# Patient Record
Sex: MALE | Race: WHITE | NOT HISPANIC OR LATINO | Employment: OTHER | URBAN - NONMETROPOLITAN AREA
[De-identification: names, ages, dates, MRNs, and addresses within clinical notes are randomized per-mention and may not be internally consistent; named-entity substitution may affect disease eponyms.]

---

## 2021-06-23 ENCOUNTER — HOSPITAL ENCOUNTER (OUTPATIENT)
Facility: HOSPITAL | Age: 76
Setting detail: OBSERVATION
Discharge: HOME | End: 2021-06-24
Attending: EMERGENCY MEDICINE | Admitting: INTERNAL MEDICINE
Payer: COMMERCIAL

## 2021-06-23 DIAGNOSIS — Z79.4 TYPE 2 DIABETES MELLITUS WITH HYPERGLYCEMIA, WITH LONG-TERM CURRENT USE OF INSULIN (HCC): ICD-10-CM

## 2021-06-23 DIAGNOSIS — E87.6 HYPOKALEMIA: Primary | ICD-10-CM

## 2021-06-23 DIAGNOSIS — R94.31 ABNORMAL EKG: ICD-10-CM

## 2021-06-23 DIAGNOSIS — E11.65 TYPE 2 DIABETES MELLITUS WITH HYPERGLYCEMIA, WITH LONG-TERM CURRENT USE OF INSULIN (HCC): ICD-10-CM

## 2021-06-23 PROBLEM — E11.9 DIABETES MELLITUS, TYPE 2 (HCC): Status: ACTIVE | Noted: 2021-06-23

## 2021-06-23 PROBLEM — I10 ESSENTIAL HYPERTENSION: Status: ACTIVE | Noted: 2021-06-23

## 2021-06-23 LAB
ANION GAP SERPL CALCULATED.3IONS-SCNC: 5 MMOL/L (ref 4–13)
ATRIAL RATE: 97 BPM
BASOPHILS # BLD AUTO: 0.04 THOUSANDS/ΜL (ref 0–0.1)
BASOPHILS NFR BLD AUTO: 1 % (ref 0–1)
BUN SERPL-MCNC: 15 MG/DL (ref 5–25)
CALCIUM SERPL-MCNC: 9 MG/DL (ref 8.3–10.1)
CHLORIDE SERPL-SCNC: 104 MMOL/L (ref 100–108)
CO2 SERPL-SCNC: 34 MMOL/L (ref 21–32)
CREAT SERPL-MCNC: 0.84 MG/DL (ref 0.6–1.3)
EOSINOPHIL # BLD AUTO: 0.14 THOUSAND/ΜL (ref 0–0.61)
EOSINOPHIL NFR BLD AUTO: 2 % (ref 0–6)
ERYTHROCYTE [DISTWIDTH] IN BLOOD BY AUTOMATED COUNT: 15.3 % (ref 11.6–15.1)
GFR SERPL CREATININE-BSD FRML MDRD: 86 ML/MIN/1.73SQ M
GLUCOSE SERPL-MCNC: 225 MG/DL (ref 65–140)
GLUCOSE SERPL-MCNC: 264 MG/DL (ref 65–140)
HCT VFR BLD AUTO: 40.8 % (ref 36.5–49.3)
HGB BLD-MCNC: 12.9 G/DL (ref 12–17)
IMM GRANULOCYTES # BLD AUTO: 0.02 THOUSAND/UL (ref 0–0.2)
IMM GRANULOCYTES NFR BLD AUTO: 0 % (ref 0–2)
LYMPHOCYTES # BLD AUTO: 1.36 THOUSANDS/ΜL (ref 0.6–4.47)
LYMPHOCYTES NFR BLD AUTO: 23 % (ref 14–44)
MAGNESIUM SERPL-MCNC: 2 MG/DL (ref 1.6–2.6)
MCH RBC QN AUTO: 27.7 PG (ref 26.8–34.3)
MCHC RBC AUTO-ENTMCNC: 31.6 G/DL (ref 31.4–37.4)
MCV RBC AUTO: 88 FL (ref 82–98)
MONOCYTES # BLD AUTO: 0.59 THOUSAND/ΜL (ref 0.17–1.22)
MONOCYTES NFR BLD AUTO: 10 % (ref 4–12)
NEUTROPHILS # BLD AUTO: 3.89 THOUSANDS/ΜL (ref 1.85–7.62)
NEUTS SEG NFR BLD AUTO: 64 % (ref 43–75)
NRBC BLD AUTO-RTO: 0 /100 WBCS
P AXIS: 65 DEGREES
PLATELET # BLD AUTO: 252 THOUSANDS/UL (ref 149–390)
PMV BLD AUTO: 8.6 FL (ref 8.9–12.7)
POTASSIUM SERPL-SCNC: 2.4 MMOL/L (ref 3.5–5.3)
PR INTERVAL: 176 MS
QRS AXIS: 29 DEGREES
QRSD INTERVAL: 82 MS
QT INTERVAL: 372 MS
QTC INTERVAL: 472 MS
RBC # BLD AUTO: 4.65 MILLION/UL (ref 3.88–5.62)
SODIUM SERPL-SCNC: 143 MMOL/L (ref 136–145)
T WAVE AXIS: 62 DEGREES
VENTRICULAR RATE: 97 BPM
WBC # BLD AUTO: 6.04 THOUSAND/UL (ref 4.31–10.16)

## 2021-06-23 PROCEDURE — 80048 BASIC METABOLIC PNL TOTAL CA: CPT | Performed by: EMERGENCY MEDICINE

## 2021-06-23 PROCEDURE — 82948 REAGENT STRIP/BLOOD GLUCOSE: CPT

## 2021-06-23 PROCEDURE — 96365 THER/PROPH/DIAG IV INF INIT: CPT

## 2021-06-23 PROCEDURE — 99220 PR INITIAL OBSERVATION CARE/DAY 70 MINUTES: CPT | Performed by: NURSE PRACTITIONER

## 2021-06-23 PROCEDURE — 99285 EMERGENCY DEPT VISIT HI MDM: CPT | Performed by: EMERGENCY MEDICINE

## 2021-06-23 PROCEDURE — 96366 THER/PROPH/DIAG IV INF ADDON: CPT

## 2021-06-23 PROCEDURE — 93010 ELECTROCARDIOGRAM REPORT: CPT | Performed by: INTERNAL MEDICINE

## 2021-06-23 PROCEDURE — 83735 ASSAY OF MAGNESIUM: CPT | Performed by: EMERGENCY MEDICINE

## 2021-06-23 PROCEDURE — 99285 EMERGENCY DEPT VISIT HI MDM: CPT

## 2021-06-23 PROCEDURE — 85025 COMPLETE CBC W/AUTO DIFF WBC: CPT | Performed by: EMERGENCY MEDICINE

## 2021-06-23 PROCEDURE — 83036 HEMOGLOBIN GLYCOSYLATED A1C: CPT | Performed by: NURSE PRACTITIONER

## 2021-06-23 PROCEDURE — 93005 ELECTROCARDIOGRAM TRACING: CPT

## 2021-06-23 PROCEDURE — 36415 COLL VENOUS BLD VENIPUNCTURE: CPT | Performed by: EMERGENCY MEDICINE

## 2021-06-23 RX ORDER — PRAVASTATIN SODIUM 40 MG
40 TABLET ORAL
Status: DISCONTINUED | OUTPATIENT
Start: 2021-06-24 | End: 2021-06-24 | Stop reason: HOSPADM

## 2021-06-23 RX ORDER — INSULIN GLARGINE 100 [IU]/ML
10 INJECTION, SOLUTION SUBCUTANEOUS EVERY MORNING
Status: DISCONTINUED | OUTPATIENT
Start: 2021-06-24 | End: 2021-06-24 | Stop reason: HOSPADM

## 2021-06-23 RX ORDER — POTASSIUM CHLORIDE 14.9 MG/ML
20 INJECTION INTRAVENOUS ONCE
Status: COMPLETED | OUTPATIENT
Start: 2021-06-23 | End: 2021-06-23

## 2021-06-23 RX ORDER — ONDANSETRON 2 MG/ML
4 INJECTION INTRAMUSCULAR; INTRAVENOUS EVERY 6 HOURS PRN
Status: DISCONTINUED | OUTPATIENT
Start: 2021-06-23 | End: 2021-06-24 | Stop reason: HOSPADM

## 2021-06-23 RX ORDER — ACETAMINOPHEN 325 MG/1
650 TABLET ORAL EVERY 6 HOURS PRN
Status: DISCONTINUED | OUTPATIENT
Start: 2021-06-23 | End: 2021-06-24 | Stop reason: HOSPADM

## 2021-06-23 RX ORDER — ROSUVASTATIN CALCIUM 20 MG/1
20 TABLET, COATED ORAL DAILY
COMMUNITY

## 2021-06-23 RX ORDER — POTASSIUM CHLORIDE 20 MEQ/1
40 TABLET, EXTENDED RELEASE ORAL
Status: COMPLETED | OUTPATIENT
Start: 2021-06-23 | End: 2021-06-23

## 2021-06-23 RX ORDER — LISINOPRIL 2.5 MG/1
2.5 TABLET ORAL DAILY
COMMUNITY

## 2021-06-23 RX ORDER — INSULIN GLARGINE 100 [IU]/ML
10 INJECTION, SOLUTION SUBCUTANEOUS
Status: ON HOLD | COMMUNITY
End: 2021-06-24 | Stop reason: SDUPTHER

## 2021-06-23 RX ORDER — LISINOPRIL 5 MG/1
2.5 TABLET ORAL DAILY
Status: DISCONTINUED | OUTPATIENT
Start: 2021-06-24 | End: 2021-06-24 | Stop reason: HOSPADM

## 2021-06-23 RX ADMIN — POTASSIUM CHLORIDE 40 MEQ: 20 TABLET, EXTENDED RELEASE ORAL at 20:40

## 2021-06-23 RX ADMIN — POTASSIUM CHLORIDE 20 MEQ: 14.9 INJECTION, SOLUTION INTRAVENOUS at 17:39

## 2021-06-23 RX ADMIN — POTASSIUM CHLORIDE 40 MEQ: 20 TABLET, EXTENDED RELEASE ORAL at 22:17

## 2021-06-23 RX ADMIN — INSULIN LISPRO 2 UNITS: 100 INJECTION, SOLUTION INTRAVENOUS; SUBCUTANEOUS at 22:17

## 2021-06-23 RX ADMIN — POTASSIUM CHLORIDE 20 MEQ: 14.9 INJECTION, SOLUTION INTRAVENOUS at 19:46

## 2021-06-24 VITALS
OXYGEN SATURATION: 95 % | TEMPERATURE: 98 F | HEART RATE: 90 BPM | RESPIRATION RATE: 16 BRPM | WEIGHT: 151.24 LBS | DIASTOLIC BLOOD PRESSURE: 89 MMHG | SYSTOLIC BLOOD PRESSURE: 135 MMHG | BODY MASS INDEX: 21.65 KG/M2 | HEIGHT: 70 IN

## 2021-06-24 PROBLEM — R94.31 ABNORMAL EKG: Status: ACTIVE | Noted: 2021-06-24

## 2021-06-24 PROBLEM — E87.0 HYPERNATREMIA: Status: ACTIVE | Noted: 2021-06-24

## 2021-06-24 PROBLEM — E11.65 TYPE 2 DIABETES MELLITUS WITH HYPERGLYCEMIA, WITH LONG-TERM CURRENT USE OF INSULIN (HCC): Status: ACTIVE | Noted: 2021-06-23

## 2021-06-24 PROBLEM — Z79.4 TYPE 2 DIABETES MELLITUS WITH HYPERGLYCEMIA, WITH LONG-TERM CURRENT USE OF INSULIN (HCC): Status: ACTIVE | Noted: 2021-06-23

## 2021-06-24 LAB
ALBUMIN SERPL BCP-MCNC: 3.5 G/DL (ref 3.5–5)
ALP SERPL-CCNC: 102 U/L (ref 46–116)
ALT SERPL W P-5'-P-CCNC: 20 U/L (ref 12–78)
ANION GAP SERPL CALCULATED.3IONS-SCNC: 5 MMOL/L (ref 4–13)
AST SERPL W P-5'-P-CCNC: 15 U/L (ref 5–45)
BILIRUB SERPL-MCNC: 0.48 MG/DL (ref 0.2–1)
BUN SERPL-MCNC: 11 MG/DL (ref 5–25)
CALCIUM SERPL-MCNC: 8.7 MG/DL (ref 8.3–10.1)
CHLORIDE SERPL-SCNC: 110 MMOL/L (ref 100–108)
CO2 SERPL-SCNC: 31 MMOL/L (ref 21–32)
CREAT SERPL-MCNC: 0.8 MG/DL (ref 0.6–1.3)
ERYTHROCYTE [DISTWIDTH] IN BLOOD BY AUTOMATED COUNT: 15.5 % (ref 11.6–15.1)
EST. AVERAGE GLUCOSE BLD GHB EST-MCNC: 217 MG/DL
GFR SERPL CREATININE-BSD FRML MDRD: 88 ML/MIN/1.73SQ M
GLUCOSE P FAST SERPL-MCNC: 155 MG/DL (ref 65–99)
GLUCOSE SERPL-MCNC: 137 MG/DL (ref 65–140)
GLUCOSE SERPL-MCNC: 155 MG/DL (ref 65–140)
HBA1C MFR BLD: 9.2 %
HCT VFR BLD AUTO: 41.2 % (ref 36.5–49.3)
HGB BLD-MCNC: 13 G/DL (ref 12–17)
MAGNESIUM SERPL-MCNC: 2.1 MG/DL (ref 1.6–2.6)
MCH RBC QN AUTO: 27.7 PG (ref 26.8–34.3)
MCHC RBC AUTO-ENTMCNC: 31.6 G/DL (ref 31.4–37.4)
MCV RBC AUTO: 88 FL (ref 82–98)
PHOSPHATE SERPL-MCNC: 2.8 MG/DL (ref 2.3–4.1)
PLATELET # BLD AUTO: 209 THOUSANDS/UL (ref 149–390)
PMV BLD AUTO: 8.4 FL (ref 8.9–12.7)
POTASSIUM SERPL-SCNC: 3.5 MMOL/L (ref 3.5–5.3)
PROT SERPL-MCNC: 7 G/DL (ref 6.4–8.2)
RBC # BLD AUTO: 4.69 MILLION/UL (ref 3.88–5.62)
SODIUM SERPL-SCNC: 146 MMOL/L (ref 136–145)
WBC # BLD AUTO: 6.35 THOUSAND/UL (ref 4.31–10.16)

## 2021-06-24 PROCEDURE — 85027 COMPLETE CBC AUTOMATED: CPT | Performed by: NURSE PRACTITIONER

## 2021-06-24 PROCEDURE — 84100 ASSAY OF PHOSPHORUS: CPT | Performed by: NURSE PRACTITIONER

## 2021-06-24 PROCEDURE — 82948 REAGENT STRIP/BLOOD GLUCOSE: CPT

## 2021-06-24 PROCEDURE — 83735 ASSAY OF MAGNESIUM: CPT | Performed by: NURSE PRACTITIONER

## 2021-06-24 PROCEDURE — 80053 COMPREHEN METABOLIC PANEL: CPT | Performed by: NURSE PRACTITIONER

## 2021-06-24 PROCEDURE — 99217 PR OBSERVATION CARE DISCHARGE MANAGEMENT: CPT | Performed by: INTERNAL MEDICINE

## 2021-06-24 RX ORDER — POTASSIUM CHLORIDE 20 MEQ/1
40 TABLET, EXTENDED RELEASE ORAL ONCE
Status: COMPLETED | OUTPATIENT
Start: 2021-06-24 | End: 2021-06-24

## 2021-06-24 RX ORDER — POTASSIUM CHLORIDE 20 MEQ/1
20 TABLET, EXTENDED RELEASE ORAL DAILY
Qty: 4 TABLET | Refills: 0 | Status: SHIPPED | OUTPATIENT
Start: 2021-06-25 | End: 2021-06-29

## 2021-06-24 RX ORDER — INSULIN GLARGINE 100 [IU]/ML
10 INJECTION, SOLUTION SUBCUTANEOUS DAILY
Qty: 10 ML | Refills: 0
Start: 2021-06-24

## 2021-06-24 RX ADMIN — POTASSIUM CHLORIDE 40 MEQ: 20 TABLET, EXTENDED RELEASE ORAL at 08:13

## 2021-06-24 RX ADMIN — LISINOPRIL 2.5 MG: 5 TABLET ORAL at 08:14

## 2021-06-24 RX ADMIN — INSULIN GLARGINE 10 UNITS: 100 INJECTION, SOLUTION SUBCUTANEOUS at 08:14

## 2021-06-24 NOTE — ASSESSMENT & PLAN NOTE
Blood pressure currently stable  Admit to [facility]-telemetry-observation  Continue prior to admission lisinopril  Trend blood pressure per protocol

## 2021-06-24 NOTE — PLAN OF CARE
Problem: Potential for Falls  Goal: Patient will remain free of falls  Description: INTERVENTIONS:  - Educate patient/family on patient safety including physical limitations  - Instruct patient to call for assistance with activity   - Consult OT/PT to assist with strengthening/mobility   - Keep Call bell within reach  - Keep bed low and locked with side rails adjusted as appropriate  - Keep care items and personal belongings within reach  - Initiate and maintain comfort rounds  - Apply yellow socks and bracelet for high fall risk patients  - Consider moving patient to room near nurses station  6/23/2021 2242 by Renetta Zazueta RN  Outcome: Progressing  6/23/2021 2242 by Renetta Zazueta RN  Outcome: Progressing     Problem: METABOLIC, FLUID AND ELECTROLYTES - ADULT  Goal: Electrolytes maintained within normal limits  Description: INTERVENTIONS:  - Monitor labs and assess patient for signs and symptoms of electrolyte imbalances  - Administer electrolyte replacement as ordered  - Monitor response to electrolyte replacements, including repeat lab results as appropriate  - Instruct patient on fluid and nutrition as appropriate  6/23/2021 2242 by Renetta Zazueta RN  Outcome: Progressing  6/23/2021 2242 by Renetta Zazueta RN  Outcome: Progressing  Goal: Glucose maintained within target range  Description: INTERVENTIONS:  - Monitor Blood Glucose as ordered  - Assess for signs and symptoms of hyperglycemia and hypoglycemia  - Administer ordered medications to maintain glucose within target range  - Assess nutritional intake and initiate nutrition service referral as needed  6/23/2021 2242 by Renetta Zazueta RN  Outcome: Progressing  6/23/2021 2242 by Renetta Zazueta RN  Outcome: Progressing     Problem: CARDIOVASCULAR - ADULT  Goal: Absence of cardiac dysrhythmias or at baseline rhythm  Description: INTERVENTIONS:  - Continuous cardiac monitoring, vital signs, obtain 12 lead EKG if ordered  - Administer antiarrhythmic and heart rate control medications as ordered  - Monitor electrolytes and administer replacement therapy as ordered  Outcome: Progressing

## 2021-06-24 NOTE — DISCHARGE INSTRUCTIONS
Hypokalemia   WHAT YOU NEED TO KNOW:   Hypokalemia is a low level of potassium in your blood  Potassium helps control how your muscles, heart, and digestive system work  Hypokalemia occurs when your body loses too much potassium or does not absorb enough from food  DISCHARGE INSTRUCTIONS:   Seek care immediately if:   · You cannot move your arm or leg  · You have a fast or irregular heartbeat  · You are too tired or weak to stand up  Contact your healthcare provider if:   · You are vomiting, or you have diarrhea  · You have numbness or tingling in your arms or legs  · Your symptoms do not go away or they get worse  · You have questions or concerns about your condition or care  Medicines:   · Potassium  will be given to bring your potassium levels back to normal     · Take your medicine as directed  Contact your healthcare provider if you think your medicine is not helping or if you have side effects  Tell him of her if you are allergic to any medicine  Keep a list of the medicines, vitamins, and herbs you take  Include the amounts, and when and why you take them  Bring the list or the pill bottles to follow-up visits  Carry your medicine list with you in case of an emergency  Eat foods that are high in potassium:  Foods that are high in potassium include bananas, oranges, tomatoes, potatoes, and avocado  [place] beans, turkey, salmon, lean beef, yogurt, and milk are also high in potassium  Ask your healthcare provider or dietitian for more information about foods that are high in potassium  Follow up with your healthcare provider as directed:  Write down your questions so you remember to ask them during your visits  © Copyright [address] Information is for End User's use only and may not be sold, redistributed or otherwise used for commercial purposes   All illustrations and images included in CareNotes® are the copyrighted property of A D A M , Inc  or [employer]  The above information is an  only  It is not intended as medical advice for individual conditions or treatments  Talk to your doctor, nurse or pharmacist before following any medical regimen to see if it is safe and effective for you  Hypokalemia   WHAT YOU NEED TO KNOW:   What is hypokalemia? Hypokalemia is a low level of potassium in your blood  Potassium helps control how your muscles, heart, and digestive system work  Hypokalemia occurs when your body loses too much potassium or does not absorb enough from food  What causes hypokalemia? · Diarrhea or vomiting    · Medicines, such as diuretics, blood pressure medicines, or antibiotics    · Excessive use of laxatives    · Anorexia or bulimia nervosa    · Medical conditions, such as Cushing syndrome or kidney disease    · Not eating enough foods that contain potassium    What are the signs and symptoms of hypokalemia? You may not have any signs or symptoms if you have mild hypokalemia  You may have any of the following if it is more severe:  · Fatigue    · Constipation    · Frequent urination or urinating large amounts    · Muscle cramps or skin tingling    · Muscle weakness    · Fast or irregular heartbeat    How is hypokalemia diagnosed? · An EKG  test records your heart rhythm and how fast your heart beats  It is used to check for an irregular heartbeat  · Blood tests  are done to check your potassium level  How is hypokalemia treated? You will receive potassium to bring your levels back to normal  This may be given as a pill or IV  The amount of potassium you will be given depends on your potassium level  What foods are high in potassium? Foods that are high in potassium include bananas, oranges, tomatoes, potatoes, and avocado  [place] beans, turkey, salmon, lean beef, yogurt, and milk are also high in potassium  Ask your healthcare provider or dietitian for more information about foods that are high in potassium  When should I seek immediate care? · You cannot move your arm or leg  · You have a fast or irregular heartbeat  · You are too tired or weak to stand up  When should I contact my healthcare provider? · You are vomiting, or you have diarrhea  · You have numbness or tingling in your arms or legs  · Your symptoms do not go away or they get worse  · You have questions or concerns about your condition or care  CARE AGREEMENT:   You have the right to help plan your care  Learn about your health condition and how it may be treated  Discuss treatment options with your healthcare providers to decide what care you want to receive  You always have the right to refuse treatment  The above information is an  only  It is not intended as medical advice for individual conditions or treatments  Talk to your doctor, nurse or pharmacist before following any medical regimen to see if it is safe and effective for you  © Copyright [address] Information is for End User's use only and may not be sold, redistributed or otherwise used for commercial purposes   All illustrations and images included in CareNotes® are the copyrighted property of A D A M , Inc  or [address] [place] [address]

## 2021-06-24 NOTE — NURSING NOTE
Went over AVS with patient  Verbalized understanding of discharge instructions including lab orders for 6/28  Left walking down to private vehicle

## 2021-06-24 NOTE — H&P
[facility] [address] [place]  H&P- Zeb George 1945, 77 y o  male MRN: [id number]  Unit/Bed#: RM01 Encounter: [id number]  Primary Care Provider: No primary care provider on file  Date and time admitted to hospital: 6/23/2021  4:06 PM    * Hypokalemia  Assessment & Plan  As evidence by a potassium level of 2 4  Patient was given potassium IV piggyback 20 mEq x2 in the ER  Will replete with 40 mEq times to p o  Admit to med surge with telemetry-observation  EKG reviewed  Monitor for signs and symptoms of arrhythmias  Patient denies any current symptoms  Trend BMP    Essential hypertension  Assessment & Plan  Blood pressure currently stable  Admit to med surge-telemetry-observation  Continue prior to admission lisinopril  Trend blood pressure per protocol    Diabetes mellitus, type 2 ([facility] [address] )  Assessment & Plan  No results found for: HGBA1C    No results for input(s): POCGLU in the last 72 hours  Blood Sugar Average: Last 72 hrs:    Collect New A1c  Accu-Cheks a c  Hs  Insulin sliding scale  Continue prior to admission long-acting Lantus 10 units q a m  VTE Prophylaxis: Low risk activity as tolerated  / reason for no mechanical VTE prophylaxis Low risk activity as tolerated   Code Status:  Full  POLST: POLST is not applicable to this patient    Anticipated Length of Stay:  Patient will be admitted on an Observation basis with an anticipated length of stay of  Less than 2 midnights  Justification for Hospital Stay:  Hypokalemia    Total Time for Visit, including Counseling / Coordination of Care: 45 minutes  Greater than 50% of this total time spent on direct patient counseling and coordination of care  Chief Complaint:   Instructed to come to the emergency room for evaluation of abnormal lab value-low potassium    History of Present Illness:    Zeb George is a 77 y o  male presented to the emergency room per recommendation of telephone conversation held with [facility]    Patient reported abnormal lab value-low potassium  Patient has a history of hypertension, diabetes and hypercholesterolemia  Prior to admission medications reviewed  Patient denies all review of symptoms  Patient denies diet changes, denies new medications, denies supplements and herbs, denies nausea vomiting diarrhea  Denies chest pain denies shortness of breath  Labs were collected emergency room showed potassium level of 2 4  Patient received 20 mEq IV piggyback potassium in ER  Patient be admitted to [facility] with telemetry for observation  Review of Systems:    Review of Systems   All other systems reviewed and are negative  Past Medical and Surgical History:     Past Medical History:   Diagnosis Date    Diabetes mellitus ([facility] [address] )        History reviewed  No pertinent surgical history  Meds/Allergies:    Prior to Admission medications    Medication Sig Start Date End Date Taking? Authorizing Provider   Empagliflozin 25 MG TABS Take 25 mg by mouth daily 4/9/21  Yes Historical Provider, MD   insulin glargine (LANTUS) 100 units/mL subcutaneous injection Inject 10 Units under the skin   Yes Historical Provider, MD   lisinopril (ZESTRIL) 2 5 mg tablet Take 2 5 mg by mouth daily   Yes Historical Provider, MD   rosuvastatin (CRESTOR) 20 MG tablet Take 20 mg by mouth daily   Yes Historical Provider, MD   Empagliflozin (JARDIANCE PO) Take by mouth daily    Historical Provider, MD     I have reviewed home medications using allscripts  Allergies: Allergies   Allergen Reactions    Metformin Diarrhea       Social History:     Marital Status:    Occupation:  Noncontributory    Substance Use History:   Social History     Substance and Sexual Activity   Alcohol Use Never     Social History     Tobacco Use   Smoking Status Former Smoker   Smokeless Tobacco Never Used     Social History     Substance and Sexual Activity   Drug Use Never       Family History:    History reviewed   No pertinent family history  Physical Exam:     Vitals:   Blood Pressure: 159/89 (06/23/21 2000)  Pulse: 85 (06/23/21 2000)  Temperature: 98 9 °F (37 2 °C) (06/23/21 1611)  Temp Source: Temporal (06/23/21 1611)  Respirations: 16 (06/23/21 2000)  Height: 5' 10" (177 8 cm) (06/23/21 1611)  Weight - Scale: 61 2 kg (135 lb) (06/23/21 1611)  SpO2: 95 % (06/23/21 2000)    Physical Exam  Constitutional:       Appearance: Normal appearance  HENT:      Head: Normocephalic and atraumatic  Mouth/Throat:      Pharynx: Oropharynx is clear  Eyes:      Conjunctiva/sclera: Conjunctivae normal       Pupils: Pupils are equal, round, and reactive to light  Cardiovascular:      Rate and Rhythm: Normal rate and regular rhythm  Pulmonary:      Effort: Pulmonary effort is normal       Breath sounds: Normal breath sounds  Abdominal:      General: Abdomen is flat  Bowel sounds are normal       Palpations: Abdomen is soft  Musculoskeletal:         General: No swelling or tenderness  Skin:     General: Skin is warm and dry  Capillary Refill: Capillary refill takes 2 to 3 seconds  Neurological:      General: No focal deficit present  Mental Status: He is alert and oriented to person, place, and time  Psychiatric:         Mood and Affect: Mood normal          Behavior: Behavior normal          Thought Content: Thought content normal          Judgment: Judgment normal          Additional Data:     Lab Results: I have personally reviewed pertinent reports  Results from last 7 days   Lab Units 06/23/21  1648   WBC Thousand/uL 6 04   HEMOGLOBIN g/dL 12 9   HEMATOCRIT % 40 8   PLATELETS Thousands/uL 252   NEUTROS PCT % 64   LYMPHS PCT % 23   MONOS PCT % 10   EOS PCT % 2     Results from last 7 days   Lab Units 06/23/21  1648   POTASSIUM mmol/L 2 4*   CHLORIDE mmol/L 104   CO2 mmol/L 34*   BUN mg/dL 15   CREATININE mg/dL 0 84   CALCIUM mg/dL 9 0           Imaging: I have personally reviewed pertinent reports        No results found     EKG, Pathology, and Other Studies Reviewed on Admission:   · EKG: reviewed    Epic / Care Everywhere Records Reviewed: Yes     ** Please Note: This note has been constructed using a voice recognition system   **

## 2021-06-24 NOTE — ED PROVIDER NOTES
History  Chief Complaint   Patient presents with    Abnormal Lab     [facility] a [facility] of 2 6  patient denies any symptoms     This is a 78-year-old male presents emergency department with abnormal labs  The patient was called today by the [place] and advised to come to the emergency department for a low potassium level  Patient states he has never had this before  He was told that it was 2 6 on outpatient labs  No recent change in medications  He does not take potassium  No chest pain  No palpitations  No shortness of breath  No weakness  No aggravating or alleviating factors  No radiation of symptoms  Labs were drawn this morning  Differential diagnosis includes electrolyte abnormality, lab draw error          Prior to Admission Medications   Prescriptions Last Dose Informant Patient Reported? Taking? Empagliflozin (JARDIANCE PO)   Yes No   Sig: Take by mouth daily   Empagliflozin 25 MG TABS 6/23/2021 at Unknown time  Yes Yes   Sig: Take 25 mg by mouth daily   insulin glargine (LANTUS) 100 units/mL subcutaneous injection   Yes Yes   Sig: Inject 10 Units under the skin   lisinopril (ZESTRIL) 2 5 mg tablet   Yes Yes   Sig: Take 2 5 mg by mouth daily   rosuvastatin (CRESTOR) 20 MG tablet   Yes Yes   Sig: Take 20 mg by mouth daily      Facility-Administered Medications: None       Past Medical History:   Diagnosis Date    Diabetes mellitus ([facility] [address] )        History reviewed  No pertinent surgical history  History reviewed  No pertinent family history  I have reviewed and agree with the history as documented  E-Cigarette/Vaping     E-Cigarette/Vaping Substances     Social History     Tobacco Use    Smoking status: Former Smoker    Smokeless tobacco: Never Used   Substance Use Topics    Alcohol use: Never    Drug use: Never       Review of Systems   Constitutional: Negative for activity change, appetite change and fever  HENT: Negative for congestion      Eyes: Negative for pain and redness  Respiratory: Negative for cough, shortness of breath and wheezing  Cardiovascular: Negative for chest pain and palpitations  Gastrointestinal: Negative for abdominal pain, diarrhea, nausea and vomiting  Endocrine: Negative for polyuria  Genitourinary: Negative for difficulty urinating, dysuria, frequency and urgency  Musculoskeletal: Negative for arthralgias and myalgias  Skin: Negative for color change and rash  Allergic/Immunologic: Negative for immunocompromised state  Neurological: Negative for dizziness, syncope and light-headedness  Hematological: Does not bruise/bleed easily  Psychiatric/Behavioral: Negative for confusion  All other systems reviewed and are negative  Physical Exam  Physical Exam  Vitals and nursing note reviewed  Constitutional:       General: He is not in acute distress  Appearance: He is well-developed  HENT:      Head: Normocephalic and atraumatic  Nose: Nose normal    Eyes:      General: No scleral icterus  Conjunctiva/sclera: Conjunctivae normal    Cardiovascular:      Rate and Rhythm: Normal rate and regular rhythm  Heart sounds: Normal heart sounds  Pulmonary:      Effort: Pulmonary effort is normal  No respiratory distress  Breath sounds: Normal breath sounds  No stridor  No wheezing  Abdominal:      General: There is no distension  Palpations: Abdomen is soft  Tenderness: There is no abdominal tenderness  There is no guarding or rebound  Musculoskeletal:         General: No deformity  Cervical back: Normal range of motion and neck supple  Skin:     General: Skin is warm and dry  Findings: No rash  Neurological:      Mental Status: He is alert and oriented to person, place, and time  Psychiatric:         Thought Content:  Thought content normal          Vital Signs  ED Triage Vitals   Temperature Pulse Respirations Blood Pressure SpO2   06/23/21 1611 06/23/21 1611 06/23/21 1611 06/23/21 1611 06/23/21 1611   98 9 °F (37 2 °C) 102 16 (!) 173/85 96 %      Temp Source Heart Rate Source Patient Position - Orthostatic VS BP Location FiO2 (%)   06/23/21 1611 06/23/21 1611 06/23/21 1611 06/23/21 1611 --   Temporal Monitor Sitting Left arm       Pain Score       06/23/21 2040       No Pain           Vitals:    06/23/21 1900 06/23/21 1930 06/23/21 2000 06/23/21 2036   BP: 162/82 164/88 159/89 160/87   Pulse: 86 88 85 86   Patient Position - Orthostatic VS: Sitting Sitting Sitting Lying         Visual Acuity      ED Medications  Medications   acetaminophen (TYLENOL) tablet 650 mg (has no administration in time range)   ondansetron (ZOFRAN) injection 4 mg (has no administration in time range)   insulin lispro (HumaLOG) 100 units/mL subcutaneous injection 1-5 Units (has no administration in time range)   insulin lispro (HumaLOG) 100 units/mL subcutaneous injection 1-5 Units (2 Units Subcutaneous Given 6/23/21 2217)   pneumococcal 13-valent conjugate vaccine (PREVNAR-13) IM injection 0 5 mL (has no administration in time range)   lisinopril (ZESTRIL) tablet 2 5 mg (has no administration in time range)   pravastatin (PRAVACHOL) tablet 40 mg (has no administration in time range)   insulin glargine (LANTUS) subcutaneous injection 10 Units 0 1 mL (has no administration in time range)   potassium chloride 20 mEq IVPB (premix) (0 mEq Intravenous Stopped 6/23/21 1939)   potassium chloride 20 mEq IVPB (premix) (20 mEq Intravenous New Bag 6/1945)   potassium chloride (K-DUR,KLOR-CON) CR tablet 40 mEq (40 mEq Oral Given 6/23/21 2217)       Diagnostic Studies  Results Reviewed     Procedure Component Value Units Date/Time    Hemoglobin A1c w/EAG Estimation (Orders if not completed within the last 90 days) [[id number]] Collected: 06/23/21 2126    Lab Status:  In process Specimen: Blood from Arm, Left Updated: 06/23/21 2132    Magnesium [[id number]]  (Normal) Collected: 06/23/21 1648    Lab Status: Final result Specimen: Blood from Arm, Right Updated: 06/23/21 1827     Magnesium 2 0 mg/dL     Basic metabolic panel [[id number]]  (Abnormal) Collected: 06/23/21 1648    Lab Status: Final result Specimen: Blood from Arm, Right Updated: 06/23/21 1711     Sodium 143 mmol/L      Potassium 2 4 mmol/L      Chloride 104 mmol/L      CO2 34 mmol/L      ANION GAP 5 mmol/L      BUN 15 mg/dL      Creatinine 0 84 mg/dL      Glucose 264 mg/dL      Calcium 9 0 mg/dL      eGFR 86 ml/min/1 73sq m     Narrative:      National Kidney Disease Foundation guidelines for Chronic Kidney Disease (CKD):     Stage 1 with normal or high GFR (GFR > 90 mL/min/1 73 square meters)    Stage 2 Mild CKD (GFR = 60-89 mL/min/1 73 square meters)    Stage 3A Moderate CKD (GFR = 45-59 mL/min/1 73 square meters)    Stage 3B Moderate CKD (GFR = 30-44 mL/min/1 73 square meters)    Stage 4 Severe CKD (GFR = 15-29 mL/min/1 73 square meters)    Stage 5 End Stage CKD (GFR <15 mL/min/1 73 square meters)  Note: GFR calculation is accurate only with a steady state creatinine    CBC and differential [[id number]]  (Abnormal) Collected: 06/23/21 1648    Lab Status: Final result Specimen: Blood from Arm, Right Updated: 06/23/21 1653     WBC 6 04 Thousand/uL      RBC 4 65 Million/uL      Hemoglobin 12 9 g/dL      Hematocrit 40 8 %      MCV 88 fL      MCH 27 7 pg      MCHC 31 6 g/dL      RDW 15 3 %      MPV 8 6 fL      Platelets 085 Thousands/uL      nRBC 0 /100 WBCs      Neutrophils Relative 64 %      Immat GRANS % 0 %      Lymphocytes Relative 23 %      Monocytes Relative 10 %      Eosinophils Relative 2 %      Basophils Relative 1 %      Neutrophils Absolute 3 89 Thousands/µL      Immature Grans Absolute 0 02 Thousand/uL      Lymphocytes Absolute 1 36 Thousands/µL      Monocytes Absolute 0 59 Thousand/µL      Eosinophils Absolute 0 14 Thousand/µL      Basophils Absolute 0 04 Thousands/µL                  No orders to display              Procedures  ECG 12 Lead Documentation Only    Date/Time: 6/23/2021 4:10 PM  Performed by: Barbara Castillo MD  Authorized by: Barbara Castillo MD     Indications / Diagnosis:  Hypokalemia  ECG reviewed by me, the ED Provider: yes    Patient location:  ED  Rate:     ECG rate assessment: normal    Rhythm:     Rhythm: sinus rhythm    Ectopy:     Ectopy: none    QRS:     QRS intervals:  Normal  Conduction:     Conduction: normal    ST segments:     ST segments:  Non-specific  T waves:     T waves: non-specific               ED Course                             SBIRT 20yo+      Most Recent Value   SBIRT (24 yo +)   In order to provide better care to our patients, we are screening all of our patients for alcohol and drug use  Would it be okay to ask you these screening questions? Yes Filed at: 06/23/2021 1618   Initial Alcohol Screen: US AUDIT-C    1  How often do you have a drink containing alcohol?  0 Filed at: 06/23/2021 1618   2  How many drinks containing alcohol do you have on a typical day you are drinking? 0 Filed at: 06/23/2021 1618   3a  Male UNDER 65: How often do you have five or more drinks on one occasion? 0 Filed at: 06/23/2021 1618   3b  FEMALE Any Age, or MALE 65+: How often do you have 4 or more drinks on one occassion? 0 Filed at: 06/23/2021 1618   Audit-C Score  0 Filed at: 06/23/2021 1618   ELIZABETH: How many times in the past year have you    Used an illegal drug or used a prescription medication for non-medical reasons? Never Filed at: 06/23/2021 1618                    MDM  Number of Diagnoses or Management Options  Diagnosis management comments: Patient with hypokalemia  Requires IV replacement         Amount and/or Complexity of Data Reviewed  Clinical lab tests: ordered and reviewed  Discuss the patient with other providers: yes [facility] admitting)        Disposition  Final diagnoses:   Hypokalemia     Time reflects when diagnosis was documented in both MDM as applicable and the Disposition within this note     Time User Action Codes Description Comment    6/23/2021 10:59 PM Trace Mckeon Add [E87 6] Hypokalemia       ED Disposition     ED Disposition Condition Date/Time Comment    Admit Stable Wed Jun 23, 2021 10:59 PM Case was discussed with [facility] Admitting team and the patient's admission status was agreed to be Admission Status: observation status to the service of SLIM   Follow-up Information    None         Current Discharge Medication List      CONTINUE these medications which have NOT CHANGED    Details   !! Empagliflozin 25 MG TABS Take 25 mg by mouth daily      insulin glargine (LANTUS) 100 units/mL subcutaneous injection Inject 10 Units under the skin      lisinopril (ZESTRIL) 2 5 mg tablet Take 2 5 mg by mouth daily      rosuvastatin (CRESTOR) 20 MG tablet Take 20 mg by mouth daily      !! Empagliflozin (JARDIANCE PO) Take by mouth daily       ! ! - Potential duplicate medications found  Please discuss with provider  No discharge procedures on file      PDMP Review     None          ED Provider  Electronically Signed by           Jean Padilla MD  06/23/21 1611

## 2021-06-24 NOTE — ASSESSMENT & PLAN NOTE
As evidence by a potassium level of 2 4  Patient was given potassium IV piggyback 20 mEq x2 in the ER  Will replete with 40 mEq times to p o    Admit to med surge with telemetry-observation  EKG reviewed  Monitor for signs and symptoms of arrhythmias  Patient denies any current symptoms  Trend BMP

## 2021-06-24 NOTE — CASE MANAGEMENT
Pt being dc'd home today and will be following up with the   [address] (OP clinic in [facility]) after dc as that is where his primary care provider is located

## 2021-06-24 NOTE — ASSESSMENT & PLAN NOTE
No results found for: HGBA1C    No results for input(s): POCGLU in the last 72 hours  Blood Sugar Average: Last 72 hrs:    Collect New A1c  Accu-Cheks a c  Hs  Insulin sliding scale  Continue prior to admission long-acting Lantus 10 units q a m

## 2021-06-25 NOTE — DISCHARGE SUMMARY
[address] [facility] [address]  Discharge- Suma Aguilar 1945, 77 y o  male MRN: [id number]  Unit/Bed#: 401-01 Encounter: [id number]  Primary Care Provider: No primary care provider on file     Date and time admitted to hospital: 6/23/2021  4:06 PM    * Hypokalemia  Assessment & Plan  · Secondary to unclear etiology  · Treated with IV and PO potassium chloride supplementation during the hospitalization, which resolved the hypokalemia  · Discharge on potassium chloride 20 meQ PO Qdaily for 4 days  · Recheck a BMP in 4 days as an outpatient with his PCP to monitor the potassium level  · Outpatient Nephrology evaluation    Results from last 7 days   Lab Units 06/24/21  0532 06/23/21  1648   SODIUM mmol/L 146* 143   POTASSIUM mmol/L 3 5 2 4*   CHLORIDE mmol/L 110* 104   CO2 mmol/L 31 34*   BUN mg/dL 11 15   CREATININE mg/dL 0 80 0 84   CALCIUM mg/dL 8 7 9 0     Results from last 7 days   Lab Units 06/24/21  0531 06/23/21  1648   MAGNESIUM mg/dL 2 1 2 0         Abnormal EKG  Assessment & Plan  · Outpatient Cardiology evaluation for a transthoracic echocardiogram and nuclear stress test    ECG 12 lead  Order: [id number]  Status:  Final result   Visible to patient:  No (inaccessible in [facility])   Next appt:  None   0 Result Notes   Ref Range & Units 6/23/21 1610   Ventricular Rate BPM 97    Atrial Rate BPM 97    ID Interval ms 176    QRSD Interval ms 82    QT Interval ms 372    QTC Interval ms 472    P Axis degrees 65    QRS Axis degrees 29    T Wave Axis degrees 62       Narrative & Impression    Normal sinus rhythm  Septal infarct , age undetermined  Nonspecific ST and T wave abnormality  Abnormal ECG  No previous ECGs available  Confirmed by Nicholas Rubalcava ([id number]) on 6/23/2021 4:55:55 PM      Specimen Collected: 06/23/21 16:10   Last Resulted: 06/23/21 16:55               Hypernatremia  Assessment & Plan  · Mild hypernatremia  · Recheck a BMP in 4 days as an outpatient with his PCP to monitor his sodium level      Results from last 7 days   Lab Units 06/24/21  0532 06/23/21  1648   SODIUM mmol/L 146* 143   POTASSIUM mmol/L 3 5 2 4*   CHLORIDE mmol/L 110* 104   CO2 mmol/L 31 34*   BUN mg/dL 11 15   CREATININE mg/dL 0 80 0 84   CALCIUM mg/dL 8 7 9 0         Essential hypertension  Assessment & Plan  · Continue lisinopril   Outpatient follow-up with PCP in regards to this matter    Type 2 diabetes mellitus with hyperglycemia, with long-term current use of insulin [facility])  Assessment & Plan  Lab Results   Component Value Date    HGBA1C 9 2 (H) 06/23/2021       Recent Labs     06/23/21  2128 06/24/21  0706   POCGLU 225* 137       Blood Sugar Average: Last 72 hrs:  (P) 181    · Continue his insulin regimen and PO diabetic medication regimen upon discharge  · Continue lisinopril for renal protection in the setting of type 2 diabetes mellitus   Outpatient follow-up with PCP in regards to this matter      Medical Problems     Resolved Problems  Date Reviewed: 6/24/2021    None              Discharging Physician / Practitioner: Joel Williamson DO  PCP: No primary care provider on file    Admission Date:   Admission Orders (From admission, onward)     Ordered        06/23/21 2002  Place in Observation  Once                   Discharge Date: 06/24/21    Consultations During Hospital Stay:  · None    Procedures Performed:   · None    Significant Findings / Test Results:   ECG 12 lead  Order: [id number]  Status:  Final result   Visible to patient:  No (inaccessible in [facility])   Next appt:  None   0 Result Notes   Ref Range & Units 6/23/21 1610   Ventricular Rate BPM 97    Atrial Rate BPM 97    ID Interval ms 176    QRSD Interval ms 82    QT Interval ms 372    QTC Interval ms 472    P Axis degrees 65    QRS Axis degrees 29    T Wave Axis degrees 62       Narrative & Impression    Normal sinus rhythm  Septal infarct , age undetermined  Nonspecific ST and T wave abnormality  Abnormal ECG  No previous ECGs available  Confirmed by Renny Downey ([id number]) on 6/23/2021 4:55:55 PM      Specimen Collected: 06/23/21 16:10   Last Resulted: 06/23/21 16:55           Results from last 7 days   Lab Units 06/24/21  0531 06/23/21  1648   WBC Thousand/uL 6 35 6 04   HEMOGLOBIN g/dL 13 0 12 9   HEMATOCRIT % 41 2 40 8   PLATELETS Thousands/uL 209 252     Results from last 7 days   Lab Units 06/24/21  0532 06/23/21  1648   SODIUM mmol/L 146* 143   POTASSIUM mmol/L 3 5 2 4*   CHLORIDE mmol/L 110* 104   CO2 mmol/L 31 34*   BUN mg/dL 11 15   CREATININE mg/dL 0 80 0 84   CALCIUM mg/dL 8 7 9 0     Results from last 7 days   Lab Units 06/24/21  0531 06/23/21  1648   MAGNESIUM mg/dL 2 1 2 0     Results from last 7 days   Lab Units 06/24/21  0532   ALK PHOS U/L 102   ALT U/L 20   AST U/L 15       Incidental Findings:   · None     Test Results Pending at Discharge (will require follow-up):  · None     Outpatient Tests Requested:  · BMP an Magnesium level in 4 days with PCP  · Outpatient follow-up with Cardiology for a transthoracic echocardiogram and nuclear stress test    Complications:  None    Reason for Admission:  Hypokalemia    Hospital Course:   Willie Vera is a 77 y o  male patient who originally presented to the hospital on 6/23/2021 after being found to have severe hypokalemia on outpatient laboratory blood work  Please see above list of diagnoses and related plan for additional information  Condition at Discharge: good    Discharge Day Visit / Exam:   Subjective: The patient was seen and examined  The patient is doing better  No chest pain  No shortness of breath  No abdominal pain  No nausea or vomiting  No diarrhea      Vitals: Blood Pressure: 135/89 (06/24/21 0708)  Pulse: 90 (06/24/21 0708)  Temperature: 98 °F (36 7 °C) (06/24/21 0708)  Temp Source: Oral (06/24/21 0013)  Respirations: 16 (06/24/21 0708)  Height: 5' 10" (177 8 cm) (06/23/21 2036)  Weight - Scale: 68 6 kg (151 lb 3 8 oz) (06/24/21 0600)  SpO2: 95 % (06/24/21 0316)  Exam:   Physical Exam   General:  NAD, follows commands  HEENT:  NC/AT, mucous membranes moist  Neck:  Supple, No JVP elevation  CV:  + S1, + S2, RRR  Pulm:  Lung fields are CTA bilaterally  Abd:  Soft, Non-tender, Non-distended  Ext:  No clubbing/cyanosis/edema  Skin:  No rashes  Neuro:  Awake, alert, oriented  Psych:  Normal mood and affect    Discharge instructions/Information to patient and family:  See after visit summary for information provided to patient and family  Provisions for Follow-Up Care:  See after visit summary for information related to follow-up care and any pertinent home health orders  Disposition:   Home    Planned Readmission:  No     Discharge Statement:  I spent 25 minutes discharging the patient  This time was spent on the day of discharge  I had direct contact with the patient on the day of discharge  Greater than 50% of the total time was spent examining patient, answering all patient questions, arranging and discussing plan of care with patient as well as directly providing post-discharge instructions  Additional time then spent on discharge activities  Discharge Medications:  See after visit summary for reconciled discharge medications provided to patient and/or family        **Please Note: This note may have been constructed using a voice recognition system**

## 2021-06-25 NOTE — ASSESSMENT & PLAN NOTE
· Outpatient Cardiology evaluation for a transthoracic echocardiogram and nuclear stress test    ECG 12 lead  Order: [id number]  Status:  Final result   Visible to patient:  No (inaccessible in [address])   Next appt:  None   0 Result Notes   Ref Range & Units 6/23/21 1610   Ventricular Rate BPM 97    Atrial Rate BPM 97    DE Interval ms 176    QRSD Interval ms 82    QT Interval ms 372    QTC Interval ms 472    P Axis degrees 65    QRS Axis degrees 29    T Wave Axis degrees 62       Narrative & Impression    Normal sinus rhythm  Septal infarct , age undetermined  Nonspecific ST and T wave abnormality  Abnormal ECG  No previous ECGs available  Confirmed by Valarie Pedraza ([id number]) on 6/23/2021 4:55:55 PM      Specimen Collected: 06/23/21 16:10   Last Resulted: 06/23/21 16:55

## 2021-06-25 NOTE — ASSESSMENT & PLAN NOTE
· Mild hypernatremia  · Recheck a BMP in 4 days as an outpatient with his PCP to monitor his sodium level      Results from last 7 days   Lab Units 06/24/21  0532 06/23/21  1648   SODIUM mmol/L 146* 143   POTASSIUM mmol/L 3 5 2 4*   CHLORIDE mmol/L 110* 104   CO2 mmol/L 31 34*   BUN mg/dL 11 15   CREATININE mg/dL 0 80 0 84   CALCIUM mg/dL 8 7 9 0

## 2021-06-25 NOTE — ASSESSMENT & PLAN NOTE
· Secondary to unclear etiology  · Treated with IV and PO potassium chloride supplementation during the hospitalization, which resolved the hypokalemia  · Discharge on potassium chloride 20 meQ PO Qdaily for 4 days  · Recheck a BMP in 4 days as an outpatient with his PCP to monitor the potassium level  · Outpatient Nephrology evaluation    Results from last 7 days   Lab Units 06/24/21  0532 06/23/21  1648   SODIUM mmol/L 146* 143   POTASSIUM mmol/L 3 5 2 4*   CHLORIDE mmol/L 110* 104   CO2 mmol/L 31 34*   BUN mg/dL 11 15   CREATININE mg/dL 0 80 0 84   CALCIUM mg/dL 8 7 9 0     Results from last 7 days   Lab Units 06/24/21  0531 06/23/21  1648   MAGNESIUM mg/dL 2 1 2 0

## 2021-06-25 NOTE — ASSESSMENT & PLAN NOTE
Lab Results   Component Value Date    HGBA1C 9 2 (H) 06/23/2021       Recent Labs     06/23/21 2128 06/24/21  0706   POCGLU 225* 137       Blood Sugar Average: Last 72 hrs:  (P) 181    · Continue his insulin regimen and PO diabetic medication regimen upon discharge  · Continue lisinopril for renal protection in the setting of type 2 diabetes mellitus   Outpatient follow-up with PCP in regards to this matter

## 2021-06-28 ENCOUNTER — APPOINTMENT (OUTPATIENT)
Dept: LAB | Facility: HOSPITAL | Age: 76
End: 2021-06-28
Attending: INTERNAL MEDICINE
Payer: COMMERCIAL

## 2021-06-28 DIAGNOSIS — E87.6 HYPOKALEMIA: ICD-10-CM

## 2021-06-28 LAB
ANION GAP SERPL CALCULATED.3IONS-SCNC: 7 MMOL/L (ref 4–13)
BUN SERPL-MCNC: 16 MG/DL (ref 5–25)
CALCIUM SERPL-MCNC: 8.9 MG/DL (ref 8.3–10.1)
CHLORIDE SERPL-SCNC: 107 MMOL/L (ref 100–108)
CO2 SERPL-SCNC: 30 MMOL/L (ref 21–32)
CREAT SERPL-MCNC: 0.83 MG/DL (ref 0.6–1.3)
GFR SERPL CREATININE-BSD FRML MDRD: 87 ML/MIN/1.73SQ M
GLUCOSE P FAST SERPL-MCNC: 81 MG/DL (ref 65–99)
MAGNESIUM SERPL-MCNC: 1.9 MG/DL (ref 1.6–2.6)
POTASSIUM SERPL-SCNC: 3.5 MMOL/L (ref 3.5–5.3)
SODIUM SERPL-SCNC: 144 MMOL/L (ref 136–145)

## 2021-06-28 PROCEDURE — 36415 COLL VENOUS BLD VENIPUNCTURE: CPT

## 2021-06-28 PROCEDURE — 83735 ASSAY OF MAGNESIUM: CPT

## 2021-06-28 PROCEDURE — 80048 BASIC METABOLIC PNL TOTAL CA: CPT

## 2021-07-09 NOTE — UTILIZATION REVIEW
Observation Admission Authorization Request   NOTIFICATION OF OBSERVATION ADMISSION/OBSERVATION AUTHORIZATION REQUEST   SERVICING FACILITY:   [address] [place] [address]  [address]  Tax ID:  [id number]  NPI: [id number]  Place of Service: On [address] [facility] Code: 22  CPT Code for Observation: CPT   CPT 42907     ATTENDING PROVIDER:  Attending Name and NPI#: David June [[id number]]  Address: [address]  Phone: [phone]     UTILIZATION REVIEW CONTACT:  Tevin Villalta, Utilization   Network Utilization Review Department  Phone: [phone]  Fax [phone]  Email: [email or web address]     PHYSICIAN ADVISORY SERVICES:  FOR WAHN-RG-VA[facility] REVIEW - MEDICAL NECESSITY DENIAL  Phone: [phone]  Fax: [phone]  Email: [email or web address]     TYPE OF REQUEST:  Observation Status     ADMISSION INFORMATION:  ADMISSION DATE/TIME: 06/23/21 @ 2002  PATIENT DIAGNOSIS CODE/DESCRIPTION:  Abnormal laboratory test result [R89 9]  DISCHARGE DATE/TIME: 6/24/2021  8:45 AM  DISCHARGE DISPOSITION (IF DISCHARGED): Home/Self Care     IMPORTANT INFORMATION:  Please contact the Tevin Villalta directly with any questions or concerns regarding this request  Department voicemails are confidential     Send requests for admission clinical reviews, concurrent reviews, approvals, and administrative denials due to lack of clinical to fax [phone]

## 2022-02-24 ENCOUNTER — EVALUATION (OUTPATIENT)
Dept: PHYSICAL THERAPY | Facility: CLINIC | Age: 77
End: 2022-02-24
Payer: COMMERCIAL

## 2022-02-24 DIAGNOSIS — M62.81 ABNORMAL GAIT DUE TO MUSCLE WEAKNESS: Primary | ICD-10-CM

## 2022-02-24 DIAGNOSIS — R26.9 ABNORMAL GAIT DUE TO MUSCLE WEAKNESS: Primary | ICD-10-CM

## 2022-02-24 PROCEDURE — 97161 PT EVAL LOW COMPLEX 20 MIN: CPT

## 2022-02-24 PROCEDURE — 97116 GAIT TRAINING THERAPY: CPT

## 2022-02-24 PROCEDURE — 97110 THERAPEUTIC EXERCISES: CPT

## 2022-02-24 PROCEDURE — 97535 SELF CARE MNGMENT TRAINING: CPT

## 2022-02-24 NOTE — PROGRESS NOTES
PT Evaluation     Today's date: 2022  Patient name: Mley Oleary  : [birth year]  MRN: [id number]  Referring provider: Grace Ramsey MD  Dx:   Encounter Diagnosis     ICD-10-CM    1  Abnormal gait due to muscle weakness  M62 81     R26 9        Start Time: 0900  Stop Time: 1000  Total time in clinic (min): 60 minutes    Assessment  Assessment details: Patient is a 76 yo male with medical diagnosis of gait abnormality  Following a thorough physical therapy evaluation, the patient demonstrates significant weakness in b/l LEs (R>L), impaired balance, difficulty c/ transfers and impaired gait  These findings place patient at a significantly increased risk of falls, which is further supported by his time c/ five time sit to stand and TUG test times  Patient demonstrates some instability c/ use of SPC, but reports he is unable to use a RW when taking his dog outside (which he does frequently t/o the day)  He reports the dog would pull the walker if he was tied to it  Trialed ambulation c/ RW in clinic, which improved patient stability  Recommended patient use RW, patient verbalized understanding but intends to continue c/ SPC  Patient has a (+) history of falls and reports he does not seek medical care after they occur even if he experiences head trauma  Educated patient about importance of seeking medical care following falls, especially when they involve head trauma due to risk of brain bleeds and other injuries to brain  Patient verbalized understanding of education provided  Patient will benefit from skilled physical therapy treatment to address the aforementioned impairments and functional deficits, accomplish patient goals and decrease patient's risk of falls     Impairments: abnormal gait, abnormal movement, impaired balance and impaired physical strength  Understanding of Dx/Px/POC: good   Prognosis: good    Goals  STG (3 weeks):  Patient will demonstrate improved five time sit to stand time by 4"   Patient will demonstrate increased LE strength by 1/2 grade  LTG (6 weeks):  Patient will demonstrate five time sit to stand <13  6" to demonstrate decreased risk of falls and disability  Patient will demonstrate TUG score of <=9" to promote decreased risk of falls  Patient will demonstrate 4+/5 LE strength to demonstrate increased strength and stability during ambulation  Patient will self-report 70% improvement in his overall stability during ambulation  Patient will be independent with HEP in 6 weeks to allow independent management of condition  Plan  Plan details: TE, NMR, TA, MT, self-care, and modalities as needed in order to progress through skilled PT focused on ROM, strength, balance, motor control  Patient would benefit from: skilled physical therapy  Planned modality interventions: cryotherapy and thermotherapy: hydrocollator packs  Planned therapy interventions: joint mobilization, manual therapy, massage, neuromuscular re-education, patient education, self care, strengthening, stretching, therapeutic activities, therapeutic exercise, home exercise program, gait training and balance  Frequency: 2x week  Duration in weeks: 6  Treatment plan discussed with: patient        Subjective Evaluation    History of Present Illness  Mechanism of injury: Patient is a 78 yo male presenting with abnormal gait and recurrent falls  Patient reports onset of unsteadiness over the course of the past year, worsened during suspected bout of COVID in early November 2021  While sick, he reports weakness to the point of crawling on the floor, but that has subsided  He uses [facility] for all ambulation at this point, except occasionally in his apartment  He has a large dog that pulls him  Patient reports his last fall was sometime in January 2022, notes he doesn't take record of his falls, just checks to make sure nothing feels more painful  Reports dizziness upon position change often   Patient's primary goals for PT are to decrease his falls  Pain  No pain reported    Social Support  Steps to enter house: yes (Has elevator, occasionally completes 2 flights of stairs c/ u/l HR)  Stairs in house: no   Lives in: apartment (3rd floor in high rise)  Lives with: alone    Employment status: not working  Patient Goals  Patient goals for therapy: increased strength, return to sport/leisure activities and improved balance          Objective     Ambulation     Comments   Observation:     -Gait: Ambulates c/ SPC on L c/ wide DORA and increased lateral sway t/o, unsteady     -Transfers:            -Sit to Stand: Requires us of b/l UEs (handrail and SPC), unsteady    Special Tests:  5xSTS: 33"  TU"    Balance s/ UE support:  CBOS: 7"   NBOS: 5"    Hip Strength (MMT Grades):  FLX (Seated, R/L): 3 / 3+  ABD (Seated, R/L): 3+ / 3+  ADD (Seated, R/L): 3+ / 4-    Knee Strength (MMT Grades):  EXT (R/L): 3 / 3  FLX (R/L): 3 / 3    Ankle Strength (MMT Grades):  DF (R/L): 3- / 3-  PF (R/L): 3- / 3-             Precautions: FALL RISK      Date        Manuals                        Neuro Re-Ed        NBOS        Tandem        SLS        Sidestepping        Ball Squeeze        S/Lying Clams                                Ther Ex        Gastroc Stretch        Heel Slides        LAQ        SLR        TKE        Step Flexion        HR/TR 2x10       Stand: Abd/Ext 2x10       Stand: March/ 2x10       Leg Press        NuStep                Ther Activity        Step-ups: Fwd        Bridge        Sit to [facility] [employer]        Obstacle Course                                Gait Training        Ambulation c/ RW '       Stairs                Modalities        CP                Self Care: Recommended patient use RW, patient verbalized understanding but intends to continue c/ SPC  Patient has a (+) history of falls and reports he does not seek medical care after they occur even if he experiences head trauma   Educated patient about importance of seeking medical care following falls, especially when they involve head trauma due to risk of brain bleeds and other injuries to brain  Patient verbalized understanding of education provided

## 2022-02-24 NOTE — LETTER
2022    MD Dinah Hall 78  [address] [facility],[address]  [place] [address]    Patient: Rebecca Torres   YOB: 1945   Date of Visit: 2022     Encounter Diagnosis     ICD-10-CM    1  Abnormal gait due to muscle weakness  M62 81     R26 9        Dear Dr Ehsan Pantoja:    Thank you for your recent referral of Rebecca Torres  Please review the attached evaluation summary from Marco's recent visit  Please verify that you agree with the plan of care by signing the attached order  If you have any questions or concerns, please do not hesitate to call  I sincerely appreciate the opportunity to share in the care of one of your patients and hope to have another opportunity to work with you in the near future  Sincerely,    Esther Barnard, PT      Referring Provider:      I certify that I have read the below Plan of Care and certify the need for these services furnished under this plan of treatment while under my care  MD Dinah Hall 78  [address] [address]  [address] [place] [address]  Via Fax: [phone]          PT Evaluation     Today's date: 2022  Patient name: Rebecca Torres  :   MRN: [id number]  Referring provider: Martha Pickard MD  Dx:   Encounter Diagnosis     ICD-10-CM    1  Abnormal gait due to muscle weakness  M62 81     R26 9        Start Time: 0900  Stop Time: 1000  Total time in clinic (min): 60 minutes    Assessment  Assessment details: Patient is a 76 yo male with medical diagnosis of gait abnormality  Following a thorough physical therapy evaluation, the patient demonstrates significant weakness in b/l LEs (R>L), impaired balance, difficulty c/ transfers and impaired gait  These findings place patient at a significantly increased risk of falls, which is further supported by his time c/ five time sit to stand and TUG test times   Patient demonstrates some instability c/ use of SPC, but reports he is unable to use a RW when taking his dog outside (which he does frequently t/o the day)  He reports the dog would pull the walker if he was tied to it  Trialed ambulation c/ RW in clinic, which improved patient stability  Recommended patient use RW, patient verbalized understanding but intends to continue c/ SPC  Patient has a (+) history of falls and reports he does not seek medical care after they occur even if he experiences head trauma  Educated patient about importance of seeking medical care following falls, especially when they involve head trauma due to risk of brain bleeds and other injuries to brain  Patient verbalized understanding of education provided  Patient will benefit from skilled physical therapy treatment to address the aforementioned impairments and functional deficits, accomplish patient goals and decrease patient's risk of falls  Impairments: abnormal gait, abnormal movement, impaired balance and impaired physical strength  Understanding of Dx/Px/POC: good   Prognosis: good    Goals  STG (3 weeks):  Patient will demonstrate improved five time sit to stand time by 4"  Patient will demonstrate increased LE strength by 1/2 grade  LTG (6 weeks):  Patient will demonstrate five time sit to stand <13  6" to demonstrate decreased risk of falls and disability  Patient will demonstrate TUG score of <=9" to promote decreased risk of falls  Patient will demonstrate 4+/5 LE strength to demonstrate increased strength and stability during ambulation  Patient will self-report 70% improvement in his overall stability during ambulation  Patient will be independent with HEP in 6 weeks to allow independent management of condition  Plan  Plan details: TE, NMR, TA, MT, self-care, and modalities as needed in order to progress through skilled PT focused on ROM, strength, balance, motor control     Patient would benefit from: skilled physical therapy  Planned modality interventions: cryotherapy and thermotherapy: hydrocollator packs  Planned therapy interventions: joint mobilization, manual therapy, massage, neuromuscular re-education, patient education, self care, strengthening, stretching, therapeutic activities, therapeutic exercise, home exercise program, gait training and balance  Frequency: 2x week  Duration in weeks: 6  Treatment plan discussed with: patient        Subjective Evaluation    History of Present Illness  Mechanism of injury: Patient is a 78 yo male presenting with abnormal gait and recurrent falls  Patient reports onset of unsteadiness over the course of the past year, worsened during suspected bout of COVID in early 2021  While sick, he reports weakness to the point of crawling on the floor, but that has subsided  He uses [facility] for all ambulation at this point, except occasionally in his apartment  He has a large dog that pulls him  Patient reports his last fall was sometime in 2022, notes he doesn't take record of his falls, just checks to make sure nothing feels more painful  Reports dizziness upon position change often  Patient's primary goals for PT are to decrease his falls     Pain  No pain reported    Social Support  Steps to enter house: yes (Has elevator, occasionally completes 2 flights of stairs c/ u/l HR)  Stairs in house: no   Lives in: apartment (3rd floor in high rise)  Lives with: alone    Employment status: not working  Patient Goals  Patient goals for therapy: increased strength, return to sport/leisure activities and improved balance          Objective     Ambulation     Comments   Observation:     -Gait: Ambulates c/ SPC on L c/ wide DORA and increased lateral sway t/o, unsteady     -Transfers:            -Sit to Stand: Requires us of b/l UEs (handrail and SPC), unsteady    Special Tests:  5xSTS: 33"  TU"    Balance s/ UE support:  CBOS: 7"   NBOS: 5"    Hip Strength (MMT Grades):  FLX (Seated, R/L): 3 / 3+  ABD (Seated, R/L): 3+ / 3+  ADD (Seated, R/L): 3+ / 4-    Knee Strength (MMT Grades):  EXT (R/L): 3 / 3  FLX (R/L): 3 / 3    Ankle Strength (MMT Grades):  DF (R/L): 3- / 3-  PF (R/L): 3- / 3-             Precautions: FALL RISK      Date 2/24       Manuals                        Neuro Re-Ed        NBOS        Tandem        SLS        Sidestepping        Ball Squeeze        S/Lying Clams                                Ther Ex        Gastroc Stretch        Heel Slides        LAQ        SLR        TKE        Step Flexion        HR/TR 2x10       Stand: Abd/Ext 2x10       Stand: March/HC 2x10       Leg Press        NuStep                Ther Activity        Step-ups: Fwd        Bridge        Sit to [facility]        Obstacle Course                                Gait Training        Ambulation c/ RW '       Stairs                Modalities        CP                Self Care: Recommended patient use RW, patient verbalized understanding but intends to continue c/ SPC  Patient has a (+) history of falls and reports he does not seek medical care after they occur even if he experiences head trauma  Educated patient about importance of seeking medical care following falls, especially when they involve head trauma due to risk of brain bleeds and other injuries to brain  Patient verbalized understanding of education provided

## 2022-03-01 ENCOUNTER — OFFICE VISIT (OUTPATIENT)
Dept: PHYSICAL THERAPY | Facility: CLINIC | Age: 77
End: 2022-03-01
Payer: COMMERCIAL

## 2022-03-01 DIAGNOSIS — R26.9 ABNORMAL GAIT DUE TO MUSCLE WEAKNESS: Primary | ICD-10-CM

## 2022-03-01 DIAGNOSIS — M62.81 ABNORMAL GAIT DUE TO MUSCLE WEAKNESS: Primary | ICD-10-CM

## 2022-03-01 PROCEDURE — 97112 NEUROMUSCULAR REEDUCATION: CPT

## 2022-03-01 PROCEDURE — 97110 THERAPEUTIC EXERCISES: CPT

## 2022-03-01 PROCEDURE — 97530 THERAPEUTIC ACTIVITIES: CPT

## 2022-03-01 NOTE — PROGRESS NOTES
Daily Note     Today's date: 3/1/2022  Patient name: Chris Muse  :   MRN: [id number]  Referring provider: Dirk Zarate MD  Dx:   Encounter Diagnosis     ICD-10-CM    1  Abnormal gait due to muscle weakness  M62 81     R26 9        Start Time: 9620  Stop Time: 5221  Total time in clinic (min): 56 minutes    Subjective: Patient reports continued weakness and instability in his LEs, but denies any falls  Objective: See treatment diary below      Assessment: Tolerated treatment well  Patient demonstrated fatigue post treatment, exhibited good technique with therapeutic exercises and would benefit from continued PT  Required verbal cues to engage quadriceps during quad set and SLR  Uses UEs for support consistently  Provided CGA/touchA t/o session to ensure safety and stability  Patient required rest breaks t/o session secondary to fatigue, will assess for muscle soreness and post-exercise fatigue next session  Educated patient about what to expect c/ DOMs, patient verbalized understanding  Plan: Continue per plan of care        Precautions: FALL RISK      Date 2/24 3/1      Manuals                        Neuro Re-Ed        NBOS        Tandem Walk  5 laps //      SLS        Sidestepping  3 laps //      [employer] Set  10"x10      Ball Squeeze  5"x20      S/Lying Clams                                Ther Ex        Gastroc Stretch        Heel Slides        LAQ  2# 2x10      HCs  L1 2x10      SLR  2x10      TKE        Step Flexion        HR/TR 2x10 3x10      Stand: Abd/Ext 2x10 2x10      Stand: March/HC 2x10 2x10      Leg Press        NuStep  L4 10'              Ther Activity        Step-ups: Fwd        Bridge        Sit to [employer]  1x10 Foam      Wall Squat        Obstacle Course                                Gait Training        Ambulation c/ RW '       Stairs                Modalities        CP

## 2022-03-04 ENCOUNTER — OFFICE VISIT (OUTPATIENT)
Dept: PHYSICAL THERAPY | Facility: CLINIC | Age: 77
End: 2022-03-04
Payer: COMMERCIAL

## 2022-03-04 DIAGNOSIS — R26.9 ABNORMAL GAIT DUE TO MUSCLE WEAKNESS: Primary | ICD-10-CM

## 2022-03-04 DIAGNOSIS — M62.81 ABNORMAL GAIT DUE TO MUSCLE WEAKNESS: Primary | ICD-10-CM

## 2022-03-04 PROCEDURE — 97110 THERAPEUTIC EXERCISES: CPT

## 2022-03-04 PROCEDURE — 97112 NEUROMUSCULAR REEDUCATION: CPT

## 2022-03-04 PROCEDURE — 97530 THERAPEUTIC ACTIVITIES: CPT

## 2022-03-04 NOTE — PROGRESS NOTES
Daily Note     Today's date: 3/4/2022  Patient name: Adwoa Berry  : [birth year]  MRN: [id number]  Referring provider: Gabriel Chan MD  Dx:   Encounter Diagnosis     ICD-10-CM    1  Abnormal gait due to muscle weakness  M62 81     R26 9        Start Time: 1000  Stop Time: 1100  Total time in clinic (min): 60 minutes    Subjective: Patient reports he was sore following his last appt  Denies any falls  Objective: See treatment diary below      Assessment: Tolerated treatment well  Patient demonstrated fatigue post treatment, exhibited good technique with therapeutic exercises and would benefit from continued PT  Patient demonstrates improved gait mechanics upon presentation to PT this morning  Incorporated static tandem balance, which patient was able to complete c/ occasional reaching for // bars  Plan: Continue per plan of care        Precautions: FALL RISK      Date 2/24 3/1 3/4     Manuals                        Neuro Re-Ed        NBOS   nv     Tandem   30"x3 ea //     Tandem Walk  5 laps // 5 laps //     SLS        Sidestepping  3 laps // L1 3 laps //     [employer] Set  10"x10 10"x10     Ball Squeeze  5"x20 5"x20     S/Lying Clams                                Ther Ex        Gastroc Stretch        Heel Slides        LAQ  2# 2x10 2# 2x10     HCs  L1 2x10 L1 2x10     SLR  2x10      TKE        Step Flexion        HR/TR 2x10 3x10 3x10     Stand: Abd/Ext 2x10 2x10 2x10     Stand: March/HC 2x10 2x10 2x10     Leg Press        NuStep  L4 10' L4 10'             Ther Activity        Step-ups: Fwd   4" 2x10     Bridge        Sit to Stands  1x10 Foam 1x10 Foam     Wall Squat        Obstacle Course                                Gait Training        Ambulation c/ RW '       Stairs                Modalities        CP

## 2022-03-08 ENCOUNTER — OFFICE VISIT (OUTPATIENT)
Dept: PHYSICAL THERAPY | Facility: CLINIC | Age: 77
End: 2022-03-08
Payer: COMMERCIAL

## 2022-03-08 DIAGNOSIS — R26.9 ABNORMAL GAIT DUE TO MUSCLE WEAKNESS: Primary | ICD-10-CM

## 2022-03-08 DIAGNOSIS — M62.81 ABNORMAL GAIT DUE TO MUSCLE WEAKNESS: Primary | ICD-10-CM

## 2022-03-08 PROCEDURE — 97530 THERAPEUTIC ACTIVITIES: CPT

## 2022-03-08 PROCEDURE — 97112 NEUROMUSCULAR REEDUCATION: CPT

## 2022-03-08 PROCEDURE — 97110 THERAPEUTIC EXERCISES: CPT

## 2022-03-08 NOTE — PROGRESS NOTES
Daily Note     Today's date: 3/8/2022  Patient name: Mely Oleary  :   MRN: [id number]  Referring provider: Grace Ramsey MD  Dx:   Encounter Diagnosis     ICD-10-CM    1  Abnormal gait due to muscle weakness  M62 81     R26 9        Start Time: 0800  Stop Time: 0900  Total time in clinic (min): 60 minutes    Subjective: Patient reports no increase in muscle soreness following last visit  He reports he fell onto his dog's bed on Saturday while working on AV cables at his TV, denies an head impact, LOC, or pain since fall  Objective: See treatment diary below      Assessment: Tolerated treatment well  Patient demonstrated fatigue post treatment, exhibited good technique with therapeutic exercises and would benefit from continued PT  Patient demonstrates progress in his balance since IE as evidenced by his ability to perform NBOS on foam for 30" s/ UE support  Progressed step height to 6", patient completed all repetitions c/ only slight increase in muscular fatigue  Plan: Continue per plan of care  Progress balance exercises  Precautions: FALL RISK      Date 2/24 3/1 3/4 3/7    Manuals                        Neuro Re-Ed        NBOS   nv Foam30"x3    Tandem   30"x3 ea // 30"x3 ea  //    Tandem Walk  5 laps // 5 laps // 5 laps //    SLS        Sidestepping  3 laps // L1 3 laps // L1 4 laps //    Quad Set  10"x10 10"x10 10"x10    Ball Squeeze  5"x20 5"x20 5"x20    S/Lying Clams    L2 2x10ea                              Ther Ex        Gastroc Stretch        Heel Slides        LAQ  2# 2x10 2# 2x10 2# 2x10    HCs  L1 2x10 L1 2x10 L2 2x10    SLR  2x10      TKE    L2 2x10    Step Flexion        HR/TR 2x10 3x10 3x10 3x10    Stand: Abd/Ext 2x10 2x10 2x10 2x10    Stand: March/HC 2x10 2x10 2x10 2x10    Leg Press        NuStep: S7, A9  L4 10' L4 10' L4 10'            Ther Activity        Step-ups: Fwd   4" 2x10 6" 2x10    Bridge        Sit to Stands  1x10 Foam 1x10 Foam 1x10 Foam    Wall Squat Obstacle Course                                Gait Training        Ambulation c/ RW '       Stairs                Modalities        CP

## 2022-03-11 ENCOUNTER — OFFICE VISIT (OUTPATIENT)
Dept: PHYSICAL THERAPY | Facility: CLINIC | Age: 77
End: 2022-03-11
Payer: COMMERCIAL

## 2022-03-11 DIAGNOSIS — R26.9 ABNORMAL GAIT DUE TO MUSCLE WEAKNESS: Primary | ICD-10-CM

## 2022-03-11 DIAGNOSIS — M62.81 ABNORMAL GAIT DUE TO MUSCLE WEAKNESS: Primary | ICD-10-CM

## 2022-03-11 PROCEDURE — 97110 THERAPEUTIC EXERCISES: CPT

## 2022-03-11 PROCEDURE — 97112 NEUROMUSCULAR REEDUCATION: CPT

## 2022-03-11 PROCEDURE — 97530 THERAPEUTIC ACTIVITIES: CPT

## 2022-03-11 NOTE — PROGRESS NOTES
Daily Note     Today's date: 3/11/2022  Patient name: Faye Cuellar  :   MRN: [id number]  Referring provider: Haydee Aguilar MD  Dx:   Encounter Diagnosis     ICD-10-CM    1  Abnormal gait due to muscle weakness  M62 81     R26 9        Start Time: 0800  Stop Time: 09  Total time in clinic (min): 62 minutes    Subjective: Patient reports less muscle soreness following last visit, denies any falls since prior to his last PT session  Objective: See treatment diary below      Assessment: Tolerated treatment well  Patient demonstrated fatigue post treatment, exhibited good technique with therapeutic exercises and would benefit from continued PT  Patient demonstrates improved technique and decreased fatigue c/ sit to stands  Incorporated obstacle course and stop/go gait to improve balance c/ sudden movements  Patient required CGA, but was able to self-correct 3 mild LOB  Plan: Continue per plan of care  Precautions: FALL RISK      Date 2/24 3/1 3/4 3/7 3/10   Manuals                        Neuro Re-Ed        NBOS   nv Foam30"x3 nv   NBOS c/ perturbations     nv   Tandem   30"x3 ea // 30"x3 ea  // 30"x3ea //   Tandem Walk  5 laps // 5 laps // 5 laps // 5 laps //   SLS        Sidestepping  3 laps // L1 3 laps // L1 4 laps // L1 4 laps //   Quad Set  10"x10 10"x10 10"x10 10"x10   Ball Squeeze  5"x20 5"x20 5"x20 5"x20   S/Lying Clams    L2 2x10ea   L3 2x10ea                           Ther Ex        Gastroc Stretch        Heel Slides        LAQ  2# 2x10 2# 2x10 2# 2x10 3# 2x10   HCs  L1 2x10 L1 2x10 L2 2x10 L3 2x10   SLR  2x10      TKE    L2 2x10 nv   Step Flexion        HR/TR 2x10 3x10 3x10 3x10 3x10   Stand: Abd/Ext 2x10 2x10 2x10 2x10 2x10   Stand: March/HC 2x10 2x10 2x10 2x10 2x10   Leg Press        NuStep: S9, A9  L4 10' L4 10' L4 10' L4 10'           Ther Activity        Step-ups: Fwd   4" 2x10 6" 2x10 6" 2x10   Bridge        Sit to Stands  1x10 Foam 1x10 Foam 1x10 Foam 2x10 Foam   Wall Squat        Obstacle Course: [place], [place], Foam     20'x4   Stop/Go Gait     20' x4                   Gait Training        Ambulation c/ RW '       Stairs                Modalities        CP                    Access Code: [email or web address]  URL: [email or web address]  Date: 03/11/2022  Prepared by: Fernandez     Exercises  · Supine Quad Set - 1 x daily - 7 x weekly - 10 reps - 10 seconds hold  · Supine Active Straight Leg Raise - 1 x daily - 7 x weekly - 2 sets - 10 reps  · Seated Long Arc Quad - 1 x daily - 7 x weekly - 2 sets - 10 reps  · Supine Bridge - 1 x daily - 7 x weekly - 3 sets - 10 reps

## 2022-03-15 ENCOUNTER — OFFICE VISIT (OUTPATIENT)
Dept: PHYSICAL THERAPY | Facility: CLINIC | Age: 77
End: 2022-03-15
Payer: COMMERCIAL

## 2022-03-15 DIAGNOSIS — R26.9 ABNORMAL GAIT DUE TO MUSCLE WEAKNESS: Primary | ICD-10-CM

## 2022-03-15 DIAGNOSIS — M62.81 ABNORMAL GAIT DUE TO MUSCLE WEAKNESS: Primary | ICD-10-CM

## 2022-03-15 PROCEDURE — 97112 NEUROMUSCULAR REEDUCATION: CPT

## 2022-03-15 PROCEDURE — 97530 THERAPEUTIC ACTIVITIES: CPT

## 2022-03-15 PROCEDURE — 97110 THERAPEUTIC EXERCISES: CPT

## 2022-03-15 PROCEDURE — 97140 MANUAL THERAPY 1/> REGIONS: CPT

## 2022-03-15 NOTE — PROGRESS NOTES
Daily Note     Today's date: 3/15/2022  Patient name: Dieudonne Sandoval  : [birth year]  MRN: [id number]  Referring provider: Marlana Boas, MD  Dx:   Encounter Diagnosis     ICD-10-CM    1  Abnormal gait due to muscle weakness  M62 81     R26 9        Start Time: 0758  Stop Time: 09  Total time in clinic (min): 87 minutes    Subjective: Patient reports greater ease c/ sitting to standing  Objective: See treatment diary below      Assessment: Tolerated treatment well  Patient demonstrated fatigue post treatment, exhibited good technique with therapeutic exercises and would benefit from continued PT  Patient demonstrates improved stability c/ sit to stands and was able to complete 5 repetitions c/ hands on thighs v  Pushing up with hands on chair rails  Focused session on ankle strategy and ankle strength  He demonstrates decreased ankle dorsiflexion ROM and decreased tibialis anterior strength (eccentrically and concentrically, R>L)  Incorporated gastroc stretch in standing, manual stretching and toes raises to improve functional mobility and strength during gait and balance  Provided patient c/ handout c/ HEP  Patient demonstrated each exercise c/ good form and verbalized understanding of expectations c/ HEP and ability to perform safely at home  Plan: Continue per plan of care  Precautions: FALL RISK      Date 3/15 3/1 3/4 3/7 3/10   Manuals        Hamstring and Gastroc Stretch KS               Neuro Re-Ed        NBOS Foam 30"x3  nv Foam30"x3 nv   NBOS c/ perturbations: lateral, fwd, bkwd x30    nv   Tandem 30"x3 //  30"x3 ea // 30"x3 ea  // 30"x3ea //   Tandem Walk 5 laps // 5 laps // 5 laps // 5 laps // 5 laps //   SLS        Sidestepping L1 4 laps // 3 laps // L1 3 laps // L1 4 laps // L1 4 laps //   Quad Set 10"x10 10"x10 10"x10 10"x10 10"x10   Ball Squeeze  5"x20 5"x20 5"x20 5"x20   S/Lying Clams    L2 2x10ea   L3 2x10ea                           Ther Ex        Gastroc Stretch 30"x3 1/2 roll, seated       Heel Slides        LAQ 3# 2x10 2# 2x10 2# 2x10 2# 2x10 3# 2x10   HCs L3 2x10 L1 2x10 L1 2x10 L2 2x10 L3 2x10   SLR 1x10 2x10      TKE L2 2x10   L2 2x10 nv   Step Flexion        HR/TR 2x10 3x10 3x10 3x10 3x10   Stand: Abd/Ext 2x10 2x10 2x10 2x10 2x10   Stand: March/HC 2x10 2x10 2x10 2x10 2x10   Leg Press        NuStep: S9, A9 L4 10' L4 10' L4 10' L4 10' L4 10'           Ther Activity        Step-ups: Fwd nv  4" 2x10 6" 2x10 6" 2x10   Bridge        Sit to Stands 2x10 Foam 1x10 Foam 1x10 Foam 1x10 Foam 2x10 Foam   Wall Squat        Obstacle Course: Hurdles, Cones, Foam nv    20'x4   Stop/Go Gait nv    20' x4                   Gait Training        Ambulation c/ RW        Stairs                Modalities        CP                    Access Code: [email or web address]  URL: [email or web address]  Date: 03/15/2022  Prepared by: Susanna Roman    Exercises  · Supine Quad Set - 1 x daily - 7 x weekly - 10 reps - 10 seconds hold  · Supine Active Straight Leg Raise - 1 x daily - 7 x weekly - 2 sets - 10 reps  · Seated Long Arc Quad - 1 x daily - 7 x weekly - 2 sets - 10 reps  · Supine Bridge - 1 x daily - 7 x weekly - 3 sets - 10 reps  · Seated Heel Toe Raises - 1 x daily - 7 x weekly - 3 sets - 10 reps  · Seated Calf Stretch with Strap - 1 x daily - 7 x weekly - 3 reps - 30 seconds hold

## 2022-03-18 ENCOUNTER — OFFICE VISIT (OUTPATIENT)
Dept: PHYSICAL THERAPY | Facility: CLINIC | Age: 77
End: 2022-03-18
Payer: COMMERCIAL

## 2022-03-18 DIAGNOSIS — R26.9 ABNORMAL GAIT DUE TO MUSCLE WEAKNESS: Primary | ICD-10-CM

## 2022-03-18 DIAGNOSIS — M62.81 ABNORMAL GAIT DUE TO MUSCLE WEAKNESS: Primary | ICD-10-CM

## 2022-03-18 PROCEDURE — 97112 NEUROMUSCULAR REEDUCATION: CPT

## 2022-03-18 PROCEDURE — 97110 THERAPEUTIC EXERCISES: CPT

## 2022-03-18 PROCEDURE — 97530 THERAPEUTIC ACTIVITIES: CPT

## 2022-03-18 NOTE — PROGRESS NOTES
Daily Note     Today's date: 3/18/2022  Patient name: Wallace Hernández  :   MRN: [id number]  Referring provider: Nelson Rosado MD  Dx:   Encounter Diagnosis     ICD-10-CM    1  Abnormal gait due to muscle weakness  M62 81     R26 9        Start Time: 0800  Stop Time: 09  Total time in clinic (min): 62 minutes    Subjective: Patient reports improvement in his ankle mobility and stability at home  Reports consistency c/ HEP and denies any difficulties c/ completion  Objective: See treatment diary below      Assessment: Tolerated treatment well  Patient demonstrated fatigue post treatment, exhibited good technique with therapeutic exercises and would benefit from continued PT  Patient demonstrates improved ankle mobility during toe raises and improved hamstring stability during HCs  Continues to have difficulty using tibialis anterior while standing, but able to perform c/ good technique from seated position  Trialed gait training s/ AD c/ CGA, patient able to walk 250' s/ LOB, but c/ increased muscular fatigue specifically noting increased calf muscle tension  Plan: Continue per plan of care  Precautions: FALL RISK      Date 3/15 3/18 3/4 3/7 3/10   Manuals        Hamstring and Gastroc Stretch KS               Neuro Re-Ed        NBOS Foam 30"x3 nv nv Foam30"x3 nv   NBOS c/ perturbations: lateral, fwd, bkwd x30 nv   nv   Tandem 30"x3 // 30"x3 // 30"x3 ea // 30"x3 ea  // 30"x3ea //   Tandem Walk 5 laps // 5 laps // 5 laps // 5 laps // 5 laps //   SLS        Sidestepping L1 4 laps // L1 4 laps // L1 3 laps // L1 4 laps // L1 4 laps //   Quad Set 10"x10 np 10"x10 10"x10 10"x10   Ball Squeeze   5"x20 5"x20 5"x20   S/Lying Clams    L2 2x10ea   L3 2x10ea                           Ther Ex        Gastroc Stretch 30"x3 1/2 roll, seated 30"x3 1/2 roll      Heel Slides        LAQ 3# 2x10 3# 2x10 2# 2x10 2# 2x10 3# 2x10   HCs L3 2x10 L3 2x10 L1 2x10 L2 2x10 L3 2x10   SLR 1x10 2x10      TKE L2 2x10 nv L2 2x10 nv   Step Flexion        HR/TR 2x10 3x10 3x10 3x10 3x10   Stand: Abd/Ext 2x10 2x10 2x10 2x10 2x10   Stand: March/HC 2x10 2x10 2x10 2x10 2x10   Leg Press        NuStep: S9, A9 L4 10' L4 10' L4 10' L4 10' L4 10'           Ther Activity        Step-ups: Fwd nv 6" 2x10 4" 2x10 6" 2x10 6" 2x10   Bridge        Sit to Stands 2x10 Foam 2x10 Foam 1x10 Foam 1x10 Foam 2x10 Foam   Wall Squat        Obstacle Course: Hurdles, Cones, Foam, 6", 8" nv 2 laps   20'x4   Stop/Go Gait nv np   20' x4   Ambulation s/ AD, gait belt  ' CGA              Gait Training        Ambulation c/ RW        Stairs                Modalities        CP                    Access Code: [email or web address]  URL: [email or web address]  Date: 03/15/2022  Prepared by: Elodie Boas    Exercises  · Supine Quad Set - 1 x daily - 7 x weekly - 10 reps - 10 seconds hold  · Supine Active Straight Leg Raise - 1 x daily - 7 x weekly - 2 sets - 10 reps  · Seated Long Arc Quad - 1 x daily - 7 x weekly - 2 sets - 10 reps  · Supine Bridge - 1 x daily - 7 x weekly - 3 sets - 10 reps  · Seated Heel Toe Raises - 1 x daily - 7 x weekly - 3 sets - 10 reps  · Seated Calf Stretch with Strap - 1 x daily - 7 x weekly - 3 reps - 30 seconds hold

## 2022-03-21 NOTE — PROGRESS NOTES
PT Re-Evaluation     Today's date: 3/22/2022  Patient name: Sheela Stephens  :   MRN: [id number]  Referring provider: Janneth Marie MD  Dx:   Encounter Diagnosis     ICD-10-CM    1  Abnormal gait due to muscle weakness  M62 81     R26 9        Start Time: 0800  Stop Time: 0900  Total time in clinic (min): 60 minutes    Assessment  Assessment details: Patient is a 76 yo male with medical diagnosis of gait abnormality  Patient's LTGs are not met at this time  Patient continues to demonstrate progress in pain and function as evidenced by improved balance, increased b/l LE strength and improve gait mechanics  He continues to demonstrate decreased weakness in b/l LE (L>R), impaired balance and is at risk of falls as evidenced by functional testing  Patient will continue to benefit from skilled PT interventions to address remaining impairments and functional limitations and decrease risk of falls  Impairments: abnormal gait, abnormal movement, impaired balance and impaired physical strength  Understanding of Dx/Px/POC: good   Prognosis: good    Goals  STG (3 weeks):  Patient will demonstrate improved five time sit to stand time by 4"  Met  Patient will demonstrate increased LE strength by 1/2 grade  Partially Met  LTG (6 weeks):  Patient will demonstrate five time sit to stand <13  6" to demonstrate decreased risk of falls and disability  Not Met  Progressing  Patient will demonstrate TUG score of <=9" to promote decreased risk of falls  Not Met  Progressing  Patient will demonstrate 4+/5 LE strength to demonstrate increased strength and stability during ambulation  Not Met  Patient will self-report 70% improvement in his overall stability during ambulation  Not Met  Patient will be independent with HEP in 6 weeks to allow independent management of condition  Not Met      Plan  Plan details: TE, NMR, TA, MT, self-care, and modalities as needed in order to progress through skilled PT focused on ROM, strength, balance, motor control  Patient will continue to benefit from skilled PT interventions 2x/wk  to address remaining impairments and functional limitations and decrease risk of falls  Patient would benefit from: skilled physical therapy  Planned modality interventions: cryotherapy and thermotherapy: hydrocollator packs  Planned therapy interventions: joint mobilization, manual therapy, massage, neuromuscular re-education, patient education, self care, strengthening, stretching, therapeutic activities, therapeutic exercise, home exercise program, gait training and balance  Frequency: 2x week  Duration in weeks: 6  Treatment plan discussed with: patient        Subjective Evaluation    History of Present Illness  Mechanism of injury: Patient is a 78 yo male presenting with abnormal gait and recurrent falls  Patient reports 20-30% improvement in his overall stability during gait  He reports  improved strength in his legs and progress in balance  He notes improved balance for the morning, but it doesn't last throughout the whole day  Patient's primary goals for PT are to decrease his falls     Pain  No pain reported    Social Support  Steps to enter house: yes (Has elevator, occasionally completes 2 flights of stairs c/ u/l HR)  Stairs in house: no   Lives in: apartment (3rd floor in high rise)  Lives with: alone    Employment status: not working  Patient Goals  Patient goals for therapy: increased strength, return to sport/leisure activities and improved balance          Objective     Ambulation     Comments   Observation:     -Gait: Ambulates c/ SPC on L c/ wide DORA and increased lateral sway t/o     -Transfers:            -Sit to Stand: Able to place hands on thighs to complete    Special Tests:  5xSTS: 20"  TU"    Balance s/ UE support:  NBOS: 30"  Tandem (R posterior/L posterior): 15" / 10"    Hip Strength (MMT Grades):  FLX (Seated, R/L): 3+ / 3+  ABD (Seated, R/L): 4- / 4-  ADD (Seated, R/L): 3+ / 4-    Knee Strength (MMT Grades):  EXT (R/L): 3+ / 3+  FLX (R/L): 3+ / 3    Ankle Strength (MMT Grades):  DF (R/L): 3+ / 3+  PF (R/L): 3+ / 3+             Precautions: FALL RISK    Date 3/15 3/18 3/22 3/7 3/10   Manuals        Hamstring and Gastroc Stretch KS       Assessment   KS             Neuro Re-Ed        NBOS Foam 30"x3 nv  Foam30"x3 nv   NBOS c/ perturbations: lateral, fwd, bkwd x30 nv   nv   Tandem 30"x3 // 30"x3 // 30"x3 ea // 30"x3 ea  // 30"x3ea //   Tandem Walk 5 laps // 5 laps // 5 laps // 5 laps // 5 laps //   SLS        Sidestepping L1 4 laps // L1 4 laps // L2 3 laps // L1 4 laps // L1 4 laps //   Quad Set 10"x10 np 10"x10 10"x10 10"x10   Ball Squeeze   5"x20 5"x20 5"x20   S/Lying Clams    L2 2x10ea  L3 2x10ea                           Ther Ex        Gastroc Stretch 30"x3 1/2 roll, seated 30"x3 1/2 roll 30"x3 1/2 roll     Heel Slides        LAQ 3# 2x10 3# 2x10 3# 2x10 2# 2x10 3# 2x10   HCs L3 2x10 L3 2x10 L2 2x10 L2 2x10 L3 2x10   SLR 1x10 2x10 2x10     TKE L2 2x10 nv nv L2 2x10 nv   Step Flexion        HR/TR 2x10 3x10 3x10 3x10 3x10   Stand: Abd/Ext 2x10 2x10 np 2x10 2x10   Stand: March/HC 2x10 2x10 np 2x10 2x10   Leg Press        NuStep: S9, A9 L4 10' L4 10' L4 10' L4 10' L4 10'           Ther Activity        Step-ups: Fwd nv 6" 2x10 8" 2x10 6" 2x10 6" 2x10   Bridge        Sit to Stands 2x10 Foam 2x10 Foam 2x10 Foam 1x10 Foam 2x10 Foam   Wall Squat        Obstacle Course: Hurdles, Cones, Foam, 6", 8" nv 2 laps np  20'x4   Stop/Go Gait nv np D/c  20' x4   Ambulation s/ AD, gait belt  ' CGA np             Gait Training        Ambulation c/ RW        Stairs                Modalities        CP                    Access Code: [email or web address]  URL: [email or web address]  Date: 03/15/2022  Prepared by: Susanna Roman    Exercises  · Supine Quad Set - 1 x daily - 7 x weekly - 10 reps - 10 seconds hold  · Supine Active Straight Leg Raise - 1 x daily - 7 x weekly - 2 sets - 10 reps  · Seated Long Arc Quad - 1 x daily - 7 x weekly - 2 sets - 10 reps  · Supine Bridge - 1 x daily - 7 x weekly - 3 sets - 10 reps  · Seated Heel Toe Raises - 1 x daily - 7 x weekly - 3 sets - 10 reps  · Seated Calf Stretch with Strap - 1 x daily - 7 x weekly - 3 reps - 30 seconds hold

## 2022-03-22 ENCOUNTER — EVALUATION (OUTPATIENT)
Dept: PHYSICAL THERAPY | Facility: CLINIC | Age: 77
End: 2022-03-22
Payer: COMMERCIAL

## 2022-03-22 DIAGNOSIS — M62.81 ABNORMAL GAIT DUE TO MUSCLE WEAKNESS: Primary | ICD-10-CM

## 2022-03-22 DIAGNOSIS — R26.9 ABNORMAL GAIT DUE TO MUSCLE WEAKNESS: Primary | ICD-10-CM

## 2022-03-22 PROCEDURE — 97110 THERAPEUTIC EXERCISES: CPT

## 2022-03-22 PROCEDURE — 97112 NEUROMUSCULAR REEDUCATION: CPT

## 2022-03-22 PROCEDURE — 97530 THERAPEUTIC ACTIVITIES: CPT

## 2022-03-22 NOTE — LETTER
2022    MD Riri [address]  [address]  [place]    Patient: Isabel Barron   YOB: 1945   Date of Visit: 3/22/2022     Encounter Diagnosis     ICD-10-CM    1  Abnormal gait due to muscle weakness  M62 81     R26 9        Dear Dr Ector Sandoval:    Thank you for your recent referral of Isabel Barron  Please review the attached evaluation summary from Marco's recent visit  Please verify that you agree with the plan of care by signing the attached order  If you have any questions or concerns, please do not hesitate to call  I sincerely appreciate the opportunity to share in the care of one of your patients and hope to have another opportunity to work with you in the near future  Sincerely,    Bernie Marcelino, PT      Referring Provider:      I certify that I have read the below Plan of Care and certify the need for these services furnished under this plan of treatment while under my care  Maribel Mercado MD  [facility]  [address]  [address] [place] [address]  Via Fax: [phone]          PT Re-Evaluation     Today's date: 3/22/2022  Patient name: Isabel Barron  : [birth year]  MRN: [id number]  Referring provider: Natasha Green MD  Dx:   Encounter Diagnosis     ICD-10-CM    1  Abnormal gait due to muscle weakness  M62 81     R26 9        Start Time: 0800  Stop Time: 0900  Total time in clinic (min): 60 minutes    Assessment  Assessment details: Patient is a 76 yo male with medical diagnosis of gait abnormality  Patient's LTGs are not met at this time  Patient continues to demonstrate progress in pain and function as evidenced by improved balance, increased b/l LE strength and improve gait mechanics  He continues to demonstrate decreased weakness in b/l LE (L>R), impaired balance and is at risk of falls as evidenced by functional testing   Patient will continue to benefit from skilled PT interventions to address remaining impairments and functional limitations and decrease risk of falls  Impairments: abnormal gait, abnormal movement, impaired balance and impaired physical strength  Understanding of Dx/Px/POC: good   Prognosis: good    Goals  STG (3 weeks):  Patient will demonstrate improved five time sit to stand time by 4"  Met  Patient will demonstrate increased LE strength by 1/2 grade  Partially Met  LTG (6 weeks):  Patient will demonstrate five time sit to stand <13  6" to demonstrate decreased risk of falls and disability  Not Met  Progressing  Patient will demonstrate TUG score of <=9" to promote decreased risk of falls  Not Met  Progressing  Patient will demonstrate 4+/5 LE strength to demonstrate increased strength and stability during ambulation  Not Met  Patient will self-report 70% improvement in his overall stability during ambulation  Not Met  Patient will be independent with HEP in 6 weeks to allow independent management of condition  Not Met  Plan  Plan details: TE, NMR, TA, MT, self-care, and modalities as needed in order to progress through skilled PT focused on ROM, strength, balance, motor control  Patient will continue to benefit from skilled PT interventions 2x/wk  to address remaining impairments and functional limitations and decrease risk of falls  Patient would benefit from: skilled physical therapy  Planned modality interventions: cryotherapy and thermotherapy: hydrocollator packs  Planned therapy interventions: joint mobilization, manual therapy, massage, neuromuscular re-education, patient education, self care, strengthening, stretching, therapeutic activities, therapeutic exercise, home exercise program, gait training and balance  Frequency: 2x week  Duration in weeks: 6  Treatment plan discussed with: patient        Subjective Evaluation    History of Present Illness  Mechanism of injury: Patient is a 76 yo male presenting with abnormal gait and recurrent falls   Patient reports 20-30% improvement in his overall stability during gait  He reports  improved strength in his legs and progress in balance  He notes improved balance for the morning, but it doesn't last throughout the whole day  Patient's primary goals for PT are to decrease his falls  Pain  No pain reported    Social Support  Steps to enter house: yes (Has elevator, occasionally completes 2 flights of stairs c/ u/l HR)  Stairs in house: no   Lives in: apartment (3rd floor in high rise)  Lives with: alone    Employment status: not working  Patient Goals  Patient goals for therapy: increased strength, return to sport/leisure activities and improved balance          Objective     Ambulation     Comments   Observation:     -Gait: Ambulates c/ SPC on L c/ wide DORA and increased lateral sway t/o     -Transfers:            -Sit to Stand: Able to place hands on thighs to complete    Special Tests:  5xSTS: 20"  TU"    Balance s/ UE support:  NBOS: 30"  Tandem (R posterior/L posterior): 15" / 10"    Hip Strength (MMT Grades):  FLX (Seated, R/L): 3+ / 3+  ABD (Seated, R/L): 4- / 4-  ADD (Seated, R/L): 3+ / 4-    Knee Strength (MMT Grades):  EXT (R/L): 3+ / 3+  FLX (R/L): 3+ / 3    Ankle Strength (MMT Grades):  DF (R/L): 3+ / 3+  PF (R/L): 3+ / 3+             Precautions: FALL RISK    Date 3/15 3/18 3/22 3/7 3/10   Manuals        Hamstring and Gastroc Stretch KS       Assessment   KS             Neuro Re-Ed        NBOS Foam 30"x3 nv  Foam30"x3 nv   NBOS c/ perturbations: lateral, fwd, bkwd x30 nv   nv   Tandem 30"x3 // 30"x3 // 30"x3 ea // 30"x3 ea  // 30"x3ea //   Tandem Walk 5 laps // 5 laps // 5 laps // 5 laps // 5 laps //   SLS        Sidestepping L1 4 laps // L1 4 laps // L2 3 laps // L1 4 laps // L1 4 laps //   Quad Set 10"x10 np 10"x10 10"x10 10"x10   Ball Squeeze   5"x20 5"x20 5"x20   S/Lying Clams    L2 2x10ea   L3 2x10ea                           Ther Ex        Gastroc Stretch 30"x3 1/2 roll, seated 30"x3 1/2 roll 30"x3 1/2 roll     Heel Slides LAQ 3# 2x10 3# 2x10 3# 2x10 2# 2x10 3# 2x10   HCs L3 2x10 L3 2x10 L2 2x10 L2 2x10 L3 2x10   SLR 1x10 2x10 2x10     TKE L2 2x10 nv nv L2 2x10 nv   Step Flexion        HR/TR 2x10 3x10 3x10 3x10 3x10   Stand: Abd/Ext 2x10 2x10 np 2x10 2x10   Stand: March/HC 2x10 2x10 np 2x10 2x10   Leg Press        NuStep: S9, A9 L4 10' L4 10' L4 10' L4 10' L4 10'           Ther Activity        Step-ups: Fwd nv 6" 2x10 8" 2x10 6" 2x10 6" 2x10   Bridge        Sit to Stands 2x10 Foam 2x10 Foam 2x10 Foam 1x10 Foam 2x10 Foam   Wall Squat        Obstacle Course: Hurdles, Cones, Foam, 6", 8" nv 2 laps np  20'x4   Stop/Go Gait nv np D/c  20' x4   Ambulation s/ AD, gait belt  ' CGA np             Gait Training        Ambulation c/ RW        Stairs                Modalities        CP                    Access Code: [email or web address]  URL: [email or web address]  Date: 03/15/2022  Prepared by: Sowmya Perez    Exercises  · Supine Quad Set - 1 x daily - 7 x weekly - 10 reps - 10 seconds hold  · Supine Active Straight Leg Raise - 1 x daily - 7 x weekly - 2 sets - 10 reps  · Seated Long Arc Quad - 1 x daily - 7 x weekly - 2 sets - 10 reps  · Supine Bridge - 1 x daily - 7 x weekly - 3 sets - 10 reps  · Seated Heel Toe Raises - 1 x daily - 7 x weekly - 3 sets - 10 reps  · Seated Calf Stretch with Strap - 1 x daily - 7 x weekly - 3 reps - 30 seconds hold

## 2022-03-25 ENCOUNTER — OFFICE VISIT (OUTPATIENT)
Dept: PHYSICAL THERAPY | Facility: CLINIC | Age: 77
End: 2022-03-25
Payer: COMMERCIAL

## 2022-03-25 DIAGNOSIS — R26.9 ABNORMAL GAIT DUE TO MUSCLE WEAKNESS: Primary | ICD-10-CM

## 2022-03-25 DIAGNOSIS — M62.81 ABNORMAL GAIT DUE TO MUSCLE WEAKNESS: Primary | ICD-10-CM

## 2022-03-25 PROCEDURE — 97112 NEUROMUSCULAR REEDUCATION: CPT

## 2022-03-25 PROCEDURE — 97110 THERAPEUTIC EXERCISES: CPT

## 2022-03-25 PROCEDURE — 97530 THERAPEUTIC ACTIVITIES: CPT

## 2022-03-25 NOTE — PROGRESS NOTES
Daily Note     Today's date: 3/25/2022  Patient name: Bertin Covarrubias  :   MRN: [id number]  Referring provider: Man Kay MD  Dx:   Encounter Diagnosis     ICD-10-CM    1  Abnormal gait due to muscle weakness  M62 81     R26 9        Start Time: 0800  Stop Time: 0910  Total time in clinic (min): 70 minutes    Subjective: Reports decreased foot slap while walking at home  Objective: See treatment diary below      Assessment: Tolerated treatment well  Patient demonstrated fatigue post treatment, exhibited good technique with therapeutic exercises and would benefit from continued PT  Trialed FF of stairs which patient was able to complete c/ rest break at the top  Notes right knee pain while ascending stairs, which is typical for him secondary to a history of previous knee injury on that side  Plan: Continue per plan of care  Precautions: FALL RISK    Date 3/15 3/18 3/22 3/25 3/10   Manuals        Hamstring and Gastroc Stretch KS   KS    Assessment   KS             Neuro Re-Ed        NBOS Foam 30"x3 nv   nv   NBOS c/ perturbations: lateral, fwd, bkwd x30 nv   nv   Tandem 30"x3 // 30"x3 // 30"x3 ea // 30"x3 ea  // 30"x3ea //   Tandem Walk 5 laps // 5 laps // 5 laps // 5 laps // 5 laps //   SLS        Sidestepping L1 4 laps // L1 4 laps // L2 3 laps // L2 4 laps // L1 4 laps //   Quad Set 10"x10 np 10"x10 10"x10 10"x10   Ball Squeeze   5"x20 5"x20 5"x20   S/Lying Clams    L3 2x10ea   L3 2x10ea                           Ther Ex        Gastroc Stretch 30"x3 1/2 roll, seated 30"x3 1/2 roll 30"x3 1/2 roll 30"x3 1/2 roll    Heel Slides        LAQ 3# 2x10 3# 2x10 3# 2x10 4# 2x10 3# 2x10   HCs L3 2x10 L3 2x10 L2 2x10 L3 2x10 L3 2x10   SLR 1x10 2x10 2x10 2x10    TKE L2 2x10 nv nv np nv   Step Flexion        HR/TR 2x10 3x10 3x10 1# 3x10 3x10   Stand: Abd/Ext 2x10 2x10 np 1# 2x10 2x10   Stand: March/ 2x10 2x10 np 1# 2x10 2x10   Leg Press        NuStep: S9, A9 L4 10' L4 10' L4 10' L5 10' L4 10' Ther Activity        Step-ups: Fwd nv 6" 2x10 8" 2x10 8" 2x10 6" 2x10   Bridge        Sit to Stands 2x10 Foam 2x10 Foam 2x10 Foam 1x10 Foam 2x10 Foam   Wall Squat        Obstacle Course: Hurdles, Cones, Foam, 6", 8" nv 2 laps np nv 20'x4   Stop/Go Gait nv np D/c  20' x4   Ambulation s/ AD, gait belt  ' CGA np     Stairs c/ SPC and R HR Asc/ L HR Hermes    FFx1                     Gait Training        Ambulation c/ RW        Stairs                Modalities        CP                    Access Code: [email or web address]  URL: [email or web address]  Date: 03/15/2022  Prepared by: Tobi Massing    Exercises  · Supine Quad Set - 1 x daily - 7 x weekly - 10 reps - 10 seconds hold  · Supine Active Straight Leg Raise - 1 x daily - 7 x weekly - 2 sets - 10 reps  · Seated Long Arc Quad - 1 x daily - 7 x weekly - 2 sets - 10 reps  · Supine Bridge - 1 x daily - 7 x weekly - 3 sets - 10 reps  · Seated Heel Toe Raises - 1 x daily - 7 x weekly - 3 sets - 10 reps  · Seated Calf Stretch with Strap - 1 x daily - 7 x weekly - 3 reps - 30 seconds hold

## 2022-03-29 ENCOUNTER — OFFICE VISIT (OUTPATIENT)
Dept: PHYSICAL THERAPY | Facility: CLINIC | Age: 77
End: 2022-03-29
Payer: COMMERCIAL

## 2022-03-29 DIAGNOSIS — R26.9 ABNORMAL GAIT DUE TO MUSCLE WEAKNESS: Primary | ICD-10-CM

## 2022-03-29 DIAGNOSIS — M62.81 ABNORMAL GAIT DUE TO MUSCLE WEAKNESS: Primary | ICD-10-CM

## 2022-03-29 PROCEDURE — 97110 THERAPEUTIC EXERCISES: CPT

## 2022-03-29 PROCEDURE — 97530 THERAPEUTIC ACTIVITIES: CPT

## 2022-03-29 PROCEDURE — 97112 NEUROMUSCULAR REEDUCATION: CPT

## 2022-03-29 NOTE — PROGRESS NOTES
Daily Note     Today's date: 3/29/2022  Patient name: Adwoa Berry  : [birth year]  MRN: [id number]  Referring provider: Gabriel Chan MD  Dx:   Encounter Diagnosis     ICD-10-CM    1  Abnormal gait due to muscle weakness  M62 81     R26 9        Start Time: 0800  Stop Time: 0900  Total time in clinic (min): 60 minutes    Subjective: Patient reports he feels stiff this morning, but notes mild improvement in his balance since his last visit  Objective: See treatment diary below      Assessment: Tolerated treatment well  Patient demonstrated fatigue post treatment, exhibited good technique with therapeutic exercises and would benefit from continued PT  Patient demonstrates continued progress in balance during ambulation s/ AD as evidenced by Sup/CGA performance c/ obstacle course  Continue to recommend use of SPC for all ambulation when not at PT  Plan: Continue per plan of care  Precautions: FALL RISK    Date 3/15 3/18 3/22 3/25 3/29   Manuals        Hamstring and Gastroc Stretch KS   KS    Assessment   KS             Neuro Re-Ed        NBOS Foam 30"x3 nv   nv   NBOS c/ perturbations: lateral, fwd, bkwd x30 nv   nv   Tandem 30"x3 // 30"x3 // 30"x3 ea // 30"x3 ea  // 30"x3ea //   Tandem Walk 5 laps // 5 laps // 5 laps // 5 laps // 5 laps //   SLS        Sidestepping L1 4 laps // L1 4 laps // L2 3 laps // L2 4 laps // L2 4 laps //   Quad Set 10"x10 np 10"x10 10"x10 10"x10   Ball Squeeze   5"x20 5"x20 5"x20   S/Lying Clams    L3 2x10ea                              Ther Ex        Gastroc Stretch 30"x3 1/2 roll, seated 30"x3 1/2 roll 30"x3 1/2 roll 30"x3 1/2 roll 30"x3 1/2 roll   Heel Slides        LAQ 3# 2x10 3# 2x10 3# 2x10 4# 2x10 3# 2x10   HCs L3 2x10 L3 2x10 L2 2x10 L3 2x10 L3 2x10   SLR 1x10 2x10 2x10 2x10    TKE L2 2x10 nv nv np    Step Flexion        HR/TR 2x10 3x10 3x10 1# 3x10 1# 3x10   Stand: Abd/Ext 2x10 2x10 np 1# 2x10 1# 2x10   Stand: March/HC 2x10 2x10 np 1# 2x10 1# 2x10   Leg Press NuStep: S9, A9 L4 10' L4 10' L4 10' L5 10' L5 10'           Ther Activity        Step-ups: Fwd nv 6" 2x10 8" 2x10 8" 2x10 8" 2x10   Bridge        Sit to Stands 2x10 Foam 2x10 Foam 2x10 Foam 1x10 Foam 2x10 Foam   Wall Squat        Obstacle Course: Hurdles, Cones, Foam, 6", 8" nv 2 laps np nv 4 laps, hurdles, foam no AD CGA   Stop/Go Gait nv np D/c     Ambulation s/ AD, gait belt  ' CGA np  ' Sup   Stairs c/ SPC and R HR Asc/ L HR Hermes    FFx1                     Gait Training        Ambulation c/ RW        Stairs                Modalities        CP                    Access Code: [email or web address]  URL: [email or web address]  Date: 03/15/2022  Prepared by: Sal Bruno    Exercises  · Supine Quad Set - 1 x daily - 7 x weekly - 10 reps - 10 seconds hold  · Supine Active Straight Leg Raise - 1 x daily - 7 x weekly - 2 sets - 10 reps  · Seated Long Arc Quad - 1 x daily - 7 x weekly - 2 sets - 10 reps  · Supine Bridge - 1 x daily - 7 x weekly - 3 sets - 10 reps  · Seated Heel Toe Raises - 1 x daily - 7 x weekly - 3 sets - 10 reps  · Seated Calf Stretch with Strap - 1 x daily - 7 x weekly - 3 reps - 30 seconds hold

## 2022-03-31 ENCOUNTER — OFFICE VISIT (OUTPATIENT)
Dept: PHYSICAL THERAPY | Facility: CLINIC | Age: 77
End: 2022-03-31
Payer: COMMERCIAL

## 2022-03-31 DIAGNOSIS — R26.9 ABNORMAL GAIT DUE TO MUSCLE WEAKNESS: Primary | ICD-10-CM

## 2022-03-31 DIAGNOSIS — M62.81 ABNORMAL GAIT DUE TO MUSCLE WEAKNESS: Primary | ICD-10-CM

## 2022-03-31 PROCEDURE — 97112 NEUROMUSCULAR REEDUCATION: CPT

## 2022-03-31 PROCEDURE — 97110 THERAPEUTIC EXERCISES: CPT

## 2022-03-31 PROCEDURE — 97140 MANUAL THERAPY 1/> REGIONS: CPT

## 2022-03-31 NOTE — PROGRESS NOTES
Daily Note     Today's date: 3/31/2022  Patient name: Freddy Hernandez  : [birth year]  MRN: [id number]  Referring provider: Kathy Sarabia MD  Dx:   Encounter Diagnosis     ICD-10-CM    1  Abnormal gait due to muscle weakness  M62 81     R26 9                   Subjective: Pt reports he is doing well and getting stronger  Objective: See treatment diary below      Assessment: Tolerated treatment well  Patient exhibited good technique with therapeutic exercises  Pt cont to demonstrate overall improved gait and balance with program  Pt with min LOB with balance program  Cont to delbert well with overall improved strength and balance  Plan: Continue per plan of care  Precautions: FALL RISK    Date 3/31 3/18 3/22 3/25 3/29   Manuals        Hamstring and Gastroc Stretch    KS    Assessment   KS             Neuro Re-Ed        NBOS  nv   nv   NBOS c/ perturbations: lateral, fwd, bkwd  nv   nv   Tandem 30"x3 // 30"x3 // 30"x3 ea // 30"x3 ea  // 30"x3ea //   Tandem Walk 5 laps // 5 laps // 5 laps // 5 laps // 5 laps //   SLS        Sidestepping L2 5 laps // L1 4 laps // L2 3 laps // L2 4 laps // L2 4 laps //   Quad Set NP np 10"x10 10"x10 10"x10   Ball Squeeze   5"x20 5"x20 5"x20   S/Lying Clams    L3 2x10ea                              Ther Ex        Gastroc Stretch 30"x3 1/2 roll, seated 30"x3 1/2 roll 30"x3 1/2 roll 30"x3 1/2 roll 30"x3 1/2 roll   Heel Slides        LAQ 3# 2x10 3# 2x10 3# 2x10 4# 2x10 3# 2x10   HCs L3 2x10 L3 2x10 L2 2x10 L3 2x10 L3 2x10   SLR 1x10 2x10 2x10 2x10    TKE L2 2x10 nv nv np    Step Flexion        HR/TR 3x10 1# 3x10 3x10 1# 3x10 1# 3x10   Stand: Abd/Ext 2x10 1# 2x10 np 1# 2x10 1# 2x10   Stand: March/HC 2x10 1# 2x10 np 1# 2x10 1# 2x10   Leg Press        NuStep: S9, A9 L5 10' L4 10' L4 10' L5 10' L5 10'           Ther Activity        Step-ups: Fwd 8" 2/10 6" 2x10 8" 2x10 8" 2x10 8" 2x10   Bridge        Sit to Stands 2x10 Foam 2x10 Foam 2x10 Foam 1x10 Foam 2x10 Foam   Wall Squat Obstacle Course: Hurdles, Cones, Foam, 6", 8" 4 laps hurdles 2 laps np nv 4 laps, hurdles, foam no AD CGA   Stop/Go Gait nv np D/c     Ambulation s/ AD, gait belt ' ' CGA np  ' Sup   Stairs c/ SPC and R HR Asc/ L HR Hermes    FFx1                     Gait Training        Ambulation c/ RW        Stairs                Modalities        CP                    Access Code: [email or web address]  URL: [email or web address]  Date: 03/15/2022  Prepared by: Lauren De Jesus    Exercises  · Supine Quad Set - 1 x daily - 7 x weekly - 10 reps - 10 seconds hold  · Supine Active Straight Leg Raise - 1 x daily - 7 x weekly - 2 sets - 10 reps  · Seated Long Arc Quad - 1 x daily - 7 x weekly - 2 sets - 10 reps  · Supine Bridge - 1 x daily - 7 x weekly - 3 sets - 10 reps  · Seated Heel Toe Raises - 1 x daily - 7 x weekly - 3 sets - 10 reps  · Seated Calf Stretch with Strap - 1 x daily - 7 x weekly - 3 reps - 30 seconds hold

## 2022-04-05 ENCOUNTER — OFFICE VISIT (OUTPATIENT)
Dept: PHYSICAL THERAPY | Facility: CLINIC | Age: 77
End: 2022-04-05
Payer: COMMERCIAL

## 2022-04-05 DIAGNOSIS — M62.81 ABNORMAL GAIT DUE TO MUSCLE WEAKNESS: Primary | ICD-10-CM

## 2022-04-05 DIAGNOSIS — R26.9 ABNORMAL GAIT DUE TO MUSCLE WEAKNESS: Primary | ICD-10-CM

## 2022-04-05 PROCEDURE — 97112 NEUROMUSCULAR REEDUCATION: CPT

## 2022-04-05 PROCEDURE — 97110 THERAPEUTIC EXERCISES: CPT

## 2022-04-05 NOTE — PROGRESS NOTES
Daily Note     Today's date: 2022  Patient name: iXomara Riley  :   MRN: [id number]  Referring provider: Divya Pennington MD  Dx:   Encounter Diagnosis     ICD-10-CM    1  Abnormal gait due to muscle weakness  M62 81     R26 9                   Subjective: Pt reports he is doing well  States cont to make slow steady gains  Feels stronger in legs  Objective: See treatment diary below      Assessment: Tolerated treatment well  Patient demonstrated fatigue post treatment  Pt with notable fatigue at end of session today  Pt with mild unsteadiness with NBOS of foam surface  Pt cont to demonstrate improved overall strength and stability with program        Plan: Continue per plan of care  Precautions: FALL RISK    Date 3/31 4/5 3/22 3/25 3/29   Manuals        Hamstring and Gastroc Stretch    KS    Assessment   KS             Neuro Re-Ed        NBOS  30"/2   nv   NBOS c/ perturbations: lateral, fwd, bkwd  nv   nv   Tandem 30"x3 // 30"x3 // 30"x3 ea // 30"x3 ea  // 30"x3ea //   Tandem Walk 5 laps // 5 laps // 5 laps // 5 laps // 5 laps //   SLS        Sidestepping L2 5 laps // L2 5 laps // L2 3 laps // L2 4 laps // L2 4 laps //   Quad Set NP np 10"x10 10"x10 10"x10   Ball Squeeze   5"x20 5"x20 5"x20   S/Lying Clams    L3 2x10ea                              Ther Ex        Gastroc Stretch 30"x3 1/2 roll, seated  30"x3 1/2 roll 30"x3 1/2 roll 30"x3 1/2 roll   Heel Slides        LAQ 3# 2x10 3# 2x10 3# 2x10 4# 2x10 3# 2x10   HCs L3 2x10 L3 2x10 L2 2x10 L3 2x10 L3 2x10   SLR 1x10 NV 2x10 2x10    TKE L2 2x10 L2 2/10 nv np    Step Flexion        HR/TR 3x10 1# 3x10 1# 3x10 1# 3x10 1# 3x10   Stand: Abd/Ext 2x10 1# 2x10 1# np 1# 2x10 1# 2x10   Stand: March/HC 2x10 1# 2x10 1# np 1# 2x10 1# 2x10   Leg Press        NuStep: S9, A9 L5 10' L5 10' L4 10' L5 10' L5 10'           Ther Activity        Step-ups: Fwd 8" 2/10 8" 2x10 8" 2x10 8" 2x10 8" 2x10   Bridge        Sit to Stands 2x10 Foam 2x10 Foam 2x10 Foam 1x10 Foam 2x10 Foam   Wall Squat        Obstacle Course: Hurdles, Cones, Foam, 6", 8" 4 laps hurdles 4 hurdles np nv 4 laps, hurdles, foam no AD CGA   Stop/Go Gait nv np D/c     Ambulation s/ AD, gait belt '  np  ' Sup   Stairs c/ SPC and R HR Asc/ L HR Hermes    FFx1                     Gait Training        Ambulation c/ RW        Stairs                Modalities        CP                    Access Code: [email or web address]  URL: [email or web address]  Date: 03/15/2022  Prepared by: Kelly Lopes    Exercises  · Supine Quad Set - 1 x daily - 7 x weekly - 10 reps - 10 seconds hold  · Supine Active Straight Leg Raise - 1 x daily - 7 x weekly - 2 sets - 10 reps  · Seated Long Arc Quad - 1 x daily - 7 x weekly - 2 sets - 10 reps  · Supine Bridge - 1 x daily - 7 x weekly - 3 sets - 10 reps  · Seated Heel Toe Raises - 1 x daily - 7 x weekly - 3 sets - 10 reps  · Seated Calf Stretch with Strap - 1 x daily - 7 x weekly - 3 reps - 30 seconds hold

## 2022-04-08 ENCOUNTER — OFFICE VISIT (OUTPATIENT)
Dept: PHYSICAL THERAPY | Facility: CLINIC | Age: 77
End: 2022-04-08
Payer: COMMERCIAL

## 2022-04-08 DIAGNOSIS — R26.9 ABNORMAL GAIT DUE TO MUSCLE WEAKNESS: Primary | ICD-10-CM

## 2022-04-08 DIAGNOSIS — M62.81 ABNORMAL GAIT DUE TO MUSCLE WEAKNESS: Primary | ICD-10-CM

## 2022-04-08 PROCEDURE — 97140 MANUAL THERAPY 1/> REGIONS: CPT

## 2022-04-08 PROCEDURE — 97110 THERAPEUTIC EXERCISES: CPT

## 2022-04-08 PROCEDURE — 97530 THERAPEUTIC ACTIVITIES: CPT

## 2022-04-08 NOTE — PROGRESS NOTES
Daily Note     Today's date: 2022  Patient name: Avi Mena  :   MRN: [id number]  Referring provider: Norma Cazares MD  Dx:   Encounter Diagnosis     ICD-10-CM    1  Abnormal gait due to muscle weakness  M62 81     R26 9        Start Time: 0800  Stop Time: 0900  Total time in clinic (min): 60 minutes    Subjective: Patient reports a few days ago he had a fall in his house  Denies any injuries or head impact other than a small cut on his forearm, which show good evidence of healing  Reports he notices his falls usually occur when he unexpectedly turns to the right side  Objective: See treatment diary below      Assessment: Tolerated treatment well  Patient demonstrated fatigue post treatment, exhibited good technique with therapeutic exercises and would benefit from continued PT  Patient demonstrates impaired balance c/ unexpected turning to right and left  Right is worse than left  Requiring CGA on occasion c/ gait belt  Incorporated pivot practice c/ SPC to increase motor response when required to unexpectedly turn at home  Plan: Continue per plan of care  Precautions: FALL RISK    Date 3/31 4/5 4/8 3/25 3/29   Manuals        Hamstring and Gastroc Stretch   KS KS    Assessment                Neuro Re-Ed        NBOS  30"/2 DC  nv   NBOS c/ perturbations: lateral, fwd, bkwd  nv nv  nv   Tandem 30"x3 // 30"x3 // nv 30"x3 ea  // 30"x3ea //   Tandem Walk 5 laps // 5 laps // nv 5 laps // 5 laps //   SLS        Sidestepping L2 5 laps // L2 5 laps // nv L2 4 laps // L2 4 laps //   [employer] NP np HEP 10"x10 10"x10   Ball Squeeze   HEP 5"x20 5"x20   S/Lying Clams    L3 2x10ea                              Ther Ex        Gastroc Stretch 30"x3 1/2 roll, seated  30"x3 1/2 roll 30"x3 1/2 roll 30"x3 1/2 roll   Heel Slides        LAQ 3# 2x10 3# 2x10 4# 2x10 4# 2x10 3# 2x10   HCs L3 2x10 L3 2x10 L3 2x10 L3 2x10 L3 2x10   SLR 1x10 NV np 2x10    TKE L2 2x10 L2 2/10 DC np    Step Flexion HR/TR 3x10 1# 3x10 1# 3x10 2# 1# 3x10 1# 3x10   Stand: Abd/Ext 2x10 1# 2x10 1# 2# 2x10 1# 2x10 1# 2x10   Stand: March/HC 2x10 1# 2x10 1# 2# 2x10 1# 2x10 1# 2x10   Leg Press        NuStep: S9, A9 L5 10' L5 10' L5 10' L5 10' L5 10'   Pivots c/ SPC in // bars   x20 ea  Ther Activity        Step-ups: Fwd 8" 2/10 8" 2x10 np 8" 2x10 8" 2x10   Bridge        Sit to Stands 2x10 Foam 2x10 Foam 2x10 Foam 1x10 Foam 2x10 Foam   Wall Squat        Obstacle Course: Hurdles, Cones, Foam, 6", 8" 4 laps hurdles 4 hurdles np nv 4 laps, hurdles, foam no AD CGA   Stop/Go Gait nv np D/c     Ambulation s/ AD, gait belt '  500' c/ L/R Pivot  ' Sup   Stairs c/ SPC and R HR Asc/ L HR Hermes    FFx1                     Gait Training        Ambulation c/ RW        Stairs                Modalities        CP                    Access Code: [email or web address]  URL: [email or web address]  Date: 03/15/2022  Prepared by: Leidy Irene    Exercises  · Supine Quad Set - 1 x daily - 7 x weekly - 10 reps - 10 seconds hold  · Supine Active Straight Leg Raise - 1 x daily - 7 x weekly - 2 sets - 10 reps  · Seated Long Arc Quad - 1 x daily - 7 x weekly - 2 sets - 10 reps  · Supine Bridge - 1 x daily - 7 x weekly - 3 sets - 10 reps  · Seated Heel Toe Raises - 1 x daily - 7 x weekly - 3 sets - 10 reps  · Seated Calf Stretch with Strap - 1 x daily - 7 x weekly - 3 reps - 30 seconds hold

## 2022-04-12 ENCOUNTER — OFFICE VISIT (OUTPATIENT)
Dept: PHYSICAL THERAPY | Facility: CLINIC | Age: 77
End: 2022-04-12
Payer: COMMERCIAL

## 2022-04-12 DIAGNOSIS — M62.81 ABNORMAL GAIT DUE TO MUSCLE WEAKNESS: Primary | ICD-10-CM

## 2022-04-12 DIAGNOSIS — R26.9 ABNORMAL GAIT DUE TO MUSCLE WEAKNESS: Primary | ICD-10-CM

## 2022-04-12 PROCEDURE — 97530 THERAPEUTIC ACTIVITIES: CPT

## 2022-04-12 PROCEDURE — 97110 THERAPEUTIC EXERCISES: CPT

## 2022-04-12 PROCEDURE — 97112 NEUROMUSCULAR REEDUCATION: CPT

## 2022-04-12 NOTE — PROGRESS NOTES
Daily Note     Today's date: 2022  Patient name: Vaishnavi Mcgraw  : [birth year]  MRN: [id number]  Referring provider: Rachell Weinberg MD  Dx:   Encounter Diagnosis     ICD-10-CM    1  Abnormal gait due to muscle weakness  M62 81     R26 9        Start Time: 788  Stop Time: 1276  Total time in clinic (min): 58 minutes    Subjective: Patient denies any falls since his last visit  He cites 2 close calls when he was trying to move too fast       Objective: See treatment diary below      Assessment: Tolerated treatment well  Patient demonstrated fatigue post treatment, exhibited good technique with therapeutic exercises and would benefit from continued PT  Patient demonstrates improved ability to maintain balance during gait c/ pivoting  Practiced multiple times t/o session to improve motor control  Plan: Continue per plan of care  Re-eval at nv  Precautions: FALL RISK    Date 3/31 4/5 4/8 4/12 3/29   Manuals        Hamstring and Gastroc Stretch   KS     Assessment                Neuro Re-Ed        NBOS  30"/2 DC  nv   NBOS c/ perturbations: lateral, fwd, bkwd  nv nv np nv   Tandem 30"x3 // 30"x3 // nv np 30"x3ea //   Tandem Walk 5 laps // 5 laps // nv 5 laps // 5 laps //   SLS        Sidestepping L2 5 laps // L2 5 laps // nv L2 5 laps // L2 4 laps //   [employer] NP np HEP  10"x10   [employer]   HEP  5"x20   S/Lying Clams                                Ther Ex        Gastroc Stretch 30"x3 1/2 roll, seated  30"x3 1/2 roll 30"x3 1/2 roll 30"x3 1/2 roll   Heel Slides        LAQ 3# 2x10 3# 2x10 4# 2x10 nv 3# 2x10   HCs L3 2x10 L3 2x10 L3 2x10 nv L3 2x10   SLR 1x10 NV np     TKE L2 2x10 L2 2/10 DC     Step Flexion        HR/TR 3x10 1# 3x10 1# 3x10 2# 2# 3x10 1# 3x10   Stand: Abd/Ext 2x10 1# 2x10 1# 2# 2x10 2# 2x10 1# 2x10   Stand: March/HC 2x10 1# 2x10 1# 2# 2x10 2# 2x10 1# 2x10   Leg Press        NuStep: S9, A9 L5 10' L5 10' L5 10' L5 10' L5 10'   Pivots c/ SPC in // bars   x20 ea  x20 ea   S/ // Ther Activity        Step-ups: Fwd 8" 2/10 8" 2x10 np 8" 2x10 8" 2x10   Bridge        Sit to Stands 2x10 Foam 2x10 Foam 2x10 Foam 2x10 Foam 2x10 Foam   Wall Squat        Obstacle Course: Hurdles, Cones, Foam, 6", 8" 4 laps hurdles 4 hurdles np np 4 laps, hurdles, foam no AD CGA   Stop/Go Gait nv np D/c     Ambulation s/ AD, gait belt '  500' c/ L/R Pivot 500' c/ L/R pivot ' Sup   Stairs c/ SPC and R HR Asc/ L HR Hermes                        Gait Training        Ambulation c/ RW        Stairs                Modalities        CP                    Access Code: [email or web address]  URL: [email or web address]  Date: 03/15/2022  Prepared by: Comfort Westbrook    Exercises  · Supine Quad Set - 1 x daily - 7 x weekly - 10 reps - 10 seconds hold  · Supine Active Straight Leg Raise - 1 x daily - 7 x weekly - 2 sets - 10 reps  · Seated Long Arc Quad - 1 x daily - 7 x weekly - 2 sets - 10 reps  · Supine Bridge - 1 x daily - 7 x weekly - 3 sets - 10 reps  · Seated Heel Toe Raises - 1 x daily - 7 x weekly - 3 sets - 10 reps  · Seated Calf Stretch with Strap - 1 x daily - 7 x weekly - 3 reps - 30 seconds hold    Detail Level: Zone Detail Level: Generalized Detail Level: Detailed

## 2022-04-12 NOTE — PROGRESS NOTES
PT Re-Evaluation     Today's date: 2022  Patient name: Jack Pollock  :   MRN: [id number]  Referring provider: Carlos Andrews MD  Dx:   Encounter Diagnosis     ICD-10-CM    1  Abnormal gait due to muscle weakness  M62 81     R26 9        Start Time: 0759  Stop Time: 0900  Total time in clinic (min): 61 minutes    Assessment  Assessment details: Patient is a 76 yo male with medical diagnosis of gait abnormality  Patient's LTGs are progressing, but not met at this time  He continues to demonstrate progress in pain and function as evidenced by improved balance, increased b/l LE strength,  improve gait mechanics and improved performance on standardized five time sit to stand and TUG assessments  He continues to demonstrate decreased weakness in b/l LE (L>R), impaired balance and is at risk of falls as evidenced by functional testing  Patient will continue to benefit from skilled PT interventions 1-2x/week to address remaining impairments and functional limitations, decrease risk of falls and progress to independent management c/ HEP  Impairments: abnormal gait, abnormal movement, impaired balance and impaired physical strength  Understanding of Dx/Px/POC: good   Prognosis: good    Goals  STG (3 weeks):  Patient will demonstrate improved five time sit to stand time by 4"  Met  Patient will demonstrate increased LE strength by 1/2 grade  Met  LTG (6 weeks):  Patient will demonstrate five time sit to stand <13  6" to demonstrate decreased risk of falls and disability  Progressing, Not Met  Patient will demonstrate TUG score of <=9" to promote decreased risk of falls  Not Met  Progressing  Patient will demonstrate 4+/5 LE strength to demonstrate increased strength and stability during ambulation  Not Met  Progressing  Patient will self-report 70% improvement in his overall stability during ambulation  Not Met  Progressing     Patient will be independent with HEP in 6 weeks to allow independent management of condition  Not Met  Progressing  Plan  Plan details: TE, NMR, TA, MT, self-care, and modalities as needed in order to progress through skilled PT focused on ROM, strength, balance, motor control  Patient will continue to benefit from skilled PT interventions 1-2x/wk  to address remaining impairments and functional limitations and decrease risk of falls  Patient would benefit from: skilled physical therapy  Planned modality interventions: cryotherapy and thermotherapy: hydrocollator packs  Planned therapy interventions: joint mobilization, manual therapy, massage, neuromuscular re-education, patient education, self care, strengthening, stretching, therapeutic activities, therapeutic exercise, home exercise program, gait training and balance  Frequency: 2x week  Duration in weeks: 6  Treatment plan discussed with: patient        Subjective Evaluation    History of Present Illness  Mechanism of injury: Patient is a 76 yo male presenting with abnormal gait and recurrent falls  Patient reports improvement in the number of occurrences of gait instability  He reports improved strength in his legs, specifically in his tibialis anterior and gastrocnemius  Overall, he feels more confident in his balance in various settings  Patient's primary goals for PT are to decrease his falls     Pain  No pain reported    Social Support  Steps to enter house: yes (Has elevator, occasionally completes 2 flights of stairs c/ u/l HR)  Stairs in house: no   Lives in: apartment (3rd floor in high rise)  Lives with: alone    Employment status: not working  Patient Goals  Patient goals for therapy: increased strength, return to sport/leisure activities and improved balance          Objective     Ambulation     Comments   Observation:     -Gait: Ambulates c/ SPC on L c/ wide DORA and increased lateral sway t/o     -Transfers:            -Sit to Stand: Able to place hands on thighs to complete    Special Tests:  5xSTS: 17"   TUG: 13" c/ SPC, 12" s/ AD    Balance s/ UE support:  NBOS: 30"  Tandem (R posterior/L posterior): 17" / 30"    Hip Strength (MMT Grades):  FLX (Seated, R/L): 4- / 4-  ABD (Seated, R/L): 4- / 4-  ADD (Seated, R/L): 3+ / 4-    Knee Strength (MMT Grades):  EXT (R/L): 4- / 4-  FLX (R/L): 4- / 4-    Ankle Strength (MMT Grades):  DF (R/L): 3+ / 4-  PF (R/L): 4- / 4-      Flowsheet Rows      Most Recent Value   PT/OT G-Codes    Current Score 54   Projected Score 51           Precautions: FALL RISK    Date 3/31 4/5 4/8 4/12 4/14   Manuals        Hamstring and Gastroc Stretch   KS  KS   Assessment     KS           Neuro Re-Ed        NBOS  30"/2 DC  nv   NBOS c/ perturbations: lateral, fwd, bkwd  nv nv np nv   Tandem 30"x3 // 30"x3 // nv np 30"x3ea //   Tandem Walk 5 laps // 5 laps // nv 5 laps // 5 laps //   SLS        Sidestepping L2 5 laps // L2 5 laps // nv L2 5 laps // [employer] NP np HEP     Ball Squeeze   HEP     S/Lying Clams     L3 2x10   Bridge     2x10                   Ther Ex        Gastroc Stretch 30"x3 1/2 roll, seated  30"x3 1/2 roll 30"x3 1/2 roll 30"x3 1/2 roll   Heel Slides        LAQ 3# 2x10 3# 2x10 4# 2x10 nv 4# 2x10   HCs L3 2x10 L3 2x10 L3 2x10 nv L3 2x10   SLR 1x10 NV np     TKE L2 2x10 L2 2/10 DC     Step Flexion        HR/TR 3x10 1# 3x10 1# 3x10 2# 2# 3x10 2#  3x10   Stand: Abd/Ext 2x10 1# 2x10 1# 2# 2x10 2# 2x10 nv   Stand: March/HC 2x10 1# 2x10 1# 2# 2x10 2# 2x10 nv   Leg Press        NuStep: S9, A9 L5 10' L5 10' L5 10' L5 10' L5 10'   Pivots c/ SPC    x20 ea  x20 ea    x20ea no AD           Ther Activity        Step-ups: Fwd 8" 2/10 8" 2x10 np 8" 2x10 nv   Bridge        Sit to Stands 2x10 Foam 2x10 Foam 2x10 Foam 2x10 Foam 2x10 Foam   Wall Squat        Obstacle Course: Hurdles, Cones, Foam, 6", 8" 4 laps hurdles 4 hurdles np np np   Stop/Go Gait nv np D/c     Ambulation s/ AD, gait belt '  500' c/ L/R Pivot 500' c/ L/R pivot ' L/R pivot   Stairs c/ SPC and R HR Asc/ L HR Hermes Gait Training        Ambulation c/ RW        Stairs                Modalities        CP                    Access Code: [email or web address]  URL: [email or web address]  Date: 04/14/2022  Prepared by: Brandan Ledezma    Exercises  · Supine Quad Set - 1 x daily - 7 x weekly - 10 reps - 10 seconds hold  · Supine Bridge - 1 x daily - 7 x weekly - 2 sets - 10 reps  · Supine Active Straight Leg Raise - 1 x daily - 7 x weekly - 2 sets - 10 reps  · [employer]ams[employer] with Resistance - 1 x daily - 7 x weekly - 2 sets - 10 reps  · Seated Long Arc Quad - 1 x daily - 7 x weekly - 2 sets - 10 reps  · Seated Heel Toe Raises - 1 x daily - 7 x weekly - 3 sets - 10 reps  · Seated Calf Stretch with Strap - 1 x daily - 7 x weekly - 3 reps - 30 seconds hold      ·

## 2022-04-14 ENCOUNTER — EVALUATION (OUTPATIENT)
Dept: PHYSICAL THERAPY | Facility: CLINIC | Age: 77
End: 2022-04-14
Payer: COMMERCIAL

## 2022-04-14 DIAGNOSIS — M62.81 ABNORMAL GAIT DUE TO MUSCLE WEAKNESS: Primary | ICD-10-CM

## 2022-04-14 DIAGNOSIS — R26.9 ABNORMAL GAIT DUE TO MUSCLE WEAKNESS: Primary | ICD-10-CM

## 2022-04-14 PROCEDURE — 97112 NEUROMUSCULAR REEDUCATION: CPT

## 2022-04-14 PROCEDURE — 97110 THERAPEUTIC EXERCISES: CPT

## 2022-04-14 PROCEDURE — 97140 MANUAL THERAPY 1/> REGIONS: CPT

## 2022-04-14 NOTE — LETTER
2022    Darron Gilbert MD  [address]  [place], [address] [place] [address],[address]    Patient: Xiomara Riley   YOB: 1945   Date of Visit: 2022     Encounter Diagnosis     ICD-10-CM    1  Abnormal gait due to muscle weakness  M62 81     R26 9        Dear Dr Peyman Plunkett:    Thank you for your recent referral of Xiomara Riley  Please review the attached evaluation summary from Marco's recent visit  Please verify that you agree with the plan of care by signing the attached order  If you have any questions or concerns, please do not hesitate to call  I sincerely appreciate the opportunity to share in the care of one of your patients and hope to have another opportunity to work with you in the near future  Sincerely,    Brandan Ledezma, PT      Referring Provider:      I certify that I have read the below Plan of Care and certify the need for these services furnished under this plan of treatment while under my care  Darron Gilbert MD  [place] [address]  [address] [address]  Via Fax: [phone]          PT Re-Evaluation     Today's date: 2022  Patient name: Xiomara Riley  : [birth year]  MRN: [id number]  Referring provider: Divya Pennington MD  Dx:   Encounter Diagnosis     ICD-10-CM    1  Abnormal gait due to muscle weakness  M62 81     R26 9        Start Time: 0759  Stop Time: 0900  Total time in clinic (min): 61 minutes    Assessment  Assessment details: Patient is a 76 yo male with medical diagnosis of gait abnormality  Patient's LTGs are progressing, but not met at this time  He continues to demonstrate progress in pain and function as evidenced by improved balance, increased b/l LE strength,  improve gait mechanics and improved performance on standardized five time sit to stand and TUG assessments  He continues to demonstrate decreased weakness in b/l LE (L>R), impaired balance and is at risk of falls as evidenced by functional testing   Patient will continue to benefit from skilled PT interventions 1-2x/week to address remaining impairments and functional limitations, decrease risk of falls and progress to independent management c/ HEP  Impairments: abnormal gait, abnormal movement, impaired balance and impaired physical strength  Understanding of Dx/Px/POC: good   Prognosis: good    Goals  STG (3 weeks):  Patient will demonstrate improved five time sit to stand time by 4"  Met  Patient will demonstrate increased LE strength by 1/2 grade  Met  LTG (6 weeks):  Patient will demonstrate five time sit to stand <13  6" to demonstrate decreased risk of falls and disability  Progressing, Not Met  Patient will demonstrate TUG score of <=9" to promote decreased risk of falls  Not Met  Progressing  Patient will demonstrate 4+/5 LE strength to demonstrate increased strength and stability during ambulation  Not Met  Progressing  Patient will self-report 70% improvement in his overall stability during ambulation  Not Met  Progressing  Patient will be independent with HEP in 6 weeks to allow independent management of condition  Not Met  Progressing  Plan  Plan details: TE, NMR, TA, MT, self-care, and modalities as needed in order to progress through skilled PT focused on ROM, strength, balance, motor control  Patient will continue to benefit from skilled PT interventions 1-2x/wk  to address remaining impairments and functional limitations and decrease risk of falls     Patient would benefit from: skilled physical therapy  Planned modality interventions: cryotherapy and thermotherapy: hydrocollator packs  Planned therapy interventions: joint mobilization, manual therapy, massage, neuromuscular re-education, patient education, self care, strengthening, stretching, therapeutic activities, therapeutic exercise, home exercise program, gait training and balance  Frequency: 2x week  Duration in weeks: 6  Treatment plan discussed with: patient        Subjective Evaluation    History of Present Illness  Mechanism of injury: Patient is a 78 yo male presenting with abnormal gait and recurrent falls  Patient reports improvement in the number of occurrences of gait instability  He reports improved strength in his legs, specifically in his tibialis anterior and gastrocnemius  Overall, he feels more confident in his balance in various settings  Patient's primary goals for PT are to decrease his falls     Pain  No pain reported    Social Support  Steps to enter house: yes (Has elevator, occasionally completes 2 flights of stairs c/ u/l HR)  Stairs in house: no   Lives in: apartment (3rd floor in high rise)  Lives with: alone    Employment status: not working  Patient Goals  Patient goals for therapy: increased strength, return to sport/leisure activities and improved balance          Objective     Ambulation     Comments   Observation:     -Gait: Ambulates c/ SPC on L c/ wide DORA and increased lateral sway t/o     -Transfers:            -Sit to Stand: Able to place hands on thighs to complete    Special Tests:  5xSTS: 17"   TU" c/ SPC, 12" s/ AD    Balance s/ UE support:  NBOS: 30"  Tandem (R posterior/L posterior): 17" / 30"    Hip Strength (MMT Grades):  FLX (Seated, R/L): 4- / 4-  ABD (Seated, R/L): 4- / 4-  ADD (Seated, R/L): 3+ / 4-    Knee Strength (MMT Grades):  EXT (R/L): 4- / 4-  FLX (R/L): 4- / 4-    Ankle Strength (MMT Grades):  DF (R/L): 3+ / 4-  PF (R/L): 4- / 4-      Flowsheet Rows      Most Recent Value   PT/OT G-Codes    Current Score 54   Projected Score 51           Precautions: FALL RISK    Date 3/31 4/5 4/8 4/12 4/14   Manuals        Hamstring and Gastroc Stretch   KS  KS   Assessment     KS           Neuro Re-Ed        NBOS  30"/2 DC  nv   NBOS c/ perturbations: lateral, fwd, bkwd  nv nv np nv   Tandem 30"x3 // 30"x3 // nv np 30"x3ea //   Tandem Walk 5 laps // 5 laps // nv 5 laps // 5 laps //   SLS        Sidestepping L2 5 laps // L2 5 laps // nv L2 5 laps // nv   Quad Set NP np HEP     Ball Squeeze   HEP     S/Lying Clams     L3 2x10   Bridge     2x10                   Ther Ex        Gastroc Stretch 30"x3 1/2 roll, seated  30"x3 1/2 roll 30"x3 1/2 roll 30"x3 1/2 roll   Heel Slides        LAQ 3# 2x10 3# 2x10 4# 2x10 nv 4# 2x10   HCs L3 2x10 L3 2x10 L3 2x10 nv L3 2x10   SLR 1x10 NV np     TKE L2 2x10 L2 2/10 DC     Step Flexion        HR/TR 3x10 1# 3x10 1# 3x10 2# 2# 3x10 2#  3x10   Stand: Abd/Ext 2x10 1# 2x10 1# 2# 2x10 2# 2x10 nv   Stand: March/HC 2x10 1# 2x10 1# 2# 2x10 2# 2x10 nv   Leg Press        NuStep: S9, A9 L5 10' L5 10' L5 10' L5 10' L5 10'   Pivots c/ SPC    x20 ea  x20 ea  x20ea no AD           Ther Activity        Step-ups: Fwd 8" 2/10 8" 2x10 np 8" 2x10 nv   Bridge        Sit to Stands 2x10 Foam 2x10 Foam 2x10 Foam 2x10 Foam 2x10 Foam   Wall Squat        Obstacle Course: Hurdles, Cones, Foam, 6", 8" 4 laps hurdles 4 hurdles np np np   Stop/Go Gait nv np D/c     Ambulation s/ AD, gait belt '  500' c/ L/R Pivot 500' c/ L/R pivot ' L/R pivot   Stairs c/ SPC and R HR Asc/ L HR Hermes                        Gait Training        Ambulation c/ RW        Stairs                Modalities        CP                    Access Code: [email or web address]  URL: [email or web address]  Date: 04/14/2022  Prepared by: Farhad Chapman    Exercises  · Supine Quad Set - 1 x daily - 7 x weekly - 10 reps - 10 seconds hold  · Supine Bridge - 1 x daily - 7 x weekly - 2 sets - 10 reps  · Supine Active Straight Leg Raise - 1 x daily - 7 x weekly - 2 sets - 10 reps  · Clamshell with Resistance - 1 x daily - 7 x weekly - 2 sets - 10 reps  · Seated Long Arc Quad - 1 x daily - 7 x weekly - 2 sets - 10 reps  · Seated Heel Toe Raises - 1 x daily - 7 x weekly - 3 sets - 10 reps  · Seated Calf Stretch with Strap - 1 x daily - 7 x weekly - 3 reps - 30 seconds hold      ·

## 2022-04-15 ENCOUNTER — APPOINTMENT (OUTPATIENT)
Dept: PHYSICAL THERAPY | Facility: CLINIC | Age: 77
End: 2022-04-15
Payer: COMMERCIAL

## 2022-04-19 ENCOUNTER — APPOINTMENT (OUTPATIENT)
Dept: PHYSICAL THERAPY | Facility: CLINIC | Age: 77
End: 2022-04-19
Payer: COMMERCIAL

## 2022-04-22 ENCOUNTER — OFFICE VISIT (OUTPATIENT)
Dept: PHYSICAL THERAPY | Facility: CLINIC | Age: 77
End: 2022-04-22
Payer: COMMERCIAL

## 2022-04-22 DIAGNOSIS — M62.81 ABNORMAL GAIT DUE TO MUSCLE WEAKNESS: Primary | ICD-10-CM

## 2022-04-22 DIAGNOSIS — R26.9 ABNORMAL GAIT DUE TO MUSCLE WEAKNESS: Primary | ICD-10-CM

## 2022-04-22 PROCEDURE — 97530 THERAPEUTIC ACTIVITIES: CPT

## 2022-04-22 PROCEDURE — 97110 THERAPEUTIC EXERCISES: CPT

## 2022-04-22 PROCEDURE — 97112 NEUROMUSCULAR REEDUCATION: CPT

## 2022-04-22 NOTE — PROGRESS NOTES
Daily Note     Today's date: 2022  Patient name: Lona Ascencio  :   MRN: [id number]  Referring provider: Nay King MD  Dx:   Encounter Diagnosis     ICD-10-CM    1  Abnormal gait due to muscle weakness  M62 81     R26 9        Start Time: 0800          Subjective: Patient reports he has had on and off days since his last visit, where some days he feels weaker and more wobbly and other days he feels much stronger  Objective: See treatment diary below      Assessment: Tolerated treatment well  Patient demonstrated fatigue post treatment, exhibited good technique with therapeutic exercises and would benefit from continued PT  Patient demonstrates mild instability that he is able to self-correct during backwards walking, requiring verbal cues to step from toe to heel and increase step length  Step length and form improved c/ repetition and cues  Plan: Continue per plan of care        Precautions: FALL RISK    Date    Manuals        Hamstring and Gastroc Stretch   KS  KS   Assessment     KS           Neuro Re-Ed        NBOS  30"/2 DC  nv   NBOS c/ perturbations: lateral, fwd, bkwd  nv nv np nv   Tandem  30"x3 // nv np 30"x3ea //   Tandem Walk  5 laps // nv 5 laps // 5 laps //   Backward Walking 40'x2       SLS        Sidestepping L2 5 laps // L2 5 laps // nv L2 5 laps // [employer] Set  np HEP     Ball Squeeze   HEP     S/Lying Clams     L3 2x10   Bridge     2x10                   Ther Ex        Gastroc Stretch 30"x3 1/2 roll, seated  30"x3 1/2 roll 30"x3 1/2 roll 30"x3 1/2 roll   Heel Slides        LAQ 4# 2x10 3# 2x10 4# 2x10 nv 4# 2x10   HCs L3 2x10 L3 2x10 L3 2x10 nv L3 2x10   SLR  NV np     TKE  L2 2/10 DC     Step Flexion        HR/TR 3x10 2# 3x10 1# 3x10 2# 2# 3x10 2#  3x10   Stand: Abd/Ext 2x10 2# 2x10 1# 2# 2x10 2# 2x10 nv   Stand: March/ 2x10 2# 2x10 1# 2# 2x10 2# 2x10 nv   Leg Press        NuStep: S9, A9 L5 10' L5 10' L5 10' L5 10' L5 10'   Pivots c/ [facility] x20 ea  x20 ea  x20ea no AD           Ther Activity        Step-ups: Fwd 8" 2/10 8" 2x10 np 8" 2x10 nv   Bridge        Sit to Stands 2x10 Foam 2x10 Foam 2x10 Foam 2x10 Foam 2x10 Foam   Wall Squat        Obstacle Course: Hurdles, Cones, Foam, 6", 8"  4 hurdles np np np   Stop/Go Gait  np D/c     Ambulation s/ AD, gait belt   500' c/ L/R Pivot 500' c/ L/R pivot ' L/R pivot   Stairs c/ SPC and R HR Asc/ L HR Hermes                        Gait Training        Ambulation c/ RW        Stairs                Modalities        CP                    Access Code: [email or web address]  URL: [email or web address]  Date: 04/14/2022  Prepared by: Yanelis Mccrary    Exercises  · Supine Quad Set - 1 x daily - 7 x weekly - 10 reps - 10 seconds hold  · Supine Bridge - 1 x daily - 7 x weekly - 2 sets - 10 reps  · Supine Active Straight Leg Raise - 1 x daily - 7 x weekly - 2 sets - 10 reps  · Clamshell with Resistance - 1 x daily - 7 x weekly - 2 sets - 10 reps  · Seated Long Arc Quad - 1 x daily - 7 x weekly - 2 sets - 10 reps  · Seated Heel Toe Raises - 1 x daily - 7 x weekly - 3 sets - 10 reps  · Seated Calf Stretch with Strap - 1 x daily - 7 x weekly - 3 reps - 30 seconds hold      ·

## 2022-04-26 ENCOUNTER — OFFICE VISIT (OUTPATIENT)
Dept: PHYSICAL THERAPY | Facility: CLINIC | Age: 77
End: 2022-04-26
Payer: COMMERCIAL

## 2022-04-26 DIAGNOSIS — M62.81 ABNORMAL GAIT DUE TO MUSCLE WEAKNESS: Primary | ICD-10-CM

## 2022-04-26 DIAGNOSIS — R26.9 ABNORMAL GAIT DUE TO MUSCLE WEAKNESS: Primary | ICD-10-CM

## 2022-04-26 PROCEDURE — 97530 THERAPEUTIC ACTIVITIES: CPT

## 2022-04-26 PROCEDURE — 97110 THERAPEUTIC EXERCISES: CPT

## 2022-04-26 PROCEDURE — 97112 NEUROMUSCULAR REEDUCATION: CPT

## 2022-04-26 NOTE — PROGRESS NOTES
Daily Note     Today's date: 2022  Patient name: Fritz Smoker  : 1202  MRN: [id number]  Referring provider: Natalie Buchanan MD  Dx:   Encounter Diagnosis     ICD-10-CM    1  Abnormal gait due to muscle weakness  M62 81     R26 9        Start Time: 0800  Stop Time: 0900  Total time in clinic (min): 60 minutes    Subjective: Patient reports he occasionally still feels like his feet are stuck to the ground and he has difficulty lifting them up, but that has been improving  Objective: See treatment diary below      Assessment: Tolerated treatment well  Patient demonstrated fatigue post treatment, exhibited good technique with therapeutic exercises and would benefit from continued PT  Demonstrates moderate fatigue during PT session, but able to progress resistance c/ LAQ exercise  Progressed backwards walking to add resistance c/ cable column  Patient able to complete repetitions c/ CGA  Plan: Continue per plan of care        Precautions: FALL RISK    Date    Manuals        Hamstring and Gastroc Stretch   KS  KS   Assessment     KS           Neuro Re-Ed        NBOS   DC  nv   NBOS c/ perturbations: lateral, fwd, bkwd   nv np nv   Tandem   nv np 30"x3ea //   Tandem Walk   nv 5 laps // 5 laps //   Backward Walking 40'x2 x10 P3 CC      SLS        Sidestepping L2 5 laps // nv nv L2 5 laps // [employer] Set   HEP     [employer] Squeeze   HEP     S/Lying Clams  HEP   L3 2x10   Bridge  HEP   2x10                   Ther Ex        Gastroc Stretch 30"x3 1/2 roll, seated 30"x3 1/2 roll 30"x3 1/2 roll 30"x3 1/2 roll 30"x3 1/2 roll   Heel Slides        LAQ 4# 2x10 5# 2x10 4# 2x10 nv 4# 2x10   HCs L3 2x10 L3 2x10 L3 2x10 nv L3 2x10   SLR  HEP np     TKE   DC     Step Flexion        HR/TR 3x10 2# 3x10 2# 3x10 2# 2# 3x10 2#  3x10   Stand: Abd/Ext 2x10 2# 2x10 2# 2# 2x10 2# 2x10 nv   Stand: March/HC 2x10 2# 2x10 2# 2# 2x10 2# 2x10 nv   Leg Press        NuStep: S9, A9 L5 10' L5 10' L5 10' L5 10' L5 10'   Pivots c/ SPC   nv x20 ea  x20 ea  x20ea no AD           Ther Activity        Step-ups: Fwd 8" 2/10 8" 2x10 np 8" 2x10 nv   Bridge        Sit to Stands 2x10 Foam 2x10 Foam 2x10 Foam 2x10 Foam 2x10 Foam   Wall Squat        Obstacle Course: Hurdles, Cones, Foam, 6", 8"   np np np   Stop/Go Gait   D/c     Ambulation s/ AD, gait belt  400' ' c/ L/R Pivot 500' c/ L/R pivot ' L/R pivot   Stairs c/ SPC and R HR Asc/ L HR Hermes                        Gait Training        Ambulation c/ RW        Stairs                Modalities        CP                  Access Code: [email or web address]  URL: [email or web address]  Date: 04/26/2022  Prepared by: Win Yanez    Exercises  · Supine Quad Set - 1 x daily - 7 x weekly - 10 reps - 10 seconds hold  · Supine Bridge - 1 x daily - 7 x weekly - 2 sets - 10 reps  · Supine Active Straight Leg Raise - 1 x daily - 7 x weekly - 2 sets - 10 reps  · Clamshell with Resistance - 1 x daily - 7 x weekly - 2 sets - 10 reps  · Seated Long Arc Quad - 1 x daily - 7 x weekly - 2 sets - 10 reps  · Seated Heel Toe Raises - 1 x daily - 7 x weekly - 3 sets - 10 reps  · Seated Calf Stretch with Strap - 1 x daily - 7 x weekly - 3 reps - 30 seconds hold  · Gastroc Stretch on Wall - 1 x daily - 7 x weekly - 3 reps - 30 seconds hold

## 2022-04-29 ENCOUNTER — APPOINTMENT (OUTPATIENT)
Dept: PHYSICAL THERAPY | Facility: CLINIC | Age: 77
End: 2022-04-29
Payer: COMMERCIAL

## 2022-05-03 ENCOUNTER — APPOINTMENT (OUTPATIENT)
Dept: PHYSICAL THERAPY | Facility: CLINIC | Age: 77
End: 2022-05-03
Payer: COMMERCIAL

## 2022-05-10 ENCOUNTER — OFFICE VISIT (OUTPATIENT)
Dept: PHYSICAL THERAPY | Facility: CLINIC | Age: 77
End: 2022-05-10
Payer: COMMERCIAL

## 2022-05-10 DIAGNOSIS — M62.81 ABNORMAL GAIT DUE TO MUSCLE WEAKNESS: Primary | ICD-10-CM

## 2022-05-10 DIAGNOSIS — R26.9 ABNORMAL GAIT DUE TO MUSCLE WEAKNESS: Primary | ICD-10-CM

## 2022-05-10 PROCEDURE — 97110 THERAPEUTIC EXERCISES: CPT

## 2022-05-10 PROCEDURE — 97140 MANUAL THERAPY 1/> REGIONS: CPT

## 2022-05-10 PROCEDURE — 97530 THERAPEUTIC ACTIVITIES: CPT

## 2022-05-10 PROCEDURE — 97112 NEUROMUSCULAR REEDUCATION: CPT

## 2022-05-10 NOTE — PROGRESS NOTES
Daily Note     Today's date: 5/10/2022  Patient name: Anup Allen  :   MRN: [id number]  Referring provider: Pablo Byrd MD  Dx:   Encounter Diagnosis     ICD-10-CM    1  Abnormal gait due to muscle weakness  M62 81     R26 9        Start Time: 0800  Stop Time: 0900  Total time in clinic (min): 60 minutes    Subjective: Patient reports he was sick last weak c/ N/V  He reports feeling weak after this  Objective: See treatment diary below      Assessment: Tolerated treatment well  Patient demonstrated fatigue post treatment, exhibited good technique with therapeutic exercises and would benefit from continued PT  Patient demonstrates some increased fatigue following sickness last week  He demonstrates safe ambulation c/ cane, but was more hesitant to walk s/ AD c/ PT CGA  Plan: Continue per plan of care  Precautions: FALL RISK    Date 4/22 4/26 5/10 4/12 4/14   Manuals        Hamstring and Gastroc Stretch   KS  KS   Assessment     KS           Neuro Re-Ed        NBOS c/ perturbations: lateral, fwd, bkwd    np nv   Tandem   30"x3 ea // np 30"x3ea //   Tandem Walk   5 laps // 5 laps // 5 laps //   Backward Walking 40'x2 x10 P3 CC x5 P2 CC     SLS        Sidestepping L2 5 laps // nv L2 5 laps // L2 5 laps // nv   S/Lying Clams  HEP   L3 2x10   Bridge  HEP   2x10                   Ther Ex        Gastroc Stretch 30"x3 1/2 roll, seated 30"x3 1/2 roll 30"x3 1/2 roll 30"x3 1/2 roll 30"x3 1/2 roll   Heel Slides        LAQ 4# 2x10 5# 2x10 5# 2x10 nv 4# 2x10   HCs L3 2x10 L3 2x10 L3 2x10 nv L3 2x10   SLR  HEP      HR/TR 3x10 2# 3x10 2# 3x10 2# 2# 3x10 2#  3x10   Stand: Abd/Ext 2x10 2# 2x10 2# 2# 2x10 2# 2x10 nv   Stand: March/HC 2x10 2# 2x10 2# 2# 2x10 2# 2x10 nv   Leg Press        NuStep: S9, A9 L5 10' L5 10' L5 10' L5 10' L5 10'   Pivots c/ SPC   nv np x20 ea    x20ea no AD           Ther Activity        Step-ups: Fwd 8" 2/10 8" 2x10 8" 2x10 8" 2x10 nv   Bridge        Sit to Stands 2x10 Foam 2x10 Foam 2x10 Foam 2x10 Foam 2x10 Foam   Wall Squat        Obstacle Course: Hurdles, Cones, Foam, 6", 8"    np np   Stop/Go Gait        Ambulation s/ AD, gait belt  400' KS  500' c/ L/R pivot ' L/R pivot   Stairs c/ SPC and R HR Asc/ L HR Hermes                        Gait Training        Ambulation c/ RW        Stairs                Modalities        CP                  Access Code: [email or web address]  URL: [email or web address]  Date: 04/26/2022  Prepared by: Benigno Seth    Exercises  · Supine Quad Set - 1 x daily - 7 x weekly - 10 reps - 10 seconds hold  · Supine Bridge - 1 x daily - 7 x weekly - 2 sets - 10 reps  · Supine Active Straight Leg Raise - 1 x daily - 7 x weekly - 2 sets - 10 reps  · Clamshell with Resistance - 1 x daily - 7 x weekly - 2 sets - 10 reps  · Seated Long Arc Quad - 1 x daily - 7 x weekly - 2 sets - 10 reps  · Seated Heel Toe Raises - 1 x daily - 7 x weekly - 3 sets - 10 reps  · Seated Calf Stretch with Strap - 1 x daily - 7 x weekly - 3 reps - 30 seconds hold  · Gastroc Stretch on Wall - 1 x daily - 7 x weekly - 3 reps - 30 seconds hold

## 2022-05-17 ENCOUNTER — EVALUATION (OUTPATIENT)
Dept: PHYSICAL THERAPY | Facility: CLINIC | Age: 77
End: 2022-05-17
Payer: COMMERCIAL

## 2022-05-17 DIAGNOSIS — M62.81 ABNORMAL GAIT DUE TO MUSCLE WEAKNESS: Primary | ICD-10-CM

## 2022-05-17 DIAGNOSIS — R26.9 ABNORMAL GAIT DUE TO MUSCLE WEAKNESS: Primary | ICD-10-CM

## 2022-05-17 PROCEDURE — 97110 THERAPEUTIC EXERCISES: CPT

## 2022-05-17 PROCEDURE — 97112 NEUROMUSCULAR REEDUCATION: CPT

## 2022-05-17 PROCEDURE — 97140 MANUAL THERAPY 1/> REGIONS: CPT

## 2022-05-17 PROCEDURE — 97530 THERAPEUTIC ACTIVITIES: CPT

## 2022-05-17 NOTE — PROGRESS NOTES
PT Discharge    Today's date: 2022  Patient name: Xiomara Riley  : [birth year]  MRN: [id number]  Referring provider: Divya Pennington MD  Dx:   Encounter Diagnosis     ICD-10-CM    1  Abnormal gait due to muscle weakness  M62 81     R26 9        Start Time: 0800  Stop Time: 0855  Total time in clinic (min): 55 minutes    Assessment  Assessment details: Patient is a 78 yo male with medical diagnosis of gait abnormality  Patient's LTGs are progressing, but not met at this time  He continues to demonstrate progress in pain and function as evidenced by improved balance, increased b/l LE strength,  improve gait mechanics and improved performance on standardized assessments  He continues to demonstrate decreased weakness in b/l LE (L>R), impaired balance and is at risk of falls as evidenced by functional testing; however, demonstrates independence c/ his home exercise program and is ready to transition to independent management c/ HEP  He verbalizes and demonstrates safe habits during completion of exercises at PT  Patient will benefit from discharge to independent HEP  Impairments: abnormal gait, abnormal movement, impaired balance and impaired physical strength  Understanding of Dx/Px/POC: good   Prognosis: good    Goals  STG (3 weeks):  Patient will demonstrate improved five time sit to stand time by 4"  Met  Patient will demonstrate increased LE strength by 1/2 grade  Met  LTG (6 weeks):  Patient will demonstrate five time sit to stand <13  6" to demonstrate decreased risk of falls and disability  Progressing, Not Met  Patient will demonstrate TUG score of <=9" to promote decreased risk of falls  Not Met  Progressing  Patient will demonstrate 4+/5 LE strength to demonstrate increased strength and stability during ambulation  Not Met  Progressing  Patient will self-report 70% improvement in his overall stability during ambulation  Not Met  Progressing     Patient will be independent with HEP in 6 weeks to allow independent management of condition  Met  Plan  Plan details: D/c patient to independent management c/ HEP  Planned therapy interventions: home exercise program  Treatment plan discussed with: patient        Subjective Evaluation    History of Present Illness  Mechanism of injury: Patient is a 78 yo male presenting with abnormal gait and recurrent falls  Patient reports improvement in the number of occurrences of gait instability and in his steadiness in static standing for activities at home  He reports improved strength in his legs, specifically in his tibialis anterior and gastrocnemius  He has been walking more lately secondary to the nicer weather  Patient's primary goals for PT are to decrease his falls  He feels he is ready to progress to independent management c/ HEP  Pain  No pain reported    Social Support  Steps to enter house: yes (Has elevator, occasionally completes 2 flights of stairs c/ u/l HR)  Stairs in house: no   Lives in: apartment (3rd floor in high rise)  Lives with: alone    Employment status: not working  Patient Goals  Patient goals for therapy: increased strength, return to sport/leisure activities and improved balance          Objective     Ambulation     Comments   Observation:     -Gait: Ambulates c/ SPC on L and increased lateral sway     -Transfers:            -Sit to Stand: Able to perform 1 rep s/ use of UE, but requires hands on thighs for repetitive sit to stands       Special Tests:  5xSTS: 16"   TU" c/ SPC, 12" s/ AD    Balance s/ UE support:  NBOS: 30"  Tandem (R posterior/L posterior): 30" / 30"  SLS (R/L): 6" / 1"    Hip Strength (MMT Grades):  FLX (Seated, R/L): 4- / 4-  ABD (Seated, R/L): 4 / 4  ADD (Seated, R/L): 4- / 4    Knee Strength (MMT Grades):  EXT (R/L): 4 / 4  FLX (R/L): 4 / 4    Ankle Strength (MMT Grades):  DF (R/L): 4- / 4  PF (R/L):            Precautions: FALL RISK        Date 4/22 4/26 5/10 5/17 4/14   Manuals        Hamstring and Gastroc Stretch   KS  KS   Assessment    KS KS           Neuro Re-Ed        NBOS c/ perturbations: lateral, fwd, bkwd     nv   Tandem   30"x3 ea // 30"x3 ea // 30"x3ea //   Tandem Walk   5 laps //  5 laps //   Backward Walking 40'x2 x10 P3 CC x5 P2 CC 40'x4 CGA    SLS    10"x2    Sidestepping L2 5 laps // nv L2 5 laps // L3 4 laps // nv   S/Lying Clams  HEP   L3 2x10   Bridge  HEP   2x10                   Ther Ex        Gastroc Stretch 30"x3 1/2 roll, seated 30"x3 1/2 roll 30"x3 1/2 roll 30"x3 1/2 roll 30"x3 1/2 roll   Heel Slides        LAQ 4# 2x10 5# 2x10 5# 2x10 np 4# 2x10   HCs L3 2x10 L3 2x10 L3 2x10 np L3 2x10   SLR  HEP      HR/TR 3x10 2# 3x10 2# 3x10 2# np 2#  3x10   Stand: Abd/Ext 2x10 2# 2x10 2# 2# 2x10 np nv   Stand: March/HC 2x10 2# 2x10 2# 2# 2x10 np nv   Leg Press        NuStep: S9, A9 L5 10' L5 10' L5 10' L5 10' L5 10'   Pivots c/ SPC   nv np np  x20ea no AD           Ther Activity        Step-ups: Fwd 8" 2/10 8" 2x10 8" 2x10 8" 2x10 nv   Bridge        Sit to Stands 2x10 Foam 2x10 Foam 2x10 Foam 2x10 Foam 2x10 Foam   Wall Squat        Obstacle Course: Hurdles, Cones, Foam, 6", 8"    np np   Stop/Go Gait        Ambulation s/ AD, gait belt  400' KS   ' L/R pivot   Stairs c/ SPC and R HR Asc/ L HR Hermes                        Gait Training        Ambulation c/ RW        Stairs                Modalities        CP                  Access Code: [email or web address]  URL: [email or web address]  Date: 04/26/2022  Prepared by: Anisa Curtis    Exercises  · Supine Quad Set - 1 x daily - 7 x weekly - 10 reps - 10 seconds hold  · Supine Bridge - 1 x daily - 7 x weekly - 2 sets - 10 reps  · Supine Active Straight Leg Raise - 1 x daily - 7 x weekly - 2 sets - 10 reps  · Clamshell with Resistance - 1 x daily - 7 x weekly - 2 sets - 10 reps  · Seated Long Arc Quad - 1 x daily - 7 x weekly - 2 sets - 10 reps  · Seated Heel Toe Raises - 1 x daily - 7 x weekly - 3 sets - 10 reps  · Seated Calf Stretch with Strap - 1 x daily - 7 x weekly - 3 reps - 30 seconds hold  · Gastroc Stretch on Wall - 1 x daily - 7 x weekly - 3 reps - 30 seconds hold

## 2022-05-24 ENCOUNTER — APPOINTMENT (OUTPATIENT)
Dept: PHYSICAL THERAPY | Facility: CLINIC | Age: 77
End: 2022-05-24
Payer: COMMERCIAL

## 2023-01-01 ENCOUNTER — OFFICE VISIT (OUTPATIENT)
Dept: CARDIOLOGY CLINIC | Facility: HOSPITAL | Age: 78
End: 2023-01-01
Payer: COMMERCIAL

## 2023-01-01 VITALS
SYSTOLIC BLOOD PRESSURE: 108 MMHG | DIASTOLIC BLOOD PRESSURE: 70 MMHG | OXYGEN SATURATION: 97 % | WEIGHT: 128 LBS | BODY MASS INDEX: 18.37 KG/M2 | HEART RATE: 83 BPM

## 2023-01-01 DIAGNOSIS — I25.5 ISCHEMIC CARDIOMYOPATHY: ICD-10-CM

## 2023-01-01 DIAGNOSIS — I50.23 ACUTE ON CHRONIC SYSTOLIC HEART FAILURE (HCC): ICD-10-CM

## 2023-01-01 DIAGNOSIS — I25.10 CORONARY ARTERY DISEASE INVOLVING NATIVE CORONARY ARTERY OF NATIVE HEART WITHOUT ANGINA PECTORIS: Primary | ICD-10-CM

## 2023-01-01 PROCEDURE — 99213 OFFICE O/P EST LOW 20 MIN: CPT | Performed by: INTERNAL MEDICINE

## 2023-02-08 ENCOUNTER — HOSPITAL ENCOUNTER (OUTPATIENT)
Facility: HOSPITAL | Age: 78
Setting detail: OBSERVATION
End: 2023-02-10
Attending: EMERGENCY MEDICINE | Admitting: INTERNAL MEDICINE

## 2023-02-08 ENCOUNTER — APPOINTMENT (EMERGENCY)
Dept: ULTRASOUND IMAGING | Facility: HOSPITAL | Age: 78
End: 2023-02-08

## 2023-02-08 ENCOUNTER — APPOINTMENT (EMERGENCY)
Dept: RADIOLOGY | Facility: HOSPITAL | Age: 78
End: 2023-02-08

## 2023-02-08 DIAGNOSIS — L97.509 ULCER OF FOOT DUE TO DIABETES (HCC): ICD-10-CM

## 2023-02-08 DIAGNOSIS — E83.42 HYPOMAGNESEMIA: ICD-10-CM

## 2023-02-08 DIAGNOSIS — E87.70 FLUID OVERLOAD: ICD-10-CM

## 2023-02-08 DIAGNOSIS — N50.3 EPIDIDYMAL CYST: ICD-10-CM

## 2023-02-08 DIAGNOSIS — N43.3 HYDROCELE, BILATERAL: Primary | ICD-10-CM

## 2023-02-08 DIAGNOSIS — E11.621 ULCER OF FOOT DUE TO DIABETES (HCC): ICD-10-CM

## 2023-02-08 DIAGNOSIS — J90 CHRONIC BILATERAL PLEURAL EFFUSIONS: ICD-10-CM

## 2023-02-08 DIAGNOSIS — Z79.4 TYPE 2 DIABETES MELLITUS WITH HYPERGLYCEMIA, WITH LONG-TERM CURRENT USE OF INSULIN (HCC): ICD-10-CM

## 2023-02-08 DIAGNOSIS — E11.65 TYPE 2 DIABETES MELLITUS WITH HYPERGLYCEMIA, WITH LONG-TERM CURRENT USE OF INSULIN (HCC): ICD-10-CM

## 2023-02-08 DIAGNOSIS — R09.89 PULMONARY VASCULAR CONGESTION: ICD-10-CM

## 2023-02-08 DIAGNOSIS — R33.9 URINARY RETENTION: ICD-10-CM

## 2023-02-08 LAB
ALBUMIN SERPL BCP-MCNC: 3.4 G/DL (ref 3.5–5)
ALP SERPL-CCNC: 126 U/L (ref 34–104)
ALT SERPL W P-5'-P-CCNC: 7 U/L (ref 7–52)
ANION GAP SERPL CALCULATED.3IONS-SCNC: 7 MMOL/L (ref 4–13)
APTT PPP: 32 SECONDS (ref 23–37)
AST SERPL W P-5'-P-CCNC: 11 U/L (ref 13–39)
BACTERIA UR QL AUTO: ABNORMAL /HPF
BASE EX.OXY STD BLDV CALC-SCNC: 77.3 % (ref 60–80)
BASE EXCESS BLDV CALC-SCNC: 0.6 MMOL/L
BASOPHILS # BLD AUTO: 0.02 THOUSANDS/ÂΜL (ref 0–0.1)
BASOPHILS NFR BLD AUTO: 0 % (ref 0–1)
BETA-HYDROXYBUTYRATE: 0.1 MMOL/L
BILIRUB SERPL-MCNC: 0.84 MG/DL (ref 0.2–1)
BILIRUB UR QL STRIP: NEGATIVE
BNP SERPL-MCNC: 3345 PG/ML (ref 0–100)
BUN SERPL-MCNC: 18 MG/DL (ref 5–25)
CALCIUM ALBUM COR SERPL-MCNC: 9.4 MG/DL (ref 8.3–10.1)
CALCIUM SERPL-MCNC: 8.9 MG/DL (ref 8.4–10.2)
CHLORIDE SERPL-SCNC: 100 MMOL/L (ref 96–108)
CK SERPL-CCNC: 61 U/L (ref 39–308)
CLARITY UR: CLEAR
CO2 SERPL-SCNC: 29 MMOL/L (ref 21–32)
COLOR UR: YELLOW
CREAT SERPL-MCNC: 0.89 MG/DL (ref 0.6–1.3)
EOSINOPHIL # BLD AUTO: 0.11 THOUSAND/ÂΜL (ref 0–0.61)
EOSINOPHIL NFR BLD AUTO: 2 % (ref 0–6)
ERYTHROCYTE [DISTWIDTH] IN BLOOD BY AUTOMATED COUNT: 15.7 % (ref 11.6–15.1)
FLUAV RNA RESP QL NAA+PROBE: NEGATIVE
FLUBV RNA RESP QL NAA+PROBE: NEGATIVE
GFR SERPL CREATININE-BSD FRML MDRD: 83 ML/MIN/1.73SQ M
GLUCOSE SERPL-MCNC: 346 MG/DL (ref 65–140)
GLUCOSE SERPL-MCNC: 395 MG/DL (ref 65–140)
GLUCOSE SERPL-MCNC: 417 MG/DL (ref 65–140)
GLUCOSE UR STRIP-MCNC: ABNORMAL MG/DL
HCO3 BLDV-SCNC: 27.3 MMOL/L (ref 24–30)
HCT VFR BLD AUTO: 41.2 % (ref 36.5–49.3)
HGB BLD-MCNC: 12.6 G/DL (ref 12–17)
HGB UR QL STRIP.AUTO: ABNORMAL
HYALINE CASTS #/AREA URNS LPF: ABNORMAL /LPF
IMM GRANULOCYTES # BLD AUTO: 0.02 THOUSAND/UL (ref 0–0.2)
IMM GRANULOCYTES NFR BLD AUTO: 0 % (ref 0–2)
INR PPP: 1.21 (ref 0.84–1.19)
KETONES UR STRIP-MCNC: ABNORMAL MG/DL
LACTATE SERPL-SCNC: 1.6 MMOL/L (ref 0.5–2)
LEUKOCYTE ESTERASE UR QL STRIP: ABNORMAL
LYMPHOCYTES # BLD AUTO: 0.91 THOUSANDS/ÂΜL (ref 0.6–4.47)
LYMPHOCYTES NFR BLD AUTO: 17 % (ref 14–44)
MAGNESIUM SERPL-MCNC: 1.6 MG/DL (ref 1.9–2.7)
MCH RBC QN AUTO: 26.4 PG (ref 26.8–34.3)
MCHC RBC AUTO-ENTMCNC: 30.6 G/DL (ref 31.4–37.4)
MCV RBC AUTO: 86 FL (ref 82–98)
MONOCYTES # BLD AUTO: 0.47 THOUSAND/ÂΜL (ref 0.17–1.22)
MONOCYTES NFR BLD AUTO: 9 % (ref 4–12)
MUCOUS THREADS UR QL AUTO: ABNORMAL
NEUTROPHILS # BLD AUTO: 3.99 THOUSANDS/ÂΜL (ref 1.85–7.62)
NEUTS SEG NFR BLD AUTO: 72 % (ref 43–75)
NITRITE UR QL STRIP: NEGATIVE
NON-SQ EPI CELLS URNS QL MICRO: ABNORMAL /HPF
NRBC BLD AUTO-RTO: 0 /100 WBCS
O2 CT BLDV-SCNC: 14.8 ML/DL
PCO2 BLDV: 52.5 MM HG (ref 42–50)
PH BLDV: 7.33 [PH] (ref 7.3–7.4)
PH UR STRIP.AUTO: 5.5 [PH]
PHOSPHATE SERPL-MCNC: 3 MG/DL (ref 2.3–4.1)
PLATELET # BLD AUTO: 218 THOUSANDS/UL (ref 149–390)
PMV BLD AUTO: 9.4 FL (ref 8.9–12.7)
PO2 BLDV: 46.1 MM HG (ref 35–45)
POTASSIUM SERPL-SCNC: 3.9 MMOL/L (ref 3.5–5.3)
PROCALCITONIN SERPL-MCNC: <0.05 NG/ML
PROT SERPL-MCNC: 6.3 G/DL (ref 6.4–8.4)
PROT UR STRIP-MCNC: ABNORMAL MG/DL
PROTHROMBIN TIME: 15.6 SECONDS (ref 11.6–14.5)
RBC # BLD AUTO: 4.77 MILLION/UL (ref 3.88–5.62)
RBC #/AREA URNS AUTO: ABNORMAL /HPF
RSV RNA RESP QL NAA+PROBE: NEGATIVE
SARS-COV-2 RNA RESP QL NAA+PROBE: NEGATIVE
SODIUM SERPL-SCNC: 136 MMOL/L (ref 135–147)
SP GR UR STRIP.AUTO: 1.02 (ref 1–1.03)
UROBILINOGEN UR QL STRIP.AUTO: 1 E.U./DL
WBC # BLD AUTO: 5.52 THOUSAND/UL (ref 4.31–10.16)
WBC #/AREA URNS AUTO: ABNORMAL /HPF

## 2023-02-08 RX ORDER — POTASSIUM CHLORIDE 20 MEQ/1
20 TABLET, EXTENDED RELEASE ORAL DAILY
Status: DISCONTINUED | OUTPATIENT
Start: 2023-02-09 | End: 2023-02-10 | Stop reason: HOSPADM

## 2023-02-08 RX ORDER — LANOLIN ALCOHOL/MO/W.PET/CERES
400 CREAM (GRAM) TOPICAL ONCE
Status: COMPLETED | OUTPATIENT
Start: 2023-02-08 | End: 2023-02-08

## 2023-02-08 RX ORDER — POTASSIUM CHLORIDE 20 MEQ/1
40 TABLET, EXTENDED RELEASE ORAL ONCE
Status: COMPLETED | OUTPATIENT
Start: 2023-02-08 | End: 2023-02-08

## 2023-02-08 RX ORDER — INSULIN LISPRO 100 [IU]/ML
1-6 INJECTION, SOLUTION INTRAVENOUS; SUBCUTANEOUS
Status: DISCONTINUED | OUTPATIENT
Start: 2023-02-08 | End: 2023-02-10 | Stop reason: HOSPADM

## 2023-02-08 RX ORDER — CALCIUM CARBONATE 200(500)MG
1000 TABLET,CHEWABLE ORAL DAILY PRN
Status: DISCONTINUED | OUTPATIENT
Start: 2023-02-08 | End: 2023-02-10 | Stop reason: HOSPADM

## 2023-02-08 RX ORDER — INSULIN GLARGINE 100 [IU]/ML
10 INJECTION, SOLUTION SUBCUTANEOUS
Status: DISCONTINUED | OUTPATIENT
Start: 2023-02-08 | End: 2023-02-08

## 2023-02-08 RX ORDER — INSULIN GLARGINE 100 [IU]/ML
5 INJECTION, SOLUTION SUBCUTANEOUS
Status: DISCONTINUED | OUTPATIENT
Start: 2023-02-08 | End: 2023-02-08

## 2023-02-08 RX ORDER — LIDOCAINE HYDROCHLORIDE 20 MG/ML
1 JELLY TOPICAL ONCE
Status: COMPLETED | OUTPATIENT
Start: 2023-02-08 | End: 2023-02-08

## 2023-02-08 RX ORDER — MAGNESIUM SULFATE HEPTAHYDRATE 40 MG/ML
2 INJECTION, SOLUTION INTRAVENOUS ONCE
Status: COMPLETED | OUTPATIENT
Start: 2023-02-08 | End: 2023-02-08

## 2023-02-08 RX ORDER — POLYETHYLENE GLYCOL 3350 17 G/17G
17 POWDER, FOR SOLUTION ORAL DAILY PRN
Status: DISCONTINUED | OUTPATIENT
Start: 2023-02-08 | End: 2023-02-10 | Stop reason: HOSPADM

## 2023-02-08 RX ORDER — CEPHALEXIN 250 MG/1
500 CAPSULE ORAL EVERY 6 HOURS SCHEDULED
Status: DISCONTINUED | OUTPATIENT
Start: 2023-02-08 | End: 2023-02-10 | Stop reason: HOSPADM

## 2023-02-08 RX ORDER — ONDANSETRON 2 MG/ML
4 INJECTION INTRAMUSCULAR; INTRAVENOUS EVERY 6 HOURS PRN
Status: DISCONTINUED | OUTPATIENT
Start: 2023-02-08 | End: 2023-02-10 | Stop reason: HOSPADM

## 2023-02-08 RX ORDER — FUROSEMIDE 10 MG/ML
40 INJECTION INTRAMUSCULAR; INTRAVENOUS ONCE
Status: COMPLETED | OUTPATIENT
Start: 2023-02-08 | End: 2023-02-08

## 2023-02-08 RX ORDER — INSULIN GLARGINE 100 [IU]/ML
10 INJECTION, SOLUTION SUBCUTANEOUS
Status: DISCONTINUED | OUTPATIENT
Start: 2023-02-08 | End: 2023-02-09

## 2023-02-08 RX ORDER — ACETAMINOPHEN 325 MG/1
650 TABLET ORAL EVERY 6 HOURS PRN
Status: DISCONTINUED | OUTPATIENT
Start: 2023-02-08 | End: 2023-02-10 | Stop reason: HOSPADM

## 2023-02-08 RX ORDER — LIDOCAINE HYDROCHLORIDE 20 MG/ML
1 JELLY TOPICAL ONCE
Status: DISCONTINUED | OUTPATIENT
Start: 2023-02-08 | End: 2023-02-08

## 2023-02-08 RX ORDER — TAMSULOSIN HYDROCHLORIDE 0.4 MG/1
0.4 CAPSULE ORAL
Status: DISCONTINUED | OUTPATIENT
Start: 2023-02-08 | End: 2023-02-10 | Stop reason: HOSPADM

## 2023-02-08 RX ORDER — ATORVASTATIN CALCIUM 40 MG/1
40 TABLET, FILM COATED ORAL
Status: DISCONTINUED | OUTPATIENT
Start: 2023-02-08 | End: 2023-02-10 | Stop reason: HOSPADM

## 2023-02-08 RX ORDER — INSULIN GLARGINE 100 [IU]/ML
10 INJECTION, SOLUTION SUBCUTANEOUS
Status: DISCONTINUED | OUTPATIENT
Start: 2023-02-09 | End: 2023-02-08

## 2023-02-08 RX ORDER — HEPARIN SODIUM 5000 [USP'U]/ML
5000 INJECTION, SOLUTION INTRAVENOUS; SUBCUTANEOUS EVERY 8 HOURS SCHEDULED
Status: DISCONTINUED | OUTPATIENT
Start: 2023-02-08 | End: 2023-02-10 | Stop reason: HOSPADM

## 2023-02-08 RX ADMIN — HEPARIN SODIUM 5000 UNITS: 5000 INJECTION INTRAVENOUS; SUBCUTANEOUS at 18:47

## 2023-02-08 RX ADMIN — ATORVASTATIN CALCIUM 40 MG: 40 TABLET, FILM COATED ORAL at 18:46

## 2023-02-08 RX ADMIN — POTASSIUM CHLORIDE 40 MEQ: 1500 TABLET, EXTENDED RELEASE ORAL at 18:46

## 2023-02-08 RX ADMIN — CEPHALEXIN 500 MG: 250 CAPSULE ORAL at 18:46

## 2023-02-08 RX ADMIN — TAMSULOSIN HYDROCHLORIDE 0.4 MG: 0.4 CAPSULE ORAL at 18:46

## 2023-02-08 RX ADMIN — FUROSEMIDE 40 MG: 10 INJECTION, SOLUTION INTRAMUSCULAR; INTRAVENOUS at 18:46

## 2023-02-08 RX ADMIN — INSULIN LISPRO 5 UNITS: 100 INJECTION, SOLUTION INTRAVENOUS; SUBCUTANEOUS at 22:05

## 2023-02-08 RX ADMIN — INSULIN GLARGINE 10 UNITS: 100 INJECTION, SOLUTION SUBCUTANEOUS at 19:13

## 2023-02-08 RX ADMIN — INSULIN LISPRO 5 UNITS: 100 INJECTION, SOLUTION INTRAVENOUS; SUBCUTANEOUS at 18:47

## 2023-02-08 RX ADMIN — SODIUM CHLORIDE 1000 ML: 0.9 INJECTION, SOLUTION INTRAVENOUS at 13:48

## 2023-02-08 RX ADMIN — MAGNESIUM SULFATE HEPTAHYDRATE 2 G: 40 INJECTION, SOLUTION INTRAVENOUS at 19:20

## 2023-02-08 RX ADMIN — LIDOCAINE HYDROCHLORIDE 1 APPLICATION.: 20 JELLY TOPICAL at 15:56

## 2023-02-08 RX ADMIN — MAGNESIUM OXIDE TAB 400 MG (241.3 MG ELEMENTAL MG) 400 MG: 400 (241.3 MG) TAB at 14:45

## 2023-02-08 NOTE — ASSESSMENT & PLAN NOTE
· Patient noted BLE edema, SIMON, and scrotal swelling at home  · BNP elevated  · Vascular congestion on CXR  · Suspect volume overload at this time  · No past ECHO to review  · Obtain ECHO tomorrow  · Trial lasix 40 mg IV tonight and monitor response  · Daily weights and I/O  · Salt restricted diet  · Should ECHO be abnormal, will get cardiology involved

## 2023-02-08 NOTE — ASSESSMENT & PLAN NOTE
Lab Results   Component Value Date    HGBA1C 9 2 (H) 06/23/2021       Recent Labs     02/08/23  1303 02/08/23  1713   POCGLU 395* 346*       Blood Sugar Average: Last 72 hrs:  (P) 370 5   Poor control secondary to lack of insulin therapy outpatient for the last two months  Repeat A1c at this time  Initiate on lantus 10 units and SSI with ADA diet  Should follow with endocrine on discharge

## 2023-02-08 NOTE — ED PROCEDURE NOTE
PROCEDURE  CriticalCare Time  Performed by: Michael Martinez DO  Authorized by: Michael Martinez DO     Critical care provider statement:     Critical care time (minutes):  35    Critical care time was exclusive of:  Separately billable procedures and treating other patients and teaching time    Critical care was necessary to treat or prevent imminent or life-threatening deterioration of the following conditions:  Cardiac failure    Critical care was time spent personally by me on the following activities:  Blood draw for specimens, obtaining history from patient or surrogate, development of treatment plan with patient or surrogate, evaluation of patient's response to treatment, examination of patient, ordering and performing treatments and interventions, ordering and review of laboratory studies, ordering and review of radiographic studies, re-evaluation of patient's condition and review of old charts  Comments:      Diffuse edema and dyspnea wiht concern for CHF, Lasix and KCl ordered           Michael Martinez DO  02/08/23 2031

## 2023-02-08 NOTE — ASSESSMENT & PLAN NOTE
· Patient found to have significant urinary retention in the ED  · Possibly secondary to severe edema with component of BPH as well  · Mensah placed at that time  · Initiate on flomax, goal is for void trial in 24-48 hours

## 2023-02-08 NOTE — ED PROVIDER NOTES
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 presenting to the ED due to scrotal swelling x2 weeks  Patient denies scrotal pain however feels as if the swelling is gotten significantly worse over the past 2 weeks  Patient states he has not urinated in 3 days due to the swelling  Patient also notes that he has not taken his insulin in the past 8 weeks as [facility] and has not [facility] his [facility]  Upon ED arrival, patient was satting 81% on room air, does not wear oxygen at baseline  Hypoxia resolved after 2 L nasal cannula  Patient reports that he has been short of breath with exertion for the past few weeks in addition to lower extremity edema which is new for him  Denies any chest pain, abdominal pain, nausea, vomiting, diarrhea, fevers or chills, URI symptoms     Wt Readings from Last 3 Encounters:   02/08/23 83 4 kg (183 lb 13 8 oz)   06/24/21 68 6 kg (151 lb 3 8 oz)         Prior to Admission Medications   Prescriptions Last Dose Informant Patient Reported? Taking? potassium chloride (K-DUR,KLOR-CON) 20 mEq tablet   No No   Sig: Take 1 tablet (20 mEq total) by mouth daily for 4 days      Facility-Administered Medications: None       Past Medical History:   Diagnosis Date   • [facility]        History reviewed  No pertinent surgical history  History reviewed  No pertinent family history  I have reviewed and agree with the history as documented  E-Cigarette/Vaping   • E-Cigarette Use Never User      E-Cigarette/Vaping Substances     Social History     Tobacco Use   • Smoking status: Former   • Smokeless tobacco: Never   Vaping Use   • Vaping Use: Never used   Substance Use Topics   • Alcohol use: Never   • Drug use: Never        Review of Systems   Constitutional: Negative for chills and fever     HENT: Negative for ear pain and sore throat  Eyes: Negative for pain and visual disturbance  Respiratory: Positive for shortness of breath  Negative for cough  Cardiovascular: Negative for chest pain and palpitations  Gastrointestinal: Negative for abdominal pain and vomiting  Genitourinary: Positive for difficulty urinating and scrotal swelling  Negative for dysuria and hematuria  Musculoskeletal: Negative for arthralgias and back pain  Skin: Negative for color change and rash  Neurological: Negative for seizures and syncope  All other systems reviewed and are negative  Physical Exam  ED Triage Vitals [02/08/23 1251]   Temperature Pulse Respirations Blood Pressure SpO2   (!) 97 4 °F (36 3 °C) 99 20 143/87 (!) 81 %      Temp Source Heart Rate Source Patient Position - Orthostatic VS BP Location FiO2 (%)   Temporal Monitor Sitting Left arm --      Pain Score       No Pain             Orthostatic Vital Signs  Vitals:    02/08/23 1430 02/08/23 1500 02/08/23 1600 02/08/23 1703   BP: 166/97 159/87 157/76 159/96   Pulse: 92 91 90 90   Patient Position - Orthostatic VS:           Physical Exam  Vitals and nursing note reviewed  Constitutional:       General: He is not in acute distress  Appearance: He is well-developed  HENT:      Head: Normocephalic and atraumatic  Mouth/Throat:      Mouth: Mucous membranes are moist       Pharynx: Oropharynx is clear  Eyes:      Conjunctiva/sclera: Conjunctivae normal       Pupils: Pupils are equal, round, and reactive to light  Neck:      Vascular: JVD present  Trachea: Trachea and phonation normal    Cardiovascular:      Rate and Rhythm: Normal rate and regular rhythm  Pulses: Normal pulses  Radial pulses are 2+ on the right side and 2+ on the left side  Dorsalis pedis pulses are 2+ on the right side and 2+ on the left side  Posterior tibial pulses are 2+ on the right side and 2+ on the left side        Heart sounds: Normal heart sounds, S1 normal and S2 normal  No murmur heard  Pulmonary:      Effort: Pulmonary effort is normal  No respiratory distress  Breath sounds: Decreased breath sounds present  Comments: Diffusely decreased breath sounds  No wheezing rales or rhonchi  Abdominal:      Palpations: Abdomen is soft  Tenderness: There is no abdominal tenderness  Genitourinary:     Penis: Circumcised  Testes: Cremasteric reflex is present  Comments: Significant scrotal swelling  No crepitus  Nontender  Bilateral cremasteric reflex intact  Bedside bladder ultrasound with 1500 cc of urine  Musculoskeletal:         General: No swelling  Cervical back: Full passive range of motion without pain, normal range of motion and neck supple  Right lower le+ Pitting Edema present  Left lower le+ Pitting Edema present  Feet:      Comments: L foot ulcer on dorsal surface see image below  Skin:     General: Skin is warm and dry  Capillary Refill: Capillary refill takes less than 2 seconds  Neurological:      Mental Status: He is alert     Psychiatric:         Mood and Affect: Mood normal                ED Medications  Medications   acetaminophen (TYLENOL) tablet 650 mg (has no administration in time range)   polyethylene glycol (MIRALAX) packet 17 g (has no administration in time range)   ondansetron (ZOFRAN) injection 4 mg (has no administration in time range)   calcium carbonate (TUMS) chewable tablet 1,000 mg (has no administration in time range)   heparin (porcine) subcutaneous injection 5,000 Units (5,000 Units Subcutaneous Given 23)   insulin lispro (HumaLOG) 100 units/mL subcutaneous injection 1-6 Units (5 Units Subcutaneous Given 23)   insulin lispro (HumaLOG) 100 units/mL subcutaneous injection 1-6 Units (has no administration in time range)   influenza vaccine, high-dose (FLUZONE HIGH-DOSE) IM injection TERESITA 0 7 mL (has no administration in time range) tamsulosin (FLOMAX) capsule 0 4 mg (0 4 mg Oral Given 2/8/23 1846)   atorvastatin (LIPITOR) tablet 40 mg (40 mg Oral Given 2/8/23 1846)   potassium chloride (K-DUR,KLOR-CON) CR tablet 20 mEq (has no administration in time range)   insulin glargine (LANTUS) subcutaneous injection 10 Units 0 1 mL (10 Units Subcutaneous Given 2/8/23 1913)   cephalexin (KEFLEX) capsule 500 mg (500 mg Oral Given 2/8/23 1846)   magnesium sulfate 2 g/50 mL IVPB (premix) 2 g (2 g Intravenous New Bag 2/8/23 1920)   sodium chloride 0 9 % bolus 1,000 mL (0 mL Intravenous Stopped 2/8/23 1448)   magnesium Oxide (MAG-OX) tablet 400 mg (400 mg Oral Given 2/8/23 1445)   lidocaine (URO-JET) 2 % urethral/mucosal gel 1 application (1 application Urethral Given 2/8/23 1556)   furosemide (LASIX) injection 40 mg (40 mg Intravenous Given 2/8/23 1846)   potassium chloride (K-DUR,KLOR-CON) CR tablet 40 mEq (40 mEq Oral Given 2/8/23 1846)       Diagnostic Studies  Results Reviewed     Procedure Component Value Units Date/Time    Urine culture [[id number]] Collected: 02/08/23 1617    Lab Status: In process Specimen: Urine, Indwelling Mensah Catheter Updated: 02/08/23 1644    Urine Microscopic [[id number]]  (Abnormal) Collected: 02/08/23 1617    Lab Status: Final result Specimen: Urine, Indwelling Mensah Catheter Updated: 02/08/23 1634     RBC, UA 2-4 /hpf      WBC, UA None Seen /hpf      Epithelial Cells Occasional /hpf      Bacteria, UA Occasional /hpf      Hyaline Casts, UA 0-1 /lpf      MUCUS THREADS Occasional    UA w Reflex to Microscopic w Reflex to Culture [[id number]]  (Abnormal) Collected: 02/08/23 1617    Lab Status: Final result Specimen: Urine, Indwelling Mensah Catheter Updated: 02/08/23 1625     Color, UA Yellow     Clarity, UA Clear     Specific Gravity, UA 1 025     pH, UA 5 5     Leukocytes, UA Elevated glucose may cause decreased leukocyte values   See urine microscopic for [facility] result/     Nitrite, UA Negative     Protein, UA Trace mg/dl Glucose, UA >=1000 (1%) mg/dl      Ketones, UA Trace mg/dl      Urobilinogen, UA 1 0 E U /dl      Bilirubin, UA Negative     Occult Blood, UA Trace-Intact    Phosphorus [[id number]]  (Normal) Collected: 02/08/23 1349    Lab Status: Final result Specimen: Blood from Arm, Right Updated: 02/08/23 1458     Phosphorus 3 0 mg/dL     FLU/RSV/COVID - if FLU/RSV clinically relevant [[id number]]  (Normal) Collected: 02/08/23 1349    Lab Status: Final result Specimen: Nares from Nose Updated: 02/08/23 1451     SARS-CoV-2 Negative     INFLUENZA A PCR Negative     INFLUENZA B PCR Negative     RSV PCR Negative    Narrative:      FOR PEDIATRIC PATIENTS - copy/paste COVID Guidelines URL to browser: [email or web address]  [email or web address]    SARS-CoV-2 assay is a Nucleic Acid Amplification assay intended for the  qualitative detection of nucleic acid from SARS-CoV-2 in nasopharyngeal  swabs  Results are for the presumptive identification of SARS-CoV-2 RNA  Positive results are indicative of infection with SARS-CoV-2, the virus  causing COVID-19, but do not rule out bacterial infection or co-infection  with other viruses  Laboratories within the [place] and its  territories are required to report all positive results to the appropriate  public health authorities  Negative results do not preclude SARS-CoV-2  infection and should not be used as the sole basis for treatment or other  patient management decisions  Negative results must be combined with  clinical observations, patient history, and epidemiological information  This test has not been FDA cleared or approved  This test has been authorized by FDA under an Emergency Use Authorization  (EUA)   This test is only authorized for the duration of time the  declaration that circumstances exist justifying the authorization of the  emergency use of an in vitro diagnostic tests for detection of SARS-CoV-2  virus and/or diagnosis of COVID-19 infection under section 564(b)(1) of  the Act, 21 U  S C  728WSX-3(F)(9), unless the authorization is terminated  or revoked sooner  The test has been validated but independent review by FDA  and CLIA is pending  Test performed using [employer]: This RT-PCR assay targets N2,  a region unique to SARS-CoV-2  A conserved region in the E-gene was chosen  for pan-Sarbecovirus detection which includes SARS-CoV-2  According to CMS-2020-01-R, this platform meets the definition of high-throughput technology  Procalcitonin [[id number]]  (Normal) Collected: 02/08/23 1349    Lab Status: Final result Specimen: Blood from Arm, Right Updated: 02/08/23 1428     Procalcitonin <0 05 ng/ml     B-Type Natriuretic Peptide(BNP) [[id number]]  (Abnormal) Collected: 02/08/23 1349    Lab Status: Final result Specimen: Blood from Arm, Right Updated: 02/08/23 1424     BNP 3,345 pg/mL     Protime-INR [[id number]]  (Abnormal) Collected: 02/08/23 1349    Lab Status: Final result Specimen: Blood from Arm, Right Updated: 02/08/23 1424     Protime 15 6 seconds      INR 1 21    APTT [[id number]]  (Normal) Collected: 02/08/23 1349    Lab Status: Final result Specimen: Blood from Arm, Right Updated: 02/08/23 1424     PTT 32 seconds     Lactic acid [[id number]]  (Normal) Collected: 02/08/23 1349    Lab Status: Final result Specimen: Blood from Arm, Right Updated: 02/08/23 1421     LACTIC ACID 1 6 mmol/L     Narrative:      Result may be elevated if tourniquet was used during collection      Comprehensive metabolic panel [[id number]]  (Abnormal) Collected: 02/08/23 1349    Lab Status: Final result Specimen: Blood from Arm, Right Updated: 02/08/23 1421     Sodium 136 mmol/L      Potassium 3 9 mmol/L      Chloride 100 mmol/L      CO2 29 mmol/L      ANION GAP 7 mmol/L      BUN 18 mg/dL      Creatinine 0 89 mg/dL      Glucose 417 mg/dL      Calcium 8 9 mg/dL      Corrected Calcium 9 4 mg/dL      AST 11 U/L      ALT 7 U/L      Alkaline Phosphatase 126 U/L      Total Protein 6 3 g/dL      Albumin 3 4 g/dL      Total Bilirubin 0 84 mg/dL      eGFR 83 ml/min/1 73sq m     Narrative:      [place] guidelines for Chronic Kidney Disease (CKD):   •  Stage 1 with normal or high GFR (GFR > 90 mL/min/1 73 square meters)  •  Stage 2 Mild CKD (GFR = 60-89 mL/min/1 73 square meters)  •  Stage 3A Moderate CKD (GFR = 45-59 mL/min/1 73 square meters)  •  Stage 3B Moderate CKD (GFR = 30-44 mL/min/1 73 square meters)  •  Stage 4 Severe CKD (GFR = 15-29 mL/min/1 73 square meters)  •  Stage 5 End Stage CKD (GFR <15 mL/min/1 73 square meters)  Note: GFR calculation is accurate only with a steady state creatinine    CK (with reflex to MB) [[id number]]  (Normal) Collected: 02/08/23 1349    Lab Status: Final result Specimen: Blood from Arm, Right Updated: 02/08/23 1421     Total CK 61 U/L     Magnesium [[id number]]  (Abnormal) Collected: 02/08/23 1349    Lab Status: Final result Specimen: Blood from Arm, Right Updated: 02/08/23 1421     Magnesium 1 6 mg/dL     Beta Hydroxybutyrate [[id number]]  (Normal) Collected: 02/08/23 1349    Lab Status: Final result Specimen: Blood from Arm, Right Updated: 02/08/23 1407     BETA-HYDROXYBUTYRATE 0 1 mmol/L     Blood gas, Venous [[id number]]  (Abnormal) Collected: 02/08/23 1349    Lab Status: Final result Specimen: Blood from Arm, Right Updated: 02/08/23 1404     pH, Quentin 7 334     pCO2, Quentin 52 5 mm Hg      pO2, Quentin 46 1 mm Hg      HCO3, Quentin 27 3 mmol/L      Base Excess, Quentin 0 6 mmol/L      O2 Content, Quentin 14 8 ml/dL      O2 HGB, VENOUS 77 3 %     CBC and differential [[id number]]  (Abnormal) Collected: 02/08/23 1349    Lab Status: Final result Specimen: Blood from Arm, Right Updated: 02/08/23 1403     WBC 5 52 Thousand/uL      RBC 4 77 Million/uL      Hemoglobin 12 6 g/dL      Hematocrit 41 2 %      MCV 86 fL      MCH 26 4 pg      MCHC 30 6 g/dL      RDW 15 7 %      MPV 9 4 fL      Platelets 649 Thousands/uL      nRBC 0 /100 WBCs Neutrophils Relative 72 %      Immat GRANS % 0 %      Lymphocytes Relative 17 %      Monocytes Relative 9 %      Eosinophils Relative 2 %      Basophils Relative 0 %      Neutrophils Absolute 3 99 Thousands/µL      Immature Grans Absolute 0 02 Thousand/uL      Lymphocytes Absolute 0 91 Thousands/µL      Monocytes Absolute 0 47 Thousand/µL      Eosinophils Absolute 0 11 Thousand/µL      Basophils Absolute 0 02 Thousands/µL     Blood culture #2 [[id number]] Collected: 02/08/23 1349    Lab Status: In process Specimen: Blood from Arm, Right Updated: 02/08/23 1401    Blood culture #1 [[id number]] Collected: 02/08/23 1349    Lab Status: In process Specimen: Blood from Arm, Right Updated: 02/08/23 1401    Hemoglobin A1C [[id number]] Collected: 02/08/23 1349    Lab Status: In process Specimen: Blood from Arm, Right Updated: 02/08/23 1400    Fingerstick Glucose (POCT) [[id number]]  (Abnormal) Collected: 02/08/23 1303    Lab Status: Final result Updated: 02/08/23 1305     POC Glucose 395 mg/dl                  XR chest 1 view portable   ED Interpretation by Nilda Davidson MD (02/08 1610)   Cardiomegaly, bilateral pleural effusions  No prior CXRs to compare to      Final Result by Tete Velazquez MD (02/08 1655)      Mild pulmonary vascular congestion and small bilateral pleural effusions  Workstation performed: [id number]         US scrotum and testicles   Final Result by Ching Harris MD (02/08 1417)       Diffuse scrotal thickening in underlying hydroceles may indicate a cellulitis  No signs of torsion or epididymoorchitis  The study was marked in [facility] for immediate notification        Workstation performed: [id number]               Procedures  ECG 12 Lead Documentation Only    Date/Time: 2/8/2023 3:50 PM  Performed by: Nilda Davidson MD  Authorized by: Nilda Davidson MD     Indications / Diagnosis:  Sob  Patient location:  ED  Previous ECG:     Previous ECG:  Compared to current Comparison ECG info:  6/23/21    Similarity:  No change  Interpretation:     Interpretation: abnormal    Rate:     ECG rate:  94    ECG rate assessment: normal    Rhythm:     Rhythm: sinus rhythm    Ectopy:     Ectopy: none    QRS:     QRS axis:  Normal    QRS intervals:  Normal  Conduction:     Conduction: normal    ST segments:     ST segments:  Normal  T waves:     T waves: inverted      Inverted:  V5 and V6  Comments:      NSR, normal axis, normal intervals  No ST elevations or depressions  Low voltage QRS  T wave inversion in V5 and V6 which was also present on prior ECG  ED Course  ED Course as of 02/08/23 2018 Wed Feb 08, 2023   1307 Pt on 2L NC, satting 95%   1307 POC Glucose(!): 395   1336 US at bedside   1435 BNP(!): 3,345   1435 Magnesium(!): 1 6  Mg ox ordered   1439 Bedside bladder US with 1500 mL of urine in bladder   1559 SLIM texted for admission                                       Medical Decision Making  69-year-old male with IDDM 2 presenting to the ED due to scrotal swelling x2 weeks  Of note, patient has not taken his insulin in 8 months due to an argument with the [place]  on ED arrival, was saturating 81% on room air, placed on 2 L nasal cannula with improvement up to 98%  CXR with pulmonary vascular congestion, bilateral pleural effusions, BNP = 3345  Scrotal ultrasound with bilateral hydroceles, bilateral epididymal cyst, possible cellulitis  Bedside ultrasound revealed urinary bladder with 1500 cc in it  Mensah catheter was placed without issues  Patient was given K-Dur, Lasix, mag oxide  Patient will be admitted to internal medicine service      Chronic bilateral pleural effusions: acute illness or injury  Epididymal cyst: acute illness or injury  Fluid overload: acute illness or injury  Hydrocele, bilateral: acute illness or injury  Hypomagnesemia: acute illness or injury  Pulmonary vascular congestion: acute illness or injury  Ulcer of foot due to diabetes [facility]): acute illness or injury  Urinary retention: acute illness or injury     Details: Mensah catheter replaced in the ED  Amount and/or Complexity of Data Reviewed  Independent Historian:      Details: HPI from patient  External Data Reviewed: labs, radiology, ECG and notes  Labs: ordered  Decision-making details documented in ED Course  Radiology: ordered and independent interpretation performed  Decision-making details documented in ED Course  ECG/medicine tests: ordered and independent interpretation performed  Discussion of management or test interpretation with external provider(s): Discussed case with internal medicine, patient admitted under observation  Risk  OTC drugs  Prescription drug management  Decision regarding hospitalization              Disposition  Final diagnoses:   Hydrocele, bilateral   Epididymal cyst - bilateral   Urinary retention   Hypomagnesemia   Fluid overload   Ulcer of foot due to diabetes [facility])   Chronic bilateral pleural effusions   Pulmonary vascular congestion     Time reflects when diagnosis was documented in both MDM as applicable and the Disposition within this note     Time User Action Codes Description Comment    2/8/2023  4:01 PM Carylon Bloodgood Add [N43 3] Hydrocele, bilateral     2/8/2023  4:01 PM Carylon Bloodgood Add [N50 3] Epididymal cyst     2/8/2023  4:01 PM Carylon Bloodgood Modify [N50 3] Epididymal cyst bilateral    2/8/2023  4:01 PM Carylon Bloodgood Add [R33 9] Urinary retention     2/8/2023  4:01 PM Carylon Bloodgood Add [E83 42] Hypomagnesemia     2/8/2023  4:02 PM Carylon Bloodgood Add [E87 70] Fluid overload     2/8/2023  4:05 PM Carylon Bloodgood Add [E11 621,  T90 047] Ulcer of foot due to diabetes ([facility] )     2/8/2023  4:16 PM Verlon Celina Add [E11 65,  Z79 4] Type 2 diabetes mellitus with hyperglycemia, with long-term current use of insulin ([facility] )     2/8/2023  5:11 PM Carylon Bloodgood Add [J90] Chronic bilateral pleural effusions     2/8/2023  5:11 PM Carylon Bloodgood Add [R09 89] Pulmonary vascular congestion       ED Disposition     ED Disposition   Admit    Condition   Stable    Date/Time   Wed Feb 8, 2023  4:02 PM    Comment   Case was discussed with SLIM attending and the patient's admission status was agreed to be Admission Status: observation status to the service of Dr Lori Ledezma  Follow-up Information    None         Current Discharge Medication List      CONTINUE these medications which have NOT CHANGED    Details   potassium chloride (K-DUR,KLOR-CON) 20 mEq tablet Take 1 tablet (20 mEq total) by mouth daily for 4 days  Qty: 4 tablet, Refills: 0    Associated Diagnoses: Hypokalemia           No discharge procedures on file  PDMP Review     None           ED Provider  Attending physically available and evaluated Kimani Desai I managed the patient along with the ED Attending      Electronically Signed by         Tushar Altman MD  02/08/23 2018

## 2023-02-08 NOTE — H&P
[facility] [address] [place]  H&P- Ankit Maxim 1945, 69 y o  male MRN: [id number]  Unit/Bed#: 406-01 Encounter: [id number]  Primary Care Provider: No primary care provider on file  Date and time admitted to hospital: 2/8/2023 12:47 PM    * Volume overload  Assessment & Plan  · Patient noted BLE edema, SIMON, and scrotal swelling at home  · BNP elevated  · Vascular congestion on CXR  · Suspect volume overload at this time  · No past ECHO to review  · Obtain ECHO tomorrow  · Trial lasix 40 mg IV tonight and monitor response  · Daily weights and I/O  · Salt restricted diet  · Should ECHO be abnormal, will get cardiology involved    Urinary retention  Assessment & Plan  · Patient found to have significant urinary retention in the ED  · Possibly secondary to severe edema with component of BPH as well  · Mensah placed at that time  · Initiate on flomax, goal is for void trial in 24-48 hours    Foot ulcer ([facility] [address] )  Assessment & Plan  · Left foot ulcer  · Does not look overtly infected at this time  · Can likely hold off on imaging  · Should have outpatient podiatry follow up  · Wound care nurse consulted    Essential hypertension  Assessment & Plan  · BP acceptable  · Hold lisinopril     Type 2 diabetes mellitus with hyperglycemia, with long-term current use of insulin ([facility] [address] )  Assessment & Plan  Lab Results   Component Value Date    HGBA1C 9 2 (H) 06/23/2021       Recent Labs     02/08/23  1303 02/08/23  1713   POCGLU 395* 346*       Blood Sugar Average: Last 72 hrs:  (P) 370 5   Poor control secondary to lack of insulin therapy outpatient for the last two months  Repeat A1c at this time  Initiate on lantus 10 units and SSI with ADA diet  Should follow with endocrine on discharge    VTE Pharmacologic Prophylaxis: VTE Score: 4 Moderate Risk (Score 3-4) - Pharmacological DVT Prophylaxis Ordered: heparin  Code Status: Level 1 - Full Code   Discussion with family: Patient declined call to   Anticipated Length of Stay: Patient will be admitted on an observation basis with an anticipated length of stay of less than 2 midnights secondary to monitoring symptoms overnight  Total Time for Visit, including Counseling / Coordination of Care: 75 Greater than 50% of this total time spent on direct patient counseling and coordination of care  Chief Complaint: testicular swelling    History of Present Illness:  Graham Monday is a 69 y o  male with a PMH of DM, HTN who presents with scrotal swelling  Reports this has been ongoing for two weeks  Had difficulty with urination secondary to this  Associated with SIMON and lower leg edema  Also reported he had not been taking his insulin at home for two months secondary to argument with the [place]  Was found to be hypoxic in the ED and placed on oxygen  BNP 3000 and CXR showing vascular congestion  Has no past ECHO  Not taking any diuretics at home  Found to have significant urinary retention in the ED  Catheterized with 1500 output  Mensah left in place due to significant edema  Denies fevers, chills, diarrhea, constipation, abdominal pain, chest pains  Review of Systems:  Review of Systems   Constitutional: Positive for activity change and fatigue  Negative for diaphoresis and fever  Eyes: Negative for photophobia and visual disturbance  Respiratory: Positive for shortness of breath  Negative for cough and wheezing  Cardiovascular: Positive for leg swelling  Negative for chest pain  Gastrointestinal: Negative for abdominal pain, constipation, diarrhea, nausea and vomiting  Endocrine: Negative for polydipsia, polyphagia and polyuria  Genitourinary: Positive for difficulty urinating  Negative for frequency and hematuria  Musculoskeletal: Negative for arthralgias, back pain, gait problem and joint swelling  Allergic/Immunologic: Negative for environmental allergies, food allergies and immunocompromised state     Neurological: Negative for dizziness, syncope, weakness, light-headedness and headaches  Hematological: Negative for adenopathy  Does not bruise/bleed easily  Psychiatric/Behavioral: Negative for confusion  The patient is not nervous/anxious  Past Medical and Surgical History:   Past Medical History:   Diagnosis Date   • Diabetes mellitus ([facility] [address] )        History reviewed  No pertinent surgical history  Meds/Allergies:  Prior to Admission medications    Medication Sig Start Date End Date Taking? Authorizing Provider   potassium chloride (K-DUR,KLOR-CON) 20 mEq tablet Take 1 tablet (20 mEq total) by mouth daily for 4 days 6/25/21 6/29/21  Linwood Blevins,    Empagliflozin (JARDIANCE PO) Take by mouth daily  2/8/23  Historical Provider, MD   insulin glargine (LANTUS) 100 units/mL subcutaneous injection Inject 10 Units under the skin daily 6/24/21 2/8/23  Linwood Blevins DO   lisinopril (ZESTRIL) 2 5 mg tablet Take 2 5 mg by mouth daily  2/8/23  Historical Provider, MD   rosuvastatin (CRESTOR) 20 MG tablet Take 20 mg by mouth daily  2/8/23  Historical Provider, MD     I have reviewed home medications with patient personally  Allergies: Allergies   Allergen Reactions   • Metformin Diarrhea       Social History:  Marital Status:    Occupation: retired  Patient Pre-hospital Living Situation: Home  Patient Pre-hospital Level of Mobility: walks  Patient Pre-hospital Diet Restrictions: none  Substance Use History:   Social History     Substance and Sexual Activity   Alcohol Use Never     Social History     Tobacco Use   Smoking Status Former   Smokeless Tobacco Never     Social History     Substance and Sexual Activity   Drug Use Never       Family History:  History reviewed  No pertinent family history      Physical Exam:     Vitals:   Blood Pressure: 159/96 (02/08/23 1703)  Pulse: 90 (02/08/23 1703)  Temperature: 97 6 °F (36 4 °C) (02/08/23 1703)  Temp Source: Temporal (02/08/23 1251)  Respirations: 19 (02/08/23 1703)  Height: 5' 10" (177 8 cm) (02/08/23 1703)  Weight - Scale: 83 4 kg (183 lb 13 8 oz) (02/08/23 1703)  SpO2: 97 % (02/08/23 1703)    Physical Exam  Vitals and nursing note reviewed  Constitutional:       General: He is not in acute distress  Appearance: He is ill-appearing  HENT:      Head: Normocephalic and atraumatic  Neck:      Comments: JVD  Cardiovascular:      Rate and Rhythm: Normal rate and regular rhythm  Pulses: Normal pulses  Heart sounds: Normal heart sounds  No murmur heard  No gallop  Pulmonary:      Effort: Pulmonary effort is normal       Breath sounds: Rales present  No wheezing  Comments: 2L O2  Abdominal:      General: Bowel sounds are normal       Palpations: Abdomen is soft  Tenderness: There is no abdominal tenderness  There is no guarding or rebound  Musculoskeletal:      Cervical back: Normal range of motion and neck supple  Right lower leg: Edema present  Left lower leg: Edema present  Skin:     General: Skin is warm  Comments: Ulceration of the dorsal left foot, crusting between the first and second digit   Neurological:      General: No focal deficit present  Mental Status: He is alert and oriented to person, place, and time  Mental status is at baseline     Psychiatric:         Mood and Affect: Mood normal          Behavior: Behavior normal           Additional Data:     Lab Results:  Results from last 7 days   Lab Units 02/08/23  1349   WBC Thousand/uL 5 52   HEMOGLOBIN g/dL 12 6   HEMATOCRIT % 41 2   PLATELETS Thousands/uL 218   NEUTROS PCT % 72   LYMPHS PCT % 17   MONOS PCT % 9   EOS PCT % 2     Results from last 7 days   Lab Units 02/08/23  1349   SODIUM mmol/L 136   POTASSIUM mmol/L 3 9   CHLORIDE mmol/L 100   CO2 mmol/L 29   BUN mg/dL 18   CREATININE mg/dL 0 89   ANION GAP mmol/L 7   CALCIUM mg/dL 8 9   ALBUMIN g/dL 3 4*   TOTAL BILIRUBIN mg/dL 0 84   ALK PHOS U/L 126*   ALT U/L 7   AST U/L 11*   GLUCOSE RANDOM mg/dL 417*     Results from last 7 days   Lab Units 02/08/23  1349   INR  1 21*     Results from last 7 days   Lab Units 02/08/23  1713 02/08/23  1303   POC GLUCOSE mg/dl 346* 395*         Results from last 7 days   Lab Units 02/08/23  1349   LACTIC ACID mmol/L 1 6   PROCALCITONIN ng/ml <0 05       Lines/Drains:  Invasive Devices     Peripheral Intravenous Line  Duration           Peripheral IV 02/08/23 Right Antecubital <1 day    Peripheral IV 02/08/23 Right;Ventral (anterior) Forearm <1 day          Drain  Duration           Urethral Catheter Coude 16 Fr  <1 day              Urinary Catheter:  Goal for removal: Remove after 48 hrs of I/O monitoring             Imaging: Reviewed radiology reports from this admission including: chest xray and ultrasound(s)  XR chest 1 view portable   ED Interpretation by Faisal Dougherty MD (02/08 1610)   Cardiomegaly, bilateral pleural effusions  No prior CXRs to compare to      Final Result by Joe Kelsey MD (02/08 1655)      Mild pulmonary vascular congestion and small bilateral pleural effusions  Workstation performed: [id number]         US scrotum and testicles   Final Result by Nan Berg MD (02/08 1417)       Diffuse scrotal thickening in underlying hydroceles may indicate a cellulitis  No signs of torsion or epididymoorchitis  The study was marked in [facility] for immediate notification  Workstation performed: [id number]             EKG and Other Studies Reviewed on Admission:   · EKG: NSR  HR 94     ** Please Note: This note has been constructed using a voice recognition system   **

## 2023-02-08 NOTE — ASSESSMENT & PLAN NOTE
· Left foot ulcer  · Does not look overtly infected at this time  · Can likely hold off on imaging  · Should have outpatient podiatry follow up  · Wound care nurse consulted

## 2023-02-08 NOTE — ED ATTENDING ATTESTATION
Tg Banegas DO, saw and evaluated the patient  I have discussed the patient with the resident/non-physician practitioner and agree with the resident's/non-physician practitioner's findings, Plan of Care, and MDM as documented in the resident's/non-physician practitioner's note, except where noted  All available labs and Radiology studies were reviewed  At this point I agree with the current assessment done in the Emergency Department  I have conducted an independent evaluation of this patient including a focused history and a physical exam     ED Note - Ankit Padron 69 y o  male MRN: [id number]  Unit/Bed#: RM04 Encounter: [id number]    History of Present Illness   HPI  Ankit Padron is a 69 y o  male who presents for evaluation of progressively worsening lower extremity edema now involving the scrotum and lower abdomen  Patient states that he was unable to wear his jeans due to the edema  Patient also complaining of exertional dyspnea and has been sleeping in his recliner  Patient states that his right difficult to move his legs due to the excessive weight from the edema  No fever or chills  REVIEW OF SYSTEMS  See HPI for further details  12 systems reviewed and otherwise negative except as noted  Historical Information     PAST MEDICAL HISTORY  Past Medical History:   Diagnosis Date   • Diabetes mellitus ([facility] [address] )        FAMILY HISTORY  History reviewed  No pertinent family history      SOCIAL HISTORY  Social History     Socioeconomic History   • Marital status:      Spouse name: None   • Number of children: None   • Years of education: None   • Highest education level: None   Occupational History   • None   Tobacco Use   • Smoking status: Former   • Smokeless tobacco: Never   Vaping Use   • Vaping Use: Never used   Substance and Sexual Activity   • Alcohol use: Never   • Drug use: Never   • Sexual activity: None   Other Topics Concern   • None   Social History Narrative   • None Social Determinants of Health     Financial Resource Strain: Not on file   Food Insecurity: Not on file   Transportation Needs: Not on file   Physical Activity: Not on file   Stress: Not on file   Social Connections: Not on file   Intimate Partner Violence: Not on file   Housing Stability: Not on file       SURGICAL HISTORY  History reviewed  No pertinent surgical history  Meds/Allergies     CURRENT MEDICATIONS  No current facility-administered medications for this encounter  Current Outpatient Medications:   •  potassium chloride (K-DUR,KLOR-CON) 20 mEq tablet, Take 1 tablet (20 mEq total) by mouth daily for 4 days, Disp: 4 tablet, Rfl: 0  (Not in a hospital admission)      ALLERGIES  Allergies   Allergen Reactions   • Metformin Diarrhea     Objective     PHYSICAL EXAM    VITAL SIGNS: Blood pressure 159/87, pulse 91, temperature (!) 97 4 °F (36 3 °C), temperature source Temporal, resp  rate 16, weight 83 2 kg (183 lb 6 8 oz), SpO2 97 %      Constitutional:  Appears well developed and well nourished, no acute distress, non-toxic appearance   Eyes:  PERRL, EOMI, conjunctivae pink, sclerae non-icteric    HENT:  Normocephalic/Atraumatic, no rhinorrhea, mucous membranes moist   Neck: normal range of motion, no tenderness, supple   Respiratory:  No respiratory distress, normal breath sounds   Cardiovascular:  Normal rate, normal rhythm, no murmurs/RUBS, JVD bilaterally, minimal hepatojugular reflex  GI:  Soft, no tenderness, non-distended  :  No CVAT, no flank ecchymosis, scrotal edema  Musculoskeletal: 4+ pitting lower extremity edema up to the waist with scrotal edema, no tenderness, no deformities  Integument:  Pink, warm, dry, Well hydrated, no rash, no erythema, no bullae   Lymphatic:  No cervical/ tonsillar/ submandibular lymphadenopathy noted   Neurologic:  Awake, Alert & oriented x 3, CN 2-12 intact, no focal neurological deficits, motor function intact  Psychiatric:  Speech and behavior appropriate ED COURSE and MDM:    Assessment/Plan   Assessment:  Kimani Desai is a 69 y o  male presents for evaluation of lower extremity swelling, scrotal swelling and dyspnea  Concern for progressive heart failure  Also history of diabetes and has not had follow-up with PCP  Plan:  Labs, EKG, CXR, Symptom management  Admission  [place] CRITICAL CARE TIME:   35 minutes  Diffuse edema and dyspnea wiht concern for CHF, Lasix and KCl ordered      Portions of the record may have been created with voice recognition software  Occasional wrong word or "sound a like" substitutions may have occurred due to the inherent limitations of voice recognition software       ED Provider  Electronically Signed by

## 2023-02-09 ENCOUNTER — APPOINTMENT (OUTPATIENT)
Dept: NON INVASIVE DIAGNOSTICS | Facility: HOSPITAL | Age: 78
End: 2023-02-09

## 2023-02-09 ENCOUNTER — TELEPHONE (OUTPATIENT)
Dept: OTHER | Facility: HOSPITAL | Age: 78
End: 2023-02-09

## 2023-02-09 PROBLEM — I50.21 ACUTE SYSTOLIC CONGESTIVE HEART FAILURE (HCC): Status: ACTIVE | Noted: 2023-02-08

## 2023-02-09 LAB
ALBUMIN SERPL BCP-MCNC: 2.9 G/DL (ref 3.5–5)
ALP SERPL-CCNC: 97 U/L (ref 34–104)
ALT SERPL W P-5'-P-CCNC: 5 U/L (ref 7–52)
ANION GAP SERPL CALCULATED.3IONS-SCNC: 5 MMOL/L (ref 4–13)
AORTIC ROOT: 3.3 CM
APICAL FOUR CHAMBER EJECTION FRACTION: 23 %
AST SERPL W P-5'-P-CCNC: 10 U/L (ref 13–39)
ATRIAL RATE: 94 BPM
BACTERIA UR CULT: NORMAL
BASOPHILS # BLD AUTO: 0.02 THOUSANDS/ÂΜL (ref 0–0.1)
BASOPHILS NFR BLD AUTO: 0 % (ref 0–1)
BILIRUB SERPL-MCNC: 0.96 MG/DL (ref 0.2–1)
BUN SERPL-MCNC: 18 MG/DL (ref 5–25)
CALCIUM ALBUM COR SERPL-MCNC: 9.1 MG/DL (ref 8.3–10.1)
CALCIUM SERPL-MCNC: 8.2 MG/DL (ref 8.4–10.2)
CHLORIDE SERPL-SCNC: 104 MMOL/L (ref 96–108)
CO2 SERPL-SCNC: 31 MMOL/L (ref 21–32)
CREAT SERPL-MCNC: 0.8 MG/DL (ref 0.6–1.3)
E WAVE DECELERATION TIME: 142 MS
EOSINOPHIL # BLD AUTO: 0.14 THOUSAND/ÂΜL (ref 0–0.61)
EOSINOPHIL NFR BLD AUTO: 2 % (ref 0–6)
ERYTHROCYTE [DISTWIDTH] IN BLOOD BY AUTOMATED COUNT: 15.4 % (ref 11.6–15.1)
EST. AVERAGE GLUCOSE BLD GHB EST-MCNC: 298 MG/DL
FRACTIONAL SHORTENING: 16 % (ref 28–44)
GFR SERPL CREATININE-BSD FRML MDRD: 86 ML/MIN/1.73SQ M
GLUCOSE SERPL-MCNC: 140 MG/DL (ref 65–140)
GLUCOSE SERPL-MCNC: 159 MG/DL (ref 65–140)
GLUCOSE SERPL-MCNC: 164 MG/DL (ref 65–140)
GLUCOSE SERPL-MCNC: 219 MG/DL (ref 65–140)
GLUCOSE SERPL-MCNC: 56 MG/DL (ref 65–140)
GLUCOSE SERPL-MCNC: 60 MG/DL (ref 65–140)
GLUCOSE SERPL-MCNC: 68 MG/DL (ref 65–140)
HBA1C MFR BLD: 12 %
HCT VFR BLD AUTO: 38.2 % (ref 36.5–49.3)
HGB BLD-MCNC: 11.9 G/DL (ref 12–17)
IMM GRANULOCYTES # BLD AUTO: 0.04 THOUSAND/UL (ref 0–0.2)
IMM GRANULOCYTES NFR BLD AUTO: 1 % (ref 0–2)
INTERVENTRICULAR SEPTUM IN DIASTOLE (PARASTERNAL SHORT AXIS VIEW): 1 CM
INTERVENTRICULAR SEPTUM: 1 CM (ref 0.6–1.1)
LAAS-AP2: 17.5 CM2
LAAS-AP4: 16.8 CM2
LEFT ATRIUM SIZE: 3.6 CM
LEFT INTERNAL DIMENSION IN SYSTOLE: 3.6 CM (ref 2.1–4)
LEFT VENTRICLE DIASTOLIC VOLUME (MOD BIPLANE): 137 ML
LEFT VENTRICLE SYSTOLIC VOLUME (MOD BIPLANE): 97 ML
LEFT VENTRICULAR INTERNAL DIMENSION IN DIASTOLE: 4.3 CM (ref 3.5–6)
LEFT VENTRICULAR POSTERIOR WALL IN END DIASTOLE: 1.1 CM
LEFT VENTRICULAR STROKE VOLUME: 29 ML
LV EF: 29 %
LVSV (TEICH): 29 ML
LYMPHOCYTES # BLD AUTO: 1 THOUSANDS/ÂΜL (ref 0.6–4.47)
LYMPHOCYTES NFR BLD AUTO: 17 % (ref 14–44)
MAGNESIUM SERPL-MCNC: 1.9 MG/DL (ref 1.9–2.7)
MCH RBC QN AUTO: 26.5 PG (ref 26.8–34.3)
MCHC RBC AUTO-ENTMCNC: 31.2 G/DL (ref 31.4–37.4)
MCV RBC AUTO: 85 FL (ref 82–98)
MONOCYTES # BLD AUTO: 0.53 THOUSAND/ÂΜL (ref 0.17–1.22)
MONOCYTES NFR BLD AUTO: 9 % (ref 4–12)
MV E'TISSUE VEL-SEP: 5 CM/S
MV PEAK A VEL: 0.69 M/S
MV PEAK E VEL: 99 CM/S
NEUTROPHILS # BLD AUTO: 4.27 THOUSANDS/ÂΜL (ref 1.85–7.62)
NEUTS SEG NFR BLD AUTO: 71 % (ref 43–75)
NRBC BLD AUTO-RTO: 0 /100 WBCS
P AXIS: 66 DEGREES
PHOSPHATE SERPL-MCNC: 2.7 MG/DL (ref 2.3–4.1)
PLATELET # BLD AUTO: 236 THOUSANDS/UL (ref 149–390)
PMV BLD AUTO: 9.3 FL (ref 8.9–12.7)
POTASSIUM SERPL-SCNC: 3.8 MMOL/L (ref 3.5–5.3)
PR INTERVAL: 162 MS
PROCALCITONIN SERPL-MCNC: <0.05 NG/ML
PROT SERPL-MCNC: 5.3 G/DL (ref 6.4–8.4)
QRS AXIS: 27 DEGREES
QRSD INTERVAL: 84 MS
QT INTERVAL: 342 MS
QTC INTERVAL: 427 MS
RA PRESSURE ESTIMATED: 8 MMHG
RBC # BLD AUTO: 4.49 MILLION/UL (ref 3.88–5.62)
RIGHT ATRIUM AREA SYSTOLE A4C: 11.1 CM2
RIGHT VENTRICLE ID DIMENSION: 3.7 CM
RV PSP: 46 MMHG
SL CV LEFT ATRIUM LENGTH A2C: 4.3 CM
SL CV LV EF: 25
SL CV PED ECHO LEFT VENTRICLE DIASTOLIC VOLUME (MOD BIPLANE) 2D: 82 ML
SL CV PED ECHO LEFT VENTRICLE SYSTOLIC VOLUME (MOD BIPLANE) 2D: 54 ML
SODIUM SERPL-SCNC: 140 MMOL/L (ref 135–147)
T WAVE AXIS: 117 DEGREES
TR MAX PG: 38 MMHG
TR PEAK VELOCITY: 3.1 M/S
TRICUSPID ANNULAR PLANE SYSTOLIC EXCURSION: 2 CM
TRICUSPID VALVE PEAK REGURGITATION VELOCITY: 3.08 M/S
VENTRICULAR RATE: 94 BPM
WBC # BLD AUTO: 6 THOUSAND/UL (ref 4.31–10.16)

## 2023-02-09 RX ORDER — DOCUSATE SODIUM 100 MG/1
100 CAPSULE, LIQUID FILLED ORAL 2 TIMES DAILY
Status: DISCONTINUED | OUTPATIENT
Start: 2023-02-09 | End: 2023-02-10 | Stop reason: HOSPADM

## 2023-02-09 RX ORDER — METOPROLOL SUCCINATE 50 MG/1
50 TABLET, EXTENDED RELEASE ORAL 2 TIMES DAILY
Status: DISCONTINUED | OUTPATIENT
Start: 2023-02-09 | End: 2023-02-10 | Stop reason: HOSPADM

## 2023-02-09 RX ORDER — FUROSEMIDE 10 MG/ML
40 INJECTION INTRAMUSCULAR; INTRAVENOUS
Status: DISCONTINUED | OUTPATIENT
Start: 2023-02-09 | End: 2023-02-10

## 2023-02-09 RX ORDER — INSULIN GLARGINE 100 [IU]/ML
5 INJECTION, SOLUTION SUBCUTANEOUS
Status: DISCONTINUED | OUTPATIENT
Start: 2023-02-09 | End: 2023-02-10 | Stop reason: HOSPADM

## 2023-02-09 RX ADMIN — HEPARIN SODIUM 5000 UNITS: 5000 INJECTION INTRAVENOUS; SUBCUTANEOUS at 03:35

## 2023-02-09 RX ADMIN — METOPROLOL SUCCINATE 50 MG: 50 TABLET, EXTENDED RELEASE ORAL at 22:16

## 2023-02-09 RX ADMIN — METOPROLOL SUCCINATE 50 MG: 50 TABLET, EXTENDED RELEASE ORAL at 14:14

## 2023-02-09 RX ADMIN — POTASSIUM CHLORIDE 20 MEQ: 1500 TABLET, EXTENDED RELEASE ORAL at 08:08

## 2023-02-09 RX ADMIN — FUROSEMIDE 40 MG: 10 INJECTION, SOLUTION INTRAMUSCULAR; INTRAVENOUS at 17:19

## 2023-02-09 RX ADMIN — HEPARIN SODIUM 5000 UNITS: 5000 INJECTION INTRAVENOUS; SUBCUTANEOUS at 22:16

## 2023-02-09 RX ADMIN — INSULIN LISPRO 1 UNITS: 100 INJECTION, SOLUTION INTRAVENOUS; SUBCUTANEOUS at 17:19

## 2023-02-09 RX ADMIN — CEPHALEXIN 500 MG: 250 CAPSULE ORAL at 11:46

## 2023-02-09 RX ADMIN — ATORVASTATIN CALCIUM 40 MG: 40 TABLET, FILM COATED ORAL at 17:19

## 2023-02-09 RX ADMIN — INSULIN GLARGINE 5 UNITS: 100 INJECTION, SOLUTION SUBCUTANEOUS at 22:21

## 2023-02-09 RX ADMIN — TAMSULOSIN HYDROCHLORIDE 0.4 MG: 0.4 CAPSULE ORAL at 17:19

## 2023-02-09 RX ADMIN — CEPHALEXIN 500 MG: 250 CAPSULE ORAL at 00:00

## 2023-02-09 RX ADMIN — CEPHALEXIN 500 MG: 250 CAPSULE ORAL at 17:18

## 2023-02-09 RX ADMIN — POLYETHYLENE GLYCOL 3350 17 G: 17 POWDER, FOR SOLUTION ORAL at 17:40

## 2023-02-09 RX ADMIN — CEPHALEXIN 500 MG: 250 CAPSULE ORAL at 06:00

## 2023-02-09 RX ADMIN — HEPARIN SODIUM 5000 UNITS: 5000 INJECTION INTRAVENOUS; SUBCUTANEOUS at 14:14

## 2023-02-09 RX ADMIN — INFLUENZA A VIRUS A/VICTORIA/2570/2019 IVR-215 (H1N1) ANTIGEN (FORMALDEHYDE INACTIVATED), INFLUENZA A VIRUS A/DARWIN/9/2021 SAN-010 (H3N2) ANTIGEN (FORMALDEHYDE INACTIVATED), INFLUENZA B VIRUS B/PHUKET/3073/2013 ANTIGEN (FORMALDEHYDE INACTIVATED), AND INFLUENZA B VIRUS B/MICHIGAN/01/2021 ANTIGEN (FORMALDEHYDE INACTIVATED) 0.7 ML: 60; 60; 60; 60 INJECTION, SUSPENSION INTRAMUSCULAR at 08:16

## 2023-02-09 RX ADMIN — INSULIN LISPRO 2 UNITS: 100 INJECTION, SOLUTION INTRAVENOUS; SUBCUTANEOUS at 22:17

## 2023-02-09 RX ADMIN — DOCUSATE SODIUM 100 MG: 100 CAPSULE, LIQUID FILLED ORAL at 11:46

## 2023-02-09 RX ADMIN — DOCUSATE SODIUM 100 MG: 100 CAPSULE, LIQUID FILLED ORAL at 17:20

## 2023-02-09 NOTE — PLAN OF CARE
Problem: OCCUPATIONAL THERAPY ADULT  Goal: Performs self-care activities at highest level of function for planned discharge setting  See evaluation for individualized goals  Description: Treatment Interventions: ADL retraining, Functional transfer training, UE strengthening/ROM, Endurance training, Patient/family training, Equipment evaluation/education, Activityengagement          See flowsheet documentation for full assessment, interventions and recommendations  Note: Limitation: Decreased ADL status, Decreased UE strength, Decreased Safe judgement during ADL, Decreased endurance, Decreased self-care trans, Decreased high-level ADLs     Assessment: Pt is a 69 y o  male seen for OT evaluation s/p admit to [facility] on 3/3/8192 w/ Acute systolic congestive heart failure ([facility] [address] )  Comorbidities affecting pt's functional performance at time of assessment include: DM, urinary retention, foot ulcer, DM, HTN  Personal factors affecting pt at time of IE include:steps to enter environment, limited home support, difficulty performing ADLS, difficulty performing IADLS , decreased initiation and engagement  and health management   Prior to admission, pt was (I) with ADLs and (A) with IADLs with use of SPC during mobility  Upon evaluation: Pt requires (S)-min (A) x1 level with use of RW during mobility 2* the following deficits impacting occupational performance: weakness, decreased strength, decreased balance, decreased tolerance, impaired initiation and decreased safety awareness  Pt to benefit from continued skilled OT tx while in the hospital to address deficits as defined above and maximize level of functional independence w ADL's and functional mobility  Occupational Performance areas to address include: grooming, bathing/shower, toilet hygiene, dressing, functional mobility, community mobility and clothing management   The patient's raw score on the AM-PAC Daily Activity inpatient short form is 18, standardized score is 38 66, less than 39 4  Patients at this level are likely to benefit from discharge to post-acute rehabilitation services  Please refer to the recommendation of the Occupational Therapist for safe discharge planning  Pt benefited from co-evaluation of skilled OT and PT therapists in order to most appropriately address functional deficits d/t extensive assistance required for safe functional mobility, decreased activity tolerance, and regression from functioning level prior to admission and/or onset of present illness  OT/PT objectives were addressed separately; please see PT note for specific goal areas targeted       OT Discharge Recommendation: Post acute rehabilitation services

## 2023-02-09 NOTE — UTILIZATION REVIEW
Initial Clinical Review    Admission: Date/Time/Statement:   Admission Orders (From admission, onward)     Ordered        23 1603  Place in Observation  Once                      Orders Placed This Encounter   Procedures   • Place in Observation     Standing Status:   Standing     Number of Occurrences:   1     Order Specific Question:   Level of Care     Answer:   Med Surg [16]     ED Arrival Information     Expected   -    Arrival   2023 12:30    Acuity   Emergent            Means of arrival   Wheelchair    Escorted by   Family Member    Service   Hospitalist    Admission type   Emergency            Arrival complaint   Enlarged Scrotum           Chief Complaint   Patient presents with   • Groin Swelling     Pt states his scrotum started swelling 2 weeks ago and getting progressively worse       Initial Presentation: 69 y o  male presents to ed from home for evaluation and treatment of scrotum swelling for two weeks and getting worse  PMHX: DM   Clinical assessment significant for hypoxia 81%, decreased breath sounds, significant scrotal swelling, 2+ pitting edema, L foot ulcer  HBA1C 12, BNP 3345, GLUCOSE 417, ALK PHOS 126, MAG 1 6,  / 52 5 / 46  3  Imaging shows bilateral pleural effusions, pulmonary vascular congestion  US scrotum shows cellulitis  Initially treated with iv  9% ns bolus, mag ox  Admit to observation for fluid overload  Date:  23   Day 2: observation  Received iv lasix x1, obtain echo, check urine culture  Consult cardiology for congestive heart failure  EF 25%  Continue  Iv lasix bid, beta blocker  Transfer to [facility]  Consult urology for urinary retention  Mensah placed in ed - output 3625 ml  Anasarca is related to volume overload  NO evidence of necrotic skin changes to scrotum  Low suspicious for tab's or cellulitis         ED Triage Vitals [23 1251]   (!) 97 4 °F (36 3 °C) 99 20 143/87 (!) 81 % Temporal Monitor         No Pain          02/09/23 82 1 kg (181 lb)     Additional Vital Signs:     Date/Time Temp Pulse Resp BP MAP SpO2 O2 Flow Rate (L/min) O2 Device   02/09/23 1100 -- -- -- -- -- -- 1 L/min Nasal cannula   02/09/23 0900 -- -- -- -- -- -- -- None (Room air)   02/09/23 0815 -- -- -- -- -- -- 1 L/min Nasal cannula   02/09/23 0802 -- 75 -- 159/86 -- -- -- --   02/09/23 07:39:20 97 9 °F (36 6 °C) -- 20 105/65 78 -- -- --   02/08/23 23:16:30 97 9 °F (36 6 °C) 81 -- 101/63 76 96 % -- --   02/08/23 17:03:52 97 6 °F (36 4 °C) 90 19 159/96 117 97 % -- --   02/08/23 1600 -- 90 21 157/76 108 97 % -- --   02/08/23 1500 -- 91 16 159/87 117 97 % -- --   02/08/23 1430 -- 92 18 166/97 125 95 % -- --   02/08/23 1251 97 4 °F (36 3 °C)   Abnormal  99 20 143/87 -- 81 % Abnormal  -- None (Room air)         Pertinent Labs/Diagnostic Test Results:             Collection Time Result Time Vent R Atrial R UT Int  QRSD Int  QT Int  QTC Int  P Axis QRS Axis T Wave Ax    02/08/23 13:09:35 02/09/23 08:21:18 94 94 162 84 342 427 66 27 117      Normal sinus rhythm   Low voltage QRS   Nonspecific T wave abnormality   Abnormal ECG   When compared with ECG of 23-JUN-2021 16:10,   T wave inversion more evident in Lateral leads                   XR chest 1 view portable   Final (02/08 1655)      Mild pulmonary vascular congestion and small bilateral pleural effusions  US scrotum and testicles   Final  (02/08 1417)       Diffuse scrotal thickening in underlying hydroceles may indicate a cellulitis  No signs of torsion or epididymoorchitis             Results from last 7 days   Lab Units 02/08/23  1349   SARS-COV-2  Negative     Results from last 7 days   Lab Units 02/09/23  0458 02/08/23  1349   WBC Thousand/uL 6 00 5 52   HEMOGLOBIN g/dL 11 9* 12 6   HEMATOCRIT % 38 2 41 2   PLATELETS Thousands/uL 236 218   NEUTROS ABS Thousands/µL 4 27 3 99         Results from last 7 days   Lab Units 02/09/23  0458 02/08/23  1349 SODIUM mmol/L 140 136   POTASSIUM mmol/L 3 8 3 9   CHLORIDE mmol/L 104 100   CO2 mmol/L 31 29   ANION GAP mmol/L 5 7   BUN mg/dL 18 18   CREATININE mg/dL 0 80 0 89   EGFR ml/min/1 73sq m 86 83   CALCIUM mg/dL 8 2* 8 9   MAGNESIUM mg/dL 1 9 1 6*   PHOSPHORUS mg/dL 2 7 3 0     Results from last 7 days   Lab Units 02/09/23  0458 02/08/23  1349   AST U/L 10* 11*   ALT U/L 5* 7   ALK PHOS U/L 97 126*   TOTAL PROTEIN g/dL 5 3* 6 3*   ALBUMIN g/dL 2 9* 3 4*   TOTAL BILIRUBIN mg/dL 0 96 0 84     Results from last 7 days   Lab Units 02/09/23  1106 02/09/23  1003 02/09/23  0841 02/09/23  0737 02/08/23  1713 02/08/23  1303   POC GLUCOSE mg/dl 140 159* 68 56* 346* 395*     Results from last 7 days   Lab Units 02/09/23  0458 02/08/23  1349   GLUCOSE RANDOM mg/dL 60* 417*         Results from last 7 days   Lab Units 02/08/23  1349   HEMOGLOBIN A1C % 12 0*   EAG mg/dl 298     BETA-HYDROXYBUTYRATE   Date Value Ref Range Status   02/08/2023 0 1 <0 6 mmol/L Final          Results from last 7 days   Lab Units 02/08/23  1349   PH PELON  7 334   PCO2 PELON mm Hg 52 5*   PO2 PELON mm Hg 46 1*   HCO3 PELON mmol/L 27 3   BASE EXC PELON mmol/L 0 6   O2 CONTENT PELON ml/dL 14 8   O2 HGB, VENOUS % 77 3         Results from last 7 days   Lab Units 02/08/23  1349   CK TOTAL U/L 61       Results from last 7 days   Lab Units 02/08/23  1349   PROTIME seconds 15 6*   INR  1 21*   PTT seconds 32         Results from last 7 days   Lab Units 02/09/23  0458 02/08/23  1349   PROCALCITONIN ng/ml <0 05 <0 05     Results from last 7 days   Lab Units 02/08/23  1349   LACTIC ACID mmol/L 1 6             Results from last 7 days   Lab Units 02/08/23  1349   BNP pg/mL 3,345*       Results from last 7 days   Lab Units 02/08/23  1617   CLARITY UA  Clear   COLOR UA  Yellow   SPEC GRAV UA  1 025   PH UA  5 5   GLUCOSE UA mg/dl >=1000 (1%)*   KETONES UA mg/dl Trace*   BLOOD UA  Trace-Intact*   PROTEIN UA mg/dl Trace*   NITRITE UA  Negative   BILIRUBIN UA  Negative UROBILINOGEN UA E U /dl 1 0   LEUKOCYTES UA  Elevated glucose may cause decreased leukocyte values  See urine microscopic for [facility] result/*   WBC UA /hpf None Seen   RBC UA /hpf 2-4   BACTERIA UA /hpf Occasional   EPITHELIAL CELLS WET PREP /hpf Occasional   MUCUS THREADS  Occasional*     Results from last 7 days   Lab Units 02/08/23  1349   INFLUENZA A PCR  Negative   INFLUENZA B PCR  Negative   RSV PCR  Negative       Results from last 7 days   Lab Units 02/08/23  1349   BLOOD CULTURE  Received in Microbiology Lab  Culture in Progress  Received in Microbiology Lab  Culture in Progress                 ED Treatment:   Medication Administration from 02/08/2023 1230 to 02/08/2023 1645       Date/Time Order Dose Route Action     02/08/2023 1348 EST sodium chloride 0 9 % bolus 1,000 mL 1,000 mL Intravenous New Bag     02/08/2023 1445 EST magnesium Oxide (MAG-OX) tablet 400 mg 400 mg Oral Given     02/08/2023 1556 EST lidocaine (URO-JET) 2 % urethral/mucosal gel 1 application 1 application Urethral Given        Past Medical History:   Diagnosis   • Diabetes mellitus ([facility] )     Present on Admission:  • Essential hypertension      Admitting Diagnosis:     Hypomagnesemia [E83 42]  Epididymal cyst [N50 3]  Urinary retention [R33 9]  Ulcer of foot due to diabetes ([facility] ) [V96 833, L97 509]  Hydrocele, bilateral [N43 3]  Inflammation of scrotum [N49 2]  Scrotum pain [N50 82]  Fluid overload [E87 70]  Type 2 diabetes mellitus with hyperglycemia, with long-term current use of insulin ([facility]) [E11 65, Z79 4]    Age/Sex: 69 y o  male    Scheduled Medications:    atorvastatin, 40 mg, Oral, Daily With Dinner  cephalexin, 500 mg, Oral, Q6H [place]  docusate sodium, 100 mg, Oral, BID  furosemide, 40 mg, Intravenous, BID (diuretic)  heparin (porcine), 5,000 Units, Subcutaneous, Q8H [place]  insulin glargine, 5 Units, Subcutaneous, HS  insulin lispro, 1-6 Units, Subcutaneous, TID AC  insulin lispro, 1-6 Units, Subcutaneous, HS  potassium chloride, 20 mEq, Oral, Daily  tamsulosin, 0 4 mg, Oral, Daily With Dinner      Continuous IV Infusions:     PRN Meds:  acetaminophen, 650 mg, Oral, Q6H PRN  calcium carbonate, 1,000 mg, Oral, Daily PRN  ondansetron, 4 mg, Intravenous, Q6H PRN  polyethylene glycol, 17 g, Oral, Daily PRN        IP CONSULT TO CASE MANAGEMENT  IP CONSULT TO CARDIOLOGY  IP CONSULT TO UROLOGY    Network Utilization Review Department  ATTENTION: Please call with any questions or concerns to [phone] and carefully listen to the prompts so that you are directed to the right person  All voicemails are confidential   Candia Siemens all requests for admission clinical reviews, approved or denied determinations and any other requests to dedicated fax number below belonging to the campus where the patient is receiving treatment   List of dedicated fax numbers for the Facilities:  [address] DENIALS (Administrative/Medical Necessity) [phone]   [address] (Maternity/NICU/Pediatrics) [phone]   [address] [phone]   [facility] [phone]   [facility] [address] [place] [address] Aaliyah Prieto [facility] 28 [phone]   [address] [place] [address] 815 [facility] [phone]

## 2023-02-09 NOTE — PLAN OF CARE
Problem: MOBILITY - ADULT  Goal: Maintain or return to baseline ADL function  Description: INTERVENTIONS:  -  Assess patient's ability to carry out ADLs; assess patient's baseline for ADL function and identify physical deficits which impact ability to perform ADLs (bathing, care of mouth/teeth, toileting, grooming, dressing, etc )  - Assess/evaluate cause of self-care deficits   - Assess range of motion  - Assess patient's mobility; develop plan if impaired  - Assess patient's need for assistive devices and provide as appropriate  - Encourage maximum independence but intervene and supervise when necessary  - Involve family in performance of ADLs  - Assess for home care needs following discharge   - Consider OT consult to assist with ADL evaluation and planning for discharge  - Provide patient education as appropriate  Outcome: Progressing  Goal: Maintains/Returns to pre admission functional level  Description: INTERVENTIONS:  - Perform BMAT or MOVE assessment daily    - Set and communicate daily mobility goal to care team and patient/family/caregiver  - Collaborate with rehabilitation services on mobility goals if consulted  - Perform Range of Motion 3 times a day  - Reposition patient every 2 hours    - Dangle patient 3 times a day  - Stand patient 3 times a day  - Ambulate patient 3 times a day  - Out of bed to chair 3 times a day   - Out of bed for meals 3 times a day  - Out of bed for toileting  - Record patient progress and toleration of activity level   Outcome: Progressing     Problem: Prexisting or High Potential for Compromised Skin Integrity  Goal: Skin integrity is maintained or improved  Description: INTERVENTIONS:  - Identify patients at risk for skin breakdown  - Assess and monitor skin integrity  - Assess and monitor nutrition and hydration status  - Monitor labs   - Assess for incontinence   - Turn and reposition patient  - Assist with mobility/ambulation  - Relieve pressure over bony prominences  - Avoid friction and shearing  - Provide appropriate hygiene as needed including keeping skin clean and dry  - Evaluate need for skin moisturizer/barrier cream  - Collaborate with interdisciplinary team   - Patient/family teaching  - Consider wound care consult   Outcome: Progressing     Problem: PAIN - ADULT  Goal: Verbalizes/displays adequate comfort level or baseline comfort level  Description: Interventions:  - Encourage patient to monitor pain and request assistance  - Assess pain using appropriate pain scale  - Administer analgesics based on type and severity of pain and evaluate response  - Implement non-pharmacological measures as appropriate and evaluate response  - Consider cultural and social influences on pain and pain management  - Notify physician/advanced practitioner if interventions unsuccessful or patient reports new pain  Outcome: Progressing     Problem: INFECTION - ADULT  Goal: Absence or prevention of progression during hospitalization  Description: INTERVENTIONS:  - Assess and monitor for signs and symptoms of infection  - Monitor lab/diagnostic results  - Monitor all insertion sites, i e  indwelling lines, tubes, and drains  - Monitor endotracheal if appropriate and nasal secretions for changes in amount and color  - [place] appropriate cooling/warming therapies per order  - Administer medications as ordered  - Instruct and encourage patient and family to use good hand hygiene technique  - Identify and instruct in appropriate isolation precautions for identified infection/condition  Outcome: Progressing     Problem: SAFETY ADULT  Goal: Maintain or return to baseline ADL function  Description: INTERVENTIONS:  -  Assess patient's ability to carry out ADLs; assess patient's baseline for ADL function and identify physical deficits which impact ability to perform ADLs (bathing, care of mouth/teeth, toileting, grooming, dressing, etc )  - Assess/evaluate cause of self-care deficits - Assess range of motion  - Assess patient's mobility; develop plan if impaired  - Assess patient's need for assistive devices and provide as appropriate  - Encourage maximum independence but intervene and supervise when necessary  - Involve family in performance of ADLs  - Assess for home care needs following discharge   - Consider OT consult to assist with ADL evaluation and planning for discharge  - Provide patient education as appropriate  Outcome: Progressing  Goal: Maintains/Returns to pre admission functional level  Description: INTERVENTIONS:  - Perform BMAT or MOVE assessment daily    - Set and communicate daily mobility goal to care team and patient/family/caregiver  - Collaborate with rehabilitation services on mobility goals if consulted  - Perform Range of Motion 3 times a day  - Reposition patient every 2 hours    - Dangle patient 3 times a day  - Stand patient 3 times a day  - Ambulate patient 3 times a day  - Out of bed to chair 3 times a day   - Out of bed for meals 3 times a day  - Out of bed for toileting  - Record patient progress and toleration of activity level   Outcome: Progressing  Goal: Patient will remain free of falls  Description: INTERVENTIONS:  - Educate patient/family on patient safety including physical limitations  - Instruct patient to call for assistance with activity   - Consult OT/PT to assist with strengthening/mobility   - Keep Call bell within reach  - Keep bed low and locked with side rails adjusted as appropriate  - Keep care items and personal belongings within reach  - Initiate and maintain comfort rounds  - Make Fall Risk Sign visible to staff  - Offer Toileting every 2 Hours, in advance of need  - Initiate/Maintain bend and chair alarm  - Obtain necessary fall risk management equipment: bed and chair alarm  - Apply yellow socks and bracelet for high fall risk patients  - Consider moving patient to room near nurses station  Outcome: Progressing     Problem: DISCHARGE PLANNING  Goal: Discharge to home or other facility with appropriate resources  Description: INTERVENTIONS:  - Identify barriers to discharge w/patient and caregiver  - Arrange for needed discharge resources and transportation as appropriate  - Identify discharge learning needs (meds, wound care, etc )  - Arrange for interpretive services to assist at discharge as needed  - Refer to Case Management Department for coordinating discharge planning if the patient needs post-hospital services based on physician/advanced practitioner order or complex needs related to functional status, cognitive ability, or social support system  Outcome: Progressing     Problem: Knowledge Deficit  Goal: Patient/family/caregiver demonstrates understanding of disease process, treatment plan, medications, and discharge instructions  Description: Complete learning assessment and assess knowledge base    Interventions:  - Provide teaching at level of understanding  - Provide teaching via preferred learning methods  Outcome: Progressing     Problem: CARDIOVASCULAR - ADULT  Goal: Maintains optimal cardiac output and hemodynamic stability  Description: INTERVENTIONS:  - Monitor I/O, vital signs and rhythm  - Monitor for S/S and trends of decreased cardiac output  - Administer and titrate ordered vasoactive medications to optimize hemodynamic stability  - Assess quality of pulses, skin color and temperature  - Assess for signs of decreased coronary artery perfusion  - Instruct patient to report change in severity of symptoms  Outcome: Progressing  Goal: Absence of cardiac dysrhythmias or at baseline rhythm  Description: INTERVENTIONS:  - Continuous cardiac monitoring, vital signs, obtain 12 lead EKG if ordered  - Administer antiarrhythmic and heart rate control medications as ordered  - Monitor electrolytes and administer replacement therapy as ordered  Outcome: Progressing     Problem: RESPIRATORY - ADULT  Goal: Achieves optimal ventilation and oxygenation  Description: INTERVENTIONS:  - Assess for changes in respiratory status  - Assess for changes in mentation and behavior  - Position to facilitate oxygenation and minimize respiratory effort  - Oxygen administered by appropriate delivery if ordered  - Initiate smoking cessation education as indicated  - Encourage broncho-pulmonary hygiene including cough, deep breathe, Incentive Spirometry  - Assess the need for suctioning and aspirate as needed  - Assess and instruct to report SOB or any respiratory difficulty  - Respiratory Therapy support as indicated  Outcome: Progressing     Problem: SKIN/TISSUE INTEGRITY - ADULT  Goal: Skin Integrity remains intact(Skin Breakdown Prevention)  Description: Assess:  -Perform Vin assessment every shift   -Clean and moisturize skin every shift  -Inspect skin when repositioning, toileting, and assisting with ADLS  -Assess extremities for adequate circulation and sensation     Bed Management:  -Have minimal linens on bed & keep smooth, unwrinkled  -Change linens as needed when moist or perspiring  -Avoid sitting or lying in one position for more than 2 hours while in bed      Toileting:  -Offer bedside commode  -Assess for incontinence every 2 hrs  -Use incontinent care products after each incontinent episode such as blue pads and wipes      Activity:  -Mobilize patient 3 times a day  -Encourage activity and walks on unit  -Encourage or provide ROM exercises   -Turn and reposition patient every 2 Hours  -Use appropriate equipment to lift or move patient in bed  -Instruct/ Assist with weight shifting every 2 hours when out of bed in chair      Skin Care:  -Avoid use of baby powder, tape, friction and shearing, hot water or constrictive clothing    -Do not massage red bony areas      Outcome: Progressing  Goal: Incision(s), wounds(s) or drain site(s) healing without S/S of infection  Description: INTERVENTIONS  - Assess and document dressing, incision, wound bed, drain sites and surrounding tissue  - Provide patient and family education  - Perform skin care/dressing changes every shift or as directed  Outcome: Progressing  Goal: Pressure injury heals and does not worsen  Description: Interventions:  - Implement low air loss mattress or specialty surface (Criteria met)  - Apply silicone foam dressing  - Limit chair time to 2 hour intervals  - Use special pressure reducing interventions such as waffle cushion when in chair   - Apply fecal or urinary incontinence containment device   - Turn and reposition patient & offload bony prominences every 2 hours   - Utilize friction reducing device or surface for transfers   - Consider consults to  interdisciplinary teams such as PT/ OT  - Consider nutrition services referral as needed  Outcome: Progressing     Problem: MUSCULOSKELETAL - ADULT  Goal: Maintain or return mobility to safest level of function  Description: INTERVENTIONS:  - Assess patient's ability to carry out ADLs; assess patient's baseline for ADL function and identify physical deficits which impact ability to perform ADLs (bathing, care of mouth/teeth, toileting, grooming, dressing, etc )  - Assess/evaluate cause of self-care deficits   - Assess range of motion  - Assess patient's mobility  - Assess patient's need for assistive devices and provide as appropriate  - Encourage maximum independence but intervene and supervise when necessary  - Involve family in performance of ADLs  - Assess for home care needs following discharge   - Consider OT consult to assist with ADL evaluation and planning for discharge  - Provide patient education as appropriate  Outcome: Progressing  Goal: Maintain proper alignment of affected body part  Description: INTERVENTIONS:  - Support, maintain and protect limb and body alignment  - Provide patient/ family with appropriate education  Outcome: Progressing     Problem: GENITOURINARY - ADULT  Goal: Maintains or returns to baseline urinary function  Description: INTERVENTIONS:  - Assess urinary function  - Encourage oral fluids to ensure adequate hydration if ordered  - Administer IV fluids as ordered to ensure adequate hydration  - Administer ordered medications as needed  - Offer frequent toileting  - Follow urinary retention protocol if ordered  Outcome: Progressing  Goal: Absence of urinary retention  Description: INTERVENTIONS:  - Assess patient’s ability to void and empty bladder  - Monitor I/O  - Bladder scan as needed  - Discuss with physician/AP medications to alleviate retention as needed  - Discuss catheterization for long term situations as appropriate  Outcome: Progressing  Goal: Urinary catheter remains patent  Description: INTERVENTIONS:  - Assess patency of urinary catheter  - If patient has a chronic reagan, consider changing catheter if non-functioning  - Follow guidelines for intermittent irrigation of non-functioning urinary catheter  Outcome: Progressing

## 2023-02-09 NOTE — TELEPHONE ENCOUNTER
Kade Cluster 78-year-old male with CHF, truncal and scrotal edema and urinary retention--high-volume  Will require Mensah catheter for 10 days TOV at SNF[facility]  Please patient/caregiver hospital follow-up appointment date and time for AP follow-up thereafter within 4-6 weeks

## 2023-02-09 NOTE — PLAN OF CARE
Problem: MOBILITY - ADULT  Goal: Maintain or return to baseline ADL function  Description: INTERVENTIONS:  -  Assess patient's ability to carry out ADLs; assess patient's baseline for ADL function and identify physical deficits which impact ability to perform ADLs (bathing, care of mouth/teeth, toileting, grooming, dressing, etc )  - Assess/evaluate cause of self-care deficits   - Assess range of motion  - Assess patient's mobility; develop plan if impaired  - Assess patient's need for assistive devices and provide as appropriate  - Encourage maximum independence but intervene and supervise when necessary  - Involve family in performance of ADLs  - Assess for home care needs following discharge   - Consider OT consult to assist with ADL evaluation and planning for discharge  - Provide patient education as appropriate  Outcome: Progressing  Goal: Maintains/Returns to pre admission functional level  Description: INTERVENTIONS:  - Perform BMAT or MOVE assessment daily    - Set and communicate daily mobility goal to care team and patient/family/caregiver  - Collaborate with rehabilitation services on mobility goals if consulted  - Perform Range of Motion 3 times a day  - Reposition patient every 2 hours    - Dangle patient 3 times a day  - Stand patient 3 times a day  - Ambulate patient 3 times a day  - Out of bed to chair 3 times a day   - Out of bed for meals 3 times a day  - Out of bed for toileting  - Record patient progress and toleration of activity level   Outcome: Progressing     Problem: Prexisting or High Potential for Compromised Skin Integrity  Goal: Skin integrity is maintained or improved  Description: INTERVENTIONS:  - Identify patients at risk for skin breakdown  - Assess and monitor skin integrity  - Assess and monitor nutrition and hydration status  - Monitor labs   - Assess for incontinence   - Turn and reposition patient  - Assist with mobility/ambulation  - Relieve pressure over bony prominences  - Avoid friction and shearing  - Provide appropriate hygiene as needed including keeping skin clean and dry  - Evaluate need for skin moisturizer/barrier cream  - Collaborate with interdisciplinary team   - Patient/family teaching  - Consider wound care consult   Outcome: Progressing     Problem: PAIN - ADULT  Goal: Verbalizes/displays adequate comfort level or baseline comfort level  Description: Interventions:  - Encourage patient to monitor pain and request assistance  - Assess pain using appropriate pain scale  - Administer analgesics based on type and severity of pain and evaluate response  - Implement non-pharmacological measures as appropriate and evaluate response  - Consider cultural and social influences on pain and pain management  - Notify physician/advanced practitioner if interventions unsuccessful or patient reports new pain  Outcome: Progressing     Problem: INFECTION - ADULT  Goal: Absence or prevention of progression during hospitalization  Description: INTERVENTIONS:  - Assess and monitor for signs and symptoms of infection  - Monitor lab/diagnostic results  - Monitor all insertion sites, i e  indwelling lines, tubes, and drains  - Monitor endotracheal if appropriate and nasal secretions for changes in amount and color  - [place] appropriate cooling/warming therapies per order  - Administer medications as ordered  - Instruct and encourage patient and family to use good hand hygiene technique  - Identify and instruct in appropriate isolation precautions for identified infection/condition  Outcome: Progressing  Goal: Absence of fever/infection during neutropenic period  Description: INTERVENTIONS:  - Monitor WBC    Outcome: Progressing     Problem: SAFETY ADULT  Goal: Maintain or return to baseline ADL function  Description: INTERVENTIONS:  -  Assess patient's ability to carry out ADLs; assess patient's baseline for ADL function and identify physical deficits which impact ability to perform ADLs (bathing, care of mouth/teeth, toileting, grooming, dressing, etc )  - Assess/evaluate cause of self-care deficits   - Assess range of motion  - Assess patient's mobility; develop plan if impaired  - Assess patient's need for assistive devices and provide as appropriate  - Encourage maximum independence but intervene and supervise when necessary  - Involve family in performance of ADLs  - Assess for home care needs following discharge   - Consider OT consult to assist with ADL evaluation and planning for discharge  - Provide patient education as appropriate  Outcome: Progressing  Goal: Maintains/Returns to pre admission functional level  Description: INTERVENTIONS:  - Perform BMAT or MOVE assessment daily    - Set and communicate daily mobility goal to care team and patient/family/caregiver  - Collaborate with rehabilitation services on mobility goals if consulted  - Perform Range of Motion 3 times a day  - Reposition patient every 2 hours    - Dangle patient 3 times a day  - Stand patient 3 times a day  - Ambulate patient 3 times a day  - Out of bed to chair 3 times a day   - Out of bed for meals 3 times a day  - Out of bed for toileting  - Record patient progress and toleration of activity level   Outcome: Progressing  Goal: Patient will remain free of falls  Description: INTERVENTIONS:  - Educate patient/family on patient safety including physical limitations  - Instruct patient to call for assistance with activity   - Consult OT/PT to assist with strengthening/mobility   - Keep Call bell within reach  - Keep bed low and locked with side rails adjusted as appropriate  - Keep care items and personal belongings within reach  - Initiate and maintain comfort rounds  - Make Fall Risk Sign visible to staff  - Offer Toileting every 2 Hours, in advance of need  - Initiate/Maintain bend and chair alarm  - Obtain necessary fall risk management equipment: bed and chair alarm  - Apply yellow socks and bracelet for high fall risk patients  - Consider moving patient to room near nurses station  Outcome: Progressing     Problem: DISCHARGE PLANNING  Goal: Discharge to home or other facility with appropriate resources  Description: INTERVENTIONS:  - Identify barriers to discharge w/patient and caregiver  - Arrange for needed discharge resources and transportation as appropriate  - Identify discharge learning needs (meds, wound care, etc )  - Arrange for interpretive services to assist at discharge as needed  - Refer to Case Management Department for coordinating discharge planning if the patient needs post-hospital services based on physician/advanced practitioner order or complex needs related to functional status, cognitive ability, or social support system  Outcome: Progressing     Problem: Knowledge Deficit  Goal: Patient/family/caregiver demonstrates understanding of disease process, treatment plan, medications, and discharge instructions  Description: Complete learning assessment and assess knowledge base    Interventions:  - Provide teaching at level of understanding  - Provide teaching via preferred learning methods  Outcome: Progressing     Problem: CARDIOVASCULAR - ADULT  Goal: Maintains optimal cardiac output and hemodynamic stability  Description: INTERVENTIONS:  - Monitor I/O, vital signs and rhythm  - Monitor for S/S and trends of decreased cardiac output  - Administer and titrate ordered vasoactive medications to optimize hemodynamic stability  - Assess quality of pulses, skin color and temperature  - Assess for signs of decreased coronary artery perfusion  - Instruct patient to report change in severity of symptoms  Outcome: Progressing  Goal: Absence of cardiac dysrhythmias or at baseline rhythm  Description: INTERVENTIONS:  - Continuous cardiac monitoring, vital signs, obtain 12 lead EKG if ordered  - Administer antiarrhythmic and heart rate control medications as ordered  - Monitor electrolytes and administer replacement therapy as ordered  Outcome: Progressing     Problem: RESPIRATORY - ADULT  Goal: Achieves optimal ventilation and oxygenation  Description: INTERVENTIONS:  - Assess for changes in respiratory status  - Assess for changes in mentation and behavior  - Position to facilitate oxygenation and minimize respiratory effort  - Oxygen administered by appropriate delivery if ordered  - Initiate smoking cessation education as indicated  - Encourage broncho-pulmonary hygiene including cough, deep breathe, Incentive Spirometry  - Assess the need for suctioning and aspirate as needed  - Assess and instruct to report SOB or any respiratory difficulty  - Respiratory Therapy support as indicated  Outcome: Progressing     Problem: SKIN/TISSUE INTEGRITY - ADULT  Goal: Skin Integrity remains intact(Skin Breakdown Prevention)  Description: Assess:  -Perform Vin assessment every shift   -Clean and moisturize skin every shift  -Inspect skin when repositioning, toileting, and assisting with ADLS  -Assess extremities for adequate circulation and sensation     Bed Management:  -Have minimal linens on bed & keep smooth, unwrinkled  -Change linens as needed when moist or perspiring  -Avoid sitting or lying in one position for more than 2 hours while in bed      Toileting:  -Offer bedside commode  -Assess for incontinence every 2 hrs  -Use incontinent care products after each incontinent episode such as blue pads and wipes      Activity:  -Mobilize patient 3 times a day  -Encourage activity and walks on unit  -Encourage or provide ROM exercises   -Turn and reposition patient every 2 Hours  -Use appropriate equipment to lift or move patient in bed  -Instruct/ Assist with weight shifting every 2 hours when out of bed in chair      Skin Care:  -Avoid use of baby powder, tape, friction and shearing, hot water or constrictive clothing    -Do not massage red bony areas      Outcome: Progressing  Goal: Incision(s), wounds(s) or drain site(s) healing without S/S of infection  Description: INTERVENTIONS  - Assess and document dressing, incision, wound bed, drain sites and surrounding tissue  - Provide patient and family education  - Perform skin care/dressing changes every shift or as directed  Outcome: Progressing  Goal: Pressure injury heals and does not worsen  Description: Interventions:  - Implement low air loss mattress or specialty surface (Criteria met)  - Apply silicone foam dressing  - Limit chair time to 2 hour intervals  - Use special pressure reducing interventions such as waffle cushion when in chair   - Apply fecal or urinary incontinence containment device   - Turn and reposition patient & offload bony prominences every 2 hours   - Utilize friction reducing device or surface for transfers   - Consider consults to  interdisciplinary teams such as PT/ OT  - Consider nutrition services referral as needed  Outcome: Progressing     Problem: MUSCULOSKELETAL - ADULT  Goal: Maintain or return mobility to safest level of function  Description: INTERVENTIONS:  - Assess patient's ability to carry out ADLs; assess patient's baseline for ADL function and identify physical deficits which impact ability to perform ADLs (bathing, care of mouth/teeth, toileting, grooming, dressing, etc )  - Assess/evaluate cause of self-care deficits   - Assess range of motion  - Assess patient's mobility  - Assess patient's need for assistive devices and provide as appropriate  - Encourage maximum independence but intervene and supervise when necessary  - Involve family in performance of ADLs  - Assess for home care needs following discharge   - Consider OT consult to assist with ADL evaluation and planning for discharge  - Provide patient education as appropriate  Outcome: Progressing  Goal: Maintain proper alignment of affected body part  Description: INTERVENTIONS:  - Support, maintain and protect limb and body alignment  - Provide patient/ family with appropriate education  Outcome: Progressing

## 2023-02-09 NOTE — DISCHARGE INSTR - OTHER ORDERS
Skin care Plan:  1-Protect sacrum w/Allevyn foam, paco with P, change q3d and PRN, check skin q-shift  2-Turn/reposition q2h or when medically stable for pressure re-distribution on skin  Use foam wedges  3-Elevate heels to offload pressure  4-Moisturize skin daily with skin nourishing cream   5-Ehob cushion in chair when out of bed  6-Hydraguard to bilateral heels BID and PRN  7-Cleanse left anterior foot and right lateral tibia blister with normal saline, apply Dermagran to wound bed only  cover with DSD and wrap with rajwinder  Change daily and prn soil or dislodgment

## 2023-02-09 NOTE — ASSESSMENT & PLAN NOTE
· Patient noted BLE edema, SIMON, and scrotal swelling at home  · BNP elevated  · Vascular congestion on CXR  · Suspicion was for volume overload, confirmed acute CHF on ECHO with EF of 25%  · Continue with lasix 40 mg BID for now  · Daily weights and I/O  · Salt restricted diet  · Cardiology input appreciated, patient recommended for transfer to higher level of care for ischemic work up

## 2023-02-09 NOTE — ASSESSMENT & PLAN NOTE
· Patient found to have significant urinary retention in the ED  · Possibly secondary to severe edema with component of BPH as well  · Mensah placed at that time  · Initiate on flomax, goal is for void trial in 24 hours or when scrotal edema resolved

## 2023-02-09 NOTE — PHYSICAL THERAPY NOTE
Physical Therapy Evaluation    Patient Name: Agustina Titus    RYYFC'V Date: 2/9/2023     Problem List  Principal Problem:    Acute systolic congestive heart failure ([facility] [address] )  Active Problems:    Type 2 diabetes mellitus with hyperglycemia, with long-term current use of insulin (HCC)    Essential hypertension    Foot ulcer (HCC)    Urinary retention    Hydrocele, bilateral       Past Medical History  Past Medical History:   Diagnosis Date    Diabetes mellitus ([facility] [address] )         Past Surgical History  History reviewed  No pertinent surgical history  02/09/23 1405   PT Last Visit   PT Visit Date 02/09/23   Note Type   Note type Evaluation   Pain Assessment   Pain Assessment Tool 0-10   Pain Score No Pain   Restrictions/Precautions   Weight Bearing Precautions Per Order No   Other Precautions Fall Risk;O2;Telemetry;Multiple lines; Bed Alarm; Chair Alarm   Home Living   Type of [facility] One level;Ramped entrance;Elevator   Bathroom Shower/Tub Tub/shower unit   Bathroom Toilet Standard   Bathroom Equipment Grab bars in shower; Shower chair   Bathroom Accessibility Accessible   Home Equipment Walker;Cane;Wheelchair-manual  (walker is being borrowed from [employer])   Additional Comments pt reports use of all DME at baseline   Prior Function   Level of [place] Independent with ADLs; Independent with functional mobility; Independent with IADLS   Lives With Alone   Receives Help From Family   IADLs Family/Friend/Other provides transportation   Falls in the last 6 months 1 to 4   Vocational Retired   General   Family/Caregiver Present No   Cognition   Overall Cognitive Status WFL   Arousal/Participation Alert   Orientation Level Oriented X4   Following Commands Follows all commands and directions without difficulty   Subjective   Subjective pt pleasant and cooperative; agreeable to PT intervention   RLE Assessment   RLE Assessment X  (4-/5 grossly)   LLE Assessment   LLE Assessment X  (4-/5 grossly)   Bed Mobility   Supine to Sit 5  Supervision   Additional items HOB elevated; Bedrails; Increased time required;Verbal cues   Sit to Supine   (pt OOB at end of session)   Transfers   Sit to Stand 5  Supervision   Additional items Increased time required;Verbal cues   Stand to Sit 5  Supervision   Additional items Increased time required;Verbal cues   Additional Comments RW used   Ambulation/Elevation   Gait pattern Excessively slow; Short stride; Foward flexed;Decreased foot clearance; Wide DORA; Improper Weight shift   Gait Assistance 4  Minimal assist   Additional items Assist x 1;Verbal cues   Assistive Device Rolling walker   Distance 20'   Balance   Static Sitting Good   Dynamic Sitting Good   Static Standing Fair +   Dynamic Standing Lenka Boyle [id number] -  (with RW)   Endurance Deficit   Endurance Deficit Yes   Endurance Deficit Description pt easily fatigued with ambulation   Activity Tolerance   Activity Tolerance Patient limited by fatigue   Assessment   Prognosis Good   Problem List Decreased strength;Decreased endurance; Impaired balance;Decreased mobility   Assessment Patient is a 69 y o  male evaluated by Physical Therapy s/p admit to [address] [place] [address] on 2/8/2023 with admitting diagnosis of: Hypomagnesemia, Epididymal cyst, Urinary retention, Ulcer of foot due to diabetes, Hydrocele, bilateral, Inflammation of scrotum, Scrotum pain, Fluid overload, Type 2 diabetes mellitus with hyperglycemia, with long-term current use of insulin, and principal problem of: Acute systolic congestive heart failure  PT was consulted to assess patient's functional mobility and discharge needs  Ordered are PT Evaluation and treatment with activity level of: up with assistance  Comorbidities affecting patient's physical performance at time of assessment include: DM, HTN, foot ulcer, CHF   Personal factors affecting the patient at time of IE include: ambulating with assistive device, inability to navigate community distances, history of fall(s), inability/difficulty performing IADLs and inability/difficulty performing ADLs  Please locate objective findings from PT assessment regarding body systems outlined above  Upon evaluation, pt able to perform all functional mobility with SUP-Vamsi, RW, and increased time  Occasional verbal cuing provided for safety awareness, sequencing, and RW use  Ambulation limited to 20' within room d/t B LE swelling and fatigue  HR and SpO2 remained WFL on 1L O2 throughout  Pt trialed without O2, however experienced decrease in SpO2  Pt demonstrated moderate postural sway but no true LOB experienced  The patient's AM-PAC Basic Mobility Inpatient Short Form Raw Score is 16  A Raw score of less than or equal to 16 suggests the patient may benefit from discharge to post-acute rehabilitation services  Please also refer to the recommendation of the Physical Therapist for safe discharge planning  Co treatment with OT secondary to complex medical condition of pt, possible A of 2 required to achieve and maintain transitional movements, requiring the need of skilled therapeutic intervention of 2 therapists to achieve delivery of services  Pt will benefit from continued PT intervention during LOS to address current deficits, increase LOF, and facilitate safe d/c to next level of care when medically appropriate  D/c recommendation at this time is post-acute rehabilitation services  Goals   Patient Goals to feel better   LTG Expiration Date 02/23/23   Long Term Goal #1 Pt will participate in B LE strengthening exercises to facilitate improved functional activity tolerance  Pt will perform all functional transfers and bed mobility mod(I) with good safety awareness  Pt will ambulate 250' mod(I) with LRAD while maintaining good functional dynamic balance  Plan   Treatment/Interventions Functional transfer training;LE strengthening/ROM; Therapeutic exercise; Endurance training;Bed mobility;Gait training   PT Frequency 3-5x/wk   Recommendation   PT Discharge Recommendation Post acute rehabilitation services   Equipment Recommended [facility] Recommended Wheeled walker   AM-PAC Basic Mobility Inpatient   Turning in Flat Bed Without Bedrails 3   Lying on Back to Sitting on Edge of Flat Bed Without Bedrails 3   Moving Bed to Chair 3   Standing Up From Chair Using Arms 3   Walk in Room 3   Climb 3-5 Stairs With Railing 1   Basic Mobility Inpatient Raw Score 16   Basic Mobility Standardized Score 38 32   Highest Level Of Mobility   -HLM Goal 5: Stand one or more mins   -HLM Achieved 6: Walk 10 steps or more   End of Consult   Patient Position at End of Consult Bedside chair;Bed/Chair alarm activated; All needs within reach

## 2023-02-09 NOTE — ASSESSMENT & PLAN NOTE
· Left foot ulcer  · Does not look overtly infected at this time but with mild purulent drainage noted between the first and second digit, patient was initiated on keflex therapy   Anticipate 5-7 days therapy  · Can likely hold off on imaging  · Should have outpatient podiatry follow up  · Wound care nurse consulted

## 2023-02-09 NOTE — PROGRESS NOTES
[address] [facility] [address] [place]  Progress Note Edwina Rose 1945, 69 y o  male MRN: [id number]  Unit/Bed#: 406-01 Encounter: [id number]  Primary Care Provider: No primary care provider on file  Date and time admitted to hospital: 2/8/2023 12:47 PM    * Acute systolic congestive heart failure ([facility] [address] )  Assessment & Plan  · Patient noted BLE edema, SIMON, and scrotal swelling at home  · BNP elevated  · Vascular congestion on CXR  · Suspicion was for volume overload, confirmed acute CHF on ECHO with EF of 25%  · Continue with lasix 40 mg BID for now  · Daily weights and I/O  · Salt restricted diet  · Cardiology input appreciated, patient recommended for transfer to higher level of care for ischemic work up    Urinary retention  Assessment & Plan  · Patient found to have significant urinary retention in the ED  · Possibly secondary to severe edema with component of BPH as well  · Mensah placed at that time  · Initiate on flomax, goal is for void trial in 24 hours or when scrotal edema resolved    Foot ulcer ([facility] )  Assessment & Plan  · Left foot ulcer  · Does not look overtly infected at this time but with mild purulent drainage noted between the first and second digit, patient was initiated on keflex therapy   Anticipate 5-7 days therapy  · Can likely hold off on imaging  · Should have outpatient podiatry follow up  · Wound care nurse consulted    Essential hypertension  Assessment & Plan  · BP acceptable  · Hold lisinopril     Type 2 diabetes mellitus with hyperglycemia, with long-term current use of insulin [facility])  Assessment & Plan  Lab Results   Component Value Date    HGBA1C 12 0 (H) 02/08/2023       Recent Labs     02/09/23  0737 02/09/23  0841 02/09/23  1003 02/09/23  1106   POCGLU 56* 68 159* 140       Blood Sugar Average: Last 72 hrs:  (P) 194   Poor control secondary to lack of insulin therapy outpatient for the last two months  Repeat A1c at this time  Initiate on lantus 10 units and SSI with ADA diet --> with hypoglycemia this AM, lantus decreased to 5 units  Should follow with endocrine on discharge      VTE Pharmacologic Prophylaxis: VTE Score: 4 Moderate Risk (Score 3-4) - Pharmacological DVT Prophylaxis Ordered: heparin  Patient Centered Rounds: I performed bedside rounds with nursing staff today  Discussions with Specialists or Other Care Team Provider: case management, cardiology, wound care    Education and Discussions with Family / Patient: Patient declined call to   Time Spent for Care: 46 min  More than 50% of total time spent on counseling and coordination of care as described above  Current Length of Stay: 0 day(s)  Current Patient Status: Observation   Certification Statement: The patient will continue to require additional inpatient hospital stay due to need for ischemic work up and continued diuresis  Discharge Plan: anticipate transfer to [facility] in the next 24-48 hours    Code Status: Level 1 - Full Code    Subjective:   Patient reports feeling well  No longer requiring oxygen  Still with lower extremity and scrotal edema      Objective:     Vitals:   Temp (24hrs), Av 8 °F (36 6 °C), Min:97 6 °F (36 4 °C), Max:97 9 °F (36 6 °C)    Temp:  [97 6 °F (36 4 °C)-97 9 °F (36 6 °C)] 97 9 °F (36 6 °C)  HR:  [75-92] 75  Resp:  [16-21] 20  BP: (101-166)/(63-97) 159/86  SpO2:  [95 %-97 %] 96 %  Body mass index is 25 97 kg/m²  Input and Output Summary (last 24 hours): Intake/Output Summary (Last 24 hours) at 2023 1329  Last data filed at 2023 1100  Gross per 24 hour   Intake 480 ml   Output 3625 ml   Net -3145 ml       Physical Exam:   Physical Exam  Vitals and nursing note reviewed  Constitutional:       General: He is not in acute distress  Appearance: He is ill-appearing  HENT:      Head: Normocephalic and atraumatic  Cardiovascular:      Rate and Rhythm: Normal rate and regular rhythm  Pulses: Normal pulses  Heart sounds: Normal heart sounds  No murmur heard  No gallop  Pulmonary:      Effort: Pulmonary effort is normal       Breath sounds: Rales present  No wheezing or rhonchi  Abdominal:      General: Bowel sounds are normal       Palpations: Abdomen is soft  Tenderness: There is no abdominal tenderness  There is no guarding or rebound  Musculoskeletal:      Right lower leg: Edema present  Left lower leg: Edema present  Skin:     General: Skin is warm and dry  Neurological:      General: No focal deficit present  Mental Status: He is alert and oriented to person, place, and time  Mental status is at baseline     Psychiatric:         Mood and Affect: Mood normal          Behavior: Behavior normal           Additional Data:     Labs:  Results from last 7 days   Lab Units 02/09/23  0458   WBC Thousand/uL 6 00   HEMOGLOBIN g/dL 11 9*   HEMATOCRIT % 38 2   PLATELETS Thousands/uL 236   NEUTROS PCT % 71   LYMPHS PCT % 17   MONOS PCT % 9   EOS PCT % 2     Results from last 7 days   Lab Units 02/09/23  0458   SODIUM mmol/L 140   POTASSIUM mmol/L 3 8   CHLORIDE mmol/L 104   CO2 mmol/L 31   BUN mg/dL 18   CREATININE mg/dL 0 80   ANION GAP mmol/L 5   CALCIUM mg/dL 8 2*   ALBUMIN g/dL 2 9*   TOTAL BILIRUBIN mg/dL 0 96   ALK PHOS U/L 97   ALT U/L 5*   AST U/L 10*   GLUCOSE RANDOM mg/dL 60*     Results from last 7 days   Lab Units 02/08/23  1349   INR  1 21*     Results from last 7 days   Lab Units 02/09/23  1106 02/09/23  1003 02/09/23  0841 02/09/23  0737 02/08/23  1713 02/08/23  1303   POC GLUCOSE mg/dl 140 159* 68 56* 346* 395*     Results from last 7 days   Lab Units 02/08/23  1349   HEMOGLOBIN A1C % 12 0*     Results from last 7 days   Lab Units 02/09/23  0458 02/08/23  1349   LACTIC ACID mmol/L  --  1 6   PROCALCITONIN ng/ml <0 05 <0 05       Lines/Drains:  Invasive Devices     Peripheral Intravenous Line  Duration           Peripheral IV 02/08/23 Right Antecubital 1 day    Peripheral IV 02/08/23 Right;Ventral (anterior) Forearm 1 day          Drain  Duration           Urethral Catheter Coude 16 Fr  <1 day              Urinary Catheter:  Goal for removal: Remove after 48 hrs of I/O monitoring           Telemetry:  Telemetry Orders (From admission, onward)             48 Hour Telemetry Monitoring  Continuous x 48 hours        References:    Telemetry Guidelines   Question:  Reason for 48 Hour Telemetry  Answer:  Acute Decompensated CHF (continuous diuretic infusion or total diuretic dose > 200 mg daily, associated electrolyte derangement, ionotropic drip, history of ventricular arrhythmia, or new EF <35%)                 Telemetry Reviewed: Normal Sinus Rhythm  Indication for Continued Telemetry Use: Acute CHF on >200 mg lasix/day or equivalent dose or with new reduced EF  Imaging: Reviewed radiology reports from this admission including: ECHO    Recent Cultures (last 7 days):   Results from last 7 days   Lab Units 02/08/23  1349   BLOOD CULTURE  Received in Microbiology Lab  Culture in Progress  Received in Microbiology Lab  Culture in Progress         Last 24 Hours Medication List:   Current Facility-Administered Medications   Medication Dose Route Frequency Provider Last Rate   • acetaminophen  650 mg Oral Q6H PRN Marysol Worthy PA-C     • atorvastatin  40 mg Oral Daily With Jessy Saenz PA-C     • calcium carbonate  1,000 mg Oral Daily PRN Marysol Worthy PA-C     • cephalexin  500 mg Oral Q6H DE [facility] Marysol Worthy PA-C     • docusate sodium  100 mg Oral BID Marysol Worthy PA-C     • furosemide  40 mg Intravenous BID (diuretic) JANIS Finnegan     • heparin (porcine)  5,000 Units Subcutaneous [facility] Marysol Worthy PA-C     • insulin glargine  5 Units Subcutaneous HS Marysol Worthy PA-C     • insulin lispro  1-6 Units Subcutaneous TID AC Marysol Worthy PA-C     • insulin lispro  1-6 Units Subcutaneous HS Marysol Worthy PA-C     • metoprolol succinate  50 mg Oral BID JANIS Finnegan     • ondansetron  4 mg Intravenous Q6H PRN Emory Evans PA-C     • polyethylene glycol  17 g Oral Daily PRN Emory Evans PA-C     • potassium chloride  20 mEq Oral Daily Emory Evans PA-C     • tamsulosin  0 4 mg Oral Daily With Irving Gautam PA-C          Today, Patient Was Seen By: Emory Evans PA-C    **Please Note: This note may have been constructed using a voice recognition system  **

## 2023-02-09 NOTE — OCCUPATIONAL THERAPY NOTE
Occupational Therapy Evaluation     Patient Name: Ankit Padron  Today's Date: 2/9/2023  Problem List  Principal Problem:    Acute systolic congestive heart failure ([facility] [address] )  Active Problems:    Type 2 diabetes mellitus with hyperglycemia, with long-term current use of insulin (HCC)    Essential hypertension    Foot ulcer (HCC)    Urinary retention    Hydrocele, bilateral    Past Medical History  Past Medical History:   Diagnosis Date    Diabetes mellitus ([facility] [address] )      Past Surgical History  History reviewed  No pertinent surgical history  02/09/23 1404   OT Last Visit   OT Visit Date 02/09/23   Note Type   Note type Evaluation   Pain Assessment   Pain Score No Pain   Restrictions/Precautions   Weight Bearing Precautions Per Order No   Other Precautions Fall Risk; Chair Alarm; Bed Alarm;Telemetry;O2   Home Living   Type of Home Apartment   Home Layout One level;Performs ADLs on one level; Able to live on main level with bedroom/bathroom; Elevator   Bathroom Shower/Tub Tub/shower unit   Bathroom Toilet Standard   Bathroom Equipment Shower chair;Grab bars in shower   [address]; Wheelchair-manual;Walker; Other (Comment);Grab bars  (borrowed walker, ? what kind of walker)   Additional Comments pt reports use of SPC at baseline, however with recent mobility difficulties, utilizing w/c and recliner more often during functional activity in his apartment   Prior Function   Level of [place] Independent with ADLs; Independent with functional mobility; Independent with IADLS   Lives With Alone   IADLs Family/Friend/Other provides transportation   Falls in the last 6 months 1 to 4   Vocational Retired   Subjective   Subjective "It feels good to sit up in this chair"   ADL   Where Assessed Edge of bed   LB Dressing Assistance 4  Minimal Assistance   LB Dressing Deficit Don/doff L sock   Additional Comments pt with L LE difficulties when donning socks; multiple attempts to don sock and unable to perform requiring min (A)   Bed Mobility   Additional Comments pt on 1L O2 during session; trialed during conversation on RA and SpO2 drops to 85%; pt placed back on 1L O2 with WFL SpO2 throughout remainder of session   Transfers   Sit to Stand 5  Supervision   Additional items Increased time required;Verbal cues  (RW)   Stand to Sit 5  Supervision   Additional items Increased time required;Verbal cues  (RW)   Additional Comments pt performs functional transfers with (S) level and education in regards to RW safety and stability; demonstrates understanding of ashley same and no LOB or instability   Functional Mobility   Functional Mobility 4  Minimal assistance   Additional Comments x1 with use of RW; no significant LOB, however limited by fatigue and significant leg swelling   Additional items Rolling walker   Balance   Static Sitting Good   Dynamic Sitting Good   Static Standing Fair +   Dynamic Standing Fair +   Ambulatory Fair -   Activity Tolerance   Activity Tolerance Patient limited by fatigue   RUE Assessment   RUE Assessment WFL   LUE Assessment   LUE Assessment WFL   Hand Function   Gross Motor Coordination Functional   Fine Motor Coordination Functional   Sensation   Light Touch No apparent deficits   Sharp/Dull No apparent deficits   Psychosocial   Psychosocial (WDL) WDL   Cognition   Overall Cognitive Status WFL   Arousal/Participation Alert   Attention Within functional limits   Orientation Level Oriented X4   Memory Within functional limits   Following Commands Follows all commands and directions without difficulty   Assessment   Limitation Decreased ADL status; Decreased UE strength;Decreased Safe judgement during ADL;Decreased endurance;Decreased self-care trans;Decreased high-level ADLs   Assessment Pt is a 69 y o  male seen for OT evaluation s/p admit to [facility] on 6/6/5326 w/ Acute systolic congestive heart failure ([facility] [address] )    Comorbidities affecting pt's functional performance at time of assessment include: DM, urinary retention, foot ulcer, DM, HTN  Personal factors affecting pt at time of IE include:steps to enter environment, limited home support, difficulty performing ADLS, difficulty performing IADLS , decreased initiation and engagement  and health management   Prior to admission, pt was (I) with ADLs and (A) with IADLs with use of SPC during mobility  Upon evaluation: Pt requires (S)-min (A) x1 level with use of RW during mobility 2* the following deficits impacting occupational performance: weakness, decreased strength, decreased balance, decreased tolerance, impaired initiation and decreased safety awareness  Pt to benefit from continued skilled OT tx while in the hospital to address deficits as defined above and maximize level of functional independence w ADL's and functional mobility  Occupational Performance areas to address include: grooming, bathing/shower, toilet hygiene, dressing, functional mobility, community mobility and clothing management  The patient's raw score on the -PAC Daily Activity inpatient short form is 18, standardized score is 38 66, less than 39 4  Patients at this level are likely to benefit from discharge to post-acute rehabilitation services  Please refer to the recommendation of the Occupational Therapist for safe discharge planning  Pt benefited from co-evaluation of skilled OT and PT therapists in order to most appropriately address functional deficits d/t extensive assistance required for safe functional mobility, decreased activity tolerance, and regression from functioning level prior to admission and/or onset of present illness  OT/PT objectives were addressed separately; please see PT note for specific goal areas targeted     Goals   Patient Goals to go home   Short Term Goal  pt will perform UE strengthening exercises   Long Term Goal #1 pt will perform UB/LB bathing and grooming tasks at (I) level   Long Term Goal #2 pt will demonstratre functional mobility with RW at mod (I) level   Long Term Goal pt will demonstrate toilet transfers and hygiene at (I) level   Plan   Treatment Interventions ADL retraining;Functional transfer training;UE strengthening/ROM; Endurance training;Patient/family training;Equipment evaluation/education; Activityengagement   Goal Expiration Date 02/23/23   OT Frequency 3-5x/wk   Recommendation   OT Discharge Recommendation Post acute rehabilitation services   Additional Comments  anticipate pt will improve with hospital admission towards HHOT status and recommendation   AM-PAC Daily Activity Inpatient   Lower Body Dressing 2   Bathing 3   Toileting 3   Upper Body Dressing 3   Grooming 3   Eating 4   Daily Activity Raw Score 18   Daily Activity Standardized Score (Calc for Raw Score >=11) 38 66   AM-PAC Applied Cognition Inpatient   Following a Speech/Presentation 4   Understanding Ordinary Conversation 4   Taking Medications 4   Remembering Where Things Are Placed or Put Away 4   Remembering List of 4-5 Errands 4   Taking Care of Complicated Tasks 4   Applied Cognition Raw Score 24   Applied Cognition Standardized Score 62 21

## 2023-02-09 NOTE — CASE MANAGEMENT
Case Management Assessment & Discharge Planning Note    Patient name Richa Adams  Location [facility] [id number] MRN [id number]  : 1945 Date 2023       Current Admission Date: 2023  Current Admission Diagnosis:Fluid overload   Patient Active Problem List    Diagnosis Date Noted   • Hydrocele, bilateral    • Foot ulcer ([facility] [address] ) 2023   • Fluid overload 2023   • Urinary retention 2023   • Hypernatremia 2021   • Abnormal EKG 2021   • Hypokalemia 2021   • Type 2 diabetes mellitus with hyperglycemia, with long-term current use of insulin ([facility] [address] ) 2021   • Essential hypertension 2021      LOS (days): 0  Geometric Mean LOS (GMLOS) (days):   Days to GMLOS:     OBJECTIVE:              Current admission status: Observation       Preferred Pharmacy:   [address] [place] [address] [address] [place] [address], [address] [place] [id number]  Phone: [phone] Fax: [phone]    [email or web address]  [address] [facility] [address]  Phone: [phone] Fax: [phone]    Primary Care Provider: No primary care provider on file  Primary Insurance: [id number] [facility] [facility]  Secondary Insurance:     ASSESSMENT:  Active Health Care Proxies    There are no active Health Care Proxies on file         Advance Directives  Does patient have a [address] [place] [address] Avenue?: No  Was patient offered paperwork?: Yes (declines)  Does patient currently have a Health Care decision maker?: Yes, please see Health Care Proxy section  Does patient have Advance Directives?: No  Was patient offered paperwork?: Yes (declines)  Primary Contact: Luis A Garrettr (Daughter)   [phone] (M)         Readmission Root Cause  30 Day Readmission: No    Patient Information  Admitted from[de-identified] Home  Mental Status: Alert  During Assessment patient was accompanied by: Not accompanied during assessment  Assessment information provided by[de-identified] Patient  Primary Caregiver: Self  Support Systems: Son, Daughter  [place] of Residence: [facility] do you live in?: [address] entry access options  Select all that apply : Elevator  Type of Current Residence: Apartment (Aurelia palacios)  Floor Level: 3  Upon entering residence, is there a bedroom on the main floor (no further steps)?: Yes  Upon entering residence, is there a bathroom on the main floor (no further steps)?: Yes  In the last 12 months, was there a time when you were not able to pay the mortgage or rent on time?: No  In the last 12 months, how many places have you lived?: 1  In the last 12 months, was there a time when you did not have a steady place to sleep or slept in a shelter (including now)?: No  Homeless/housing insecurity resource given?: N/A  Living Arrangements: Lives Alone  Is patient a ?: Yes  Is patient active with [facility])?: No (used to go to the [place] in [facility], no longer goes there   Possibly may start going to the [facility])    Activities of Daily Living Prior to Admission  Functional Status: Independent  Completes ADLs independently?: Yes  Ambulates independently?: Yes  Does patient use assisted devices?: Yes  Assisted Devices (DME) used: Omayra Amos, Wheelchair, Straight Cane  Does patient currently own DME?: Yes  What DME does the patient currently own?: Straight [employer] Wheelchair  Does patient have a history of Outpatient Therapy (PT/OT)?: Yes (hx OP PT [place])  Does the patient have a history of Short-Term Rehab?: No  Does patient have a history of HHC?: No  Does patient currently have [address]?: No         Patient Information Continued  Income Source: Pension/MC[employer]  Does patient have prescription coverage?: Yes  Within the past 12 months, you worried that your food would run out before you got the money to buy more : Never true  Within the past 12 months, the food you bought just didn't last and you didn't have money to get more : Never true  Food insecurity resource given?: N/A  Does patient receive dialysis treatments?: No  Does patient have a history of substance abuse?: No  Does patient have a history of Mental Health Diagnosis?: No         Means of Transportation  Means of Transport to Appts[de-identified] Family transport  In the past 12 months, has lack of transportation kept you from medical appointments or from getting medications?: No  In the past 12 months, has lack of transportation kept you from meetings, work, or from getting things needed for daily living?: No  Was application for public transport provided?: N/A        DISCHARGE DETAILS:    Discharge planning discussed with[de-identified] patient        CM contacted family/caregiver?: No- see comments (pt is alert, independent no family contacted)  Were Treatment Team discharge recommendations reviewed with patient/caregiver?: Yes  Did patient/caregiver verbalize understanding of patient care needs?: Yes  Were patient/caregiver advised of the risks associated with not following Treatment Team discharge recommendations?: Yes         [address]         Is the patient interested in [facility] at discharge?: Yes  [address] requested[de-identified] Nursing, Occupational Therapy, Physical [address] [place] [address] Ave Name[de-identified] [facility] Provider[de-identified] PCP  Home Health Services Needed[de-identified] Strengthening/Theraputic Exercises to Improve Function, Gait/ADL Training, Evaluate Functional Status and Safety, Heart Failure Management, Diabetes Management, Urinary Incontinence Catheter Management  Homebound Criteria Met[de-identified] Uses an Assist Device (i e  cane, walker, etc)  Supporting Clincal Findings[de-identified] Fatigues Easliy in Short Distances              Would you like to participate in our [facility] service program?  : No - Declined       Discharge Destination Plan[de-identified] Home with Lyndsey at Discharge : Family

## 2023-02-09 NOTE — CONSULTS
Consultation - UROLOGY   Arabella Chávez 69 y o  male MRN: [id number]  Unit/Bed#: 406-01 Encounter: [id number]    Assessment/Plan     Assessment:  Acute urinary retention  Massive bilateral scrotal edema worse over the past 2 weeks  Scrotal ultrasound shows bilateral complicated hydroceles noted  Patient with bilateral lower extremity edema, dyspnea on exertion  Mensah catheter placed in the ED, total urine output 3625 mL clear yellow urine  BNP elevated in the ED  Chest x-ray shows vascular congestion  Suspect anasarca secondary to volume overload  Acute urinary retention likely secondary to BPH with bladder outlet obstruction    Urinalysis is negative for leukocytes, white count is normal   The patient is afebrile  Procalcitonin level also normal   At this time on inspection of the scrotum there is no evidence of any cellulitic or necrotic skin changes  Low suspicion for Maged's or cellulitis  BNP 3345  Procalcitonin <0 05 normal  WBC 6 00  Hgb 11 9  Cr remains WNL 0 80    Urinalysis negative leukocytes, trace protein, glucose greater than thousand, trace ketones and trace occult blood  Urine culture pending    VSS, afebrile    Plan:  Indwelling 16 French coudé catheter to remain in place as a urological requirement for a minimum of another 24 to 48 hours before consideration of voiding trial, or if the patient continues to receive aggressive IV diuresis  Suspect that the patient will likely require urinary catheter to be left indwelling and will need follow-up in the urology office as an outpatient after hospital discharge in about 2 weeks    Fluid restriction  IV diuresis, patient was given a trial Lasix 40 mg IV overnight  Monitor MENA's including urine output  Daily weights  Salt restricted diet  Flomax has been initiated daily  Monitor creatinine, CBC, BMP in AM   Dr Karey Mott is the on-call urologist this week covering the hospital campus, she is available via [employer] messaging or telephone if any problems or questions  History of Present Illness     HPI:  Mary Murphy is a 69 y o diabetic male who presents with acute urinary retention x 4 days, presented to ED yesterday for significant scrotal swelling and AUR  Per ED report his scrotum was edematous but nontender and no crepitus  No skin changes  1 5 L on bedside bladder scan  Mensah catheter placed in ED and total urine output since has been 3,625 ml clear urine  He has 2+ bilateral LE edema as well  Initial c/o shortness of breath, now improved after diuresis and catheter placed  He said he noticed worsening nonpainful scrotal swelling for about 2 weeks  No hx of trauma  Denies hematuria or burning  No fever or chills  SCROTAL ULTRASOUND 2/8/2023     INDICATION:    testicular swelling x 2 weeks      COMPARISON: None     TECHNIQUE:   Ultrasound the scrotal contents was performed with a high frequency linear transducer utilizing volumetric sweep imaging as well as standard still image techniques  Imaging performed in longitudinal and transverse orientation  Color and   spectral Doppler evaluation also performed bilaterally      FINDINGS:     TESTES:   Testes are symmetric and normal in size      RIGHT testis = 3 6 x 2 6 x 2 7 cm  Volume 12 7 mL  Normal contour with slightly heterogeneous echotexture  No intratesticular mass lesion or calcifications      LEFT testis = 3 8 x 2 8 x 2 5 cm  Volume 13 9 mL  Normal contour with slightly heterogeneous echotexture  No intratesticular mass lesion or calcifications      Doppler flow within both testes is present and symmetric      EPIDIDYMIDES:   Normal Size  Doppler ultrasound demonstrates normal blood flow    Bilateral epididymal cysts      HYDROCELE:  Complicated bilateral hydroceles      VARICOCELE:  None present      SCROTUM: Bilateral scrotal thickening      IMPRESSION:     Diffuse scrotal thickening in underlying hydroceles may indicate a cellulitis      No signs of torsion or epididymoorchitis  CHEST 2/8/2023     INDICATION:   hypoxia      COMPARISON:  None     EXAM PERFORMED/VIEWS:  XR CHEST PORTABLE     FINDINGS:     Cardiomediastinal silhouette is enlarged      Mild pulmonary vascular congestion and small bilateral pleural effusions  No pneumothorax      Osseous structures appear within normal limits for patient age      IMPRESSION:     Mild pulmonary vascular congestion and small bilateral pleural effusions  Inpatient consult to Urology  Consult performed by: Marli Mcnamara PA-C  Consult ordered by: Zachariah Cedeno,           Review of Systems   Constitutional: Positive for activity change and unexpected weight change  Negative for appetite change, chills, diaphoresis, fatigue and fever  HENT: Negative  Eyes: Negative  Respiratory: Positive for shortness of breath  Negative for wheezing  Cardiovascular: Positive for leg swelling  Negative for chest pain and palpitations  Gastrointestinal: Positive for abdominal distention  Negative for abdominal pain, constipation, diarrhea, nausea and vomiting  Endocrine: Negative  Genitourinary: Positive for decreased urine volume, difficulty urinating, hematuria, penile swelling and scrotal swelling  Negative for dysuria, flank pain, frequency and testicular pain  Musculoskeletal: Negative  Negative for back pain and gait problem  Skin: Positive for wound  Ulcer left foot, no drainage  Allergic/Immunologic: Negative  Neurological: Negative  Negative for tremors and weakness  Hematological: Negative  Psychiatric/Behavioral: Negative  Historical Information   Past Medical History:   Diagnosis Date   • [facility]      History reviewed  No pertinent surgical history    Social History   Social History     Substance and Sexual Activity   Alcohol Use Never     Social History     Substance and Sexual Activity   Drug Use Never     E-Cigarette/Vaping   • E-Cigarette Use Never User      E-Cigarette/Vaping Substances     Social History     Tobacco Use   Smoking Status Former   Smokeless Tobacco Never     Family History: non-contributory    Meds/Allergies   current meds:   Current Facility-Administered Medications   Medication Dose Route Frequency   • acetaminophen (TYLENOL) tablet 650 mg  650 mg Oral Q6H PRN   • atorvastatin (LIPITOR) tablet 40 mg  40 mg Oral Daily With Dinner   • calcium carbonate (TUMS) chewable tablet 1,000 mg  1,000 mg Oral Daily PRN   • cephalexin (KEFLEX) capsule 500 mg  500 mg Oral Q6H [address]   • furosemide (LASIX) injection 40 mg  40 mg Intravenous BID (diuretic)   • heparin (porcine) subcutaneous injection 5,000 Units  5,000 Units Subcutaneous Q8H [address]   • insulin glargine (LANTUS) subcutaneous injection 5 Units 0 05 mL  5 Units Subcutaneous HS   • insulin lispro (HumaLOG) 100 units/mL subcutaneous injection 1-6 Units  1-6 Units Subcutaneous TID AC   • insulin lispro (HumaLOG) 100 units/mL subcutaneous injection 1-6 Units  1-6 Units Subcutaneous HS   • ondansetron (ZOFRAN) injection 4 mg  4 mg Intravenous Q6H PRN   • polyethylene glycol (MIRALAX) packet 17 g  17 g Oral Daily PRN   • potassium chloride (K-DUR,KLOR-CON) CR tablet 20 mEq  20 mEq Oral Daily   • tamsulosin (FLOMAX) capsule 0 4 mg  0 4 mg Oral Daily With Dinner     Allergies   Allergen Reactions   • Metformin Diarrhea       Objective   First Vitals:   Blood Pressure: 143/87 (02/08/23 1251)  Pulse: 99 (02/08/23 1251)  Temperature: (!) 97 4 °F (36 3 °C) (02/08/23 1251)  Temp Source: Temporal (02/08/23 1251)  Respirations: 20 (02/08/23 1251)  Height: 5' 10" (177 8 cm) (02/08/23 1703)  Weight - Scale: 83 2 kg (183 lb 6 8 oz) (02/08/23 1251)  SpO2: (!) 81 % (02/08/23 1251)    Current Vitals:   Blood Pressure: 159/86 (02/09/23 0802)  Pulse: 75 (02/09/23 0802)  Temperature: 97 9 °F (36 6 °C) (02/09/23 0739)  Temp Source: Temporal (02/08/23 1251)  Respirations: 20 (02/09/23 0739)  Height: 5' 10" (177 8 cm) (02/09/23 0802)  Weight - Scale: 82 1 kg (181 lb) (02/09/23 0802)  SpO2: 96 % (02/08/23 2316)      Intake/Output Summary (Last 24 hours) at 2/9/2023 0849  Last data filed at 2/9/2023 0545  Gross per 24 hour   Intake 240 ml   Output 3625 ml   Net -3385 ml       Invasive Devices     Peripheral Intravenous Line  Duration           Peripheral IV 02/08/23 Right Antecubital <1 day    Peripheral IV 02/08/23 Right;Ventral (anterior) Forearm <1 day          Drain  Duration           Urethral Catheter Coude 16 Fr  <1 day                Physical Exam     32-year-old male seen at bedside  He is in no acute respiratory distress  He is chronically ill-appearing  Skin warm dry to touch  No pallor or jaundice  ENT oral mucosa pink and moist   PERRLA, EOMI  Sclera white OU  Neck without carotid bruit  Trachea midline  There is evidence of elevated JVD   Chest symmetric and expansion  No chest wall tenderness  Coty Nissen Heart regular rate and rhythm  No murmur or gallop is heard  No friction rub  Lungs patient has bilateral bibasilar Rales with diminished breath sounds, he is on 2 L of nasal cannula O2 at present  SPO2 on room air 97% at present  Abdomen positive bowel sounds are heard  Abdomen is not distended  No suprapubic tenderness  No guarding or rebound  No ascites  Inguinal sites are free of hernia  Negative HJR  Liver edge is not palpable  Genital rectal examination reveals the patient has massive scrotal edema, nontender  Very difficult to palpate the testicles through the significant swelling  No skin erythema or necrosis noted  The glans penis is embedded in significant edema  16 [place] coudé catheter draining clear yellow urine without blood or clots noted  Extremities no calf tenderness, patient has 2+ pitting edema to the mid shins bilaterally  Ambulation not observed  Neurological exam shows no focal motor or sensory neurologic weakness  Mental status is normal   No tremor noted    Cranial nerves appear symmetric  Skin the patient has ulceration of the top of the left foot between the first and second toes  No drainage or erythema  Both feet are warm to touch and appear well-perfused  Lab Results:   I have personally reviewed pertinent lab results  , CBC:   Lab Results   Component Value Date    WBC 6 00 02/09/2023    HGB 11 9 (L) 02/09/2023    HCT 38 2 02/09/2023    MCV 85 02/09/2023     02/09/2023    MCH 26 5 (L) 02/09/2023    MCHC 31 2 (L) 02/09/2023    RDW 15 4 (H) 02/09/2023    MPV 9 3 02/09/2023    NRBC 0 02/09/2023   , CMP:   Lab Results   Component Value Date    SODIUM 140 02/09/2023    K 3 8 02/09/2023     02/09/2023    CO2 31 02/09/2023    BUN 18 02/09/2023    CREATININE 0 80 02/09/2023    CALCIUM 8 2 (L) 02/09/2023    AST 10 (L) 02/09/2023    ALT 5 (L) 02/09/2023    ALKPHOS 97 02/09/2023    EGFR 86 02/09/2023   , Coagulation:   Lab Results   Component Value Date    INR 1 21 (H) 02/08/2023   , Urinalysis:   Lab Results   Component Value Date    COLORU Yellow 02/08/2023    CLARITYU Clear 02/08/2023    SPECGRAV 1 025 02/08/2023    PHUR 5 5 02/08/2023    LEUKOCYTESUR (A) 02/08/2023     Elevated glucose may cause decreased leukocyte values  See urine microscopic for [facility] result/    NITRITE Negative 02/08/2023    GLUCOSEU >=1000 (1%) (A) 02/08/2023    KETONESU Trace (A) 02/08/2023    BILIRUBINUR Negative 02/08/2023    BLOODU Trace-Intact (A) 02/08/2023   , Lipase: No results found for: LIPASE  Imaging: I have personally reviewed pertinent reports  EKG, Pathology, and Other Studies: I have personally reviewed pertinent reports  Counseling / Coordination of Care  Total floor / unit time spent today 50 minutes  Greater than 50% of total time was spent with the patient and / or family counseling and / or coordination of care    A description of the counseling / coordination of care: Extensive review of the patient's past medical records, personal examination of the patient, discussion with the attending hospitalist PA, review of diagnostic imaging laboratory studies and review of existing orders all conducted neurological evaluation of the patient at this time        Paige Sanches PA-C

## 2023-02-09 NOTE — CONSULTS
Consultation - Cardiology Team One  Mary Murphy 69 y o  male MRN: [id number]  Unit/Bed#: 406-01 Encounter: [id number]    Inpatient consult to Cardiology  Consult performed by: JANIS Mcintyre  Consult ordered by: Bobie Bosworth, PA-C          Physician Requesting Consult: Rehana Hairston DO  Reason for Consult / Principal Problem: volume overload    Assessment:    CHF (type unspecified)  To the ED yesterday afternoon with c/o scrotal swelling x 2 weeks with associated urinary retention; he also notes SIMON, orthopnea and LE edema   Hypoxic on presentation with O2 sat of 81%; chest xray with evidence of mild pulm vascular congestion  O2 sat this AM is 92% on RA  BNP 3,345  No prior hx of CHF; no prior echo on file; echo ordered, await results  IV lasix 40 mg x 1 dose given yesterday afternoon on admit; he continues with bibasilar rales, at least +3 B/L LE edema up to the buttocks and penile/scrotal edema--we will continue diuresis with IV lasix 40 mg BID and continue to monitor his volume status/renal function closely  I/O: total UO since admit is 3 6 L  Weight this AM is 181 lbs down from 183 lbs on admit  Continue strict I/O's; daily weights (standing scale if able) and 2 gm sodium/fluid restriction    Essential HTN  BP mildly elevated on admit with SBP in the 150's (in setting of volume overload)  BP this /86; he notes he had been on lisinopril in the past but he stopped this several months ago on his own  He may need anti-hypertensive meds on discharge but for now we will monitor BP and make further recommendations once echo is resulted    DM2  HgbA1c 9 2  Defer to primary team       Plan/Recommendations:  Await echo results  Continue IV diuresis with Lasix 40 mg BID;  monitor his volume status/renal function closely  He may need anti-hypertensive meds on discharge but for now we will monitor BP and make further recommendations once echo is resulted    ______________________________________________________________________      History of Present Illness   HPI: Bisi Cervantes is a 69y o  year old male with PMH of DM and HTN to the ED yesterday secondary to complaints of fully worsening bilateral lower extremity edema, scrotal/penile edema as well as pain on exertion, orthopnea and increased abdominal girth for at least the past 2 weeks  Patient also noted subsequent urinary retention in the setting of scrotal/penile edema  Patient denies any prior history of CHF  He states that he had previously been followed by the [place] for all of his medical care but has since ties with them over the past several months and for at least the past 6 months has not taken any medication  He states he had previously been on lisinopril for hypertension, insulin for his diabetes and then a cholesterol medication, he states he stopped all of these several months ago  In the ED chest x-ray shows mild pulmonary vascular congestion and BNP is elevated  IV Lasix was given secondary to concern for overload  Allergies consulted secondary to the same  On physical exam the patient does have extensive bilateral lower extremity edema up to the buttocks  He has rather severe renal/penile edema and bibasilar Rales  He had adequate response to IV Lasix we will continue this today  We will await echo results regarding medical management  EKG reviewed personally: sinus rhythm rate of 94        Telemetry reviewed personally: not on tele        Review of Systems   Constitutional: Positive for malaise/fatigue  Negative for chills and fever  HENT: Negative for congestion  Cardiovascular: Positive for dyspnea on exertion, leg swelling and orthopnea  Negative for chest pain and palpitations  Respiratory: Negative for cough, shortness of breath (no SOB at rest) and wheezing  Endocrine: Negative  Hematologic/Lymphatic: Negative  Skin: Negative      Musculoskeletal: Negative  Gastrointestinal: Negative for bloating, abdominal pain, nausea and vomiting  Increased abdominal girth   Genitourinary: Negative  Scrotal/penile edema   Neurological: Negative for dizziness and light-headedness  Psychiatric/Behavioral: Negative  All other systems reviewed and are negative  Historical Information   Past Medical History:   Diagnosis Date   • Diabetes mellitus ([address] )      History reviewed  No pertinent surgical history  Social History     Substance and Sexual Activity   Alcohol Use Never     Social History     Substance and Sexual Activity   Drug Use Never     Social History     Tobacco Use   Smoking Status Former   Smokeless Tobacco Never     Family History: History reviewed  No pertinent family history      Meds/Allergies   current meds:   Current Facility-Administered Medications   Medication Dose Route Frequency   • acetaminophen (TYLENOL) tablet 650 mg  650 mg Oral Q6H PRN   • atorvastatin (LIPITOR) tablet 40 mg  40 mg Oral Daily With Dinner   • calcium carbonate (TUMS) chewable tablet 1,000 mg  1,000 mg Oral Daily PRN   • cephalexin (KEFLEX) capsule 500 mg  500 mg Oral Q6H [address]   • heparin (porcine) subcutaneous injection 5,000 Units  5,000 Units Subcutaneous Q8H [address]   • insulin glargine (LANTUS) subcutaneous injection 10 Units 0 1 mL  10 Units Subcutaneous HS   • insulin lispro (HumaLOG) 100 units/mL subcutaneous injection 1-6 Units  1-6 Units Subcutaneous TID AC   • insulin lispro (HumaLOG) 100 units/mL subcutaneous injection 1-6 Units  1-6 Units Subcutaneous HS   • ondansetron (ZOFRAN) injection 4 mg  4 mg Intravenous Q6H PRN   • polyethylene glycol (MIRALAX) packet 17 g  17 g Oral Daily PRN   • potassium chloride (K-DUR,KLOR-CON) CR tablet 20 mEq  20 mEq Oral Daily   • tamsulosin (FLOMAX) capsule 0 4 mg  0 4 mg Oral Daily With Dinner          Allergies   Allergen Reactions   • Metformin Diarrhea       Objective   Vitals: Blood pressure 105/65, pulse 81, temperature 97 9 °F (36 6 °C), resp  rate 20, height 5' 10" (1 778 m), weight 82 2 kg (181 lb 3 5 oz), SpO2 96 %  ,     Body mass index is 26 kg/m²  ,     Systolic (59GTM), SZN:994 , Min:101 , TDJ:947     Diastolic (95MHM), MMZ:18, Min:63, Max:97    Wt Readings from Last 3 Encounters:   02/09/23 82 2 kg (181 lb 3 5 oz)   06/24/21 68 6 kg (151 lb 3 8 oz)      Lab Results   Component Value Date    CREATININE 0 80 02/09/2023    CREATININE 0 89 02/08/2023    CREATININE 0 83 06/28/2021             Intake/Output Summary (Last 24 hours) at 2/9/2023 0817  Last data filed at 2/9/2023 0545  Gross per 24 hour   Intake 240 ml   Output 3625 ml   Net -3385 ml     Weight (last 2 days)     Date/Time Weight    02/09/23 0544 82 2 (181 22)     Weight: pt refused to get out of bed at 02/09/23 0544    02/08/23 17:03:52 83 4 (183 86)    02/08/23 1251 83 2 (183 42)        Invasive Devices     Peripheral Intravenous Line  Duration           Peripheral IV 02/08/23 Right Antecubital <1 day    Peripheral IV 02/08/23 Right;Ventral (anterior) Forearm <1 day          Drain  Duration           Urethral Catheter Coude 16 Fr  <1 day                  Physical Exam  Vitals reviewed  Constitutional:       General: He is not in acute distress  HENT:      Head: Normocephalic and atraumatic  Mouth/Throat:      Mouth: Mucous membranes are moist    Cardiovascular:      Rate and Rhythm: Normal rate and regular rhythm  Heart sounds: Normal heart sounds, S1 normal and S2 normal  No murmur heard  Comments: + JVD  Pulmonary:      Effort: Pulmonary effort is normal  No respiratory distress  Breath sounds: Examination of the right-lower field reveals rales  Examination of the left-lower field reveals rales  Rales present  Abdominal:      General: Bowel sounds are normal  There is no distension  Palpations: Abdomen is soft  Genitourinary:     Comments: Scrotal/penile edema  Musculoskeletal:         General: Normal range of motion  Cervical back: Normal range of motion and neck supple  Right lower leg: 3+ Edema present  Left lower leg: 3+ Edema present  Skin:     General: Skin is warm and dry  Neurological:      Mental Status: He is alert and oriented to person, place, and time  Psychiatric:         Mood and Affect: Mood normal            LABORATORY RESULTS:  Results from last 7 days   Lab Units 02/08/23  1349   CK TOTAL U/L 61     CBC with diff:   Results from last 7 days   Lab Units 02/09/23  0458 02/08/23  1349   WBC Thousand/uL 6 00 5 52   HEMOGLOBIN g/dL 11 9* 12 6   HEMATOCRIT % 38 2 41 2   MCV fL 85 86   PLATELETS Thousands/uL 236 218   MCH pg 26 5* 26 4*   MCHC g/dL 31 2* 30 6*   RDW % 15 4* 15 7*   MPV fL 9 3 9 4   NRBC AUTO /100 WBCs 0 0       CMP:  Results from last 7 days   Lab Units 02/09/23  0458 02/08/23  1349   POTASSIUM mmol/L 3 8 3 9   CHLORIDE mmol/L 104 100   CO2 mmol/L 31 29   BUN mg/dL 18 18   CREATININE mg/dL 0 80 0 89   CALCIUM mg/dL 8 2* 8 9   AST U/L 10* 11*   ALT U/L 5* 7   ALK PHOS U/L 97 126*   EGFR ml/min/1 73sq m 86 83       BMP:  Results from last 7 days   Lab Units 02/09/23  0458 02/08/23  1349   POTASSIUM mmol/L 3 8 3 9   CHLORIDE mmol/L 104 100   CO2 mmol/L 31 29   BUN mg/dL 18 18   CREATININE mg/dL 0 80 0 89   CALCIUM mg/dL 8 2* 8 9          No results found for: NTBNP         Results from last 7 days   Lab Units 02/09/23  0458 02/08/23  1349   MAGNESIUM mg/dL 1 9 1 6*          Results from last 7 days   Lab Units 02/08/23  1349   HEMOGLOBIN A1C % 12 0*              Results from last 7 days   Lab Units 02/08/23  1349   INR  1 21*     Lipid Profile:   No results found for: CHOL  No results found for: HDL  No results found for: LDLCALC  No results found for: TRIG      Cardiac testing:   No results found for this or any previous visit  No results found for this or any previous visit  No valid procedures specified  No results found for this or any previous visit          Imaging: I have personally reviewed pertinent reports  XR chest 1 view portable    Result Date: 2/8/2023  Narrative: CHEST INDICATION:   hypoxia  COMPARISON:  None EXAM PERFORMED/VIEWS:  XR CHEST PORTABLE FINDINGS: Cardiomediastinal silhouette is enlarged  Mild pulmonary vascular congestion and small bilateral pleural effusions  No pneumothorax  Osseous structures appear within normal limits for patient age  Impression: Mild pulmonary vascular congestion and small bilateral pleural effusions  Workstation performed: [id number]     US scrotum and testicles    Result Date: 2/8/2023  Narrative: SCROTAL ULTRASOUND INDICATION:    testicular swelling x 2 weeks  COMPARISON: None TECHNIQUE:   Ultrasound the scrotal contents was performed with a high frequency linear transducer utilizing volumetric sweep imaging as well as standard still image techniques  Imaging performed in longitudinal and transverse orientation  Color and spectral Doppler evaluation also performed bilaterally  FINDINGS: TESTES: Testes are symmetric and normal in size  RIGHT testis = 3 6 x 2 6 x 2 7 cm  Volume 12 7 mL Normal contour with slightly heterogeneous echotexture  No intratesticular mass lesion or calcifications  LEFT testis = 3 8 x 2 8 x 2 5 cm  Volume 13 9 mL Normal contour with slightly heterogeneous echotexture  No intratesticular mass lesion or calcifications  Doppler flow within both testes is present and symmetric  EPIDIDYMIDES: Normal Size  Doppler ultrasound demonstrates normal blood flow  Bilateral epididymal cysts  HYDROCELE:  Complicated bilateral hydroceles  VARICOCELE:  None present  SCROTUM: Bilateral scrotal thickening  Impression:  Diffuse scrotal thickening in underlying hydroceles may indicate a cellulitis  No signs of torsion or epididymoorchitis  The study was marked in [facility] for immediate notification   Workstation performed: [id number]     [address] bedside procedure    Result Date: 2/8/2023  Narrative: 1 2 [id number]  2 083 [id number]          Counseling / Coordination of Care  Total floor / unit time spent today 45 minutes  Greater than 50% of total time was spent with the patient and / or family counseling and / or coordination of care  A description of the counseling / coordination of care: Review of history, current assessment, development of a plan  Code Status: Level 1 - Full Code    ** Please Note: Dragon 360 Dictation voice to text software may have been used in the creation of this document   **

## 2023-02-09 NOTE — PLAN OF CARE
Problem: MOBILITY - ADULT  Goal: Maintain or return to baseline ADL function  Description: INTERVENTIONS:  -  Assess patient's ability to carry out ADLs; assess patient's baseline for ADL function and identify physical deficits which impact ability to perform ADLs (bathing, care of mouth/teeth, toileting, grooming, dressing, etc )  - Assess/evaluate cause of self-care deficits   - Assess range of motion  - Assess patient's mobility; develop plan if impaired  - Assess patient's need for assistive devices and provide as appropriate  - Encourage maximum independence but intervene and supervise when necessary  - Involve family in performance of ADLs  - Assess for home care needs following discharge   - Consider OT consult to assist with ADL evaluation and planning for discharge  - Provide patient education as appropriate  Outcome: Progressing  Goal: Maintains/Returns to pre admission functional level  Description: INTERVENTIONS:  - Perform BMAT or MOVE assessment daily    - Set and communicate daily mobility goal to care team and patient/family/caregiver  - Collaborate with rehabilitation services on mobility goals if consulted  - Perform Range of Motion 3 times a day  - Reposition patient every 2 hours    - Dangle patient 3 times a day  - Stand patient 3 times a day  - Ambulate patient 3 times a day  - Out of bed to chair 3 times a day   - Out of bed for meals 3 times a day  - Out of bed for toileting  - Record patient progress and toleration of activity level   Outcome: Progressing     Problem: Prexisting or High Potential for Compromised Skin Integrity  Goal: Skin integrity is maintained or improved  Description: INTERVENTIONS:  - Identify patients at risk for skin breakdown  - Assess and monitor skin integrity  - Assess and monitor nutrition and hydration status  - Monitor labs   - Assess for incontinence   - Turn and reposition patient  - Assist with mobility/ambulation  - Relieve pressure over bony prominences  - Avoid friction and shearing  - Provide appropriate hygiene as needed including keeping skin clean and dry  - Evaluate need for skin moisturizer/barrier cream  - Collaborate with interdisciplinary team   - Patient/family teaching  - Consider wound care consult   Outcome: Progressing     Problem: PAIN - ADULT  Goal: Verbalizes/displays adequate comfort level or baseline comfort level  Description: Interventions:  - Encourage patient to monitor pain and request assistance  - Assess pain using appropriate pain scale  - Administer analgesics based on type and severity of pain and evaluate response  - Implement non-pharmacological measures as appropriate and evaluate response  - Consider cultural and social influences on pain and pain management  - Notify physician/advanced practitioner if interventions unsuccessful or patient reports new pain  Outcome: Progressing     Problem: INFECTION - ADULT  Goal: Absence or prevention of progression during hospitalization  Description: INTERVENTIONS:  - Assess and monitor for signs and symptoms of infection  - Monitor lab/diagnostic results  - Monitor all insertion sites, i e  indwelling lines, tubes, and drains  - Monitor endotracheal if appropriate and nasal secretions for changes in amount and color  - Roberts appropriate cooling/warming therapies per order  - Administer medications as ordered  - Instruct and encourage patient and family to use good hand hygiene technique  - Identify and instruct in appropriate isolation precautions for identified infection/condition  Outcome: Progressing     Problem: SAFETY ADULT  Goal: Maintain or return to baseline ADL function  Description: INTERVENTIONS:  -  Assess patient's ability to carry out ADLs; assess patient's baseline for ADL function and identify physical deficits which impact ability to perform ADLs (bathing, care of mouth/teeth, toileting, grooming, dressing, etc )  - Assess/evaluate cause of self-care deficits - Assess range of motion  - Assess patient's mobility; develop plan if impaired  - Assess patient's need for assistive devices and provide as appropriate  - Encourage maximum independence but intervene and supervise when necessary  - Involve family in performance of ADLs  - Assess for home care needs following discharge   - Consider OT consult to assist with ADL evaluation and planning for discharge  - Provide patient education as appropriate  Outcome: Progressing  Goal: Maintains/Returns to pre admission functional level  Description: INTERVENTIONS:  - Perform BMAT or MOVE assessment daily    - Set and communicate daily mobility goal to care team and patient/family/caregiver  - Collaborate with rehabilitation services on mobility goals if consulted  - Perform Range of Motion 3 times a day  - Reposition patient every 2 hours    - Dangle patient 3 times a day  - Stand patient 3 times a day  - Ambulate patient 3 times a day  - Out of bed to chair 3 times a day   - Out of bed for meals 3 times a day  - Out of bed for toileting  - Record patient progress and toleration of activity level   Outcome: Progressing  Goal: Patient will remain free of falls  Description: INTERVENTIONS:  - Educate patient/family on patient safety including physical limitations  - Instruct patient to call for assistance with activity   - Consult OT/PT to assist with strengthening/mobility   - Keep Call bell within reach  - Keep bed low and locked with side rails adjusted as appropriate  - Keep care items and personal belongings within reach  - Initiate and maintain comfort rounds  - Make Fall Risk Sign visible to staff  - Offer Toileting every 2 Hours, in advance of need  - Initiate/Maintain bend and chair alarm  - Obtain necessary fall risk management equipment: bed and chair alarm  - Apply yellow socks and bracelet for high fall risk patients  - Consider moving patient to room near nurses station  Outcome: Progressing     Problem: DISCHARGE PLANNING  Goal: Discharge to home or other facility with appropriate resources  Description: INTERVENTIONS:  - Identify barriers to discharge w/patient and caregiver  - Arrange for needed discharge resources and transportation as appropriate  - Identify discharge learning needs (meds, wound care, etc )  - Arrange for interpretive services to assist at discharge as needed  - Refer to Case Management Department for coordinating discharge planning if the patient needs post-hospital services based on physician/advanced practitioner order or complex needs related to functional status, cognitive ability, or social support system  Outcome: Progressing     Problem: Knowledge Deficit  Goal: Patient/family/caregiver demonstrates understanding of disease process, treatment plan, medications, and discharge instructions  Description: Complete learning assessment and assess knowledge base    Interventions:  - Provide teaching at level of understanding  - Provide teaching via preferred learning methods  Outcome: Progressing     Problem: CARDIOVASCULAR - ADULT  Goal: Maintains optimal cardiac output and hemodynamic stability  Description: INTERVENTIONS:  - Monitor I/O, vital signs and rhythm  - Monitor for S/S and trends of decreased cardiac output  - Administer and titrate ordered vasoactive medications to optimize hemodynamic stability  - Assess quality of pulses, skin color and temperature  - Assess for signs of decreased coronary artery perfusion  - Instruct patient to report change in severity of symptoms  Outcome: Progressing  Goal: Absence of cardiac dysrhythmias or at baseline rhythm  Description: INTERVENTIONS:  - Continuous cardiac monitoring, vital signs, obtain 12 lead EKG if ordered  - Administer antiarrhythmic and heart rate control medications as ordered  - Monitor electrolytes and administer replacement therapy as ordered  Outcome: Progressing     Problem: RESPIRATORY - ADULT  Goal: Achieves optimal ventilation and oxygenation  Description: INTERVENTIONS:  - Assess for changes in respiratory status  - Assess for changes in mentation and behavior  - Position to facilitate oxygenation and minimize respiratory effort  - Oxygen administered by appropriate delivery if ordered  - Initiate smoking cessation education as indicated  - Encourage broncho-pulmonary hygiene including cough, deep breathe, Incentive Spirometry  - Assess the need for suctioning and aspirate as needed  - Assess and instruct to report SOB or any respiratory difficulty  - Respiratory Therapy support as indicated  Outcome: Progressing     Problem: SKIN/TISSUE INTEGRITY - ADULT  Goal: Skin Integrity remains intact(Skin Breakdown Prevention)  Description: Assess:  -Perform Vni assessment every shift   -Clean and moisturize skin every shift  -Inspect skin when repositioning, toileting, and assisting with ADLS  -Assess extremities for adequate circulation and sensation     Bed Management:  -Have minimal linens on bed & keep smooth, unwrinkled  -Change linens as needed when moist or perspiring  -Avoid sitting or lying in one position for more than 2 hours while in bed      Toileting:  -Offer bedside commode  -Assess for incontinence every 2 hrs  -Use incontinent care products after each incontinent episode such as blue pads and wipes      Activity:  -Mobilize patient 3 times a day  -Encourage activity and walks on unit  -Encourage or provide ROM exercises   -Turn and reposition patient every 2 Hours  -Use appropriate equipment to lift or move patient in bed  -Instruct/ Assist with weight shifting every 2 hours when out of bed in chair      Skin Care:  -Avoid use of baby powder, tape, friction and shearing, hot water or constrictive clothing    -Do not massage red bony areas      Outcome: Progressing  Goal: Incision(s), wounds(s) or drain site(s) healing without S/S of infection  Description: INTERVENTIONS  - Assess and document dressing, incision, wound bed, drain sites and surrounding tissue  - Provide patient and family education  - Perform skin care/dressing changes every shift or as directed  Outcome: Progressing  Goal: Pressure injury heals and does not worsen  Description: Interventions:  - Implement low air loss mattress or specialty surface (Criteria met)  - Apply silicone foam dressing  - Limit chair time to 2 hour intervals  - Use special pressure reducing interventions such as waffle cushion when in chair   - Apply fecal or urinary incontinence containment device   - Turn and reposition patient & offload bony prominences every 2 hours   - Utilize friction reducing device or surface for transfers   - Consider consults to  interdisciplinary teams such as PT/ OT  - Consider nutrition services referral as needed  Outcome: Progressing     Problem: MUSCULOSKELETAL - ADULT  Goal: Maintain or return mobility to safest level of function  Description: INTERVENTIONS:  - Assess patient's ability to carry out ADLs; assess patient's baseline for ADL function and identify physical deficits which impact ability to perform ADLs (bathing, care of mouth/teeth, toileting, grooming, dressing, etc )  - Assess/evaluate cause of self-care deficits   - Assess range of motion  - Assess patient's mobility  - Assess patient's need for assistive devices and provide as appropriate  - Encourage maximum independence but intervene and supervise when necessary  - Involve family in performance of ADLs  - Assess for home care needs following discharge   - Consider OT consult to assist with ADL evaluation and planning for discharge  - Provide patient education as appropriate  Outcome: Progressing  Goal: Maintain proper alignment of affected body part  Description: INTERVENTIONS:  - Support, maintain and protect limb and body alignment  - Provide patient/ family with appropriate education  Outcome: Progressing     Problem: GENITOURINARY - ADULT  Goal: Maintains or returns to baseline urinary function  Description: INTERVENTIONS:  - Assess urinary function  - Encourage oral fluids to ensure adequate hydration if ordered  - Administer IV fluids as ordered to ensure adequate hydration  - Administer ordered medications as needed  - Offer frequent toileting  - Follow urinary retention protocol if ordered  Outcome: Progressing  Goal: Absence of urinary retention  Description: INTERVENTIONS:  - Assess patient’s ability to void and empty bladder  - Monitor I/O  - Bladder scan as needed  - Discuss with physician/AP medications to alleviate retention as needed  - Discuss catheterization for long term situations as appropriate  Outcome: Progressing  Goal: Urinary catheter remains patent  Description: INTERVENTIONS:  - Assess patency of urinary catheter  - If patient has a chronic reagan, consider changing catheter if non-functioning  - Follow guidelines for intermittent irrigation of non-functioning urinary catheter  Outcome: Progressing

## 2023-02-09 NOTE — ASSESSMENT & PLAN NOTE
Lab Results   Component Value Date    HGBA1C 12 0 (H) 02/08/2023       Recent Labs     02/09/23  0737 02/09/23  0841 02/09/23  1003 02/09/23  1106   POCGLU 56* 68 159* 140       Blood Sugar Average: Last 72 hrs:  (P) 194   Poor control secondary to lack of insulin therapy outpatient for the last two months  Repeat A1c at this time  Initiate on lantus 10 units and SSI with ADA diet --> with hypoglycemia this AM, lantus decreased to 5 units  Should follow with endocrine on discharge

## 2023-02-09 NOTE — PLAN OF CARE
Problem: PHYSICAL THERAPY ADULT  Goal: Performs mobility at highest level of function for planned discharge setting  See evaluation for individualized goals  Description: Treatment/Interventions: Functional transfer training, LE strengthening/ROM, Therapeutic exercise, Endurance training, Bed mobility, Gait training  Equipment Recommended: Wilma Pedraza       See flowsheet documentation for full assessment, interventions and recommendations  Note: Prognosis: Good  Problem List: Decreased strength, Decreased endurance, Impaired balance, Decreased mobility  Assessment: Patient is a 69 y o  male evaluated by Physical Therapy [facility] with admitting diagnosis of: Hypomagnesemia, Epididymal cyst, Urinary retention, Ulcer of foot due to diabetes, Hydrocele, bilateral, Inflammation of scrotum, Scrotum pain, Fluid overload, Type 2 diabetes mellitus with hyperglycemia, with long-term current use of insulin, and principal problem of: Acute systolic congestive heart failure  PT was consulted to assess patient's functional mobility and discharge needs  Ordered are PT Evaluation and treatment with activity level of: up with assistance  Comorbidities affecting patient's physical performance at time of assessment include: DM, HTN, foot ulcer, CHF  Personal factors affecting the patient at time of IE include: ambulating with assistive device, inability to navigate community distances, history of fall(s), inability/difficulty performing IADLs and inability/difficulty performing ADLs  Please locate objective findings from PT assessment regarding body systems outlined above  Upon evaluation, pt able to perform all functional mobility with SUP-Vamsi, RW, and increased time  Occasional verbal cuing provided for safety awareness, sequencing, and RW use  Ambulation limited to 20' within room d/t B LE swelling and fatigue  HR and SpO2 remained WFL on 1L O2 throughout   Pt trialed without O2, however experienced decrease in SpO2  Pt demonstrated moderate postural sway but no true LOB experienced  The patient's AM-PAC Basic Mobility Inpatient Short Form Raw Score is 16  A Raw score of less than or equal to 16 suggests the patient may benefit from discharge to post-acute rehabilitation services  Please also refer to the recommendation of the Physical Therapist for safe discharge planning  Co treatment with OT secondary to complex medical condition of pt, possible A of 2 required to achieve and maintain transitional movements, requiring the need of skilled therapeutic intervention of 2 therapists to achieve delivery of services  Pt will benefit from continued PT intervention during LOS to address current deficits, increase LOF, and facilitate safe d/c to next level of care when medically appropriate  D/c recommendation at this time is post-acute rehabilitation services  PT Discharge Recommendation: Post acute rehabilitation services    See flowsheet documentation for full assessment

## 2023-02-09 NOTE — WOUND OSTOMY CARE
Consult Note - Wound   Graham Monday 69 y o  male MRN: [id number]  Unit/Bed#: 406-01 Encounter: [id number]        History and Present Illness:  69year old male admitted with fluid overload, PMH Type 2 DM  Foot ulcer, Seen today for initial wound consult  Supine in bed, able to turn with assist, reagan catheter to straight drainage  Assessment Findings:   1)Bilateral heels intact  2)Pt has decreased sensation to feet, when questioned stated decreased sensation while cleaning wounds of foot  Left anterior foot wound, pt stated this is a traumatic wound  States he was trimming toe nails and struck foot on tub  He stated wound has healed fairly well but then began to decline  Anterior foot wound round shape pink wound bed, periwound callous and erythema  Distal to wound is intact brown scab  Thoroughly cleansed between toes and dried  Dermagran applied to just size of wound  Covered with dsd  3)Right lateral distal tibia 2 fluid filled blisters  Covered with dermagan dsd  4)Bilateral buttocks dry intact, hyperpigmented, brown  Red blanchable  Scrotum very edematous, skin beneath and sides is dry and intact   Interdry applied as preventative measure to decrease moisture, prevent friction  No fluctuance, odor, warmth/temperature differences, or purulence noted to the above noted wounds and skin areas assessed  New dressings applied per orders listed below  Patient tolerated well- no s/s of non-verbal pain or discomfort observed during the encounter  Bedside nurse aware of plan of care  See flow sheets for more detailed assessment findings  Wound care will continue to follow  Skin care Plan:Wound care will follow weekly   1-Protect sacrum w/Allevyn foam, paco with P, change q3d and PRN, check skin q-shift  2-Turn/reposition q2h or when medically stable for pressure re-distribution on skin  Use foam wedges  3-Elevate heels to offload pressure    4-Moisturize skin daily with skin nourishing cream   5-Ehob cushion in chair when out of bed  6-Hydraguard to bilateral heels BID and PRN  7-Cleanse left anterior foot and right lateral tibia blister with normal saline, apply Dermagran to wound bed only  cover with DSD and wrap with rajwinder  Change daily and prn soil or dislodgment  Wounds:  Wound 02/08/23 Buttocks (Active)   Wound Image   02/09/23 1103   Wound Description Dry; Intact; Beefy red 02/09/23 1103   Hanh-wound Assessment Dry; Intact 02/09/23 1103   Wound Length (cm) 6 cm 02/09/23 1103   Wound Width (cm) 4 cm 02/09/23 1103   Wound Surface Area (cm^2) 24 cm^2 02/09/23 1103   Drainage Amount None 02/09/23 1103   Treatments Cleansed;Site care 02/09/23 1103   Dressing Foam, Silicon (eg  Allevyn, etc) 02/09/23 1103   Dressing Changed Reinforced 02/09/23 1103   Patient Tolerance Tolerated well 02/09/23 1103   Dressing Status Clean;Dry; Intact 02/09/23 1103       Wound 02/08/23 Foot Anterior; Left (Active)   Wound Image   02/09/23 1034   Wound Description Dry;Beefy red 02/09/23 1034   Hanh-wound Assessment Callus;Edema; Erythema 02/09/23 1034   Wound Length (cm) 0 5 cm 02/09/23 1034   Wound Width (cm) 1 5 cm 02/09/23 1034   Wound Surface Area (cm^2) 0 75 cm^2 02/09/23 1034   Drainage Amount None 02/09/23 1034   Non-staged Wound Description Partial thickness 02/09/23 1034   Treatments Cleansed;Site care;Elevated 02/09/23 1034   Dressing Beauty Klippel gauze;Dry dressing 02/09/23 1034   Dressing Changed Changed 02/09/23 1034   Patient Tolerance Tolerated well 02/09/23 1034   Dressing Status Clean;Dry; Intact 02/09/23 1034       Wound 02/09/23 Pretibial Distal;Right (Active)   Wound Image   02/09/23 1045   Wound Description Dry; Intact 02/09/23 1045   Hanh-wound Assessment Dry; Intact 02/09/23 1045   Wound Length (cm) 2 5 cm 02/09/23 1045   Wound Width (cm) 0 5 cm 02/09/23 1045   Wound Surface Area (cm^2) 1 25 cm^2 02/09/23 1045   Drainage Amount None 02/09/23 1045   Treatments Cleansed;Site care 02/09/23 1045   Dressing Corrie [place] gauze;Dry dressing 02/09/23 1045   Dressing Changed New 02/09/23 1045   Patient Tolerance Tolerated well 02/09/23 1045   Dressing Status Clean;Dry; Intact 02/09/23 1045     Call or tigertext with any questions  Wound Care will continue to follow    Ainsley OCBURNN RN

## 2023-02-10 ENCOUNTER — HOSPITAL ENCOUNTER (INPATIENT)
Facility: HOSPITAL | Age: 78
LOS: 9 days | Discharge: NURSING, REHAB OR LONG-TERM CARE | End: 2023-02-19
Attending: STUDENT IN AN ORGANIZED HEALTH CARE EDUCATION/TRAINING PROGRAM | Admitting: INTERNAL MEDICINE

## 2023-02-10 VITALS
WEIGHT: 181 LBS | OXYGEN SATURATION: 95 % | TEMPERATURE: 97.8 F | RESPIRATION RATE: 18 BRPM | SYSTOLIC BLOOD PRESSURE: 122 MMHG | BODY MASS INDEX: 25.91 KG/M2 | HEART RATE: 75 BPM | HEIGHT: 70 IN | DIASTOLIC BLOOD PRESSURE: 68 MMHG

## 2023-02-10 DIAGNOSIS — I25.10 CAD (CORONARY ARTERY DISEASE): ICD-10-CM

## 2023-02-10 DIAGNOSIS — L97.509 ULCER OF FOOT DUE TO DIABETES (HCC): ICD-10-CM

## 2023-02-10 DIAGNOSIS — R33.9 URINARY RETENTION: ICD-10-CM

## 2023-02-10 DIAGNOSIS — I50.21 ACUTE SYSTOLIC CONGESTIVE HEART FAILURE (HCC): Primary | ICD-10-CM

## 2023-02-10 DIAGNOSIS — I50.20 HEART FAILURE WITH REDUCED EJECTION FRACTION (HCC): ICD-10-CM

## 2023-02-10 DIAGNOSIS — I25.10 CAD, MULTIPLE VESSEL: ICD-10-CM

## 2023-02-10 DIAGNOSIS — E11.65 TYPE 2 DIABETES MELLITUS WITH HYPERGLYCEMIA, WITH LONG-TERM CURRENT USE OF INSULIN (HCC): ICD-10-CM

## 2023-02-10 DIAGNOSIS — E11.621 ULCER OF FOOT DUE TO DIABETES (HCC): ICD-10-CM

## 2023-02-10 DIAGNOSIS — Z79.4 TYPE 2 DIABETES MELLITUS WITH HYPERGLYCEMIA, WITH LONG-TERM CURRENT USE OF INSULIN (HCC): ICD-10-CM

## 2023-02-10 PROBLEM — E44.0 MODERATE PROTEIN-CALORIE MALNUTRITION (HCC): Status: ACTIVE | Noted: 2023-02-10

## 2023-02-10 LAB
ANION GAP SERPL CALCULATED.3IONS-SCNC: 5 MMOL/L (ref 4–13)
BUN SERPL-MCNC: 21 MG/DL (ref 5–25)
CALCIUM SERPL-MCNC: 8.6 MG/DL (ref 8.4–10.2)
CHLORIDE SERPL-SCNC: 100 MMOL/L (ref 96–108)
CO2 SERPL-SCNC: 32 MMOL/L (ref 21–32)
CREAT SERPL-MCNC: 0.88 MG/DL (ref 0.6–1.3)
GFR SERPL CREATININE-BSD FRML MDRD: 83 ML/MIN/1.73SQ M
GLUCOSE SERPL-MCNC: 123 MG/DL (ref 65–140)
GLUCOSE SERPL-MCNC: 157 MG/DL (ref 65–140)
GLUCOSE SERPL-MCNC: 182 MG/DL (ref 65–140)
GLUCOSE SERPL-MCNC: 224 MG/DL (ref 65–140)
GLUCOSE SERPL-MCNC: 238 MG/DL (ref 65–140)
MRSA NOSE QL CULT: NORMAL
POTASSIUM SERPL-SCNC: 4.1 MMOL/L (ref 3.5–5.3)
SODIUM SERPL-SCNC: 137 MMOL/L (ref 135–147)

## 2023-02-10 RX ORDER — INSULIN LISPRO 100 [IU]/ML
1-6 INJECTION, SOLUTION INTRAVENOUS; SUBCUTANEOUS
Status: CANCELLED | OUTPATIENT
Start: 2023-02-11

## 2023-02-10 RX ORDER — CALCIUM CARBONATE 200(500)MG
1000 TABLET,CHEWABLE ORAL DAILY PRN
Status: CANCELLED | OUTPATIENT
Start: 2023-02-10

## 2023-02-10 RX ORDER — INSULIN GLARGINE 100 [IU]/ML
5 INJECTION, SOLUTION SUBCUTANEOUS
Status: CANCELLED | OUTPATIENT
Start: 2023-02-10

## 2023-02-10 RX ORDER — POLYETHYLENE GLYCOL 3350 17 G/17G
17 POWDER, FOR SOLUTION ORAL DAILY PRN
Status: CANCELLED | OUTPATIENT
Start: 2023-02-10

## 2023-02-10 RX ORDER — CEPHALEXIN 250 MG/1
500 CAPSULE ORAL EVERY 6 HOURS SCHEDULED
Status: CANCELLED | OUTPATIENT
Start: 2023-02-10

## 2023-02-10 RX ORDER — CALCIUM CARBONATE 200(500)MG
1000 TABLET,CHEWABLE ORAL DAILY PRN
Status: DISCONTINUED | OUTPATIENT
Start: 2023-02-10 | End: 2023-02-19 | Stop reason: HOSPADM

## 2023-02-10 RX ORDER — INSULIN LISPRO 100 [IU]/ML
1-6 INJECTION, SOLUTION INTRAVENOUS; SUBCUTANEOUS
Status: DISCONTINUED | OUTPATIENT
Start: 2023-02-11 | End: 2023-02-14

## 2023-02-10 RX ORDER — DOCUSATE SODIUM 100 MG/1
100 CAPSULE, LIQUID FILLED ORAL 2 TIMES DAILY
Status: CANCELLED | OUTPATIENT
Start: 2023-02-10

## 2023-02-10 RX ORDER — ACETAMINOPHEN 325 MG/1
650 TABLET ORAL EVERY 6 HOURS PRN
Status: DISCONTINUED | OUTPATIENT
Start: 2023-02-10 | End: 2023-02-19 | Stop reason: HOSPADM

## 2023-02-10 RX ORDER — ONDANSETRON 2 MG/ML
4 INJECTION INTRAMUSCULAR; INTRAVENOUS EVERY 6 HOURS PRN
Status: CANCELLED | OUTPATIENT
Start: 2023-02-10

## 2023-02-10 RX ORDER — BUMETANIDE 0.25 MG/ML
2 INJECTION, SOLUTION INTRAMUSCULAR; INTRAVENOUS 2 TIMES DAILY
Status: DISCONTINUED | OUTPATIENT
Start: 2023-02-10 | End: 2023-02-10 | Stop reason: HOSPADM

## 2023-02-10 RX ORDER — POTASSIUM CHLORIDE 20 MEQ/1
20 TABLET, EXTENDED RELEASE ORAL DAILY
Status: DISCONTINUED | OUTPATIENT
Start: 2023-02-11 | End: 2023-02-11

## 2023-02-10 RX ORDER — POLYETHYLENE GLYCOL 3350 17 G/17G
17 POWDER, FOR SOLUTION ORAL DAILY PRN
Status: DISCONTINUED | OUTPATIENT
Start: 2023-02-10 | End: 2023-02-18

## 2023-02-10 RX ORDER — TAMSULOSIN HYDROCHLORIDE 0.4 MG/1
0.4 CAPSULE ORAL
Status: DISCONTINUED | OUTPATIENT
Start: 2023-02-11 | End: 2023-02-19 | Stop reason: HOSPADM

## 2023-02-10 RX ORDER — CEPHALEXIN 500 MG/1
500 CAPSULE ORAL EVERY 6 HOURS SCHEDULED
Status: DISCONTINUED | OUTPATIENT
Start: 2023-02-11 | End: 2023-02-13

## 2023-02-10 RX ORDER — METOPROLOL SUCCINATE 50 MG/1
50 TABLET, EXTENDED RELEASE ORAL 2 TIMES DAILY
Status: DISCONTINUED | OUTPATIENT
Start: 2023-02-11 | End: 2023-02-12

## 2023-02-10 RX ORDER — BUMETANIDE 0.25 MG/ML
2 INJECTION, SOLUTION INTRAMUSCULAR; INTRAVENOUS 2 TIMES DAILY
Status: DISCONTINUED | OUTPATIENT
Start: 2023-02-11 | End: 2023-02-11

## 2023-02-10 RX ORDER — INSULIN GLARGINE 100 [IU]/ML
5 INJECTION, SOLUTION SUBCUTANEOUS
Status: DISCONTINUED | OUTPATIENT
Start: 2023-02-11 | End: 2023-02-14

## 2023-02-10 RX ORDER — ONDANSETRON 2 MG/ML
4 INJECTION INTRAMUSCULAR; INTRAVENOUS EVERY 6 HOURS PRN
Status: DISCONTINUED | OUTPATIENT
Start: 2023-02-10 | End: 2023-02-19 | Stop reason: HOSPADM

## 2023-02-10 RX ORDER — ACETAMINOPHEN 325 MG/1
650 TABLET ORAL EVERY 6 HOURS PRN
Status: CANCELLED | OUTPATIENT
Start: 2023-02-10

## 2023-02-10 RX ORDER — TAMSULOSIN HYDROCHLORIDE 0.4 MG/1
0.4 CAPSULE ORAL
Status: CANCELLED | OUTPATIENT
Start: 2023-02-11

## 2023-02-10 RX ORDER — ATORVASTATIN CALCIUM 40 MG/1
40 TABLET, FILM COATED ORAL
Status: DISCONTINUED | OUTPATIENT
Start: 2023-02-11 | End: 2023-02-11

## 2023-02-10 RX ORDER — METOPROLOL SUCCINATE 50 MG/1
50 TABLET, EXTENDED RELEASE ORAL 2 TIMES DAILY
Status: CANCELLED | OUTPATIENT
Start: 2023-02-10

## 2023-02-10 RX ORDER — INSULIN LISPRO 100 [IU]/ML
1-6 INJECTION, SOLUTION INTRAVENOUS; SUBCUTANEOUS
Status: CANCELLED | OUTPATIENT
Start: 2023-02-10

## 2023-02-10 RX ORDER — HEPARIN SODIUM 5000 [USP'U]/ML
5000 INJECTION, SOLUTION INTRAVENOUS; SUBCUTANEOUS EVERY 8 HOURS SCHEDULED
Status: DISCONTINUED | OUTPATIENT
Start: 2023-02-11 | End: 2023-02-19 | Stop reason: HOSPADM

## 2023-02-10 RX ORDER — DOCUSATE SODIUM 100 MG/1
100 CAPSULE, LIQUID FILLED ORAL 2 TIMES DAILY
Status: DISCONTINUED | OUTPATIENT
Start: 2023-02-11 | End: 2023-02-18

## 2023-02-10 RX ORDER — POTASSIUM CHLORIDE 20 MEQ/1
20 TABLET, EXTENDED RELEASE ORAL DAILY
Status: CANCELLED | OUTPATIENT
Start: 2023-02-11

## 2023-02-10 RX ORDER — BUMETANIDE 0.25 MG/ML
2 INJECTION, SOLUTION INTRAMUSCULAR; INTRAVENOUS 2 TIMES DAILY
Status: CANCELLED | OUTPATIENT
Start: 2023-02-10

## 2023-02-10 RX ORDER — HEPARIN SODIUM 5000 [USP'U]/ML
5000 INJECTION, SOLUTION INTRAVENOUS; SUBCUTANEOUS EVERY 8 HOURS SCHEDULED
Status: CANCELLED | OUTPATIENT
Start: 2023-02-10

## 2023-02-10 RX ORDER — ATORVASTATIN CALCIUM 40 MG/1
40 TABLET, FILM COATED ORAL
Status: CANCELLED | OUTPATIENT
Start: 2023-02-11

## 2023-02-10 RX ADMIN — HEPARIN SODIUM 5000 UNITS: 5000 INJECTION INTRAVENOUS; SUBCUTANEOUS at 13:49

## 2023-02-10 RX ADMIN — ATORVASTATIN CALCIUM 40 MG: 40 TABLET, FILM COATED ORAL at 17:07

## 2023-02-10 RX ADMIN — DOCUSATE SODIUM 100 MG: 100 CAPSULE, LIQUID FILLED ORAL at 17:09

## 2023-02-10 RX ADMIN — HEPARIN SODIUM 5000 UNITS: 5000 INJECTION INTRAVENOUS; SUBCUTANEOUS at 06:53

## 2023-02-10 RX ADMIN — BUMETANIDE 2 MG: 0.25 INJECTION, SOLUTION INTRAMUSCULAR; INTRAVENOUS at 17:08

## 2023-02-10 RX ADMIN — POLYETHYLENE GLYCOL 3350 17 G: 17 POWDER, FOR SOLUTION ORAL at 17:21

## 2023-02-10 RX ADMIN — FUROSEMIDE 40 MG: 10 INJECTION, SOLUTION INTRAMUSCULAR; INTRAVENOUS at 08:59

## 2023-02-10 RX ADMIN — CEPHALEXIN 500 MG: 250 CAPSULE ORAL at 00:40

## 2023-02-10 RX ADMIN — DOCUSATE SODIUM 100 MG: 100 CAPSULE, LIQUID FILLED ORAL at 08:59

## 2023-02-10 RX ADMIN — POTASSIUM CHLORIDE 20 MEQ: 1500 TABLET, EXTENDED RELEASE ORAL at 08:59

## 2023-02-10 RX ADMIN — INSULIN LISPRO 2 UNITS: 100 INJECTION, SOLUTION INTRAVENOUS; SUBCUTANEOUS at 17:09

## 2023-02-10 RX ADMIN — TAMSULOSIN HYDROCHLORIDE 0.4 MG: 0.4 CAPSULE ORAL at 17:09

## 2023-02-10 RX ADMIN — INSULIN LISPRO 1 UNITS: 100 INJECTION, SOLUTION INTRAVENOUS; SUBCUTANEOUS at 12:09

## 2023-02-10 RX ADMIN — METOPROLOL SUCCINATE 50 MG: 50 TABLET, EXTENDED RELEASE ORAL at 08:59

## 2023-02-10 RX ADMIN — CEPHALEXIN 500 MG: 250 CAPSULE ORAL at 06:53

## 2023-02-10 RX ADMIN — CEPHALEXIN 500 MG: 250 CAPSULE ORAL at 17:08

## 2023-02-10 RX ADMIN — CEPHALEXIN 500 MG: 250 CAPSULE ORAL at 12:09

## 2023-02-10 NOTE — ASSESSMENT & PLAN NOTE
Lab Results   Component Value Date    HGBA1C 12 0 (H) 02/08/2023       Recent Labs     02/09/23  1535 02/09/23  2213 02/10/23  0733 02/10/23  1122   POCGLU 164* 219* 123 182*       Blood Sugar Average: Last 72 hrs:  (P) 185  2   Poor control secondary to lack of insulin therapy outpatient for the last two months  Repeat A1c at this time  Initiate on lantus 10 units and SSI with ADA diet --> with hypoglycemia, lantus decreased to 5 units  Should follow with endocrine on discharge

## 2023-02-10 NOTE — MALNUTRITION/BMI
This medical record reflects one or more clinical indicators suggestive of malnutrition  Malnutrition Findings:   Adult Malnutrition type: Acute illness  Adult Degree of Malnutrition: Malnutrition of moderate degree  Malnutrition Characteristics: Muscle loss, Inadequate energy, Fluid accumulation                  360 Statement: moderate PCM related to suspected prolong inadequate po intake due to increase SIMON and inability to prepare meals with muscle wasting as evidence by inability to ambulate  Treated with diet and supplements  BMI Findings: Body mass index is 25 97 kg/m²  See Nutrition note dated 2/9/2023 for additional details  Completed nutrition assessment is viewable in the nutrition documentation

## 2023-02-10 NOTE — ASSESSMENT & PLAN NOTE
Malnutrition Findings:   Adult Malnutrition type: Acute illness  Adult Degree of Malnutrition: Malnutrition of moderate degree  Malnutrition Characteristics: Muscle loss, Inadequate energy, Fluid accumulation                  360 Statement: moderate PCM related to suspected prolong inadequate po intake due to increase SIMON and inability to prepare meals with muscle wasting as evidence by inability to ambulate  Treated with diet and supplements  BMI Findings: Body mass index is 25 97 kg/m²

## 2023-02-10 NOTE — PHYSICAL THERAPY NOTE
PHYSICAL THERAPY NOTE          Patient Name: Dio Schulz  [id number] Date: 2/10/2023   02/10/23 1138   PT Last Visit   PT Visit Date 02/10/23   Note Type   Note Type Treatment   Pain Assessment   Pain Assessment Tool 0-10   Pain Score No Pain   Restrictions/Precautions   Weight Bearing Precautions Per Order No   Other Precautions   (Fall Risk; O2; Telemetry; Multiple lines; Bed Alarm; Chair Alarm)   General   Response to Previous Treatment Patient with no complaints from previous session  Family/Caregiver Present No   Cognition   Overall Cognitive Status WFL   Arousal/Participation Alert; Cooperative   Following Commands Follows all commands and directions without difficulty   Subjective   Subjective c/o LE stiffness and swelling  Agreable to therapy  Bed Mobility   Supine to Sit 5  Supervision   Additional items Assist x 1;HOB elevated; Increased time required;Verbal cues   Additional Comments Increased time to transition to EOB  Vitals WFL's with 1 L  Transfers   Sit to Stand 4  Minimal assistance   Additional items Assist x 1; Armrests; Increased time required;Verbal cues   Stand to Sit 4  Minimal assistance   Additional items Assist x 1; Armrests; Increased time required;Verbal cues   Stand pivot 4  Minimal assistance   Additional items Verbal cues   Additional Comments RW used   Ambulation/Elevation   Gait pattern   (Excessively slow; Short stride; Foward flexed; Decreased foot clearance; Wide ODRA;  Improper Weight shift)   Gait Assistance 4  Minimal assist   Additional items Assist x 1;Verbal cues   Assistive Device Rolling walker   Distance 35'   Balance   Static Sitting Good   Dynamic Sitting Good   Static Standing Fair +   Dynamic Standing Fair   Ambulatory Fair -  (RW)   Endurance Deficit   Endurance Deficit Yes   Activity Tolerance   Activity Tolerance Patient limited by fatigue   Exercises   Heelslides Supine;15 reps;AROM; Bilateral   Hip Extension Supine;15 reps;AROM; Bilateral   Hip Adduction 15 reps; Supine;AROM; Bilateral   Ankle Pumps Supine;15 reps;AROM; Bilateral   Assessment   Prognosis Good   Problem List   (Decreased strength; Decreased endurance; Impaired balance; Decreased mobility)   Assessment Pt  seen for PT treatment session this date with interventions consisting of  therapeutic exercises, bed mobility, transfers and  gait training w/ emphasis on improving pt's ability to ambulate  Pt  Requiring occasional cues for sequence and safety  In comparison to previous session, Pt  With improvements in activity tolerance  Pt is in need of continued activity in PT to improve strength balance endurance mobility transfers and ambulation with return to maximize LOF  From PT/mobility standpoint, recommendation at time of d/c would be post acute rehab  in order to promote return to PLOF and independence  The patient's AM-PAC Basic Mobility Inpatient Short Form Raw Score is 16  A Raw score of greater than 16 suggests the patient may benefit from discharge to home  Please also refer to physical therapy recommendation for safe DC planning  Goals   LTG Expiration Date 02/23/23   PT Treatment Day 1   Plan   Treatment/Interventions   (Functional transfer training; LE strengthening/ROM; Therapeutic exercise;  Endurance training; Bed mobility; Gait training)   Progress Progressing toward goals   PT Frequency 3-5x/wk   Recommendation   PT Discharge Recommendation Post acute rehabilitation services   AM-PAC Basic Mobility Inpatient   Turning in Flat Bed Without Bedrails 3   Lying on Back to Sitting on Edge of Flat Bed Without Bedrails 3   Moving Bed to Chair 3   Standing Up From Chair Using Arms 3   Walk in Room 3   Climb 3-5 Stairs With Railing 1   Basic Mobility Inpatient Raw Score 16   Basic Mobility Standardized Score 38 32   Highest Level Of Mobility   JH-HLM Goal 5: Stand one or more mins   JH-HLM Achieved 7: Walk 25 feet or more   Education   Education Provided Mobility training   Patient Demonstrates verbal understanding   End of Consult   Patient Position at End of Consult Bedside chair;Bed/Chair alarm activated; All needs within reach   End of Consult Comments discussed POC with PT

## 2023-02-10 NOTE — ASSESSMENT & PLAN NOTE
· Patient found to have significant urinary retention in the ED  · Possibly secondary to severe edema with component of BPH as well  · Mensah placed at that time  · Initiate on flomax, goal is for void trial when scrotal edema resolved

## 2023-02-10 NOTE — UTILIZATION REVIEW
Continued Stay Review    Date: 2- 10-23                        Current Patient Class: observation Current Level of Care:  Med sug    HPI:77 y o  male initially admitted on 2-8-23     Assessment/Plan:     Per cardiology patient continues with at least 2+ bilateral lower extremity up to the buttocks and including scrotum with minimal improvement in 24 hours  Urinary output appears to have decreased  Change diuresis to  iv bumex 2 mg bid for more vigorous diuresis  UO for 24 hours 550 cc  This am 700 cc  Question accuracy  Continue daily wt and intake/output  contnue 2 gm NA and fluid restrictive diet   Await transfer to [facility] for cardiac cath      Vital Signs:       Date/Time Temp Pulse Resp BP MAP (mmHg) SpO2 O2 Flow Rate (L/min) O2 Device   02/10/23 0859 -- -- -- -- -- -- 1 L/min Nasal cannula   02/10/23 07:35:18 98 3 °F   (36 8 °C) -- 20 127/63 84 -- -- --   02/10/23 0117 98 4 °F   (36 9 °C) 72 -- -- -- 93 % 1 L/min Nasal cannula       Pertinent Labs/Diagnostic Results:   Results from last 7 days   Lab Units 02/08/23  1349   SARS-COV-2  Negative     Results from last 7 days   Lab Units 02/09/23  0458 02/08/23  1349   WBC Thousand/uL 6 00 5 52   HEMOGLOBIN g/dL 11 9* 12 6   HEMATOCRIT % 38 2 41 2   PLATELETS Thousands/uL 236 218   NEUTROS ABS Thousands/µL 4 27 3 99         Results from last 7 days   Lab Units 02/10/23  0935 02/09/23  0458 02/08/23  1349   SODIUM mmol/L 137 140 136   POTASSIUM mmol/L 4 1 3 8 3 9   CHLORIDE mmol/L 100 104 100   CO2 mmol/L 32 31 29   ANION GAP mmol/L 5 5 7   BUN mg/dL 21 18 18   CREATININE mg/dL 0 88 0 80 0 89   EGFR ml/min/1 73sq m 83 86 83   CALCIUM mg/dL 8 6 8 2* 8 9   MAGNESIUM mg/dL  --  1 9 1 6*   PHOSPHORUS mg/dL  --  2 7 3 0     Results from last 7 days   Lab Units 02/09/23  0458 02/08/23  1349   AST U/L 10* 11*   ALT U/L 5* 7   ALK PHOS U/L 97 126*   TOTAL PROTEIN g/dL 5 3* 6 3*   ALBUMIN g/dL 2 9* 3 4*   TOTAL BILIRUBIN mg/dL 0 96 0 84     Results from last 7 days   Lab Units 02/10/23  1122 02/10/23  0733 02/09/23  2213 02/09/23  1535 02/09/23  1106 02/09/23  1003 02/09/23  0841 02/09/23  0737 02/08/23  1713 02/08/23  1303   POC GLUCOSE mg/dl 182* 123 219* 164* 140 159* 68 56* 346* 395*     Results from last 7 days   Lab Units 02/10/23  0935 02/09/23  0458 02/08/23  1349   GLUCOSE RANDOM mg/dL 157* 60* 417*         Results from last 7 days   Lab Units 02/08/23  1349   HEMOGLOBIN A1C % 12 0*   EAG mg/dl 298     BETA-HYDROXYBUTYRATE   Date Value Ref Range Status   02/08/2023 0 1 <0 6 mmol/L Final          Results from last 7 days   Lab Units 02/08/23  1349   PH PELON  7 334   PCO2 PELON mm Hg 52 5*   PO2 PELON mm Hg 46 1*   HCO3 PELON mmol/L 27 3   BASE EXC PELON mmol/L 0 6   O2 CONTENT PELON ml/dL 14 8   O2 HGB, VENOUS % 77 3         Results from last 7 days   Lab Units 02/08/23  1349   CK TOTAL U/L 61             Results from last 7 days   Lab Units 02/08/23  1349   PROTIME seconds 15 6*   INR  1 21*   PTT seconds 32         Results from last 7 days   Lab Units 02/09/23  0458 02/08/23  1349   PROCALCITONIN ng/ml <0 05 <0 05     Results from last 7 days   Lab Units 02/08/23  1349   LACTIC ACID mmol/L 1 6             Results from last 7 days   Lab Units 02/08/23  1349   BNP pg/mL 3,345*       Results from last 7 days   Lab Units 02/08/23  1617   CLARITY UA  Clear   COLOR UA  Yellow   SPEC GRAV UA  1 025   PH UA  5 5   GLUCOSE UA mg/dl >=1000 (1%)*   KETONES UA mg/dl Trace*   BLOOD UA  Trace-Intact*   PROTEIN UA mg/dl Trace*   NITRITE UA  Negative   BILIRUBIN UA  Negative   UROBILINOGEN UA E U /dl 1 0   LEUKOCYTES UA  Elevated glucose may cause decreased leukocyte values   See urine microscopic for [facility] result/*   WBC UA /hpf None Seen   RBC UA /hpf 2-4   BACTERIA UA /hpf Occasional   EPITHELIAL CELLS WET PREP /hpf Occasional   MUCUS THREADS  Occasional*     Results from last 7 days   Lab Units 02/08/23  1349   INFLUENZA A PCR  Negative   INFLUENZA B PCR  Negative   RSV PCR Negative       Results from last 7 days   Lab Units 02/08/23  1617 02/08/23  1349   BLOOD CULTURE   --  No Growth at 24 hrs  No Growth at 24 hrs  URINE CULTURE  No Growth <1000 cfu/mL  --                  Medications:   Scheduled Medications:  atorvastatin, 40 mg, Oral, Daily With Dinner  bumetanide, 2 mg, Intravenous, BID  cephalexin, 500 mg, Oral, Q6H [place]  docusate sodium, 100 mg, Oral, BID  heparin (porcine), 5,000 Units, Subcutaneous, Q8H [place]  insulin glargine, 5 Units, Subcutaneous, HS  insulin lispro, 1-6 Units, Subcutaneous, TID AC  insulin lispro, 1-6 Units, Subcutaneous, HS  metoprolol succinate, 50 mg, Oral, BID  potassium chloride, 20 mEq, Oral, Daily  tamsulosin, 0 4 mg, Oral, Daily With Dinner      Continuous IV Infusions:     PRN Meds:  acetaminophen, 650 mg, Oral, Q6H PRN  calcium carbonate, 1,000 mg, Oral, Daily PRN  ondansetron, 4 mg, Intravenous, Q6H PRN  polyethylene glycol, 17 g, Oral, Daily PRN        Discharge Plan: to be determined     Network Utilization Review Department  ATTENTION: Please call with any questions or concerns to [phone] and carefully listen to the prompts so that you are directed to the right person  All voicemails are confidential   Candia Siemens all requests for admission clinical reviews, approved or denied determinations and any other requests to dedicated fax number below belonging to the campus where the patient is receiving treatment   List of dedicated fax numbers for the Facilities:  [address] DENIALS (Administrative/Medical Necessity) [phone]   [address] [place] [address] (Maternity/NICU/Pediatrics) [phone]   [place] [address] [phone]   [address] [place] [address] 310 [facility] 134 078 [facility] [phone]

## 2023-02-10 NOTE — ASSESSMENT & PLAN NOTE
· Patient noted BLE edema, SIMON, and scrotal swelling at home  · BNP elevated  · Vascular congestion on CXR  · Suspicion was for volume overload, confirmed acute CHF on ECHO with EF of 25%  · Lasix switched to bumex 1 mg BID  · Daily weights and I/O  · Salt restricted diet  · Cardiology input appreciated, patient recommended for transfer to higher level of care for ischemic work up, awaiting transfer to [facility]

## 2023-02-10 NOTE — PLAN OF CARE
Problem: PHYSICAL THERAPY ADULT  Goal: Performs mobility at highest level of function for planned discharge setting  See evaluation for individualized goals  Description: Treatment/Interventions: Functional transfer training, LE strengthening/ROM, Therapeutic exercise, Endurance training, Bed mobility, Gait training  Equipment Recommended: Rachle Valero       See flowsheet documentation for full assessment, interventions and recommendations  Outcome: Progressing  Note: Prognosis: Good  Problem List:  (Decreased strength; Decreased endurance; Impaired balance; Decreased mobility)  Assessment: Pt  seen for PT treatment session this date with interventions consisting of  therapeutic exercises, bed mobility, transfers and  gait training w/ emphasis on improving pt's ability to ambulate  Pt  Requiring occasional cues for sequence and safety  In comparison to previous session, Pt  With improvements in activity tolerance  Pt is in need of continued activity in PT to improve strength balance endurance mobility transfers and ambulation with return to maximize LOF  From PT/mobility standpoint, recommendation at time of d/c would be post acute rehab  in order to promote return to PLOF and independence  The patient's AM-PAC Basic Mobility Inpatient Short Form Raw Score is 16  A Raw score of greater than 16 suggests the patient may benefit from discharge to home  Please also refer to physical therapy recommendation for safe DC planning  PT Discharge Recommendation: Post acute rehabilitation services    See flowsheet documentation for full assessment

## 2023-02-10 NOTE — PLAN OF CARE
Problem: MOBILITY - ADULT  Goal: Maintain or return to baseline ADL function  Description: INTERVENTIONS:  -  Assess patient's ability to carry out ADLs; assess patient's baseline for ADL function and identify physical deficits which impact ability to perform ADLs (bathing, care of mouth/teeth, toileting, grooming, dressing, etc )  - Assess/evaluate cause of self-care deficits   - Assess range of motion  - Assess patient's mobility; develop plan if impaired  - Assess patient's need for assistive devices and provide as appropriate  - Encourage maximum independence but intervene and supervise when necessary  - Involve family in performance of ADLs  - Assess for home care needs following discharge   - Consider OT consult to assist with ADL evaluation and planning for discharge  - Provide patient education as appropriate  Outcome: Progressing  Goal: Maintains/Returns to pre admission functional level  Description: INTERVENTIONS:  - Perform BMAT or MOVE assessment daily    - Set and communicate daily mobility goal to care team and patient/family/caregiver  - Collaborate with rehabilitation services on mobility goals if consulted  - Perform Range of Motion 3 times a day  - Reposition patient every 2 hours    - Dangle patient 3 times a day  - Stand patient 3 times a day  - Ambulate patient 3 times a day  - Out of bed to chair 3 times a day   - Out of bed for meals 3 times a day  - Out of bed for toileting  - Record patient progress and toleration of activity level   Outcome: Progressing     Problem: Prexisting or High Potential for Compromised Skin Integrity  Goal: Skin integrity is maintained or improved  Description: INTERVENTIONS:  - Identify patients at risk for skin breakdown  - Assess and monitor skin integrity  - Assess and monitor nutrition and hydration status  - Monitor labs   - Assess for incontinence   - Turn and reposition patient  - Assist with mobility/ambulation  - Relieve pressure over bony prominences  - Avoid friction and shearing  - Provide appropriate hygiene as needed including keeping skin clean and dry  - Evaluate need for skin moisturizer/barrier cream  - Collaborate with interdisciplinary team   - Patient/family teaching  - Consider wound care consult   Outcome: Progressing     Problem: PAIN - ADULT  Goal: Verbalizes/displays adequate comfort level or baseline comfort level  Description: Interventions:  - Encourage patient to monitor pain and request assistance  - Assess pain using appropriate pain scale  - Administer analgesics based on type and severity of pain and evaluate response  - Implement non-pharmacological measures as appropriate and evaluate response  - Consider cultural and social influences on pain and pain management  - Notify physician/advanced practitioner if interventions unsuccessful or patient reports new pain  Outcome: Progressing     Problem: INFECTION - ADULT  Goal: Absence or prevention of progression during hospitalization  Description: INTERVENTIONS:  - Assess and monitor for signs and symptoms of infection  - Monitor lab/diagnostic results  - Monitor all insertion sites, i e  indwelling lines, tubes, and drains  - Monitor endotracheal if appropriate and nasal secretions for changes in amount and color  - [place] appropriate cooling/warming therapies per order  - Administer medications as ordered  - Instruct and encourage patient and family to use good hand hygiene technique  - Identify and instruct in appropriate isolation precautions for identified infection/condition  Outcome: Progressing     Problem: SAFETY ADULT  Goal: Maintain or return to baseline ADL function  Description: INTERVENTIONS:  -  Assess patient's ability to carry out ADLs; assess patient's baseline for ADL function and identify physical deficits which impact ability to perform ADLs (bathing, care of mouth/teeth, toileting, grooming, dressing, etc )  - Assess/evaluate cause of self-care deficits - Assess range of motion  - Assess patient's mobility; develop plan if impaired  - Assess patient's need for assistive devices and provide as appropriate  - Encourage maximum independence but intervene and supervise when necessary  - Involve family in performance of ADLs  - Assess for home care needs following discharge   - Consider OT consult to assist with ADL evaluation and planning for discharge  - Provide patient education as appropriate  Outcome: Progressing  Goal: Maintains/Returns to pre admission functional level  Description: INTERVENTIONS:  - Perform BMAT or MOVE assessment daily    - Set and communicate daily mobility goal to care team and patient/family/caregiver  - Collaborate with rehabilitation services on mobility goals if consulted  - Perform Range of Motion 3 times a day  - Reposition patient every 2 hours    - Dangle patient 3 times a day  - Stand patient 3 times a day  - Ambulate patient 3 times a day  - Out of bed to chair 3 times a day   - Out of bed for meals 3 times a day  - Out of bed for toileting  - Record patient progress and toleration of activity level   Outcome: Progressing  Goal: Patient will remain free of falls  Description: INTERVENTIONS:  - Educate patient/family on patient safety including physical limitations  - Instruct patient to call for assistance with activity   - Consult OT/PT to assist with strengthening/mobility   - Keep Call bell within reach  - Keep bed low and locked with side rails adjusted as appropriate  - Keep care items and personal belongings within reach  - Initiate and maintain comfort rounds  - Make Fall Risk Sign visible to staff  - Offer Toileting every 2 Hours, in advance of need  - Initiate/Maintain bend and chair alarm  - Obtain necessary fall risk management equipment: bed and chair alarm  - Apply yellow socks and bracelet for high fall risk patients  - Consider moving patient to room near nurses station  Outcome: Progressing     Problem: DISCHARGE PLANNING  Goal: Discharge to home or other facility with appropriate resources  Description: INTERVENTIONS:  - Identify barriers to discharge w/patient and caregiver  - Arrange for needed discharge resources and transportation as appropriate  - Identify discharge learning needs (meds, wound care, etc )  - Arrange for interpretive services to assist at discharge as needed  - Refer to Case Management Department for coordinating discharge planning if the patient needs post-hospital services based on physician/advanced practitioner order or complex needs related to functional status, cognitive ability, or social support system  Outcome: Progressing     Problem: Knowledge Deficit  Goal: Patient/family/caregiver demonstrates understanding of disease process, treatment plan, medications, and discharge instructions  Description: Complete learning assessment and assess knowledge base    Interventions:  - Provide teaching at level of understanding  - Provide teaching via preferred learning methods  Outcome: Progressing     Problem: CARDIOVASCULAR - ADULT  Goal: Maintains optimal cardiac output and hemodynamic stability  Description: INTERVENTIONS:  - Monitor I/O, vital signs and rhythm  - Monitor for S/S and trends of decreased cardiac output  - Administer and titrate ordered vasoactive medications to optimize hemodynamic stability  - Assess quality of pulses, skin color and temperature  - Assess for signs of decreased coronary artery perfusion  - Instruct patient to report change in severity of symptoms  Outcome: Progressing  Goal: Absence of cardiac dysrhythmias or at baseline rhythm  Description: INTERVENTIONS:  - Continuous cardiac monitoring, vital signs, obtain 12 lead EKG if ordered  - Administer antiarrhythmic and heart rate control medications as ordered  - Monitor electrolytes and administer replacement therapy as ordered  Outcome: Progressing     Problem: RESPIRATORY - ADULT  Goal: Achieves optimal ventilation and oxygenation  Description: INTERVENTIONS:  - Assess for changes in respiratory status  - Assess for changes in mentation and behavior  - Position to facilitate oxygenation and minimize respiratory effort  - Oxygen administered by appropriate delivery if ordered  - Initiate smoking cessation education as indicated  - Encourage broncho-pulmonary hygiene including cough, deep breathe, Incentive Spirometry  - Assess the need for suctioning and aspirate as needed  - Assess and instruct to report SOB or any respiratory difficulty  - Respiratory Therapy support as indicated  Outcome: Progressing     Problem: SKIN/TISSUE INTEGRITY - ADULT  Goal: Skin Integrity remains intact(Skin Breakdown Prevention)  Description: Assess:  -Perform Vin assessment every shift   -Clean and moisturize skin every shift  -Inspect skin when repositioning, toileting, and assisting with ADLS  -Assess extremities for adequate circulation and sensation     Bed Management:  -Have minimal linens on bed & keep smooth, unwrinkled  -Change linens as needed when moist or perspiring  -Avoid sitting or lying in one position for more than 2 hours while in bed      Toileting:  -Offer bedside commode  -Assess for incontinence every 2 hrs  -Use incontinent care products after each incontinent episode such as blue pads and wipes      Activity:  -Mobilize patient 3 times a day  -Encourage activity and walks on unit  -Encourage or provide ROM exercises   -Turn and reposition patient every 2 Hours  -Use appropriate equipment to lift or move patient in bed  -Instruct/ Assist with weight shifting every 2 hours when out of bed in chair      Skin Care:  -Avoid use of baby powder, tape, friction and shearing, hot water or constrictive clothing    -Do not massage red bony areas      Outcome: Progressing  Goal: Incision(s), wounds(s) or drain site(s) healing without S/S of infection  Description: INTERVENTIONS  - Assess and document dressing, incision, wound bed, drain sites and surrounding tissue  - Provide patient and family education  - Perform skin care/dressing changes every shift or as directed  Outcome: Progressing  Goal: Pressure injury heals and does not worsen  Description: Interventions:  - Implement low air loss mattress or specialty surface (Criteria met)  - Apply silicone foam dressing  - Limit chair time to 2 hour intervals  - Use special pressure reducing interventions such as waffle cushion when in chair   - Apply fecal or urinary incontinence containment device   - Turn and reposition patient & offload bony prominences every 2 hours   - Utilize friction reducing device or surface for transfers   - Consider consults to  interdisciplinary teams such as PT/ OT  - Consider nutrition services referral as needed  Outcome: Progressing     Problem: MUSCULOSKELETAL - ADULT  Goal: Maintain or return mobility to safest level of function  Description: INTERVENTIONS:  - Assess patient's ability to carry out ADLs; assess patient's baseline for ADL function and identify physical deficits which impact ability to perform ADLs (bathing, care of mouth/teeth, toileting, grooming, dressing, etc )  - Assess/evaluate cause of self-care deficits   - Assess range of motion  - Assess patient's mobility  - Assess patient's need for assistive devices and provide as appropriate  - Encourage maximum independence but intervene and supervise when necessary  - Involve family in performance of ADLs  - Assess for home care needs following discharge   - Consider OT consult to assist with ADL evaluation and planning for discharge  - Provide patient education as appropriate  Outcome: Progressing  Goal: Maintain proper alignment of affected body part  Description: INTERVENTIONS:  - Support, maintain and protect limb and body alignment  - Provide patient/ family with appropriate education  Outcome: Progressing     Problem: GENITOURINARY - ADULT  Goal: Maintains or returns to baseline urinary function  Description: INTERVENTIONS:  - Assess urinary function  - Encourage oral fluids to ensure adequate hydration if ordered  - Administer IV fluids as ordered to ensure adequate hydration  - Administer ordered medications as needed  - Offer frequent toileting  - Follow urinary retention protocol if ordered  Outcome: Progressing  Goal: Absence of urinary retention  Description: INTERVENTIONS:  - Assess patient’s ability to void and empty bladder  - Monitor I/O  - Bladder scan as needed  - Discuss with physician/AP medications to alleviate retention as needed  - Discuss catheterization for long term situations as appropriate  Outcome: Progressing  Goal: Urinary catheter remains patent  Description: INTERVENTIONS:  - Assess patency of urinary catheter  - If patient has a chronic reagan, consider changing catheter if non-functioning  - Follow guidelines for intermittent irrigation of non-functioning urinary catheter  Outcome: Progressing     Problem: Nutrition/Hydration-ADULT  Goal: Nutrient/Hydration intake appropriate for improving, restoring or maintaining nutritional needs  Description: Monitor and assess patient's nutrition/hydration status for malnutrition  Collaborate with interdisciplinary team and initiate plan and interventions as ordered  Monitor patient's weight and dietary intake as ordered or per policy  Utilize nutrition screening tool and intervene as necessary  Determine patient's food preferences and provide high-protein, high-caloric foods as appropriate       INTERVENTIONS:  - Monitor oral intake, urinary output, labs, and treatment plans  - Assess nutrition and hydration status and recommend course of action  - Evaluate amount of meals eaten  - Assist patient with eating if necessary   - Allow adequate time for meals  - Recommend/ encourage appropriate diets, oral nutritional supplements, and vitamin/mineral supplements  - Order, calculate, and assess calorie counts as needed  - Recommend, monitor, and adjust tube feedings and TPN/PPN based on assessed needs  - Assess need for intravenous fluids  - Provide specific nutrition/hydration education as appropriate  - Include patient/family/caregiver in decisions related to nutrition  Outcome: Progressing

## 2023-02-10 NOTE — PROGRESS NOTES
[address] [facility] [address] [place]  Progress Note Antoinette Zuñiga 1945, 69 y o  male MRN: [id number]  Unit/Bed#: 406-01 Encounter: [id number]  Primary Care Provider: No primary care provider on file  Date and time admitted to hospital: 2/8/2023 12:47 PM    * Acute systolic [facility]ive [facility] ([facility] )  Assessment & Plan  · Patient noted BLE edema, SIMON, and scrotal swelling at home  · BNP elevated  · Vascular congestion on CXR  · Suspicion was for volume overload, confirmed acute CHF on ECHO with EF of 25%  · Lasix switched to bumex 1 mg BID  · Daily weights and I/O  · Salt restricted diet  · Cardiology input appreciated, patient recommended for transfer to higher level of care for ischemic work up, awaiting transfer to [facility]    Moderate protein-calorie malnutrition ([facility] )  Assessment & Plan  Malnutrition Findings:   Adult Malnutrition type: Acute illness  Adult Degree of Malnutrition: Malnutrition of moderate degree  Malnutrition Characteristics: Muscle loss, Inadequate energy, Fluid accumulation                  360 Statement: moderate PCM related to suspected prolong inadequate po intake due to increase SIMON and inability to prepare meals with muscle wasting as evidence by inability to ambulate  Treated with diet and supplements  BMI Findings: Body mass index is 25 97 kg/m²  Urinary retention  Assessment & Plan  · Patient found to have significant urinary retention in the ED  · Possibly secondary to severe edema with component of BPH as well  · Mensah placed at that time  · Initiate on flomax, goal is for void trial when scrotal edema resolved    Foot ulcer ([facility] 75 )  Assessment & Plan  · Left foot ulcer  · Does not look overtly infected at this time but with mild purulent drainage noted between the first and second digit, patient was initiated on keflex therapy   Anticipate 5-7 days therapy  · MRSA negative  · Can likely hold off on imaging  · Should have outpatient podiatry follow up  · Wound care nurse consulted    Essential hypertension  Assessment & Plan  · BP acceptable  · Hold lisinopril     Type 2 diabetes mellitus with hyperglycemia, with long-term current use of insulin [facility])  Assessment & Plan  Lab Results   Component Value Date    HGBA1C 12 0 (H) 2023       Recent Labs     23  1535 23  2213 02/10/23  0733 02/10/23  1122   POCGLU 164* 219* 123 182*       Blood Sugar Average: Last 72 hrs:  (P) 185  2   Poor control secondary to lack of insulin therapy outpatient for the last two months  Repeat A1c at this time  Initiate on lantus 10 units and SSI with ADA diet --> with hypoglycemia, lantus decreased to 5 units  Should follow with endocrine on discharge        VTE Pharmacologic Prophylaxis: VTE Score: 4 Moderate Risk (Score 3-4) - Pharmacological DVT Prophylaxis Ordered: heparin  Patient Centered Rounds: I performed bedside rounds with nursing staff today  Discussions with Specialists or Other Care Team Provider: case management, cardiology    Education and Discussions with Family / Patient: Patient declined call to   Time Spent for Care: 36 min  More than 50% of total time spent on counseling and coordination of care as described above  Current Length of Stay: 0 day(s)  Current Patient Status: Observation   Certification Statement: The patient will continue to require additional inpatient hospital stay due to transfer to [facility], continued diuresis  Discharge Plan: transfer to [facility]    Code Status: Level 3 - DNAR and DNI    Subjective:   Patient reports continued swelling  Did have BM yesterday  Notes SOB when his oxygen is off    Objective:     Vitals:   Temp (24hrs), Av 2 °F (36 8 °C), Min:97 6 °F (36 4 °C), Max:98 4 °F (36 9 °C)    Temp:  [97 6 °F (36 4 °C)-98 4 °F (36 9 °C)] 98 3 °F (36 8 °C)  HR:  [72-87] 72  Resp:  [16-20] 20  BP: (127-152)/(63-83) 127/63  SpO2:  [91 %-94 %] 93 %  Body mass index is 25 97 kg/m²       Input and Output Summary (last 24 hours): Intake/Output Summary (Last 24 hours) at 2/10/2023 1359  Last data filed at 2/10/2023 0935  Gross per 24 hour   Intake 180 ml   Output 1250 ml   Net -1070 ml       Physical Exam:   Physical Exam  Vitals and nursing note reviewed  Constitutional:       General: He is not in acute distress  Appearance: He is ill-appearing  HENT:      Head: Normocephalic and atraumatic  Cardiovascular:      Rate and Rhythm: Normal rate and regular rhythm  Pulses: Normal pulses  Heart sounds: Normal heart sounds  No murmur heard  No gallop  Pulmonary:      Effort: Pulmonary effort is normal       Breath sounds: Rales (left) present  No wheezing or rhonchi  Abdominal:      General: Bowel sounds are normal  There is distension  Palpations: Abdomen is soft  Tenderness: There is no abdominal tenderness  There is no guarding or rebound  Genitourinary:     Comments: Mensah in place  Musculoskeletal:      Right lower leg: Edema present  Left lower leg: Edema present  Skin:     General: Skin is warm and dry  Neurological:      General: No focal deficit present  Mental Status: He is alert and oriented to person, place, and time     Psychiatric:         Mood and Affect: Mood normal          Behavior: Behavior normal           Additional Data:     Labs:  Results from last 7 days   Lab Units 02/09/23  0458   WBC Thousand/uL 6 00   HEMOGLOBIN g/dL 11 9*   HEMATOCRIT % 38 2   PLATELETS Thousands/uL 236   NEUTROS PCT % 71   LYMPHS PCT % 17   MONOS PCT % 9   EOS PCT % 2     Results from last 7 days   Lab Units 02/10/23  0935 02/09/23  0458   SODIUM mmol/L 137 140   POTASSIUM mmol/L 4 1 3 8   CHLORIDE mmol/L 100 104   CO2 mmol/L 32 31   BUN mg/dL 21 18   CREATININE mg/dL 0 88 0 80   ANION GAP mmol/L 5 5   CALCIUM mg/dL 8 6 8 2*   ALBUMIN g/dL  --  2 9*   TOTAL BILIRUBIN mg/dL  --  0 96   ALK PHOS U/L  --  97   ALT U/L  --  5*   AST U/L  --  10*   GLUCOSE RANDOM mg/dL 157* 60*     Results from last 7 days   Lab Units 02/08/23  1349   INR  1 21*     Results from last 7 days   Lab Units 02/10/23  1122 02/10/23  0733 02/09/23  2213 02/09/23  1535 02/09/23  1106 02/09/23  1003 02/09/23  0841 02/09/23  0737 02/08/23  1713 02/08/23  1303   POC GLUCOSE mg/dl 182* 123 219* 164* 140 159* 68 56* 346* 395*     Results from last 7 days   Lab Units 02/08/23  1349   HEMOGLOBIN A1C % 12 0*     Results from last 7 days   Lab Units 02/09/23  0458 02/08/23  1349   LACTIC ACID mmol/L  --  1 6   PROCALCITONIN ng/ml <0 05 <0 05       Lines/Drains:  Invasive Devices     Peripheral Intravenous Line  Duration           Peripheral IV 02/08/23 Right Antecubital 2 days    Peripheral IV 02/08/23 Right;Ventral (anterior) Forearm 2 days          Drain  Duration           Urethral Catheter Coude 16 Fr  1 day              Urinary Catheter:  Goal for removal: Remove after 48 hrs of I/O monitoring           Telemetry:  Telemetry Orders (From admission, onward)             48 Hour Telemetry Monitoring  Continuous x 48 hours        References:    Telemetry Guidelines   Question:  Reason for 48 Hour Telemetry  Answer:  Acute Decompensated CHF (continuous diuretic infusion or total diuretic dose > 200 mg daily, associated electrolyte derangement, ionotropic drip, history of ventricular arrhythmia, or new EF <35%)                 Telemetry Reviewed: Normal Sinus Rhythm and episode of vtach  Indication for Continued Telemetry Use: Acute CHF on >200 mg lasix/day or equivalent dose or with new reduced EF  Imaging: No pertinent imaging reviewed  Recent Cultures (last 7 days):   Results from last 7 days   Lab Units 02/08/23  1617 02/08/23  1349   BLOOD CULTURE   --  No Growth at 24 hrs  No Growth at 24 hrs     URINE CULTURE  No Growth <1000 cfu/mL  --        Last 24 Hours Medication List:   Current Facility-Administered Medications   Medication Dose Route Frequency Provider Last Rate   • acetaminophen  650 mg Oral Q6H PRN Jose Banks PA-C     • atorvastatin  40 mg Oral Daily With Cherrie Riggs PA-C     • bumetanide  2 mg Intravenous BID Sande Landau, CRNP     • calcium carbonate  1,000 mg Oral Daily PRN Jose Banks PA-C     • cephalexin  500 mg Oral Q6H [address] Jose Banks PA-C     • docusate sodium  100 mg Oral BID Jose Banks PA-C     • heparin (porcine)  5,000 Units Subcutaneous [facility] Jose Banks PA-C     • insulin glargine  5 Units Subcutaneous HS Jose Banks PA-C     • insulin lispro  1-6 Units Subcutaneous TID AC Jose Banks PA-C     • insulin lispro  1-6 Units Subcutaneous HS Jose Banks PA-C     • metoprolol succinate  50 mg Oral BID Sande Landau, CRNP     • ondansetron  4 mg Intravenous Q6H PRN Jose Banks PA-C     • polyethylene glycol  17 g Oral Daily PRN Jose Banks PA-C     • potassium chloride  20 mEq Oral Daily Jose Banks PA-C     • tamsulosin  0 4 mg Oral Daily With Dinner Jose Banks PA-C          Today, Patient Was Seen By: Jose Banks PA-C    **Please Note: This note may have been constructed using a voice recognition system  **

## 2023-02-10 NOTE — PLAN OF CARE
Problem: OCCUPATIONAL THERAPY ADULT  Goal: Performs self-care activities at highest level of function for planned discharge setting  See evaluation for individualized goals  Description: Treatment Interventions: ADL retraining, Functional transfer training, UE strengthening/ROM, Endurance training, Patient/family training, Equipment evaluation/education, Activityengagement    See flowsheet documentation for full assessment, interventions and recommendations  Outcome: Progressing  Note: Limitation: Decreased ADL status, Decreased UE strength, Decreased Safe judgement during ADL, Decreased endurance, Decreased self-care trans, Decreased high-level ADLs     Assessment: Patient participated in Skilled OT session this date with interventions consisting of ADL re training with the use of correct body mechnaics, Energy Conservation techniques, safety awareness and fall prevention techniques and therapeutic exercise to: increase functional use of BUEs, increase BUE muscle strength    Patient agreeable to OT treatment session, upon arrival patient was found seated OOB to Recliner  In comparison to previous session, patient with improvements in self-care skills, specifically LB dressing and UE exercises   Patient requiring verbal cues for correct technique  Therapist educated patient on household maintenance with RW management to facilitate safety with household tasks  Pt receptive to education  Will continue to follow-up on AE education with sock aide and household education  Patient continues to be functioning below baseline level, occupational performance remains limited secondary to factors listed above and increased risk for falls and injury  From OT standpoint, recommendation at time of d/c would be Short Term Rehab    Patient to benefit from continued Occupational Therapy treatment while in the hospital to address deficits as defined above and maximize level of functional independence with ADLs and functional mobility       OT Discharge Recommendation: Post acute rehabilitation services     Elisha Roberto MS, OTR/L

## 2023-02-10 NOTE — OCCUPATIONAL THERAPY NOTE
Occupational Therapy Treatment Note      Thom Yvon    2/10/2023    Principal Problem:    Acute systolic congestive heart failure (HCC)  Active Problems:    Type 2 diabetes mellitus with hyperglycemia, with long-term current use of insulin (HCC)    Essential hypertension    Foot ulcer (HCC)    Urinary retention    Hydrocele, bilateral    Moderate protein-calorie malnutrition (HCC)      Past Medical History:   Diagnosis Date    Diabetes mellitus ([facility] [address] )        History reviewed  No pertinent surgical history  02/10/23 1400   OT Last Visit   OT Visit Date 02/10/23   Note Type   Note Type Treatment   Pain Assessment   Pain Assessment Tool 0-10   Pain Score No Pain   Restrictions/Precautions   Weight Bearing Precautions Per Order No   Other Precautions Fall Risk;O2;Telemetry;Multiple lines; Chair Alarm; Bed Alarm   ADL   Where Assessed Chair   LB Dressing Assistance 5  Supervision/Setup   LB Dressing Deficit Setup; Increased time to complete; Don/doff R sock; Don/doff L sock; Use of adaptive equipment   LB Dressing Comments Therapist educated pt on sock aide use for LB dressing d/t limited mobility  Pt demonstrated understanding by using sock aide to don bilateral socks at (S) level while seated  Vitals WNL throughout LB dressing  Pt reported increased difficulty when attempting to doff/don socks w/out AD  Bed Mobility   Additional Comments Pt OOB in recliner upon arrival and conclusion of session   Therapeutic Exercise - ROM   UE-ROM Yes  (AROM of BUEs in all planes to improve muscular strength and endurance to facilitate participation in functional transfers and self-care skills  Pt completed 2 sets of 10 each w/ visual cues for correct technique )   Subjective   Subjective "I just feel like I have lost a lot of strength"   Cognition   Overall Cognitive Status [facility]   Arousal/Participation Alert; Responsive; Cooperative   Attention Within functional limits   Orientation Level Oriented X4   Memory Within functional limits Following Commands Follows all commands and directions without difficulty   Comments Pt agreeable to OT session   Additional Activities   Additional Activities Other (Comment)   Additional Activities Comments Therapist educated pt on RW management at home and available adaptive equipment to aid onto RW for safety with laundry and household tasks  Pt receptive to education and stated he will check into it  Activity Tolerance   Activity Tolerance Patient tolerated treatment well   Medical Staff Made Aware Yes, RN aware of session outcomes   Assessment   Assessment Patient participated in Skilled OT session this date with interventions consisting of ADL re training with the use of correct body mechnaics, Energy Conservation techniques, safety awareness and fall prevention techniques and therapeutic exercise to: increase functional use of BUEs, increase BUE muscle strength    Patient agreeable to OT treatment session, upon arrival patient was found seated OOB to Recliner  In comparison to previous session, patient with improvements in self-care skills, specifically LB dressing and UE exercises   Patient requiring verbal cues for correct technique  Therapist educated patient on household maintenance with RW management to facilitate safety with household tasks  Pt receptive to education  Will continue to follow-up on AE education with sock aide and household education  Patient continues to be functioning below baseline level, occupational performance remains limited secondary to factors listed above and increased risk for falls and injury  From OT standpoint, recommendation at time of d/c would be Short Term Rehab  Patient to benefit from continued Occupational Therapy treatment while in the hospital to address deficits as defined above and maximize level of functional independence with ADLs and functional mobility     Plan   Treatment Interventions ADL retraining;Functional transfer training;UE strengthening/ROM; Endurance training;Patient/family training;Equipment evaluation/education; Activityengagement   Goal Expiration Date 02/23/23   OT Treatment Day 1   OT Frequency 3-5x/wk   Recommendation   OT Discharge Recommendation Post acute rehabilitation services   Additional Comments  The patient's raw score on the AM-PAC Daily Activity inpatient short form is 18, standardized score is 38 66, less than 39 4  Patients at this level are likely to benefit from discharge to post-acute rehabilitation services  Please refer to the recommendation of the Occupational Therapist for safe discharge planning     AM-PAC Daily Activity Inpatient   Lower Body Dressing 3   Bathing 2   Toileting 3   Upper Body Dressing 3   Grooming 3   Eating 4   Daily Activity Raw Score 18   Daily Activity Standardized Score (Calc for Raw Score >=11) 38 66   AM-[facility] Applied Cognition Inpatient   Following a Speech/Presentation 4   Understanding Ordinary Conversation 4   Taking Medications 4   Remembering Where Things Are Placed or Put Away 4   Remembering List of 4-5 Errands 4   Taking Care of Complicated Tasks 4   Applied Cognition Raw Score 24   Applied Cognition Standardized Score 62 21     Haseeb  MS, OTR/L

## 2023-02-10 NOTE — PROGRESS NOTES
General Cardiology   Progress Note -  Team One   Etelvina Ch 69 y o  male MRN: [id number]    Unit/Bed#: 406-01 Encounter: [id number]    Assessment:    Acute HFrEF  Patient admitted with c/o severe scrotal swelling x 2 weeks with associated urinary retention; he also notes SIMON, orthopnea and LE edema; chest xray with evidence of mild pulm vascular congestion  Noted to have systolic dysfunction on echo yesterday  IV lasix 40 mg BID started yesterday and he did receive a dose this AM  He continues with at least +2 B/L LE edema up to the buttocks and severe penile/scrotal edema with minimal improvement in 24 hrs; his UO appears to have dropped off  We will change him to IV Bumex 2 mg BID as of this afternoon for more vigorous diuresis  I/O: UO for 24 hrs documented as 550 cc but 700 cc out just this AM alone (question accuracy of yesterday's documentation)  Weight this AM is not yet completed; he was 181 lbs yesterday down from 183 lbs on admit  Continue strict I/O's; daily weights (standing scale if able) and 2 gm sodium/fluid restriction    Cardiomyopathy  Etiology unknown, he denies any recent viral illnesses, no ETOH/illicit drug abuse; we will transfer him to [place] for cardiac cath  Toprol XL 50 mg BID started yesterday; per Dr Irene Izquierdo, we will hold off on starting ACE/ARB/Entresto at this time and continue with IV diuresis     Essential HTN  BP mildly elevated on admit with SBP in the 150's (in setting of volume overload)  IV lasix started yesterday and Toprol XL 50 mg BID started yesterday in setting of new cardiomyopathy and IV diuresis continues  BP has remained controlled over the past 24 hrs; this AM is 127/63     DM2  HgbA1c 9 2  Defer to primary team        Plan/Recommendations:  He continues with at least +2 B/L LE edema up to the buttocks and severe penile/scrotal edema with minimal improvement in 24 hrs; his UO appears to have dropped off  We will change him to IV Bumex 2 mg BID as of this afternoon for more vigorous diuresis  Toprol XL 50 mg BID started yesterday; per Dr Irene Izquierdo, we will hold off on starting ACE/ARB/Entresto at this time and continue with IV diuresis  Continue to await transfer to [facility]      _______________________________________________________________________    Subjective  Patient continues to note orthopnea as well as bilateral lower extremity edema and penile/scrotal edema  No shortness of breath at rest       Review of Systems   Constitutional: Positive for malaise/fatigue  Negative for chills and fever  HENT: Negative for congestion  Cardiovascular: Positive for dyspnea on exertion, leg swelling and orthopnea  Negative for chest pain and palpitations  Respiratory: Negative for cough, shortness of breath (no SOB at rest) and wheezing  Endocrine: Negative  Hematologic/Lymphatic: Negative  Skin: Negative  Musculoskeletal: Negative  Gastrointestinal: Negative for bloating, abdominal pain, nausea and vomiting  Genitourinary: Negative  Scrotal/penile swelling   Neurological: Negative for dizziness and light-headedness  Psychiatric/Behavioral: Negative  All other systems reviewed and are negative  Objective:   Vitals: Blood pressure 127/63, pulse 72, temperature 98 3 °F (36 8 °C), resp  rate 20, height 5' 10" (1 778 m), weight 82 1 kg (181 lb), SpO2 93 %  ,     Wt Readings from Last 3 Encounters:   02/09/23 82 1 kg (181 lb)   06/24/21 68 6 kg (151 lb 3 8 oz)        Lab Results   Component Value Date    CREATININE 0 80 02/09/2023    CREATININE 0 89 02/08/2023    CREATININE 0 83 06/28/2021         Body mass index is 25 97 kg/m²  ,     Systolic (31WUS), JHM:686 , Min:127 , DBN:970     Diastolic (91IAR), LRY:26, Min:63, Max:83          Intake/Output Summary (Last 24 hours) at 2/10/2023 0816  Last data filed at 2/10/2023 0711  Gross per 24 hour   Intake 240 ml   Output 1250 ml   Net -1010 ml     Weight (last 2 days)     Date/Time Weight    23 0802 82 1 (181)    23 0544 82 2 (181 22)     Weight: pt refused to get out of bed at 23 0544    23 17:03:52 83 4 (183 86)    23 1251 83 2 (183 42)            Telemetry Review: NSR with rates in the 60's-70's        Physical Exam  Vitals reviewed  Constitutional:       General: He is not in acute distress  HENT:      Head: Normocephalic and atraumatic  Mouth/Throat:      Mouth: Mucous membranes are moist    Cardiovascular:      Rate and Rhythm: Normal rate and regular rhythm  Heart sounds: Normal heart sounds, S1 normal and S2 normal  No murmur heard  Comments: + JVD  Pulmonary:      Effort: Pulmonary effort is normal  No respiratory distress  Breath sounds: Examination of the right-lower field reveals rales  Examination of the left-lower field reveals rales  Rales present  Abdominal:      General: Bowel sounds are normal  There is no distension  Palpations: Abdomen is soft  Genitourinary:     Comments: Penile/scrotal edema  Musculoskeletal:         General: Normal range of motion  Cervical back: Normal range of motion and neck supple  Right lower le+ Pitting Edema present  Left lower le+ Pitting Edema present  Skin:     General: Skin is warm and dry  Neurological:      Mental Status: He is alert and oriented to person, place, and time     Psychiatric:         Mood and Affect: Mood normal          LABORATORY RESULTS  Results from last 7 days   Lab Units 23  1349   CK TOTAL U/L 61     CBC with diff:   Results from last 7 days   Lab Units 23  0458 23  1349   WBC Thousand/uL 6 00 5 52   HEMOGLOBIN g/dL 11 9* 12 6   HEMATOCRIT % 38 2 41 2   MCV fL 85 86   PLATELETS Thousands/uL 236 218   MCH pg 26 5* 26 4*   MCHC g/dL 31 2* 30 6*   RDW % 15 4* 15 7*   MPV fL 9 3 9 4   NRBC AUTO /100 WBCs 0 0       CMP:  Results from last 7 days   Lab Units 23  0458 23  1349   POTASSIUM mmol/L 3 8 3 9 CHLORIDE mmol/L 104 100   CO2 mmol/L 31 29   BUN mg/dL 18 18   CREATININE mg/dL 0 80 0 89   CALCIUM mg/dL 8 2* 8 9   AST U/L 10* 11*   ALT U/L 5* 7   ALK PHOS U/L 97 126*   EGFR ml/min/1 73sq m 86 83       BMP:  Results from last 7 days   Lab Units 02/09/23  0458 02/08/23  1349   POTASSIUM mmol/L 3 8 3 9   CHLORIDE mmol/L 104 100   CO2 mmol/L 31 29   BUN mg/dL 18 18   CREATININE mg/dL 0 80 0 89   CALCIUM mg/dL 8 2* 8 9       No results found for: NTBNP          Results from last 7 days   Lab Units 02/09/23  0458 02/08/23  1349   MAGNESIUM mg/dL 1 9 1 6*          Results from last 7 days   Lab Units 02/08/23  1349   HEMOGLOBIN A1C % 12 0*              Results from last 7 days   Lab Units 02/08/23  1349   INR  1 21*       Lipid Profile:   No results found for: CHOL  No results found for: HDL  No results found for: LDLCALC  No results found for: TRIG    Cardiac testing:   No results found for this or any previous visit  No results found for this or any previous visit  No results found for this or any previous visit  No valid procedures specified  No results found for this or any previous visit          Meds/Allergies   current meds:   Current Facility-Administered Medications   Medication Dose Route Frequency   • acetaminophen (TYLENOL) tablet 650 mg  650 mg Oral Q6H PRN   • atorvastatin (LIPITOR) tablet 40 mg  40 mg Oral Daily With Dinner   • calcium carbonate (TUMS) chewable tablet 1,000 mg  1,000 mg Oral Daily PRN   • cephalexin (KEFLEX) capsule 500 mg  500 mg Oral Q6H [address]   • docusate sodium (COLACE) capsule 100 mg  100 mg Oral BID   • furosemide (LASIX) injection 40 mg  40 mg Intravenous BID (diuretic)   • heparin (porcine) subcutaneous injection 5,000 Units  5,000 Units Subcutaneous Q8H [address]   • insulin glargine (LANTUS) subcutaneous injection 5 Units 0 05 mL  5 Units Subcutaneous HS   • insulin lispro (HumaLOG) 100 units/mL subcutaneous injection 1-6 Units  1-6 Units Subcutaneous TID AC   • insulin lispro (HumaLOG) 100 units/mL subcutaneous injection 1-6 Units  1-6 Units Subcutaneous HS   • metoprolol succinate (TOPROL-XL) 24 hr tablet 50 mg  50 mg Oral BID   • ondansetron (ZOFRAN) injection 4 mg  4 mg Intravenous Q6H PRN   • polyethylene glycol (MIRALAX) packet 17 g  17 g Oral Daily PRN   • potassium chloride (K-DUR,KLOR-CON) CR tablet 20 mEq  20 mEq Oral Daily   • tamsulosin (FLOMAX) capsule 0 4 mg  0 4 mg Oral Daily With Dinner     Medications Prior to Admission   Medication   • potassium chloride (K-DUR,KLOR-CON) 20 mEq tablet            Counseling / Coordination of Care  Total floor / unit time spent today 20 minutes  Greater than 50% of total time was spent with the patient and / or family counseling and / or coordination of care  ** Please Note: Dragon 360 Dictation voice to text software may have been used in the creation of this document   **

## 2023-02-10 NOTE — ASSESSMENT & PLAN NOTE
· Left foot ulcer  · Does not look overtly infected at this time but with mild purulent drainage noted between the first and second digit, patient was initiated on keflex therapy   Anticipate 5-7 days therapy  · MRSA negative  · Can likely hold off on imaging  · Should have outpatient podiatry follow up  · Wound care nurse consulted

## 2023-02-10 NOTE — DISCHARGE INSTR - AVS FIRST PAGE
Reagan Cath Care instructions---Maintain reagan catheter to straight drainage  May use leg bag and shower  May flush TID prn using Ramya syringe and 120 ml NSS  May use more saline ad shelley to prevent/treat cath obstruction  Remove catheter on 8th day rehab by 0600 hrs  Bladder scan if no void or less than 200ml in 6hrs  Straight cath for bladder scan >350ml and repeat process  If patient requires straight cath x3 , re-insert reagan and call for Urologist follow-up  Information above  Reagan can remain in place for up to 4 weeks at a time   Reagan placed at [facility] on 2/9/2023

## 2023-02-10 NOTE — PLAN OF CARE
Problem: MOBILITY - ADULT  Goal: Maintain or return to baseline ADL function  Description: INTERVENTIONS:  -  Assess patient's ability to carry out ADLs; assess patient's baseline for ADL function and identify physical deficits which impact ability to perform ADLs (bathing, care of mouth/teeth, toileting, grooming, dressing, etc )  - Assess/evaluate cause of self-care deficits   - Assess range of motion  - Assess patient's mobility; develop plan if impaired  - Assess patient's need for assistive devices and provide as appropriate  - Encourage maximum independence but intervene and supervise when necessary  - Involve family in performance of ADLs  - Assess for home care needs following discharge   - Consider OT consult to assist with ADL evaluation and planning for discharge  - Provide patient education as appropriate  Outcome: Progressing  Goal: Maintains/Returns to pre admission functional level  Description: INTERVENTIONS:  - Perform BMAT or MOVE assessment daily    - Set and communicate daily mobility goal to care team and patient/family/caregiver  - Collaborate with rehabilitation services on mobility goals if consulted  - Perform Range of Motion 3 times a day  - Reposition patient every 2 hours    - Dangle patient 3 times a day  - Stand patient 3 times a day  - Ambulate patient 3 times a day  - Out of bed to chair 3 times a day   - Out of bed for meals 3 times a day  - Out of bed for toileting  - Record patient progress and toleration of activity level   Outcome: Progressing     Problem: Prexisting or High Potential for Compromised Skin Integrity  Goal: Skin integrity is maintained or improved  Description: INTERVENTIONS:  - Identify patients at risk for skin breakdown  - Assess and monitor skin integrity  - Assess and monitor nutrition and hydration status  - Monitor labs   - Assess for incontinence   - Turn and reposition patient  - Assist with mobility/ambulation  - Relieve pressure over bony prominences  - Avoid friction and shearing  - Provide appropriate hygiene as needed including keeping skin clean and dry  - Evaluate need for skin moisturizer/barrier cream  - Collaborate with interdisciplinary team   - Patient/family teaching  - Consider wound care consult   Outcome: Progressing     Problem: PAIN - ADULT  Goal: Verbalizes/displays adequate comfort level or baseline comfort level  Description: Interventions:  - Encourage patient to monitor pain and request assistance  - Assess pain using appropriate pain scale  - Administer analgesics based on type and severity of pain and evaluate response  - Implement non-pharmacological measures as appropriate and evaluate response  - Consider cultural and social influences on pain and pain management  - Notify physician/advanced practitioner if interventions unsuccessful or patient reports new pain  Outcome: Progressing     Problem: INFECTION - ADULT  Goal: Absence or prevention of progression during hospitalization  Description: INTERVENTIONS:  - Assess and monitor for signs and symptoms of infection  - Monitor lab/diagnostic results  - Monitor all insertion sites, i e  indwelling lines, tubes, and drains  - Monitor endotracheal if appropriate and nasal secretions for changes in amount and color  - [place] appropriate cooling/warming therapies per order  - Administer medications as ordered  - Instruct and encourage patient and family to use good hand hygiene technique  - Identify and instruct in appropriate isolation precautions for identified infection/condition  Outcome: Progressing     Problem: SAFETY ADULT  Goal: Maintain or return to baseline ADL function  Description: INTERVENTIONS:  -  Assess patient's ability to carry out ADLs; assess patient's baseline for ADL function and identify physical deficits which impact ability to perform ADLs (bathing, care of mouth/teeth, toileting, grooming, dressing, etc )  - Assess/evaluate cause of self-care deficits - Assess range of motion  - Assess patient's mobility; develop plan if impaired  - Assess patient's need for assistive devices and provide as appropriate  - Encourage maximum independence but intervene and supervise when necessary  - Involve family in performance of ADLs  - Assess for home care needs following discharge   - Consider OT consult to assist with ADL evaluation and planning for discharge  - Provide patient education as appropriate  Outcome: Progressing  Goal: Maintains/Returns to pre admission functional level  Description: INTERVENTIONS:  - Perform BMAT or MOVE assessment daily    - Set and communicate daily mobility goal to care team and patient/family/caregiver  - Collaborate with rehabilitation services on mobility goals if consulted  - Perform Range of Motion 3 times a day  - Reposition patient every 2 hours    - Dangle patient 3 times a day  - Stand patient 3 times a day  - Ambulate patient 3 times a day  - Out of bed to chair 3 times a day   - Out of bed for meals 3 times a day  - Out of bed for toileting  - Record patient progress and toleration of activity level   Outcome: Progressing  Goal: Patient will remain free of falls  Description: INTERVENTIONS:  - Educate patient/family on patient safety including physical limitations  - Instruct patient to call for assistance with activity   - Consult OT/PT to assist with strengthening/mobility   - Keep Call bell within reach  - Keep bed low and locked with side rails adjusted as appropriate  - Keep care items and personal belongings within reach  - Initiate and maintain comfort rounds  - Make Fall Risk Sign visible to staff  - Offer Toileting every 2 Hours, in advance of need  - Initiate/Maintain bend and chair alarm  - Obtain necessary fall risk management equipment: bed and chair alarm  - Apply yellow socks and bracelet for high fall risk patients  - Consider moving patient to room near nurses station  Outcome: Progressing     Problem: DISCHARGE PLANNING  Goal: Discharge to home or other facility with appropriate resources  Description: INTERVENTIONS:  - Identify barriers to discharge w/patient and caregiver  - Arrange for needed discharge resources and transportation as appropriate  - Identify discharge learning needs (meds, wound care, etc )  - Arrange for interpretive services to assist at discharge as needed  - Refer to Case Management Department for coordinating discharge planning if the patient needs post-hospital services based on physician/advanced practitioner order or complex needs related to functional status, cognitive ability, or social support system  Outcome: Progressing     Problem: Knowledge Deficit  Goal: Patient/family/caregiver demonstrates understanding of disease process, treatment plan, medications, and discharge instructions  Description: Complete learning assessment and assess knowledge base    Interventions:  - Provide teaching at level of understanding  - Provide teaching via preferred learning methods  Outcome: Progressing     Problem: CARDIOVASCULAR - ADULT  Goal: Maintains optimal cardiac output and hemodynamic stability  Description: INTERVENTIONS:  - Monitor I/O, vital signs and rhythm  - Monitor for S/S and trends of decreased cardiac output  - Administer and titrate ordered vasoactive medications to optimize hemodynamic stability  - Assess quality of pulses, skin color and temperature  - Assess for signs of decreased coronary artery perfusion  - Instruct patient to report change in severity of symptoms  Outcome: Progressing  Goal: Absence of cardiac dysrhythmias or at baseline rhythm  Description: INTERVENTIONS:  - Continuous cardiac monitoring, vital signs, obtain 12 lead EKG if ordered  - Administer antiarrhythmic and heart rate control medications as ordered  - Monitor electrolytes and administer replacement therapy as ordered  Outcome: Progressing     Problem: RESPIRATORY - ADULT  Goal: Achieves optimal ventilation and oxygenation  Description: INTERVENTIONS:  - Assess for changes in respiratory status  - Assess for changes in mentation and behavior  - Position to facilitate oxygenation and minimize respiratory effort  - Oxygen administered by appropriate delivery if ordered  - Initiate smoking cessation education as indicated  - Encourage broncho-pulmonary hygiene including cough, deep breathe, Incentive Spirometry  - Assess the need for suctioning and aspirate as needed  - Assess and instruct to report SOB or any respiratory difficulty  - Respiratory Therapy support as indicated  Outcome: Progressing     Problem: SKIN/TISSUE INTEGRITY - ADULT  Goal: Skin Integrity remains intact(Skin Breakdown Prevention)  Description: Assess:  -Perform Vin assessment every shift   -Clean and moisturize skin every shift  -Inspect skin when repositioning, toileting, and assisting with ADLS  -Assess extremities for adequate circulation and sensation     Bed Management:  -Have minimal linens on bed & keep smooth, unwrinkled  -Change linens as needed when moist or perspiring  -Avoid sitting or lying in one position for more than 2 hours while in bed      Toileting:  -Offer bedside commode  -Assess for incontinence every 2 hrs  -Use incontinent care products after each incontinent episode such as blue pads and wipes      Activity:  -Mobilize patient 3 times a day  -Encourage activity and walks on unit  -Encourage or provide ROM exercises   -Turn and reposition patient every 2 Hours  -Use appropriate equipment to lift or move patient in bed  -Instruct/ Assist with weight shifting every 2 hours when out of bed in chair      Skin Care:  -Avoid use of baby powder, tape, friction and shearing, hot water or constrictive clothing    -Do not massage red bony areas      Outcome: Progressing  Goal: Incision(s), wounds(s) or drain site(s) healing without S/S of infection  Description: INTERVENTIONS  - Assess and document dressing, incision, wound bed, drain sites and surrounding tissue  - Provide patient and family education  - Perform skin care/dressing changes every shift or as directed  Outcome: Progressing  Goal: Pressure injury heals and does not worsen  Description: Interventions:  - Implement low air loss mattress or specialty surface (Criteria met)  - Apply silicone foam dressing  - Limit chair time to 2 hour intervals  - Use special pressure reducing interventions such as waffle cushion when in chair   - Apply fecal or urinary incontinence containment device   - Turn and reposition patient & offload bony prominences every 2 hours   - Utilize friction reducing device or surface for transfers   - Consider consults to  interdisciplinary teams such as PT/ OT  - Consider nutrition services referral as needed  Outcome: Progressing     Problem: MUSCULOSKELETAL - ADULT  Goal: Maintain or return mobility to safest level of function  Description: INTERVENTIONS:  - Assess patient's ability to carry out ADLs; assess patient's baseline for ADL function and identify physical deficits which impact ability to perform ADLs (bathing, care of mouth/teeth, toileting, grooming, dressing, etc )  - Assess/evaluate cause of self-care deficits   - Assess range of motion  - Assess patient's mobility  - Assess patient's need for assistive devices and provide as appropriate  - Encourage maximum independence but intervene and supervise when necessary  - Involve family in performance of ADLs  - Assess for home care needs following discharge   - Consider OT consult to assist with ADL evaluation and planning for discharge  - Provide patient education as appropriate  Outcome: Progressing  Goal: Maintain proper alignment of affected body part  Description: INTERVENTIONS:  - Support, maintain and protect limb and body alignment  - Provide patient/ family with appropriate education  Outcome: Progressing     Problem: GENITOURINARY - ADULT  Goal: Maintains or returns to baseline urinary function  Description: INTERVENTIONS:  - Assess urinary function  - Encourage oral fluids to ensure adequate hydration if ordered  - Administer IV fluids as ordered to ensure adequate hydration  - Administer ordered medications as needed  - Offer frequent toileting  - Follow urinary retention protocol if ordered  Outcome: Progressing  Goal: Absence of urinary retention  Description: INTERVENTIONS:  - Assess patient’s ability to void and empty bladder  - Monitor I/O  - Bladder scan as needed  - Discuss with physician/AP medications to alleviate retention as needed  - Discuss catheterization for long term situations as appropriate  Outcome: Progressing  Goal: Urinary catheter remains patent  Description: INTERVENTIONS:  - Assess patency of urinary catheter  - If patient has a chronic reagan, consider changing catheter if non-functioning  - Follow guidelines for intermittent irrigation of non-functioning urinary catheter  Outcome: Progressing     Problem: Nutrition/Hydration-ADULT  Goal: Nutrient/Hydration intake appropriate for improving, restoring or maintaining nutritional needs  Description: Monitor and assess patient's nutrition/hydration status for malnutrition  Collaborate with interdisciplinary team and initiate plan and interventions as ordered  Monitor patient's weight and dietary intake as ordered or per policy  Utilize nutrition screening tool and intervene as necessary  Determine patient's food preferences and provide high-protein, high-caloric foods as appropriate       INTERVENTIONS:  - Monitor oral intake, urinary output, labs, and treatment plans  - Assess nutrition and hydration status and recommend course of action  - Evaluate amount of meals eaten  - Assist patient with eating if necessary   - Allow adequate time for meals  - Recommend/ encourage appropriate diets, oral nutritional supplements, and vitamin/mineral supplements  - Order, calculate, and assess calorie counts as needed  - Assess need for intravenous fluids  - Provide specific nutrition/hydration education as appropriate  - Include patient/family/caregiver in decisions related to nutrition  Outcome: Progressing

## 2023-02-11 PROBLEM — R63.4 UNINTENTIONAL WEIGHT LOSS: Status: ACTIVE | Noted: 2023-02-11

## 2023-02-11 LAB
ANION GAP SERPL CALCULATED.3IONS-SCNC: 4 MMOL/L (ref 4–13)
BUN SERPL-MCNC: 25 MG/DL (ref 5–25)
CALCIUM SERPL-MCNC: 8.5 MG/DL (ref 8.3–10.1)
CHLORIDE SERPL-SCNC: 100 MMOL/L (ref 96–108)
CHOLEST SERPL-MCNC: 108 MG/DL
CO2 SERPL-SCNC: 32 MMOL/L (ref 21–32)
CREAT SERPL-MCNC: 0.94 MG/DL (ref 0.6–1.3)
FERRITIN SERPL-MCNC: 57 NG/ML (ref 8–388)
GFR SERPL CREATININE-BSD FRML MDRD: 78 ML/MIN/1.73SQ M
GLUCOSE SERPL-MCNC: 195 MG/DL (ref 65–140)
GLUCOSE SERPL-MCNC: 209 MG/DL (ref 65–140)
GLUCOSE SERPL-MCNC: 277 MG/DL (ref 65–140)
GLUCOSE SERPL-MCNC: 291 MG/DL (ref 65–140)
GLUCOSE SERPL-MCNC: 314 MG/DL (ref 65–140)
HDLC SERPL-MCNC: 36 MG/DL
IRON SATN MFR SERPL: 9 % (ref 20–50)
IRON SERPL-MCNC: 26 UG/DL (ref 65–175)
LDLC SERPL CALC-MCNC: 61 MG/DL (ref 0–100)
MAGNESIUM SERPL-MCNC: 1.7 MG/DL (ref 1.6–2.6)
NT-PROBNP SERPL-MCNC: ABNORMAL PG/ML
POTASSIUM SERPL-SCNC: 3.9 MMOL/L (ref 3.5–5.3)
SODIUM SERPL-SCNC: 136 MMOL/L (ref 135–147)
TIBC SERPL-MCNC: 296 UG/DL (ref 250–450)
TRIGL SERPL-MCNC: 57 MG/DL
TSH SERPL DL<=0.05 MIU/L-ACNC: 1.39 UIU/ML (ref 0.45–4.5)

## 2023-02-11 RX ORDER — FUROSEMIDE 10 MG/ML
7.5 SYRINGE (ML) INJECTION CONTINUOUS
Status: DISCONTINUED | OUTPATIENT
Start: 2023-02-11 | End: 2023-02-13

## 2023-02-11 RX ORDER — POTASSIUM CHLORIDE 20 MEQ/1
40 TABLET, EXTENDED RELEASE ORAL 2 TIMES DAILY
Status: DISCONTINUED | OUTPATIENT
Start: 2023-02-11 | End: 2023-02-12

## 2023-02-11 RX ORDER — FUROSEMIDE 10 MG/ML
10 SYRINGE (ML) INJECTION CONTINUOUS
Status: DISCONTINUED | OUTPATIENT
Start: 2023-02-11 | End: 2023-02-11

## 2023-02-11 RX ORDER — MAGNESIUM SULFATE HEPTAHYDRATE 40 MG/ML
2 INJECTION, SOLUTION INTRAVENOUS ONCE
Status: COMPLETED | OUTPATIENT
Start: 2023-02-11 | End: 2023-02-11

## 2023-02-11 RX ADMIN — INSULIN LISPRO 4 UNITS: 100 INJECTION, SOLUTION INTRAVENOUS; SUBCUTANEOUS at 17:36

## 2023-02-11 RX ADMIN — CEPHALEXIN 500 MG: 500 CAPSULE ORAL at 13:17

## 2023-02-11 RX ADMIN — INSULIN LISPRO 5 UNITS: 100 INJECTION, SOLUTION INTRAVENOUS; SUBCUTANEOUS at 21:26

## 2023-02-11 RX ADMIN — HEPARIN SODIUM 5000 UNITS: 5000 INJECTION INTRAVENOUS; SUBCUTANEOUS at 21:27

## 2023-02-11 RX ADMIN — CEPHALEXIN 500 MG: 500 CAPSULE ORAL at 05:28

## 2023-02-11 RX ADMIN — MAGNESIUM SULFATE HEPTAHYDRATE 2 G: 40 INJECTION, SOLUTION INTRAVENOUS at 15:09

## 2023-02-11 RX ADMIN — INSULIN LISPRO 4 UNITS: 100 INJECTION, SOLUTION INTRAVENOUS; SUBCUTANEOUS at 13:17

## 2023-02-11 RX ADMIN — COLLAGENASE SANTYL: 250 OINTMENT TOPICAL at 15:09

## 2023-02-11 RX ADMIN — CEPHALEXIN 500 MG: 500 CAPSULE ORAL at 17:35

## 2023-02-11 RX ADMIN — HEPARIN SODIUM 5000 UNITS: 5000 INJECTION INTRAVENOUS; SUBCUTANEOUS at 05:28

## 2023-02-11 RX ADMIN — HEPARIN SODIUM 5000 UNITS: 5000 INJECTION INTRAVENOUS; SUBCUTANEOUS at 13:17

## 2023-02-11 RX ADMIN — TAMSULOSIN HYDROCHLORIDE 0.4 MG: 0.4 CAPSULE ORAL at 17:35

## 2023-02-11 RX ADMIN — INSULIN LISPRO 2 UNITS: 100 INJECTION, SOLUTION INTRAVENOUS; SUBCUTANEOUS at 08:35

## 2023-02-11 RX ADMIN — CEPHALEXIN 500 MG: 500 CAPSULE ORAL at 23:04

## 2023-02-11 RX ADMIN — Medication 10 MG/HR: at 15:09

## 2023-02-11 RX ADMIN — METOPROLOL SUCCINATE 50 MG: 50 TABLET, EXTENDED RELEASE ORAL at 21:27

## 2023-02-11 RX ADMIN — DOCUSATE SODIUM 100 MG: 100 CAPSULE, LIQUID FILLED ORAL at 17:35

## 2023-02-11 RX ADMIN — INSULIN GLARGINE 5 UNITS: 100 INJECTION, SOLUTION SUBCUTANEOUS at 21:27

## 2023-02-11 RX ADMIN — CEPHALEXIN 500 MG: 500 CAPSULE ORAL at 00:26

## 2023-02-11 RX ADMIN — POTASSIUM CHLORIDE 20 MEQ: 1500 TABLET, EXTENDED RELEASE ORAL at 08:36

## 2023-02-11 RX ADMIN — BUMETANIDE 2 MG: 0.25 INJECTION INTRAMUSCULAR; INTRAVENOUS at 08:36

## 2023-02-11 RX ADMIN — DOCUSATE SODIUM 100 MG: 100 CAPSULE, LIQUID FILLED ORAL at 08:36

## 2023-02-11 RX ADMIN — METOPROLOL SUCCINATE 50 MG: 50 TABLET, EXTENDED RELEASE ORAL at 08:36

## 2023-02-11 RX ADMIN — POTASSIUM CHLORIDE 40 MEQ: 1500 TABLET, EXTENDED RELEASE ORAL at 17:35

## 2023-02-11 NOTE — ASSESSMENT & PLAN NOTE
Wt Readings from Last 3 Encounters:   02/10/23 80 9 kg (178 lb 6 4 oz)   02/09/23 82 1 kg (181 lb)   06/24/21 68 6 kg (151 lb 3 8 oz)     · Patient presented with volume overload noted with bilateral lower extreme edema, dyspnea on exertion, scrotal swelling at home  Appeared grossly volume overloaded at time of admission [facility] with CXR revealing vascular congestion for which IV diuresis was initiated  · Echocardiogram at [facility] revealing new EF 25%  · Seen by cardiology, transitioned to IV Bumex 2 mg twice daily and advised transfer here for heart failure service evaluation and further ischemic work-up as appropriate    Await formal heart failure service consultation  · Continue IV diuresis as above and as per heart failure service  · Monitor daily weights, I's/O, volume status  · Continue low-sodium, fluid restricted diet  · Continue to monitor daily BMP while on IV diuresis, replete electrolytes as needed

## 2023-02-11 NOTE — CONSULTS
Advanced Heart Failure / Pulmonary Hypertension Service Consultation    Mahin Lennon 69 y o  male  MRN: [id number]  Unit/Bed#: -01; Encounter: [id number]    Assessment:  Principal Problem:    Acute systolic congestive heart failure ([address] )  Active Problems:    Type 2 diabetes mellitus with hyperglycemia, with long-term current use of insulin ([address] )    Essential hypertension    Foot ulcer ([facility] )    Urinary retention      HPI:   Mahin Lennon is a 80-year-old man with type 2 diabetes mellitus and history of tobacco abuse who presented to [place] [facility] on 02/08/2023 with worsening bilateral LE swelling, scrotal swelling with urinary retention, abdominal bloating, SIMON, and orthopnea for 2 to 3 weeks  Upon arrival to ED, patient noted to be hypoxic with elevated JVD, decreased breath sounds, and significant lower extremity and scrotal swelling  Received 1 L of IV fluids in ED; unclear why  CXR with mild pulmonary vascular ingestions and small bilateral pleural effusions; BNP of 3345  Mensah catheter was placed due to urinary retention, and patient was started on oral antibiotics due to concern for acute toe/skin infection  Cardiology consulted for HF exacerbation and patient was started on IV Lasix as well as metoprolol succinate  TTE was ordered and revealed newly reduced LVEF of 25% with severe global hypokinesis and regional wall motion abnormalities  Was switched to IV Bumex on 02/10  Cardiology then recommended transfer to [place] [facility] for new HFrEF diagnosis as well as advanced HF evaluation, and patient was transferred on 02/11  [facility]  Patient estimates that he was last seen in their clinic about 1 year ago  During that final visit, he states he had a significant disagreement with his provider about restarting metformin against his wishes  He admits, that out of spite, he stopped taking all his medications a few months later   Patient estimates that he has been off all medications for at least 6 months  In addition to medication list from the [place] available on Care Everywhere, seems that he was taking lisinopril for renal protection and not for HTN  He is unsure why he was started on a statin by the [place] as he reports never having any issues with high cholesterol  A few weeks ago, patient first noted mild foot and ankle swelling that lasted approximately 4-5 days  He reports the swelling quickly began to progress up to his knees and then up past his waist  With this rapid onset swelling, he became weak and had difficulty caring for himself, getting out of bed, and even standing up  He also began to experience PND and orthopnea  Patient seen and examined  Reviewed hospital course and findings for him  Feeling better overall with addition of IV diuresis and Mensah catheter placement  Continues to have difficulty moving lower extremities due to rapid onset swelling  Patient lives alone with his dog; denies having good social support system  His diet primarily consists of pasta with joann sauce  Occasionally will have canned vegetables and rarely will have canned soup for meals  Reports that since having COVID last year, he has mostly lost his sense of smell and taste  However, he denies adding additional salt to foods  Enjoys drinking milk and does endorse drinking large volume of this daily  Patient reports unintentionally losing 50-60 pounds in 3 to 4 months time period (weight was approximately 130 lbs 5 to 6 months ago)  He denies ever having a colonoscopy; however, he reports having completed home stool sample testing for colon cancer recently and reports these came back negative  Reviewed patient's medical, family, and social history; EMR updated as appropriate  Denies ever having LHC, echocardiogram, or sleep study completed      Heart failure service was consulted for "acute systolic heart failure " Prior to this admission, patient was not following with outpatient cardiology  Objective: Intake/ Output: Not accurate  Weight: 178 lbs, bed scale  Telemetry: NSR, rates in 70s  Today's Plan:  • Stop IV Bumex, and start patient on Lasix drip at 10 mg/hr  Patient estimates that his dry weight is in 130-140 lbs range  • Will increase standing order for PO potassium while on Lasix drip  • Magnesium of 1 7 today; IV supplementation ordered  • Plan for LHC once closer to euvolemia  Regardless, earliest date available for non-emergent case is 02/13/2023  • NT-proBNP of 14,829 (no priors available for comparison)  Continue diuresis as above  • Iron panel today with normal ferritin but with low iron saturation  CBC from 02/09 with hemoglobin of 11 9 mg/dL  Consider IV iron replacement  • TSH within normal limits  • Will also check UDS, chronic hepatitis panel, rapid HIV, and lipid panel (previously on rosuvastatin 40 mg daily per [place] records)  • Wean oxygen as able  • Consult to nutrition services for HF and DM dietary education placed  • Defer further work-up of recent unintentional weight loss to primary team     Plan:  Newly diagnosed HFrEF; LVEF 25%; LVIDd 4 3 cm; NYHA III; ACC/AHA Stage C   Etiology: unclear  Multiple risk factors for CAD including poorly controlled DM II, history of tobacco abuse  Denies any family history of cardiac diseases and denies recent viral illness  TTE 02/09/2023: LVEF 25%  LVIDd 4 3 cm  Severe global hypokinesis with RWMA  Normal RV  Mild MR and TR  RVSP 46 mmHg  Pharmacotherapies / Neurohormonal Blockade:  --Beta Blocker: metoprolol succinate 50 mg q12 hours  --ARNi / ACEi / ARB: No, aim to add s/p LHC  --Aldosterone Antagonist: No, aim to add s/p LHC  --SGLT2 Inhibitor: No (previously on empagliflozin 25 mg daily for DM II)  --Home Diuretic: None  --Inpatient Diuretic: IV lasix 10 mg/hr with potassium 40 mEq BID  Sudden Cardiac Death Risk Reduction:  --LVEF 25%     --Plan to reassess LVEF with limited TTE after 3 months uninterrupted and optimized HF GDMT (earliest 05/2023)  Electrical Resynchronization:  --Candidacy for BiV device: narrow QRS  Advanced Therapies (if appropriate): Will continue to monitor  Concerns include HgA1c of 12 0 in 02/2023 and recent history of poor adherence to prescribed medical regimen  Unintentional weight loss  Diabetes mellitus, type II  Urinary retention: management per urology  History of tobacco abuse: quit in his 35s  Exposure to Agent Orange  Cataracts, bilateral: s/p bilateral extractions  Past Medical History:   Diagnosis Date   • 6th nerve palsy    • Bilateral cataracts     s/p extraction in 2021   • Depression    • Diabetes mellitus ([facility] )    • Diabetic retinopathy ([facility] )    • Exposure to Agent Orange     while serving in [employer]   • HFrEF (heart failure with reduced ejection fraction) ([facility] ) 02/2023   • History of tobacco abuse     quit in his 35s   • Hyperlipidemia    • Rupture of biceps tendon, traumatic 2017    right   • Unintentional weight loss 2022    lost ~60 lbs in 3-4 months       Review of Systems   Constitutional: Positive for activity change and unexpected weight change  Negative for appetite change, fatigue and fever  HENT: Negative for congestion, postnasal drip, rhinorrhea, sneezing, sore throat and trouble swallowing  Eyes: Negative  Respiratory: Positive for shortness of breath  Negative for cough and chest tightness  Cardiovascular: Positive for leg swelling  Negative for chest pain and palpitations  Gastrointestinal: Negative for abdominal distention, abdominal pain, diarrhea, nausea and vomiting  Endocrine: Negative  Genitourinary: Positive for decreased urine volume, difficulty urinating, penile swelling and scrotal swelling  Negative for dysuria and urgency  Musculoskeletal: Positive for gait problem  Skin: Negative  Allergic/Immunologic: Negative  Neurological: Positive for weakness  Negative for dizziness, tremors, syncope, light-headedness and headaches  Hematological: Negative  Psychiatric/Behavioral: Positive for sleep disturbance  Negative for agitation and confusion  The patient is not nervous/anxious  14-point ROS completed and negative except as stated above and/or in the HPI      Current Facility-Administered Medications:   •  acetaminophen (TYLENOL) tablet 650 mg, 650 mg, Oral, Q6H PRN, [place] Thuy, DO  •  calcium carbonate (TUMS) chewable tablet 1,000 mg, 1,000 mg, Oral, Daily PRN, [place] Sublimity, DO  •  cephalexin (KEFLEX) capsule 500 mg, 500 mg, Oral, Q6H [address], [place] Thuy, DO, 500 mg at 02/11/23 1317  •  collagenase (SANTYL) ointment, , Topical, Daily, Carine Gan DPM  •  docusate sodium (COLACE) capsule 100 mg, 100 mg, Oral, BID, [place] Thuy, DO, 100 mg at 02/11/23 4233  •  furosemide (LASIX) 500 mg infusion 50 mL, 10 mg/hr, Intravenous, Continuous, Kim Moreno PA-C  •  heparin (porcine) subcutaneous injection 5,000 Units, 5,000 Units, Subcutaneous, Q8H [address], [place] Thuy, DO, 5,000 Units at 02/11/23 1317  •  insulin glargine (LANTUS) subcutaneous injection 5 Units 0 05 mL, 5 Units, Subcutaneous, HS, [place] Thuy, DO  •  insulin lispro (HumaLOG) 100 units/mL subcutaneous injection 1-6 Units, 1-6 Units, Subcutaneous, TID AC, 4 Units at 02/11/23 1317 **AND** Fingerstick Glucose (POCT), , , TID AC, [place] Thuy, DO  •  insulin lispro (HumaLOG) 100 units/mL subcutaneous injection 1-6 Units, 1-6 Units, Subcutaneous, HS, [place] Thuy, DO  •  magnesium sulfate 2 g/50 mL IVPB (premix) 2 g, 2 g, Intravenous, Once, Kim Moreno PA-C  •  metoprolol succinate (TOPROL-XL) 24 hr tablet 50 mg, 50 mg, Oral, BID, [place] Sublimity, , 50 mg at 02/11/23 0836  •  ondansetron (ZOFRAN) injection 4 mg, 4 mg, Intravenous, Q6H PRN, Dana Daley, DO  •  polyethylene glycol (MIRALAX) packet 17 g, 17 g, Oral, Daily PRN, Dana Daley, DO  •  potassium chloride (K-DUR,KLOR-CON) CR tablet 40 mEq, 40 mEq, Oral, BID, Dena Grace PA-C  •  tamsulosin (FLOMAX) capsule 0 4 mg, 0 4 mg, Oral, Daily With Dinner, Sheridan Briggs, DO    Allergies   Allergen Reactions   • Metformin Diarrhea     Social History     Socioeconomic History   • Marital status:      Spouse name: Not on file   • Number of children: 2   • Years of education: Not on file   • Highest education level: Not on file   Occupational History   • Not on file   Tobacco Use   • Smoking status: Former     Packs/day: 1 00     Years: 6 00     Pack years: 6 00     Types: Cigarettes     Start date:      Quit date:      Years since quittin 1   • Smokeless tobacco: Never   Vaping Use   • Vaping Use: Never used   Substance and Sexual Activity   • Alcohol use: Not Currently     Comment: Denies history of heavy alcohol use  At most will drink 1 or 2 drinks in a year (Updated 2023)  • Drug use: Never   • Sexual activity: Not on file   Other Topics Concern   • Not on file   Social History Narrative    Served in the [employer]; completed 18-month tour in [facility]     Previously worked for [employer]  Lives alone with his dog  Has a son and a daughter who both live into [address] -does not maintain a close relationship with them  Denies having strong support system in place at home  Social Determinants of Health     Financial Resource Strain: Not on file   Food Insecurity: No Food Insecurity   • Worried About [address] [employer] in the Last Year: Never true   • Ran Out of Food in the Last Year: Never true   Transportation Needs: No Transportation Needs   • Lack of Transportation (Medical): No   • Lack of Transportation (Non-Medical):  No   Physical Activity: Not on file   Stress: Not on file   Social Connections: Not on file   Intimate Partner Violence: Not on file   Housing Stability: Low Risk    • Unable to Pay for Housing in the Last Year: No   • Number of Places Lived in the Last Year: 1   • Unstable Housing in the Last Year: No     Family History   Problem Relation Age of Onset   • No Known Problems Sister    • Other Daughter         chronic neck pain   • Other Son         chronic back pain   • Sudden death Neg Hx    • Cancer Neg Hx        Vitals:  Blood pressure 127/73, pulse 79, temperature 98 6 °F (37 °C), temperature source Oral, resp  rate 18, height 5' 10" (1 778 m), weight 80 9 kg (178 lb 6 4 oz), SpO2 97 %  Body mass index is 25 6 kg/m²  I/O last 3 completed shifts:  In: -   Out: 600 [Urine:600]    Weight (last 2 days)     Date/Time Weight    02/11/23 0600 80 9 (178 4)    02/10/23 2342 80 9 (178 4)        Wt Readings from Last 10 Encounters:   02/11/23 80 9 kg (178 lb 6 4 oz)   02/09/23 82 1 kg (181 lb)   06/24/21 68 6 kg (151 lb 3 8 oz)     Vitals:    02/11/23 0229 02/11/23 0600 02/11/23 0754 02/11/23 1100   BP: (!) 131/48  128/73 127/73   BP Location:    Left arm   Pulse: 76  75 79   Resp:   19 18   Temp:   98 5 °F (36 9 °C) 98 6 °F (37 °C)   TempSrc:   Oral Oral   SpO2: 97%  97% 97%   Weight:  80 9 kg (178 lb 6 4 oz)     Height:           Physical Exam  Vitals reviewed  Constitutional:       General: He is awake  He is not in acute distress  Appearance: Normal appearance  He is well-developed  He is ill-appearing  He is not toxic-appearing or diaphoretic  Interventions: Nasal cannula in place  HENT:      Head: Normocephalic  Nose: Nose normal       Mouth/Throat:      Mouth: Mucous membranes are moist    Eyes:      General: No scleral icterus  Conjunctiva/sclera: Conjunctivae normal    Neck:      Vascular: JVD (up to ear) present  Trachea: No tracheal deviation  Cardiovascular:      Rate and Rhythm: Normal rate and regular rhythm  No extrasystoles are present  Heart sounds: Murmur heard  Comments: 2-3+ sacral edema  Pulmonary:      Effort: Pulmonary effort is normal  No tachypnea, bradypnea or respiratory distress        Breath sounds: Normal air entry  Examination of the right-lower field reveals decreased breath sounds  Examination of the left-lower field reveals decreased breath sounds  Decreased breath sounds present  No wheezing or rhonchi  Abdominal:      General: Bowel sounds are normal  There is no distension  Palpations: Abdomen is soft  Tenderness: There is no abdominal tenderness  Genitourinary:     Comments: Mensah catheter present with clear yellow urine in catheter bag  Musculoskeletal:      Cervical back: Neck supple  Right lower leg: 3+ Pitting Edema present  Left lower leg: 3+ Pitting Edema present  Skin:     General: Skin is warm and dry  Coloration: Skin is pale  Skin is not jaundiced  Neurological:      Mental Status: He is alert and oriented to person, place, and time  Motor: Weakness present  Psychiatric:         Attention and Perception: Attention normal          Mood and Affect: Mood and affect normal          Speech: Speech normal          Behavior: Behavior normal  Behavior is cooperative  Thought Content: Thought content normal      Central Line: No   Mensah Catheter: yes (16 Fr  Coude), placed 02/08/2023      Labs & Results:  Results from last 7 days   Lab Units 02/08/23  1349   CK TOTAL U/L 61     Results from last 7 days   Lab Units 02/09/23  0458 02/08/23  1349   WBC Thousand/uL 6 00 5 52   HEMOGLOBIN g/dL 11 9* 12 6   HEMATOCRIT % 38 2 41 2   PLATELETS Thousands/uL 236 218         Results from last 7 days   Lab Units 02/11/23  0432 02/10/23  0935 02/09/23  0458 02/08/23  1349   POTASSIUM mmol/L 3 9 4 1 3 8 3 9   CHLORIDE mmol/L 100 100 104 100   CO2 mmol/L 32 32 31 29   BUN mg/dL 25 21 18 18   CREATININE mg/dL 0 94 0 88 0 80 0 89   CALCIUM mg/dL 8 5 8 6 8 2* 8 9   ALK PHOS U/L  --   --  97 126*   ALT U/L  --   --  5* 7   AST U/L  --   --  10* 11*     Results from last 7 days   Lab Units 02/08/23  1349   INR  1 21*     Rg Vega PA-C

## 2023-02-11 NOTE — H&P
[address] [facility]  H&P- Cam Annetta 1945, 69 y o  male MRN: [id number]  Unit/Bed#: MS Cuenca-01 Encounter: [id number]  Primary Care Provider: No primary care provider on file  Date and time admitted to hospital: 2/10/2023 11:38 PM    * Acute systolic congestive heart failure (HCC)  Assessment & Plan  Wt Readings from Last 3 Encounters:   02/10/23 80 9 kg (178 lb 6 4 oz)   02/09/23 82 1 kg (181 lb)   06/24/21 68 6 kg (151 lb 3 8 oz)     · Patient presented with volume overload noted with bilateral lower extreme edema, dyspnea on exertion, scrotal swelling at home  Appeared grossly volume overloaded at time of admission  miners with CXR revealing vascular congestion for which IV diuresis was initiated  · Echocardiogram at [facility] revealing new EF 25%  · Seen by cardiology, transitioned to IV Bumex 2 mg twice daily and advised transfer here for heart failure service evaluation and further ischemic work-up as appropriate  Await formal heart failure service consultation  · Continue IV diuresis as above and as per heart failure service  · Monitor daily weights, I's/O, volume status  · Continue low-sodium, fluid restricted diet  · Continue to monitor daily BMP while on IV diuresis, replete electrolytes as needed        Urinary retention  Assessment & Plan  · Found with significant urinary retention during ED evaluation at [address] [place] [address] requiring Mensah catheter placement  · Seen by urology at [address] [place] [address], suspected secondary to severe edema with component of BPH as well  · Mensah catheter and continue Flomax  Tentative void trial once scrotal edema is improved/resolved    Foot ulcer ([address] )  Assessment & Plan  · Left foot ulcer POA to  miners and persistent here  · Does not look overtly infected, however started on Keflex therapy given mild purulent drainage noted between first and second digits    Continue Keflex for anticipated 5 to 7-day therapy    Essential hypertension  Assessment & Plan  · Blood pressure acceptable, lisinopril held at [facility] and will continue to hold for now  · Continue metoprolol succinate 50 mg twice daily    Type 2 diabetes mellitus with hyperglycemia, with long-term current use of insulin [facility])  Assessment & Plan  Lab Results   Component Value Date    HGBA1C 12 0 (H) 02/08/2023       Recent Labs     02/10/23  0733 02/10/23  1122 02/10/23  1623 02/10/23  2353   POCGLU 123 182* 224* 238*       Blood Sugar Average: Last 72 hrs:  · Uncontrolled as outpatient due to lack of insulin therapy outpatient  · Was initiated on Lantus 10 units nightly initially at [facility] however decreased to 5 units due to hypoglycemia  Continue with this dosing with correctional insulin/Accu-Cheks and adjust as needed  · Continue carb controlled diet, hypoglycemia protocol        VTE Prophylaxis: Heparin  / sequential compression device   Code Status: Level 3 - DNAR and DNI  POLST: POLST form is not discussed and not completed at this time  Discussion with family: Offered however patient declines at this time    Anticipated Length of Stay:  Patient will be admitted on an Inpatient basis with an anticipated length of stay of greater than 2 midnights  Justification for Hospital Stay: Please see detailed plans noted above  Chief Complaint:     Scrotal and leg swelling, new onset systolic heart failure and cardiomyopathy found at [facility]  History of Present Illness:  Chris Muse is a 69 y o  male who presents in [facility] for further work-up/management of acute systolic congestive heart failure and newly discovered cardiomyopathy EF 25%    He was admitted initially to the [facility] 2/8/2023 with worsening scrotal swelling, lower extremity edema, and dyspnea on exertion over the span of 2 weeks, noted hypoxic quiring 2L NC and with CXR revealing vascular ingestion for which IV diuresis was initiated and Mensah catheter was placed during ED evaluation due to urinary retention  Patient was seen in conjunction by urology, who advised initiation of Flomax and continuation of Mensah catheter until scrotal edema improved, and cardiology who advised ongoing diuresis and additionally echocardiogram obtained revealing new ejection fraction 25%  Given the newly discovered cardiomyopathy he was transferred to this hospital for heart failure service evaluation as well as further ischemic work-up as appropriate  Currently, patient is lying in bed in no acute distress and comfortable appearing  Offers no acute complaints at this time aside from ongoing leg and scrotal swelling although reports these feel improved [facility]  Review of Systems:    Constitutional:  Denies fever or chills   Eyes:  Denies change in visual acuity   HENT:  Denies nasal congestion or sore throat   Respiratory:  Denies cough but reported dyspnea on exertion  Cardiovascular:  Denies chest pain but reported edema  GI:  Denies abdominal pain, nausea, vomiting, bloody stools or diarrhea   :  Denies dysuria but reported scrotal swelling  Musculoskeletal:  Denies back pain or joint pain   Integument:  Denies rash   Neurologic:  Denies headache, focal weakness or sensory changes   Endocrine:  Denies polyuria or polydipsia   Lymphatic:  Denies swollen glands   Psychiatric:  Denies depression or anxiety     Past Medical and Surgical History:   Past Medical History:   Diagnosis Date   • Diabetes mellitus ([facility] [address] )      No past surgical history on file  Meds/Allergies:  Medications Prior to Admission   Medication   • potassium chloride (K-DUR,KLOR-CON) 20 mEq tablet       Allergies:    Allergies   Allergen Reactions   • Metformin Diarrhea     History:  Marital Status:      Substance Use History:   Social History     Substance and Sexual Activity   Alcohol Use Never     Social History     Tobacco Use   Smoking Status Former   Smokeless Tobacco Never     Social History     Substance and Sexual Activity   Drug Use Never       Family History:  No family history on file  Physical Exam:     Vitals:   Blood Pressure: 120/68 (02/10/23 2342)  Pulse: 77 (02/10/23 2338)  Temperature: 97 5 °F (36 4 °C) (02/10/23 2342)  Respirations: 18 (02/10/23 2342)  Height: 5' 10" (177 8 cm) (02/10/23 2342)  Weight - Scale: 80 9 kg (178 lb 6 4 oz) (02/10/23 2342)  SpO2: 90 % (02/10/23 2338)    Constitutional:  Well developed, well nourished, no acute distress, non-toxic appearance   Eyes:  PERRL, conjunctiva normal   HENT:  Atraumatic, external ears normal, nose normal, oropharynx moist, no pharyngeal exudates  Neck- normal range of motion, no tenderness, supple, +JVD   Respiratory:  No respiratory distress, normal breath sounds, bibasilar Rales, no wheezing   Cardiovascular:  Normal rate, normal rhythm, no murmurs, no gallops, no rubs   GI:  Soft, nondistended, normal bowel sounds, nontender, no organomegaly, no mass, no rebound, no guarding   :  No costovertebral angle tenderness, prolonged edema, Mensah catheter in place draining clear yellow urine  Musculoskeletal: Bilateral lower extremity edema, no tenderness, no deformities  Back- no tenderness  Integument:  Well hydrated, no rash   Lymphatic:  No lymphadenopathy noted   Neurologic:  Alert &awake, communicative, CN 2-12 normal, normal motor function, normal sensory function, no focal deficits noted   Psychiatric:  Speech and behavior appropriate       Lab Results: I have personally reviewed pertinent reports        Results from last 7 days   Lab Units 02/09/23  0458   WBC Thousand/uL 6 00   HEMOGLOBIN g/dL 11 9*   HEMATOCRIT % 38 2   PLATELETS Thousands/uL 236   NEUTROS PCT % 71   LYMPHS PCT % 17   MONOS PCT % 9   EOS PCT % 2     Results from last 7 days   Lab Units 02/10/23  0935 02/09/23  0458   POTASSIUM mmol/L 4 1 3 8   CHLORIDE mmol/L 100 104   CO2 mmol/L 32 31   BUN mg/dL 21 18   CREATININE mg/dL 0 88 0 80   CALCIUM mg/dL 8 6 8 2*   ALK PHOS U/L  -- 97   ALT U/L  --  5*   AST U/L  --  10*     Results from last 7 days   Lab Units 02/08/23  1349   INR  1 21*       EKG: Reviewed    Imaging: I have personally reviewed pertinent reports  XR chest 1 view portable    Result Date: 2/8/2023  Narrative: CHEST INDICATION:   hypoxia  COMPARISON:  None EXAM PERFORMED/VIEWS:  XR CHEST PORTABLE FINDINGS: Cardiomediastinal silhouette is enlarged  Mild pulmonary vascular congestion and small bilateral pleural effusions  No pneumothorax  Osseous structures appear within normal limits for patient age  Impression: Mild pulmonary vascular congestion and small bilateral pleural effusions  Workstation performed: [id number]      scrotum and testicles    Result Date: 2/8/2023  Narrative: SCROTAL ULTRASOUND INDICATION:    testicular swelling x 2 weeks  COMPARISON: None TECHNIQUE:   Ultrasound the scrotal contents was performed with a high frequency linear transducer utilizing volumetric sweep imaging as well as standard still image techniques  Imaging performed in longitudinal and transverse orientation  Color and spectral Doppler evaluation also performed bilaterally  FINDINGS: TESTES: Testes are symmetric and normal in size  RIGHT testis = 3 6 x 2 6 x 2 7 cm  Volume 12 7 mL Normal contour with slightly heterogeneous echotexture  No intratesticular mass lesion or calcifications  LEFT testis = 3 8 x 2 8 x 2 5 cm  Volume 13 9 mL Normal contour with slightly heterogeneous echotexture  No intratesticular mass lesion or calcifications  Doppler flow within both testes is present and symmetric  EPIDIDYMIDES: Normal Size  Doppler ultrasound demonstrates normal blood flow  Bilateral epididymal cysts  HYDROCELE:  Complicated bilateral hydroceles  VARICOCELE:  None present  SCROTUM: Bilateral scrotal thickening  Impression:  Diffuse scrotal thickening in underlying hydroceles may indicate a cellulitis  No signs of torsion or epididymoorchitis   The study was marked in [facility] for immediate notification  Workstation performed: [id number]     [address] bedside procedure    Result Date: 2/8/2023  Narrative: 1 [id number] [id number]  2 382 [id number]    Echo complete w/ contrast if indicated    Result Date: 2/9/2023  Narrative: •  Left Ventricle: Left ventricular cavity size is moderately dilated  Wall thickness is increased  There is borderline concentric hypertrophy  The left ventricular ejection fraction is 25%  Systolic function is severely reduced  There is severe global hypokinesis with regional variation  •  Left Atrium: The atrium is mildly dilated  •  Mitral Valve: There is mild regurgitation  •  Tricuspid Valve: There is mild regurgitation  The estimated right ventricular systolic pressure is 53 80 mmHg  •  Pericardium: There is a large left pleural effusion  ** Please Note: Dragon 360 Dictation voice to text software was used in the creation of this document   **

## 2023-02-11 NOTE — ASSESSMENT & PLAN NOTE
· Found with significant urinary retention during ED evaluation at [address] requiring Mensah catheter placement  · Seen by urology at [address], suspected secondary to severe edema with component of BPH as well  · Mensah catheter and continue Flomax    Tentative void trial once scrotal edema is improved/resolved

## 2023-02-11 NOTE — ASSESSMENT & PLAN NOTE
· Left foot ulcer POA to SL miners and persistent here  · Does not look overtly infected, however started on Keflex therapy given mild purulent drainage noted between first and second digits    Continue Keflex for anticipated 5 to 7-day therapy

## 2023-02-11 NOTE — ASSESSMENT & PLAN NOTE
· Blood pressure acceptable, lisinopril held at [facility] and will continue to hold for now  · Continue metoprolol succinate 50 mg twice daily

## 2023-02-11 NOTE — ASSESSMENT & PLAN NOTE
· Left foot ulcer POA to  miners and persistent here  · Pt reports he has severe bl le neuropathy/pitting edema and uncontrolled diabetes  · States that he was trying to cut his nails fell over and had the clippers on his nail which ripped it off his toe   · Does not look overtly infected, however started on Keflex therapy given mild purulent drainage noted between first and second digits     ·  Continue Keflex for anticipated 5 to 7-day therapy  · Will have podiatry see pt will need ongoing post admission care - in [facility]

## 2023-02-11 NOTE — PROGRESS NOTES
[address] [facility]  Progress Note eJz Greer 1945, 69 y o  male MRN: [id number]  Unit/Bed#: MS 57Anaid-01 Encounter: [id number]  Primary Care Provider: No primary care provider on file  Date and time admitted to hospital: 2/10/2023 11:38 PM    * Acute systolic congestive heart failure (HCC)  Assessment & Plan  Wt Readings from Last 3 Encounters:   02/11/23 80 9 kg (178 lb 6 4 oz)   02/09/23 82 1 kg (181 lb)   06/24/21 68 6 kg (151 lb 3 8 oz)     · Patient presented with volume overload noted with bilateral lower extreme edema, dyspnea on exertion, scrotal swelling at home  Appeared grossly volume overloaded at time of admission  miners with CXR revealing vascular congestion for which IV diuresis was initiated  · Echocardiogram at [facility] revealing new EF 25%  · Seen by cardiology, transitioned to IV Bumex 2 mg twice daily and advised transfer here for heart failure service evaluation and further ischemic work-up as appropriate  · Await formal heart failure service consultation  · Pt has not been taking any medications as all diabetes uncontrolled as noted by end organ damage/severe le neuropathy and leg wounds/A1c of 12  · Continue IV diuresis as above and as per heart failure service  · Pt currently on oxgyen - feels very sob   · Monitor daily weights, I's/O, volume status  · Continue low-sodium, fluid restricted diet  · Continue to monitor daily BMP while on IV diuresis, replete electrolytes as needed        Urinary retention  Assessment & Plan  · Found with significant urinary retention during ED evaluation at [address] [place] [address] requiring Mensah catheter placement  · Seen by urology at [address] [place] [address], suspected secondary to severe edema with component of BPH as well  · Mensah catheter and continue Flomax      · Tentative void trial in ten days per ap urology     Foot ulcer ([facility] [address] )  Assessment & Plan  · Left foot ulcer POA to  miners and persistent here  · Pt reports he has severe bl le neuropathy/pitting edema and uncontrolled diabetes  · States that he was trying to cut his nails fell over and had the clippers on his nail which ripped it off his toe   · Does not look overtly infected, however started on Keflex therapy given mild purulent drainage noted between first and second digits  ·  Continue Keflex for anticipated 5 to 7-day therapy  · Will have podiatry see pt will need ongoing post admission care - in [facility] area     Essential hypertension  Assessment & Plan  · Blood pressure acceptable, lisinopril held at [facility] and will continue to hold for now  · Continue metoprolol succinate 50 mg twice daily    Type 2 diabetes mellitus with hyperglycemia, with long-term current use of insulin [facility])  Assessment & Plan  Lab Results   Component Value Date    HGBA1C 12 0 (H) 02/08/2023       Recent Labs     02/10/23  1122 02/10/23  1623 02/10/23  2353 02/11/23  0634   POCGLU 182* 224* 238* 195*       Blood Sugar Average: Last 72 hrs:  · (P) 195Uncontrolled as outpatient due to lack of insulin therapy outpatient  · Was initiated on Lantus 10 units nightly initially at [facility] however decreased to 5 units due to hypoglycemia  Continue with this dosing with correctional insulin/Accu-Cheks and adjust as needed  · Continue carb controlled diet, hypoglycemia protocol  · Pt has been provided with book to read needs ongoing education - feels it is fixed since he came to hospital         VTE Pharmacologic Prophylaxis:   High Risk (Score >/= 5) - Pharmacological DVT Prophylaxis Ordered: heparin  Sequential Compression Devices Ordered  Patient Centered Rounds: I performed bedside rounds with nursing staff today  Discussions with Specialists or Other Care Team Provider: nursing     Education and Discussions with Family / Patient: Updated  (daughter) via phone  also called pts son left message no answer but daughter updated to fullest     Time Spent for Care: 45 minutes   More than 50% of total time spent on counseling and coordination of care as described above  Current Length of Stay: 1 day(s)  Current Patient Status: Inpatient   Certification Statement: The patient will continue to require additional inpatient hospital stay due to pending ischemic bonilla and heartfailure tx   Discharge Plan: Anticipate discharge in 48-72 hrs to will need pt ot eval     Code Status: Level 3 - DNAR and DNI    Subjective:   Pt appears well upon entry to the room on oxygen 2 liters  Pt states feels very sob a lot of le swelling scrotum swollen reagan cath in place yellow urine  Pt has no sensation in lower extremities pt with dsd to left foot toe wound and right le ankle wound  Objective:     Vitals:   Temp (24hrs), Av 8 °F (36 6 °C), Min:97 5 °F (36 4 °C), Max:98 5 °F (36 9 °C)    Temp:  [97 5 °F (36 4 °C)-98 5 °F (36 9 °C)] 98 5 °F (36 9 °C)  HR:  [75-77] 75  Resp:  [18-19] 19  BP: (120-131)/(48-73) 128/73  SpO2:  [90 %-98 %] 97 %  Body mass index is 25 6 kg/m²  Input and Output Summary (last 24 hours): Intake/Output Summary (Last 24 hours) at 2023 [facility]  Last data filed at 2/10/2023 2301  Gross per 24 hour   Intake --   Output 600 ml   Net -600 ml       Physical Exam:   Physical Exam  Constitutional:       General: He is not in acute distress  Appearance: He is not ill-appearing or diaphoretic  HENT:      Head: Normocephalic and atraumatic  Mouth/Throat:      Pharynx: Oropharynx is clear  No oropharyngeal exudate  Eyes:      General:         Right eye: No discharge  Left eye: No discharge  Conjunctiva/sclera: Conjunctivae normal    Cardiovascular:      Rate and Rhythm: Normal rate  Heart sounds: No murmur heard  No friction rub  No gallop  Pulmonary:      Effort: No respiratory distress  Breath sounds: No stridor  Rales present  No wheezing or rhonchi  Comments: 2 liters nc in place   Chest:      Chest wall: No tenderness     Abdominal:      General: There is no distension  Palpations: There is no mass  Tenderness: There is no abdominal tenderness  There is no guarding or rebound  Hernia: No hernia is present  Genitourinary:     Comments: Severe scrotal edema with reagan in place yellow urine   Musculoskeletal:         General: No swelling, tenderness, deformity or signs of injury  Right lower leg: Edema present  Left lower leg: Edema present  Comments: bl pitting edema up to waist unable to fully bend bl knees or lift legs off bed especially right leg    Skin:     Coloration: Skin is pale  Skin is not jaundiced  Findings: No bruising, erythema, lesion or rash  Neurological:      Mental Status: He is alert and oriented to person, place, and time  Additional Data:     Labs:  Results from last 7 days   Lab Units 02/09/23  0458   WBC Thousand/uL 6 00   HEMOGLOBIN g/dL 11 9*   HEMATOCRIT % 38 2   PLATELETS Thousands/uL 236   NEUTROS PCT % 71   LYMPHS PCT % 17   MONOS PCT % 9   EOS PCT % 2     Results from last 7 days   Lab Units 02/11/23  0432 02/10/23  0935 02/09/23  0458   SODIUM mmol/L 136   < > 140   POTASSIUM mmol/L 3 9   < > 3 8   CHLORIDE mmol/L 100   < > 104   CO2 mmol/L 32   < > 31   BUN mg/dL 25   < > 18   CREATININE mg/dL 0 94   < > 0 80   ANION GAP mmol/L 4   < > 5   CALCIUM mg/dL 8 5   < > 8 2*   ALBUMIN g/dL  --   --  2 9*   TOTAL BILIRUBIN mg/dL  --   --  0 96   ALK PHOS U/L  --   --  97   ALT U/L  --   --  5*   AST U/L  --   --  10*   GLUCOSE RANDOM mg/dL 209*   < > 60*    < > = values in this interval not displayed       Results from last 7 days   Lab Units 02/08/23  1349   INR  1 21*     Results from last 7 days   Lab Units 02/11/23  0634 02/10/23  2353 02/10/23  1623 02/10/23  1122 02/10/23  0733 02/09/23  2213 02/09/23  1535 02/09/23  1106 02/09/23  1003 02/09/23  0841 02/09/23  0737 02/08/23  1713   POC GLUCOSE mg/dl 195* 238* 224* 182* 123 219* 164* 140 159* 68 56* 346*     Results from last 7 days Lab Units 02/08/23  1349   HEMOGLOBIN A1C % 12 0*     Results from last 7 days   Lab Units 02/09/23  0458 02/08/23  1349   LACTIC ACID mmol/L  --  1 6   PROCALCITONIN ng/ml <0 05 <0 05       Lines/Drains:  Invasive Devices     Peripheral Intravenous Line  Duration           Peripheral IV 02/08/23 Right Antecubital 2 days    Peripheral IV 02/08/23 Right;Ventral (anterior) Forearm 2 days          Drain  Duration           Urethral Catheter Coude 16 Fr  2 days              Urinary Catheter:  Goal for removal: see recently by urology will require 9 days reagan for voiding trial sp flomax intiiation            Telemetry:  Telemetry Orders (From admission, onward)             48 Hour Telemetry Monitoring  Continuous x 48 hours        References:    Telemetry Guidelines   Question:  Reason for 48 Hour Telemetry  Answer:  Acute Decompensated CHF (continuous diuretic infusion or total diuretic dose > 200 mg daily, associated electrolyte derangement, ionotropic drip, history of ventricular arrhythmia, or new EF <35%)                 Telemetry Reviewed: Normal Sinus Rhythm  Indication for Continued Telemetry Use: Acute CHF on >200 mg lasix/day or equivalent dose or with new reduced EF  HF bonilla and ischemic evaluation per cardiology              Imaging: Reviewed radiology reports from this admission including: chest xray    Recent Cultures (last 7 days):   Results from last 7 days   Lab Units 02/08/23  1617 02/08/23  1349   BLOOD CULTURE   --  No Growth at 48 hrs  No Growth at 48 hrs     URINE CULTURE  No Growth <1000 cfu/mL  --        Last 24 Hours Medication List:   Current Facility-Administered Medications   Medication Dose Route Frequency Provider Last Rate   • acetaminophen  650 mg Oral Q6H PRN Earna Shed, DO     • atorvastatin  40 mg Oral Daily With Osbaldo Molina, DO     • bumetanide  2 mg Intravenous BID Earna Shed, DO     • calcium carbonate  1,000 mg Oral Daily PRN Earna Shed, DO     • cephalexin 500 mg Oral Q6H [address] Wood [place], DO     • docusate sodium  100 mg Oral BID Wood Thuy, DO     • heparin (porcine)  5,000 Units Subcutaneous [facility] Wood [place], DO     • insulin glargine  5 Units Subcutaneous HS Wood Thuy, DO     • insulin lispro  1-6 Units Subcutaneous TID AC Wood [place], DO     • insulin lispro  1-6 Units Subcutaneous HS Wood Thuy, DO     • metoprolol succinate  50 mg Oral BID Wood Thuy, DO     • ondansetron  4 mg Intravenous Q6H PRN Wood Thuy, DO     • polyethylene glycol  17 g Oral Daily PRN Wood [place], DO     • potassium chloride  20 mEq Oral Daily Wood [place], DO     • tamsulosin  0 4 mg Oral Daily With Dinner Wood Thuy, DO          Today, Patient Was Seen By: JANIS Flower    **Please Note: This note may have been constructed using a voice recognition system  **

## 2023-02-11 NOTE — ASSESSMENT & PLAN NOTE
· Found with significant urinary retention during ED evaluation at [address] requiring Mensah catheter placement  · Seen by urology at [address], suspected secondary to severe edema with component of BPH as well  · Mensah catheter and continue Flomax      · Tentative void trial in ten days per ap urology

## 2023-02-11 NOTE — ASSESSMENT & PLAN NOTE
Lab Results   Component Value Date    HGBA1C 12 0 (H) 02/08/2023       Recent Labs     02/10/23  0733 02/10/23  1122 02/10/23  1623 02/10/23  2353   POCGLU 123 182* 224* 238*       Blood Sugar Average: Last 72 hrs:  · Uncontrolled as outpatient due to lack of insulin therapy outpatient  · Was initiated on Lantus 10 units nightly initially at [facility] however decreased to 5 units due to hypoglycemia    Continue with this dosing with correctional insulin/Accu-Cheks and adjust as needed  · Continue carb controlled diet, hypoglycemia protocol

## 2023-02-11 NOTE — ASSESSMENT & PLAN NOTE
Lab Results   Component Value Date    HGBA1C 12 0 (H) 02/08/2023       Recent Labs     02/10/23  1122 02/10/23  1623 02/10/23  2353 02/11/23  0634   POCGLU 182* 224* 238* 195*       Blood Sugar Average: Last 72 hrs:  · (P) 195Uncontrolled as outpatient due to lack of insulin therapy outpatient  · Was initiated on Lantus 10 units nightly initially at [facility] however decreased to 5 units due to hypoglycemia    Continue with this dosing with correctional insulin/Accu-Cheks and adjust as needed  · Continue carb controlled diet, hypoglycemia protocol  · Pt has been provided with book to read needs ongoing education - feels it is fixed since he came to hospital

## 2023-02-11 NOTE — CONSULTS
Podiatry - Consultation    Patient Information:   Jarod López 69 y o  male MRN: [id number]  Unit/Bed#: -01 Encounter: [id number]  PCP: No primary care provider on file  Date of Admission:  2/10/2023  Date of Consultation: 02/11/23  Requesting Physician: Chen Lizarraga DO      ASSESSMENT:    Jarod López is a 69 y o  male with:    1  Left Diabetic foot ulcer  2  Right leg blister  3  Type 2 diabetes mellitus  4  Acute systolic CHF    PLAN:    · Plan for local wound care consisting of Santyl DSD to left dorsal foot diabetic ulcer, and Allevyn foam border dressings to right leg blisters  · Wounds appear stable at this time, with no acute clinical signs of infection present  · Local wound care consisting of Santyl DSD  Wound care instructions placed  · Elevation and offloading on green foam wedges or pillows when non-ambulatory  · Rest of care per primary team   · Will discuss this plan with my attending and update as needed  Weightbearing status: Weightbearing as tolerated    SUBJECTIVE:    History of Present Illness:    Jarod López is a 69 y o  male who is originally admitted 2/10/2023 due to acute systolic CHF  Patient has a past medical history of hypokalemia, hypernatremia, type 2 diabetes, and HTN  We are consulted for wound on left foot that patient states has been present for approximately 5 days  He states that it began after he rubbed his cane on the top of his foot for several days due to it feeling itchy, and he noticed that the area began to open and turn into a wound  He states that he has neuropathy in bilateral feet, however he does not regularly see a podiatrist  He also has blisters on the side of his right leg and is unsure how or when they started  He denies fever, chills, nausea, vomiting, shortness of breath, and chest pain at this time  Review of Systems:    Constitutional: Negative  HENT: Negative  Eyes: Negative  Respiratory: Negative      Cardiovascular: Negative  Gastrointestinal: Negative  Musculoskeletal: negative    Skin: left foot wound, right leg blisters    Neurological: bilateral numbness    Psych: Negative  Past Medical and Surgical History:     Past Medical History:   Diagnosis Date   • 6th nerve palsy    • Cataract, senile    • Depression    • Diabetes mellitus ([facility] )    • Diabetic retinopathy ([facility] )    • HFrEF (heart failure with reduced ejection fraction) ([facility] ) 02/2023   • Hyperlipidemia    • Hypertension    • Rupture of biceps tendon     right   • Tobacco abuse        History reviewed  No pertinent surgical history  Meds/Allergies:    Medications Prior to Admission   Medication   • potassium chloride (K-DUR,KLOR-CON) 20 mEq tablet       Allergies   Allergen Reactions   • Metformin Diarrhea       Social History:     Marital Status:     Substance Use History:   Social History     Substance and Sexual Activity   Alcohol Use Never     Social History     Tobacco Use   Smoking Status Former   Smokeless Tobacco Never     Social History     Substance and Sexual Activity   Drug Use Never       Family History:    History reviewed  No pertinent family history  OBJECTIVE:    Vitals:   Blood Pressure: 127/73 (02/11/23 1100)  Pulse: 79 (02/11/23 1100)  Temperature: 98 6 °F (37 °C) (02/11/23 1100)  Temp Source: Oral (02/11/23 1100)  Respirations: 18 (02/11/23 1100)  Height: 5' 10" (177 8 cm) (02/10/23 2342)  Weight - Scale: 80 9 kg (178 lb 6 4 oz) (02/11/23 0600)  SpO2: 97 % (02/11/23 1100)    Physical Exam:    General Appearance: Alert, cooperative, no distress  HEENT: Head normocephalic, atraumatic, without obvious abnormality  Heart: Normal rate and rhythm  Lungs: Non-labored breathing  No respiratory distress  Abdomen: Without distension  Psychiatric: AAOx3  Lower Extremity:    Vascular:   DP: Right: 1+ Left: 1+  PT: Right: 1+ Left: 1+  CRT < 3 seconds at the digits  +1/4 edema noted at bilateral lower extremities     Pedal hair is absent  Skin temperature is cool bilaterally  Musculoskeletal:  MMT is 4/5 in all muscle compartments bilaterally  ROM at the 1st MPJ and ankle joint are decreased bilaterally  No Pain on palpation of bilateral lower extremities  No gross deformities noted  Dermatological:  Lower extremity wound(s) as noted below:    Wound #: 1  Location: Dorsal left forefoot   Length 1 0cm: Width 0 7cm: Depth 0 1cm:   Deepest Tissue Noted in Base: Subcutaneous   Probe to Bone: No  Peripheral Skin Description: Attached  Granulation: 0% Fibrotic Tissue: 100% Necrotic Tissue: 0%   Drainage Amount: minimal  Signs of Infection: No      - Blisters on lateral right leg, appear to have drained with minimal serous fluid present  Localized erythema around blisters  Roof left intact, with no open wound in underlying skin  Neurological:  Gross sensation is diminished  Light touch is absent  Protective sensation is absent  Clinical Images 02/11/23: Additional data:     Lab Results: I have personally reviewed pertinent labs including:    Results from last 7 days   Lab Units 02/09/23  0458   WBC Thousand/uL 6 00   HEMOGLOBIN g/dL 11 9*   HEMATOCRIT % 38 2   PLATELETS Thousands/uL 236   NEUTROS PCT % 71   LYMPHS PCT % 17   MONOS PCT % 9   EOS PCT % 2     Results from last 7 days   Lab Units 02/11/23  0432 02/10/23  0935 02/09/23  0458   POTASSIUM mmol/L 3 9   < > 3 8   CHLORIDE mmol/L 100   < > 104   CO2 mmol/L 32   < > 31   BUN mg/dL 25   < > 18   CREATININE mg/dL 0 94   < > 0 80   CALCIUM mg/dL 8 5   < > 8 2*   ALK PHOS U/L  --   --  97   ALT U/L  --   --  5*   AST U/L  --   --  10*    < > = values in this interval not displayed  Results from last 7 days   Lab Units 02/08/23  1349   INR  1 21*       Cultures: I have personally reviewed pertinent cultures including:    Results from last 7 days   Lab Units 02/08/23  1617 02/08/23  1349   BLOOD CULTURE   --  No Growth at 48 hrs  No Growth at 48 hrs     URINE CULTURE  No Growth <1000 cfu/mL  --            Imaging: I have personally reviewed pertinent reports in PACS  EKG, Pathology, and Other Studies: I have personally reviewed pertinent reports  Time Spent for Care: 30 minutes  More than 50% of total time spent on counseling and coordination of care as described above  ** Please Note: Portions of the record may have been created with voice recognition software  Occasional wrong word or "sound a like" substitutions may have occurred due to the inherent limitations of voice recognition software  Read the chart carefully and recognize, using context, where substitutions have occurred   **

## 2023-02-11 NOTE — ASSESSMENT & PLAN NOTE
Wt Readings from Last 3 Encounters:   02/11/23 80 9 kg (178 lb 6 4 oz)   02/09/23 82 1 kg (181 lb)   06/24/21 68 6 kg (151 lb 3 8 oz)     · Patient presented with volume overload noted with bilateral lower extreme edema, dyspnea on exertion, scrotal swelling at home  Appeared grossly volume overloaded at time of admission  miners with CXR revealing vascular congestion for which IV diuresis was initiated  · Echocardiogram at [address] [place] [address] revealing new EF 25%  · Seen by cardiology, transitioned to IV Bumex 2 mg twice daily and advised transfer here for heart failure service evaluation and further ischemic work-up as appropriate      · Await formal heart failure service consultation  · Pt has not been taking any medications as all diabetes uncontrolled as noted by end organ damage/severe le neuropathy and leg wounds/A1c of 12  · Continue IV diuresis as above and as per heart failure service  · Pt currently on oxgyen - feels very sob   · Monitor daily weights, I's/O, volume status  · Continue low-sodium, fluid restricted diet  · Continue to monitor daily BMP while on IV diuresis, replete electrolytes as needed

## 2023-02-11 NOTE — ASSESSMENT & PLAN NOTE
· Blood pressure acceptable, lisinopril held at [facility] and will continue to hold for now  · Continue metoprolol succinate 50 mg twice daily

## 2023-02-12 LAB
ANION GAP SERPL CALCULATED.3IONS-SCNC: 7 MMOL/L (ref 4–13)
BASOPHILS # BLD AUTO: 0.02 THOUSANDS/ÂΜL (ref 0–0.1)
BASOPHILS NFR BLD AUTO: 0 % (ref 0–1)
BUN SERPL-MCNC: 25 MG/DL (ref 5–25)
CALCIUM SERPL-MCNC: 8.8 MG/DL (ref 8.3–10.1)
CHLORIDE SERPL-SCNC: 97 MMOL/L (ref 96–108)
CO2 SERPL-SCNC: 33 MMOL/L (ref 21–32)
CREAT SERPL-MCNC: 0.87 MG/DL (ref 0.6–1.3)
EOSINOPHIL # BLD AUTO: 0.14 THOUSAND/ÂΜL (ref 0–0.61)
EOSINOPHIL NFR BLD AUTO: 3 % (ref 0–6)
ERYTHROCYTE [DISTWIDTH] IN BLOOD BY AUTOMATED COUNT: 15.4 % (ref 11.6–15.1)
GFR SERPL CREATININE-BSD FRML MDRD: 83 ML/MIN/1.73SQ M
GLUCOSE SERPL-MCNC: 101 MG/DL (ref 65–140)
GLUCOSE SERPL-MCNC: 106 MG/DL (ref 65–140)
GLUCOSE SERPL-MCNC: 185 MG/DL (ref 65–140)
GLUCOSE SERPL-MCNC: 268 MG/DL (ref 65–140)
GLUCOSE SERPL-MCNC: 311 MG/DL (ref 65–140)
HCT VFR BLD AUTO: 38.3 % (ref 36.5–49.3)
HGB BLD-MCNC: 12 G/DL (ref 12–17)
HIV 1+2 AB+HIV1 P24 AG SERPL QL IA: NORMAL
HIV1 P24 AG SER QL: NORMAL
IMM GRANULOCYTES # BLD AUTO: 0.02 THOUSAND/UL (ref 0–0.2)
IMM GRANULOCYTES NFR BLD AUTO: 0 % (ref 0–2)
LYMPHOCYTES # BLD AUTO: 1.07 THOUSANDS/ÂΜL (ref 0.6–4.47)
LYMPHOCYTES NFR BLD AUTO: 22 % (ref 14–44)
MAGNESIUM SERPL-MCNC: 1.9 MG/DL (ref 1.6–2.6)
MCH RBC QN AUTO: 26.2 PG (ref 26.8–34.3)
MCHC RBC AUTO-ENTMCNC: 31.3 G/DL (ref 31.4–37.4)
MCV RBC AUTO: 84 FL (ref 82–98)
MONOCYTES # BLD AUTO: 0.57 THOUSAND/ÂΜL (ref 0.17–1.22)
MONOCYTES NFR BLD AUTO: 12 % (ref 4–12)
NEUTROPHILS # BLD AUTO: 2.97 THOUSANDS/ÂΜL (ref 1.85–7.62)
NEUTS SEG NFR BLD AUTO: 63 % (ref 43–75)
NRBC BLD AUTO-RTO: 0 /100 WBCS
PLATELET # BLD AUTO: 221 THOUSANDS/UL (ref 149–390)
PMV BLD AUTO: 9.7 FL (ref 8.9–12.7)
POTASSIUM SERPL-SCNC: 3.7 MMOL/L (ref 3.5–5.3)
RBC # BLD AUTO: 4.58 MILLION/UL (ref 3.88–5.62)
SODIUM SERPL-SCNC: 137 MMOL/L (ref 135–147)
WBC # BLD AUTO: 4.79 THOUSAND/UL (ref 4.31–10.16)

## 2023-02-12 RX ORDER — POTASSIUM CHLORIDE 20 MEQ/1
40 TABLET, EXTENDED RELEASE ORAL
Status: DISCONTINUED | OUTPATIENT
Start: 2023-02-12 | End: 2023-02-14

## 2023-02-12 RX ORDER — MAGNESIUM SULFATE HEPTAHYDRATE 40 MG/ML
2 INJECTION, SOLUTION INTRAVENOUS ONCE
Status: COMPLETED | OUTPATIENT
Start: 2023-02-12 | End: 2023-02-12

## 2023-02-12 RX ORDER — LOSARTAN POTASSIUM 25 MG/1
12.5 TABLET ORAL DAILY
Status: DISCONTINUED | OUTPATIENT
Start: 2023-02-12 | End: 2023-02-13

## 2023-02-12 RX ORDER — METOPROLOL SUCCINATE 50 MG/1
50 TABLET, EXTENDED RELEASE ORAL EVERY 12 HOURS SCHEDULED
Status: DISCONTINUED | OUTPATIENT
Start: 2023-02-12 | End: 2023-02-19 | Stop reason: HOSPADM

## 2023-02-12 RX ORDER — POTASSIUM CHLORIDE 20 MEQ/1
20 TABLET, EXTENDED RELEASE ORAL ONCE
Status: COMPLETED | OUTPATIENT
Start: 2023-02-12 | End: 2023-02-12

## 2023-02-12 RX ADMIN — INSULIN GLARGINE 5 UNITS: 100 INJECTION, SOLUTION SUBCUTANEOUS at 21:00

## 2023-02-12 RX ADMIN — METOPROLOL SUCCINATE 50 MG: 50 TABLET, EXTENDED RELEASE ORAL at 08:12

## 2023-02-12 RX ADMIN — INSULIN LISPRO 1 UNITS: 100 INJECTION, SOLUTION INTRAVENOUS; SUBCUTANEOUS at 12:39

## 2023-02-12 RX ADMIN — POTASSIUM CHLORIDE 40 MEQ: 1500 TABLET, EXTENDED RELEASE ORAL at 08:12

## 2023-02-12 RX ADMIN — CEPHALEXIN 500 MG: 500 CAPSULE ORAL at 17:23

## 2023-02-12 RX ADMIN — DOCUSATE SODIUM 100 MG: 100 CAPSULE, LIQUID FILLED ORAL at 17:23

## 2023-02-12 RX ADMIN — INSULIN LISPRO 3 UNITS: 100 INJECTION, SOLUTION INTRAVENOUS; SUBCUTANEOUS at 17:24

## 2023-02-12 RX ADMIN — TAMSULOSIN HYDROCHLORIDE 0.4 MG: 0.4 CAPSULE ORAL at 17:23

## 2023-02-12 RX ADMIN — COLLAGENASE SANTYL: 250 OINTMENT TOPICAL at 08:12

## 2023-02-12 RX ADMIN — MAGNESIUM SULFATE HEPTAHYDRATE 2 G: 40 INJECTION, SOLUTION INTRAVENOUS at 10:22

## 2023-02-12 RX ADMIN — DOCUSATE SODIUM 100 MG: 100 CAPSULE, LIQUID FILLED ORAL at 08:12

## 2023-02-12 RX ADMIN — CEPHALEXIN 500 MG: 500 CAPSULE ORAL at 12:39

## 2023-02-12 RX ADMIN — METOPROLOL SUCCINATE 50 MG: 50 TABLET, EXTENDED RELEASE ORAL at 21:00

## 2023-02-12 RX ADMIN — LOSARTAN POTASSIUM 12.5 MG: 25 TABLET, FILM COATED ORAL at 17:23

## 2023-02-12 RX ADMIN — HEPARIN SODIUM 5000 UNITS: 5000 INJECTION INTRAVENOUS; SUBCUTANEOUS at 05:05

## 2023-02-12 RX ADMIN — HEPARIN SODIUM 5000 UNITS: 5000 INJECTION INTRAVENOUS; SUBCUTANEOUS at 13:18

## 2023-02-12 RX ADMIN — CEPHALEXIN 500 MG: 500 CAPSULE ORAL at 05:06

## 2023-02-12 RX ADMIN — HEPARIN SODIUM 5000 UNITS: 5000 INJECTION INTRAVENOUS; SUBCUTANEOUS at 21:00

## 2023-02-12 RX ADMIN — INSULIN LISPRO 5 UNITS: 100 INJECTION, SOLUTION INTRAVENOUS; SUBCUTANEOUS at 21:00

## 2023-02-12 RX ADMIN — POTASSIUM CHLORIDE 40 MEQ: 1500 TABLET, EXTENDED RELEASE ORAL at 17:23

## 2023-02-12 RX ADMIN — POTASSIUM CHLORIDE 20 MEQ: 1500 TABLET, EXTENDED RELEASE ORAL at 12:39

## 2023-02-12 NOTE — ASSESSMENT & PLAN NOTE
Lab Results   Component Value Date    HGBA1C 12 0 (H) 02/08/2023       Recent Labs     02/11/23  2119 02/12/23  0611 02/12/23  1156 02/12/23  1700   POCGLU 314* 106 185* 268*       Blood Sugar Average: Last 72 hrs:  (P) 157 3   Poor control secondary to lack of insulin therapy outpatient for the last two months  Repeat A1c at this time 12 0  Initiate on lantus 10 units and SSI with ADA diet --> with hypoglycemia, lantus decreased to 5 units  Should follow with endocrine on discharge

## 2023-02-12 NOTE — ASSESSMENT & PLAN NOTE
Wt Readings from Last 3 Encounters:   02/12/23 78 kg (172 lb)   02/09/23 82 1 kg (181 lb)   06/24/21 68 6 kg (151 lb 3 8 oz)     · Patient presented with volume overload noted with bilateral lower extreme edema, dyspnea on exertion, scrotal swelling at home  Appeared grossly volume overloaded at time of admission [facility] with CXR revealing vascular congestion for which IV diuresis was initiated  · E[facility]ocardiogram at [facility] revealing new EF 25%  · Seen by cardiology, transitioned to IV Bumex 2 mg twice daily and advised transfer here for heart failure service evaluation and further ischemic work-up as appropriate      · Await formal heart failure service consultation  · Pt has not been taking any medications as all diabetes uncontrolled as noted by end organ damage/severe le neuropathy and leg wounds/A1c of 12  · Continue IV diuresis as above and as per heart failure service  · Pt currently on oxgyen - feels very sob   · Monitor daily weights, I's/O, volume status  · Continue low-sodium, fluid restricted diet  · Continue to monitor daily BMP while on IV diuresis, replete electrolytes as needed

## 2023-02-12 NOTE — DISCHARGE SUMMARY
[facility] [address] [place]  Discharge- Rebecca Torres 1945, 69 y o  male MRN: [id number]  Unit/Bed#: 406-01 Encounter: [id number]  Primary Care Provider: No primary care provider on file  Date and time admitted to hospital: 2/8/2023 12:47 PM    * Acute systolic congestive heart failure ([facility] )  Assessment & Plan  · Patient noted BLE edema, SIMON, and scrotal swelling at home  · BNP elevated  · Vascular congestion on CXR  · Suspicion was for volume overload, confirmed acute CHF on ECHO with EF of 25%  · Unclear cause at this time  · Lasix switched to bumex 1 mg BID  · Daily weights and I/O  · Salt restricted diet  · Cardiology input appreciated, patient  Being discharged to [facility] for ischemic work up and HF team    Moderate protein-calorie malnutrition ([facility] )  Assessment & Plan  Malnutrition Findings:   Adult Malnutrition type: Acute illness  Adult Degree of Malnutrition: Malnutrition of moderate degree  Malnutrition Characteristics: Muscle loss, Inadequate energy, Fluid accumulation                  360 Statement: moderate PCM related to suspected prolong inadequate po intake due to increase SIMON and inability to prepare meals with muscle wasting as evidence by inability to ambulate  Treated with diet and supplements  BMI Findings: Body mass index is 25 97 kg/m²  Hydrocele, bilateral  Assessment & Plan  · Noted on US    Urinary retention  Assessment & Plan  · Patient found to have significant urinary retention in the ED  · Possibly secondary to severe edema with component of BPH as well  · Mensah placed at that time  · Initiate on flomax, goal is for void trial when scrotal edema resolved    Foot ulcer ([facility] )  Assessment & Plan  · Left foot ulcer  · Does not look overtly infected at this time but with mild purulent drainage noted between the first and second digit, patient was initiated on keflex therapy   Anticipate 5-7 days therapy  · MRSA negative  · Can likely hold off on imaging  · Should have outpatient podiatry follow up  · Wound care nurse consulted    Type 2 diabetes mellitus with hyperglycemia, with long-term current use of insulin [facility])  Assessment & Plan  Lab Results   Component Value Date    HGBA1C 12 0 (H) 02/08/2023       Recent Labs     02/11/23  2119 02/12/23  0611 02/12/23  1156 02/12/23  1700   POCGLU 314* 106 185* 268*       Blood Sugar Average: Last 72 hrs:  (P) 157 3   Poor control secondary to lack of insulin therapy outpatient for the last two months  Repeat A1c at this time 12 0  Initiate on lantus 10 units and SSI with ADA diet --> with hypoglycemia, lantus decreased to 5 units  Should follow with endocrine on discharge      Medical Problems     Resolved Problems  Date Reviewed: 2/12/2023   None       Discharging Physician / Practitioner: Duke Crisostomo PA-C  PCP: No primary care provider on file  Admission Date:   Admission Orders (From admission, onward)     Ordered        02/08/23 1603  Place in Observation  Once                      Discharge/Transfer Date: 2/10/23    Disposition:   Transfer to: [facility]  Reason for Transfer: HF team, ischemic work up  Accepting Provider at [facility]: Dr Padilla Flair Stay:  · Cardiology  · urology    Procedures Performed:   · none    Significant Findings / Test Results:     XR chest 1 view portable   ED Interpretation   Cardiomegaly, bilateral pleural effusions  No prior CXRs to compare to      Final Result      Mild pulmonary vascular congestion and small bilateral pleural effusions  Workstation performed: [id number]         US scrotum and testicles   Final Result       Diffuse scrotal thickening in underlying hydroceles may indicate a cellulitis  No signs of torsion or epididymoorchitis  The study was marked in [facility] for immediate notification        Workstation performed: [id number]               Incidental Findings:   · As above   · I reviewed the above mentioned incidental findings with the patient and/or family and they expressed understanding  Test Results Pending at Discharge (will require follow up):   · none     Outpatient Tests Requested:  · To be determined    Complications:  none    Reason for Admission: swelling    Hospital Course:   Celestnie Vick is a 69 y o  male patient who originally presented to the hospital on 2/8/2023 due to worsening scrotal swelling, lower extremity edema, and dyspnea on exertion over the span of 2 weeks, noted hypoxic quiring 2L NC and with CXR revealing vascular ingestion for which IV diuresis was initiated and Mensah catheter was placed during ED evaluation due to urinary retention  Patient was seen in conjunction by urology, who advised initiation of Flomax and continuation of Mensah catheter until scrotal edema improved, and cardiology who advised ongoing diuresis and additionally echocardiogram obtained revealing new ejection fraction 25%  Case was discussed with cardiology who determined that the patient should have ischemic work-up and availability of heart failure team which would be capable at [facility]  Discussed with Dr Di Quintanilla to accepted the patient under [facility] service  Please see above list of diagnoses and related plan for additional information  Condition at Discharge: fair    Discharge Day Visit / Exam:   Subjective:  Pt still swelling and having difficulties with ambulation  Vitals: Blood Pressure: 122/68 (02/10/23 1505)  Pulse: 75 (02/10/23 1505)  Temperature: 97 8 °F (36 6 °C) (02/10/23 1505)  Temp Source: Oral (02/10/23 0117)  Respirations: 18 (02/10/23 1505)  Height: 5' 10" (177 8 cm) (02/09/23 0802)  Weight - Scale: 82 1 kg (181 lb) (02/09/23 0802)  SpO2: 95 % (02/10/23 1505)  Exam:   Physical Exam  Vitals and nursing note reviewed  Constitutional:       General: He is not in acute distress  Appearance: He is ill-appearing  HENT:      Head: Normocephalic and atraumatic     Cardiovascular: Rate and Rhythm: Normal rate and regular rhythm  Pulses: Normal pulses  Heart sounds: Normal heart sounds  Pulmonary:      Effort: Pulmonary effort is normal       Breath sounds: Rales present  Abdominal:      General: Bowel sounds are normal       Palpations: Abdomen is soft  Musculoskeletal:      Right lower leg: Edema present  Left lower leg: Edema present  Neurological:      Mental Status: He is alert and oriented to person, place, and time  Psychiatric:         Mood and Affect: Mood normal          Behavior: Behavior normal           Discussion with Family: Patient declined call to   Discharge Statement:  I spent 20 minutes discharging the patient  This time was spent on the day of discharge  I had direct contact with the patient on the day of discharge  Greater than 50% of the total time was spent examining patient, answering all patient questions, arranging and discussing plan of care with patient as well as directly providing post-discharge instructions  Additional time then spent on discharge activities      ** Please Note: This note may have been constructed using a voice recognition system **

## 2023-02-12 NOTE — ASSESSMENT & PLAN NOTE
Lab Results   Component Value Date    HGBA1C 12 0 (H) 02/08/2023       Recent Labs     02/11/23  1646 02/11/23  2119 02/12/23  0611 02/12/23  1156   POCGLU 291* 314* 106 185*       Blood Sugar Average: Last 72 hrs:  · (P) 228Uncontrolled as outpatient due to lack of insulin therapy outpatient  · Was initiated on Lantus 10 units nightly initially at [facility] however decreased to 5 units due to hypoglycemia    Continue with this dosing with correctional insulin/Accu-Cheks and adjust as needed  · Continue carb controlled diet, hypoglycemia protocol  · Pt has been provided with book to read needs ongoing education - feels it is fixed since he came to hospital

## 2023-02-12 NOTE — ASSESSMENT & PLAN NOTE
· Patient noted BLE edema, SIMON, and scrotal swelling at home  · BNP elevated  · Vascular congestion on CXR  · Suspicion was for volume overload, confirmed acute CHF on ECHO with EF of 25%  · Unclear cause at this time  · Lasix switched to bumex 1 mg BID  · Daily weights and I/O  · Salt restricted diet  · Cardiology input appreciated, patient  Being discharged to [facility] for ischemic work up and HF team

## 2023-02-12 NOTE — ASSESSMENT & PLAN NOTE
· Found with significant urinary retention during ED evaluation at [address] requiring Mensah catheter placement  · Seen by urology at [address], suspected secondary to severe edema with component of BPH as well  · Mensah catheter and continue Flomax      · Tentative void trial in ten days per ap urology

## 2023-02-12 NOTE — PLAN OF CARE
Problem: PHYSICAL THERAPY ADULT  Goal: Performs mobility at highest level of function for planned discharge setting  See evaluation for individualized goals  Description: Treatment/Interventions: ADL retraining, Functional transfer training, LE strengthening/ROM, Therapeutic exercise, Endurance training, Patient/family training, Equipment eval/education, Bed mobility, Gait training, Compensatory technique education, Continued evaluation, Spoke to nursing, Family  Equipment Recommended: Malgorzata Mobley       See flowsheet documentation for full assessment, interventions and recommendations  Note: Prognosis: Good  Problem List: Decreased strength, Decreased endurance, Impaired balance, Decreased mobility  Assessment: Pt seen for high complexity PT evaluation  Pt with active PT eval/treat orders  Pt is a 69 y o  male who was admitted to [facility] on 2/10/2023  Pt's current dx/ problem list include: acute systolic congestive heart failure  Comorbidities affecting pt's physical performance at time of assessment are as follows: unintentional weight loss, type 2 DM, foot ulcer, urinary retention  Personal factors affecting pt at time of initial examination include: limited home support, past experience, inability to perform IADLs, inability to perform ADLs, inability to ambulate household distances  Due to acute medical issues, ongoing medical workup for primary dx; fall risk, increased reliance on more restrictive AD compared to baseline;  decreased activity tolerance compared to baseline, increased assistance needed from caregiver at current time, multiple lines, decline in overall functional mobility status; health management issues; note unstable clinical picture (high complexity)  At baseline, pt resides alone in an apartment and was independent with ADLs/ IADLs  Currently, upon initial examination, pt is requiring min Ax1 for supine to sit and min/mod A x 1 for sit to stand and min A x 1 for ambulation   PT to continue to follow and assess functional transfers and ambulation as appropriate and able  Currently, pt presents functioning below baseline and with overall mobility deficits 2* to: decreased LE strength/AROM; limited flexibility;  generalized weakness/ deconditioning; decreased endurance; decreased activity tolerance; impaired balance; gait deviations  Pt currently at increased risk for falls  At the end of evaluation, pt was left seated in bed side recliner with all needs in reach  Pt would benefit from continued skilled PT services while in hospital to address remaining limitations and maximize functional potential  The patient's AM-PAC Basic Mobility Inpatient Short Form Raw Score is 15  A Raw score of less than or equal to 16 suggests the patient may benefit from discharge to post-acute rehabilitation services  Please also refer to the recommendation of the Physical Therapist for safe discharge planning  PT Discharge Recommendation: Post acute rehabilitation services    See flowsheet documentation for full assessment

## 2023-02-12 NOTE — ASSESSMENT & PLAN NOTE
· Left foot ulcer POA to  miners and persistent here  · Pt reports he has severe bl le neuropathy/pitting edema and uncontrolled diabetes  · States that he was trying to cut his nails fell over and had the clippers on his nail which ripped it off his toe   · Does not look overtly infected, however started on Keflex therapy given mild purulent drainage noted between first and second digits     ·  Continue Keflex for anticipated 5 to 7-day therapy  · Will have podiatry see pt will need ongoing post admission care - in [facility]

## 2023-02-12 NOTE — PROGRESS NOTES
Advanced Heart Failure / Pulmonary Hypertension Service Progress Note    Nahomy Walls 69 y o  male   MRN: [id number]  Unit/Bed#: -01; Encounter: [id number]    Assessment:  Principal Problem:    Acute systolic congestive heart failure (HCC)  Active Problems:    Unintentional weight loss    Type 2 diabetes mellitus with hyperglycemia, with long-term current use of insulin (HCC)    Foot ulcer ([address] )    Urinary retention      Subjective:   Nahomy Walls is a 76-year-old man with type 2 diabetes mellitus and history of tobacco abuse who presented to [facility] on 02/08/2023 with worsening bilateral LE swelling, scrotal swelling with urinary retention, abdominal bloating, SIMON, and orthopnea for 2 to 3 weeks  Upon arrival to ED, patient noted to be hypoxic with elevated JVD, decreased breath sounds, and significant lower extremity and scrotal swelling  Received 1 L of IV fluids in ED; unclear why  CXR with mild pulmonary vascular ingestions and small bilateral pleural effusions; BNP of 3345  Mensah catheter was placed due to urinary retention, and patient was started on oral antibiotics due to concern for acute toe/skin infection  Cardiology consulted for HF exacerbation and patient was started on IV Lasix as well as metoprolol succinate  TTE was ordered and revealed newly reduced LVEF of 25% with severe global hypokinesis and regional wall motion abnormalities  Was switched to IV Bumex on 02/10  Cardiology then recommended transfer to [facility] for new HFrEF diagnosis as well as advanced HF evaluation, and patient was transferred on 02/11  Patient seen and examined  No significant events overnight  No current complaints  Feels swelling has improved some and endorses increased urine output  Sleep well overnight; denies PND  Objective: Intake/ Output: 720 mL / 4550 mL  Weight: 172 lbs, bed (178 lbs on 02/11, bed)  Telemetry: NSR, rates in 60-70s      Today's Plan:  • Lasix drip decreased to 5 mg/hr yesterday afternoon due to robust response  Will increase rate to 7 5 mg/hr today  Patient estimates that his dry weight is in 130-140 lbs range  • Start low dose losartan  • Potassium 3 7 this AM; PO replacement ordered  Increased standing order given increase in diuretics  • Magnesium of 1 9 today; IV supplementation ordered  • Plan for LHC once closer to euvolemia  • Iron panel with normal ferritin but with low iron saturation  CBC from 02/09 with hemoglobin of 11 9 mg/dL  Consider IV iron replacement  • Lipid panel unremarkable  Hold statin (can resume if LHC with significant findings)  • Wean oxygen as able  • Consult to nutrition services for HF and DM dietary education placed  Plan:  Newly diagnosed HFrEF; LVEF 25%; LVIDd 4 3 cm; NYHA III; ACC/AHA Stage C              Etiology: unclear  Multiple risk factors for CAD including poorly controlled DM II, history of tobacco abuse  Denies any family history of cardiac diseases and denies recent viral illness  UDS pending  Negative HIV  TTE 02/09/2023: LVEF 25%  LVIDd 4 3 cm  Severe global hypokinesis with RWMA  Normal RV  Mild MR and TR  RVSP 46 mmHg      Pharmacotherapies / Neurohormonal Blockade:  --Beta Blocker: metoprolol succinate 50 mg q12 hours  --ARNi / ACEi / ARB: losartan 12 5 mg daily  --Aldosterone Antagonist: No, aim to add s/p LHC  --SGLT2 Inhibitor: No (previously on empagliflozin 25 mg daily for DM II)  --Home Diuretic: None  --Inpatient Diuretic: IV Lasix 7 5 mg/hr with potassium 40 mEq TID      Sudden Cardiac Death Risk Reduction:  --LVEF 25%  --Plan to reassess LVEF with limited TTE after 3 months uninterrupted and optimized HF GDMT (earliest 05/2023)     Electrical Resynchronization:  --Candidacy for BiV device: narrow QRS     Advanced Therapies (if appropriate): Will continue to monitor   Concerns include HgA1c of 12 0 in 02/2023 and recent history of poor adherence to prescribed medical regimen      Unintentional weight loss  Foot ulcer, left: management per primary team    Diabetes mellitus, type II  Urinary retention: management per urology  History of tobacco abuse: quit in his 35s  Exposure to Agent Orange  Cataracts, bilateral: s/p bilateral extractions  Vitals:   Blood pressure 130/67, pulse 69, temperature 97 8 °F (36 6 °C), resp  rate 18, height 5' 10" (1 778 m), weight 78 kg (172 lb), SpO2 94 %  Body mass index is 24 68 kg/m²  I/O last 3 completed shifts: In: 720 [P O :720]  Out: 5150 [Urine:5150]    Weight (last 2 days)     Date/Time Weight    02/12/23 0531 78 (172)    02/11/23 0600 80 9 (178 4)    02/10/23 2342 80 9 (178 4)        Wt Readings from Last 10 Encounters:   02/12/23 78 kg (172 lb)   02/09/23 82 1 kg (181 lb)   06/24/21 68 6 kg (151 lb 3 8 oz)     Vitals:    02/12/23 0245 02/12/23 0531 02/12/23 0734 02/12/23 1155   BP: 109/51  113/61 130/67   BP Location:   Left arm    Pulse: 63  64 69   Resp:   18 18   Temp:   98 4 °F (36 9 °C) 97 8 °F (36 6 °C)   TempSrc:   Oral    SpO2: 98%  97% 94%   Weight:  78 kg (172 lb)     Height:           Physical Exam  Vitals reviewed  Constitutional:       General: He is awake  He is not in acute distress  Appearance: Normal appearance  He is well-developed  He is cachectic  He is ill-appearing  He is not toxic-appearing or diaphoretic  Interventions: Nasal cannula in place  HENT:      Head: Normocephalic  Nose: Nose normal       Mouth/Throat:      Mouth: Mucous membranes are moist    Eyes:      General: No scleral icterus  Conjunctiva/sclera: Conjunctivae normal    Neck:      Vascular: JVD (up to ear) present  Trachea: No tracheal deviation  Cardiovascular:      Rate and Rhythm: Normal rate and regular rhythm  No extrasystoles are present  Heart sounds: No murmur heard  Pulmonary:      Effort: Pulmonary effort is normal  No tachypnea, bradypnea or respiratory distress        Breath sounds: No decreased air movement  Examination of the right-lower field reveals decreased breath sounds  Examination of the left-lower field reveals decreased breath sounds  Decreased breath sounds present  No wheezing  Abdominal:      General: Bowel sounds are normal       Tenderness: There is no abdominal tenderness  Genitourinary:     Comments: Mensah catheter present with clear yellow urine in catheter bag  Musculoskeletal:      Cervical back: Neck supple  Right lower leg: 3+ Pitting Edema present  Left lower leg: 3+ Pitting Edema present  Skin:     General: Skin is warm and dry  Coloration: Skin is pale  Skin is not jaundiced  Neurological:      General: No focal deficit present  Mental Status: He is alert and oriented to person, place, and time  Psychiatric:         Attention and Perception: Attention normal          Mood and Affect: Mood and affect normal          Speech: Speech normal          Behavior: Behavior normal  Behavior is cooperative  Thought Content: Thought content normal      Central Line: No   Mensah Catheter: Yes (16 Fr  Coude), placed 02/08/2023        Current Facility-Administered Medications:   •  acetaminophen (TYLENOL) tablet 650 mg, 650 mg, Oral, Q6H PRN, Pamela Cruz, DO  •  calcium carbonate (TUMS) chewable tablet 1,000 mg, 1,000 mg, Oral, Daily PRN, Pamela Cruz DO  •  cephalexin (KEFLEX) capsule 500 mg, 500 mg, Oral, Q6H [address], Pamela Cruz DO, 500 mg at 02/12/23 1239  •  collagenase (SANTYL) ointment, , Topical, Daily, Hildechelsey Arredondo, DPM, Given at 02/12/23 0512  •  docusate sodium (COLACE) capsule 100 mg, 100 mg, Oral, BID, Pamela Cruz DO, 100 mg at 02/12/23 9442  •  furosemide (LASIX) 500 mg infusion 50 mL, 7 5 mg/hr, Intravenous, Continuous, Marco Keen PA-C, Last Rate: 0 8 mL/hr at 02/12/23 1239, 7 5 mg/hr at 02/12/23 1239  •  heparin (porcine) subcutaneous injection 5,000 Units, 5,000 Units, Subcutaneous, Q8H [address], Kenton Mcnamara Anthony, DO, 5,000 Units at 02/12/23 0505  •  insulin glargine (LANTUS) subcutaneous injection 5 Units 0 05 mL, 5 Units, Subcutaneous, HS, Evlyn Salle, DO, 5 Units at 02/11/23 2127  •  insulin lispro (HumaLOG) 100 units/mL subcutaneous injection 1-6 Units, 1-6 Units, Subcutaneous, TID AC, 1 Units at 02/12/23 1239 **AND** Fingerstick Glucose (POCT), , , TID AC, Evlyn Salle, DO  •  insulin lispro (HumaLOG) 100 units/mL subcutaneous injection 1-6 Units, 1-6 Units, Subcutaneous, HS, Evlyn Salle, DO, 5 Units at 02/11/23 2126  •  losartan (COZAAR) tablet 12 5 mg, 12 5 mg, Oral, Daily, Mila Reyes PA-C  •  metoprolol succinate (TOPROL-XL) 24 hr tablet 50 mg, 50 mg, Oral, Q12H [facility], Mila Reyes PA-C  •  ondansetron (ZOFRAN) injection 4 mg, 4 mg, Intravenous, Q6H PRN, Evlyn Salle, DO  •  polyethylene glycol (MIRALAX) packet 17 g, 17 g, Oral, Daily PRN, Evlyn Salle, DO  •  potassium chloride (K-DUR,KLOR-CON) CR tablet 40 mEq, 40 mEq, Oral, TID With Meals, Mila Reyes PA-C  •  tamsulosin (FLOMAX) capsule 0 4 mg, 0 4 mg, Oral, Daily With Dinner, Beverly Regaladole, DO, 0 4 mg at 02/11/23 1735    Labs & Results:  Results from last 7 days   Lab Units 02/08/23  1349   CK TOTAL U/L 61     Results from last 7 days   Lab Units 02/12/23  0500 02/09/23  0458 02/08/23  1349   WBC Thousand/uL 4 79 6 00 5 52   HEMOGLOBIN g/dL 12 0 11 9* 12 6   HEMATOCRIT % 38 3 38 2 41 2   PLATELETS Thousands/uL 221 236 218         Results from last 7 days   Lab Units 02/12/23  0500 02/11/23  0432 02/10/23  0935 02/09/23  0458 02/08/23  1349   POTASSIUM mmol/L 3 7 3 9 4 1 3 8 3 9   CHLORIDE mmol/L 97 100 100 104 100   CO2 mmol/L 33* 32 32 31 29   BUN mg/dL 25 25 21 18 18   CREATININE mg/dL 0 87 0 94 0 88 0 80 0 89   CALCIUM mg/dL 8 8 8 5 8 6 8 2* 8 9   ALK PHOS U/L  --   --   --  97 126*   ALT U/L  --   --   --  5* 7   AST U/L  --   --   --  10* 11*     Results from last 7 days   Lab Units 02/08/23  1349   INR  1 21* Tara Lucero, YEN

## 2023-02-12 NOTE — PROGRESS NOTES
[address] [facility]  Progress Note Ubaldo Lopez 1945, 69 y o  male MRN: [id number]  Unit/Bed#: MS [address]-01 Encounter: [id number]  Primary Care Provider: No primary care provider on file  Date and time admitted to hospital: 2/10/2023 11:38 PM    * Acute systolic congestive heart failure ([facility] [address] )  Assessment & Plan  Wt Readings from Last 3 Encounters:   02/12/23 78 kg (172 lb)   02/09/23 82 1 kg (181 lb)   06/24/21 68 6 kg (151 lb 3 8 oz)     · Patient presented with volume overload noted with bilateral lower extreme edema, dyspnea on exertion, scrotal swelling at home  Appeared grossly volume overloaded at time of admission  miners with CXR revealing vascular congestion for which IV diuresis was initiated  · Echocardiogram at [facility] revealing new EF 25%  · Seen by cardiology, transitioned to IV Bumex 2 mg twice daily and advised transfer here for heart failure service evaluation and further ischemic work-up as appropriate  · Await formal heart failure service consultation  · Pt has not been taking any medications as all diabetes uncontrolled as noted by end organ damage/severe le neuropathy and leg wounds/A1c of 12  · Continue IV diuresis as above and as per heart failure service  · Pt currently on oxgyen - feels very sob   · Monitor daily weights, I's/O, volume status  · Continue low-sodium, fluid restricted diet  · Continue to monitor daily BMP while on IV diuresis, replete electrolytes as needed        Urinary retention  Assessment & Plan  · Found with significant urinary retention during ED evaluation at [address] [place] [address] requiring Mensah catheter placement  · Seen by urology at [address] [place] [address], suspected secondary to severe edema with component of BPH as well  · Mensah catheter and continue Flomax      · Tentative void trial in ten days per ap urology     Foot ulcer ([facility] [address] )  Assessment & Plan  · Left foot ulcer POA to  miners and persistent here  · Pt reports he has severe bl le neuropathy/pitting edema and uncontrolled diabetes  · States that he was trying to cut his nails fell over and had the clippers on his nail which ripped it off his toe   · Does not look overtly infected, however started on Keflex therapy given mild purulent drainage noted between first and second digits  ·  Continue Keflex for anticipated 5 to 7-day therapy  · Will have podiatry see pt will need ongoing post admission care - in [facility]     Type 2 diabetes mellitus with hyperglycemia, with long-term current use of insulin [facility])  Assessment & Plan  Lab Results   Component Value Date    HGBA1C 12 0 (H) 02/08/2023       Recent Labs     02/11/23  1646 02/11/23  2119 02/12/23  0611 02/12/23  1156   POCGLU 291* 314* 106 185*       Blood Sugar Average: Last 72 hrs:  · (P) 228Uncontrolled as outpatient due to lack of insulin therapy outpatient  · Was initiated on Lantus 10 units nightly initially at [facility] however decreased to 5 units due to hypoglycemia  Continue with this dosing with correctional insulin/Accu-Cheks and adjust as needed  · Continue carb controlled diet, hypoglycemia protocol  · Pt has been provided with book to read needs ongoing education - feels it is fixed since he came to hospital         VTE Pharmacologic Prophylaxis:   High Risk (Score >/= 5) - Pharmacological DVT Prophylaxis Ordered: heparin  Sequential Compression Devices Ordered  Patient Centered Rounds: I performed bedside rounds with nursing staff today  Discussions with Specialists or Other Care Team Provider: nursing     Education and Discussions with Family / Patient: patient   Time Spent for Care: 45 minutes  More than 50% of total time spent on counseling and coordination of care as described above      Current Length of Stay: 2 day(s)  Current Patient Status: Inpatient   Certification Statement: The patient will continue to require additional inpatient hospital stay due to ongoing iv diuresis   Discharge Plan: Anticipate discharge in 48-72 hrs to evaluation of diuresis     Code Status: Level 3 - DNAR and DNI    Subjective:   Pt is rather ornery this am wanting to get to the bathroom but attached to equipment  He wants to get to the bathroom to have a bm   Swelling is coming down he feels and sob improving   Ambulated to br with assist with roller walker     Objective:     Vitals:   Temp (24hrs), Av 3 °F (36 8 °C), Min:97 8 °F (36 6 °C), Max:98 9 °F (37 2 °C)    Temp:  [97 8 °F (36 6 °C)-98 9 °F (37 2 °C)] 98 9 °F (37 2 °C)  HR:  [63-86] 86  Resp:  [18-20] 20  BP: (103-130)/(51-70) 108/64  SpO2:  [93 %-98 %] 93 %  Body mass index is 24 68 kg/m²  Input and Output Summary (last 24 hours): Intake/Output Summary (Last 24 hours) at 2023 2140  Last data filed at 2023 1836  Gross per 24 hour   Intake 720 ml   Output 3525 ml   Net -2805 ml       Physical Exam:   Physical Exam  Constitutional:       General: He is not in acute distress  Appearance: He is not ill-appearing, toxic-appearing or diaphoretic  HENT:      Head: Normocephalic and atraumatic  Nose: No congestion  Mouth/Throat:      Pharynx: Oropharynx is clear  Eyes:      General:         Right eye: No discharge  Left eye: No discharge  Conjunctiva/sclera: Conjunctivae normal    Cardiovascular:      Rate and Rhythm: Normal rate  Heart sounds: No murmur heard  No friction rub  No gallop  Pulmonary:      Effort: No respiratory distress  Breath sounds: No stridor  No wheezing, rhonchi or rales  Chest:      Chest wall: No tenderness  Abdominal:      General: There is no distension  Palpations: There is no mass  Tenderness: There is no abdominal tenderness  There is no guarding or rebound  Hernia: No hernia is present  Genitourinary:     Comments: Severe scrotal edema reagan cath in place   Musculoskeletal:         General: Swelling present  No tenderness, deformity or signs of injury        Right lower leg: Edema present  Left lower leg: Edema present  Skin:     Coloration: Skin is not jaundiced or pale  Findings: No bruising, erythema, lesion or rash  Neurological:      Mental Status: He is alert and oriented to person, place, and time  Psychiatric:      Comments: Rather Jennifer Calixto today           Additional Data:     Labs:  Results from last 7 days   Lab Units 02/12/23  0500   WBC Thousand/uL 4 79   HEMOGLOBIN g/dL 12 0   HEMATOCRIT % 38 3   PLATELETS Thousands/uL 221   NEUTROS PCT % 63   LYMPHS PCT % 22   MONOS PCT % 12   EOS PCT % 3     Results from last 7 days   Lab Units 02/12/23  0500 02/10/23  0935 02/09/23  0458   SODIUM mmol/L 137   < > 140   POTASSIUM mmol/L 3 7   < > 3 8   CHLORIDE mmol/L 97   < > 104   CO2 mmol/L 33*   < > 31   BUN mg/dL 25   < > 18   CREATININE mg/dL 0 87   < > 0 80   ANION GAP mmol/L 7   < > 5   CALCIUM mg/dL 8 8   < > 8 2*   ALBUMIN g/dL  --   --  2 9*   TOTAL BILIRUBIN mg/dL  --   --  0 96   ALK PHOS U/L  --   --  97   ALT U/L  --   --  5*   AST U/L  --   --  10*   GLUCOSE RANDOM mg/dL 101   < > 60*    < > = values in this interval not displayed  Results from last 7 days   Lab Units 02/08/23  1349   INR  1 21*     Results from last 7 days   Lab Units 02/12/23  2032 02/12/23  1700 02/12/23  1156 02/12/23  0611 02/11/23  2119 02/11/23  1646 02/11/23  1206 02/11/23  0634 02/10/23  2353 02/10/23  1623 02/10/23  1122 02/10/23  0733   POC GLUCOSE mg/dl 311* 268* 185* 106 314* 291* 277* 195* 238* 224* 182* 123     Results from last 7 days   Lab Units 02/08/23  1349   HEMOGLOBIN A1C % 12 0*     Results from last 7 days   Lab Units 02/09/23  0458 02/08/23  1349   LACTIC ACID mmol/L  --  1 6   PROCALCITONIN ng/ml <0 05 <0 05       Lines/Drains:  Invasive Devices     Peripheral Intravenous Line  Duration           Peripheral IV 02/11/23 Dorsal (posterior); Right Hand 1 day          Drain  Duration           Urethral Catheter Coude 16 Fr  4 days              Urinary Catheter:  Goal for removal: per urology            Telemetry:  Telemetry Orders (From admission, onward)             48 Hour Telemetry Monitoring  Continuous x 48 hours        References:    Telemetry Guidelines   Question:  Reason for 48 Hour Telemetry  Answer:  Acute Decompensated CHF (continuous diuretic infusion or total diuretic dose > 200 mg daily, associated electrolyte derangement, ionotropic drip, history of ventricular arrhythmia, or new EF <35%)                 Telemetry Reviewed: Normal Sinus Rhythm  Indication for Continued Telemetry Use: Acute CHF on >200 mg lasix/day or equivalent dose or with new reduced EF  iv lasix gtt              Imaging: No pertinent imaging reviewed  Recent Cultures (last 7 days):   Results from last 7 days   Lab Units 02/11/23  0001 02/08/23  1617 02/08/23  1349   BLOOD CULTURE   --   --  No Growth at 72 hrs  No Growth at 72 hrs  GRAM STAIN RESULT  4+ Gram positive rods*  Rare Gram positive cocci in clusters*  --   --    URINE CULTURE   --  No Growth <1000 cfu/mL  --    WOUND CULTURE  Culture results to follow    --   --        Last 24 Hours Medication List:   Current Facility-Administered Medications   Medication Dose Route Frequency Provider Last Rate   • acetaminophen  650 mg Oral Q6H PRN Frank Rose, DO     • calcium carbonate  1,000 mg Oral Daily PRN Frank Rose, DO     • cephalexin  500 mg Oral Q6H [address] Frank Rose, DO     • collagenase   Topical Daily Carol Moses DPM     • docusate sodium  100 mg Oral BID Frank Rose, DO     • furosemide  7 5 mg/hr Intravenous Continuous Edenilson Shelton PA-C 7 5 mg/hr (02/12/23 1239)   • heparin (porcine)  5,000 Units Subcutaneous [facility] Frank Rose, DO     • insulin glargine  5 Units Subcutaneous HS Frank Rose, DO     • insulin lispro  1-6 Units Subcutaneous TID AC Frank Rose, DO     • insulin lispro  1-6 Units Subcutaneous HS Frank Rose, DO     • losartan  12 5 mg Oral Daily Saritha ANN Geena Farmer PA-C     • metoprolol succinate  50 mg Oral Q12H [address] Erwin Flynn PA-C     • ondansetron  4 mg Intravenous Q6H PRN Nicanor Hails, DO     • polyethylene glycol  17 g Oral Daily PRN Nicanor Hails, DO     • potassium chloride  40 mEq Oral TID With Meals Erwin Flynn PA-C     • tamsulosin  0 4 mg Oral Daily With [address], DO          Today, Patient Was Seen By: JANIS Brooks    **Please Note: This note may have been constructed using a voice recognition system  **

## 2023-02-12 NOTE — PHYSICAL THERAPY NOTE
Physical Therapy Evaluation     Patient's Name: Graham Monday    Admitting Diagnosis  Fluid overload [E87 70]    Problem List  Patient Active Problem List   Diagnosis    Hypokalemia    Type 2 diabetes mellitus with hyperglycemia, with long-term current use of insulin (HCC)    Hypernatremia    Abnormal EKG    Foot ulcer (HCC)    Acute systolic congestive heart failure (HCC)    Urinary retention    Hydrocele, bilateral    Moderate protein-calorie malnutrition ([facility] )    Unintentional weight loss       Past Medical History  Past Medical History:   Diagnosis Date    6th nerve palsy     Bilateral cataracts     s/p extractions in 2021    Depression     Diabetes mellitus ([facility] )     Diabetic retinopathy ([facility] )     Exposure to Agent Orange     while serving in [employer]    HFrEF (heart failure with reduced ejection fraction) ([facility] ) 02/2023    History of tobacco abuse     quit in his 30s    Rupture of biceps tendon, traumatic 2017    right    Unintentional weight loss 2022    lost ~60 lbs in 3-4 months       Past Surgical History  Past Surgical History:   Procedure Laterality Date    CATARACT EXTRACTION, BILATERAL Bilateral 2021 02/12/23 0831   PT Last Visit   PT Visit Date 02/12/23   Note Type   Note type Evaluation   Pain Assessment   Pain Assessment Tool 0-10   Pain Score No Pain   Restrictions/Precautions   Weight Bearing Precautions Per Order No   Other Precautions Fall Risk;Telemetry;O2;Multiple lines   Home Living   Type of Home Apartment  ([facility] [employer] [facility])   Home Layout One level;Performs ADLs on one level;Elevator;Ramped entrance   Bathroom Shower/Tub Tub/shower unit   Bathroom Toilet Standard   Bathroom Equipment Grab bars in shower; Shower chair   Bathroom Accessibility Accessible   Home Equipment Walker;Cane;Wheelchair-manual  (Pt reports that he is currently borrowing RW from the [employer])   Prior Function   Level of [place] Independent with ADLs; Independent with functional mobility; Independent with IADLS Lives With Alone   Receives Help From Family   IADLs Family/Friend/Other provides transportation   Falls in the last 6 months 1 to 4   Vocational Retired   General   Family/Caregiver Present No   Cognition   Arousal/Participation Alert   Attention Within functional limits   Orientation Level Oriented X4   Memory Within functional limits   Following Commands Follows all commands and directions without difficulty   RLE Assessment   RLE Assessment WNL  (4-/5 acessed w/ mobility)   LLE Assessment   LLE Assessment WNL  (4-/5 acessed w/ mobility)   Bed Mobility   Supine to Sit 4  Minimal assistance   Additional items Assist x 1; Increased time required;HOB elevated;Verbal cues; Bedrails   Additional Comments Pt OOB in bedside recliner at end of session   Transfers   Sit to Stand 3  Moderate assistance   Additional items Assist x 1; Increased time required;Verbal cues   Stand to Sit 4  Minimal assistance   Additional items Assist x 1; Increased time required;Verbal cues   Additional Comments w/ RW   Ambulation/Elevation   Gait pattern Decreased foot clearance; Wide DORA   Gait Assistance 4  Minimal assist   Additional items Assist x 1   Assistive Device Rolling walker   Distance 5'   Balance   Static Sitting Fair   Dynamic Sitting Fair -   Static Standing Poor +   Dynamic Standing Poor   Ambulatory Poor   Endurance Deficit   Endurance Deficit Yes   Activity Tolerance   Activity Tolerance Patient limited by fatigue   Nurse Made Aware RN cleared and updated   Assessment   Prognosis Good   Problem List Decreased strength;Decreased endurance; Impaired balance;Decreased mobility   Assessment Pt seen for high complexity PT evaluation  Pt with active PT eval/treat orders  Pt is a 69 y o  male who was admitted to [facility] on 2/10/2023  Pt's current dx/ problem list include: acute systolic congestive heart failure   Comorbidities affecting pt's physical performance at time of assessment are as follows: unintentional weight loss, type 2 DM, foot ulcer, urinary retention  Personal factors affecting pt at time of initial examination include: limited home support, past experience, inability to perform IADLs, inability to perform ADLs, inability to ambulate household distances  Due to acute medical issues, ongoing medical workup for primary dx; fall risk, increased reliance on more restrictive AD compared to baseline;  decreased activity tolerance compared to baseline, increased assistance needed from caregiver at current time, multiple lines, decline in overall functional mobility status; health management issues; note unstable clinical picture (high complexity)  At baseline, pt resides alone in an apartment and was independent with ADLs/ IADLs  Currently, upon initial examination, pt is requiring min Ax1 for supine to sit and min/mod A x 1 for sit to stand and min A x 1 for ambulation  PT to continue to follow and assess functional transfers and ambulation as appropriate and able  Currently, pt presents functioning below baseline and with overall mobility deficits 2* to: decreased LE strength/AROM; limited flexibility;  generalized weakness/ deconditioning; decreased endurance; decreased activity tolerance; impaired balance; gait deviations  Pt currently at increased risk for falls  At the end of evaluation, pt was left seated in bed side recliner with all needs in reach  Pt would benefit from continued skilled PT services while in hospital to address remaining limitations and maximize functional potential  The patient's AM-PAC Basic Mobility Inpatient Short Form Raw Score is 15  A Raw score of less than or equal to 16 suggests the patient may benefit from discharge to post-acute rehabilitation services  Please also refer to the recommendation of the Physical Therapist for safe discharge planning  Goals   Patient Goals To rest   STG Expiration Date 02/26/23   Short Term Goal #1 STG 1   Pt will be able to perform bed mobility with mod I in order to improve overall functional mobility and assist in safe d/c  STG 2  Pt will be able to perform functional transfer with mod I in order to improve overall functional mobility and assist in safe d/c  STG 3  Pt will be able to ambulate at least 150 feet with least restrictive device with mod I A in order to improve overall functional mobility and assist in safe d/c  STG 4  Pt will improve sitting/standing static/dynamic balance 1 grade in order to improve functional mobility and assist in safe d/c  STG 5  Pt will improve LE strength by one grade in order to improve functional mobility and assist in safe d/c    Plan   Treatment/Interventions ADL retraining;Functional transfer training;LE strengthening/ROM; Therapeutic exercise; Endurance training;Patient/family training;Equipment eval/education; Bed mobility;Gait training; Compensatory technique education;Continued evaluation;Spoke to nursing;Family   PT Frequency 3-5x/wk   Recommendation   PT Discharge Recommendation Post acute rehabilitation services   Equipment Recommended [facility] Recommended Wheeled walker   AM-PAC Basic Mobility Inpatient   Turning in Flat Bed Without Bedrails 3   Lying on Back to Sitting on Edge of Flat Bed Without Bedrails 3   Moving Bed to Chair 3   Standing Up From Chair Using Arms 3   Walk in Room 2   Climb 3-5 Stairs With Railing 1   Basic Mobility Inpatient Raw Score 15   Basic Mobility Standardized Score 36 97   Highest Level Of Mobility   JH-HLM Goal 4: Move to chair/commode   JH-HLM Achieved 4: Move to chair/commode         Homa Harper, PT

## 2023-02-13 LAB
AMPHETAMINES SERPL QL SCN: NEGATIVE
AMPHETAMINES UR QL SCN: NEGATIVE NG/ML
ANION GAP SERPL CALCULATED.3IONS-SCNC: 2 MMOL/L (ref 4–13)
BACTERIA BLD CULT: NORMAL
BACTERIA BLD CULT: NORMAL
BARBITURATES UR QL SCN: NEGATIVE NG/ML
BARBITURATES UR QL: NEGATIVE
BENZODIAZ UR QL: NEGATIVE
BENZODIAZ UR QL: NEGATIVE NG/ML
BUN SERPL-MCNC: 29 MG/DL (ref 5–25)
BZE UR QL: NEGATIVE NG/ML
CALCIUM SERPL-MCNC: 8.9 MG/DL (ref 8.3–10.1)
CANNABINOIDS UR QL SCN: NEGATIVE NG/ML
CHLORIDE SERPL-SCNC: 94 MMOL/L (ref 96–108)
CO2 SERPL-SCNC: 38 MMOL/L (ref 21–32)
COCAINE UR QL: NEGATIVE
CREAT SERPL-MCNC: 0.98 MG/DL (ref 0.6–1.3)
GFR SERPL CREATININE-BSD FRML MDRD: 74 ML/MIN/1.73SQ M
GLUCOSE SERPL-MCNC: 215 MG/DL (ref 65–140)
GLUCOSE SERPL-MCNC: 225 MG/DL (ref 65–140)
GLUCOSE SERPL-MCNC: 242 MG/DL (ref 65–140)
GLUCOSE SERPL-MCNC: 245 MG/DL (ref 65–140)
GLUCOSE SERPL-MCNC: 281 MG/DL (ref 65–140)
HBV CORE AB SER QL: NORMAL
HBV CORE IGM SER QL: NORMAL
HBV SURFACE AG SER QL: NORMAL
HCV AB SER QL: NORMAL
MAGNESIUM SERPL-MCNC: 1.9 MG/DL (ref 1.6–2.6)
METHADONE UR QL SCN: NEGATIVE NG/ML
METHADONE UR QL: NEGATIVE
OPIATES UR QL SCN: NEGATIVE
OPIATES UR QL: NEGATIVE NG/ML
OXYCODONE+OXYMORPHONE UR QL SCN: NEGATIVE
PCP UR QL: NEGATIVE
PCP UR QL: NEGATIVE NG/ML
POTASSIUM SERPL-SCNC: 4.1 MMOL/L (ref 3.5–5.3)
PROPOXYPH UR QL SCN: NEGATIVE NG/ML
SODIUM SERPL-SCNC: 134 MMOL/L (ref 135–147)
THC UR QL: NEGATIVE

## 2023-02-13 PROCEDURE — 4A023N7 MEASUREMENT OF CARDIAC SAMPLING AND PRESSURE, LEFT HEART, PERCUTANEOUS APPROACH: ICD-10-PCS | Performed by: INTERNAL MEDICINE

## 2023-02-13 RX ORDER — CEPHALEXIN 500 MG/1
500 CAPSULE ORAL EVERY 6 HOURS SCHEDULED
Status: COMPLETED | OUTPATIENT
Start: 2023-02-13 | End: 2023-02-14

## 2023-02-13 RX ADMIN — DOCUSATE SODIUM 100 MG: 100 CAPSULE, LIQUID FILLED ORAL at 09:36

## 2023-02-13 RX ADMIN — POTASSIUM CHLORIDE 40 MEQ: 1500 TABLET, EXTENDED RELEASE ORAL at 14:24

## 2023-02-13 RX ADMIN — INSULIN LISPRO 2 UNITS: 100 INJECTION, SOLUTION INTRAVENOUS; SUBCUTANEOUS at 18:16

## 2023-02-13 RX ADMIN — INSULIN LISPRO 4 UNITS: 100 INJECTION, SOLUTION INTRAVENOUS; SUBCUTANEOUS at 21:28

## 2023-02-13 RX ADMIN — CEPHALEXIN 500 MG: 500 CAPSULE ORAL at 15:40

## 2023-02-13 RX ADMIN — HEPARIN SODIUM 5000 UNITS: 5000 INJECTION INTRAVENOUS; SUBCUTANEOUS at 14:25

## 2023-02-13 RX ADMIN — CEPHALEXIN 500 MG: 500 CAPSULE ORAL at 05:50

## 2023-02-13 RX ADMIN — COLLAGENASE SANTYL 1 APPLICATION.: 250 OINTMENT TOPICAL at 09:38

## 2023-02-13 RX ADMIN — HEPARIN SODIUM 5000 UNITS: 5000 INJECTION INTRAVENOUS; SUBCUTANEOUS at 21:17

## 2023-02-13 RX ADMIN — DOCUSATE SODIUM 100 MG: 100 CAPSULE, LIQUID FILLED ORAL at 18:15

## 2023-02-13 RX ADMIN — POTASSIUM CHLORIDE 40 MEQ: 1500 TABLET, EXTENDED RELEASE ORAL at 18:15

## 2023-02-13 RX ADMIN — HEPARIN SODIUM 5000 UNITS: 5000 INJECTION INTRAVENOUS; SUBCUTANEOUS at 05:50

## 2023-02-13 RX ADMIN — INSULIN LISPRO 3 UNITS: 100 INJECTION, SOLUTION INTRAVENOUS; SUBCUTANEOUS at 14:25

## 2023-02-13 RX ADMIN — LOSARTAN POTASSIUM 12.5 MG: 25 TABLET, FILM COATED ORAL at 09:36

## 2023-02-13 RX ADMIN — TAMSULOSIN HYDROCHLORIDE 0.4 MG: 0.4 CAPSULE ORAL at 18:15

## 2023-02-13 RX ADMIN — CEPHALEXIN 500 MG: 500 CAPSULE ORAL at 00:16

## 2023-02-13 RX ADMIN — CEPHALEXIN 500 MG: 500 CAPSULE ORAL at 21:16

## 2023-02-13 RX ADMIN — INSULIN LISPRO 2 UNITS: 100 INJECTION, SOLUTION INTRAVENOUS; SUBCUTANEOUS at 09:38

## 2023-02-13 RX ADMIN — POTASSIUM CHLORIDE 40 MEQ: 1500 TABLET, EXTENDED RELEASE ORAL at 09:36

## 2023-02-13 RX ADMIN — INSULIN GLARGINE 5 UNITS: 100 INJECTION, SOLUTION SUBCUTANEOUS at 21:19

## 2023-02-13 RX ADMIN — METOPROLOL SUCCINATE 50 MG: 50 TABLET, EXTENDED RELEASE ORAL at 09:36

## 2023-02-13 RX ADMIN — METOPROLOL SUCCINATE 50 MG: 50 TABLET, EXTENDED RELEASE ORAL at 21:16

## 2023-02-13 NOTE — CASE MANAGEMENT
Case Management Assessment & Discharge Planning Note    Patient name Celestine Vick  Location [address] [id number] MRN [id number]  : 1945 Date 2023       Current Admission Date: 2/10/2023  Current Admission Diagnosis:Acute systolic congestive heart failure [facility])   Patient Active Problem List    Diagnosis Date Noted   • Unintentional weight loss 2023   • Moderate protein-calorie malnutrition ([facility] ) 02/10/2023   • Hydrocele, bilateral    • Foot ulcer ([facility] ) 9099   • Acute systolic congestive heart failure ([facility] [address] ) 2023   • Urinary retention 2023   • Hypernatremia 2021   • Abnormal EKG 2021   • Hypokalemia 2021   • Type 2 diabetes mellitus with hyperglycemia, with long-term current use of insulin ([facility] [address] ) 2021      LOS (days): 3  Geometric Mean LOS (GMLOS) (days):   Days to GMLOS:     OBJECTIVE:    Risk of Unplanned Readmission Score: 11 35         Current admission status: Inpatient       Preferred Pharmacy:   [facility] [place], [address] [facility]  [address] [place] [id number]  Phone: [phone] Fax: [phone]    [facility]  [address] [place] [address]  Phone: [phone] Fax: [phone]    Primary Care Provider: No primary care provider on file      Primary Insurance: VA COMMUNITY CARE [facility]  Secondary Insurance: MEDICARE    ASSESSMENT:  [address] Representative - Son   Primary Phone: [phone] (Mobile)               Advance Directives  Does patient have a [address]?: No  Was patient offered paperwork?: Yes (declined)  Does patient currently have a Health Care decision maker?: Yes, please see Health Care Proxy section  Does patient have Advance Directives?: No  Was patient offered paperwork?: Yes (declined)  Primary Contact: Lizzie Dumont (Daughter)   [phone] (M)         Readmission Root Cause  30 Day Readmission: No    Patient Information  Admitted from[de-identified] Facility  Mental Status: Alert  During Assessment patient was accompanied by: Not accompanied during assessment  Primary Caregiver: Self  Support Systems: Son, Daughter  [place] of Residence: [facility] do you live in?: [address] entry access options  Select all that apply : Elevator  Type of Current Residence: Other (Comment)  Floor Level: 3  Upon entering residence, is there a bedroom on the main floor (no further steps)?: Yes  Upon entering residence, is there a bathroom on the main floor (no further steps)?: Yes  In the last 12 months, was there a time when you were not able to pay the mortgage or rent on time?: No  In the last 12 months, how many places have you lived?: 1  In the last 12 months, was there a time when you did not have a steady place to sleep or slept in a shelter (including now)?: No  Homeless/housing insecurity resource given?: N/A  Living Arrangements: Lives Alone  Is patient a ?: Yes (pt reports he "was 100% service connected" but unsure if he still is)  Is patient active with [address] [place] [address])?: No (has Corona Mullins but does not use the [address] [place] [address]  Was going to [facility] in the past but not currently    Wants to use VA insurance for rehab )    Activities of Daily Living Prior to Admission  Functional Status: Independent  Completes ADLs independently?: Yes  Ambulates independently?: Yes  Does patient use assisted devices?: Yes  Assisted Devices (DME) used: Hurtis [place], Wheelchair, Straight Cane  Does patient currently own DME?: Yes  What DME does the patient currently own?: Stair Chair/Glide, Walker, Wheelchair  Does patient have a history of Outpatient Therapy (PT/OT)?: Yes  Does the patient have a history of Short-Term Rehab?: No  Does patient currently have [address]?: No         Patient Information Continued  Income Source: Pension/intermediate  Does patient have prescription coverage?: Yes  Within the past 12 months, you worried that your food would run out before you got the money to buy more : Never true  Within the past 12 months, the food you bought just didn't last and you didn't have money to get more : Never true  Food insecurity resource given?: N/A  Does patient receive dialysis treatments?: No  Does patient have a history of substance abuse?: No  Does patient have a history of Mental Health Diagnosis?: No         Means of Transportation  Means of Transport to Appts[de-identified] Family transport  In the past 12 months, has lack of transportation kept you from medical appointments or from getting medications?: No  In the past 12 months, has lack of transportation kept you from meetings, work, or from getting things needed for daily living?: No  Was application for public transport provided?: N/A        DISCHARGE DETAILS:    Discharge planning discussed with[de-identified] patient  Freedom of Choice: Yes  Comments - Freedom of Choice: Discussed STR    Pt in agreement with blanket ref made in [place] for every SNF that accepts VA insurance within 61 mile radius of [place]  CM contacted family/caregiver?: No- see comments  Were Treatment Team discharge recommendations reviewed with patient/caregiver?: Yes  Did patient/caregiver verbalize understanding of patient care needs?: Yes  Were patient/caregiver advised of the risks associated with not following Treatment Team discharge recommendations?: Yes    Contacts  Patient Contacts: Offered to call pt's daughter - he declined              Other Referral/Resources/Interventions Provided:  Interventions: Short Term Rehab         Treatment Team Recommendation: Home with [email or web address]  Discharge Destination Plan[de-identified] Home with [address] [email or web address]                                 Addendum:  Left  for VA to check about service connection and VA eligible SNF

## 2023-02-13 NOTE — PROGRESS NOTES
Podiatry - Progress Note  Patient: Cesar Sevilla 69 y o  male   MRN: [id number]  PCP: No primary care provider on file  Unit/Bed#: -01 Encounter: [id number]  Date Of Visit: 23    ASSESSMENT:    Cesar Sevilla is a 69 y o  male with:    1  Left Diabetic foot ulcer  2  Right leg blister  3  Type 2 diabetes mellitus  4  Acute systolic CHF    PLAN:    • Plan for continued  local wound care consisting of Santyl DSD to left dorsal foot diabetic ulcer, and Allevyn foam border dressings to right leg blisters  • Wounds appear stable at this time, with no acute clinical signs of infection present  • Local wound care consisting of Santyl DSD  Wound care instructions placed  • Elevation and offloading on green foam wedges or pillows when non-ambulatory  • Rest of care per primary team   • Will discuss this plan with my attending and update as needed      Weightbearing status: Weightbearing as tolerated    SUBJECTIVE:     The patient was seen, evaluated, and assessed at bedside today  The patient was awake, alert, and in no acute distress  No acute events overnight  The patient reports that he is feeling frustrated today, and is worried about his pets being cared for while he is in the hospital  He has no pedal complaints today, and states that his feet feel okay  Patient denies N/V/F/chills/SOB/CP  OBJECTIVE:     Vitals:   /63   Pulse 65   Temp 98 3 °F (36 8 °C)   Resp 17   Ht 5' 10" (1 778 m)   Wt 76 6 kg (168 lb 12 8 oz)   SpO2 99%   BMI 24 22 kg/m²     Temp (24hrs), Av 4 °F (36 9 °C), Min:97 8 °F (36 6 °C), Max:98 9 °F (37 2 °C)      Physical Exam:     Lungs: Non labored breathing  Abdomen: Soft, non-tender  Lower Extremity:  Cardiovascular status at baseline from admission  Neurological status at baseline from admission  Musculoskeletal status at baseline from admission  No calf tenderness noted       Wound #: 1  Location: Dorsal left forefoot   Length 1 0cm: Width 0 7cm: Depth 0 1cm:   Deepest Tissue Noted in Base: Subcutaneous   Probe to Bone: No  Peripheral Skin Description: Attached  Granulation: 0% Fibrotic Tissue: 100% Necrotic Tissue: 0%   Drainage Amount: minimal  Signs of Infection: No       - Blisters on lateral right leg, appear drained with minimal serous fluid present  Localized erythema around blisters  Roof left intact, with no open wound in underlying skin  Clinical Images 02/13/23: Additional Data:     Labs:    Results from last 7 days   Lab Units 02/12/23  0500   WBC Thousand/uL 4 79   HEMOGLOBIN g/dL 12 0   HEMATOCRIT % 38 3   PLATELETS Thousands/uL 221   NEUTROS PCT % 63   LYMPHS PCT % 22   MONOS PCT % 12   EOS PCT % 3     Results from last 7 days   Lab Units 02/13/23  0436 02/10/23  0935 02/09/23  0458   POTASSIUM mmol/L 4 1   < > 3 8   CHLORIDE mmol/L 94*   < > 104   CO2 mmol/L 38*   < > 31   BUN mg/dL 29*   < > 18   CREATININE mg/dL 0 98   < > 0 80   CALCIUM mg/dL 8 9   < > 8 2*   ALK PHOS U/L  --   --  97   ALT U/L  --   --  5*   AST U/L  --   --  10*    < > = values in this interval not displayed  Results from last 7 days   Lab Units 02/08/23  1349   INR  1 21*       * I Have Reviewed All Lab Data Listed Above  Recent Cultures (last 7 days):     Results from last 7 days   Lab Units 02/11/23  0001 02/08/23  1617 02/08/23  1349   BLOOD CULTURE   --   --  No Growth After 4 Days  No Growth After 4 Days  GRAM STAIN RESULT  4+ Gram positive rods*  Rare Gram positive cocci in clusters*  --   --    URINE CULTURE   --  No Growth <1000 cfu/mL  --    WOUND CULTURE  Culture results to follow  --   --            Imaging: I have personally reviewed pertinent films in PACS  EKG, Pathology, and Other Studies: I have personally reviewed pertinent reports  ** Please Note: Portions of the record may have been created with voice recognition software   Occasional wrong word or "sound a like" substitutions may have occurred due to the inherent limitations of voice recognition software  Read the chart carefully and recognize, using context, where substitutions have occurred   **

## 2023-02-13 NOTE — PLAN OF CARE
Problem: OCCUPATIONAL THERAPY ADULT  Goal: Performs self-care activities at highest level of function for planned discharge setting  See evaluation for individualized goals  Description: Treatment Interventions: ADL retraining, Functional transfer training, UE strengthening/ROM, Equipment evaluation/education, Cognitive reorientation, Patient/family training, Endurance training, Compensatory technique education, Activityengagement, Energy conservation          See flowsheet documentation for full assessment, interventions and recommendations  Note: Limitation: Decreased ADL status, Decreased UE ROM, Decreased UE strength, Decreased Safe judgement during ADL, Decreased cognition, Decreased endurance, Decreased self-care trans, Decreased high-level ADLs  Prognosis: Fair  Assessment: Pt is a 69 y o  Male who [facility] to [facility] on [facility]/2023 with worsening LE edema  Pt with diagnosis of acute systolic congestive heart failure and L diabetic foot ulcer  Pt then transferred to [facility] on 2/10/2023 for further cardiology workup  Pt WBAT on B/L LE per podiatry  Pt  has a past medical history of 6th nerve palsy, Bilateral cataracts, Depression, Diabetes mellitus ([facility] [address] ), Diabetic retinopathy ([facility] [address] ), Exposure to Agent Orange, HFrEF (heart failure with reduced ejection fraction) ([facility] [address] ) (02/2023), History of tobacco abuse, Rupture of biceps tendon, traumatic (2017), and Unintentional weight loss (2022)  Pt greeted bedside for OT evaluation on 2/13/2023  Pt lives alone in a one level apartment with ramp/elevator to enter  PTA, Pt reports being I with ADLs/requires assist with IADLs/functional mobility with RW v  PWC  Pt reports for the past two weeks prior to admission he has been having difficulty taking care of himself due to B/L LE weakness  Pt also reports he has a dog at home that he cares for   Pt demonstrating the following occupational deficits: S with UB ADLs, mod A with LB ADLs, min-mod A with bed mobility, min A with functional transfers, and min A with functional mobility with RW  Limitations that impact functional performance include decreased ADL status, decreased UE ROM, decreased UE strength, decreased safe judgement during ADLs, decreased cognition, decreased endurance, decreased self care transfers, decreased high level ADLs and pain  Occupational performance areas to address ADL retraining, functional transfer training, UE strengthening/ROM, endurance training, cognitive reorientation, Pt/caregiver education, equipment evaluation/education, compensatory technique education, energy conservation and activity engagement   Pt would benefit from continued skilled OT services while in hospital to maximize independence with ADLs  Will continue to follow Pt's progress  Pt would benefit from post acute rehabilitation services upon DC to maximize safety and independence with ADLs and functional tasks of choice       OT Discharge Recommendation: Post acute rehabilitation services

## 2023-02-13 NOTE — PROGRESS NOTES
[facility]  Progress Note Edwina Rose 1945, 69 y o  male MRN: [id number]  Unit/Bed#: MS [facility]enca-01 Encounter: [id number]  Primary Care Provider: No primary care provider on file  Date and time admitted to hospital: 2/10/2023 11:38 PM    * Acute systolic congestive heart failure (HCC)  Assessment & Plan  Wt Readings from Last 3 Encounters:   02/13/23 76 6 kg (168 lb 12 8 oz)   02/09/23 82 1 kg (181 lb)   06/24/21 68 6 kg (151 lb 3 8 oz)     · Patient presented with volume overload noted with bilateral lower extreme edema, dyspnea on exertion, scrotal swelling at home  Appeared grossly volume overloaded at time of admission  miners with CXR revealing vascular congestion for which IV diuresis was initiated  · Echocardiogram at [address] [place] [address] revealing new EF 25%  · Seen by heart failure - started on bumex then transitioned to iv lasix with large amt of urine output  Pt still with lower extremity edema today but for now cardiology will stop gtt with plan for cath in the am   · Pt has not been taking any medications as all diabetes uncontrolled as noted by end organ damage/severe le neuropathy and leg wounds/A1c of 12  · Will likely transition to po diuretic post cath   · Pt currently on oxgyen - feels very sob   · Monitor daily weights, I's/O, volume status  · Continue low-sodium, fluid restricted diet  · Continue to monitor daily BMP         Urinary retention  Assessment & Plan  · Found with significant urinary retention during ED evaluation at [address] [place] [address] requiring Mensah catheter placement  · Seen by urology at [address] [place] [address], suspected secondary to severe edema with component of BPH as well  · Mensah catheter and continue Flomax      · Tentative void trial in ten days per ap urology from 2/9/23 would be 2/19/23 - pt with severe scrotal edema would monitor for some resolution - or discuss with urology     Foot ulcer ([facility] )  Assessment & Plan  · Left foot ulcer POA to  miners and persistent here  · Pt reports he has severe bl le neuropathy/pitting edema and uncontrolled diabetes  · States that he was trying to cut his nails fell over and had the clippers on his nail which ripped it off his toe   · Does not look overtly infected, however started on Keflex therapy given mild purulent drainage noted between first and second digits  ·  Continue Keflex for 7 of 7-day therapy complete today - podiatry have little suspicion for infection  · Wound culture was sent staph aureus and gram negative pending final result and sensitivity   · Can review final culture tomorrow possibly discuss with podiatry if feel adequate duration   · Appreciate podiatry orders placed for local wound care will discosn    Type 2 diabetes mellitus with hyperglycemia, with long-term current use of insulin [facility])  Assessment & Plan  Lab Results   Component Value Date    HGBA1C 12 0 (H) 02/08/2023       Recent Labs     02/12/23  2032 02/13/23  0613 02/13/23  1112 02/13/23  1714   POCGLU 311* 225* 245* 215*       Blood Sugar Average: Last 72 hrs:  · (P) 239 [id number] as outpatient due to lack of insulin therapy outpatient  · Was initiated on Lantus 10 units nightly initially at [facility] however decreased to 5 units due to hypoglycemia  Continue with this dosing with correctional insulin/Accu-Cheks and adjust as needed  · Continue carb controlled diet, hypoglycemia protocol  · SSI - npo after midnight tonight   · Pt has been provided with book to read needs ongoing education   · Pt is npo tonight will not add or subtract insulin tonight however will need likely up titration of lantus and ssi  after cath tomorrow         VTE Pharmacologic Prophylaxis:   High Risk (Score >/= 5) - Pharmacological DVT Prophylaxis Ordered: heparin  Sequential Compression Devices Ordered  Patient Centered Rounds: I performed bedside rounds with nursing staff today    Discussions with Specialists or Other Care Team Provider: nursing     Education and Discussions with Family / Patient: Updated  (daughter) via phone  Please call pts daughter tomorrow pt will allow it although he is frustrated with family as feels they are not helping him out in this situation   Daughter works has children and unable to provide personal home care to pt but can assist with decisions and help as needed     Time Spent for Care: 45 minutes  More than 50% of total time spent on counseling and coordination of care as described above  Current Length of Stay: 3 day(s)  Current Patient Status: Inpatient   Certification Statement: The patient will continue to require additional inpatient hospital stay due to pending cardiac cath tomorrow   Discharge Plan: Anticipate discharge in >72 hrs to rehab facility  blanket referral placed for rehab in 60 mile radius of [place] for va services     Code Status: Level 3 - DNAR and DNI    Subjective:   Pt is in better spirits today   Conner Fix with me calling daughter  He sees that he is voiding large volumes of urine   He feels less sob at this time that is improving still on oxygen  No pain bl lower extremity neuropathy    Objective:     Vitals:   Temp (24hrs), Av 4 °F (36 9 °C), Min:97 9 °F (36 6 °C), Max:98 9 °F (37 2 °C)    Temp:  [97 9 °F (36 6 °C)-98 9 °F (37 2 °C)] 98 3 °F (36 8 °C)  HR:  [65-86] 67  Resp:  [16-20] 16  BP: (106-116)/(59-64) 116/60  SpO2:  [93 %-99 %] 96 %  Body mass index is 24 22 kg/m²  Input and Output Summary (last 24 hours): Intake/Output Summary (Last 24 hours) at 2023 1745  Last data filed at 2023 1507  Gross per 24 hour   Intake 720 ml   Output 4100 ml   Net -3380 ml       Physical Exam:   Physical Exam  Constitutional:       General: He is not in acute distress  Appearance: He is not ill-appearing, toxic-appearing or diaphoretic  HENT:      Head: Normocephalic and atraumatic  Nose: No congestion  Eyes:      General:         Right eye: No discharge  Left eye: No discharge  Cardiovascular:      Rate and Rhythm: Normal rate  Heart sounds: No murmur heard  No friction rub  No gallop  Pulmonary:      Effort: No respiratory distress  Breath sounds: No stridor  No wheezing, rhonchi or rales  Comments: Very poor effort   Chest:      Chest wall: No tenderness  Abdominal:      General: There is no distension  Palpations: There is no mass  Tenderness: There is no abdominal tenderness  There is no guarding or rebound  Hernia: No hernia is present  Musculoskeletal:         General: No swelling, tenderness, deformity or signs of injury  Right lower leg: Edema present  Left lower leg: Edema present  Skin:     Coloration: Skin is not jaundiced or pale  Findings: No bruising, erythema, lesion or rash  Neurological:      Mental Status: He is alert and oriented to person, place, and time  Psychiatric:         Behavior: Behavior normal           Additional Data:     Labs:  Results from last 7 days   Lab Units 02/12/23  0500   WBC Thousand/uL 4 79   HEMOGLOBIN g/dL 12 0   HEMATOCRIT % 38 3   PLATELETS Thousands/uL 221   NEUTROS PCT % 63   LYMPHS PCT % 22   MONOS PCT % 12   EOS PCT % 3     Results from last 7 days   Lab Units 02/13/23  0436 02/10/23  0935 02/09/23  0458   SODIUM mmol/L 134*   < > 140   POTASSIUM mmol/L 4 1   < > 3 8   CHLORIDE mmol/L 94*   < > 104   CO2 mmol/L 38*   < > 31   BUN mg/dL 29*   < > 18   CREATININE mg/dL 0 98   < > 0 80   ANION GAP mmol/L 2*   < > 5   CALCIUM mg/dL 8 9   < > 8 2*   ALBUMIN g/dL  --   --  2 9*   TOTAL BILIRUBIN mg/dL  --   --  0 96   ALK PHOS U/L  --   --  97   ALT U/L  --   --  5*   AST U/L  --   --  10*   GLUCOSE RANDOM mg/dL 242*   < > 60*    < > = values in this interval not displayed       Results from last 7 days   Lab Units 02/08/23  1349   INR  1 21*     Results from last 7 days   Lab Units 02/13/23  1714 02/13/23  1112 02/13/23  0613 02/12/23  2032 02/12/23  1700 02/12/23  1156 02/12/23  1640 02/11/23  2119 02/11/23  1646 02/11/23  1206 02/11/23  0634 02/10/23  2353   POC GLUCOSE mg/dl 215* 245* 225* 311* 268* 185* 106 314* 291* 277* 195* 238*     Results from last 7 days   Lab Units 02/08/23  1349   HEMOGLOBIN A1C % 12 0*     Results from last 7 days   Lab Units 02/09/23  0458 02/08/23  1349   LACTIC ACID mmol/L  --  1 6   PROCALCITONIN ng/ml <0 05 <0 05       Lines/Drains:  Invasive Devices     Peripheral Intravenous Line  Duration           Peripheral IV 02/11/23 Dorsal (posterior); Right Hand 2 days          Drain  Duration           Urethral Catheter Coude 16 Fr  5 days              Urinary Catheter:  Goal for removal: dc in ten days per urology 10/19/23            Telemetry:  Telemetry Orders (From admission, onward)             48 Hour Telemetry Monitoring  Continuous x 48 hours        References:    Telemetry Guidelines   Question:  Reason for 48 Hour Telemetry  Answer:  Acute Decompensated CHF (continuous diuretic infusion or total diuretic dose > 200 mg daily, associated electrolyte derangement, ionotropic drip, history of ventricular arrhythmia, or new EF <35%)                 Telemetry Reviewed: Normal Sinus Rhythm  Indication for Continued Telemetry Use: Acute CHF on >200 mg lasix/day or equivalent dose or with new reduced EF  Imaging: No pertinent imaging reviewed  Recent Cultures (last 7 days):   Results from last 7 days   Lab Units 02/11/23  0001 02/08/23  1617 02/08/23  1349   BLOOD CULTURE   --   --  No Growth After 4 Days  No Growth After 4 Days     GRAM STAIN RESULT  4+ Gram positive rods*  Rare Gram positive cocci in clusters*  --   --    URINE CULTURE   --  No Growth <1000 cfu/mL  --    WOUND CULTURE  4+ Growth of Staphylococcus aureus*  4+ Growth of  --   --        Last 24 Hours Medication List:   Current Facility-Administered Medications   Medication Dose Route Frequency Provider Last Rate   • acetaminophen  650 mg Oral Q6H PRN Alan Tamez DO     • calcium carbonate  1,000 mg Oral Daily PRN Alan Tamez DO     • cephalexin  500 mg Oral Q6H [facility] HOME JANIS Ugalde     • collagenase   Topical Daily Deniz Chowdhury DPM     • docusate sodium  100 mg Oral BID Alan Tamez DO     • heparin (porcine)  5,000 Units Subcutaneous [facility] Alan Tamez DO     • insulin glargine  5 Units Subcutaneous HS Alan Tamez DO     • insulin lispro  1-6 Units Subcutaneous TID AC Alan Tamez DO     • insulin lispro  1-6 Units Subcutaneous HS Alan Tamez DO     • metoprolol succinate  50 mg Oral Q12H [facility] Kolby Joy PA-C     • ondansetron  4 mg Intravenous Q6H PRN Alan Tamez DO     • polyethylene glycol  17 g Oral Daily PRN Alan Tamez DO     • potassium chloride  40 mEq Oral TID With Meals Kolby Joy PA-C     • tamsulosin  0 4 mg Oral Daily With [address], DO          Today, Patient Was Seen By: JANIS Ugalde    **Please Note: This note may have been constructed using a voice recognition system  **

## 2023-02-13 NOTE — ASSESSMENT & PLAN NOTE
Wt Readings from Last 3 Encounters:   02/13/23 76 6 kg (168 lb 12 8 oz)   02/09/23 82 1 kg (181 lb)   06/24/21 68 6 kg (151 lb 3 8 oz)     · Patient presented with volume overload noted with bilateral lower extreme edema, dyspnea on exertion, scrotal swelling at home  Appeared grossly volume overloaded at time of admission  miners with CXR revealing vascular congestion for which IV diuresis was initiated  · Echocardiogram at [address] [place] [address] revealing new EF 25%  · Seen by heart failure - started on bumex then transitioned to iv lasix with large amt of urine output   Pt still with lower extremity edema today but for now cardiology will stop gtt with plan for cath in the am   · Pt has not been taking any medications as all diabetes uncontrolled as noted by end organ damage/severe le neuropathy and leg wounds/A1c of 12  · Will likely transition to po diuretic post cath   · Pt currently on oxgyen - feels very sob   · Monitor daily weights, I's/O, volume status  · Continue low-sodium, fluid restricted diet  · Continue to monitor daily BMP

## 2023-02-13 NOTE — OCCUPATIONAL THERAPY NOTE
Occupational Therapy Evaluation     Patient Name: Angus Littlejohn  Today's Date: 2/13/2023  Problem List  Principal Problem:    Acute systolic congestive heart failure ([facility] [address] )  Active Problems:    Type 2 diabetes mellitus with hyperglycemia, with long-term current use of insulin (HCC)    Foot ulcer ([facility] [address] )    Urinary retention    Unintentional weight loss    Past Medical History  Past Medical History:   Diagnosis Date    6th nerve palsy     Bilateral cataracts     s/p extractions in 2021    Depression     Diabetes mellitus ([facility] )     Diabetic retinopathy ([facility] )     Exposure to Agent Orange     while serving in [employer]    HFrEF (heart failure with reduced ejection fraction) ([facility] ) 02/2023    History of tobacco abuse     quit in his 30s    Rupture of biceps tendon, traumatic 2017    right    Unintentional weight loss 2022    lost ~60 lbs in 3-4 months     Past Surgical History  Past Surgical History:   Procedure Laterality Date    CATARACT EXTRACTION, BILATERAL Bilateral 2021 02/13/23 1031   OT Last Visit   OT Visit Date 02/13/23   Note Type   Note type Evaluation   Pain Assessment   Pain Assessment Tool 0-10   Pain Score No Pain   Restrictions/Precautions   Weight Bearing Precautions Per Order Yes   RLE Weight Bearing Per Order WBAT   LLE Weight Bearing Per Order WBAT   Other Precautions Fall Risk;Pain;Multiple lines; Bed Alarm; Chair Alarm  (IV, reagan, 2L O2)   Home Living   Type of Home Apartment  ([facility])   Home Layout One level;Ramped entrance;Elevator   Bathroom Shower/Tub Tub/shower unit   Bathroom Toilet Standard   Bathroom Equipment Grab bars in shower; Shower chair  (denies use of SC, however, owns)   Home Equipment Walker;Cane;Wheelchair-electric; Other (Comment)  (PWC and RW borrowed from [facility])   Additional Comments Pt lives alone in a one level apartment with ramp/elevator to enter  Prior Function   Level of [place] Independent with ADLs; Independent with functional mobility; Needs assistance with IADLS   Lives With Alone   Receives Help From Family   IADLs Family/Friend/Other provides transportation; Independent with meal prep  (Pt reports he does not take medications at home and states he has stopped driving for the past 2 months 2* to weakness  Pt states he utilizes grocery delivery )   Falls in the last 6 months (S)  1 to 4  (Pt states he would fall and it would take him "A day to get up " Pt also states he utilizes his "dog to get to a surface in order to stand up ")   Vocational Retired   Comments PTA, Pt reports being I with ADLs/requires assist with IADLs/functional mobility with RW v  PWC  Pt reports for the past two weeks prior to admission he has been having difficulty taking care of himself due to B/L LE weakness  Pt also reports he has a dog at home that he cares for  Lifestyle   Autonomy I with ADLs and functional mobility with PWC v  RW   Reciprocal Relationships Pt reports limited support from family   Service to Others Retired   [address] Enjoys spending time with his dog   ADL   Where Assessed Edge of bed   Eating Assistance 5  Supervision/Setup   Grooming Assistance 5  Supervision/Setup   UB Bathing Assistance 5  Supervision/Setup   LB Bathing Assistance 3  Moderate [address] 5  Supervision/Setup    [facility] 3  Moderate [address]  3  Moderate Assistance   Functional Assistance 3  Moderate Assistance   Functional Deficit Supervision/safety; Increased time to complete;Setup   Bed Mobility   Supine to Sit 4  Minimal assistance   Additional items Assist x 1; Increased time required;Verbal cues;LE management   Sit to Supine 3  Moderate assistance   Additional items Assist x 1; Increased time required;LE management;Verbal cues   Additional Comments Pt greeted supine in bed  Pt encouraged to sit OOB in chair, however, Pt declined     Transfers   Sit to Stand 4  Minimal assistance   Additional items Assist x 1; Increased time required;Verbal cues   Stand to Sit 4  Minimal assistance   Additional items Assist x 1; Increased time required;Verbal cues   Additional Comments with RW   Functional Mobility   Functional Mobility 4  Minimal assistance   Additional Comments Min Ax1 with RW- short distances from EOB to recliner chair back to EOB  Additional items Rolling walker   Balance   Static Sitting Fair   Dynamic Sitting Fair -   Static Standing Poor +   Dynamic Standing Poor   Ambulatory Poor   Activity Tolerance   Activity Tolerance Patient limited by fatigue   Nurse Made Aware RN cleared/updated  RUE Assessment   RUE Assessment WFL   LUE Assessment   LUE Assessment WFL   Hand Function   Gross Motor Coordination Functional   Fine Motor Coordination Functional  (Pt reports overall weakness with FMC and difficulty opening small food packages, however, WFL)   Sensation   Light Touch Partial deficits in the RUE;Partial deficits in the LUE;Partial deficits in the RLE;Partial deficits in the LLE  (Pt with c/o of numbness/tingling "All over"- Pt reports it is chronic)   Psychosocial   Psychosocial (WDL) WDL   Cognition   Overall Cognitive Status WFL   Arousal/Participation Alert; Cooperative   Attention Attends with cues to redirect   Orientation Level Oriented X4   Memory Within functional limits   Following Commands Follows multistep commands without difficulty   Comments Pt pleasant and cooperative during OT session  Pt occasionally frustrated being in hospital and provided reassurance  Pt with a lakc of insight into functional deficits and requires cues for safety  Assessment   Limitation Decreased ADL status; Decreased UE ROM; Decreased UE strength;Decreased Safe judgement during ADL;Decreased cognition;Decreased endurance;Decreased self-care trans;Decreased high-level ADLs   Prognosis Fair   Assessment Pt is a 69 y o  Male who presented to [facility] on 2/8/2023 with worsening LE edema   Pt with diagnosis of acute systolic congestive heart failure and L diabetic foot ulcer  Pt then transferred to [facility] on 2/10/2023 for further cardiology workup  Pt WBAT on B/L LE per podiatry  Pt  has a past medical history of 6th nerve palsy, Bilateral cataracts, Depression, Diabetes mellitus ([facility] ), Diabetic retinopathy ([facility] ), Exposure to Agent Orange, HFrEF (heart failure with reduced ejection fraction) ([facility] ) (02/2023), History of tobacco abuse, Rupture of biceps tendon, traumatic (2017), and Unintentional weight loss (2022)  Pt greeted bedside for OT evaluation on 2/13/2023  Pt lives alone in a one level apartment with ramp/elevator to enter  PTA, Pt reports being I with ADLs/requires assist with IADLs/functional mobility with RW v  PWC  Pt reports for the past two weeks prior to admission he has been having difficulty taking care of himself due to B/L LE weakness  Pt also reports he has a dog at home that he cares for  Pt demonstrating the following occupational deficits: S with UB ADLs, mod A with LB ADLs, min-mod A with bed mobility, min A with functional transfers, and min A with functional mobility with RW  Limitations that impact functional performance include decreased ADL status, decreased UE ROM, decreased UE strength, decreased safe judgement during ADLs, decreased cognition, decreased endurance, decreased self care transfers, decreased high level ADLs and pain  Occupational performance areas to address ADL retraining, functional transfer training, UE strengthening/ROM, endurance training, cognitive reorientation, Pt/caregiver education, equipment evaluation/education, compensatory technique education, energy conservation and activity engagement   Pt would benefit from continued skilled OT services while in hospital to maximize independence with ADLs  Will continue to follow Pt's progress  Pt would benefit from post acute rehabilitation services upon DC to maximize safety and independence with ADLs and functional tasks of choice  Goals   Patient Goals To rest    LTG Time Frame 10-14   Long Term Goal #1 See goals listed below  Plan   Treatment Interventions ADL retraining;Functional transfer training;UE strengthening/ROM; Equipment evaluation/education;Cognitive reorientation;Patient/family training; Endurance training; Compensatory technique education; Activityengagement; Energy conservation   Goal Expiration Date 02/23/23   OT Treatment Day 1   OT Frequency 2-3x/wk   Recommendation   OT Discharge Recommendation Post acute rehabilitation services   Additional Comments  The patient's raw score on the AM-PAC Daily Activity Inpatient Short Form is 16  A raw score of less than 19 suggests the patient may benefit from discharge to post-acute rehabilitation services  Please refer to the recommendation of the Occupational Therapist for safe discharge planning  AM-PAC Daily Activity Inpatient   Lower Body Dressing 2   Bathing 2   Toileting 2   Upper Body Dressing 3   Grooming 3   Eating 4   Daily Activity Raw Score 16   Daily Activity Standardized Score (Calc for Raw Score >=11) 35 96   AM-[facility] Applied Cognition Inpatient   Following a Speech/Presentation 4   Understanding Ordinary Conversation 4   Taking Medications 3   Remembering Where Things Are Placed or Put Away 3   Remembering List of 4-5 Errands 3   Taking Care of Complicated Tasks 3   Applied Cognition Raw Score 20   Applied Cognition Standardized Score 41 76   End of Consult   Education Provided Yes   Patient Position at End of Consult Bed/Chair alarm activated; All needs within reach; Supine   Nurse Communication Nurse aware of consult       Goals:  Pt will complete UB ADLs with I in order to maximize participation with ADLs  Pt will complete LB ADLs with I in order to maximize safety with ADLs  Pt will complete toileting routine (transfer, hygiene, and clothing management) with I in order to return to prior level of function    Pt will complete bed mobility with I in order to maximize participation with ADLs  Pt will complete functional transfers at I level in order to increase participation with ADLs  Pt will increase dynamic standing balance to F+ in order to increase safety with ADLs  Pt will increase standing tolerance x10 min in order to increase participation with ADLs  Pt will complete functional mobility with AD PRN for item retrieval task at Ya level in order to increase participation with ADLs  Pt will complete IADL tasks/simulation of IADLs tasks with I in order to return to PLOF  Pt will demonstrate G energy conservation techniques with ADLs/IADLs in order to reduce the risk of falls  Pt will be attentive 100% of the time for ongoing functional/formal cognitive assessment to assist with safe dc planning prn          Donny Emanuel MS, OTR/L

## 2023-02-13 NOTE — ASSESSMENT & PLAN NOTE
· Found with significant urinary retention during ED evaluation at [address] requiring Menash catheter placement  · Seen by urology at [facility] [address], suspected secondary to severe edema with component of BPH as well  · Mensah catheter and continue Flomax      · Tentative void trial in ten days per ap urology from 2/9/23 would be 2/19/23 - pt with severe scrotal edema would monitor for some resolution - or discuss with urology

## 2023-02-13 NOTE — ASSESSMENT & PLAN NOTE
Lab Results   Component Value Date    HGBA1C 12 0 (H) 02/08/2023       Recent Labs     02/12/23  2032 02/13/23  0613 02/13/23  1112 02/13/23  1714   POCGLU 311* 225* 245* 215*       Blood Sugar Average: Last 72 hrs:  · (P) 239 [id number] as outpatient due to lack of insulin therapy outpatient  · Was initiated on Lantus 10 units nightly initially at [facility] however decreased to 5 units due to hypoglycemia    Continue with this dosing with correctional insulin/Accu-Cheks and adjust as needed  · Continue carb controlled diet, hypoglycemia protocol  · SSI - npo after midnight tonight   · Pt has been provided with book to read needs ongoing education   · Pt is npo tonight will not add or subtract insulin tonight however will need likely up titration of lantus and ssi  after cath tomorrow

## 2023-02-13 NOTE — ASSESSMENT & PLAN NOTE
· Left foot ulcer POA to [facility] and persistent here  · Pt reports he has severe bl le neuropathy/pitting edema and uncontrolled diabetes  · States that he was trying to cut his nails fell over and had the clippers on his nail which ripped it off his toe   · Does not look overtly infected, however started on Keflex therapy given mild purulent drainage noted between first and second digits     ·  Continue Keflex for 7 of 7-day therapy complete today - podiatry have little suspicion for infection  · Wound culture was sent staph aureus and gram negative pending final result and sensitivity   · Can review final culture tomorrow possibly discuss with podiatry if feel adequate duration   · Appreciate podiatry orders placed for local wound care will discosn

## 2023-02-13 NOTE — PROGRESS NOTES
Heart Failure Progress note  Unit/Bed#: -01 Encounter: [id number]        Adwoa Berry 69 y o  male [address] Stay Days: 3    Assessment and Plan      Current Problem List   Principal Problem:    Acute systolic congestive heart failure (HCC)  Active Problems:    Type 2 diabetes mellitus with hyperglycemia, with long-term current use of insulin (HCC)    Foot ulcer ([address] )    Urinary retention    Unintentional weight loss    Assessment/Plan:    1  Newly diagnosed HFrEF; LVEF 25%; LVIDd 4 3 cm; NYHA III; ACC/AHA Stage C Etiology: unclear  Multiple risk factors for CAD including poorly controlled DM II, history of tobacco abuse   Denied any family history of cardiac diseases and denies recent viral illness  UDS pending  Negative HIV  TTE 02/09/2023: LVEF 25%  LVIDd 4 3 cm  Severe global hypokinesis with RWMA  Normal RV  Mild MR and TR  RVSP 46 mmHg  · Pharmacotherapies / Neurohormonal Blockade:  · Beta Blocker: metoprolol succinate 50 mg q12 hours    · ARNi / ACEi / ARB: losartan 12 5 mg daily  · Aldosterone Antagonist: No, aim to add s/p LHC    · SGLT2 Inhibitor: No (previously on empagliflozin 25 mg daily for DM II)     · Home Diuretic: None    · Inpatient Diuretic: IV Lasix 7 5 mg/hr with potassium 40 mEq TID  - Recommend stopping - will reassess tomorrow need  · Cath tomorrow  · Sudden Cardiac Death Risk Reduction:  · LVEF 25%  Plan to reassess LVEF with limited TTE after 3 months uninterrupted and optimized HF GDMT (earliest 05/2023)     ·  Electrical Resynchronization:  · Candidacy for BiV device: narrow QRS  ·  Advanced Therapies (if appropriate): Will continue to monitor  Concerns include HgA1c of 12 0 in 02/2023 and recent history of poor adherence to prescribed medical regimen  2   Unintentional weight loss  ·  Recommend work up per primary teeam    3  Foot ulcer  ·  Per primary  4  Type II DM  ·  Elevated A1C previous on insulin  5  Exposure to agent orange  Subjective     69year old male with with poorly controlled type II DM and recent unintentional weight loss  Presented to [facility] with volume overload and urinary retention, EF of 25% which is new  Patient transferred here for heart failure and ischemic evaluation  Started on lasix on Saturday at 5 mg an hour increased yesterday to 7 5 mg  Maps 80s  7 4 liters this admission  -3 litesr lsast 24 hours  Only 720 documented in  Current regimen includes: losartan 12 5 mg, metoprolol succinate 50 mg BID, and lasix at 7 5 mg an hour  Echo this admission:   Left Ventricle: Left ventricular cavity size is moderately dilated  Wall thickness is increased  There is borderline concentric hypertrophy  The left ventricular ejection fraction is 25%  Systolic function is severely reduced  There is severe global hypokinesis with regional variation  •  Left Atrium: The atrium is mildly dilated  •  Mitral Valve: There is mild regurgitation  •  Tricuspid Valve: There is mild regurgitation  The estimated right ventricular systolic pressure is 64 77 mmHg  •  Pericardium: There is a large left pleural effusion  EKG: LBBB morphology - narrow QRS, Low voltage limb leads, sinus rhythm at 94 BPM        Patient seen and examined  Short episodes 4 beats of NSVT overnight    7 4 liters this admission  -3 litesr lsast 24 hours  Weight today is 168 pounds, 4 pounds since yest  And 178 pounds on admission so 10 poudns done       Objective     Vitals: Temp (24hrs), Av 4 °F (36 9 °C), Min:97 8 °F (36 6 °C), Max:98 9 °F (37 2 °C)  Current: Temperature: 98 3 °F (36 8 °C)  Patient Vitals for the past 24 hrs:   BP Temp Temp src Pulse Resp SpO2 Weight   23 0738 115/63 98 3 °F (36 8 °C) -- 65 17 99 % --   23 0600 -- -- -- -- -- -- 76 6 kg (168 lb 12 8 oz)   23 0217 110/59 -- -- 69 -- 98 % --   23 2251 110/60 98 6 °F (37 °C) Oral 74 16 96 % --   23 2100 108/64 -- -- -- -- -- --   23 -- -- -- -- -- 96 % --   02/12/23 1925 106/64 98 9 °F (37 2 °C) -- 86 20 93 % --   02/12/23 1607 130/70 98 5 °F (36 9 °C) -- 73 20 97 % --   02/12/23 1155 130/67 97 8 °F (36 6 °C) -- 69 18 94 % --    Body mass index is 24 22 kg/m²  Physical Exam:  Physical Exam  Cardiovascular:      Rate and Rhythm: Normal rate and regular rhythm  Comments: Elevated JVP  Pulmonary:      Breath sounds: Rales present  No rhonchi  Abdominal:      General: There is no distension  Palpations: Abdomen is soft  Musculoskeletal:      Right lower leg: Edema present  Left lower leg: Edema present  Neurological:      Mental Status: He is alert  Psychiatric:         Mood and Affect: Mood normal          Invasive Devices     Peripheral Intravenous Line  Duration           Peripheral IV 02/11/23 Dorsal (posterior); Right Hand 1 day          Drain  Duration           Urethral Catheter Coude 16 Fr  4 days                    Labs:   Results from last 7 days   Lab Units 02/12/23  0500 02/09/23  0458 02/08/23  1349   WBC Thousand/uL 4 79 6 00 5 52   HEMOGLOBIN g/dL 12 0 11 9* 12 6   HEMATOCRIT % 38 3 38 2 41 2   PLATELETS Thousands/uL 221 236 218   NEUTROS PCT % 63 71 72   MONOS PCT % 12 9 9      Results from last 7 days   Lab Units 02/13/23  0436 02/12/23  0500 02/11/23  0432 02/10/23  0935 02/09/23  0458 02/08/23  1349   SODIUM mmol/L 134* 137 136 137 140 136   POTASSIUM mmol/L 4 1 3 7 3 9 4 1 3 8 3 9   CHLORIDE mmol/L 94* 97 100 100 104 100   CO2 mmol/L 38* 33* 32 32 31 29   BUN mg/dL 29* 25 25 21 18 18   CREATININE mg/dL 0 98 0 87 0 94 0 88 0 80 0 89   CALCIUM mg/dL 8 9 8 8 8 5 8 6 8 2* 8 9   ALK PHOS U/L  --   --   --   --  97 126*   ALT U/L  --   --   --   --  5* 7   AST U/L  --   --   --   --  10* 11*   MAGNESIUM mg/dL 1 9 1 9 1 7  --  1 9 1 6*   PHOSPHORUS mg/dL  --   --   --   --  2 7 3 0   INR   --   --   --   --   --  1 21*   PTT seconds  --   --   --   --   --  32   EGFR ml/min/1 73sq m 74 83 78 83 86 83     Results from last 7 days   Lab Units 02/08/23  1349   INR  1 21*   PTT seconds 32     Results from last 7 days   Lab Units 02/08/23  1349   LACTIC ACID mmol/L 1 6         No results found for: PHART, LWJ4LDD, PO2ART, QXH2YOJ, P1OQRIOR, BEART, SOURCE  No components found for: HIV1X2  No results found for: HAV, HEPAIGM, HEPBIGM, HEPBCAB, HBEAG, HEPCAB  No results found for: SPEP, UPEP   Lab Results   Component Value Date    HGBA1C 12 0 (H) 02/08/2023    HGBA1C 9 2 (H) 06/23/2021     No results found for: CHOL   Lab Results   Component Value Date    HDL 36 (L) 02/11/2023      Lab Results   Component Value Date    LDLCALC 61 02/11/2023      Lab Results   Component Value Date    TRIG 57 02/11/2023     No components found for: PROCAL  Results from last 7 days   Lab Units 02/08/23  1349   BNP pg/mL 3,345*     Micro:  Results from last 7 days   Lab Units 02/11/23  0001 02/08/23  1617 02/08/23  1349   BLOOD CULTURE   --   --  No Growth After 4 Days  No Growth After 4 Days  GRAM STAIN RESULT  4+ Gram positive rods*  Rare Gram positive cocci in clusters*  --   --    URINE CULTURE   --  No Growth <1000 cfu/mL  --    WOUND CULTURE  Culture results to follow  --   --      Urinalysis:  No results found for: AMPHETUR, BDZUR, COCAINEUR, OPIATEUR, PCPUR, THCUR, ETOH, ACTMNPHEN, SALICYLATE   Results from last 7 days   Lab Units 02/08/23  1617   COLOR UA  Yellow   CLARITY UA  Clear   SPEC GRAV UA  1 025   PH UA  5 5   LEUKOCYTES UA  Elevated glucose may cause decreased leukocyte values  See urine microscopic for [facility] result/*   NITRITE UA  Negative   GLUCOSE UA mg/dl >=1000 (1%)*   KETONES UA mg/dl Trace*   BILIRUBIN UA  Negative   BLOOD UA  Trace-Intact*      Results from last 7 days   Lab Units 02/08/23  1617   RBC UA /hpf 2-4   WBC UA /hpf None Seen   EPITHELIAL CELLS WET PREP /hpf Occasional   BACTERIA UA /hpf Occasional      Intake and Outputs:  I/O       02/11 0701 02/12 0700 02/12 0701 02/13 0700 02/13 0701 02/14 0700    P  O  720 720 240 Total Intake(mL/kg) 720 (9 2) 720 (9 4) 240 (3 1)    Urine (mL/kg/hr) 4550 (2 4) 3775 (2 1)     Stool  0     Total Output 4550 3775     Net -3830 -3055 +240           Unmeasured Stool Occurrence  1 x         Nutrition:  Diet Wilfredo/CHO Controlled; Consistent Carbohydrate Diet Level 3 (6 carb servings/90 grams CHO/meal);  Sodium 2 GM, Fluid Restriction 2000 ML  Radiology Results:   No orders to display     Scheduled Medications:  cephalexin, 500 mg, Q6H [place]  collagenase, , Daily  docusate sodium, 100 mg, BID  heparin (porcine), 5,000 Units, Q8H [address]  insulin glargine, 5 Units, HS  insulin lispro, 1-6 Units, TID AC  insulin lispro, 1-6 Units, HS  losartan, 12 5 mg, Daily  metoprolol succinate, 50 mg, Q12H [address]  potassium chloride, 40 mEq, TID With Meals  tamsulosin, 0 4 mg, Daily With Dinner      PRN MEDS:  acetaminophen, 650 mg, Q6H PRN  calcium carbonate, 1,000 mg, Daily PRN  ondansetron, 4 mg, Q6H PRN  polyethylene glycol, 17 g, Daily PRN      Last 24 Hour Meds: :   Medication Administration - last 24 hours from 02/12/2023 0924 to 02/13/2023 1909       Date/Time Order Dose Route Action Action by     02/13/2023 0550 EST heparin (porcine) subcutaneous injection 5,000 Units 5,000 Units Subcutaneous Given Azael Leal RN     02/12/2023 2100 EST heparin (porcine) subcutaneous injection 5,000 Units 5,000 Units Subcutaneous Given Azael Leal RN     02/12/2023 1318 EST heparin (porcine) subcutaneous injection 5,000 Units 5,000 Units Subcutaneous Given Mina Fuel, HARRY     02/12/2023 1724 EST insulin lispro (HumaLOG) 100 units/mL subcutaneous injection 1-6 Units 3 Units Subcutaneous Given Mina Fuel, RN     02/12/2023 1239 EST insulin lispro (HumaLOG) 100 units/mL subcutaneous injection 1-6 Units 1 Units Subcutaneous Given Mina Fuel, HARRY     02/12/2023 2100 EST insulin lispro (HumaLOG) 100 units/mL subcutaneous injection 1-6 Units 5 Units Subcutaneous Given Azael Leal, HARRY     02/12/2023 1723 EST tamsulosin (FLOMAX) capsule 0 4 mg 0 4 mg Oral Given Helena Turner, HARRY     02/13/2023 0550 EST cephalexin (KEFLEX) capsule 500 mg 500 mg Oral Given Yary Stephen, RN     02/13/2023 0016 EST cephalexin (KEFLEX) capsule 500 mg 500 mg Oral Given Yary Stephen, RN     02/12/2023 1723 EST cephalexin (KEFLEX) capsule 500 mg 500 mg Oral Given Helena Turner, RN     02/12/2023 1239 EST cephalexin (KEFLEX) capsule 500 mg 500 mg Oral Given Helena Turner, RN     02/12/2023 2100 EST insulin glargine (LANTUS) subcutaneous injection 5 Units 0 05 mL 5 Units Subcutaneous Given Yary Stephen, RN     02/12/2023 1723 EST docusate sodium (COLACE) capsule 100 mg 100 mg Oral Given Helena Turner, RN     02/12/2023 1239 EST furosemide (LASIX) 500 mg infusion 50 mL 7 5 mg/hr Intravenous Rate/Dose Change Helena Turner, HARRY     02/12/2023 1239 EST potassium chloride (K-DUR,KLOR-CON) CR tablet 20 mEq 20 mEq Oral Given Helena Turner, RN     02/12/2023 1022 EST magnesium sulfate 2 g/50 mL IVPB (premix) 2 g 2 g Intravenous [address] Helena Turner, RN     02/12/2023 2100 EST metoprolol succinate (TOPROL-XL) 24 hr tablet 50 mg 50 mg Oral Given Yary Stephen, RN     02/12/2023 1723 EST losartan (COZAAR) tablet 12 5 mg 12 5 mg Oral Given Helena Turner, RN     02/12/2023 1723 EST potassium chloride (K-DUR,KLOR-CON) CR tablet 40 mEq 40 mEq Oral Given Helena Turner RN          PLEASE NOTE:  This encounter was completed utilizing the [employer] Direct Speech Voice Recognition Software  Grammatical errors, random word insertions, pronoun errors and incomplete sentences are occasional consequences of the system due to software limitations, ambient noise and hardware issues  These may be missed by proof reading prior to affixing electronic signature  Any questions or concerns about the content, text or information contained within the body of this dictation should be directly addressed to the physician for clarification   Please do not hesitate to call me directly if you have any any questions or concerns

## 2023-02-14 ENCOUNTER — APPOINTMENT (INPATIENT)
Dept: NON INVASIVE DIAGNOSTICS | Facility: HOSPITAL | Age: 78
End: 2023-02-14

## 2023-02-14 ENCOUNTER — APPOINTMENT (INPATIENT)
Dept: RADIOLOGY | Facility: HOSPITAL | Age: 78
End: 2023-02-14

## 2023-02-14 PROBLEM — I25.10 CAD (CORONARY ARTERY DISEASE): Status: ACTIVE | Noted: 2023-02-14

## 2023-02-14 LAB
ANION GAP SERPL CALCULATED.3IONS-SCNC: 4 MMOL/L (ref 4–13)
BACTERIA WND AEROBE CULT: ABNORMAL
BACTERIA WND AEROBE CULT: ABNORMAL
BUN SERPL-MCNC: 30 MG/DL (ref 5–25)
CALCIUM SERPL-MCNC: 8.8 MG/DL (ref 8.3–10.1)
CHLORIDE SERPL-SCNC: 94 MMOL/L (ref 96–108)
CO2 SERPL-SCNC: 36 MMOL/L (ref 21–32)
CREAT SERPL-MCNC: 1 MG/DL (ref 0.6–1.3)
ERYTHROCYTE [DISTWIDTH] IN BLOOD BY AUTOMATED COUNT: 15.4 % (ref 11.6–15.1)
GFR SERPL CREATININE-BSD FRML MDRD: 72 ML/MIN/1.73SQ M
GLUCOSE SERPL-MCNC: 112 MG/DL (ref 65–140)
GLUCOSE SERPL-MCNC: 127 MG/DL (ref 65–140)
GLUCOSE SERPL-MCNC: 176 MG/DL (ref 65–140)
GLUCOSE SERPL-MCNC: 187 MG/DL (ref 65–140)
GLUCOSE SERPL-MCNC: 187 MG/DL (ref 65–140)
GRAM STN SPEC: ABNORMAL
GRAM STN SPEC: ABNORMAL
HCT VFR BLD AUTO: 38.1 % (ref 36.5–49.3)
HGB BLD-MCNC: 11.5 G/DL (ref 12–17)
MAGNESIUM SERPL-MCNC: 1.9 MG/DL (ref 1.6–2.6)
MCH RBC QN AUTO: 25.3 PG (ref 26.8–34.3)
MCHC RBC AUTO-ENTMCNC: 30.2 G/DL (ref 31.4–37.4)
MCV RBC AUTO: 84 FL (ref 82–98)
PLATELET # BLD AUTO: 246 THOUSANDS/UL (ref 149–390)
PMV BLD AUTO: 10.1 FL (ref 8.9–12.7)
POTASSIUM SERPL-SCNC: 4.9 MMOL/L (ref 3.5–5.3)
RBC # BLD AUTO: 4.55 MILLION/UL (ref 3.88–5.62)
SODIUM SERPL-SCNC: 134 MMOL/L (ref 135–147)
WBC # BLD AUTO: 6.9 THOUSAND/UL (ref 4.31–10.16)

## 2023-02-14 RX ORDER — INSULIN LISPRO 100 [IU]/ML
1-5 INJECTION, SOLUTION INTRAVENOUS; SUBCUTANEOUS
Status: DISCONTINUED | OUTPATIENT
Start: 2023-02-14 | End: 2023-02-17

## 2023-02-14 RX ORDER — HEPARIN SODIUM 1000 [USP'U]/ML
INJECTION, SOLUTION INTRAVENOUS; SUBCUTANEOUS CODE/TRAUMA/SEDATION MEDICATION
Status: DISCONTINUED | OUTPATIENT
Start: 2023-02-14 | End: 2023-02-14 | Stop reason: HOSPADM

## 2023-02-14 RX ORDER — FUROSEMIDE 10 MG/ML
60 INJECTION INTRAMUSCULAR; INTRAVENOUS ONCE
Status: COMPLETED | OUTPATIENT
Start: 2023-02-14 | End: 2023-02-14

## 2023-02-14 RX ORDER — INSULIN LISPRO 100 [IU]/ML
1-5 INJECTION, SOLUTION INTRAVENOUS; SUBCUTANEOUS
Status: DISCONTINUED | OUTPATIENT
Start: 2023-02-14 | End: 2023-02-19 | Stop reason: HOSPADM

## 2023-02-14 RX ORDER — NITROGLYCERIN 20 MG/100ML
INJECTION INTRAVENOUS CODE/TRAUMA/SEDATION MEDICATION
Status: DISCONTINUED | OUTPATIENT
Start: 2023-02-14 | End: 2023-02-14 | Stop reason: HOSPADM

## 2023-02-14 RX ORDER — ATORVASTATIN CALCIUM 40 MG/1
40 TABLET, FILM COATED ORAL
Status: DISCONTINUED | OUTPATIENT
Start: 2023-02-14 | End: 2023-02-19 | Stop reason: HOSPADM

## 2023-02-14 RX ORDER — ASPIRIN 81 MG/1
81 TABLET ORAL DAILY
Status: DISCONTINUED | OUTPATIENT
Start: 2023-02-14 | End: 2023-02-19 | Stop reason: HOSPADM

## 2023-02-14 RX ORDER — INSULIN LISPRO 100 [IU]/ML
3 INJECTION, SOLUTION INTRAVENOUS; SUBCUTANEOUS
Status: DISCONTINUED | OUTPATIENT
Start: 2023-02-14 | End: 2023-02-15

## 2023-02-14 RX ORDER — LIDOCAINE HYDROCHLORIDE 10 MG/ML
INJECTION, SOLUTION EPIDURAL; INFILTRATION; INTRACAUDAL; PERINEURAL CODE/TRAUMA/SEDATION MEDICATION
Status: DISCONTINUED | OUTPATIENT
Start: 2023-02-14 | End: 2023-02-14 | Stop reason: HOSPADM

## 2023-02-14 RX ORDER — INSULIN GLARGINE 100 [IU]/ML
7 INJECTION, SOLUTION SUBCUTANEOUS
Status: DISCONTINUED | OUTPATIENT
Start: 2023-02-14 | End: 2023-02-19 | Stop reason: HOSPADM

## 2023-02-14 RX ORDER — MAGNESIUM SULFATE HEPTAHYDRATE 40 MG/ML
2 INJECTION, SOLUTION INTRAVENOUS ONCE
Status: COMPLETED | OUTPATIENT
Start: 2023-02-14 | End: 2023-02-15

## 2023-02-14 RX ORDER — FENTANYL CITRATE 50 UG/ML
INJECTION, SOLUTION INTRAMUSCULAR; INTRAVENOUS CODE/TRAUMA/SEDATION MEDICATION
Status: DISCONTINUED | OUTPATIENT
Start: 2023-02-14 | End: 2023-02-14 | Stop reason: HOSPADM

## 2023-02-14 RX ORDER — INSULIN LISPRO 100 [IU]/ML
2 INJECTION, SOLUTION INTRAVENOUS; SUBCUTANEOUS
Status: DISCONTINUED | OUTPATIENT
Start: 2023-02-14 | End: 2023-02-14

## 2023-02-14 RX ORDER — MIDAZOLAM HYDROCHLORIDE 2 MG/2ML
INJECTION, SOLUTION INTRAMUSCULAR; INTRAVENOUS CODE/TRAUMA/SEDATION MEDICATION
Status: DISCONTINUED | OUTPATIENT
Start: 2023-02-14 | End: 2023-02-14 | Stop reason: HOSPADM

## 2023-02-14 RX ORDER — VERAPAMIL HYDROCHLORIDE 2.5 MG/ML
INJECTION, SOLUTION INTRAVENOUS CODE/TRAUMA/SEDATION MEDICATION
Status: DISCONTINUED | OUTPATIENT
Start: 2023-02-14 | End: 2023-02-14 | Stop reason: HOSPADM

## 2023-02-14 RX ORDER — SODIUM CHLORIDE 9 MG/ML
50 INJECTION, SOLUTION INTRAVENOUS CONTINUOUS
Status: DISPENSED | OUTPATIENT
Start: 2023-02-14 | End: 2023-02-14

## 2023-02-14 RX ADMIN — COLLAGENASE SANTYL: 250 OINTMENT TOPICAL at 08:57

## 2023-02-14 RX ADMIN — DOCUSATE SODIUM 100 MG: 100 CAPSULE, LIQUID FILLED ORAL at 08:54

## 2023-02-14 RX ADMIN — CEPHALEXIN 500 MG: 500 CAPSULE ORAL at 05:17

## 2023-02-14 RX ADMIN — SODIUM CHLORIDE 50 ML/HR: 0.9 INJECTION, SOLUTION INTRAVENOUS at 14:30

## 2023-02-14 RX ADMIN — HEPARIN SODIUM 5000 UNITS: 5000 INJECTION INTRAVENOUS; SUBCUTANEOUS at 05:17

## 2023-02-14 RX ADMIN — TAMSULOSIN HYDROCHLORIDE 0.4 MG: 0.4 CAPSULE ORAL at 18:40

## 2023-02-14 RX ADMIN — FUROSEMIDE 60 MG: 10 INJECTION, SOLUTION INTRAMUSCULAR; INTRAVENOUS at 18:40

## 2023-02-14 RX ADMIN — INSULIN GLARGINE 7 UNITS: 100 INJECTION, SOLUTION SUBCUTANEOUS at 21:34

## 2023-02-14 RX ADMIN — ASPIRIN 81 MG: 81 TABLET, COATED ORAL at 14:34

## 2023-02-14 RX ADMIN — INSULIN LISPRO 1 UNITS: 100 INJECTION, SOLUTION INTRAVENOUS; SUBCUTANEOUS at 21:34

## 2023-02-14 RX ADMIN — ATORVASTATIN CALCIUM 40 MG: 40 TABLET, FILM COATED ORAL at 18:40

## 2023-02-14 RX ADMIN — METOPROLOL SUCCINATE 50 MG: 50 TABLET, EXTENDED RELEASE ORAL at 08:54

## 2023-02-14 RX ADMIN — HEPARIN SODIUM 5000 UNITS: 5000 INJECTION INTRAVENOUS; SUBCUTANEOUS at 21:34

## 2023-02-14 RX ADMIN — METOPROLOL SUCCINATE 50 MG: 50 TABLET, EXTENDED RELEASE ORAL at 21:34

## 2023-02-14 RX ADMIN — POTASSIUM CHLORIDE 40 MEQ: 1500 TABLET, EXTENDED RELEASE ORAL at 08:54

## 2023-02-14 RX ADMIN — MAGNESIUM SULFATE HEPTAHYDRATE 2 G: 40 INJECTION, SOLUTION INTRAVENOUS at 23:22

## 2023-02-14 NOTE — ASSESSMENT & PLAN NOTE
Possibly in setting of newly diagnosed heart failure however has lost appx 60 lbs unintentionally in the last 3-4 months  · Has never had colonoscopy    · Check CT C/A/P

## 2023-02-14 NOTE — ASSESSMENT & PLAN NOTE
Found with significant urinary retention during ED evaluation at [address] requiring Mensah catheter placement  · Seen by urology at [address] [place] [address] Drive, suspected secondary to severe edema with component of BPH as well  · Mensah catheter and continue Flomax  · Void trial in appx 10 days around 2/19, can be done here or as outpatient

## 2023-02-14 NOTE — ASSESSMENT & PLAN NOTE
Lab Results   Component Value Date    HGBA1C 12 0 (H) 02/08/2023       Recent Labs     02/13/23  1112 02/13/23  1714 02/13/23  2126 02/14/23  0602   POCGLU 245* 215* 281* 187*       Uncontrolled as outpatient due to lack of insulin therapy outpatient  · Continue Lantus 5 units QHS + humalog 2 units 3 times daily with meals plus SSI  · Would likely benefit from Jardiance given heart failure  · Monitor accuchecks and adjust regimen as needed  · Given plan for possible CT surgery, will consult endocrinology  · Anticipate will need insulin on discharge

## 2023-02-14 NOTE — CONSULTS
Consultation - Celestine Vick 69 y o  male MRN: [id number]    Unit/Bed#: -01 Encounter: [id number]      Assessment/Plan     Assessment: This is a 69y o -year-old male with past medical history of type 2 diabetes mellitus, with complications of CAD, systolic heart failure, who was transferred from [facility] to [address] [place] [address] for evaluation of acute systolic heart failure endocrinology consulted for the management of type 2 diabetes mellitus  Plan:  Type 2 diabetes mellitus  HbA1c 12 Feb 2023  Home regimen: None, reports that he stopped taking his medications for a while  Inpatient regimen: Lantus 5 units at bedtime, Humalog 2 units with meals, correctional scale algorithm 3 with meals and at bedtime    Recommendations: We will increase Lantus to 7 units at bedtime, increase Humalog to 3 units from 2 units with meals  Reduce correctional scale algorithm 2 1 with meals and at bedtime from 3  We will continue to monitor blood glucose and make adjustments as needed  Monitor for hypoglycemia and treat according to the protocol      CC: Diabetes Consult    History of Present Illness     HPI:  This is a 69y o -year-old male with past medical history of type 2 diabetes mellitus, with complications of CAD, systolic heart failure, who was transferred from [address] [facility] to [address] [place] [address] for evaluation of acute systolic heart failure endocrinology consulted for the management of type 2 diabetes mellitus  He has had diabetes for many years  He is not on anything at home  He denies any polyuria, polydipsia, nocturia and blurry vision  Denies any known complications of diabetes  He denies any hypoglycemia  Does not check his blood glucose at home    Inpatient consult to Endocrinology  Consult performed by: Christine Rivers MD  Consult ordered by: JANIS Agarwal          Review of Systems   Constitutional: Negative for activity change, appetite change and fatigue     HENT: Negative for congestion and sore throat  Eyes: Negative for redness and visual disturbance  Respiratory: Negative for cough and shortness of breath  Cardiovascular: Negative for palpitations and leg swelling  Gastrointestinal: Negative for abdominal pain, constipation, diarrhea, nausea and vomiting  Endocrine: Negative for cold intolerance, heat intolerance, polydipsia, polyphagia and polyuria  Genitourinary: Negative for difficulty urinating and dysuria  Musculoskeletal: Negative for gait problem and neck pain  Skin: Negative for color change  Neurological: Negative for dizziness and syncope  Psychiatric/Behavioral: Negative for agitation and behavioral problems  All other systems reviewed and are negative  Historical Information   Past Medical History:   Diagnosis Date   • 6th nerve palsy    • Blister of right leg    • BPH (benign prostatic hyperplasia)    • CAD (coronary artery disease)    • Cardiomyopathy ([facility])     EF 25%   • CHF (congestive heart failure) ([facility])    • Depression    • Diabetes mellitus ([facility] )     type 2, insulin dependent   • Diabetic foot ulcer ([facility] )     left, local wound care   • Diabetic retinopathy ([facility] )    • Exposure to Agent Orange     while serving in [employer]   • Former tobacco use    • H/O urinary retention     w/ reagan placement   • Hyperlipidemia    • Hypertension    • Rupture of biceps tendon, traumatic 2017    right     Past Surgical History:   Procedure Laterality Date   • CARDIAC CATHETERIZATION     • CATARACT EXTRACTION, BILATERAL Bilateral 2021     Social History   Social History     Substance and Sexual Activity   Alcohol Use Not Currently    Comment: Denies history of heavy alcohol use  At most will drink 1 or 2 drinks in a year (Updated 02/2023)       Social History     Substance and Sexual Activity   Drug Use Never     Social History     Tobacco Use   Smoking Status Former   • Packs/day: 1 00   • Years: 6 00   • Pack years: 6 00   • Types: Cigarettes   • Start date: 5   • Quit date: 12   • Years since quittin 3   Smokeless Tobacco Never     Family History:   Family History   Problem Relation Age of Onset   • No Known Problems Sister    • Other Daughter         chronic neck pain   • Other Son         chronic back pain   • Sudden death Neg Hx    • Cancer Neg Hx        Meds/Allergies   Current Facility-Administered Medications   Medication Dose Route Frequency Provider Last Rate Last Admin   • acetaminophen (TYLENOL) tablet 650 mg  650 mg Oral Q6H PRN Karuche Males, DO       • aspirin (ECOTRIN LOW STRENGTH) EC tablet 81 mg  81 mg Oral Daily Karuche Males, DO   81 mg at 23 1434   • atorvastatin (LIPITOR) tablet 40 mg  40 mg Oral Daily With [employer], DO       • calcium carbonate (TUMS) chewable tablet 1,000 mg  1,000 mg Oral Daily PRN Shaun Males, DO       • collagenase (SANTYL) ointment   Topical Daily Shaun Males, DO   Given at 23 0857   • docusate sodium (COLACE) capsule 100 mg  100 mg Oral BID Karuche Males, DO   100 mg at 23 1248   • furosemide (LASIX) injection 60 mg  60 mg Intravenous Once Toni Salazar, DO       • heparin (porcine) subcutaneous injection 5,000 Units  5,000 Units Subcutaneous Count includes the [facility] Shaun Males, DO   5,000 Units at 23 0517   • insulin glargine (LANTUS) subcutaneous injection 7 Units 0 07 mL  7 Units Subcutaneous HS Dai Griggs MD       • insulin lispro (HumaLOG) 100 units/mL subcutaneous injection 1-6 Units  1-6 Units Subcutaneous TID AC Shaun Males, DO   2 Units at 23 1816   • insulin lispro (HumaLOG) 100 units/mL subcutaneous injection 1-6 Units  1-6 Units Subcutaneous HS Shaun Males, DO   4 Units at 23 2128   • insulin lispro (HumaLOG) 100 units/mL subcutaneous injection 3 Units  3 Units Subcutaneous TID With Meals Dai Griggs MD       • metoprolol succinate (TOPROL-XL) 24 hr tablet 50 mg  50 mg Oral Q12H [address] Shaun Males, DO   50 mg at 23 0854   • ondansetron (ZOFRAN) injection 4 mg  4 mg Intravenous Q6H PRN Leverne Gal, DO       • polyethylene glycol (MIRALAX) packet 17 g  17 g Oral Daily PRN Leverne Gal, DO       • sodium chloride 0 9 % infusion  50 mL/hr Intravenous Continuous Leverne Gal, DO 50 mL/hr at 02/14/23 1430 50 mL/hr at 02/14/23 1430   • tamsulosin (FLOMAX) capsule 0 4 mg  0 4 mg Oral Daily With Dinner Leverne Gal, DO   0 4 mg at 02/13/23 1815     Allergies   Allergen Reactions   • Metformin Diarrhea       Objective   Vitals: Blood pressure 106/72, pulse (!) 53, temperature (!) 94 2 °F (34 6 °C), temperature source Oral, resp  rate 16, height 5' 10" (1 778 m), weight 74 9 kg (165 lb 1 6 oz), SpO2 95 %  Intake/Output Summary (Last 24 hours) at 2/14/2023 1643  Last data filed at 2/14/2023 0830  Gross per 24 hour   Intake 1200 ml   Output 1650 ml   Net -450 ml     Invasive Devices     Peripheral Intravenous Line  Duration           Peripheral IV 02/11/23 Dorsal (posterior); Right Hand 3 days    Peripheral IV 02/14/23 Dorsal (posterior); Left Forearm <1 day          Drain  Duration           Urethral Catheter Coude 16 Fr  6 days                Physical Exam  Constitutional:       Appearance: Normal appearance  HENT:      Head: Normocephalic and atraumatic  Cardiovascular:      Rate and Rhythm: Normal rate and regular rhythm  Pulses: Normal pulses  Heart sounds: Normal heart sounds  No murmur heard  No gallop  Pulmonary:      Effort: Pulmonary effort is normal       Breath sounds: Normal breath sounds  No wheezing or rales  Abdominal:      Palpations: Abdomen is soft  Tenderness: There is no abdominal tenderness  Musculoskeletal:         General: No swelling  Cervical back: Normal range of motion and neck supple  Right lower leg: No edema  Left lower leg: No edema  Skin:     General: Skin is warm  Neurological:      Mental Status: He is alert and oriented to person, place, and time  Mental status is at baseline     Psychiatric: Mood and Affect: Mood normal          Behavior: Behavior normal          The history was obtained from the review of the chart, patient  Lab Results:   Results from last 7 days   Lab Units 02/08/23  1349   HEMOGLOBIN A1C % 12 0*     Lab Results   Component Value Date    WBC 6 90 02/14/2023    HGB 11 5 (L) 02/14/2023    HCT 38 1 02/14/2023    MCV 84 02/14/2023     02/14/2023     Lab Results   Component Value Date/Time    BUN 30 (H) 02/14/2023 04:51 AM    K 4 9 02/14/2023 04:51 AM    CL 94 (L) 02/14/2023 04:51 AM    CO2 36 (H) 02/14/2023 04:51 AM    CREATININE 1 00 02/14/2023 04:51 AM    AST 10 (L) 02/09/2023 04:58 AM    ALT 5 (L) 02/09/2023 04:58 AM    ALB 2 9 (L) 02/09/2023 04:58 AM     No results for input(s): CHOL, HDL, LDL, TRIG, VLDL in the last 72 hours  No results found for: Kathlen Coma  POC Glucose (mg/dl)   Date Value   02/14/2023 127   02/14/2023 187 (H)   02/13/2023 281 (H)   02/13/2023 215 (H)   02/13/2023 245 (H)   02/13/2023 225 (H)   02/12/2023 311 (H)   02/12/2023 268 (H)   02/12/2023 185 (H)   02/12/2023 106       Imaging Studies: I have personally reviewed pertinent reports  Portions of the record may have been created with voice recognition software  Please Tigertext questions to the clinician covering the "[id number]" Role  Thank you

## 2023-02-14 NOTE — PHYSICAL THERAPY NOTE
Physical Therapy Cancellation Note           02/14/23 1125   Note Type   Note Type Cancelled Session   Cancel Reasons Patient to operating room     Chart reviewed; pt is currently off the floor in cath lab; will follow as clinical course allows      Darell Padilla

## 2023-02-14 NOTE — ASSESSMENT & PLAN NOTE
Wt Readings from Last 3 Encounters:   02/14/23 74 9 kg (165 lb 1 6 oz)   02/09/23 82 1 kg (181 lb)   06/24/21 68 6 kg (151 lb 3 8 oz)   Patient presented with volume overload noted with bilateral lower extreme edema, dyspnea on exertion, scrotal swelling at home  Appeared grossly volume overloaded at time of admission  miners with CXR revealing vascular congestion for which IV diuresis was initiated  Transferred to [facility] for heart failure evaluation  · Echocardiogram at [facility] revealing new EF 25%  · Heart failure following, status post Lasix drip  Diuretics per heart failure team   · Continue medical management with beta-blocker  Losartan on hold for cardiac catheterization  · 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, see below  · I's and O's, daily weights, low-sodium diet  · Monitor volume status  · Will need close outpatient follow-up  · PT/OT recommending rehab  CM following

## 2023-02-14 NOTE — PROGRESS NOTES
Cardiology Progress note  Unit/Bed#: -01 Encounter: [id number]        Arabella Chávez 69 y o  male [address] Stay Days: 4    Assessment and Plan      Current Problem List   Principal Problem:    Acute systolic congestive heart failure (HCC)  Active Problems:    Type 2 diabetes mellitus with hyperglycemia, with long-term current use of insulin (HCC)    Foot ulcer ([address] )    Urinary retention    Unintentional weight loss    Assessment/Plan:  1  Newly diagnosed HFrEF; LVEF 25%; LVIDd 4 3 cm; NYHA III; ACC/AHA Stage C Etiology: unclear  Multiple risk factors for CAD including poorly controlled DM II, history of tobacco abuse   Denied any family history of cardiac diseases and denies recent viral illness  UDS pending  Negative HIV  TTE 02/09/2023: LVEF 25%  LVIDd 4 3 cm  Severe global hypokinesis with RWMA  Normal RV  Mild MR and TR  RVSP 46 mmHg  Iron panel and TSH obtained  ? Pharmacotherapies / Neurohormonal Blockade:  ? Beta Blocker: metoprolol succinate 50 mg q12 hours    ? ARNi / ACEi / ARB: losartan 12 5 mg daily - held for cath today  ? Aldosterone Antagonist: No, aim to add s/p LHC     ? SGLT2 Inhibitor: No (previously on empagliflozin 25 mg daily for DM II)     ? Home Diuretic: None    ? Inpatient Diuretic: IV Lasix 7 5 mg/hr with potassium 40 mEq TID  - Stopped yesterday  Potassium discontinued today  ? Cath Today  ? Sudden Cardiac Death Risk Reduction:  ? LVEF 25%  Plan to reassess LVEF with limited TTE after 3 months uninterrupted and optimized HF GDMT (earliest 05/2023)     ?  Electrical Resynchronization:  ? Candidacy for BiV device: narrow QRS  ? Advanced Therapies (if appropriate): Will continue to monitor  Concerns include HgA1c of 12 0 in 02/2023 and recent history of poor adherence to prescribed medical regimen  ? Can consider IV iron this admission  2   Unintentional weight loss  ? Recommend work up per primary teeam    3  Foot ulcer  ? Per primary     4  Type II DM  ? Elevated A1C previous on insulin  5  Exposure to agent orange  Subjective     Patient seen and examined  -2 2 liters net last 24  3 6 out and 1 4 in  Patient with short run of NSVT on monitor  No acute complaints  No SOB  Able to lie flat  Creatine is 1 0 today up from 0 98 yesterday  Baseline around 0 9  Down 3 pounds since yesterday, 13 pounds since admission  Objective     Vitals: Temp (24hrs), Av 2 °F (36 8 °C), Min:97 6 °F (36 4 °C), Max:98 8 °F (37 1 °C)  Current: Temperature: 97 6 °F (36 4 °C)  Patient Vitals for the past 24 hrs:   BP Temp Temp src Pulse Resp SpO2 Weight   23 0718 122/56 97 6 °F (36 4 °C) Oral 65 14 98 % --   23 0600 -- -- -- -- -- -- 74 9 kg (165 lb 1 6 oz)   23 0159 124/55 -- -- 69 -- 97 % --   23 2207 (!) 105/33 98 8 °F (37 1 °C) -- 69 -- 95 % --   23 2119 116/62 -- -- 69 -- 97 % --   23 2116 116/62 -- -- 68 -- -- --   23 1900 116/61 98 6 °F (37 °C) -- 71 -- 92 % --   23 1507 116/60 98 3 °F (36 8 °C) Oral 67 16 96 % --   23 1112 112/60 97 9 °F (36 6 °C) -- 65 16 97 % --   23 0938 -- -- -- -- -- 92 % --    Body mass index is 23 69 kg/m²  Physical Exam:  Physical Exam  Cardiovascular:      Rate and Rhythm: Normal rate and regular rhythm  Comments: Elevated JVP  Pulmonary:      Effort: Pulmonary effort is normal       Breath sounds: Rales present  Musculoskeletal:      Right lower leg: No edema  Left lower leg: No edema  Skin:     General: Skin is warm  Neurological:      Mental Status: He is alert  Invasive Devices     Peripheral Intravenous Line  Duration           Peripheral IV 23 Dorsal (posterior); Right Hand 2 days          Drain  Duration           Urethral Catheter Coude 16 Fr  5 days                    Labs:   Results from last 7 days   Lab Units 23  0451 23  0500 23  0458 23  1349   WBC Thousand/uL 6 90 4 79 6 00 5 52   HEMOGLOBIN g/dL 11 5* 12 0 11 9* 12 6   HEMATOCRIT % 38 1 38 3 38 2 41 2   PLATELETS Thousands/uL 246 221 236 218   NEUTROS PCT %  --  63 71 72   MONOS PCT %  --  12 9 9      Results from last 7 days   Lab Units 02/14/23  0451 02/13/23  0436 02/12/23  0500 02/11/23  0432 02/10/23  0935 02/09/23  0458 02/08/23  1349   SODIUM mmol/L 134* 134* 137 136 137 140 136   POTASSIUM mmol/L 4 9 4 1 3 7 3 9 4 1 3 8 3 9   CHLORIDE mmol/L 94* 94* 97 100 100 104 100   CO2 mmol/L 36* 38* 33* 32 32 31 29   BUN mg/dL 30* 29* 25 25 21 18 18   CREATININE mg/dL 1 00 0 98 0 87 0 94 0 88 0 80 0 89   CALCIUM mg/dL 8 8 8 9 8 8 8 5 8 6 8 2* 8 9   ALK PHOS U/L  --   --   --   --   --  97 126*   ALT U/L  --   --   --   --   --  5* 7   AST U/L  --   --   --   --   --  10* 11*   MAGNESIUM mg/dL 1 9 1 9 1 9 1 7  --  1 9 1 6*   PHOSPHORUS mg/dL  --   --   --   --   --  2 7 3 0   INR   --   --   --   --   --   --  1 21*   PTT seconds  --   --   --   --   --   --  32   EGFR ml/min/1 73sq m 72 74 83 78 83 86 83     Results from last 7 days   Lab Units 02/08/23  1349   INR  1 21*   PTT seconds 32     Results from last 7 days   Lab Units 02/08/23  1349   LACTIC ACID mmol/L 1 6         No results found for: PHART, MRR4MRO, PO2ART, ZQF6MLA, H5JVEHZU, BEART, SOURCE  No components found for: HIV1X2  Lab Results   Component Value Date    HEPBIGM Non-reactive 02/12/2023    HEPBCAB Non-reactive 02/12/2023    HEPCAB Non-reactive 02/12/2023     No results found for: SPEP, UPEP   Lab Results   Component Value Date    HGBA1C 12 0 (H) 02/08/2023    HGBA1C 9 2 (H) 06/23/2021     No results found for: CHOL   Lab Results   Component Value Date    HDL 36 (L) 02/11/2023      Lab Results   Component Value Date    LDLCALC 61 02/11/2023      Lab Results   Component Value Date    TRIG 57 02/11/2023     No components found for: PROCAL  Results from last 7 days   Lab Units 02/08/23  1349   BNP pg/mL 3,345*     Micro:  Results from last 7 days   Lab Units 02/11/23  0001 02/08/23  1617 02/08/23  1349 BLOOD CULTURE   --   --  No Growth After 5 Days  No Growth After 5 Days  GRAM STAIN RESULT  4+ Gram positive rods*  Rare Gram positive cocci in clusters*  --   --    URINE CULTURE   --  No Growth <1000 cfu/mL  --    WOUND CULTURE  4+ Growth of Staphylococcus aureus*  4+ Growth of  --   --      Urinalysis:  Lab Results   Component Value Date    BDZUR Negative 02/13/2023    COCAINEUR Negative 02/13/2023    OPIATEUR Negative 02/13/2023    PCPUR Negative 02/13/2023    THCUR Negative 02/13/2023      Results from last 7 days   Lab Units 02/08/23  1617   COLOR UA  Yellow   CLARITY UA  Clear   SPEC GRAV UA  1 025   PH UA  5 5   LEUKOCYTES UA  Elevated glucose may cause decreased leukocyte values  See urine microscopic for [facility] result/*   NITRITE UA  Negative   GLUCOSE UA mg/dl >=1000 (1%)*   KETONES UA mg/dl Trace*   BILIRUBIN UA  Negative   BLOOD UA  Trace-Intact*      Results from last 7 days   Lab Units 02/08/23  1617   RBC UA /hpf 2-4   WBC UA /hpf None Seen   EPITHELIAL CELLS WET PREP /hpf Occasional   BACTERIA UA /hpf Occasional      Intake and Outputs:  I/O       02/12 0701  02/13 0700 02/13 0701  02/14 0700 02/14 0701  02/15 0700    P  O  720 1440     Total Intake(mL/kg) 720 (9 4) 1440 (19 2)     Urine (mL/kg/hr) 3775 (2 1) 3650 (2)     Stool 0      Total Output 3775 3650     Net -3055 -2210            Unmeasured Stool Occurrence 1 x          Nutrition:  Diet NPO; Sips of clear liquids  Diet NPO  Radiology Results:   No orders to display     Scheduled Medications:  collagenase, , Daily  docusate sodium, 100 mg, BID  heparin (porcine), 5,000 Units, Q8H [address]  insulin glargine, 5 Units, HS  insulin lispro, 1-6 Units, TID AC  insulin lispro, 1-6 Units, HS  metoprolol succinate, 50 mg, Q12H [place]  potassium chloride, 40 mEq, TID With Meals  tamsulosin, 0 4 mg, Daily With Dinner      PRN MEDS:  acetaminophen, 650 mg, Q6H PRN  calcium carbonate, 1,000 mg, Daily PRN  ondansetron, 4 mg, Q6H PRN  polyethylene glycol, 17 g, Daily PRN      Last 24 Hour Meds: :   Medication Administration - last 24 hours from 02/13/2023 0909 to 02/14/2023 6078       Date/Time Order Dose Route Action Action by     02/14/2023 0517 EST heparin (porcine) subcutaneous injection 5,000 Units 5,000 Units Subcutaneous Given Gill Torres RN     02/13/2023 2117 EST heparin (porcine) subcutaneous injection 5,000 Units 5,000 Units Subcutaneous Given Gill Torres RN     02/13/2023 1425 EST heparin (porcine) subcutaneous injection 5,000 Units 5,000 Units Subcutaneous Given Fátima Mahoney RN     02/14/2023 0856 EST insulin lispro (HumaLOG) 100 units/mL subcutaneous injection 1-6 Units 1 Units Subcutaneous Not Given Fátima Mahoney RN     02/13/2023 1816 EST insulin lispro (HumaLOG) 100 units/mL subcutaneous injection 1-6 Units 2 Units Subcutaneous Given Fátima Mahoney RN     02/13/2023 1425 EST insulin lispro (HumaLOG) 100 units/mL subcutaneous injection 1-6 Units 3 Units Subcutaneous Given Fátima Mahoney RN     02/13/2023 3391 EST insulin lispro (HumaLOG) 100 units/mL subcutaneous injection 1-6 Units 2 Units Subcutaneous Given Fátima Mahoney RN     02/13/2023 2128 EST insulin lispro (HumaLOG) 100 units/mL subcutaneous injection 1-6 Units 4 Units Subcutaneous Given Gill Torres RN     02/13/2023 1815 EST tamsulosin (FLOMAX) capsule 0 4 mg 0 4 mg Oral Given Fátima Mahoney RN     02/13/2023 1540 EST cephalexin (KEFLEX) capsule 500 mg 500 mg Oral Given Fátima Mahoney RN     02/13/2023 2119 EST insulin glargine (LANTUS) subcutaneous injection 5 Units 0 05 mL 5 Units Subcutaneous Given Gill Torres RN     02/14/2023 7957 EST docusate sodium (COLACE) capsule 100 mg 100 mg Oral Given Fátima Mahoney RN     02/13/2023 1815 EST docusate sodium (COLACE) capsule 100 mg 100 mg Oral Given Fátima Mahoney RN     02/13/2023 6390 EST docusate sodium (COLACE) capsule 100 mg 100 mg Oral Given Fátima Mahoney RN     02/14/2023 0857 EST collagenase (SANTYL) ointment -- Topical Given Wallie Crumble, RN     02/13/2023 7935 EST collagenase (SANTYL) ointment 1 application Topical Given Wallie Crumble, RN     02/14/2023 9660 EST metoprolol succinate (TOPROL-XL) 24 hr tablet 50 mg 50 mg Oral Given Wallie Crumble, RN     02/13/2023 2116 EST metoprolol succinate (TOPROL-XL) 24 hr tablet 50 mg 50 mg Oral Given Chantalebert Dia, RN     02/13/2023 9644 EST metoprolol succinate (TOPROL-XL) 24 hr tablet 50 mg 50 mg Oral Given Wallie Crumble, RN     02/13/2023 1611 EST losartan (COZAAR) tablet 12 5 mg 12 5 mg Oral Given Wallie Crumble, RN     02/14/2023 0854 EST potassium chloride (K-DUR,KLOR-CON) CR tablet 40 mEq 40 mEq Oral Given Wallie Crumble, RN     02/13/2023 1815 EST potassium chloride (K-DUR,KLOR-CON) CR tablet 40 mEq 40 mEq Oral Given Wallie Crumble, RN     02/13/2023 1424 EST potassium chloride (K-DUR,KLOR-CON) CR tablet 40 mEq 40 mEq Oral Given Wallie Crumble, RN     02/13/2023 0936 EST potassium chloride (K-DUR,KLOR-CON) CR tablet 40 mEq 40 mEq Oral Given Wallie Crumble, RN     02/14/2023 0517 EST cephalexin (KEFLEX) capsule 500 mg 500 mg Oral Given Payton Alvares, RN     02/13/2023 2116 EST cephalexin (KEFLEX) capsule 500 mg 500 mg Oral Given Payton Alvares, RN          PLEASE NOTE:  This encounter was completed utilizing the [employer] Direct Speech Voice Recognition Software  Grammatical errors, random word insertions, pronoun errors and incomplete sentences are occasional consequences of the system due to software limitations, ambient noise and hardware issues  These may be missed by proof reading prior to affixing electronic signature  Any questions or concerns about the content, text or information contained within the body of this dictation should be directly addressed to the physician for clarification  Please do not hesitate to call me directly if you have any any questions or concerns

## 2023-02-14 NOTE — ASSESSMENT & PLAN NOTE
· Left foot diabetic ulcer POA to [facility]  · Evaluated by podiatry, local wound care with Santyl/DSD and Allevyn foam dressings to right leg blisters  · WBAT  · Completed 7 days Keflex for possible mild cellulitis

## 2023-02-14 NOTE — PLAN OF CARE
Problem: Prexisting or High Potential for Compromised Skin Integrity  Goal: Skin integrity is maintained or improved  Description: INTERVENTIONS:  - Identify patients at risk for skin breakdown  - Assess and monitor skin integrity  - Assess and monitor nutrition and hydration status  - Monitor labs   - Assess for incontinence   - Turn and reposition patient  - Assist with mobility/ambulation  - Relieve pressure over bony prominences  - Avoid friction and shearing  - Provide appropriate hygiene as needed including keeping skin clean and dry  - Evaluate need for skin moisturizer/barrier cream  - Collaborate with interdisciplinary team   - Patient/family teaching  - Consider wound care consult   Outcome: Progressing     Problem: MOBILITY - ADULT  Goal: Maintain or return to baseline ADL function  Description: INTERVENTIONS:  -  Assess patient's ability to carry out ADLs; assess patient's baseline for ADL function and identify physical deficits which impact ability to perform ADLs (bathing, care of mouth/teeth, toileting, grooming, dressing, etc )  - Assess/evaluate cause of self-care deficits   - Assess range of motion  - Assess patient's mobility; develop plan if impaired  - Assess patient's need for assistive devices and provide as appropriate  - Encourage maximum independence but intervene and supervise when necessary  - Involve family in performance of ADLs  - Assess for home care needs following discharge   - Consider OT consult to assist with ADL evaluation and planning for discharge  - Provide patient education as appropriate  Outcome: Progressing  Goal: Maintains/Returns to pre admission functional level  Description: INTERVENTIONS:  - Perform BMAT or MOVE assessment daily    - Set and communicate daily mobility goal to care team and patient/family/caregiver  - Collaborate with rehabilitation services on mobility goals if consulted  - Perform Range of Motion  times a day    - Reposition patient every hours   - Dangle patient  times a day  - Stand patient  times a day  - Ambulate patient  times a day  - Out of bed to chair  times a day   - Out of bed for meals  times a day  - Out of bed for toileting  - Record patient progress and toleration of activity level   Outcome: Progressing     Problem: PAIN - ADULT  Goal: Verbalizes/displays adequate comfort level or baseline comfort level  Description: Interventions:  - Encourage patient to monitor pain and request assistance  - Assess pain using appropriate pain scale  - Administer analgesics based on type and severity of pain and evaluate response  - Implement non-pharmacological measures as appropriate and evaluate response  - Consider cultural and social influences on pain and pain management  - Notify physician/advanced practitioner if interventions unsuccessful or patient reports new pain  Outcome: Progressing     Problem: INFECTION - ADULT  Goal: Absence or prevention of progression during hospitalization  Description: INTERVENTIONS:  - Assess and monitor for signs and symptoms of infection  - Monitor lab/diagnostic results  - Monitor all insertion sites, i e  indwelling lines, tubes, and drains  - Monitor endotracheal if appropriate and nasal secretions for changes in amount and color  - [place] appropriate cooling/warming therapies per order  - Administer medications as ordered  - Instruct and encourage patient and family to use good hand hygiene technique  - Identify and instruct in appropriate isolation precautions for identified infection/condition  Outcome: Progressing  Goal: Absence of fever/infection during neutropenic period  Description: INTERVENTIONS:  - Monitor WBC    Outcome: Progressing     Problem: SAFETY ADULT  Goal: Maintain or return to baseline ADL function  Description: INTERVENTIONS:  -  Assess patient's ability to carry out ADLs; assess patient's baseline for ADL function and identify physical deficits which impact ability to perform ADLs (bathing, care of mouth/teeth, toileting, grooming, dressing, etc )  - Assess/evaluate cause of self-care deficits   - Assess range of motion  - Assess patient's mobility; develop plan if impaired  - Assess patient's need for assistive devices and provide as appropriate  - Encourage maximum independence but intervene and supervise when necessary  - Involve family in performance of ADLs  - Assess for home care needs following discharge   - Consider OT consult to assist with ADL evaluation and planning for discharge  - Provide patient education as appropriate  Outcome: Progressing  Goal: Maintains/Returns to pre admission functional level  Description: INTERVENTIONS:  - Perform BMAT or MOVE assessment daily    - Set and communicate daily mobility goal to care team and patient/family/caregiver  - Collaborate with rehabilitation services on mobility goals if consulted  - Perform Range of Motion  times a day  - Reposition patient every  hours    - Dangle patient  times a day  - Stand patient  times a day  - Ambulate patient  times a day  - Out of bed to chair  times a day   - Out of bed for meals  times a day  - Out of bed for toileting  - Record patient progress and toleration of activity level   Outcome: Progressing  Goal: Patient will remain free of falls  Description: INTERVENTIONS:  - Educate patient/family on patient safety including physical limitations  - Instruct patient to call for assistance with activity   - Consult OT/PT to assist with strengthening/mobility   - Keep Call bell within reach  - Keep bed low and locked with side rails adjusted as appropriate  - Keep care items and personal belongings within reach  - Initiate and maintain comfort rounds  - Make Fall Risk Sign visible to staff  - Offer Toileting every  Hours, in advance of need  - Initiate/Maintain alarm  - Obtain necessary fall risk management equipment:  - Apply yellow socks and bracelet for high fall risk patients  - Consider moving patient to room near nurses station  Outcome: Progressing     Problem: DISCHARGE PLANNING  Goal: Discharge to home or other facility with appropriate resources  Description: INTERVENTIONS:  - Identify barriers to discharge w/patient and caregiver  - Arrange for needed discharge resources and transportation as appropriate  - Identify discharge learning needs (meds, wound care, etc )  - Arrange for interpretive services to assist at discharge as needed  - Refer to Case Management Department for coordinating discharge planning if the patient needs post-hospital services based on physician/advanced practitioner order or complex needs related to functional status, cognitive ability, or social support system  Outcome: Progressing     Problem: Knowledge Deficit  Goal: Patient/family/caregiver demonstrates understanding of disease process, treatment plan, medications, and discharge instructions  Description: Complete learning assessment and assess knowledge base  Interventions:  - Provide teaching at level of understanding  - Provide teaching via preferred learning methods  Outcome: Progressing     Problem: Nutrition/Hydration-ADULT  Goal: Nutrient/Hydration intake appropriate for improving, restoring or maintaining nutritional needs  Description: Monitor and assess patient's nutrition/hydration status for malnutrition  Collaborate with interdisciplinary team and initiate plan and interventions as ordered  Monitor patient's weight and dietary intake as ordered or per policy  Utilize nutrition screening tool and intervene as necessary  Determine patient's food preferences and provide high-protein, high-caloric foods as appropriate       INTERVENTIONS:  - Monitor oral intake, urinary output, labs, and treatment plans  - Assess nutrition and hydration status and recommend course of action  - Evaluate amount of meals eaten  - Assist patient with eating if necessary   - Allow adequate time for meals  - Recommend/ encourage appropriate diets, oral nutritional supplements, and vitamin/mineral supplements  - Order, calculate, and assess calorie counts as needed  - Recommend, monitor, and adjust tube feedings and TPN/PPN based on assessed needs  - Assess need for intravenous fluids  - Provide specific nutrition/hydration education as appropriate  - Include patient/family/caregiver in decisions related to nutrition  Outcome: Progressing

## 2023-02-14 NOTE — CONSULTS
Consultation - Cardiothoracic Surgery   Saúl Peng 69 y o  male MRN: [id number]  Unit/Bed#: -01 Encounter: [id number]    Physician Requesting Consult: Kate Lynne MD    Reason for Consult / Principal Problem: MVCAD, LVEF 25%    Inpatient consult to Cardiothoracic Surgery  Consult performed by: Luis E Zaldivar PA-C  Consult ordered by: Db Vaughn DO        History of Present Illness: Saúl Peng is a 69y o  year old male with a past medical history notable for medication noncompliance in a IDDM (A1c 12), HTN, HL, diabetic neuropathy with newly diagnosed (he was unaware) of left top foot ulcer who presented to the hospital in acute systolic CHF with increase in LE edema, abdominal bloating, scrotal edema, SOB and orthopnea over the previous 6 months  He tells me he has not visited a medical physician in an outpatient setting for months to years  He lives at home alone with his dog  He has been not doing well for the previous 6 months  He has unintentional weight loss of about 60 pounds in the previous 4 - 6 months  He was admitted to the hospital on 2/8/23 and was transferred to [facility] for cardiac catheterization and heart failure evaluation with his newly diagnosed LVEF 25%  Cardiac catheterization had MVCAD  He is seen in consultation following his cardiac catheterization  He does not know how long his foot has had the ulcer  He has been carding for himself for months  He was  previously, now  and does not have anyone to help him  He is a non smoker, non drinker, no illegal drug use  He has been retired from [employer] as a        Past Medical History:  Past Medical History:   Diagnosis Date   • 6th nerve palsy    • Blister of right leg    • BPH (benign prostatic hyperplasia)    • CAD (coronary artery disease)    • Cardiomyopathy (HCC)     EF 25%   • CHF (congestive heart failure) ([facility] [address] )    • Depression    • Diabetes mellitus [facility]     type 2, insulin dependent   • Diabetic foot ulcer ([facility] [address] )     left, local wound care   • Diabetic retinopathy ([facility] [address] )    • Exposure to Agent Orange     while serving in [employer]   • Former tobacco use    • H/O urinary retention     w/ reagan placement   • Hyperlipidemia    • Hypertension    • Rupture of biceps tendon, traumatic 2017    right     Past Surgical History:   Past Surgical History:   Procedure Laterality Date   • CARDIAC CATHETERIZATION     • CATARACT EXTRACTION, BILATERAL Bilateral        Family History:  Family History   Problem Relation Age of Onset   • No Known Problems Sister    • Other Daughter         chronic neck pain   • Other Son         chronic back pain   • Sudden death Neg Hx    • Cancer Neg Hx      Social History:  Social History     Substance and Sexual Activity   Alcohol Use Not Currently    Comment: Denies history of heavy alcohol use  At most will drink 1 or 2 drinks in a year (Updated 2023)  Social History     Substance and Sexual Activity   Drug Use Never     Social History     Tobacco Use   Smoking Status Former   • Packs/day: 1 00   • Years: 6    • Pack years: 6    • Types: Cigarettes   • Start date:    • Quit date: 12   • Years since quittin 1   Smokeless Tobacco Never     Marital Status:     Home Medications:   Prior to Admission medications    Medication Sig Start Date End Date Taking?  Authorizing Provider   potassium chloride (K-DUR,KLOR-CON) 20 mEq tablet Take 1 tablet (20 mEq total) by mouth daily for 4 days 21  Shilo Veronica DO       Inpatient Medications:  Scheduled Meds:   Current Facility-Administered Medications   Medication Dose Route Frequency Provider Last Rate   • acetaminophen  650 mg Oral Q6H PRN Justice Combs, DO     • aspirin  81 mg Oral Daily Justice Combs, DO     • atorvastatin  40 mg Oral Daily With Adrian Mackey DO     • calcium carbonate  1,000 mg Oral Daily PRN Justice Combs, DO     • collagenase   Topical Daily Shakira Miller, DO     • docusate sodium  100 mg Oral BID [place]ry[place] Spanglerum, DO     • heparin (porcine)  5,000 Units Subcutaneous [facility] [place]ryder Spanglerum, DO     • insulin glargine  5 Units Subcutaneous HS Shakira Angela, DO     • insulin lispro  1-6 Units Subcutaneous TID AC [place]ryder Spanglerum, DO     • insulin lispro  1-6 Units Subcutaneous HS Shakira Angela, DO     • insulin lispro  2 Units Subcutaneous TID With Meals JANIS Maxwell     • metoprolol succinate  50 mg Oral Q12H [address] Shakira Angela, DO     • ondansetron  4 mg Intravenous Q6H PRN [place] Angela, DO     • polyethylene glycol  17 g Oral Daily PRN [place] Angela, DO     • sodium chloride  50 mL/hr Intravenous Continuous Shakira Angela, DO 50 mL/hr (02/14/23 1430)   • tamsulosin  0 4 mg Oral Daily With Dinner Shakira Miller, DO       Continuous Infusions: sodium chloride, 50 mL/hr, Last Rate: 50 mL/hr (02/14/23 1430)      PRN Meds:  acetaminophen, 650 mg, Q6H PRN  calcium carbonate, 1,000 mg, Daily PRN  ondansetron, 4 mg, Q6H PRN  polyethylene glycol, 17 g, Daily PRN        Allergies: Allergies   Allergen Reactions   • Metformin Diarrhea       Review of Systems:  Review of Systems   Constitutional: Positive for activity change, appetite change, fatigue and unexpected weight change  HENT: Negative  Eyes: Negative  Respiratory: Positive for chest tightness and shortness of breath  Cardiovascular: Positive for leg swelling  Gastrointestinal: Positive for abdominal distention and nausea  Endocrine: Negative  Genitourinary: Positive for difficulty urinating and scrotal swelling  Musculoskeletal: Negative  Skin: Positive for wound  Allergic/Immunologic: Negative  Neurological: Positive for weakness  Hematological: Negative  Psychiatric/Behavioral: Negative          Vital Signs:     Vitals:    02/14/23 0600 02/14/23 0718 02/14/23 1123 02/14/23 1448   BP:  122/56  106/72   BP Location:  Right arm     Pulse:  65 60 (!) 53   Resp:  14  16   Temp:  97 6 °F (36 4 °C)     TempSrc:  Oral     SpO2:  98%  95%   Weight: 74 9 kg (165 lb 1 6 oz)      Height:         Invasive Devices     Peripheral Intravenous Line  Duration           Peripheral IV 02/11/23 Dorsal (posterior); Right Hand 2 days    Peripheral IV 02/14/23 Dorsal (posterior); Left Forearm <1 day          Drain  Duration           Urethral Catheter Coude 16 Fr  5 days                Physical Exam:  Physical Exam  Constitutional:       Appearance: He is normal weight  He is ill-appearing  Comments: Pale and malnourished    HENT:      Head: Normocephalic and atraumatic  Right Ear: External ear normal       Left Ear: External ear normal       Nose: Nose normal       Mouth/Throat:      Mouth: Mucous membranes are moist       Pharynx: Oropharynx is clear  Eyes:      General: No scleral icterus  Right eye: No discharge  Left eye: No discharge  Extraocular Movements: Extraocular movements intact  Conjunctiva/sclera: Conjunctivae normal       Pupils: Pupils are equal, round, and reactive to light  Cardiovascular:      Rate and Rhythm: Normal rate and regular rhythm  Pulses: Normal pulses  Heart sounds: No murmur heard  Pulmonary:      Effort: Pulmonary effort is normal  No respiratory distress  Breath sounds: Normal breath sounds  No wheezing or rales  Abdominal:      General: Abdomen is flat  Bowel sounds are normal  There is no distension  Tenderness: There is no abdominal tenderness  There is no guarding  Musculoskeletal:         General: Normal range of motion  Cervical back: Normal range of motion and neck supple  Right lower leg: No edema  Left lower leg: No edema  Skin:     Findings: Lesion present  Comments: Left top of food wound, ulceration   Thin, hairless legs bilaterally   Neurological:      General: No focal deficit present  Mental Status: He is alert and oriented to person, place, and time        Sensory: No sensory deficit  Psychiatric:         Mood and Affect: Mood normal          Thought Content: Thought content normal          Judgment: Judgment normal          Lab Results:     Results from last 7 days   Lab Units 02/14/23  0451 02/12/23  0500 02/09/23  0458   WBC Thousand/uL 6 90 4 79 6 00   HEMOGLOBIN g/dL 11 5* 12 0 11 9*   HEMATOCRIT % 38 1 38 3 38 2   PLATELETS Thousands/uL 246 221 236     Results from last 7 days   Lab Units 02/14/23  0451 02/13/23  0436 02/12/23  0500   POTASSIUM mmol/L 4 9 4 1 3 7   CHLORIDE mmol/L 94* 94* 97   CO2 mmol/L 36* 38* 33*   BUN mg/dL 30* 29* 25   CREATININE mg/dL 1 00 0 98 0 87   CALCIUM mg/dL 8 8 8 9 8 8     Results from last 7 days   Lab Units 02/08/23  1349   INR  1 21*   PTT seconds 32     Lab Results   Component Value Date    HGBA1C 12 0 (H) 02/08/2023     Lab Results   Component Value Date    CKTOTAL 61 02/08/2023       Imaging Studies:     Cardiac Catheterization:   Impression       •  Ost LM lesion is 50% stenosed  •  Prox LAD lesion is 60% stenosed  •  Mid LAD lesion is 70% stenosed  •  Dist LAD lesion is 50% stenosed  •  Mid Cx lesion is 100% stenosed  •  1st Diag lesion is 70% stenosed  •  Dist RCA lesion is 90% stenosed  •  Prox RCA lesion is 70% stenosed      3v CAD     Echocardiogram:   Findings    Left Ventricle Left ventricular cavity size is moderately dilated  Wall thickness is increased  There is borderline concentric hypertrophy  The left ventricular ejection fraction is 25%  Systolic function is severely reduced  There is severe global hypokinesis with regional variation  Right Ventricle Right ventricular cavity size is normal  Systolic function is normal  Wall thickness is normal    Left Atrium The atrium is mildly dilated  Right Atrium The atrium is normal in size  Aortic Valve The aortic valve is trileaflet  The leaflets are not thickened  The leaflets are not calcified  The leaflets exhibit normal mobility  There is no evidence of regurgitation  The aortic valve has no significant stenosis  Mitral Valve Mitral valve structure is normal  There is mild regurgitation  There is no evidence of stenosis  Tricuspid Valve Tricuspid valve structure is normal  There is mild regurgitation  There is no evidence of stenosis  The estimated right ventricular systolic pressure is 93 64 mmHg  Pulmonic Valve Pulmonic valve structure is normal  There is no evidence of regurgitation  There is no evidence of stenosis  Ascending Aorta The aortic root is normal in size  IVC/SVC The right atrial pressure is estimated at 8 0 mmHg  The inferior vena cava is normal in size  Pericardium There is no pericardial effusion  The pericardium is normal in appearance  There is a large left pleural effusion  I have personally reviewed pertinent reports  Assessment:  Principal Problem:    Acute systolic congestive heart failure (HCC)  Active Problems:    Type 2 diabetes mellitus with hyperglycemia, with long-term current use of insulin (HCC)    Foot ulcer (HCC)    Urinary retention    Unintentional weight loss    CAD (coronary artery disease)    Severe coronary artery disease with LVEF 25%; Ongoing CABG workup    Plan:  Preoperative testing ordered  Will discuss patient with Dr Ayala Appl  He appears high risk for CABG with concomitant low LVEF given his multiple comorbidities including lack of diabetic management for a considerable amount of time, significant bilateral neuropathy and lack of knowledge about a foot ulcer, unintentional weight loss and poor overall awareness of his chronic medical conditions  Upon first evaluation he may have a lack of conduit as well given his examination so bilateral legs  Bertin Main was comfortable with our recommendations, and their questions were answered to their satisfaction  We will continue to evaluate the patient daily with further recommendations as work up is completed    Thank you for allowing us to participate in the care of this patient  SIGNATURE: Ivis Jack, [place]  DATE: February 14, 2023  TIME: 2:52 PM    * This note was completed in part utilizing m-modal fluency direct voice recognition software  Grammatical errors, random word insertion, spelling mistakes, and incomplete sentences may be an occasional consequence of the system secondary to software limitations, ambient noise and hardware issues  At the time of dictation, efforts were made to edit, clarify and /or correct errors  Please read the chart carefully and recognize, using context, where substitutions have occurred  If you have any questions or concerns about the context, text or information contained within the body of this dictation, please contact myself, the provider, for further clarification

## 2023-02-14 NOTE — PROGRESS NOTES
[facility]  Progress Note Mica Tinoco 1945, 69 y o  male MRN: [id number]  Unit/Bed#: MS [place]-01 Encounter: [id number]  Primary Care Provider: No primary care provider on file  Date and time admitted to hospital: 2/10/2023 11:38 PM    * Acute systolic congestive heart failure (HCC)  Assessment & Plan  Wt Readings from Last 3 Encounters:   02/14/23 74 9 kg (165 lb 1 6 oz)   02/09/23 82 1 kg (181 lb)   06/24/21 68 6 kg (151 lb 3 8 oz)   Patient presented with volume overload noted with bilateral lower extreme edema, dyspnea on exertion, scrotal swelling at home  Appeared grossly volume overloaded at time of admission  miners with CXR revealing vascular congestion for which IV diuresis was initiated  Transferred to [facility] for heart failure evaluation  · Echocardiogram at [facility] revealing new EF 25%  · Heart failure following, status post Lasix drip  Diuretics per heart failure team   · Continue medical management with beta-blocker  Losartan on hold for cardiac catheterization  · 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, see below  · I's and O's, daily weights, low-sodium diet  · Monitor volume status  · Will need close outpatient follow-up  · PT/OT recommending rehab  CM following  CAD (coronary artery disease)  Assessment & Plan  · [facility] 2/14 showed triple vessel disease  CTS consult pending    Foot ulcer ([facility] [address] )  Assessment & Plan  · Left foot diabetic ulcer POA to  [place]  · Evaluated by podiatry, local wound care with Santyl/DSD and Allevyn foam dressings to right leg blisters  · WBAT  · Completed 7 days Keflex for possible mild cellulitis       Type 2 diabetes mellitus with hyperglycemia, with long-term current use of insulin [facility])  Assessment & Plan  Lab Results   Component Value Date    HGBA1C 12 0 (H) 02/08/2023       Recent Labs     02/13/23  1112 02/13/23  1714 02/13/23  2126 02/14/23  0602   POCGLU 245* 215* 281* 187*       Uncontrolled as outpatient due to lack of insulin therapy outpatient  · Continue Lantus 5 units QHS + humalog 2 units 3 times daily with meals plus SSI  · Would likely benefit from Jardiance given heart failure  · Monitor accuchecks and adjust regimen as needed  · Given plan for possible CT surgery, will consult endocrinology  · Anticipate will need insulin on discharge  Unintentional weight loss  Assessment & Plan  Possibly in setting of newly diagnosed heart failure however has lost appx 60 lbs unintentionally in the last 3-4 months  · Has never had colonoscopy  · Check CT C/A/P    Urinary retention  Assessment & Plan  Found with significant urinary retention during ED evaluation at [address] [place] [address] requiring Mensah catheter placement  · Seen by urology at [address] [place] [address], suspected secondary to severe edema with component of BPH as well  · Mensah catheter and continue Flomax  · Void trial in appx 10 days around 2/19, can be done here or as outpatient  VTE Pharmacologic Prophylaxis:   Moderate Risk (Score 3-4) - Pharmacological DVT Prophylaxis Ordered: heparin  Patient Centered Rounds: I performed bedside rounds with nursing staff today  Discussions with Specialists or Other Care Team Provider: nursing, case management     Education and Discussions with Family / Patient: Updated  (daughter) via phone  Total Time Spent on Date of Encounter in care of patient: 35 minutes This time was spent on one or more of the following: performing physical exam; counseling and coordination of care; obtaining or reviewing history; documenting in the medical record; reviewing/ordering tests, medications or procedures; communicating with other healthcare professionals and discussing with patient's family/caregivers      Current Length of Stay: 4 day(s)  Current Patient Status: Inpatient   Certification Statement: The patient will continue to require additional inpatient hospital stay due to heart failure re-evaluation, monitor bg checks, PT/OT evaluations   Discharge Plan: Anticipate discharge in 48-72 hrs to discharge location to be determined pending rehab evaluations  Code Status: Level 3 - DNAR and DNI    Subjective:   Patient offers no acute complaints  No chest pain, shortness of breath  We discussed his recent weight loss  He states that he has lost approximately 60 pounds in the last 3 to 4 months, unintentional   Denies any nausea, vomiting, recent constipation or issues with diarrhea  Reports that he has never had a colonoscopy  Objective:     Vitals:   Temp (24hrs), Av 3 °F (36 8 °C), Min:97 6 °F (36 4 °C), Max:98 8 °F (37 1 °C)    Temp:  [97 6 °F (36 4 °C)-98 8 °F (37 1 °C)] 97 6 °F (36 4 °C)  HR:  [60-71] 60  Resp:  [14-16] 14  BP: (105-124)/(33-62) 122/56  SpO2:  [92 %-98 %] 98 %  Body mass index is 23 69 kg/m²  Input and Output Summary (last 24 hours): Intake/Output Summary (Last 24 hours) at 2023 1431  Last data filed at 2023 0830  Gross per 24 hour   Intake 1200 ml   Output 3950 ml   Net -2750 ml       Physical Exam:   Physical Exam  Vitals and nursing note reviewed  Constitutional:       General: He is not in acute distress  Cardiovascular:      Rate and Rhythm: Bradycardia present  Heart sounds: No murmur heard  Pulmonary:      Breath sounds: Decreased breath sounds present  Genitourinary:     Comments: Mensah catheter draining clear yellow urine  Musculoskeletal:         General: Swelling (trace BLLE edema) present  Skin:     General: Skin is warm  Coloration: Skin is pale  Neurological:      Mental Status: He is alert and oriented to person, place, and time  Mental status is at baseline     Psychiatric:         Mood and Affect: Mood normal           Additional Data:     Labs:  Results from last 7 days   Lab Units 23  0451 23  0500   WBC Thousand/uL 6 90 4 79   HEMOGLOBIN g/dL 11 5* 12 0   HEMATOCRIT % 38 1 38 3 PLATELETS Thousands/uL 246 221   NEUTROS PCT %  --  63   LYMPHS PCT %  --  22   MONOS PCT %  --  12   EOS PCT %  --  3     Results from last 7 days   Lab Units 02/14/23  0451 02/10/23  0935 02/09/23  0458   SODIUM mmol/L 134*   < > 140   POTASSIUM mmol/L 4 9   < > 3 8   CHLORIDE mmol/L 94*   < > 104   CO2 mmol/L 36*   < > 31   BUN mg/dL 30*   < > 18   CREATININE mg/dL 1 00   < > 0 80   ANION GAP mmol/L 4   < > 5   CALCIUM mg/dL 8 8   < > 8 2*   ALBUMIN g/dL  --   --  2 9*   TOTAL BILIRUBIN mg/dL  --   --  0 96   ALK PHOS U/L  --   --  97   ALT U/L  --   --  5*   AST U/L  --   --  10*   GLUCOSE RANDOM mg/dL 176*   < > 60*    < > = values in this interval not displayed  Results from last 7 days   Lab Units 02/08/23  1349   INR  1 21*     Results from last 7 days   Lab Units 02/14/23  0602 02/13/23  2126 02/13/23  1714 02/13/23  1112 02/13/23  7062 02/12/23  2032 02/12/23  1700 02/12/23  1156 02/12/23  0611 02/11/23  2119 02/11/23  1646 02/11/23  1206   POC GLUCOSE mg/dl 187* 281* 215* 245* 225* 311* 268* 185* 106 314* 291* 277*     Results from last 7 days   Lab Units 02/08/23  1349   HEMOGLOBIN A1C % 12 0*     Results from last 7 days   Lab Units 02/09/23  0458 02/08/23  1349   LACTIC ACID mmol/L  --  1 6   PROCALCITONIN ng/ml <0 05 <0 05       Lines/Drains:  Invasive Devices     Peripheral Intravenous Line  Duration           Peripheral IV 02/11/23 Dorsal (posterior); Right Hand 2 days    Peripheral IV 02/14/23 Dorsal (posterior); Left Forearm <1 day          Drain  Duration           Urethral Catheter Coude 16 Fr  5 days              Urinary Catheter:  Goal for removal: Voiding trial when ambulation improves           Telemetry:  Telemetry Orders (From admission, onward)             48 Hour Telemetry Monitoring  Continuous x 48 hours        References:    Telemetry Guidelines   Question:  Reason for 48 Hour Telemetry  Answer:  Acute Decompensated CHF (continuous diuretic infusion or total diuretic dose > 200 mg daily, associated electrolyte derangement, ionotropic drip, history of ventricular arrhythmia, or new EF <35%)                 Telemetry Reviewed: Sinus Bradycardia  Indication for Continued Telemetry Use: Arrthymias requiring medical therapy             Imaging: No pertinent imaging reviewed  Recent Cultures (last 7 days):   Results from last 7 days   Lab Units 02/11/23  0001 02/08/23  1617 02/08/23  1349   BLOOD CULTURE   --   --  No Growth After 5 Days  No Growth After 5 Days     GRAM STAIN RESULT  4+ Gram positive rods*  Rare Gram positive cocci in clusters*  --   --    URINE CULTURE   --  No Growth <1000 cfu/mL  --    WOUND CULTURE  4+ Growth of Staphylococcus aureus*  4+ Growth of  --   --        Last 24 Hours Medication List:   Current Facility-Administered Medications   Medication Dose Route Frequency Provider Last Rate   • acetaminophen  650 mg Oral Q6H PRN Fransisco Inoue, DO     • aspirin  81 mg Oral Daily Fransisco Inoue, DO     • atorvastatin  40 mg Oral Daily With Susana Chamberlain DO     • calcium carbonate  1,000 mg Oral Daily PRN Fransisco Inoue, DO     • collagenase   Topical Daily Fransisco Inoue, DO     • docusate sodium  100 mg Oral BID Fransisco Inoue, DO     • heparin (porcine)  5,000 Units Subcutaneous [facility] Fransisco Inoue, DO     • insulin glargine  5 Units Subcutaneous HS Fransisco Inoue, DO     • insulin lispro  1-6 Units Subcutaneous TID AC Fransisco Inoue, DO     • insulin lispro  1-6 Units Subcutaneous HS Fransisco Inoue, DO     • metoprolol succinate  50 mg Oral Q12H [address] Fransisco Inoue, DO     • ondansetron  4 mg Intravenous Q6H PRN Fransisco Inoue, DO     • polyethylene glycol  17 g Oral Daily PRN Fransisco Inoue, DO     • sodium chloride  50 mL/hr Intravenous Continuous Fransisco Inoue, DO 50 mL/hr (02/14/23 1430)   • tamsulosin  0 4 mg Oral Daily With Dinner Fransisco Inoue, DO          Today, Patient Was Seen By: JANIS Young    **Please Note: This note may have been constructed using a voice recognition system  **

## 2023-02-15 ENCOUNTER — APPOINTMENT (INPATIENT)
Dept: RADIOLOGY | Facility: HOSPITAL | Age: 78
End: 2023-02-15

## 2023-02-15 LAB
ANION GAP SERPL CALCULATED.3IONS-SCNC: 5 MMOL/L (ref 4–13)
BUN SERPL-MCNC: 36 MG/DL (ref 5–25)
CALCIUM SERPL-MCNC: 8.8 MG/DL (ref 8.3–10.1)
CHLORIDE SERPL-SCNC: 94 MMOL/L (ref 96–108)
CO2 SERPL-SCNC: 36 MMOL/L (ref 21–32)
CREAT SERPL-MCNC: 1.03 MG/DL (ref 0.6–1.3)
ERYTHROCYTE [DISTWIDTH] IN BLOOD BY AUTOMATED COUNT: 15.4 % (ref 11.6–15.1)
GFR SERPL CREATININE-BSD FRML MDRD: 70 ML/MIN/1.73SQ M
GLUCOSE SERPL-MCNC: 183 MG/DL (ref 65–140)
GLUCOSE SERPL-MCNC: 186 MG/DL (ref 65–140)
GLUCOSE SERPL-MCNC: 193 MG/DL (ref 65–140)
GLUCOSE SERPL-MCNC: 199 MG/DL (ref 65–140)
GLUCOSE SERPL-MCNC: 216 MG/DL (ref 65–140)
HCT VFR BLD AUTO: 38.5 % (ref 36.5–49.3)
HGB BLD-MCNC: 11.7 G/DL (ref 12–17)
MCH RBC QN AUTO: 25.7 PG (ref 26.8–34.3)
MCHC RBC AUTO-ENTMCNC: 30.4 G/DL (ref 31.4–37.4)
MCV RBC AUTO: 84 FL (ref 82–98)
PLATELET # BLD AUTO: 247 THOUSANDS/UL (ref 149–390)
PMV BLD AUTO: 9.9 FL (ref 8.9–12.7)
POTASSIUM SERPL-SCNC: 4.6 MMOL/L (ref 3.5–5.3)
RBC # BLD AUTO: 4.56 MILLION/UL (ref 3.88–5.62)
SODIUM SERPL-SCNC: 135 MMOL/L (ref 135–147)
WBC # BLD AUTO: 5.39 THOUSAND/UL (ref 4.31–10.16)

## 2023-02-15 RX ORDER — SACUBITRIL AND VALSARTAN 24; 26 MG/1; MG/1
1 TABLET, FILM COATED ORAL 2 TIMES DAILY
Qty: 30 TABLET | Refills: 2 | Status: SHIPPED | OUTPATIENT
Start: 2023-02-15 | End: 2023-02-23 | Stop reason: ALTCHOICE

## 2023-02-15 RX ORDER — FUROSEMIDE 10 MG/ML
40 INJECTION INTRAMUSCULAR; INTRAVENOUS
Status: DISCONTINUED | OUTPATIENT
Start: 2023-02-15 | End: 2023-02-16

## 2023-02-15 RX ORDER — POTASSIUM CHLORIDE 20 MEQ/1
40 TABLET, EXTENDED RELEASE ORAL ONCE
Status: DISCONTINUED | OUTPATIENT
Start: 2023-02-15 | End: 2023-02-15

## 2023-02-15 RX ORDER — FUROSEMIDE 10 MG/ML
40 INJECTION INTRAMUSCULAR; INTRAVENOUS
Status: DISCONTINUED | OUTPATIENT
Start: 2023-02-15 | End: 2023-02-15

## 2023-02-15 RX ORDER — LOSARTAN POTASSIUM 25 MG/1
12.5 TABLET ORAL 2 TIMES DAILY
Status: DISCONTINUED | OUTPATIENT
Start: 2023-02-15 | End: 2023-02-19 | Stop reason: HOSPADM

## 2023-02-15 RX ORDER — INSULIN LISPRO 100 [IU]/ML
4 INJECTION, SOLUTION INTRAVENOUS; SUBCUTANEOUS
Status: DISCONTINUED | OUTPATIENT
Start: 2023-02-15 | End: 2023-02-19

## 2023-02-15 RX ADMIN — INSULIN LISPRO 3 UNITS: 100 INJECTION, SOLUTION INTRAVENOUS; SUBCUTANEOUS at 10:14

## 2023-02-15 RX ADMIN — DOCUSATE SODIUM 100 MG: 100 CAPSULE, LIQUID FILLED ORAL at 17:20

## 2023-02-15 RX ADMIN — INSULIN GLARGINE 7 UNITS: 100 INJECTION, SOLUTION SUBCUTANEOUS at 21:34

## 2023-02-15 RX ADMIN — HEPARIN SODIUM 5000 UNITS: 5000 INJECTION INTRAVENOUS; SUBCUTANEOUS at 13:11

## 2023-02-15 RX ADMIN — METOPROLOL SUCCINATE 50 MG: 50 TABLET, EXTENDED RELEASE ORAL at 10:13

## 2023-02-15 RX ADMIN — INSULIN LISPRO 4 UNITS: 100 INJECTION, SOLUTION INTRAVENOUS; SUBCUTANEOUS at 17:22

## 2023-02-15 RX ADMIN — INSULIN LISPRO 1 UNITS: 100 INJECTION, SOLUTION INTRAVENOUS; SUBCUTANEOUS at 10:14

## 2023-02-15 RX ADMIN — COLLAGENASE SANTYL: 250 OINTMENT TOPICAL at 10:13

## 2023-02-15 RX ADMIN — ASPIRIN 81 MG: 81 TABLET, COATED ORAL at 10:13

## 2023-02-15 RX ADMIN — FUROSEMIDE 40 MG: 10 INJECTION, SOLUTION INTRAMUSCULAR; INTRAVENOUS at 17:20

## 2023-02-15 RX ADMIN — INSULIN LISPRO 1 UNITS: 100 INJECTION, SOLUTION INTRAVENOUS; SUBCUTANEOUS at 21:34

## 2023-02-15 RX ADMIN — LOSARTAN POTASSIUM 12.5 MG: 25 TABLET, FILM COATED ORAL at 17:20

## 2023-02-15 RX ADMIN — INSULIN LISPRO 1 UNITS: 100 INJECTION, SOLUTION INTRAVENOUS; SUBCUTANEOUS at 17:21

## 2023-02-15 RX ADMIN — INSULIN LISPRO 1 UNITS: 100 INJECTION, SOLUTION INTRAVENOUS; SUBCUTANEOUS at 13:11

## 2023-02-15 RX ADMIN — INSULIN LISPRO 3 UNITS: 100 INJECTION, SOLUTION INTRAVENOUS; SUBCUTANEOUS at 13:11

## 2023-02-15 RX ADMIN — DOCUSATE SODIUM 100 MG: 100 CAPSULE, LIQUID FILLED ORAL at 10:13

## 2023-02-15 RX ADMIN — TAMSULOSIN HYDROCHLORIDE 0.4 MG: 0.4 CAPSULE ORAL at 17:20

## 2023-02-15 RX ADMIN — HEPARIN SODIUM 5000 UNITS: 5000 INJECTION INTRAVENOUS; SUBCUTANEOUS at 21:34

## 2023-02-15 RX ADMIN — FUROSEMIDE 40 MG: 10 INJECTION, SOLUTION INTRAMUSCULAR; INTRAVENOUS at 11:33

## 2023-02-15 RX ADMIN — HEPARIN SODIUM 5000 UNITS: 5000 INJECTION INTRAVENOUS; SUBCUTANEOUS at 05:00

## 2023-02-15 RX ADMIN — METOPROLOL SUCCINATE 50 MG: 50 TABLET, EXTENDED RELEASE ORAL at 21:35

## 2023-02-15 RX ADMIN — ATORVASTATIN CALCIUM 40 MG: 40 TABLET, FILM COATED ORAL at 17:20

## 2023-02-15 NOTE — PROGRESS NOTES
Cardiology Progress note  Unit/Bed#: -01 Encounter: [id number]        Bisi Cervantes 69 y o  male [address] Stay Days: 5    Assessment and Plan      Current Problem List   Principal Problem:    Acute systolic congestive heart failure (HCC)  Active Problems:    Type 2 diabetes mellitus with hyperglycemia, with long-term current use of insulin (HCC)    Foot ulcer ([address] )    Urinary retention    Unintentional weight loss    CAD (coronary artery disease)    Assessment/Plan:  1  Newly diagnosed HFrEF; LVEF 25%; LVIDd 4 3 cm; NYHA III; ACC/AHA Stage C Etiology: unclear  Multiple risk factors for CAD including poorly controlled DM II, history of tobacco abuse   Denied any family history of cardiac diseases and denies recent viral illness  UDS pending  Negative HIV  TTE 02/09/2023: LVEF 25%  LVIDd 4 3 cm  Severe global hypokinesis with RWMA  Normal RV  Mild MR and TR  RVSP 46 mmHg  Iron panel and TSH obtained  ? Pharmacotherapies / Neurohormonal Blockade:  ? Beta Blocker: metoprolol succinate 50 mg q12 hours    ? ARNi / ACEi / ARB: losartan 12 5 mg daily - held for cath yesterday restart today  ? Aldosterone Antagonist: No, aim to add s/p LHC     ? SGLT2 Inhibitor: No (previously on empagliflozin 25 mg daily for DM II)     ? Home Diuretic: None    ? Inpatient Diuretic: IV Lasix 7 5 mg/hr with potassium 40 mEq TID  - Stopped two days ago - received IV lasix 60 mg once yesterday  ? Sudden Cardiac Death Risk Reduction:  ? LVEF 25%  Plan to reassess LVEF with limited TTE after 3 months uninterrupted and optimized HF GDMT (earliest 05/2023)     ?  Electrical Resynchronization:  ? Candidacy for BiV device: narrow QRS  ? Advanced Therapies (if appropriate): Will continue to monitor  Concerns include HgA1c of 12 0 in 02/2023 and recent history of poor adherence to prescribed medical regimen  ? Can consider IV iron this admission  2   Unintentional weight loss  ?   Recommend work up per primary teeam    3  Foot ulcer  ? Per primary  4  Type II DM  ? Elevated A1C previous on insulin  5  Exposure to agent orange  Subjective     Patient seen and examined  -1 2 liters last 24 hours but only 259 documented in  -11 liters since admission  Creatine  today similar to yesterday  received IV lasix 60 mg once yesterday  Cath yesterday:  •  Ost LM lesion is 50% stenosed  •  Prox LAD lesion is 60% stenosed  •  Mid LAD lesion is 70% stenosed  •  Dist LAD lesion is 50% stenosed  •  Mid Cx lesion is 100% stenosed  •  1st Diag lesion is 70% stenosed  •  Dist RCA lesion is 90% stenosed  •  Prox RCA lesion is 70% stenosed  Seen by CT surgery no definite plan yet  Objective     Vitals: Temp (24hrs), Av 3 °F (36 3 °C), Min:94 2 °F (34 6 °C), Max:98 5 °F (36 9 °C)  Current: Temperature: 98 3 °F (36 8 °C)  Patient Vitals for the past 24 hrs:   BP Temp Temp src Pulse Resp SpO2 Weight   02/15/23 0717 111/60 98 3 °F (36 8 °C) -- 64 17 96 % --   02/15/23 0545 -- -- -- -- -- -- 75 5 kg (166 lb 6 4 oz)   02/15/23 0159 116/59 -- -- 69 -- 97 % --   23 2240 119/60 98 2 °F (36 8 °C) -- 73 18 95 % --   23 2134 121/62 -- -- -- -- -- --   23 -- -- -- -- -- 97 % --   23 121/64 -- -- 75 -- (!) 81 % --   23 1901 126/67 98 5 °F (36 9 °C) -- 70 17 96 % --   23 1745 127/68 -- -- 67 -- 97 % --   23 1645 118/67 -- -- 66 -- 91 % --   23 1615 119/68 -- -- 61 -- 94 % --   23 1545 118/69 -- -- 64 -- 96 % --   23 1530 113/69 -- -- 61 -- 96 % --   23 1515 110/70 -- -- 59 -- 98 % --   23 1500 108/71 -- -- 57 -- 94 % --   23 1448 106/72 (!) 94 2 °F (34 6 °C) Oral (!) 53 16 95 % --   23 1123 -- -- -- 60 -- -- --    Body mass index is 23 88 kg/m²  Physical Exam:  Physical Exam  Cardiovascular:      Rate and Rhythm: Normal rate and regular rhythm  Comments: Elevated JVP     Pulmonary:      Effort: Pulmonary effort is normal  Breath sounds: Rales present  Musculoskeletal:      Right lower leg: No edema  Left lower leg: No edema  Skin:     General: Skin is warm  Neurological:      Mental Status: He is alert  Invasive Devices     Peripheral Intravenous Line  Duration           Peripheral IV 02/11/23 Dorsal (posterior); Right Hand 3 days    Peripheral IV 02/14/23 Dorsal (posterior); Left Forearm <1 day          Drain  Duration           Urethral Catheter Coude 16 Fr  6 days                    Labs:   Results from last 7 days   Lab Units 02/15/23  0432 02/14/23  0451 02/12/23  0500 02/09/23  0458 02/08/23  1349   WBC Thousand/uL 5 39 6 90 4 79 6 00 5 52   HEMOGLOBIN g/dL 11 7* 11 5* 12 0 11 9* 12 6   HEMATOCRIT % 38 5 38 1 38 3 38 2 41 2   PLATELETS Thousands/uL 247 246 221 236 218   NEUTROS PCT %  --   --  63 71 72   MONOS PCT %  --   --  12 9 9      Results from last 7 days   Lab Units 02/15/23  0432 02/14/23  0451 02/13/23  0436 02/12/23  0500 02/11/23  0432 02/10/23  0935 02/09/23  0458 02/08/23  1349   SODIUM mmol/L 135 134* 134* 137 136 137 140 136   POTASSIUM mmol/L 4 6 4 9 4 1 3 7 3 9 4 1 3 8 3 9   CHLORIDE mmol/L 94* 94* 94* 97 100 100 104 100   CO2 mmol/L 36* 36* 38* 33* 32 32 31 29   BUN mg/dL 36* 30* 29* 25 25 21 18 18   CREATININE mg/dL 1 03 1 00 0 98 0 87 0 94 0 88 0 80 0 89   CALCIUM mg/dL 8 8 8 8 8 9 8 8 8 5 8 6 8 2* 8 9   ALK PHOS U/L  --   --   --   --   --   --  97 126*   ALT U/L  --   --   --   --   --   --  5* 7   AST U/L  --   --   --   --   --   --  10* 11*   MAGNESIUM mg/dL  --  1 9 1 9 1 9 1 7  --  1 9 1 6*   PHOSPHORUS mg/dL  --   --   --   --   --   --  2 7 3 0   INR   --   --   --   --   --   --   --  1 21*   PTT seconds  --   --   --   --   --   --   --  32   EGFR ml/min/1 73sq m 70 72 74 83 78 83 86 83     Results from last 7 days   Lab Units 02/08/23  1349   INR  1 21*   PTT seconds 32     Results from last 7 days   Lab Units 02/08/23  1349   LACTIC ACID mmol/L 1 6         No results found for: PHART, NHY5LBS, PO2ART, OFC2ANP, S1NOSPEM, BEART, SOURCE  No components found for: HIV1X2  Lab Results   Component Value Date    HEPBIGM Non-reactive 02/12/2023    HEPBCAB Non-reactive 02/12/2023    HEPCAB Non-reactive 02/12/2023     No results found for: SPEP, UPEP   Lab Results   Component Value Date    HGBA1C 12 0 (H) 02/08/2023    HGBA1C 9 2 (H) 06/23/2021     No results found for: CHOL   Lab Results   Component Value Date    HDL 36 (L) 02/11/2023      Lab Results   Component Value Date    LDLCALC 61 02/11/2023      Lab Results   Component Value Date    TRIG 57 02/11/2023     No components found for: PROCAL  Results from last 7 days   Lab Units 02/08/23  1349   BNP pg/mL 3,345*     Micro:  Results from last 7 days   Lab Units 02/11/23  0001 02/08/23  1617 02/08/23  1349   BLOOD CULTURE   --   --  No Growth After 5 Days  No Growth After 5 Days  GRAM STAIN RESULT  4+ Gram positive rods*  Rare Gram positive cocci in clusters*  --   --    URINE CULTURE   --  No Growth <1000 cfu/mL  --    WOUND CULTURE  4+ Growth of Staphylococcus aureus*  4+ Growth of  --   --      Urinalysis:  Lab Results   Component Value Date    BDZUR Negative 02/13/2023    COCAINEUR Negative 02/13/2023    OPIATEUR Negative 02/13/2023    PCPUR Negative 02/13/2023    THCUR Negative 02/13/2023      Results from last 7 days   Lab Units 02/08/23  1617   COLOR UA  Yellow   CLARITY UA  Clear   SPEC GRAV UA  1 025   PH UA  5 5   LEUKOCYTES UA  Elevated glucose may cause decreased leukocyte values   See urine microscopic for [facility] result/*   NITRITE UA  Negative   GLUCOSE UA mg/dl >=1000 (1%)*   KETONES UA mg/dl Trace*   BILIRUBIN UA  Negative   BLOOD UA  Trace-Intact*      Results from last 7 days   Lab Units 02/08/23  1617   RBC UA /hpf 2-4   WBC UA /hpf None Seen   EPITHELIAL CELLS WET PREP /hpf Occasional   BACTERIA UA /hpf Occasional      Intake and Outputs:  I/O       02/12 0701 02/13 0700 02/13 0701 02/14 0700 02/14 0701  02/15 0700 P O  720 1440     Total Intake(mL/kg) 720 (9 4) 1440 (19 2)     Urine (mL/kg/hr) 3775 (2 1) 3650 (2)     Stool 0      Total Output 3775 3650     Net -3055 -2210            Unmeasured Stool Occurrence 1 x          Nutrition:  Diet Wilfredo/CHO Controlled; Consistent Carbohydrate Diet Level 2 (5 carb servings/75 grams CHO/meal);  Fluid Restriction 2000 ML  Radiology Results:   CT chest abdomen pelvis wo contrast    (Results Pending)   VAS carotid complete study    (Results Pending)   VAS lower limb vein mapping bypass graft    (Results Pending)     Scheduled Medications:  aspirin, 81 mg, Daily  atorvastatin, 40 mg, Daily With Dinner  collagenase, , Daily  docusate sodium, 100 mg, BID  heparin (porcine), 5,000 Units, Q8H [address]  insulin glargine, 7 Units, HS  insulin lispro, 1-5 Units, TID AC  insulin lispro, 1-5 Units, HS  insulin lispro, 3 Units, TID With Meals  metoprolol succinate, 50 mg, Q12H [place]  tamsulosin, 0 4 mg, Daily With Dinner      PRN MEDS:  acetaminophen, 650 mg, Q6H PRN  calcium carbonate, 1,000 mg, Daily PRN  ondansetron, 4 mg, Q6H PRN  polyethylene glycol, 17 g, Daily PRN      Last 24 Hour Meds: :   Medication Administration - last 24 hours from 02/14/2023 0928 to 02/15/2023 3188       Date/Time Order Dose Route Action Action by     02/15/2023 0500 EST heparin (porcine) subcutaneous injection 5,000 Units 5,000 Units Subcutaneous Given Celso Alcantar RN     02/14/2023 2134 EST heparin (porcine) subcutaneous injection 5,000 Units 5,000 Units Subcutaneous Given Celso Alcantar RN     02/14/2023 1434 EST heparin (porcine) subcutaneous injection 5,000 Units 5,000 Units Subcutaneous Not Given Nikki Monroy RN     02/14/2023 1832 EST insulin lispro (HumaLOG) 100 units/mL subcutaneous injection 1-6 Units 1 Units Subcutaneous Not Given Nikki Monroy RN     02/14/2023 1427 EST insulin lispro (HumaLOG) 100 units/mL subcutaneous injection 1-6 Units 1 Units Subcutaneous Not Given Nikki Monroy RN     02/14/2023 1840 EST tamsulosin (FLOMAX) capsule 0 4 mg 0 4 mg Oral Given [facility]     02/14/2023 1840 EST docusate sodium (COLACE) capsule 100 mg 100 mg Oral Refused [facility] [place], RN     02/14/2023 2134 EST metoprolol succinate (TOPROL-XL) 24 hr tablet 50 mg 50 mg Oral Given Juliocesar Mall, RN     02/14/2023 1941 EST sodium chloride 0 9 % infusion 0 mL/hr Intravenous Stopped Juan Luis Hood, RN     02/14/2023 1430 EST sodium chloride 0 9 % infusion 50 mL/hr [facility]venous [facility]     02/14/2023 1434 EST aspirin (ECOTRIN LOW STRENGTH) EC tablet 81 mg 81 mg Oral Given [facility], RN     02/14/2023 1840 EST atorvastatin (LIPITOR) tablet 40 mg 40 mg Oral Given [facility], RN     02/14/2023 2134 EST insulin glargine (LANTUS) subcutaneous injection 7 Units 0 07 mL 7 Units Subcutaneous Given Juliocesar [facility], RN     02/14/2023 1844 EST insulin lispro (HumaLOG) 100 units/mL subcutaneous injection 3 Units 3 Units Subcutaneous Not Given [facility], RN     02/14/2023 1840 EST furosemide (LASIX) injection 60 mg 60 mg Intravenous Given [facility], RN     02/14/2023 1832 EST insulin lispro (HumaLOG) 100 units/mL subcutaneous injection 1-5 Units 1 Units Subcutaneous Not Given [facility], RN     02/14/2023 2134 EST insulin lispro (HumaLOG) 100 units/mL subcutaneous injection 1-5 Units 1 Units Subcutaneous Given Juliocesar [facility], RN     02/14/2023 2322 EST magnesium sulfate 2 g/50 mL IVPB (premix) 2 g 2 g Intravenous New Bag Juan Luis Hood, HARRY          PLEASE NOTE:  This encounter was completed utilizing the [employer] Direct Speech Voice Recognition Software  Grammatical errors, random word insertions, pronoun errors and incomplete sentences are occasional consequences of the system due to software limitations, ambient noise and hardware issues  These may be missed by proof reading prior to affixing electronic signature   Any questions or concerns about the content, text or information contained within the body of this dictation should be directly addressed to the physician for clarification  Please do not hesitate to call me directly if you have any any questions or concerns

## 2023-02-15 NOTE — PROGRESS NOTES
[address]  Progress Note [place] 1945, 69 y o  male MRN: [id number]  Unit/Bed#: -01 Encounter: [id number]  Primary Care Provider: No primary care provider on file  Date and time admitted to hospital: 2/10/2023 11:38 PM    * Acute systolic congestive heart failure (HCC)  Assessment & Plan  Wt Readings from Last 3 Encounters:   02/15/23 75 5 kg (166 lb 6 4 oz)   02/09/23 82 1 kg (181 lb)   06/24/21 68 6 kg (151 lb 3 8 oz)   Patient presented with volume overload noted with bilateral lower extreme edema, dyspnea on exertion, scrotal swelling at home  Appeared grossly volume overloaded at time of admission  miners with CXR revealing vascular congestion for which IV diuresis was initiated  Transferred to [facility] for heart failure evaluation  · Echocardiogram at [facility] revealing new EF 25%  · Heart failure following, status post Lasix drip  IV diuretics BID per heart failure  ? Continue medical management with ASA/statin, beta-blocker, metoprolol succinate 50mg Q12 hours  Re-starting ARB  ? 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, see below  · I's and O's, daily weights, low-sodium diet  · Monitor volume status  · Will need close outpatient follow-up  · PT/OT recommending rehab  CM following  CAD (coronary artery disease)  Assessment & Plan  · [facility] 2/14 showed triple vessel disease  · Vein mapping at carotid studies per CTS for pre-operative work up  · Overall, currently poor surgical candidate  CTS recommending medical management and optimization of co-morbidities  Should follow up with cardiology outpatient as well as CTS  Foot ulcer ([facility] [address] )  Assessment & Plan  · Left foot diabetic ulcer POA to [employer]  · Evaluated by podiatry, local wound care with Santyl/DSD and Allevyn foam dressings to right leg blisters     · WBAT  · Completed 7 days Keflex for possible mild cellulitis     Type 2 diabetes mellitus with hyperglycemia, with long-term current use of insulin [facility])  Assessment & Plan  Lab Results   Component Value Date    HGBA1C 12 0 (H) 02/08/2023       Recent Labs     02/14/23  1429 02/14/23  1717 02/14/23  2043 02/15/23  0619   POCGLU 127 112 187* 183*       Uncontrolled as outpatient due to lack of insulin therapy outpatient  · Lantus 7 units QHS + humalog 3 units 3 times daily with meals plus SSI  · Would likely benefit from [address] given heart failure  · Monitor accuchecks and adjust regimen as needed  · Endocrinology following    · Anticipate will need insulin on discharge  Unintentional weight loss  Assessment & Plan  Possibly in setting of newly diagnosed heart failure however has lost appx 60 lbs unintentionally in the last 3-4 months  · Has never had colonoscopy  Will need GI referral on discharge for screening  · CT C/A/P pending    Urinary retention  Assessment & Plan  Found with significant urinary retention during ED evaluation at [address] [place] [address] requiring Mensah catheter placement  · Seen by urology at [address] [place] [address], suspected secondary to severe edema with component of BPH as well  · Mensah catheter and continue Flomax  · Void trial in appx 10 days around 2/19, would recommend this be done at rehab when more mobile  VTE Pharmacologic Prophylaxis:   Moderate Risk (Score 3-4) - Pharmacological DVT Prophylaxis Ordered: heparin  Patient Centered Rounds: I performed bedside rounds with nursing staff today  Discussions with Specialists or Other Care Team Provider: nursing, DAYSI PA, case management     Education and Discussions with Family / Patient: Updated  (daughter) via phone      Total Time Spent on Date of Encounter in care of patient: 35 minutes This time was spent on one or more of the following: performing physical exam; counseling and coordination of care; obtaining or reviewing history; documenting in the medical record; reviewing/ordering tests, medications or procedures; communicating with other healthcare professionals and discussing with patient's family/caregivers  Current Length of Stay: 5 day(s)    Current Patient Status: Inpatient     Certification Statement: The patient will continue to require additional inpatient hospital stay due to pending CT C/A/P, IV diuresis/heart failure clearance, planning for rehab     Discharge Plan: Anticipate discharge in 48-72 hrs to rehab facility  Code Status: Level 3 - DNAR and DNI    Subjective:   Patient offers no acute complaints  No CP, SOB  Slept well last night  States that his appetite has been better here than it has in months and he likes the food  He is agreeable to following up with doctors/specialists outpatient but reports that he has barriers to this given limited social support from his children  He would be interested in pursuing heart surgery if offered at later date  He understands that surgical intervention is not being recommended currently based on his co-morbidities  Objective:     Vitals:   Temp (24hrs), Av 4 °F (36 3 °C), Min:94 2 °F (34 6 °C), Max:98 5 °F (36 9 °C)    Temp:  [94 2 °F (34 6 °C)-98 5 °F (36 9 °C)] 97 7 °F (36 5 °C)  HR:  [53-75] 63  Resp:  [16-18] 18  BP: (106-127)/(59-72) 113/60  SpO2:  [81 %-98 %] 96 %  Body mass index is 23 88 kg/m²  Input and Output Summary (last 24 hours): Intake/Output Summary (Last 24 hours) at 2/15/2023 1406  Last data filed at 2/15/2023 1348  Gross per 24 hour   Intake 495 17 ml   Output 1800 ml   Net -1304 83 ml       Physical Exam:   Physical Exam  Vitals and nursing note reviewed  Constitutional:       General: He is not in acute distress  Comments: Frail appearing   Cardiovascular:      Rate and Rhythm: Normal rate  Pulmonary:      Breath sounds: Decreased breath sounds present  Abdominal:      Tenderness: There is no abdominal tenderness     Genitourinary:     Comments: Mensah catheter draining clear yellow urine  Musculoskeletal:         General: No swelling  Skin:     General: Skin is warm  Coloration: Skin is pale  Neurological:      Mental Status: He is alert and oriented to person, place, and time  Mental status is at baseline  Psychiatric:         Mood and Affect: Mood normal           Additional Data:     Labs:  Results from last 7 days   Lab Units 02/15/23  0432 02/14/23  0451 02/12/23  0500   WBC Thousand/uL 5 39   < > 4 79   HEMOGLOBIN g/dL 11 7*   < > 12 0   HEMATOCRIT % 38 5   < > 38 3   PLATELETS Thousands/uL 247   < > 221   NEUTROS PCT %  --   --  63   LYMPHS PCT %  --   --  22   MONOS PCT %  --   --  12   EOS PCT %  --   --  3    < > = values in this interval not displayed  Results from last 7 days   Lab Units 02/15/23  0432 02/10/23  0935 02/09/23  0458   SODIUM mmol/L 135   < > 140   POTASSIUM mmol/L 4 6   < > 3 8   CHLORIDE mmol/L 94*   < > 104   CO2 mmol/L 36*   < > 31   BUN mg/dL 36*   < > 18   CREATININE mg/dL 1 03   < > 0 80   ANION GAP mmol/L 5   < > 5   CALCIUM mg/dL 8 8   < > 8 2*   ALBUMIN g/dL  --   --  2 9*   TOTAL BILIRUBIN mg/dL  --   --  0 96   ALK PHOS U/L  --   --  97   ALT U/L  --   --  5*   AST U/L  --   --  10*   GLUCOSE RANDOM mg/dL 193*   < > 60*    < > = values in this interval not displayed  Results from last 7 days   Lab Units 02/15/23  1114 02/15/23  0619 02/14/23 2043 02/14/23 1717 02/14/23  1429 02/14/23  0602 02/13/23 2126 02/13/23  1714 02/13/23  1112 02/13/23  0613 02/12/23  2032 02/12/23  1700   POC GLUCOSE mg/dl 199* 183* 187* 112 127 187* 281* 215* 245* 225* 311* 268*         Results from last 7 days   Lab Units 02/09/23  0458   PROCALCITONIN ng/ml <0 05       Lines/Drains:  Invasive Devices     Peripheral Intravenous Line  Duration           Peripheral IV 02/14/23 Dorsal (posterior); Left Forearm 1 day          Drain  Duration           Urethral Catheter Coude 16 Fr  6 days              Urinary Catheter:  Goal for removal: N/A- Discharging with Makenna           Telemetry:  Telemetry Orders (From admission, onward)             48 Hour Telemetry Monitoring  Continuous x 48 hours        References:    Telemetry Guidelines   Question:  Reason for 48 Hour Telemetry  Answer:  Acute Decompensated CHF (continuous diuretic infusion or total diuretic dose > 200 mg daily, associated electrolyte derangement, ionotropic drip, history of ventricular arrhythmia, or new EF <35%)                 Telemetry Reviewed: Normal Sinus Rhythm  Indication for Continued Telemetry Use: Acute CHF on >200 mg lasix/day or equivalent dose or with new reduced EF  Imaging: No pertinent imaging reviewed      Recent Cultures (last 7 days):   Results from last 7 days   Lab Units 02/11/23  0001 02/08/23  1617   GRAM STAIN RESULT  4+ Gram positive rods*  Rare Gram positive cocci in clusters*  --    URINE CULTURE   --  No Growth <1000 cfu/mL   WOUND CULTURE  4+ Growth of Staphylococcus aureus*  4+ Growth of  --        Last 24 Hours Medication List:   Current Facility-Administered Medications   Medication Dose Route Frequency Provider Last Rate   • acetaminophen  650 mg Oral Q6H PRN Jennifer Torres, DO     • aspirin  81 mg Oral Daily Jennifer Torres, DO     • atorvastatin  40 mg Oral Daily With María Cohen DO     • calcium carbonate  1,000 mg Oral Daily PRN Jennifer Torres, DO     • collagenase   Topical Daily Jennifer Torres, DO     • docusate sodium  100 mg Oral BID Jennifer Torres, DO     • furosemide  40 mg Intravenous BID (diuretic) Earnie Blinks, DO     • heparin (porcine)  5,000 Units Subcutaneous [facility] Jennifer Torres, DO     • insulin glargine  7 Units Subcutaneous HS Lia Gee MD     • insulin lispro  1-5 Units Subcutaneous TID AC Dai Johnson MD     • insulin lispro  1-5 Units Subcutaneous HS Dai Johnson MD     • insulin lispro  4 Units Subcutaneous TID With Meals Dai Johnson MD     • metoprolol succinate  50 mg Oral Q12H [address], DO     • ondansetron  4 mg Intravenous Q6H PRN Marshall Alberto, DO     • polyethylene glycol  17 g Oral Daily PRN Marshall Lowe, DO     • tamsulosin  0 4 mg Oral Daily With [address] [facility], DO          Today, Patient Was Seen By: JANIS Castanon    **Please Note: This note may have been constructed using a voice recognition system  **

## 2023-02-15 NOTE — ASSESSMENT & PLAN NOTE
Wt Readings from Last 3 Encounters:   02/15/23 75 5 kg (166 lb 6 4 oz)   02/09/23 82 1 kg (181 lb)   06/24/21 68 6 kg (151 lb 3 8 oz)   Patient presented with volume overload noted with bilateral lower extreme edema, dyspnea on exertion, scrotal swelling at home  Appeared grossly volume overloaded at time of admission  miners with CXR revealing vascular congestion for which IV diuresis was initiated  Transferred to [facility] for heart failure evaluation  · Echocardiogram at [address] revealing new EF 25%  · Heart failure following, status post Lasix drip  IV diuretics BID per heart failure  ? Continue medical management with ASA/statin, beta-blocker, metoprolol succinate 50mg Q12 hours  Re-starting ARB  ? 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, see below  · I's and O's, daily weights, low-sodium diet  · Monitor volume status  · Will need close outpatient follow-up  · PT/OT recommending rehab  CM following

## 2023-02-15 NOTE — ASSESSMENT & PLAN NOTE
Possibly in setting of newly diagnosed heart failure however has lost appx 60 lbs unintentionally in the last 3-4 months  · Has never had colonoscopy  Will need GI referral on discharge for screening     · CT C/A/P pending

## 2023-02-15 NOTE — CASE MANAGEMENT
Case Management Discharge Planning Note    Patient name Angus Littlejohn  Location [address] 577/-50 MRN [id number]  : 1945 Date 2/15/2023       Current Admission Date: 2/10/2023  Current Admission Diagnosis:Acute systolic congestive heart failure [facility])   Patient Active Problem List    Diagnosis Date Noted   • CAD (coronary artery disease) 2023   • Unintentional weight loss 2023   • Moderate protein-calorie malnutrition ([facility] [address] ) 02/10/2023   • Hydrocele, bilateral    • Foot ulcer ([facility] [address] ) 9103   • Acute systolic congestive heart failure ([facility] [address] ) 2023   • Urinary retention 2023   • Hypernatremia 2021   • Abnormal EKG 2021   • Hypokalemia 2021   • Type 2 diabetes mellitus with hyperglycemia, with long-term current use of insulin ([facility] [address] ) 2021      LOS (days): 5  Geometric Mean LOS (GMLOS) (days):   Days to GMLOS:     OBJECTIVE:  Risk of Unplanned Readmission Score: 13 01         Current admission status: Inpatient   Preferred Pharmacy:   [address] [place] [address] [address] [place] [address], [address] [place] [address]  Phone: [phone] Fax: [phone]    [facility]  [address] [facility] [address]  Phone: [phone] Fax: [phone]    Primary Care Provider: No primary care provider on file  Primary Insurance: [id number] [facility] [facility]  Secondary Insurance: MEDICARE    DISCHARGE DETAILS:    Discharge planning discussed with[de-identified] Patient     Comments - Freedom of Choice: CM me with Pt to review the currently accepting SNFs for his first facility preference  Requested  [address]         Is the patient interested in [facility] at discharge?: No    DME Referral Provided  Referral made for DME?: No    Other Referral/Resources/Interventions Provided:  Referral Comments: Pt reported he would like to review the list and follow up with CM regarding his first choice   Pt confirmed being VA vested but wasn't sure about the precentage  CM will follow up with Pt           Treatment Team Recommendation: Short Term Rehab  Discharge Destination Plan[de-identified] Short Term Rehab

## 2023-02-15 NOTE — CASE MANAGEMENT
Case Management Discharge Planning Note    Patient name Mely Oleary  Location [address] 577/-32 MRN [id number]  : 1945 Date 2/15/2023       Current Admission Date: 2/10/2023  Current Admission Diagnosis:Acute systolic congestive heart failure [facility])   Patient Active Problem List    Diagnosis Date Noted   • CAD (coronary artery disease) 2023   • Unintentional weight loss 2023   • Moderate protein-calorie malnutrition ([facility] [address] ) 02/10/2023   • Hydrocele, bilateral    • Foot ulcer ([facility] [address] )    • Acute systolic congestive heart failure ([facility] [address] ) 2023   • Urinary retention 2023   • Hypernatremia 2021   • Abnormal EKG 2021   • Hypokalemia 2021   • Type 2 diabetes mellitus with hyperglycemia, with long-term current use of insulin ([facility] [address] ) 2021      LOS (days): 5  Geometric Mean LOS (GMLOS) (days):   Days to GMLOS:     OBJECTIVE:  Risk of Unplanned Readmission Score: 13 01         Current admission status: Inpatient   Preferred Pharmacy:   [facility] [place], [address] [address]  Phone: [phone] Fax: [phone]    [facility]  [address] [facility] [address]  Phone: [phone] Fax: [phone]    [address] [place] [address]  Phone: [phone] Fax: [phone]    Primary Care Provider: No primary care provider on file  Primary Insurance: [id number] [facility]  Secondary Insurance: MEDICARE    DISCHARGE DETAILS:    Discharge planning discussed with[de-identified] Patient     Comments - Freedom of Choice: Pt reported his acceptance of the offered SNF bed at [address]         Requested  [address]         [facility] the [facility]?: No    DME Referral Provided  Referral made for DME?: No    Other Referral/Resources/Interventions Provided:  Referral Comments: TRACY made [facility] aware of the Pt's acceptance of the offered bed, using his Medicare benefits and reserved it           Treatment Team Recommendation: Short Term Rehab  Discharge Destination Plan[de-identified] Short Term Rehab

## 2023-02-15 NOTE — PLAN OF CARE
Problem: OCCUPATIONAL THERAPY ADULT  Goal: Performs self-care activities at highest level of function for planned discharge setting  See evaluation for individualized goals  Description: Treatment Interventions: ADL retraining, Functional transfer training, UE strengthening/ROM, Equipment evaluation/education, Cognitive reorientation, Patient/family training, Endurance training, Compensatory technique education, Activityengagement, Energy conservation          See flowsheet documentation for full assessment, interventions and recommendations  Outcome: Progressing  Note: Limitation: Decreased ADL status, Decreased UE ROM, Decreased UE strength, Decreased Safe judgement during ADL, Decreased cognition, Decreased endurance, Decreased self-care trans, Decreased high-level ADLs  Prognosis: Fair  Assessment: Pt is a 68 yo male who actively participated in skilled OT session on 2/15/2023  Pt is WBAT in b/l LE  Treatment focused to improve functional transfers with fall prevention strategies, static/dynamic balance, postural/trunk control, proper body mechanics, functional use of b/l UE's, safety awareness, and overall increased activity tolerance in ADL/IADL/leisure tasks  Upon arrival, pt found lying supine in bed and was agreeable to OT session  Pt completed supine <> sit with Min A for UB postural support with increased time noted during transitional movements w/ use of grab bars for safety and support  Pt sat EOB with fair sitting balance, requiring Max A for LB dressing tasks 2* decreased functional reach/ROM  Pt completed STS with Min A x1 w/ RW, requiring verbal cues for proper hand placement on RW and was found incontinent of stool, requiring Max A for posterior hygiene/toileting tasks  Pt completed short household functional mobility to recliner w/ Min A x1 w/ RW and was left sitting upright in recliner with all functional needs in reach   Pt demonstrates gradual functional improvements towards OT goals however continues to be functioning below baseline and is still limited by the following limitations/impairments which were addressed through skilled instruction: generalized weakness, balance, endurance/activity tolerance, postural/trunk control, strength, pain, and safety awareness  At this time, recommend discharge to post-acute rehab when medically stable  The patient's raw score on the AM-PAC Daily Activity Inpatient Short Form is 16  A raw score of less than 19 suggests the patient may benefit from discharge to post-acute rehabilitation services  Please refer to the recommendation of the Occupational Therapist for safe discharge planning  Established OT goals will be continued 2-3x/wk to address immediate acute care needs and underlying performance skills to maximize occupational performance and safety to return to [facility]       OT Discharge Recommendation: Post acute rehabilitation services

## 2023-02-15 NOTE — ASSESSMENT & PLAN NOTE
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

## 2023-02-15 NOTE — CASE MANAGEMENT
Case Management Progress Note    Patient name Birtha Lay  Location [address] 577/-49 MRN [id number]  : 1945 Date 2/15/2023       LOS (days): 5  Geometric Mean LOS (GMLOS) (days):   Days to GMLOS:        OBJECTIVE:        Current admission status: Inpatient  Preferred Pharmacy:   [address], [address] [address]  Phone: [phone] Fax: [phone]    [facility] [address] [facility] [place], [address] [facility] [address]  [address] [facility] [address]  Phone: [phone] Fax: [phone]    [address] [place] [address],[address], [facility] [address] [facility] [address] [address] [facility] [address] [address]  Phone: [phone] Fax: [phone]    Primary Care Provider: No primary care provider on file  Primary Insurance: [id number] [facility]  Secondary Insurance: MEDICARE    PROGRESS NOTE:  Per [facility] pharmacy, the Pt has VA benefits which they do not accept  Therefore, the Pt would have a cash price of $[phone]  CM made Cardiology / Nikhil Bender aware

## 2023-02-15 NOTE — CASE MANAGEMENT
Outpatient Advanced Heart Failure LCSW present during HF Team rounds with patient  Pt stated that he'd like to locate another [place] clinic instead of [facility] and he lives closer to [place]  LCSW provided pt with [address] in [place] phone # [phone]  Pt expressed his appreciation

## 2023-02-15 NOTE — OCCUPATIONAL THERAPY NOTE
Occupational Therapy Progress Note     Patient Name: Ernesto KAY Date: 2/15/2023  Problem List  Principal Problem:    Acute systolic congestive heart failure (HCC)  Active Problems:    Type 2 diabetes mellitus with hyperglycemia, with long-term current use of insulin (HCC)    Foot ulcer ([address] )    Urinary retention    Unintentional weight loss    CAD (coronary artery disease)            02/15/23 0815   OT Last Visit   OT Visit Date 02/15/23   Note Type   Note Type Treatment   Pain Assessment   Pain Assessment Tool 0-10   Pain Score No Pain   Hospital Pain Intervention(s) Repositioned; Ambulation/increased activity; Emotional support   Restrictions/Precautions   Weight Bearing Precautions Per Order Yes   RLE Weight Bearing Per Order WBAT   LLE Weight Bearing Per Order WBAT   Other Precautions Fall Risk;Telemetry;O2  (reagan)   Lifestyle   Autonomy I with ADLs and functional mobility with PWC v  RW   Reciprocal Relationships Pt reports limited support from family   Service to Others Retired   [address] Enjoys spending time with his dog   ADL   Where Assessed Edge of bed   LB Dressing Assistance 2  Maximal Assistance   LB Dressing Deficit Setup;Don/doff R sock; Don/doff L sock   Toileting Assistance  2  Maximal Assistance   Toileting Deficit Setup;Steadying;Clothing management up;Perineal hygiene;Clothing management down   Bed Mobility   Supine to Sit 4  Minimal assistance   Additional items Assist x 1;HOB elevated; Bedrails; Increased time required;Verbal cues   Sit to Supine Unable to assess   Additional Comments Pt performed supine <> sit with Min A x1 for UB postural support and sat EOB with fair sitting balance; increased time noted during transitional movements with use of grab bars for safety and support; @ end of session, pt left sitting upright in recliner with all functional needs in reach   Transfers   Sit to Stand 4  Minimal assistance   Additional items Assist x 1; Increased time required;Verbal cues   Stand to Sit 4  Minimal assistance   Additional items Assist x 1; Increased time required;Verbal cues   Additional Comments w/ RW for safety and support with verbal cues for proper technique and hand placement   Functional Mobility   Functional Mobility 4  Minimal assistance   Additional Comments Pt completed short functional mobility <> recliner with Min A x1 w/ RW for safety and support   Additional items Rolling walker   Subjective   Subjective "I'll go to the chair, but that's it "   Cognition   Overall Cognitive Status WFL   Arousal/Participation Responsive;Arousable; Cooperative   Attention Within functional limits   Orientation Level Oriented to person;Oriented to place;Oriented to time;Oriented to situation   Memory Within functional limits   Following Commands Follows multistep commands without difficulty   Comments Pt pleasant and cooperative; decreased insight/safety awareness noted @ times; presenting with flat affect, reporting frustration with hospital stay in which active listening and emotional support provided   Activity Tolerance   Activity Tolerance Patient limited by fatigue   Medical Staff Made Aware RN cleared   Assessment   Assessment Pt is a 70 yo male who actively participated in skilled OT session on 2/15/2023  Pt is WBAT in b/l LE  Treatment focused to improve functional transfers with fall prevention strategies, static/dynamic balance, postural/trunk control, proper body mechanics, functional use of b/l UE's, safety awareness, and overall increased activity tolerance in ADL/IADL/leisure tasks  Upon arrival, pt found lying supine in bed and was agreeable to OT session  Pt completed supine <> sit with Min A for UB postural support with increased time noted during transitional movements w/ use of grab bars for safety and support  Pt sat EOB with fair sitting balance, requiring Max A for LB dressing tasks 2* decreased functional reach/ROM   Pt completed STS with Min A x1 w/ RW, requiring verbal cues for proper hand placement on RW and was found incontinent of stool, requiring Max A for posterior hygiene/toileting tasks  Pt completed short household functional mobility to recliner w/ Min A x1 w/ RW and was left sitting upright in recliner with all functional needs in reach  Pt demonstrates gradual functional improvements towards OT goals however continues to be functioning below baseline and is still limited by the following limitations/impairments which were addressed through skilled instruction: generalized weakness, balance, endurance/activity tolerance, postural/trunk control, strength, pain, and safety awareness  At this time, recommend discharge to post-acute rehab when medically stable  The patient's raw score on the AM-PAC Daily Activity Inpatient Short Form is 16  A raw score of less than 19 suggests the patient may benefit from discharge to post-acute rehabilitation services  Please refer to the recommendation of the Occupational Therapist for safe discharge planning  Established OT goals will be continued 2-3x/wk to address immediate acute care needs and underlying performance skills to maximize occupational performance and safety to return to [facility]  Plan   Treatment Interventions ADL retraining;Functional transfer training;UE strengthening/ROM; Endurance training;Patient/family training;Equipment evaluation/education; Compensatory technique education;Continued evaluation; Energy conservation; Activityengagement   Goal Expiration Date 02/23/23   OT Treatment Day 2   OT Frequency 2-3x/wk   Recommendation   OT Discharge Recommendation Post acute rehabilitation services   AM-[facility] Daily Activity Inpatient   Lower Body Dressing 2   Bathing 2   Toileting 2   Upper Body Dressing 3   Grooming 3   Eating 4   Daily Activity Raw Score 16   Daily Activity Standardized Score (Calc for Raw Score >=11) 35 96   AM-PAC Applied Cognition Inpatient   Following a Speech/Presentation 3 Understanding Ordinary Conversation 4   Taking Medications 3   Remembering Where Things Are Placed or Put Away 3   Remembering List of 4-5 Errands 3   Taking Care of Complicated Tasks 3   Applied Cognition Raw Score 19   Applied Cognition Standardized Score 39 77   End of Consult   Education Provided Yes   Patient Position at End of Consult Bedside chair; All needs within reach   Nurse Communication Nurse aware of consult     Guido Hart MS, OTR/L

## 2023-02-15 NOTE — ASSESSMENT & PLAN NOTE
Lab Results   Component Value Date    HGBA1C 12 0 (H) 02/08/2023       Recent Labs     02/14/23  1429 02/14/23  1717 02/14/23  2043 02/15/23  0619   POCGLU 127 112 187* 183*       Uncontrolled as outpatient due to lack of insulin therapy outpatient  · Lantus 7 units QHS + humalog 3 units 3 times daily with meals plus SSI  · Would likely benefit from Jardiance given heart failure  · Monitor accuchecks and adjust regimen as needed  · Endocrinology following    · Anticipate will need insulin on discharge

## 2023-02-15 NOTE — QUICK NOTE
Spoke with Mr  Shae Mills daughter Tyshawn Jackson today regarding the surgical plan as outlined by Dr Azam Corrales  She understands

## 2023-02-15 NOTE — ASSESSMENT & PLAN NOTE
· Left foot diabetic ulcer POA to [facility] [place] [facility]  · Evaluated by podiatry, local wound care with Santyl/DSD and Allevyn foam dressings to right leg blisters     · WBAT  · Completed 7 days Keflex for possible mild cellulitis

## 2023-02-16 LAB
ANION GAP SERPL CALCULATED.3IONS-SCNC: 2 MMOL/L (ref 4–13)
BASOPHILS # BLD AUTO: 0.02 THOUSANDS/ÂΜL (ref 0–0.1)
BASOPHILS NFR BLD AUTO: 0 % (ref 0–1)
BUN SERPL-MCNC: 44 MG/DL (ref 5–25)
CALCIUM SERPL-MCNC: 8.6 MG/DL (ref 8.3–10.1)
CHLORIDE SERPL-SCNC: 96 MMOL/L (ref 96–108)
CO2 SERPL-SCNC: 36 MMOL/L (ref 21–32)
CREAT SERPL-MCNC: 1.26 MG/DL (ref 0.6–1.3)
EOSINOPHIL # BLD AUTO: 0.2 THOUSAND/ÂΜL (ref 0–0.61)
EOSINOPHIL NFR BLD AUTO: 3 % (ref 0–6)
ERYTHROCYTE [DISTWIDTH] IN BLOOD BY AUTOMATED COUNT: 15.5 % (ref 11.6–15.1)
GFR SERPL CREATININE-BSD FRML MDRD: 55 ML/MIN/1.73SQ M
GLUCOSE SERPL-MCNC: 166 MG/DL (ref 65–140)
GLUCOSE SERPL-MCNC: 176 MG/DL (ref 65–140)
GLUCOSE SERPL-MCNC: 177 MG/DL (ref 65–140)
GLUCOSE SERPL-MCNC: 240 MG/DL (ref 65–140)
GLUCOSE SERPL-MCNC: 280 MG/DL (ref 65–140)
HCT VFR BLD AUTO: 38.1 % (ref 36.5–49.3)
HGB BLD-MCNC: 11.4 G/DL (ref 12–17)
IMM GRANULOCYTES # BLD AUTO: 0.02 THOUSAND/UL (ref 0–0.2)
IMM GRANULOCYTES NFR BLD AUTO: 0 % (ref 0–2)
LYMPHOCYTES # BLD AUTO: 1.31 THOUSANDS/ÂΜL (ref 0.6–4.47)
LYMPHOCYTES NFR BLD AUTO: 22 % (ref 14–44)
MCH RBC QN AUTO: 25.8 PG (ref 26.8–34.3)
MCHC RBC AUTO-ENTMCNC: 29.9 G/DL (ref 31.4–37.4)
MCV RBC AUTO: 86 FL (ref 82–98)
MONOCYTES # BLD AUTO: 0.59 THOUSAND/ÂΜL (ref 0.17–1.22)
MONOCYTES NFR BLD AUTO: 10 % (ref 4–12)
NEUTROPHILS # BLD AUTO: 3.95 THOUSANDS/ÂΜL (ref 1.85–7.62)
NEUTS SEG NFR BLD AUTO: 65 % (ref 43–75)
NRBC BLD AUTO-RTO: 0 /100 WBCS
PLATELET # BLD AUTO: 263 THOUSANDS/UL (ref 149–390)
PMV BLD AUTO: 10.3 FL (ref 8.9–12.7)
POTASSIUM SERPL-SCNC: 4.4 MMOL/L (ref 3.5–5.3)
RBC # BLD AUTO: 4.42 MILLION/UL (ref 3.88–5.62)
SODIUM SERPL-SCNC: 134 MMOL/L (ref 135–147)
WBC # BLD AUTO: 6.09 THOUSAND/UL (ref 4.31–10.16)

## 2023-02-16 RX ADMIN — METOPROLOL SUCCINATE 50 MG: 50 TABLET, EXTENDED RELEASE ORAL at 08:48

## 2023-02-16 RX ADMIN — HEPARIN SODIUM 5000 UNITS: 5000 INJECTION INTRAVENOUS; SUBCUTANEOUS at 14:46

## 2023-02-16 RX ADMIN — INSULIN LISPRO 1 UNITS: 100 INJECTION, SOLUTION INTRAVENOUS; SUBCUTANEOUS at 08:52

## 2023-02-16 RX ADMIN — INSULIN LISPRO 2 UNITS: 100 INJECTION, SOLUTION INTRAVENOUS; SUBCUTANEOUS at 17:30

## 2023-02-16 RX ADMIN — METOPROLOL SUCCINATE 50 MG: 50 TABLET, EXTENDED RELEASE ORAL at 21:50

## 2023-02-16 RX ADMIN — LOSARTAN POTASSIUM 12.5 MG: 25 TABLET, FILM COATED ORAL at 08:48

## 2023-02-16 RX ADMIN — LOSARTAN POTASSIUM 12.5 MG: 25 TABLET, FILM COATED ORAL at 17:26

## 2023-02-16 RX ADMIN — INSULIN LISPRO 4 UNITS: 100 INJECTION, SOLUTION INTRAVENOUS; SUBCUTANEOUS at 17:30

## 2023-02-16 RX ADMIN — HEPARIN SODIUM 5000 UNITS: 5000 INJECTION INTRAVENOUS; SUBCUTANEOUS at 05:10

## 2023-02-16 RX ADMIN — HEPARIN SODIUM 5000 UNITS: 5000 INJECTION INTRAVENOUS; SUBCUTANEOUS at 21:48

## 2023-02-16 RX ADMIN — ATORVASTATIN CALCIUM 40 MG: 40 TABLET, FILM COATED ORAL at 17:26

## 2023-02-16 RX ADMIN — INSULIN LISPRO 2 UNITS: 100 INJECTION, SOLUTION INTRAVENOUS; SUBCUTANEOUS at 21:52

## 2023-02-16 RX ADMIN — TAMSULOSIN HYDROCHLORIDE 0.4 MG: 0.4 CAPSULE ORAL at 17:27

## 2023-02-16 RX ADMIN — COLLAGENASE SANTYL: 250 OINTMENT TOPICAL at 08:53

## 2023-02-16 RX ADMIN — INSULIN LISPRO 4 UNITS: 100 INJECTION, SOLUTION INTRAVENOUS; SUBCUTANEOUS at 08:52

## 2023-02-16 RX ADMIN — ASPIRIN 81 MG: 81 TABLET, COATED ORAL at 08:49

## 2023-02-16 RX ADMIN — INSULIN GLARGINE 7 UNITS: 100 INJECTION, SOLUTION SUBCUTANEOUS at 21:48

## 2023-02-16 NOTE — ASSESSMENT & PLAN NOTE
· Found with significant urinary retention during ED evaluation at [address] requiring Mensah catheter placement  · Seen by urology at [address], suspected secondary to severe edema with component of BPH as well  · Mensah catheter and continue Flomax  · Void trial in appx 10 days around 2/19, would recommend this be done at rehab when more mobile

## 2023-02-16 NOTE — OCCUPATIONAL THERAPY NOTE
Occupational Therapy Progress Note     Patient Name: Adwoa Berry  Today's Date: 2/16/2023  Problem List  Principal Problem:    Acute systolic congestive heart failure ([facility] [address] )  Active Problems:    Type 2 diabetes mellitus with hyperglycemia, with long-term current use of insulin (HCC)    Foot ulcer ([facility] [address] )    Urinary retention    Unintentional weight loss    CAD (coronary artery disease)          02/16/23 0950   OT Last Visit   OT Visit Date 02/16/23   Note Type   Note Type Treatment   Pain Assessment   Pain Assessment Tool 0-10   Pain Score No Pain   Effect of Pain on Daily Activities Pt reporting no pain @ beginning of session however during functional mobility, pt reporting pain in [address] Pain Intervention(s) Repositioned; Ambulation/increased activity; Emotional support   Restrictions/Precautions   Weight Bearing Precautions Per Order Yes   RLE Weight Bearing Per Order WBAT   LLE Weight Bearing Per Order WBAT   Other Precautions Fall Risk;Pain;Telemetry;Multiple lines  (reagan)   Lifestyle   Autonomy I with ADLs and functional mobility with PWC v  RW   Reciprocal Relationships Pt reports limited support from family   Service to Others Retired   [address] Enjoys spending time with his dog   ADL   Where Assessed Edge of bed   Grooming Assistance 5  Supervision/Setup   Grooming Deficit Setup;Steadying; Increased time to complete;Wash/dry hands; Wash/dry face   UB Bathing Assistance 4  Minimal Assistance   UB Bathing Deficit Setup;Steadying;Verbal cueing;Supervision/safety; Increased time to complete; Chest;Right arm;Left arm; Abdomen   LB Bathing Assistance 3  Moderate Assistance   LB Bathing Deficit Setup;Steadying;Verbal cueing;Supervision/safety; Increased time to complete;Perineal area; Buttocks;Right upper leg;Left upper leg;Right lower leg including foot; Left lower leg including foot   UB Dressing Assistance 4  Minimal Assistance   UB Dressing Deficit Setup;Steadying;Verbal cueing;Supervision/safety; Increased time to complete; Thread RUE; Thread LUE;Pull over head;Pull around back;Pull down in back   LB Dressing Assistance 2  Maximal Assistance   LB Dressing Deficit Setup;Steadying;Verbal cueing;Supervision/safety; Don/doff R sock; Don/doff L sock   Toileting Assistance  2  Maximal Assistance   Toileting Deficit Setup;Steadying;Verbal cueing;Supervison/safety; Clothing management up;Clothing management down;Perineal hygiene   Bed Mobility   Supine to Sit 4  Minimal assistance   Additional items Assist x 1;HOB elevated; Bedrails; Increased time required;Verbal cues;LE management   Sit to Supine Unable to assess   Additional Comments At end of session, pt left sitting upright in recliner with all functional needs in reach   Transfers   Sit to Stand 4  Minimal assistance   Additional items Assist x 1; Increased time required;Verbal cues   Stand to Sit 4  Minimal assistance   Additional items Assist x 1; Increased time required;Verbal cues   Additional Comments w/ RW for safety and support   Functional Mobility   Functional Mobility 4  Minimal assistance   Additional Comments Pt completed long household functional mobility distances w/ Min A x1 w/ RW for safety and support with chair follow, requiring 1 seated rest break 2* pain and fatigue   Additional items Rolling walker   Subjective   Subjective "It does feel good to get freshened up "   Cognition   Overall Cognitive Status WFL   Arousal/Participation Responsive;Arousable; Cooperative   Attention Within functional limits   Orientation Level Oriented X4   Memory Within functional limits   Following Commands Follows all commands and directions without difficulty   Comments Pt pleasant and cooperative   Activity Tolerance   Activity Tolerance Patient tolerated treatment well;Patient limited by fatigue;Patient limited by pain   Medical Staff Made Aware March Pap, DPT   Assessment   Assessment Pt is a 68 yo male who actively participated in skilled OT session on 2/16/2023  Pt is WBAT in b/l LE  Treatment focused to improve functional transfers with fall prevention strategies, static/dynamic balance, postural/trunk control, proper body mechanics, functional use of b/l UE's, safety awareness, and overall increased activity tolerance in ADL/IADL/leisure tasks  Upon arrival, pt found lying supine in bed and was agreeable to OT session  Pt performed supine <> sit with UB postural support and sat EOB for approx 20 minutes to complete seated UB bathing tasks w/ Min A, LB bathing tasks w/ Mod A, UB dressing tasks w/ Min A, and LB dressing w/ Max A  Pt completed STS w/ Min A w/ RW and required Max A for posterior hygiene tasks  Pt completed short functional mobility to recliner w/ Min A and RW for seated rest break and then completed 2nd STS w/ Min A x1 w/ RW and completed long household functional mobility distances  At the end of the session, pt was left sitting upright in recliner with all functional needs in reach  Pt demonstrates gradual functional improvements towards OT goals however continues to be functioning below baseline and is still limited by the following limitations/impairments which were addressed through skilled instruction: generalized weakness, balance, endurance/activity tolerance, postural/trunk control, strength, pain, and safety awareness  At this time, recommend discharge to post-acute rehab when medically stable  The patient's raw score on the AM-PAC Daily Activity Inpatient Short Form is 16  A raw score of less than 19 suggests the patient may benefit from discharge to post-acute rehabilitation services  Please refer to the recommendation of the Occupational Therapist for safe discharge planning  Established OT goals will be continued 2-3x/wk to address immediate acute care needs and underlying performance skills to maximize occupational performance and safety to return to OF     Plan   Treatment Interventions ADL retraining;Functional transfer training;UE strengthening/ROM; Endurance training;Patient/family training;Equipment evaluation/education; Compensatory technique education;Continued evaluation; Energy conservation; Activityengagement   Goal Expiration Date 02/23/23   OT Treatment Day 3   OT Frequency 2-3x/wk   Recommendation   OT Discharge Recommendation Post acute rehabilitation services   AM-PAC Daily Activity Inpatient   Lower Body Dressing 2   Bathing 2   Toileting 2   Upper Body Dressing 3   Grooming 3   Eating 4   Daily Activity Raw Score 16   Daily Activity Standardized Score (Calc for Raw Score >=11) 35 96   AM-PAC Applied Cognition Inpatient   Following a Speech/Presentation 3   Understanding Ordinary Conversation 4   Taking Medications 3   Remembering Where Things Are Placed or Put Away 3   Remembering List of 4-5 Errands 3   Taking Care of Complicated Tasks 3   Applied Cognition Raw Score 19   Applied Cognition Standardized Score 39 77   End of Consult   Education Provided Yes   Patient Position at End of Consult Bedside chair;Bed/Chair alarm activated; All needs within reach   Nurse Communication Nurse aware of consult     Di Loya MS, OTR/L

## 2023-02-16 NOTE — CASE MANAGEMENT
Case Management Progress Note    Patient name Richa Adams  Location [address] [id number] MRN [id number]  : 1945 Date 2023       LOS (days): 6  Geometric Mean LOS (GMLOS) (days):   Days to GMLOS:        OBJECTIVE:        Current admission status: Inpatient  Preferred Pharmacy:   [address] [place] [address] [address] [place] [address], [address] [place] [address]  Phone: [phone] Fax: [phone]    [facility], [address] [facility]  [address] [place] [address]  Phone: [phone] Fax: [phone]    [address] [place] [address],[address], [facility] [address] [facility] [address] [facility] [address] [address]  Phone: [phone] Fax: [phone]    Primary Care Provider: No primary care provider on file  Primary Insurance: [facility]  Secondary Insurance: MEDICARE    PROGRESS NOTE:  CM made a referral to Davon Stovall requesting assistance with assigning an outpatient CM to the Pt once his d/c SNF

## 2023-02-16 NOTE — PLAN OF CARE
Problem: OCCUPATIONAL THERAPY ADULT  Goal: Performs self-care activities at highest level of function for planned discharge setting  See evaluation for individualized goals  Description: Treatment Interventions: ADL retraining, Functional transfer training, UE strengthening/ROM, Equipment evaluation/education, Cognitive reorientation, Patient/family training, Endurance training, Compensatory technique education, Activityengagement, Energy conservation          See flowsheet documentation for full assessment, interventions and recommendations  Outcome: Progressing  Note: Limitation: Decreased ADL status, Decreased UE ROM, Decreased UE strength, Decreased Safe judgement during ADL, Decreased cognition, Decreased endurance, Decreased self-care trans, Decreased high-level ADLs  Prognosis: Fair  Assessment: Pt is a 70 yo male who actively participated in skilled OT session on 2/16/2023  Pt is WBAT in b/l LE  Treatment focused to improve functional transfers with fall prevention strategies, static/dynamic balance, postural/trunk control, proper body mechanics, functional use of b/l UE's, safety awareness, and overall increased activity tolerance in ADL/IADL/leisure tasks  Upon arrival, pt found lying supine in bed and was agreeable to OT session  Pt performed supine <> sit with UB postural support and sat EOB for approx 15-20 minutes to complete seated UB bathing tasks w/ Min A, LB bathing tasks w/ Mod A, UB dressing tasks w/ Min A, and LB dressing w/ Max A  Pt completed STS w/ Min A w/ RW and required Max A for posterior hygiene tasks  Pt completed short functional mobility to recliner w/ Min A and RW for seated rest break and then completed 2nd STS w/ Min A x1 w/ RW and completed long household functional mobility distances  At the end of the session, pt was left sitting upright in recliner with all functional needs in reach   Pt demonstrates gradual functional improvements towards OT goals however continues to be functioning below baseline and is still limited by the following limitations/impairments which were addressed through skilled instruction: generalized weakness, balance, endurance/activity tolerance, postural/trunk control, strength, pain, and safety awareness  At this time, recommend discharge to post-acute rehab when medically stable  The patient's raw score on the AM-PAC Daily Activity Inpatient Short Form is 16  A raw score of less than 19 suggests the patient may benefit from discharge to post-acute rehabilitation services  Please refer to the recommendation of the Occupational Therapist for safe discharge planning  Established OT goals will be continued 2-3x/wk to address immediate acute care needs and underlying performance skills to maximize occupational performance and safety to return to OF       OT Discharge Recommendation: Post acute rehabilitation services

## 2023-02-16 NOTE — PROGRESS NOTES
[facility]  [facility] 1945, 69 [facility]  male MRN: [id number]  Unit/Bed#: -01 Encounter: [id number]  Primary Care Provider: No primary care provider on file  Date and time admitted to hospital: 2/10/2023 11:38 PM    * Acute systolic congestive heart failure (HCC)  Assessment & Plan  Wt Readings from Last 3 Encounters:   02/16/23 70 7 kg (155 lb 12 8 oz)   02/09/23 82 1 kg (181 lb)   06/24/21 68 6 kg (151 lb 3 8 oz)   · Patient presented with volume overload noted with bilateral lower extreme edema, dyspnea on exertion, scrotal swelling at home  Appeared grossly volume overloaded at time of admission [facility] with CXR revealing vascular congestion for which IV diuresis was initiated  Transferred to \[facility]"" for heart failure evaluation  · Echocardiogram at [facility] revealing new EF 25%  · Heart failure following, status post Lasix drip and IV diuretics  Diuretics now on hold per Heart Failure  ? Continue medical management with ASA/statin, beta-blocker, metoprolol succinate 50mg Q12 hours, ARB  ? 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, see below  · I's and O's, daily weights, low-sodium diet  · Monitor volume status  · Will need close outpatient follow-up  · PT/OT recommending rehab  CM following  CAD (coronary artery disease)  Assessment & Plan  · [facility] 2/14 showed triple vessel disease  · Vein mapping at carotid studies per CTS for pre-operative work up  · Overall, currently poor surgical candidate  CTS recommending medical management and optimization of co-morbidities  Should follow up with cardiology outpatient as well as CTS       Type 2 diabetes mellitus with hyperglycemia, with long-term current use of insulin [facility])  Assessment & Plan  Lab Results   Component Value Date    HGBA1C 12 0 (H) 02/08/2023       Recent Labs     02/15/23  1659 02/15/23  2103 02/16/23  0609 02/16/23  1154   POCGLU 216* 186* 176* 166*       · Uncontrolled as outpatient due to lack of insulin therapy outpatient  · Currently on Lantus 7 units QHS + humalog 4 units 3 times daily with meals plus SSI  · Would likely benefit from Jardiance given heart failure  · Monitor accuchecks and adjust regimen as needed  · Endocrinology following    · Anticipate will need insulin on discharge  Urinary retention  Assessment & Plan  · Found with significant urinary retention during ED evaluation at [address] requiring Mensah catheter placement  · Seen by urology at [facility], suspected secondary to severe edema with component of BPH as well  · Mensah catheter and continue Flomax  · Void trial in appx 10 days around 2/19, would recommend this be done at rehab when more mobile  Foot ulcer ([facility] [address] )  Assessment & Plan  · Left foot diabetic ulcer POA to [facility] [place]  · Evaluated by podiatry, local wound care with Santyl/DSD and Allevyn foam dressings to right leg blisters  · WBAT  · Completed 7 days Keflex for possible mild cellulitis     Unintentional weight loss  Assessment & Plan  · Possibly in setting of newly diagnosed heart failure however has lost appx 60 lbs unintentionally in the last 3-4 months  · Has never had colonoscopy  Will need GI referral on discharge for screening  · CT C/A/P without contrast with no acute inflammatory or neoplastic process in the chest/abdomen/pelvis within the limitations of noncontrast study per the results report         VTE Pharmacologic Prophylaxis:   heparin    Patient Centered Rounds: I performed bedside rounds with nursing staff today  Discussions with Specialists or Other Care Team Provider:      Education and Discussions with Family / Patient: Patient declined call to        Total Time Spent on Date of Encounter in care of patient: 25 minutes This time was spent on one or more of the following: performing physical exam; counseling and coordination of care; obtaining or reviewing history; documenting in the medical record; reviewing/ordering tests, medications or procedures; communicating with other healthcare professionals and discussing with patient's family/caregivers  Current Length of Stay: 6 day(s)  Current Patient Status: Inpatient   Certification Statement: The patient will continue to require additional inpatient hospital stay due to monitoring renal function, off diuretics for now per HF, needs rehab  Discharge Plan: Anticipate discharge in 24-48 hrs to rehab facility  Code Status: Level 3 - DNAR and DNI    Subjective:   Mr Garland Costa denies CP or SOB  Objective:     Vitals:   Temp (24hrs), Av 5 °F (36 9 °C), Min:97 5 °F (36 4 °C), Max:99 4 °F (37 4 °C)    Temp:  [97 5 °F (36 4 °C)-99 4 °F (37 4 °C)] 97 5 °F (36 4 °C)  HR:  [68-77] 72  Resp:  [16-20] 19  BP: (110-121)/(58-64) 121/58  SpO2:  [90 %-93 %] 91 %  Body mass index is 22 35 kg/m²  Input and Output Summary (last 24 hours): Intake/Output Summary (Last 24 hours) at 2023 1502  Last data filed at 2023 1355  Gross per 24 hour   Intake 416 ml   Output 600 ml   Net -184 ml       Physical Exam:   Physical Exam  Vitals reviewed  Constitutional:       Comments: Patient seen sitting in bed comfortably resting, NAD   Cardiovascular:      Rate and Rhythm: Normal rate and regular rhythm  Pulmonary:      Effort: Pulmonary effort is normal  No respiratory distress  Breath sounds: Normal breath sounds  Abdominal:      General: Bowel sounds are normal  There is no distension  Palpations: Abdomen is soft  Tenderness: There is no abdominal tenderness  Musculoskeletal:      Right lower leg: Edema present  Left lower leg: Edema present  Skin:     General: Skin is warm  Neurological:      Mental Status: He is alert and oriented to person, place, and time     Psychiatric:         Mood and Affect: Mood normal          Behavior: Behavior normal           Additional Data: Labs:  Results from last 7 days   Lab Units 23  0519   WBC Thousand/uL 6 09   HEMOGLOBIN g/dL 11 4*   HEMATOCRIT % 38 1   PLATELETS Thousands/uL 263   NEUTROS PCT % 65   LYMPHS PCT % 22   MONOS PCT % 10   EOS PCT % 3     Results from last 7 days   Lab Units 23  0519   SODIUM mmol/L 134*   POTASSIUM mmol/L 4 4   CHLORIDE mmol/L 96   CO2 mmol/L 36*   BUN mg/dL 44*   CREATININE mg/dL 1 26   ANION GAP mmol/L 2*   CALCIUM mg/dL 8 6   GLUCOSE RANDOM mg/dL 177*         Results from last 7 days   Lab Units 23  1154 23  0609 02/15/23  2103 02/15/23  1659 02/15/23  1114 02/15/23  0619 23  2043 23  1717 23  1429 23  0602 23  2126 23  1714   POC GLUCOSE mg/dl 166* 176* 186* 216* 199* 183* 187* 112 127 187* 281* 215*               Lines/Drains:  Invasive Devices     Peripheral Intravenous Line  Duration           Peripheral IV 23 Dorsal (posterior); Left Forearm 2 days          Drain  Duration           Urethral Catheter Coude 16 Fr  7 days              Urinary Catheter:  Goal for removal: N/A- Discharging with Mensah           Telemetry:  Telemetry Orders (From admission, onward)             48 Hour Telemetry Monitoring  Continuous x 48 hours           References:    Telemetry Guidelines   Question:  Reason for 48 Hour Telemetry  Answer:  Acute Decompensated CHF (continuous diuretic infusion or total diuretic dose > 200 mg daily, associated electrolyte derangement, ionotropic drip, history of ventricular arrhythmia, or new EF <35%)                 Telemetry Reviewed: Normal Sinus Rhythm  Indication for Continued Telemetry Use: Acute CHF on >200 mg lasix/day or equivalent dose or with new reduced EF                Imaging: Reviewed radiology reports from this admission including: cardiac cath    Recent Cultures (last 7 days):   Results from last 7 days   Lab Units 23  0001   GRAM STAIN RESULT  4+ Gram positive rods*  Rare Gram positive cocci in clusters*   WOUND CULTURE  4+ Growth of Staphylococcus aureus*  4+ Growth of       Last 24 Hours Medication List:   Current Facility-Administered Medications   Medication Dose Route Frequency Provider Last Rate   • acetaminophen  650 mg Oral Q6H PRN Dinh Urena DO     • aspirin  81 mg Oral Daily Dinh Urena DO     • atorvastatin  40 mg Oral Daily With Blanquita George DO     • calcium carbonate  1,000 mg Oral Daily PRN Dinh Urena DO     • collagenase   Topical Daily Dinh Urena DO     • docusate sodium  100 mg Oral BID Dinh Urena DO     • heparin (porcine)  5,000 Units Subcutaneous [facility] Dinh Urena DO     • insulin glargine  7 Units Subcutaneous HS Tavo Alvarado MD     • insulin lispro  1-5 Units Subcutaneous TID AC Dai Carolina MD     • insulin lispro  1-5 Units Subcutaneous HS Tavo Alvarado MD     • insulin lispro  4 Units Subcutaneous TID With Meals Dai Carolina MD     • losartan  12 5 mg Oral BID Stacie Lind MD     • metoprolol succinate  50 mg Oral Q12H [facility] Dinh Urena DO     • ondansetron  4 mg Intravenous Q6H PRN Dinh Urena DO     • polyethylene glycol  17 g Oral Daily PRN Dinh Urena DO     • tamsulosin  0 4 mg Oral Daily With Blanquita George DO          Today, Patient Was Seen By: Oanh Quiros PA-C    **Please Note: This note may have been constructed using a voice recognition system  **

## 2023-02-16 NOTE — ASSESSMENT & PLAN NOTE
Lab Results   Component Value Date    HGBA1C 12 0 (H) 02/08/2023       Recent Labs     02/15/23  1659 02/15/23  2103 02/16/23  0609 02/16/23  1154   POCGLU 216* 186* 176* 166*       · Uncontrolled as outpatient due to lack of insulin therapy outpatient  · Currently on Lantus 7 units QHS + humalog 4 units 3 times daily with meals plus SSI  · Would likely benefit from Jardiance given heart failure  · Monitor accuchecks and adjust regimen as needed  · Endocrinology following    · Anticipate will need insulin on discharge

## 2023-02-16 NOTE — ASSESSMENT & PLAN NOTE
Wt Readings from Last 3 Encounters:   02/16/23 70 7 kg (155 lb 12 8 oz)   02/09/23 82 1 kg (181 lb)   06/24/21 68 6 kg (151 lb 3 8 oz)   · Patient presented with volume overload noted with bilateral lower extreme edema, dyspnea on exertion, scrotal swelling at home  Appeared grossly volume overloaded at time of admission  miners with CXR revealing vascular congestion for which IV diuresis was initiated  Transferred to [facility] for heart failure evaluation  · Echocardiogram at [facility] revealing new EF 25%  · Heart failure following, status post Lasix drip and IV diuretics  Diuretics now on hold per Heart Failure  ? Continue medical management with ASA/statin, beta-blocker, metoprolol succinate 50mg Q12 hours, ARB  ? 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, see below  · I's and O's, daily weights, low-sodium diet  · Monitor volume status  · Will need close outpatient follow-up  · PT/OT recommending rehab  CM following

## 2023-02-16 NOTE — ASSESSMENT & PLAN NOTE
· Possibly in setting of newly diagnosed heart failure however has lost appx 60 lbs unintentionally in the last 3-4 months  · Has never had colonoscopy  Will need GI referral on discharge for screening     · CT C/A/P without contrast with no acute inflammatory or neoplastic process in the chest/abdomen/pelvis within the limitations of noncontrast study per the results report

## 2023-02-16 NOTE — PLAN OF CARE
Problem: Prexisting or High Potential for Compromised Skin Integrity  Goal: Skin integrity is maintained or improved  Description: INTERVENTIONS:  - Identify patients at risk for skin breakdown  - Assess and monitor skin integrity  - Assess and monitor nutrition and hydration status  - Monitor labs   - Assess for incontinence   - Turn and reposition patient  - Assist with mobility/ambulation  - Relieve pressure over bony prominences  - Avoid friction and shearing  - Provide appropriate hygiene as needed including keeping skin clean and dry  - Evaluate need for skin moisturizer/barrier cream  - Collaborate with interdisciplinary team   - Patient/family teaching  - Consider wound care consult   Outcome: Progressing     Problem: MOBILITY - ADULT  Goal: Maintain or return to baseline ADL function  Description: INTERVENTIONS:  -  Assess patient's ability to carry out ADLs; assess patient's baseline for ADL function and identify physical deficits which impact ability to perform ADLs (bathing, care of mouth/teeth, toileting, grooming, dressing, etc )  - Assess/evaluate cause of self-care deficits   - Assess range of motion  - Assess patient's mobility; develop plan if impaired  - Assess patient's need for assistive devices and provide as appropriate  - Encourage maximum independence but intervene and supervise when necessary  - Involve family in performance of ADLs  - Assess for home care needs following discharge   - Consider OT consult to assist with ADL evaluation and planning for discharge  - Provide patient education as appropriate  Outcome: Progressing  Goal: Maintains/Returns to pre admission functional level  Description: INTERVENTIONS:  - Perform BMAT or MOVE assessment daily    - Set and communicate daily mobility goal to care team and patient/family/caregiver  - Collaborate with rehabilitation services on mobility goals if consulted  - Perform Range of Motion  times a day    - Reposition patient every hours   - Dangle patient times a day  - Stand patient  times a day  - Ambulate patient  times a day  - Out of bed to chair times a day   - Out of bed for meals times a day  - Out of bed for toileting  - Record patient progress and toleration of activity level   Outcome: Progressing     Problem: PAIN - ADULT  Goal: Verbalizes/displays adequate comfort level or baseline comfort level  Description: Interventions:  - Encourage patient to monitor pain and request assistance  - Assess pain using appropriate pain scale  - Administer analgesics based on type and severity of pain and evaluate response  - Implement non-pharmacological measures as appropriate and evaluate response  - Consider cultural and social influences on pain and pain management  - Notify physician/advanced practitioner if interventions unsuccessful or patient reports new pain  Outcome: Progressing     Problem: INFECTION - ADULT  Goal: Absence or prevention of progression during hospitalization  Description: INTERVENTIONS:  - Assess and monitor for signs and symptoms of infection  - Monitor lab/diagnostic results  - Monitor all insertion sites, i e  indwelling lines, tubes, and drains  - Monitor endotracheal if appropriate and nasal secretions for changes in amount and color  - Cass[place] appropriate cooling/warming therapies per order  - Administer medications as ordered  - Instruct and encourage patient and family to use good hand hygiene technique  - Identify and instruct in appropriate isolation precautions for identified infection/condition  Outcome: Progressing  Goal: Absence of fever/infection during neutropenic period  Description: INTERVENTIONS:  - Monitor WBC    Outcome: Progressing     Problem: SAFETY ADULT  Goal: Maintain or return to baseline ADL function  Description: INTERVENTIONS:  -  Assess patient's ability to carry out ADLs; assess patient's baseline for ADL function and identify physical deficits which impact ability to perform ADLs (bathing, care of mouth/teeth, toileting, grooming, dressing, etc )  - Assess/evaluate cause of self-care deficits   - Assess range of motion  - Assess patient's mobility; develop plan if impaired  - Assess patient's need for assistive devices and provide as appropriate  - Encourage maximum independence but intervene and supervise when necessary  - Involve family in performance of ADLs  - Assess for home care needs following discharge   - Consider OT consult to assist with ADL evaluation and planning for discharge  - Provide patient education as appropriate  Outcome: Progressing  Goal: Maintains/Returns to pre admission functional level  Description: INTERVENTIONS:  - Perform BMAT or MOVE assessment daily    - Set and communicate daily mobility goal to care team and patient/family/caregiver  - Collaborate with rehabilitation services on mobility goals if consulted  - Perform Range of Motion  times a day  - Reposition patient every  hours    - Dangle patient times a day  - Stand patient  times a day  - Ambulate patient times a day  - Out of bed to chair times a day   - Out of bed for meals  times a day  - Out of bed for toileting  - Record patient progress and toleration of activity level   Outcome: Progressing  Goal: Patient will remain free of falls  Description: INTERVENTIONS:  - Educate patient/family on patient safety including physical limitations  - Instruct patient to call for assistance with activity   - Consult OT/PT to assist with strengthening/mobility   - Keep Call bell within reach  - Keep bed low and locked with side rails adjusted as appropriate  - Keep care items and personal belongings within reach  - Initiate and maintain comfort rounds  - Make Fall Risk Sign visible to staff  - Offer Toileting every Hours, in advance of need  - Initiate/Maintain alarm  - Obtain necessary fall risk management equipment  - Apply yellow socks and bracelet for high fall risk patients  - Consider moving patient to room near nurses station  Outcome: Progressing     Problem: DISCHARGE PLANNING  Goal: Discharge to home or other facility with appropriate resources  Description: INTERVENTIONS:  - Identify barriers to discharge w/patient and caregiver  - Arrange for needed discharge resources and transportation as appropriate  - Identify discharge learning needs (meds, wound care, etc )  - Arrange for interpretive services to assist at discharge as needed  - Refer to Case Management Department for coordinating discharge planning if the patient needs post-hospital services based on physician/advanced practitioner order or complex needs related to functional status, cognitive ability, or social support system  Outcome: Progressing     Problem: Knowledge Deficit  Goal: Patient/family/caregiver demonstrates understanding of disease process, treatment plan, medications, and discharge instructions  Description: Complete learning assessment and assess knowledge base  Interventions:  - Provide teaching at level of understanding  - Provide teaching via preferred learning methods  Outcome: Progressing     Problem: Nutrition/Hydration-ADULT  Goal: Nutrient/Hydration intake appropriate for improving, restoring or maintaining nutritional needs  Description: Monitor and assess patient's nutrition/hydration status for malnutrition  Collaborate with interdisciplinary team and initiate plan and interventions as ordered  Monitor patient's weight and dietary intake as ordered or per policy  Utilize nutrition screening tool and intervene as necessary  Determine patient's food preferences and provide high-protein, high-caloric foods as appropriate       INTERVENTIONS:  - Monitor oral intake, urinary output, labs, and treatment plans  - Assess nutrition and hydration status and recommend course of action  - Evaluate amount of meals eaten  - Assist patient with eating if necessary   - Allow adequate time for meals  - Recommend/ encourage appropriate diets, oral nutritional supplements, and vitamin/mineral supplements  - Order, calculate, and assess calorie counts as needed  - Recommend, monitor, and adjust tube feedings and TPN/PPN based on assessed needs  - Assess need for intravenous fluids  - Provide specific nutrition/hydration education as appropriate  - Include patient/family/caregiver in decisions related to nutrition  Outcome: Progressing

## 2023-02-16 NOTE — ASSESSMENT & PLAN NOTE
· [facility] 2/14 showed triple vessel disease  · Vein mapping at carotid studies per CTS for pre-operative work up  · Overall, currently poor surgical candidate  CTS recommending medical management and optimization of co-morbidities  Should follow up with cardiology outpatient as well as CTS

## 2023-02-16 NOTE — PLAN OF CARE
Problem: PHYSICAL THERAPY ADULT  Goal: Performs mobility at highest level of function for planned discharge setting  See evaluation for individualized goals  Outcome: Progressing  Note: Prognosis: Good  Problem List: Decreased strength, Decreased endurance, Impaired balance, Decreased mobility, Decreased safety awareness, Pain (R calf pain when ambualting)  Assessment: Pt presents to therapy today with reduced mobility, poor endurance, risk of falling, pain in R calf after ambulating, gait abnormalities, poor endurance  These impairments limit the patient by requiring increased assistance for mobility and places him at risk of falling  Pt would benefit from continued skilled therapy while in the hospital to improve overall mobility and work towards a safe d/c  Recommend rehab  At end of session patient was left seated with call bell within reach  The patient's AM-PAC Basic Mobility Inpatient Short Form Raw Score is 16  A Raw score of less than or equal to 16 suggests the patient may benefit from discharge to post-acute rehabilitation services  Please also refer to the recommendation of the Physical Therapist for safe discharge planning  PT Discharge Recommendation: Post acute rehabilitation services    See flowsheet documentation for full assessment

## 2023-02-16 NOTE — PROGRESS NOTES
Heart Failure/ Pulmonary Hypertension Progress Note - Roderick Coil 69 y o  male MRN: [id number]    Unit/Bed#: -01 Encounter: [id number]      Assessment:    Principal Problem:    Acute systolic congestive heart failure (HCC)  Active Problems:    Type 2 diabetes mellitus with hyperglycemia, with long-term current use of insulin (HCC)    Foot ulcer (HCC)    Urinary retention    Unintentional weight loss    CAD (coronary artery disease)    69year-old man with type 2 diabetes mellitus and history of tobacco abuse who presented to [facility] on 02/08/2023 with worsening bilateral LE swelling, scrotal swelling with urinary retention, abdominal bloating, SIMON, and orthopnea for 2 to 3 weeks  Admitted for acute heart failure with echocardiogram showing severely reduced EF at 25%     # Acute heart failure with reduced ejection fraction, Stage C, NYHA III  Etiology: ischemic     Studies- personally reviewed by [facility] 2/14/23:  Severe 3-vessel CAD     EKG: sinus rhythm, nonspecific T wave abnormality, poor R wave progression     Echocardiogram 2/9/23  LVEF: 25%, 15-20 on my review  LVIDd: 4 3cm  RV: normal size, low normal systolic function  MR: mild  PASP: 46mmHg, mild TR  RVOT: no notching  Other: severe global hypokinesis with relative preservation of the basal segments     Neurohormonal Blockade:  --Beta-Blocker: metoprolol succinate 50mg BID  --ACEi, ARB or ARNi: losartan 12 5mg BID  --Aldosterone Receptor Blocker:  --SGLT2 Inhibitor:  --Diuretic: furosemide 40mg IV BID     Sudden Cardiac Death Risk Reduction:  --ICD: LifeVest candidate - patient declined, he is currently DNAR/DNI     Electrical Resynchronization:  --Candidacy for BiV device: narrow QRS     Advanced Therapies (if appropriate):   --We will continue to monitor, improving with diuresis     3-vessel CAD  Rx: aspirin, statin  Lipid panel 2/11/23: TG 57 HDL 36 LDL 61  Deemed not a candidate for CABG at this time per CTS and recommended control of medical comorbidities  Unintentional weight loss  Foot ulcer, left: management per primary team    Diabetes mellitus, type II, uncontrolled, HbA1c 12 2/8/23  Urinary retention: management per urology  History of tobacco abuse: quit in his 30s    Exposure to Agent Orange  Cataracts, bilateral: s/p bilateral extractions      Today's Plan: Will hold diuretics today, euvolemic with bump in creatinine  Plan to start oral diuretic tomorrow  Continue losartan and metoprolol succinate  Patient declined LifeVest as above  Discharge planning to rehab  Patient ok with following up at [facility]    Subjective:   Patient seen and examined  No significant events overnight  Review of Systems   Constitutional: Negative for chills and fever  Respiratory: Negative for chest tightness and shortness of breath  Cardiovascular: Negative for chest pain, palpitations and leg swelling  Gastrointestinal: Negative for abdominal distention, abdominal pain, nausea and vomiting  Neurological: Negative for dizziness and light-headedness  Objective: Intake/ Output: 236/1450, -1214  Weight: 155 lbs standing scale, rest are bed scales  Tele: sinus rhythm, no events in the last 24 hours      Vitals: Blood pressure 110/58, pulse 68, temperature 98 7 °F (37 1 °C), temperature source Oral, resp  rate 18, height 5' 10" (1 778 m), weight 70 7 kg (155 lb 12 8 oz), SpO2 92 %  , Body mass index is 22 35 kg/m² , I/O last 3 completed shifts: In: 495 2 [P O :236; I V :259 2]  Out: 3000 [Urine:3000]  No intake/output data recorded  Wt Readings from Last 3 Encounters:   02/16/23 70 7 kg (155 lb 12 8 oz)   02/09/23 82 1 kg (181 lb)   06/24/21 68 6 kg (151 lb 3 8 oz)       Intake/Output Summary (Last 24 hours) at 2/16/2023 0911  Last data filed at 2/15/2023 2216  Gross per 24 hour   Intake 236 ml   Output 1450 ml   Net -1214 ml     I/O last 3 completed shifts: In: 495 2 [P O :236;  I V :259 2]  Out: 3000 [Urine:3000]    No significant arrhythmias seen on telemetry review         Physical Exam:  Vitals:    02/15/23 2328 02/16/23 0238 02/16/23 0535 02/16/23 0712   BP: 118/60 117/60  110/58   BP Location:    Right arm   Pulse: 74 73  68   Resp: 16   18   Temp: 98 6 °F (37 °C)   98 7 °F (37 1 °C)   TempSrc:    Oral   SpO2: 93% 91%  92%   Weight:   70 7 kg (155 lb 12 8 oz)    Height:           GEN: Alphonza Maxim cachectic, alert and oriented x 3, pleasant and cooperative   HEENT: NC/AT, moist mucosa, anicteric sclerae; extraocular muscles intact  NECK: supple, no carotid bruits   HEART: regular rhythm, normal S1 and S2, no murmurs, clicks, gallops or rubs, JVP is at the clavicle sitting almost upright  LUNGS: clear to auscultation bilaterally; no wheezes, rales, or rhonchi   ABDOMEN: normal bowel sounds, soft, no tenderness, no distention  EXTREMITIES: peripheral pulses normal; no clubbing, cyanosis; trace edema  NEURO: no focal findings   SKIN: normal without suspicious lesions on exposed skin      Current Facility-Administered Medications:   •  acetaminophen (TYLENOL) tablet 650 mg, 650 mg, Oral, Q6H PRN, Shaun Benites DO  •  aspirin (ECOTRIN LOW STRENGTH) EC tablet 81 mg, 81 mg, Oral, Daily, Shaun Benites DO, 81 mg at 02/16/23 0849  •  atorvastatin (LIPITOR) tablet 40 mg, 40 mg, Oral, Daily With Dinner, Shaun Benites DO, 40 mg at 02/15/23 1720  •  calcium carbonate (TUMS) chewable tablet 1,000 mg, 1,000 mg, Oral, Daily PRN, Shaun Benites DO  •  collagenase (SANTYL) ointment, , Topical, Daily, Shaun Benites DO, Given at 02/16/23 0853  •  docusate sodium (COLACE) capsule 100 mg, 100 mg, Oral, BID, Shaun Benites DO, 100 mg at 02/15/23 1720  •  heparin (porcine) subcutaneous injection 5,000 Units, 5,000 Units, Subcutaneous, Q8H [address], Shaun Benites DO, 5,000 Units at 02/16/23 0510  •  insulin glargine (LANTUS) subcutaneous injection 7 Units 0 07 mL, 7 Units, Subcutaneous, HS, Dai Griggs MD, 7 Units at 02/15/23 2134  •  insulin lispro (HumaLOG) 100 units/mL subcutaneous injection 1-5 Units, 1-5 Units, Subcutaneous, TID AC, 1 Units at 02/16/23 0852 **AND** Fingerstick Glucose (POCT), , , TID AC, Dai Copeland MD  •  insulin lispro (HumaLOG) 100 units/mL subcutaneous injection 1-5 Units, 1-5 Units, Subcutaneous, HS, Dai Copeland MD, 1 Units at 02/15/23 2134  •  insulin lispro (HumaLOG) 100 units/mL subcutaneous injection 4 Units, 4 Units, Subcutaneous, TID With Meals, Rhiannon Hatch MD, 4 Units at 02/16/23 6748  •  losartan (COZAAR) tablet 12 5 mg, 12 5 mg, Oral, BID, Archie Turner MD, 12 5 mg at 02/16/23 0848  •  metoprolol succinate (TOPROL-XL) 24 hr tablet 50 mg, 50 mg, Oral, Q12H [address], Kevin Lips, DO, 50 mg at 02/16/23 0848  •  ondansetron (ZOFRAN) injection 4 mg, 4 mg, Intravenous, Q6H PRN, Kevin Lips, DO  •  polyethylene glycol (MIRALAX) packet 17 g, 17 g, Oral, Daily PRN, [place] Lips, DO  •  tamsulosin (FLOMAX) capsule 0 4 mg, 0 4 mg, Oral, Daily With Zondra Ducking, DO, 0 4 mg at 02/15/23 1720      Labs & Results:        Results from last 7 days   Lab Units 02/16/23  0519 02/15/23  0432 02/14/23  0451   WBC Thousand/uL 6 09 5 39 6 90   HEMOGLOBIN g/dL 11 4* 11 7* 11 5*   HEMATOCRIT % 38 1 38 5 38 1   PLATELETS Thousands/uL 263 247 246         Results from last 7 days   Lab Units 02/16/23  0519 02/15/23  0432 02/14/23  0451   POTASSIUM mmol/L 4 4 4 6 4 9   CHLORIDE mmol/L 96 94* 94*   CO2 mmol/L 36* 36* 36*   BUN mg/dL 44* 36* 30*   CREATININE mg/dL 1 26 1 03 1 00   CALCIUM mg/dL 8 6 8 8 8 8             Archie Turner MD  Advanced Heart Failure and Mechanical [place]

## 2023-02-16 NOTE — PHYSICAL THERAPY NOTE
Physical Therapy treatment note     Patient Name: Rebecca Torres    NXWHK'[id number] Date: 2/16/2023     Problem List  Principal Problem:    Acute systolic congestive heart failure ([facility] )  Active Problems:    Type 2 diabetes mellitus with hyperglycemia, with long-term current use of insulin ([facility])    Foot ulcer ([facility] )    Urinary retention    Unintentional weight loss    CAD (coronary artery disease)       Past Medical History  Past Medical History:   Diagnosis Date   • 6th nerve palsy    • Blister of right leg    • BPH (benign prostatic hyperplasia)    • CAD (coronary artery disease)    • Cardiomyopathy ([facility] )     EF 25%   • CHF (congestive heart failure) ([facility])    • Depression    • Diabetes mellitus ([place] [address] )     type 2, insulin dependent   • Diabetic foot ulcer ([place] [address] )     left, local wound care   • Diabetic retinopathy ([place] [address] )    • Exposure to Agent Orange     while serving in [employer]   • Former tobacco use    • H/O urinary retention     w/ [facility] placement   • Hyperlipidemia    • Hypertension    • Rupture of biceps tendon, traumatic 2017    right        Past Surgical History  Past Surgical History:   Procedure Laterality Date   • CARDIAC CATHETERIZATION     • CARDIAC CATHETERIZATION Left 2/14/2023    Procedure: Cardiac Left Heart Cath;  Surgeon: Krysta Kang MD;  Location: [facility] CARD[facility]; Service: Cardiology   • CARDIAC CATHETERIZATION N/A 2/14/2023    Procedure: Cardiac Coronary Angiogram;  Surgeon: Krysta Kang MD;  Location: [facility]ARDI[facility];   Service: Cardiology   • CATARACT EXTRACTION, BILATERAL Bilateral 2021 02/16/23 1031   PT Last Visit   PT Visit Date 02/16/23   Note Type   Note Type Treatment   Pain Assessment   Pain Assessment Tool 0-10   Pain Score No Pain   Restrictions/Precautions   Weight Bearing Precautions Per Order Yes   RLE Weight Bearing Per Order WBAT   LLE Weight Bearing Per Order WBAT   Other Precautions Fall Risk;Telemetry;Multiple lines;Chair Alarm; Bed Alarm  ([facility])   General   Chart Reviewed Yes   Family/Caregiver Present No   Cognition   Overall Cognitive Status WFL   Arousal/Participation Responsive;Arousable; Cooperative   Attention Within functional limits   Bed Mobility   Supine to Sit 4  Minimal assistance   Additional items Assist x 1   Additional Comments sitting EOB S level   Transfers   Sit to Stand 4  Minimal assistance   Additional items Assist x 1   Stand to Sit 4  Minimal assistance   Additional items Assist x 1   Ambulation/Elevation   Gait pattern Excessively slow; Step to; Foward flexed; Shuffling   Gait Assistance 4  Minimal assist   Additional items Assist x 1   Assistive Device Rolling walker   Distance 30ftx2, 3ftx1   Balance   Static Sitting Fair   Dynamic Sitting Fair -   Static Standing Poor +   Dynamic Standing Poor +   Ambulatory Poor   Endurance Deficit   Endurance Deficit Yes   Activity Tolerance   Activity Tolerance Patient limited by fatigue   Nurse Made Aware nurse approved therapy session   Exercises   Hip Flexion Sitting;Left  (5 reps x2 sets)   Knee AROM Long Arc Quad Sitting;Left  (10 rpes x 2 sets)   Ankle Pumps Sitting;Bilateral  (30 reps x 3 sets)    Sitting EOB at a S level for 10-15 minutes to improve sitting tolerance and mobility    Assessment   Prognosis Good   Problem List Decreased strength;Decreased endurance; Impaired balance;Decreased mobility; Decreased safety awareness;Pain  (R calf pain when ambualting)   Assessment Pt presents to therapy today with reduced mobility, poor endurance, risk of falling, pain in R calf after ambulating, gait abnormalities, poor endurance  These impairments limit the patient by requiring increased assistance for mobility and places him at risk of falling  Pt would benefit from continued skilled therapy while in the hospital to improve overall mobility and work towards a safe d/c  Recommend rehab  At end of session patient was left seated with call bell within reach  The patient's AM-PAC Basic Mobility Inpatient Short Form Raw Score is 16  A Raw score of less than or equal to 16 suggests the patient may benefit from discharge to post-acute rehabilitation services  Please also refer to the recommendation of the Physical Therapist for safe discharge planning  Pt had pain in calf when ambulating  Pt stated that pain improved with rest  Pt's calf did not feel warm  RN notified  Goals   STG Expiration Date 02/26/23   PT Treatment Day 2   Plan   Treatment/Interventions Functional transfer training;LE strengthening/ROM; Therapeutic exercise; Endurance training;Bed mobility;Gait training;Equipment eval/education;OT   Progress Progressing toward goals   PT Frequency 3-5x/wk   Recommendation   PT Discharge Recommendation Post acute rehabilitation services   Equipment Recommended [facility] Recommended Wheeled walker   Change/add to [place]?  No   AM-PAC Basic Mobility Inpatient   Turning in Flat Bed Without Bedrails 3   Lying on Back to Sitting on Edge of Flat Bed Without Bedrails 3   Moving Bed to Chair 3   Standing Up From Chair Using Arms 3   Walk in Room 3   Climb 3-5 Stairs With Railing 1   Basic Mobility Inpatient Raw Score 16   Basic Mobility Standardized Score 38 32   Highest Level Of Mobility   JH-HLM Goal 5: Stand one or more mins   JH-HLM Achieved 7: Walk 25 feet or more   Joaquina Cosby, PT, DPT

## 2023-02-16 NOTE — ASSESSMENT & PLAN NOTE
· Left foot diabetic ulcer POA to [facility] [place]  · Evaluated by podiatry, local wound care with Santyl/DSD and Allevyn foam dressings to right leg blisters     · WBAT  · Completed 7 days Keflex for possible mild cellulitis

## 2023-02-17 LAB
ANION GAP SERPL CALCULATED.3IONS-SCNC: 3 MMOL/L (ref 4–13)
BUN SERPL-MCNC: 41 MG/DL (ref 5–25)
CALCIUM SERPL-MCNC: 8.6 MG/DL (ref 8.3–10.1)
CHLORIDE SERPL-SCNC: 100 MMOL/L (ref 96–108)
CO2 SERPL-SCNC: 35 MMOL/L (ref 21–32)
CREAT SERPL-MCNC: 0.9 MG/DL (ref 0.6–1.3)
ERYTHROCYTE [DISTWIDTH] IN BLOOD BY AUTOMATED COUNT: 15.7 % (ref 11.6–15.1)
GFR SERPL CREATININE-BSD FRML MDRD: 82 ML/MIN/1.73SQ M
GLUCOSE SERPL-MCNC: 104 MG/DL (ref 65–140)
GLUCOSE SERPL-MCNC: 123 MG/DL (ref 65–140)
GLUCOSE SERPL-MCNC: 157 MG/DL (ref 65–140)
GLUCOSE SERPL-MCNC: 208 MG/DL (ref 65–140)
GLUCOSE SERPL-MCNC: 94 MG/DL (ref 65–140)
HCT VFR BLD AUTO: 36.6 % (ref 36.5–49.3)
HGB BLD-MCNC: 11.1 G/DL (ref 12–17)
MCH RBC QN AUTO: 25.7 PG (ref 26.8–34.3)
MCHC RBC AUTO-ENTMCNC: 30.3 G/DL (ref 31.4–37.4)
MCV RBC AUTO: 85 FL (ref 82–98)
PLATELET # BLD AUTO: 257 THOUSANDS/UL (ref 149–390)
PMV BLD AUTO: 9.6 FL (ref 8.9–12.7)
POTASSIUM SERPL-SCNC: 3.5 MMOL/L (ref 3.5–5.3)
RBC # BLD AUTO: 4.32 MILLION/UL (ref 3.88–5.62)
SODIUM SERPL-SCNC: 138 MMOL/L (ref 135–147)
WBC # BLD AUTO: 5.6 THOUSAND/UL (ref 4.31–10.16)

## 2023-02-17 RX ORDER — SPIRONOLACTONE 25 MG/1
12.5 TABLET ORAL DAILY
Status: DISCONTINUED | OUTPATIENT
Start: 2023-02-17 | End: 2023-02-19

## 2023-02-17 RX ORDER — FUROSEMIDE 10 MG/ML
40 INJECTION INTRAMUSCULAR; INTRAVENOUS ONCE
Status: COMPLETED | OUTPATIENT
Start: 2023-02-17 | End: 2023-02-17

## 2023-02-17 RX ADMIN — TAMSULOSIN HYDROCHLORIDE 0.4 MG: 0.4 CAPSULE ORAL at 16:39

## 2023-02-17 RX ADMIN — ATORVASTATIN CALCIUM 40 MG: 40 TABLET, FILM COATED ORAL at 16:39

## 2023-02-17 RX ADMIN — HEPARIN SODIUM 5000 UNITS: 5000 INJECTION INTRAVENOUS; SUBCUTANEOUS at 06:03

## 2023-02-17 RX ADMIN — LOSARTAN POTASSIUM 12.5 MG: 25 TABLET, FILM COATED ORAL at 09:01

## 2023-02-17 RX ADMIN — INSULIN LISPRO 4 UNITS: 100 INJECTION, SOLUTION INTRAVENOUS; SUBCUTANEOUS at 09:01

## 2023-02-17 RX ADMIN — INSULIN LISPRO 4 UNITS: 100 INJECTION, SOLUTION INTRAVENOUS; SUBCUTANEOUS at 12:14

## 2023-02-17 RX ADMIN — COLLAGENASE SANTYL: 250 OINTMENT TOPICAL at 09:00

## 2023-02-17 RX ADMIN — INSULIN GLARGINE 7 UNITS: 100 INJECTION, SOLUTION SUBCUTANEOUS at 21:33

## 2023-02-17 RX ADMIN — LOSARTAN POTASSIUM 12.5 MG: 25 TABLET, FILM COATED ORAL at 17:50

## 2023-02-17 RX ADMIN — INSULIN LISPRO 1 UNITS: 100 INJECTION, SOLUTION INTRAVENOUS; SUBCUTANEOUS at 17:45

## 2023-02-17 RX ADMIN — HEPARIN SODIUM 5000 UNITS: 5000 INJECTION INTRAVENOUS; SUBCUTANEOUS at 14:03

## 2023-02-17 RX ADMIN — SPIRONOLACTONE 12.5 MG: 25 TABLET ORAL at 12:04

## 2023-02-17 RX ADMIN — FUROSEMIDE 40 MG: 10 INJECTION, SOLUTION INTRAMUSCULAR; INTRAVENOUS at 12:04

## 2023-02-17 RX ADMIN — METOPROLOL SUCCINATE 50 MG: 50 TABLET, EXTENDED RELEASE ORAL at 09:01

## 2023-02-17 RX ADMIN — METOPROLOL SUCCINATE 50 MG: 50 TABLET, EXTENDED RELEASE ORAL at 21:32

## 2023-02-17 RX ADMIN — ASPIRIN 81 MG: 81 TABLET, COATED ORAL at 09:01

## 2023-02-17 RX ADMIN — HEPARIN SODIUM 5000 UNITS: 5000 INJECTION INTRAVENOUS; SUBCUTANEOUS at 21:34

## 2023-02-17 RX ADMIN — INSULIN LISPRO 4 UNITS: 100 INJECTION, SOLUTION INTRAVENOUS; SUBCUTANEOUS at 17:45

## 2023-02-17 NOTE — CASE MANAGEMENT
Case Management Discharge Planning Note    Patient name Rafi Bland  Location [address] [id number] MRN [id number]  : 1945 Date 2023       Current Admission Date: 2/10/2023  Current Admission Diagnosis:Acute systolic congestive heart failure [facility])   Patient Active Problem List    Diagnosis Date Noted   • CAD (coronary artery disease) 2023   • Unintentional weight loss 2023   • Moderate protein-calorie malnutrition ([facility] ) 02/10/2023   • Hydrocele, bilateral    • Foot ulcer ([facility] )    • Acute systolic congestive heart failure ([facility] ) 2023   • Urinary retention 2023   • Hypernatremia 2021   • Abnormal EKG 2021   • Hypokalemia 2021   • Type 2 diabetes mellitus with hyperglycemia, with long-term current use of insulin ([facility] ) 2021      LOS (days): 7  Geometric Mean LOS (GMLOS) (days):   Days to GMLOS:     OBJECTIVE:  Risk of Unplanned Readmission Score: 13 6         Current admission status: Inpatient   Preferred Pharmacy:   [facility] [address], [address] [address]  Phone: [phone] Fax: [phone]    [place] [address] [facility]  [address] [place] [address]  Phone: [phone] Fax: [phone]    [address] [place] [address], [facility] 232 [facility] [address] [facility]  Phone: [phone] Fax: [phone]    Primary Care Provider: No primary care provider on file  Primary Insurance: [address]  Secondary Insurance: MEDICARE    DISCHARGE DETAILS:        Other Referral/Resources/Interventions Provided:  Interventions: Prescription Price Check  Referral Comments: Verified with Calli Robert  at the [employer] that pt can have prescriptions filled through the [facility] as long as the Primary care team at the [facility] is notified  They can be reached at [phone] Option #2   Pt was currently receiving 3 medications through the [place] prior to admission into the hospital per [facility]   HF  to make her aware as well           Treatment Team Recommendation: Short Term Rehab ([place])  Discharge Destination Plan[de-identified] [facility] ([facility])

## 2023-02-17 NOTE — ASSESSMENT & PLAN NOTE
· Found with significant urinary retention during ED evaluation at [address] requiring Mensah catheter placement  · Seen by urology at [address], suspected secondary to severe edema with component of BPH as well  · Mensah catheter and continue Flomax  · Void trial in appx 10 days around 2/19, would recommend this be done at rehab when more mobile

## 2023-02-17 NOTE — ASSESSMENT & PLAN NOTE
Lab Results   Component Value Date    HGBA1C 12 0 (H) 02/08/2023       Recent Labs     02/16/23  1655 02/16/23  2112 02/17/23  0607 02/17/23  1159   POCGLU 280* 240* 94 123       · Uncontrolled as outpatient due to lack of insulin therapy outpatient  · Currently on Lantus 7 units QHS + humalog 4 units 3 times daily with meals plus SSI  · Would likely benefit from Jardiance given heart failure  · Monitor accuchecks and adjust regimen as needed  · Endocrinology following    · Anticipate will need insulin on discharge

## 2023-02-17 NOTE — PROGRESS NOTES
Heart Failure Progress note  Unit/Bed#: -01 Encounter: [id number]        Roderick Coil 69 y o  male [address] Stay Days: 7    Assessment and Plan      Current Problem List   Principal Problem:    Acute systolic congestive heart failure (HCC)  Active Problems:    Type 2 diabetes mellitus with hyperglycemia, with long-term current use of insulin (HCC)    Foot ulcer ([address] )    Urinary retention    Unintentional weight loss    CAD (coronary artery disease)    Assessment/Plan:  1  Newly diagnosed HFrEF; LVEF 25%; LVIDd 4 3 cm; [facility]; ACC/AHA Stage C Etiology:ischemic  UDS negative  Negative HIV  TTE 02/09/2023: LVEF 25%  LVIDd 4 3 cm  Severe global hypokinesis with RWMA  Normal RV  Mild MR and TR  RVSP 46 mmHg  Iron panel and TSH obtained  ? Pharmacotherapies / Neurohormonal Blockade:  ? Beta Blocker: metoprolol succinate 50 mg q12 hours    ? ARNi / ACEi / ARB: losartan 12 5 mg daily  ? Aldosterone Antagonist: Consider starting tomorrow - kidney function now improving  ? SGLT2 Inhibitor: eventual start  ? Home Diuretic: None    ? Inpatient Diuretic: none currently - consider starting po diuretic  ? Sudden Cardiac Death Risk Reduction: Refused life vest    ? LVEF 25%  Plan to reassess LVEF with limited TTE after 3 months uninterrupted and optimized HF GDMT (earliest 05/2023)     ? Electrical Resynchronization:  ? Candidacy for BiV device: narrow QRS  ? Advanced Therapies (if appropriate): Will continue to monitor  Concerns include HgA1c of 12 0 in 02/2023 and recent history of poor adherence to prescribed medical regimen  ? Can consider IV iron this admission  2   Unintentional weight loss  ? Recommend work up per primary teeam    3  Foot ulcer  ? Per primary  4  Type II DM  ? Elevated A1C previous on insulin  5  Exposure to agent orange  Subjective     Patient seen and examined  -434 yesterday net - but only 416 documented in  Up one pound since yesterday   Creatine improved from 1 2 to 0 9  Did not receive any lasix yesterday  Losartan on still  Current meds:  aspirin, 81 mg, Daily  atorvastatin, 40 mg, Daily With Dinner  collagenase, , Daily  docusate sodium, 100 mg, BID  heparin (porcine), 5,000 Units, Q8H [address]  insulin glargine, 7 Units, HS  insulin lispro, 1-5 Units, TID AC  insulin lispro, 1-5 Units, HS  insulin lispro, 4 Units, TID With Meals  losartan, 12 5 mg, BID  metoprolol succinate, 50 mg, Q12H [place]  tamsulosin, 0 4 mg, Daily With Dinner      Objective     Vitals: Temp (24hrs), Av 2 °F (36 8 °C), Min:97 5 °F (36 4 °C), Max:99 1 °F (37 3 °C)  Current: Temperature: 97 7 °F (36 5 °C)  Patient Vitals for the past 24 hrs:   BP Temp Temp src Pulse Resp SpO2 Weight   23 0745 108/55 97 7 °F (36 5 °C) -- 60 18 96 % --   23 0600 -- -- -- -- -- -- 71 kg (156 lb 9 6 oz)   23 0200 108/56 97 6 °F (36 4 °C) -- 63 -- 97 % --   23 2149 (!) 116/49 98 7 °F (37 1 °C) -- 73 20 97 % --   23 1913 (!) 115/49 99 1 °F (37 3 °C) -- 74 18 96 % --   23 1726 (!) 116/48 -- -- 71 -- 97 % --   23 1450 121/58 97 5 °F (36 4 °C) Oral 72 18 91 % --   23 1156 114/58 98 4 °F (36 9 °C) -- 70 19 90 % --    Body mass index is 22 47 kg/m²  Physical Exam:  Physical Exam  Cardiovascular:      Rate and Rhythm: Normal rate and regular rhythm  Pulmonary:      Effort: Pulmonary effort is normal       Breath sounds: Normal breath sounds  Musculoskeletal:      Right lower leg: No edema  Left lower leg: No edema  Skin:     General: Skin is warm  Findings: No bruising  Neurological:      Mental Status: He is alert  Invasive Devices     Peripheral Intravenous Line  Duration           Peripheral IV 23 Dorsal (posterior); Left Forearm 2 days          Drain  Duration           Urethral Catheter Coude 16 Fr  8 days                    Labs:   Results from last 7 days   Lab Units 23  0433 23  0519 02/15/23  0432 23  0451 02/12/23  0500   WBC Thousand/uL 5 60 6 09 5 39 6 90 4 79   HEMOGLOBIN g/dL 11 1* 11 4* 11 7* 11 5* 12 0   HEMATOCRIT % 36 6 38 1 38 5 38 1 38 3   PLATELETS Thousands/uL 257 263 247 246 221   NEUTROS PCT %  --  65  --   --  63   MONOS PCT %  --  10  --   --  12      Results from last 7 days   Lab Units 02/17/23  0433 02/16/23  0519 02/15/23  0432 02/14/23  0451 02/13/23  0436 02/12/23  0500 02/11/23  0432 02/10/23  0935   SODIUM mmol/L 138 134* 135 134* 134* 137 136 137   POTASSIUM mmol/L 3 5 4 4 4 6 4 9 4 1 3 7 3 9 4 1   CHLORIDE mmol/L 100 96 94* 94* 94* 97 100 100   CO2 mmol/L 35* 36* 36* 36* 38* 33* 32 32   BUN mg/dL 41* 44* 36* 30* 29* 25 25 21   CREATININE mg/dL 0 90 1 26 1 03 1 00 0 98 0 87 0 94 0 88   CALCIUM mg/dL 8 6 8 6 8 8 8 8 8 9 8 8 8 5 8 6   MAGNESIUM mg/dL  --   --   --  1 9 1 9 1 9 1 7  --    EGFR ml/min/1 73sq m 82 55 70 72 74 83 78 83                 No results found for: PHART, OKE8MGF, PO2ART, FTD9WTC, C7FUFJEW, BEART, SOURCE  No components found for: HIV1X2  Lab Results   Component Value Date    HEPBIGM Non-reactive 02/12/2023    HEPBCAB Non-reactive 02/12/2023    HEPCAB Non-reactive 02/12/2023     No results found for: SPEP, UPEP   Lab Results   Component Value Date    HGBA1C 12 0 (H) 02/08/2023    HGBA1C 9 2 (H) 06/23/2021     No results found for: CHOL   Lab Results   Component Value Date    HDL 36 (L) 02/11/2023      Lab Results   Component Value Date    LDLCALC 61 02/11/2023      Lab Results   Component Value Date    TRIG 57 02/11/2023     No components found for: PROCAL      Micro:  Results from last 7 days   Lab Units 02/11/23  0001   GRAM STAIN RESULT  4+ Gram positive rods*  Rare Gram positive cocci in clusters*   WOUND CULTURE  4+ Growth of Staphylococcus aureus*  4+ Growth of     Urinalysis:  Lab Results   Component Value Date    BDZUR Negative 02/13/2023    COCAINEUR Negative 02/13/2023    OPIATEUR Negative 02/13/2023    PCPUR Negative 02/13/2023    THCUR Negative 02/13/2023 Invalid input(s): URIBILINOGEN        Intake and Outputs:  I/O       02/12 0701  02/13 0700 02/13 0701  02/14 0700 02/14 0701  02/15 0700    P  O  720 1440     Total Intake(mL/kg) 720 (9 4) 1440 (19 2)     Urine (mL/kg/hr) 3775 (2 1) 3650 (2)     Stool 0      Total Output 3775 3650     Net -3055 -2210            Unmeasured Stool Occurrence 1 x          Nutrition:  Diet Wilfredo/CHO Controlled; Consistent Carbohydrate Diet Level 2 (5 carb servings/75 grams CHO/meal); Fluid Restriction 2000 ML  Radiology Results:   CT chest abdomen pelvis wo contrast   Final Result by Bari Linares MD (02/15 5331)      Within the limitations of a noncontrast study, there is no acute inflammatory or neoplastic process identified in the chest abdomen or pelvis  Bilateral pleural effusions, large on the right and moderate in size on the left with bibasilar compressive atelectasis  Retention of contrast in the renal parenchyma bilaterally  Recommend correlation with serum creatinine levels to exclude acute renal injury  Anasarca              Workstation performed: [id number]         VAS carotid complete study   Final Result by Edson Lambert MD (02/15 1346)      VAS lower limb vein mapping bypass graft   Final Result by Edson Lambert MD (02/15 1346)        Scheduled Medications:  aspirin, 81 mg, Daily  atorvastatin, 40 mg, Daily With Dinner  collagenase, , Daily  docusate sodium, 100 mg, BID  heparin (porcine), 5,000 Units, Q8H [address]  insulin glargine, 7 Units, HS  insulin lispro, 1-5 Units, TID AC  insulin lispro, 1-5 Units, HS  insulin lispro, 4 Units, TID With Meals  losartan, 12 5 mg, BID  metoprolol succinate, 50 mg, Q12H [place]  tamsulosin, 0 4 mg, Daily With Dinner      PRN MEDS:  acetaminophen, 650 mg, Q6H PRN  calcium carbonate, 1,000 mg, Daily PRN  ondansetron, 4 mg, Q6H PRN  polyethylene glycol, 17 g, Daily PRN      Last 24 Hour Meds: :   Medication Administration - last 24 hours from 02/16/2023 5461 to 02/17/2023 0851       Date/Time Order Dose Route Action Action by     02/17/2023 0603 EST heparin (porcine) subcutaneous injection 5,000 Units 5,000 Units Subcutaneous Given Gabe Hawkins RN     02/16/2023 2148 EST heparin (porcine) subcutaneous injection 5,000 Units 5,000 Units Subcutaneous Given Nino Cox RN     02/16/2023 1446 EST heparin (porcine) subcutaneous injection 5,000 Units 5,000 Units Subcutaneous Given Vadim Tamayo, HARRY     02/16/2023 1727 EST tamsulosin (FLOMAX) capsule 0 4 mg 0 4 mg Oral Given Nino Cox RN     02/16/2023 1725 EST docusate sodium (COLACE) capsule 100 mg 100 mg Oral Not Given Nino Cox RN     02/16/2023 9439 EST collagenase (SANTYL) ointment -- Topical Given Tomy Bar RN     02/16/2023 2150 EST metoprolol succinate (TOPROL-XL) 24 hr tablet 50 mg 50 mg Oral Given Nino Cox RN     02/16/2023 1726 EST atorvastatin (LIPITOR) tablet 40 mg 40 mg Oral Given Nino Cox RN     02/16/2023 2148 EST insulin glargine (LANTUS) subcutaneous injection 7 Units 0 07 mL 7 Units Subcutaneous Given Nino Cox RN     02/17/2023 0018 EST insulin lispro (HumaLOG) 100 units/mL subcutaneous injection 1-5 Units 1 Units Subcutaneous Not Given Gabe Hawkins RN     02/16/2023 1730 EST insulin lispro (HumaLOG) 100 units/mL subcutaneous injection 1-5 Units 2 Units Subcutaneous Given Nino Cox RN     02/16/2023 1345 EST insulin lispro (HumaLOG) 100 units/mL subcutaneous injection 1-5 Units 0 Units Subcutaneous Hold Vadim Tamayo, HARRY     02/16/2023 4951 EST insulin lispro (HumaLOG) 100 units/mL subcutaneous injection 1-5 Units 1 Units Subcutaneous Given Tomy Bar RN     02/16/2023 2152 EST insulin lispro (HumaLOG) 100 units/mL subcutaneous injection 1-5 Units 2 Units Subcutaneous Given Nino Cox RN     02/16/2023 1730 EST insulin lispro (HumaLOG) 100 units/mL subcutaneous injection 4 Units 4 Units Subcutaneous Given Nino Cox RN 02/16/2023 1346 EST insulin lispro (HumaLOG) 100 units/mL subcutaneous injection 4 Units 0 Units Subcutaneous Hold Vadim Ponce, HARRY     02/16/2023 0197 EST insulin lispro (HumaLOG) 100 units/mL subcutaneous injection 4 Units 4 Units Subcutaneous Given Christiana Leon, HARRY     02/16/2023 1726 EST losartan (COZAAR) tablet 12 5 mg 12 5 mg Oral Given Carli Campo RN          PLEASE NOTE:  This encounter was completed utilizing the [employer] Direct Speech Voice Recognition Software  Grammatical errors, random word insertions, pronoun errors and incomplete sentences are occasional consequences of the system due to software limitations, ambient noise and hardware issues  These may be missed by proof reading prior to affixing electronic signature  Any questions or concerns about the content, text or information contained within the body of this dictation should be directly addressed to the physician for clarification  Please do not hesitate to call me directly if you have any any questions or concerns

## 2023-02-17 NOTE — ASSESSMENT & PLAN NOTE
Wt Readings from Last 3 Encounters:   02/17/23 71 kg (156 lb 9 6 oz)   02/09/23 82 1 kg (181 lb)   06/24/21 68 6 kg (151 lb 3 8 oz)   · Patient presented with volume overload noted with bilateral lower extreme edema, dyspnea on exertion, scrotal swelling at home  Appeared grossly volume overloaded at time of admission  miners with CXR revealing vascular congestion for which IV diuresis was initiated  Transferred to [facility] for heart failure evaluation  · Echocardiogram at [facility] revealing new EF 25%  · Heart failure following, status post Lasix drip and IV diuretics  Now on spironolactone 12 5mg PO daily per Heart Failure  ? Continue medical management with ASA/statin, beta-blocker, metoprolol succinate 50mg Q12 hours, ARB  ? 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, see below  · I's and O's, daily weights, low-sodium diet  · Monitor volume status  · Will need close outpatient follow-up  · PT/OT recommending rehab  CM following

## 2023-02-17 NOTE — CASE MANAGEMENT
Outpatient Advanced Heart Failure LCSW met with patient during HF Team rounds  Pt has [facility] and Medicare A/B  Informed pt that Daniel MOLINA price checked his medication through [address] and it was >$400 bc [facility] does not accept [place]  Encouraged pt to use [facility] pharmacy for better price  Pt provided [address] contact# [phone] Option 1 for pharmacy  For possible future resources, provided pt with [facility]/[employer] as they work with [address] [facility]  Also provided pt with [employer] from [employer]  LCSW left message for [employer] to research if they work with veterans who have VA [employer]  Await return call  It is documented that pt was exposed to [employer]  VA has expanded benefits in PACT ACT for veterans exposed to toxins  Patient can contact [address] [facility] to apply  Pt knew nothing about PACT ACT and intends to find out more about it after he's discharged  His cell fast  has been lost and he wants to have a full charge on his phone before calling VA  Collaborated with InColette curran RN, CM and HF Team   Pt will diurese through the weekend  Plan dc to SNF early next week  3:30pm Update: InAlly curran RN, CM reported that she has spoken with VA contactStephen and pt is not service connected and does not have rehab/nursing home benefits through [address] but can utilize his Medicare for this  Pt can however get his medication through [address]  Daniel MOLINA following for imminent dc planning  Outpatient Advanced Heart Failure LCSW available for outpatient resources and support

## 2023-02-17 NOTE — QUICK NOTE
This is a 69y o -year-old male with past medical history of type 2 diabetes mellitus, with complications of CAD, systolic heart failure, who was transferred from [facility] to [address] [place] [address] for evaluation of acute systolic heart failure endocrinology consulted for the management of type 2 diabetes mellitus      Chart and blood glucose trends reviewed  Would recommend discontinuing bedtime correctional scale  Continue with Lantus 7 units at bedtime, Humalog 4 units with meals  We will continue to monitor blood glucose and make adjustments as needed

## 2023-02-17 NOTE — PROGRESS NOTES
[facility] [facility]  Progress Note Lorna Sit 1945, 69 y o  male MRN: [id number]  Unit/Bed#: -01 Encounter: [id number]  Primary Care Provider: No primary care provider on file  Date and time admitted to hospital: 2/10/2023 11:38 PM    * Acute systolic congestive heart failure (HCC)  Assessment & Plan  Wt Readings from Last 3 Encounters:   02/17/23 71 kg (156 lb 9 6 oz)   02/09/23 82 1 kg (181 lb)   06/24/21 68 6 kg (151 lb 3 8 oz)   · Patient presented with volume overload noted with bilateral lower extreme edema, dyspnea on exertion, scrotal swelling at home  Appeared grossly volume overloaded at time of admission [facility] with CXR revealing vascular congestion for which IV diuresis was initiated  Transferred to [facility] for heart failure evaluation  · Echocardiogram at [facility] revealing new EF 25%  · Heart failure following, status post Lasix drip and IV diuretics  Now on spironolactone 12 5mg PO daily per Heart Failure  ? Continue medical management with ASA/statin, beta-blocker, metoprolol succinate 50mg Q12 hours, ARB  ? 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, see below  · I's and O's, daily weights, low-sodium diet  · Monitor volume status  · Will need close outpatient follow-up  · PT/OT recommending rehab  CM following  CAD (coronary artery disease)  Assessment & Plan  · [facility] 2/14 showed triple vessel disease  · Vein mapping at carotid studies per CTS for pre-operative work up  · Overall, currently poor surgical candidate  CTS recommending medical management and optimization of co-morbidities  Should follow up with cardiology outpatient as well as CTS       Type 2 diabetes mellitus with hyperglycemia, with long-term current use of insulin [facility])  Assessment & Plan  Lab Results   Component Value Date    HGBA1C 12 0 (H) 02/08/2023       Recent Labs     02/16/23  1655 02/16/23  2112 02/17/23  0607 02/17/23  1159 POCGLU 280* 240* 94 123       · Uncontrolled as outpatient due to lack of insulin therapy outpatient  · Currently on Lantus 7 units QHS + humalog 4 units 3 times daily with meals plus SSI  · Would likely benefit from Jardiance given heart failure  · Monitor accuchecks and adjust regimen as needed  · Endocrinology following    · Anticipate will need insulin on discharge  Urinary retention  Assessment & Plan  · Found with significant urinary retention during ED evaluation at [facility] requiring Mensah catheter placement  · Seen by urology at [facility], suspected secondary to severe edema with component of BPH as well  · Mensah catheter and continue Flomax  · Void trial in appx 10 days around 2/19, would recommend this be done at rehab when more mobile  Foot ulcer ([facility] [address] )  Assessment & Plan  · Left foot diabetic ulcer POA to [facility] [place]  · Evaluated by podiatry, local wound care with Santyl/DSD and Allevyn foam dressings to right leg blisters  · WBAT  · Completed 7 days Keflex for possible mild cellulitis     Unintentional weight loss  Assessment & Plan  · Possibly in setting of newly diagnosed heart failure however has lost appx 60 lbs unintentionally in the last 3-4 months  · Has never had colonoscopy  Will need GI referral on discharge for screening  · CT C/A/P without contrast with no acute inflammatory or neoplastic process in the chest/abdomen/pelvis within the limitations of noncontrast study per the results report         VTE Pharmacologic Prophylaxis:   heparin    Patient Centered Rounds: I performed bedside rounds with nursing staff today  Discussions with Specialists or Other Care Team Provider:      Education and Discussions with Family / Patient: Patient declined call to        Total Time Spent on Date of Encounter in care of patient: 25 minutes This time was spent on one or more of the following: performing physical exam; counseling and coordination of care; obtaining or reviewing history; documenting in the medical record; reviewing/ordering tests, medications or procedures; communicating with other healthcare professionals and discussing with patient's family/caregivers  Current Length of Stay: 7 day(s)  Current Patient Status: Inpatient   Certification Statement: The patient will continue to require additional inpatient hospital stay due to pending Heart Failure Team clearance  Discharge Plan: pending heart failure team clearance, to rehab    Code Status: Level 3 - DNAR and DNI    Subjective:   Mr Stephany Berg denies CP, SOB, abdominal pain    Objective:     Vitals:   Temp (24hrs), Av 1 °F (36 7 °C), Min:97 5 °F (36 4 °C), Max:99 1 °F (37 3 °C)    Temp:  [97 5 °F (36 4 °C)-99 1 °F (37 3 °C)] 97 7 °F (36 5 °C)  HR:  [60-74] 60  Resp:  [18-20] 18  BP: (108-121)/(48-58) 108/55  SpO2:  [91 %-97 %] 96 %  Body mass index is 22 47 kg/m²  Input and Output Summary (last 24 hours): Intake/Output Summary (Last 24 hours) at 2023 1231  Last data filed at 2023 0900  Gross per 24 hour   Intake 476 ml   Output 1325 ml   Net -849 ml       Physical Exam:   Physical Exam  Vitals reviewed  Constitutional:       Comments: Patient seen sitting in bed, NAD   Cardiovascular:      Rate and Rhythm: Normal rate and regular rhythm  Pulmonary:      Effort: Pulmonary effort is normal       Breath sounds: Normal breath sounds  No wheezing  Abdominal:      General: Bowel sounds are normal       Palpations: Abdomen is soft  Tenderness: There is no abdominal tenderness  Musculoskeletal:      Right lower leg: Edema present  Left lower leg: Edema present  Skin:     General: Skin is warm  Neurological:      Mental Status: He is alert and oriented to person, place, and time     Psychiatric:         Mood and Affect: Mood normal          Behavior: Behavior normal           Additional Data:     Labs:  Results from last 7 days   Lab Units 23  0433 23  9004 WBC Thousand/uL 5 60 6 09   HEMOGLOBIN g/dL 11 1* 11 4*   HEMATOCRIT % 36 6 38 1   PLATELETS Thousands/uL 257 263   NEUTROS PCT %  --  65   LYMPHS PCT %  --  22   MONOS PCT %  --  10   EOS PCT %  --  3     Results from last 7 days   Lab Units 02/17/23  0433   SODIUM mmol/L 138   POTASSIUM mmol/L 3 5   CHLORIDE mmol/L 100   CO2 mmol/L 35*   BUN mg/dL 41*   CREATININE mg/dL 0 90   ANION GAP mmol/L 3*   CALCIUM mg/dL 8 6   GLUCOSE RANDOM mg/dL 104         Results from last 7 days   Lab Units 02/17/23  1159 02/17/23  0607 02/16/23  2112 02/16/23  1655 02/16/23  1154 02/16/23  0609 02/15/23  2103 02/15/23  1659 02/15/23  1114 02/15/23  0619 02/14/23  2043 02/14/23  1717   POC GLUCOSE mg/dl 123 94 240* 280* 166* 176* 186* 216* 199* 183* 187* 112               Lines/Drains:  Invasive Devices     Peripheral Intravenous Line  Duration           Peripheral IV 02/14/23 Dorsal (posterior); Left Forearm 3 days          Drain  Duration           Urethral Catheter Coude 16 Fr  8 days              Urinary Catheter:  Goal for removal: Voiding trial when ambulation improves           Telemetry:  Telemetry Orders (From admission, onward)             48 Hour Telemetry Monitoring  Continuous x 48 hours        References:    Telemetry Guidelines   Question:  Reason for 48 Hour Telemetry  Answer:  Acute Decompensated CHF (continuous diuretic infusion or total diuretic dose > 200 mg daily, associated electrolyte derangement, ionotropic drip, history of ventricular arrhythmia, or new EF <35%)                 Telemetry Reviewed: Normal Sinus Rhythm  Indication for Continued Telemetry Use: Acute CHF on >200 mg lasix/day or equivalent dose or with new reduced EF                Imaging: Reviewed radiology reports from this admission including: chest CT scan and abdominal/pelvic CT    Recent Cultures (last 7 days):   Results from last 7 days   Lab Units 02/11/23  0001   GRAM STAIN RESULT  4+ Gram positive rods*  Rare Gram positive cocci in clusters*   WOUND CULTURE  4+ Growth of Staphylococcus aureus*  4+ Growth of       Last 24 Hours Medication List:   Current Facility-Administered Medications   Medication Dose Route Frequency Provider Last Rate   • acetaminophen  650 mg Oral Q6H PRN Garcia Blood, DO     • aspirin  81 mg Oral Daily Garcia Blood, DO     • atorvastatin  40 mg Oral Daily With Orlean Severs, DO     • calcium carbonate  1,000 mg Oral Daily PRN Garcia Blood, DO     • collagenase   Topical Daily Garcia Blood, DO     • docusate sodium  100 mg Oral BID Garcia Blood, DO     • heparin (porcine)  5,000 Units Subcutaneous [facility] Garcia Blood, DO     • insulin glargine  7 Units Subcutaneous HS Rose Mckeon MD     • insulin lispro  1-5 Units Subcutaneous TID AC Dai Torres MD     • insulin lispro  1-5 Units Subcutaneous HS Rose Mckeon MD     • insulin lispro  4 Units Subcutaneous TID With Meals Dai Torres MD     • losartan  12 5 mg Oral BID Chastity Ding MD     • metoprolol succinate  50 mg Oral Q12H [address] Garcia Blood, DO     • ondansetron  4 mg Intravenous Q6H PRN Garcia Blood, DO     • polyethylene glycol  17 g Oral Daily PRN Garcia Blood, DO     • spironolactone  12 5 mg Oral Daily Eileen Quispe DO     • tamsulosin  0 4 mg Oral Daily With Orlean Severs, DO          Today, Patient Was Seen By: Judge Andressa PA-C    **Please Note: This note may have been constructed using a voice recognition system  **

## 2023-02-17 NOTE — ASSESSMENT & PLAN NOTE
· [facility] 2/14 showed triple vessel disease  · Vein mapping at carotid studies per CTS for pre-operative work up  · Overall, currently poor surgical candidate  CTS recommending medical management and optimization of co-morbidities  Should follow up with cardiology outpatient as well as CTS

## 2023-02-17 NOTE — ASSESSMENT & PLAN NOTE
· Left foot diabetic ulcer POA to [facility] [place]  · Evaluated by podiatry, local wound care with Santyl/DSD and Allevyn foam dressings to right leg blisters     · WBAT  · Completed 7 days Keflex for possible mild cellulitis

## 2023-02-17 NOTE — CASE MANAGEMENT
Case Management Discharge Planning Note    Patient name Nahomy Walls  Location [id number] MRN [id number]  : 1945 Date 2023       Current Admission Date: 2/10/2023  Current Admission Diagnosis:Acute systolic congestive heart failure [facility])   Patient Active Problem List    Diagnosis Date Noted   • CAD (coronary artery disease) 2023   • Unintentional weight loss 2023   • Moderate protein-calorie malnutrition ([facility] [address] ) 02/10/2023   • Hydrocele, bilateral    • Foot ulcer ([facility] [address] )    • Acute systolic congestive heart failure ([facility] [address] ) 2023   • Urinary retention 2023   • Hypernatremia 2021   • Abnormal EKG 2021   • Hypokalemia 2021   • Type 2 diabetes mellitus with hyperglycemia, with long-term current use of insulin ([facility] [address] ) 2021      LOS (days): 7  Geometric Mean LOS (GMLOS) (days):   Days to GMLOS:     OBJECTIVE:  Risk of Unplanned Readmission Score: 13 53         Current admission status: Inpatient   Preferred Pharmacy:   [address] [place] [address] [address] [place] [address], [address] [place] [id number]  Phone: [phone] Fax: [phone]    [facility], [address] [facility]  [address] [place] [address]  Phone: [phone] Fax: [phone]    [address] [place] [address], [place] [address] [address]  Phone: [phone] Fax: [phone]    Primary Care Provider: No primary care provider on file      Primary Insurance: [address]  Secondary Insurance: MEDICARE    DISCHARGE DETAILS:                                          Other Referral/Resources/Interventions Provided:  Interventions:  Issues  Referral Comments: Received call from [facility]  at the [place] [phone] ext [phone] that states pt is not service connected and does not have rehab/nurisng home benefits through the  [facility] and that if pt requires STR he needs to utilize Medicare  Noted pt is reserved for STR at [facility] through [facility]

## 2023-02-18 LAB
ANION GAP SERPL CALCULATED.3IONS-SCNC: 5 MMOL/L (ref 4–13)
BUN SERPL-MCNC: 46 MG/DL (ref 5–25)
CALCIUM SERPL-MCNC: 8.8 MG/DL (ref 8.3–10.1)
CHLORIDE SERPL-SCNC: 103 MMOL/L (ref 96–108)
CO2 SERPL-SCNC: 31 MMOL/L (ref 21–32)
CREAT SERPL-MCNC: 1.1 MG/DL (ref 0.6–1.3)
GFR SERPL CREATININE-BSD FRML MDRD: 64 ML/MIN/1.73SQ M
GLUCOSE SERPL-MCNC: 129 MG/DL (ref 65–140)
GLUCOSE SERPL-MCNC: 134 MG/DL (ref 65–140)
GLUCOSE SERPL-MCNC: 141 MG/DL (ref 65–140)
GLUCOSE SERPL-MCNC: 216 MG/DL (ref 65–140)
GLUCOSE SERPL-MCNC: 235 MG/DL (ref 65–140)
POTASSIUM SERPL-SCNC: 4.4 MMOL/L (ref 3.5–5.3)
SODIUM SERPL-SCNC: 139 MMOL/L (ref 135–147)

## 2023-02-18 RX ADMIN — INSULIN LISPRO 4 UNITS: 100 INJECTION, SOLUTION INTRAVENOUS; SUBCUTANEOUS at 12:48

## 2023-02-18 RX ADMIN — TAMSULOSIN HYDROCHLORIDE 0.4 MG: 0.4 CAPSULE ORAL at 18:11

## 2023-02-18 RX ADMIN — INSULIN LISPRO 4 UNITS: 100 INJECTION, SOLUTION INTRAVENOUS; SUBCUTANEOUS at 08:30

## 2023-02-18 RX ADMIN — HEPARIN SODIUM 5000 UNITS: 5000 INJECTION INTRAVENOUS; SUBCUTANEOUS at 21:53

## 2023-02-18 RX ADMIN — LOSARTAN POTASSIUM 12.5 MG: 25 TABLET, FILM COATED ORAL at 18:11

## 2023-02-18 RX ADMIN — INSULIN GLARGINE 7 UNITS: 100 INJECTION, SOLUTION SUBCUTANEOUS at 21:53

## 2023-02-18 RX ADMIN — LOSARTAN POTASSIUM 12.5 MG: 25 TABLET, FILM COATED ORAL at 08:30

## 2023-02-18 RX ADMIN — SPIRONOLACTONE 12.5 MG: 25 TABLET ORAL at 08:30

## 2023-02-18 RX ADMIN — ATORVASTATIN CALCIUM 40 MG: 40 TABLET, FILM COATED ORAL at 18:11

## 2023-02-18 RX ADMIN — INSULIN LISPRO 4 UNITS: 100 INJECTION, SOLUTION INTRAVENOUS; SUBCUTANEOUS at 18:12

## 2023-02-18 RX ADMIN — COLLAGENASE SANTYL: 250 OINTMENT TOPICAL at 08:30

## 2023-02-18 RX ADMIN — METOPROLOL SUCCINATE 50 MG: 50 TABLET, EXTENDED RELEASE ORAL at 08:30

## 2023-02-18 RX ADMIN — METOPROLOL SUCCINATE 50 MG: 50 TABLET, EXTENDED RELEASE ORAL at 21:53

## 2023-02-18 RX ADMIN — HEPARIN SODIUM 5000 UNITS: 5000 INJECTION INTRAVENOUS; SUBCUTANEOUS at 05:19

## 2023-02-18 RX ADMIN — INSULIN LISPRO 1 UNITS: 100 INJECTION, SOLUTION INTRAVENOUS; SUBCUTANEOUS at 18:12

## 2023-02-18 RX ADMIN — HEPARIN SODIUM 5000 UNITS: 5000 INJECTION INTRAVENOUS; SUBCUTANEOUS at 14:02

## 2023-02-18 RX ADMIN — ASPIRIN 81 MG: 81 TABLET, COATED ORAL at 08:30

## 2023-02-18 NOTE — PROGRESS NOTES
[address] [facility]  Progress Note Anuj Brown 1945, 69 y o  male MRN: [id number]  Unit/Bed#: MS [address]-01 Encounter: [id number]  Primary Care Provider: No primary care provider on file  Date and time admitted to hospital: 2/10/2023 11:38 PM    * Acute systolic congestive heart failure (HCC)  Assessment & Plan  Wt Readings from Last 3 Encounters:   02/18/23 71 4 kg (157 lb 6 4 oz)   02/09/23 82 1 kg (181 lb)   06/24/21 68 6 kg (151 lb 3 8 oz)   · Patient presented with volume overload noted with bilateral lower extreme edema, dyspnea on exertion, scrotal swelling at home  Appeared grossly volume overloaded at time of admission [facility] with CXR revealing vascular congestion for which IV diuresis was initiated  Transferred to [facility] for heart failure evaluation  · Echocardiogram at [facility] revealing new EF 25%  · Heart failure following, status post Lasix drip and IV diuretics  Now on spironolactone 12 5mg PO daily per Heart Failure  ? Continue medical management with ASA/statin, beta-blocker, metoprolol succinate 50mg Q12 hours, ARB  ? 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   · I/O, daily weights, low-sodium diet  · Will need close outpatient follow-up  · PT/OT recommending rehab  CM following  CAD (coronary artery disease)  Assessment & Plan  · [facility] 2/14 showed triple vessel disease  · Vein mapping at carotid studies per CTS for pre-operative work up  · Overall, currently poor surgical candidate  · CTS recommending medical management and optimization of co-morbidities  · Should follow up with cardiology outpatient as well as CTS       Type 2 diabetes mellitus with hyperglycemia, with long-term current use of insulin [facility])  Assessment & Plan  Lab Results   Component Value Date    HGBA1C 12 0 (H) 02/08/2023     Recent Labs     02/17/23  1159 02/17/23  1612 02/17/23  2133 02/18/23  0625   POCGLU 123 157* 208* 134 · Uncontrolled as outpatient due to lack of insulin therapy outpatient  · Currently on Lantus 7 units QHS + humalog 4 units 3 times daily with meals plus SSI  · Would likely benefit from Jardiance given heart failure  · Monitor accuchecks and adjust regimen as needed  · Endocrinology following    · Anticipate will need insulin on discharge  Unintentional weight loss  Assessment & Plan  · Possibly in setting of newly diagnosed heart failure however has lost appx 60 lbs unintentionally in the last 3-4 months  · Has never had colonoscopy  Will need GI referral on discharge for screening  · CT C/A/P without contrast with no acute inflammatory or neoplastic process in the chest/abdomen/pelvis within the limitations of noncontrast study per the results report     Urinary retention  Assessment & Plan  · Found with significant urinary retention during ED evaluation at [address] [place] [address] requiring Mensah catheter placement  · Seen by urology at [address] [place] [address], suspected secondary to severe edema with component of BPH as well  · Continue flomax   · Void trial in appx 10 days around 2/19, would recommend this be done at rehab when more mobile  Foot ulcer ([facility] [address] )  Assessment & Plan  · Left foot diabetic ulcer POA to [facility] [place]  · Evaluated by podiatry, local wound care with Santyl/DSD and Allevyn foam dressings to right leg blisters  · WBAT  · Completed 7 days Keflex for possible mild cellulitis       VTE Pharmacologic Prophylaxis:   Moderate Risk (Score 3-4) - Pharmacological DVT Prophylaxis Ordered: heparin  Patient Centered Rounds: I performed bedside rounds with nursing staff today  Discussions with Specialists or Other Care Team Provider: nursing     Education and Discussions with Family / Patient: Patient declined call to        Total Time Spent on Date of Encounter in care of patient: 35 minutes This time was spent on one or more of the following: performing physical exam; counseling and coordination of care; obtaining or reviewing history; documenting in the medical record; reviewing/ordering tests, medications or procedures; communicating with other healthcare professionals and discussing with patient's family/caregivers  Current Length of Stay: 8 day(s)  Current Patient Status: Inpatient   Certification Statement: The patient will continue to require additional inpatient hospital stay due to pending safe dc planning   Discharge Plan: Anticipate discharge in 48 hrs to rehab facility  Code Status: Level 3 - DNAR and DNI    Subjective:   Pt seen and examined at bedside  States he wants to take a shower  Also reports diarrhea  Will d/c the stool softeners  Objective:     Vitals:   Temp (24hrs), Av 6 °F (37 °C), Min:98 °F (36 7 °C), Max:99 2 °F (37 3 °C)    Temp:  [98 °F (36 7 °C)-99 2 °F (37 3 °C)] 98 °F (36 7 °C)  HR:  [60-78] 78  Resp:  [17-18] 17  BP: (101-124)/(38-60) 120/60  SpO2:  [94 %-95 %] 95 %  Body mass index is 22 58 kg/m²  Input and Output Summary (last 24 hours): Intake/Output Summary (Last 24 hours) at 2023 1147  Last data filed at 2023 1056  Gross per 24 hour   Intake 480 ml   Output 1250 ml   Net -770 ml       Physical Exam:   Physical Exam  Constitutional:       Appearance: Normal appearance  Comments: Chronically ill appearing    HENT:      Head: Normocephalic and atraumatic  Mouth/Throat:      Mouth: Mucous membranes are dry  Eyes:      Extraocular Movements: Extraocular movements intact  Cardiovascular:      Rate and Rhythm: Normal rate and regular rhythm  Pulmonary:      Effort: Pulmonary effort is normal       Breath sounds: Normal breath sounds  Abdominal:      General: Abdomen is flat  Palpations: Abdomen is soft  Genitourinary:     Comments: Mensah patent   Musculoskeletal:         General: No swelling  Normal range of motion  Cervical back: Normal range of motion and neck supple  Skin:     General: Skin is warm and dry  Neurological:      General: No focal deficit present  Mental Status: He is alert and oriented to person, place, and time  Psychiatric:         Mood and Affect: Mood normal          Behavior: Behavior normal        Additional Data:     Labs:  Results from last 7 days   Lab Units 02/17/23  0433 02/16/23  0519   WBC Thousand/uL 5 60 6 09   HEMOGLOBIN g/dL 11 1* 11 4*   HEMATOCRIT % 36 6 38 1   PLATELETS Thousands/uL 257 263   NEUTROS PCT %  --  65   LYMPHS PCT %  --  22   MONOS PCT %  --  10   EOS PCT %  --  3     Results from last 7 days   Lab Units 02/17/23  0433   SODIUM mmol/L 138   POTASSIUM mmol/L 3 5   CHLORIDE mmol/L 100   CO2 mmol/L 35*   BUN mg/dL 41*   CREATININE mg/dL 0 90   ANION GAP mmol/L 3*   CALCIUM mg/dL 8 6   GLUCOSE RANDOM mg/dL 104         Results from last 7 days   Lab Units 02/18/23  0625 02/17/23  2133 02/17/23  1612 02/17/23  1159 02/17/23  0607 02/16/23  2112 02/16/23  1655 02/16/23  1154 02/16/23  0609 02/15/23  2103 02/15/23  1659 02/15/23  1114   POC GLUCOSE mg/dl 134 208* 157* 123 94 240* 280* 166* 176* 186* 216* 199*               Lines/Drains:  Invasive Devices     Peripheral Intravenous Line  Duration           Peripheral IV 02/18/23 Dorsal (posterior); Right Forearm <1 day          Drain  Duration           Urethral Catheter Coude 16 Fr  9 days              Urinary Catheter:  Goal for removal: N/A- Discharging with Mensah           Telemetry:  Telemetry Orders (From admission, onward)             48 Hour Telemetry Monitoring  Continuous x 48 hours        Expiring   References:    Telemetry Guidelines   Question:  Reason for 48 Hour Telemetry  Answer:  Acute Decompensated CHF (continuous diuretic infusion or total diuretic dose > 200 mg daily, associated electrolyte derangement, ionotropic drip, history of ventricular arrhythmia, or new EF <35%)                 Telemetry Reviewed: Normal Sinus Rhythm  Indication for Continued Telemetry Use: No indication for continued use   Will discontinue  Imaging: Reviewed radiology reports from this admission including: chest CT scan and abdominal/pelvic CT    Recent Cultures (last 7 days):         Last 24 Hours Medication List:   Current Facility-Administered Medications   Medication Dose Route Frequency Provider Last Rate   • acetaminophen  650 mg Oral Q6H PRN Abad Barge, DO     • aspirin  81 mg Oral Daily Abad Barge, DO     • atorvastatin  40 mg Oral Daily With Thaistobi Shepherd, DO     • calcium carbonate  1,000 mg Oral Daily PRN Abad Barge, DO     • collagenase   Topical Daily Abad Barge, DO     • heparin (porcine)  5,000 Units Subcutaneous Q8H [address], DO     • insulin glargine  7 Units Subcutaneous HS Bertin Rich MD     • insulin lispro  1-5 Units Subcutaneous TID AC Bertin Rich MD     • insulin lispro  4 Units Subcutaneous TID With Meals Dai Negrete MD     • losartan  12 5 mg Oral BID Gretchen Balderas MD     • metoprolol succinate  50 mg Oral Q12H [address] Abad Abrange, DO     • ondansetron  4 mg Intravenous Q6H PRN Abad Cris, DO     • spironolactone  12 5 mg Oral Daily Rhiannon Quintero, DO     • tamsulosin  0 4 mg Oral Daily With Thais Shepherd DO          Today, Patient Was Seen By: Melinda Byrd PA-C    **Please Note: This note may have been constructed using a voice recognition system  **

## 2023-02-18 NOTE — ASSESSMENT & PLAN NOTE
Lab Results   Component Value Date    HGBA1C 12 0 (H) 02/08/2023     Recent Labs     02/17/23  1159 02/17/23  1612 02/17/23  2133 02/18/23  0625   POCGLU 123 157* 208* 134     · Uncontrolled as outpatient due to lack of insulin therapy outpatient  · Currently on Lantus 7 units QHS + humalog 4 units 3 times daily with meals plus SSI  · Would likely benefit from Jardiance given heart failure  · Monitor accuchecks and adjust regimen as needed  · Endocrinology following    · Anticipate will need insulin on discharge

## 2023-02-18 NOTE — ASSESSMENT & PLAN NOTE
Wt Readings from Last 3 Encounters:   02/18/23 71 4 kg (157 lb 6 4 oz)   02/09/23 82 1 kg (181 lb)   06/24/21 68 6 kg (151 lb 3 8 oz)   · Patient presented with volume overload noted with bilateral lower extreme edema, dyspnea on exertion, scrotal swelling at home  Appeared grossly volume overloaded at time of admission [facility] with CXR revealing vascular congestion for which IV diuresis was initiated  Transferred to hospitals for heart failure evaluation  · Echocardiogram at [facility] revealing new EF 25%  · Heart failure following, status post Lasix drip and IV diuretics  Now on spironolactone 12 5mg PO daily per Heart Failure  ? Continue medical management with ASA/statin, beta-blocker, metoprolol succinate 50mg Q12 hours, ARB  ? 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   · I/O, daily weights, low-sodium diet  · Will need close outpatient follow-up  · PT/OT recommending rehab  CM following

## 2023-02-18 NOTE — ASSESSMENT & PLAN NOTE
· C 2/14 showed triple vessel disease  · Vein mapping at carotid studies per CTS for pre-operative work up  · Overall, currently poor surgical candidate  · CTS recommending medical management and optimization of co-morbidities  · Should follow up with cardiology outpatient as well as CTS

## 2023-02-18 NOTE — ASSESSMENT & PLAN NOTE
· Left foot diabetic ulcer POA to [facility] [place]  · Evaluated by podiatry, local wound care with Santyl/DSD and Allevyn foam dressings to right leg blisters     · WBAT  · Completed 7 days Keflex for possible mild cellulitis

## 2023-02-18 NOTE — PROGRESS NOTES
Heart Failure/ Pulmonary Hypertension Progress Note - Natividad Stilesy 69 y o  male MRN: [id number]    Unit/Bed#: -01 Encounter: [id number]      Assessment:    Principal Problem:    Acute systolic congestive heart failure (HCC)  Active Problems:    Type 2 diabetes mellitus with hyperglycemia, with long-term current use of insulin (HCC)    Foot ulcer (HCC)    Urinary retention    Unintentional weight loss    CAD (coronary artery disease)    69year-old man with type 2 diabetes mellitus and history of tobacco abuse who presented to [facility] on 02/08/2023 with worsening bilateral LE swelling, scrotal swelling with urinary retention, abdominal bloating, SIMON, and orthopnea for 2 to 3 weeks  Admitted for acute heart failure with echocardiogram showing severely reduced EF at 25%     # Acute heart failure with reduced ejection fraction, Stage C, NYHA III  Etiology: ischemic     Studies- personally reviewed by [facility] 2/14/23:  Severe 3-vessel CAD     EKG: sinus rhythm, nonspecific T wave abnormality, poor R wave progression     Echocardiogram 2/9/23  LVEF: 25%, 15-20 on my review  LVIDd: 4 3cm  RV: normal size, low normal systolic function  MR: mild  PASP: 46mmHg, mild TR  RVOT: no notching  Other: severe global hypokinesis with relative preservation of the basal segments     Neurohormonal Blockade:  --Beta-Blocker: metoprolol succinate 50mg BID  --ACEi, ARB or ARNi: losartan 12 5mg BID  --Aldosterone Receptor Blocker: spironolactone 12 5mg daily  --SGLT2 Inhibitor:  --Diuretic: furosemide 40mg IV BID 2/15, furosemide 40-mg x 1 2/17     Sudden Cardiac Death Risk Reduction:  --ICD: LifeVest candidate - patient declined, he is currently DNAR/DNI     Electrical Resynchronization:  --Candidacy for BiV device: narrow QRS     Advanced Therapies (if appropriate):   --We will continue to monitor, improving with diuresis     3-vessel CAD  Rx: aspirin, statin  Lipid panel 2/11/23: TG 57 HDL 36 LDL 61  Deemed not a candidate for CABG at this time per CTS and recommended control of medical comorbidities  Unintentional weight loss  Foot ulcer, left: management per primary team    Diabetes mellitus, type II, uncontrolled, HbA1c 12 2/8/23  Urinary retention: management per urology  History of tobacco abuse: quit in his 30s    Exposure to [employer] [place]  Cataracts, bilateral: s/p bilateral extractions      Today's Plan:  Transition to lasix 40mg po daily, pending labs today  Strict I/O and daily weights  Keep K>4 and Mg>2  Continue losartan, metoprolol succinate and spironolactone  Patient declined LifeVest as above  Discharge planning to rehab  Patient ok with [facility] up at [facility]        Subjective:   Patient seen and examined  No significant events overnight  Review of Systems   Constitutional: Negative for chills and fever  Respiratory: Negative for chest tightness and shortness of breath  Cardiovascular: Negative for chest pain, palpitations and leg swelling  Gastrointestinal: Negative for abdominal distention, nausea and vomiting  Neurological: Negative for dizziness and light-headedness  Objective: Intake/ Output: 720/1175, -455?? Weight: 157 lbs bedscale  Tele: sinus rhythm, no events      Vitals: Blood pressure 115/59, pulse 60, temperature 98 3 °F (36 8 °C), resp  rate 17, height 5' 10" (1 778 m), weight 71 2 kg (157 lb), SpO2 95 %  , Body mass index is 22 53 kg/m² , I/O last 3 completed shifts: In: 5 [P O :720]  Out: 2025 [Urine:2025]  No intake/output data recorded  Wt Readings from Last 3 Encounters:   02/18/23 71 2 kg (157 lb)   02/09/23 82 1 kg (181 lb)   06/24/21 68 6 kg (151 lb 3 8 oz)       Intake/Output Summary (Last 24 hours) at 2/18/2023 1004  Last data filed at 2/17/2023 2005  Gross per 24 hour   Intake 480 ml   Output 700 ml   Net -220 ml     I/O last 3 completed shifts:   In: 5 [P O :720]  Out: 2025 [Urine:2025]    No significant arrhythmias seen on telemetry review         Physical Exam:  Vitals:    02/17/23 2132 02/18/23 0156 02/18/23 0519 02/18/23 0727   BP: 121/60 124/58  115/59   Pulse: 72 72  60   Resp:    17   Temp:    98 3 °F (36 8 °C)   TempSrc:       SpO2: 95% 94%  95%   Weight:   71 2 kg (157 lb)    Height:           GEN: Mary Murphy appears well, alert and oriented x 3, pleasant and cooperative   HEENT: NC/AT, moist mucosa, anicteric sclerae; extraocular muscles intact  NECK: supple, no carotid bruits   HEART: regular rhythm, normal S1 and S2, no murmurs, clicks, gallops or rubs, JVP is mildly elevated  LUNGS: clear to auscultation bilaterally; no wheezes, rales, or rhonchi   ABDOMEN: normal bowel sounds, soft, no tenderness, no distention  EXTREMITIES: peripheral pulses normal; no clubbing, cyanosis, or edema  NEURO: no focal findings   SKIN: normal without suspicious lesions on exposed skin      Current Facility-Administered Medications:   •  acetaminophen (TYLENOL) tablet 650 mg, 650 mg, Oral, Q6H PRN, Ronni Cooper DO  •  aspirin (ECOTRIN LOW STRENGTH) EC tablet 81 mg, 81 mg, Oral, Daily, Ronni Cooper DO, 81 mg at 02/18/23 0830  •  atorvastatin (LIPITOR) tablet 40 mg, 40 mg, Oral, Daily With Dinner, Ronni Cooper DO, 40 mg at 02/17/23 1639  •  calcium carbonate (TUMS) chewable tablet 1,000 mg, 1,000 mg, Oral, Daily PRN, Ronni Cooper DO  •  collagenase (SANTYL) ointment, , Topical, Daily, Ronni Cooper DO, Given at 02/18/23 0830  •  docusate sodium (COLACE) capsule 100 mg, 100 mg, Oral, BID, Ronni Cooper DO, 100 mg at 02/15/23 1720  •  heparin (porcine) subcutaneous injection 5,000 Units, 5,000 Units, Subcutaneous, Q8H [facility], Ronni Cooper DO, 5,000 Units at 02/18/23 0519  •  insulin glargine (LANTUS) subcutaneous injection 7 Units 0 07 mL, 7 Units, Subcutaneous, HS, Dai Memory MD Clara, 7 Units at 02/17/23 2133  •  insulin lispro (HumaLOG) 100 units/mL subcutaneous injection 1-5 Units, 1-5 Units, Subcutaneous, TID AC, 1 Units at 02/17/23 4048 **AND** Fingerstick Glucose (POCT), , , TID AC, Dai Garcia MD  •  insulin lispro (HumaLOG) 100 units/mL subcutaneous injection 4 Units, 4 Units, Subcutaneous, TID With Meals, Bg Christopher MD, 4 Units at 02/18/23 0830  •  losartan (COZAAR) tablet 12 5 mg, 12 5 mg, Oral, BID, Celso Lopes MD, 12 5 mg at 02/18/23 0830  •  metoprolol succinate (TOPROL-XL) 24 hr tablet 50 mg, 50 mg, Oral, Q12H [address], Paula Hutchison DO, 50 mg at 02/18/23 0830  •  ondansetron (ZOFRAN) injection 4 mg, 4 mg, Intravenous, Q6H PRN, Paula Hutchison DO  •  polyethylene glycol (MIRALAX) packet 17 g, 17 g, Oral, Daily PRN, Paula Hutchison DO  •  spironolactone (ALDACTONE) tablet 12 5 mg, 12 5 mg, Oral, Daily, Talon Cuevas DO, 12 5 mg at 02/18/23 0830  •  tamsulosin (FLOMAX) capsule 0 4 mg, 0 4 mg, Oral, Daily With Mariel Perez DO, 0 4 mg at 02/17/23 1639      Labs & Results:        Results from last 7 days   Lab Units 02/17/23  0433 02/16/23  0519 02/15/23  0432   WBC Thousand/uL 5 60 6 09 5 39   HEMOGLOBIN g/dL 11 1* 11 4* 11 7*   HEMATOCRIT % 36 6 38 1 38 5   PLATELETS Thousands/uL 257 263 247         Results from last 7 days   Lab Units 02/17/23  0433 02/16/23  0519 02/15/23  0432   POTASSIUM mmol/L 3 5 4 4 4 6   CHLORIDE mmol/L 100 96 94*   CO2 mmol/L 35* 36* 36*   BUN mg/dL 41* 44* 36*   CREATININE mg/dL 0 90 1 26 1 03   CALCIUM mg/dL 8 6 8 6 8 8           Thank you for the opportunity to participate in the care of this patient      Celso Lopes MD  Advanced Heart Failure and Mechanical Circulatory   [address]

## 2023-02-18 NOTE — ASSESSMENT & PLAN NOTE
· Found with significant urinary retention during ED evaluation at [address] requiring Mensah catheter placement  · Seen by urology at [address], suspected secondary to severe edema with component of BPH as well  · Continue flomax   · Void trial in appx 10 days around 2/19, would recommend this be done at rehab when more mobile

## 2023-02-19 VITALS
DIASTOLIC BLOOD PRESSURE: 64 MMHG | SYSTOLIC BLOOD PRESSURE: 118 MMHG | HEIGHT: 70 IN | TEMPERATURE: 98.6 F | WEIGHT: 157.41 LBS | OXYGEN SATURATION: 97 % | HEART RATE: 63 BPM | RESPIRATION RATE: 18 BRPM | BODY MASS INDEX: 22.54 KG/M2

## 2023-02-19 LAB
ANION GAP SERPL CALCULATED.3IONS-SCNC: 5 MMOL/L (ref 4–13)
BUN SERPL-MCNC: 46 MG/DL (ref 5–25)
CALCIUM SERPL-MCNC: 8.6 MG/DL (ref 8.3–10.1)
CHLORIDE SERPL-SCNC: 103 MMOL/L (ref 96–108)
CO2 SERPL-SCNC: 30 MMOL/L (ref 21–32)
CREAT SERPL-MCNC: 0.92 MG/DL (ref 0.6–1.3)
FLUAV RNA RESP QL NAA+PROBE: NEGATIVE
FLUBV RNA RESP QL NAA+PROBE: NEGATIVE
GFR SERPL CREATININE-BSD FRML MDRD: 80 ML/MIN/1.73SQ M
GLUCOSE SERPL-MCNC: 149 MG/DL (ref 65–140)
GLUCOSE SERPL-MCNC: 178 MG/DL (ref 65–140)
GLUCOSE SERPL-MCNC: 180 MG/DL (ref 65–140)
POTASSIUM SERPL-SCNC: 4 MMOL/L (ref 3.5–5.3)
RSV RNA RESP QL NAA+PROBE: NEGATIVE
SARS-COV-2 RNA RESP QL NAA+PROBE: NEGATIVE
SODIUM SERPL-SCNC: 138 MMOL/L (ref 135–147)

## 2023-02-19 RX ORDER — INSULIN LISPRO 100 [IU]/ML
7 INJECTION, SOLUTION INTRAVENOUS; SUBCUTANEOUS
Qty: 3 ML | Refills: 0
Start: 2023-02-19

## 2023-02-19 RX ORDER — INSULIN LISPRO 100 [IU]/ML
4 INJECTION, SOLUTION INTRAVENOUS; SUBCUTANEOUS
Status: DISCONTINUED | OUTPATIENT
Start: 2023-02-19 | End: 2023-02-19 | Stop reason: HOSPADM

## 2023-02-19 RX ORDER — METOPROLOL SUCCINATE 50 MG/1
50 TABLET, EXTENDED RELEASE ORAL EVERY 12 HOURS SCHEDULED
Qty: 30 TABLET | Refills: 0
Start: 2023-02-19

## 2023-02-19 RX ORDER — INSULIN LISPRO 100 [IU]/ML
7 INJECTION, SOLUTION INTRAVENOUS; SUBCUTANEOUS
Status: DISCONTINUED | OUTPATIENT
Start: 2023-02-19 | End: 2023-02-19 | Stop reason: HOSPADM

## 2023-02-19 RX ORDER — ASPIRIN 81 MG/1
81 TABLET ORAL DAILY
Qty: 30 TABLET | Refills: 0
Start: 2023-02-20

## 2023-02-19 RX ORDER — INSULIN LISPRO 100 [IU]/ML
4 INJECTION, SOLUTION INTRAVENOUS; SUBCUTANEOUS
Qty: 10 ML | Refills: 0
Start: 2023-02-20

## 2023-02-19 RX ORDER — INSULIN GLARGINE 100 [IU]/ML
7 INJECTION, SOLUTION SUBCUTANEOUS
Qty: 10 ML | Refills: 0
Start: 2023-02-19

## 2023-02-19 RX ORDER — INSULIN LISPRO 100 [IU]/ML
4 INJECTION, SOLUTION INTRAVENOUS; SUBCUTANEOUS
Qty: 3 ML | Refills: 0
Start: 2023-02-20

## 2023-02-19 RX ORDER — ACETAMINOPHEN 325 MG/1
650 TABLET ORAL EVERY 6 HOURS PRN
Qty: 30 TABLET | Refills: 0
Start: 2023-02-19

## 2023-02-19 RX ORDER — CALCIUM CARBONATE 200(500)MG
1000 TABLET,CHEWABLE ORAL DAILY PRN
Qty: 30 TABLET | Refills: 0
Start: 2023-02-19

## 2023-02-19 RX ORDER — SPIRONOLACTONE 25 MG/1
12.5 TABLET ORAL 2 TIMES DAILY
Status: DISCONTINUED | OUTPATIENT
Start: 2023-02-19 | End: 2023-02-19 | Stop reason: HOSPADM

## 2023-02-19 RX ORDER — SPIRONOLACTONE 25 MG/1
12.5 TABLET ORAL 2 TIMES DAILY
Qty: 30 TABLET | Refills: 0
Start: 2023-02-19

## 2023-02-19 RX ORDER — LOSARTAN POTASSIUM 25 MG/1
12.5 TABLET ORAL 2 TIMES DAILY
Qty: 30 TABLET | Refills: 0
Start: 2023-02-19 | End: 2023-02-23

## 2023-02-19 RX ORDER — ATORVASTATIN CALCIUM 40 MG/1
40 TABLET, FILM COATED ORAL
Qty: 30 TABLET | Refills: 0
Start: 2023-02-19

## 2023-02-19 RX ORDER — TAMSULOSIN HYDROCHLORIDE 0.4 MG/1
0.4 CAPSULE ORAL
Qty: 30 CAPSULE | Refills: 0
Start: 2023-02-19

## 2023-02-19 RX ORDER — INSULIN LISPRO 100 [IU]/ML
4 INJECTION, SOLUTION INTRAVENOUS; SUBCUTANEOUS
Status: DISCONTINUED | OUTPATIENT
Start: 2023-02-20 | End: 2023-02-19 | Stop reason: HOSPADM

## 2023-02-19 RX ADMIN — COLLAGENASE SANTYL: 250 OINTMENT TOPICAL at 09:34

## 2023-02-19 RX ADMIN — INSULIN LISPRO 4 UNITS: 100 INJECTION, SOLUTION INTRAVENOUS; SUBCUTANEOUS at 09:35

## 2023-02-19 RX ADMIN — LOSARTAN POTASSIUM 12.5 MG: 25 TABLET, FILM COATED ORAL at 09:34

## 2023-02-19 RX ADMIN — INSULIN LISPRO 1 UNITS: 100 INJECTION, SOLUTION INTRAVENOUS; SUBCUTANEOUS at 09:35

## 2023-02-19 RX ADMIN — INSULIN LISPRO 4 UNITS: 100 INJECTION, SOLUTION INTRAVENOUS; SUBCUTANEOUS at 12:32

## 2023-02-19 RX ADMIN — SPIRONOLACTONE 12.5 MG: 25 TABLET ORAL at 09:34

## 2023-02-19 RX ADMIN — METOPROLOL SUCCINATE 50 MG: 50 TABLET, EXTENDED RELEASE ORAL at 09:34

## 2023-02-19 RX ADMIN — ASPIRIN 81 MG: 81 TABLET, COATED ORAL at 09:34

## 2023-02-19 RX ADMIN — HEPARIN SODIUM 5000 UNITS: 5000 INJECTION INTRAVENOUS; SUBCUTANEOUS at 05:03

## 2023-02-19 NOTE — DISCHARGE SUMMARY
[facility]  Discharge- Dio Schulz 1945, 69 y o  male MRN: [id number]  Unit/Bed#: -Francesco Encounter: [id number]  Primary Care Provider: No primary care provider on file  Date and time admitted to hospital: 2/10/2023 11:38 PM    CAD (coronary artery disease)  Assessment & Plan  · [facility] 2/14 showed triple vessel disease  · Vein mapping at carotid studies per CTS for pre-operative work up  · Overall, currently poor surgical candidate  · CTS recommending medical management and optimization of co-morbidities  · Should follow up with cardiology outpatient as well as CTS  Unintentional weight loss  Assessment & Plan  · Possibly in setting of newly diagnosed heart failure however has lost appx 60 lbs unintentionally in the last 3-4 months  · Has never had colonoscopy  Will need GI referral on discharge for screening  · CT C/A/P without contrast with no acute inflammatory or neoplastic process in the chest/abdomen/pelvis within the limitations of noncontrast study per the results report     Urinary retention  Assessment & Plan  · Found with significant urinary retention during ED evaluation at [address] [place] [address] requiring Mensah catheter placement  · Seen by urology at [address] [place] [address], suspected secondary to severe edema with component of BPH as well  · Continue flomax   · Void trial in appx 10 days around 2/19, would recommend this be done at rehab when more mobile  Foot ulcer ([address] )  Assessment & Plan  · Left foot diabetic ulcer POA to [facility]  · Evaluated by podiatry, local wound care with Santyl/DSD and Allevyn foam dressings to right leg blisters     · WBAT  · Completed 7 days Keflex for possible mild cellulitis     Type 2 diabetes mellitus with hyperglycemia, with long-term current use of insulin [facility])  Assessment & Plan  Lab Results   Component Value Date    HGBA1C 12 0 (H) 02/08/2023     Recent Labs     02/18/23  1707 02/18/23  2101 02/19/23  0550 02/19/23  1116 POCGLU 216* 235* 178* 149*     · Uncontrolled as outpatient due to lack of insulin therapy outpatient  · Currently on Lantus 7 units QHS + humalog 4 units 3 times daily with meals plus SSI  · DC on current meds    * Acute systolic congestive heart failure (HCC)  Assessment & Plan  Wt Readings from Last 3 Encounters:   02/19/23 71 4 kg (157 lb 6 5 oz)   02/09/23 82 1 kg (181 lb)   06/24/21 68 6 kg (151 lb 3 8 oz)   · Patient presented with volume overload noted with bilateral lower extreme edema, dyspnea on exertion, scrotal swelling at home  Appeared grossly volume overloaded at time of admission  miners with CXR revealing vascular congestion for which IV diuresis was initiated  Transferred to [facility] for heart failure evaluation  · Echocardiogram at [facility] revealing new EF 25%  · Heart failure following, status post Lasix drip and IV diuretics  Now on spironolactone 12 5mg PO daily per Heart Failure  ? Continue medical management with ASA/statin, beta-blocker, metoprolol succinate 50mg Q12 hours, ARB  And Also cardiology prescribed entresto  ? Patient refused Life Vest  ? 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   · I/O, daily weights, low-sodium diet  · Will need close outpatient follow-up  · PT/OT recommending rehab  CM following  · DC today         Medical Problems     Resolved Problems  Date Reviewed: 2/19/2023   None       Discharging Physician / Practitioner: William Kahn MD  PCP: No primary care provider on file    Admission Date:   Admission Orders (From admission, onward)     Ordered        02/10/23 2411  Inpatient Admission  Once                      Discharge Date: 02/19/23    Consultations During Hospital Stay:  · Endocrinology   · Cardiac surgery  · Podiatry  · HF  · Urology   · Cardiology    Procedures Performed:   · CT -     "   Within the limitations of a noncontrast study, there is no acute inflammatory or neoplastic process identified in the chest abdomen or pelvis      Bilateral pleural effusions, large on the right and moderate in size on the left with bibasilar compressive atelectasis      Retention of contrast in the renal parenchyma bilaterally  Recommend correlation with serum creatinine levels to exclude acute renal injury      Anasarca "    Significant Findings / Test Results:   · As above  Incidental Findings:   ·  none  · I reviewed the above mentioned incidental findings with the patient and/or family and they expressed understanding  Test Results Pending at Discharge (will require follow up): · Should have lab work OP  · Follow-up with CT surgery and cardiology outpatient     Outpatient Tests Requested:  · Must have a basic metabolic panel checked in 2 to 3 days to monitor potassium given that he is on spironolactone and an ARB as well as Entresto  Complications: None    Reason for Admission: Transferred from [place] [address] Course:   Saúl Peng is a 69 y o  male patient who originally presented to the hospital on 2/10/2023 due to acute heart failure  He was initially seen at [facility] with an EF of 25 transferred here for evaluation by neurology and thoracic surgery  He was started on spironolactone, ARB and still  Team followed him in hospital and he was offered a LifeVest which he declined  He feels well today and overall is medically stable from a heart failure standpoint as per the heart failure team     No new complaints follow-up with all of these outpatient  Please see above list of diagnoses and related plan for additional information       Condition at Discharge: stable    Discharge Day Visit / Exam:   Subjective:    Patient seen and examined  No new symptoms  Feeling fine  Vitals: Blood Pressure: 118/64 (02/19/23 0658)  Pulse: 63 (02/19/23 0658)  Temperature: 98 6 °F (37 °C) (02/19/23 0658)  Temp Source: Oral (02/16/23 1450)  Respirations: 18 (02/19/23 0658)  Height: 5' 10" (177 8 cm) (02/10/23 7365)  Weight - Scale: 71 4 kg (157 lb 6 5 oz) (02/19/23 4717)  SpO2: 97 % (02/19/23 0658)  Exam:   Physical Exam  Constitutional:       General: He is not in acute distress  Appearance: He is not ill-appearing or diaphoretic  HENT:      Head: Normocephalic  Eyes:      Pupils: Pupils are equal, round, and reactive to light  Cardiovascular:      Rate and Rhythm: Normal rate  Heart sounds: No murmur heard  No friction rub  No gallop  Pulmonary:      Effort: No respiratory distress  Breath sounds: No stridor  No wheezing, rhonchi or rales  Chest:      Chest wall: No tenderness  Abdominal:      General: There is no distension  Tenderness: There is no abdominal tenderness  There is no guarding  Musculoskeletal:      Right lower leg: Edema present  Left lower leg: Edema present  Skin:     Capillary Refill: Capillary refill takes less than 2 seconds  Coloration: Skin is not jaundiced or pale  Findings: No bruising, erythema or lesion  Neurological:      Mental Status: He is alert  Cranial Nerves: No cranial nerve deficit  Sensory: No sensory deficit  Motor: No weakness  Gait: Gait normal       Deep Tendon Reflexes: Reflexes normal    Psychiatric:         Mood and Affect: Mood normal           Discussion with Family: called Paulina -   update her    Discharge instructions/Information to patient and family:   See after visit summary for information provided to patient and family  Provisions for Follow-Up Care:  See after visit summary for information related to follow-up care and any pertinent home health orders  Disposition:   Other: rehab     Planned Readmission: none  Discharge Statement:  I spent 45 minutes discharging the patient  This time was spent on the day of discharge  I had direct contact with the patient on the day of discharge   Greater than 50% of the total time was spent examining patient, answering all patient questions, arranging and discussing plan of care with patient as well as directly providing post-discharge instructions  Additional time then spent on discharge activities  Discharge Medications:  See after visit summary for reconciled discharge medications provided to patient and/or family        **Please Note: This note may have been constructed using a voice recognition system**

## 2023-02-19 NOTE — ASSESSMENT & PLAN NOTE
Wt Readings from Last 3 Encounters:   02/19/23 71 4 kg (157 lb 6 5 oz)   02/09/23 82 1 kg (181 lb)   06/24/21 68 6 kg (151 lb 3 8 oz)   · Patient presented with volume overload noted with bilateral lower extreme edema, dyspnea on exertion, scrotal swelling at home  Appeared grossly volume overloaded at time of admission  miners with CXR revealing vascular congestion for which IV diuresis was initiated  [facility]  · Echocardiogram at [facility] revealing new EF 25%  · Heart failure following, status post Lasix drip and IV diuretics  Now on spironolactone 12 5mg PO daily per Heart Failure  ? Continue medical management with ASA/statin, beta-blocker, metoprolol succinate 50mg Q12 hours, ARB  And Also cardiology prescribed entresto  ? Patient refused Life Vest  ? 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   · I/O, daily weights, low-sodium diet  · Will need close outpatient follow-up  · PT/OT recommending rehab  CM following     · DC today

## 2023-02-19 NOTE — ASSESSMENT & PLAN NOTE
Lab Results   Component Value Date    HGBA1C 12 0 (H) 02/08/2023     Recent Labs     02/18/23  1707 02/18/23  2101 02/19/23  0550 02/19/23  1116   POCGLU 216* 235* 178* 149*     · Uncontrolled as outpatient due to lack of insulin therapy outpatient  · Currently on Lantus 7 units QHS + humalog 4 units 3 times daily with meals plus SSI  · DC on current meds

## 2023-02-19 NOTE — PLAN OF CARE
Problem: MOBILITY - ADULT  Goal: Maintain or return to baseline ADL function  Description: INTERVENTIONS:  -  Assess patient's ability to carry out ADLs; assess patient's baseline for ADL function and identify physical deficits which impact ability to perform ADLs (bathing, care of mouth/teeth, toileting, grooming, dressing, etc )  - Assess/evaluate cause of self-care deficits   - Assess range of motion  - Assess patient's mobility; develop plan if impaired  - Assess patient's need for assistive devices and provide as appropriate  - Encourage maximum independence but intervene and supervise when necessary  - Involve family in performance of ADLs  - Assess for home care needs following discharge   - Consider OT consult to assist with ADL evaluation and planning for discharge  - Provide patient education as appropriate  Outcome: Progressing  Problem: INFECTION - ADULT  Goal: Absence or prevention of progression during hospitalization  Description: INTERVENTIONS:  - Assess and monitor for signs and symptoms of infection  - Monitor lab/diagnostic results  - Monitor all insertion sites, i e  indwelling lines, tubes, and drains  - Monitor endotracheal if appropriate and nasal secretions for changes in amount and color  - [place] appropriate cooling/warming therapies per order  - Administer medications as ordered  - Instruct and encourage patient and family to use good hand hygiene technique  - Identify and instruct in appropriate isolation precautions for identified infection/condition  Outcome: Progressing  Goal: Absence of fever/infection during neutropenic period  Description: INTERVENTIONS:  - Monitor WBC    Outcome: Progressing        Problem: PAIN - ADULT  Goal: Verbalizes/displays adequate comfort level or baseline comfort level  Description: Interventions:  - Encourage patient to monitor pain and request assistance  - Assess pain using appropriate pain scale  - Administer analgesics based on type and severity of pain and evaluate response  - Implement non-pharmacological measures as appropriate and evaluate response  - Consider cultural and social influences on pain and pain management  - Notify physician/advanced practitioner if interventions unsuccessful or patient reports new pain  Outcome: Progressing

## 2023-02-19 NOTE — QUICK NOTE
This is N 42 y o -year-old male with past medical history of type 2 diabetes mellitus, with complications of CAD, systolic heart failure, who was transferred from [facility] to [facility] for evaluation of acute systolic heart failure endocrinology consulted for the management of type 2 diabetes mellitus      Chart and blood glucose trends reviewed  Recommend to continue with Lantus 7 units at bedtime  Recommend to increase lispro dose from 4 to 7 units with dinner and continue with lispro 4 units with breakfast and lunch   Continue with correctional scale

## 2023-02-19 NOTE — ASSESSMENT & PLAN NOTE
· Left foot diabetic ulcer POA to [facility]  · Evaluated by podiatry, local wound care with Santyl/DSD and Allevyn foam dressings to right leg blisters     · WBAT  · Completed 7 days Keflex for possible mild cellulitis

## 2023-02-19 NOTE — ASSESSMENT & PLAN NOTE
· Found with significant urinary retention during ED evaluation at [address] requiring Mensah catheter placement  · Seen by urology at [address], suspected secondary to severe edema with component of BPH as well  · Continue flomax   · Void trial in appx 10 days around 2/19, would recommend this be done at rehab when more mobile

## 2023-02-19 NOTE — CASE MANAGEMENT
Case Management Discharge Planning Note    Patient name Adwoa Berry  Location [address] [id number] MRN [id number]  : 1945 Date 2023       Current Admission Date: 2/10/2023  Current Admission Diagnosis:Acute systolic congestive heart failure [facility])   Patient Active Problem List    Diagnosis Date Noted   • CAD (coronary artery disease) 2023   • Unintentional weight loss 2023   • Moderate protein-calorie malnutrition ([facility] ) 02/10/2023   • Hydrocele, bilateral    • Foot ulcer ([facility] )    • Acute systolic congestive heart failure ([facility] ) 2023   • Urinary retention 2023   • Hypernatremia 2021   • Abnormal EKG 2021   • Hypokalemia 2021   • Type 2 diabetes mellitus with hyperglycemia, with long-term current use of insulin ([facility] ) 2021      LOS (days): 9  Geometric Mean LOS (GMLOS) (days):   Days to GMLOS:     OBJECTIVE:  Risk of Unplanned Readmission Score: 13 98         Current admission status: Inpatient   Preferred Pharmacy:   [address] [place] [address] [address] [place] [address], [address] [place] [id number]  Phone: [phone] Fax: [phone]    [facility] [address] [facility]  [address] [place] [address]  Phone: [phone] Fax: [phone]    [address] [place] [address], [facility] [address] [facility] [address] [facility]  Phone: [phone] Fax: [phone]    Primary Care Provider: No primary care provider on file  Primary Insurance: [address]  Secondary Insurance: MEDICARE    DISCHARGE DETAILS:    Discharge planning discussed with[de-identified] Pt        CM contacted family/caregiver?: No- see comments (pt declined)                            Other Referral/Resources/Interventions Provided:  Referral Comments: [facility] rehab center - confirmed availability for adm today Liaison Cindy Dooley - via [facility]           Treatment Team Recommendation: Short Term Rehab  Discharge Destination Plan[de-identified] Short Term Rehab  Transport at Discharge : [facility] Ambulance        [facility] [phone]  ETA of Transport (Date): 02/19/23  ETA of Transport (Time): 1600              IMM Given (Date):: 02/19/23 (placed in MR bin for filing)  IMM Given to[de-identified] Patient  Family notified[de-identified] Pt declined CM offer to call family  Additional Comments: CM s/w pt in room, reviewed dc plan and transfer to [facility] today, 4pm  Pts only concern was missing dinner due to 4pm transport time, CM sent msg to facility liaison to have dinner scheduled for pt upon admission  Pt w/o additional concerns at this time      [facility] : [address]  Receiving Facility/Agency Phone Number: [phone]  Facility/Agency Fax Number: [phone]

## 2023-02-19 NOTE — PROGRESS NOTES
Heart Failure/ Pulmonary Hypertension Progress Note - Saúl Peng 69 y o  male MRN: [id number]    Unit/Bed#: -01 Encounter: [id number]      Assessment:    Principal Problem:    Acute systolic congestive heart failure (HCC)  Active Problems:    Type 2 diabetes mellitus with hyperglycemia, with long-term current use of insulin (HCC)    Foot ulcer (HCC)    Urinary retention    Unintentional weight loss    CAD (coronary artery disease)    69year-old man with type 2 diabetes mellitus and history of tobacco abuse who presented to [facility] on 02/08/2023 with worsening bilateral LE swelling, scrotal swelling with urinary retention, abdominal bloating, SIMON, and orthopnea for 2 to 3 weeks  Admitted for acute heart failure with echocardiogram showing severely reduced EF at 25%     # Acute heart failure with reduced ejection fraction, Stage C, NYHA III  Etiology: ischemic     Studies- personally reviewed by [facility] 2/14/23:  Severe 3-vessel CAD     EKG: sinus rhythm, nonspecific T wave abnormality, poor R wave progression     Echocardiogram 2/9/23  LVEF: 25%, 15-20 on my review  LVIDd: 4 3cm  RV: normal size, low normal systolic function  MR: mild  PASP: 46mmHg, mild TR  RVOT: no notching  Other: severe global hypokinesis with relative preservation of the basal segments     Neurohormonal Blockade:  --Beta-Blocker: metoprolol succinate 50mg BID  --ACEi, ARB or ARNi: losartan 12 5mg BID  --Aldosterone Receptor Blocker: spironolactone 12 5mg daily  --SGLT2 Inhibitor:  --Diuretic: furosemide 40mg IV BID 2/15, furosemide 40-mg x 1 2/17     Sudden Cardiac Death Risk Reduction:  --ICD: LifeVest candidate - patient declined, he is currently DNAR/DNI     Electrical Resynchronization:  --Candidacy for BiV device: narrow QRS     Advanced Therapies (if appropriate):   --We will continue to monitor, improving with diuresis     3-vessel CAD  Rx: aspirin, statin  Lipid panel 2/11/23: TG 57 HDL 36 LDL 61  Deemed not a candidate for CABG at this time per CTS and recommended control of medical comorbidities  Unintentional weight loss  2/15/23 CT chest/abdomen/pelvis: no acute inflammatory or neoplastic process identified in the chest abdomen or pelvis  Foot ulcer, left: management per primary team    Diabetes mellitus, type II, uncontrolled, HbA1c 12 2/8/23  Urinary retention: management per urology  History of tobacco abuse: quit in his 30s    Exposure to Agent Orange  Cataracts, bilateral: s/p bilateral extractions      Today's Plan:  Increase spironolactone to 12 5mg BID  Continue metoprolol and losartan as above  Patient declined LifeVest as above  Angella Connor for discharge to rehab today from cardiac standpoint  Patient ok with following up at [place] or [facility]      Subjective:   Patient seen and examined  No significant events overnight  Review of Systems   Constitutional: Negative for chills and fever  Respiratory: Negative for chest tightness and shortness of breath  Cardiovascular: Negative for chest pain, palpitations and leg swelling  Gastrointestinal: Negative for abdominal distention, nausea and vomiting  Neurological: Negative for dizziness and light-headedness  Objective: Intake/ Output: 1140/1050   Weight: 157 lbs bedscale  Tele: off      Vitals: Blood pressure 118/64, pulse 63, temperature 98 6 °F (37 °C), resp  rate 18, height 5' 10" (1 778 m), weight 71 4 kg (157 lb 6 5 oz), SpO2 97 %  , Body mass index is 22 59 kg/m² , I/O last 3 completed shifts: In: 1140 [P O :1140]  Out: 1750 [Urine:1750]  I/O this shift:  In: 160 [P O :160]  Out: -   Wt Readings from Last 3 Encounters:   02/19/23 71 4 kg (157 lb 6 5 oz)   02/09/23 82 1 kg (181 lb)   06/24/21 68 6 kg (151 lb 3 8 oz)       Intake/Output Summary (Last 24 hours) at 2/19/2023 1005  Last data filed at 2/19/2023 0900  Gross per 24 hour   Intake 1300 ml   Output 1050 ml   Net 250 ml     I/O last 3 completed shifts:   In: 1140 [P O :1140]  Out: 1750 [Urine:1750]    No significant arrhythmias seen on telemetry review         Physical Exam:  Vitals:    02/18/23 1453 02/18/23 2134 02/19/23 0541 02/19/23 0658   BP: 120/56 124/57  118/64   Pulse: 72 73  63   Resp: 17   18   Temp: 98 1 °F (36 7 °C) 99 3 °F (37 4 °C)  98 6 °F (37 °C)   TempSrc:       SpO2: 93% 94%  97%   Weight:   71 4 kg (157 lb 6 5 oz)    Height:           GEN: Doyce Re appears well, alert and oriented x 3, pleasant and cooperative   HEENT: NC/AT, moist mucosa, anicteric sclerae; extraocular muscles intact  NECK: supple, no carotid bruits   HEART: regular rhythm, normal S1 and S2, no murmurs, clicks, gallops or rubs, JVP is mildly elevated  LUNGS: clear to auscultation bilaterally; no wheezes, rales, or rhonchi   ABDOMEN: normal bowel sounds, soft, no tenderness, no distention  EXTREMITIES: peripheral pulses normal; no clubbing, cyanosis, or edema  NEURO: no focal findings   SKIN: normal without suspicious lesions on exposed skin      Current Facility-Administered Medications:   •  acetaminophen (TYLENOL) tablet 650 mg, 650 mg, Oral, Q6H PRN, Zilphia Girma, DO  •  aspirin (ECOTRIN LOW STRENGTH) EC tablet 81 mg, 81 mg, Oral, Daily, Zilphia Girma, DO, 81 mg at 02/19/23 0934  •  atorvastatin (LIPITOR) tablet 40 mg, 40 mg, Oral, Daily With Dinner, Zilphia Girma, DO, 40 mg at 02/18/23 1811  •  calcium carbonate (TUMS) chewable tablet 1,000 mg, 1,000 mg, Oral, Daily PRN, Zilphia Girma, DO  •  collagenase (SANTYL) ointment, , Topical, Daily, Zilphia Girma, DO, Given at 02/19/23 0934  •  heparin (porcine) subcutaneous injection 5,000 Units, 5,000 Units, Subcutaneous, Q8H [address], Zilphia Girma, DO, 5,000 Units at 02/19/23 0503  •  insulin glargine (LANTUS) subcutaneous injection 7 Units 0 07 mL, 7 Units, Subcutaneous, HS, Dai Amna Up MD, 7 Units at 02/18/23 6703  •  insulin lispro (HumaLOG) 100 units/mL subcutaneous injection 1-5 Units, 1-5 Units, Subcutaneous, TID AC, 1 Units at 02/19/23 5075 **AND** Fingerstick Glucose (POCT), , , TID AC, Dai Alvarez MD  •  insulin lispro (HumaLOG) 100 units/mL subcutaneous injection 4 Units, 4 Units, Subcutaneous, TID With Meals, Nano Childs MD, 4 Units at 02/19/23 0935  •  losartan (COZAAR) tablet 12 5 mg, 12 5 mg, Oral, BID, Kevin Larson MD, 12 5 mg at 02/19/23 9394  •  metoprolol succinate (TOPROL-XL) 24 hr tablet 50 mg, 50 mg, Oral, Q12H [facility], Iris Stokes DO, 50 mg at 02/19/23 0934  •  ondansetron (ZOFRAN) injection 4 mg, 4 mg, Intravenous, Q6H PRN, Iris Stokes DO  •  spironolactone (ALDACTONE) tablet 12 5 mg, 12 5 mg, Oral, BID, Kevin Larson MD  •  tamsulosin [facility]) capsule 0 4 mg, 0 4 mg, Oral, Daily With Dinner, Iris Stokes DO, 0 4 mg at 02/18/23 1811      Labs & Results:        Results from last 7 days   Lab Units 02/17/23  0433 02/16/23  0519 02/15/23  0432   WBC Thousand/uL 5 60 6 09 5 39   HEMOGLOBIN g/dL 11 1* 11 4* 11 7*   HEMATOCRIT % 36 6 38 1 38 5   PLATELETS Thousands/uL 257 263 247         Results from last 7 days   Lab Units 02/19/23  0433 02/18/23  1051 02/17/23  0433   POTASSIUM mmol/L 4 0 4 4 3 5   CHLORIDE mmol/L 103 103 100   CO2 mmol/L 30 31 35*   BUN mg/dL 46* 46* 41*   CREATININE mg/dL 0 92 1 10 0 90   CALCIUM mg/dL 8 6 8 8 8 6           Thank you for the opportunity to participate in the care of this patient      Kevin Larson MD  Advanced Heart Failure and Mechanical Circulatory [facility]

## 2023-02-19 NOTE — DISCHARGE INSTR - AVS FIRST PAGE
Dear Chris Muse,     [facility]  It is our hope that we were always able to exceed the expected standards for your care during your stay  You were hospitalized due to shortness of breath  You were cared for on the 5th floor under the service of Lorene Branham MD with the [facility] who covers for your primary care physician (PCP), No primary care provider on file  , while you were hospitalized  If you have any questions or concerns related to this hospitalization, you may contact us at [phone]  For follow up as well as medication refills, we recommend that you follow up with your primary care physician  A registered nurse will reach out to you by phone within a few days after your discharge to answer any additional questions that you may have after going home  However, at this time we provide for you here, the most important instructions / recommendations at discharge:     Notable Medication Adjustments -   We started you on spironolactone, valsartan and entresto by cardiology   Testing Required after Discharge -   You need close cardiology follow up   You will need to check you basic metabolic panel in 2-3 days time to monitor your potassium  Important follow up information -   If you experience any weights gain, shortness of breath, leg swelling seek urgent medical attention  Other Instructions -   As above  Please review this entire after visit summary as additional general instructions including medication list, appointments, activity, diet, any pertinent wound care, and other additional recommendations from your care team that may be provided for you        Sincerely,     Lorene Branham MD      Discharge Instructions - Podiatry    Weight Bearing Status: weight bearing as tolerated                       Pain: Continue analgesics as directed    Follow-up appointment instructions: Please make an appointment within one week of discharge with [address] [place] [address]   Contact sooner if any increase in pain, or signs of infection occur    Nursing Wound Care Instructions: Remove dressings  Flush with sterile saline and pat dry  Cover right heel with maxorb and a dry sterile dressing  Apply and allvyn bandage to the left   Change on a [facility] schedule

## 2023-02-21 ENCOUNTER — NURSING HOME VISIT (OUTPATIENT)
Dept: WOUND CARE | Facility: HOSPITAL | Age: 78
End: 2023-02-21

## 2023-02-21 DIAGNOSIS — Z79.4 TYPE 2 DIABETES MELLITUS WITH HYPERGLYCEMIA, WITH LONG-TERM CURRENT USE OF INSULIN (HCC): ICD-10-CM

## 2023-02-21 DIAGNOSIS — E11.621 DIABETIC ULCER OF LEFT FOOT ASSOCIATED WITH TYPE 2 DIABETES MELLITUS, UNSPECIFIED PART OF FOOT, UNSPECIFIED ULCER STAGE (HCC): ICD-10-CM

## 2023-02-21 DIAGNOSIS — L97.529 DIABETIC ULCER OF LEFT FOOT ASSOCIATED WITH TYPE 2 DIABETES MELLITUS, UNSPECIFIED PART OF FOOT, UNSPECIFIED ULCER STAGE (HCC): ICD-10-CM

## 2023-02-21 DIAGNOSIS — E11.65 TYPE 2 DIABETES MELLITUS WITH HYPERGLYCEMIA, WITH LONG-TERM CURRENT USE OF INSULIN (HCC): ICD-10-CM

## 2023-02-21 DIAGNOSIS — R68.89 SUSPECTED DEEP TISSUE INJURY OF UNKNOWN DEPTH: Primary | ICD-10-CM

## 2023-02-21 NOTE — ASSESSMENT & PLAN NOTE
Left heel  -Deep tissue injury, with no obvious sign of infection  -Local wound care with hydraguard

## 2023-02-21 NOTE — PROGRESS NOTES
[address] [facility]NAG[facility]   [facility]       Patient ID: Isabel Barron is a 69 y o  male Date of Birth 1945     Location of Service: [facility]     Performed wound round with: Wound team     Chief Complaint : Left foot dorsal, left heel, left great toe    Wound Instructions:  Wound: Left heel, left great toe, left foot dorsal  Discontinue previous wound order  Cleanse the wound bed with NSS   Apply hydraguard to wound bed, then cover with ABD and Kerlix  Frequency : daily and prn for soiling  Offload all wounds  Turn and reposition frequently,   Increase protein intake  Monitor for any sign of infection or worsening, inform PCP or patient's primary physician in your facility  Allergies  Metformin      Assessment & Plan:  1  Suspected deep tissue injury of unknown depth  Assessment & Plan:  Left heel  -Deep tissue injury, with no obvious sign of infection  -Local wound care with hydraguard      2  Diabetic ulcer of left foot associated with type 2 diabetes mellitus, unspecified part of foot, unspecified ulcer stage ([facility])  Assessment & Plan:  Wound on left foot dorsal and left great toe  -Was seen by podiatry, ordered local wound care  -Wound is covered with 100% granulation on the left foot dorsal   Eschar on the left great toe   -Discontinue Santyl, ordered hydraguard for local wound care  -Continue to offload, ordered Prevalon boots      3  Type 2 diabetes mellitus with hyperglycemia, with long-term current use of insulin [facility])  Assessment & Plan:    Lab Results   Component Value Date    HGBA1C 12 0 (H) 02/08/2023   -Uncontrolled DM  -Currently under the care of geriatric team             Subjective:   2/21/2023  This is a new consult for wound on the left heel, left foot dorsal, and left great toe    Patient was referred by geriatric team   Patient have a complex medical history including but not limited to coronary artery disease, type 2 diabetes, congestive heart failure, and weight loss  As per medical record review, patient was admitted at [facility] from February 10 to February 19, 2023  Patient was seen with the facility wound team     Wound history: As per medical record review patient was admitted with wound on left foot and right lower leg  Patient was seen by podiatry and ordered enzymatic debridement  Received patient in bed, seems comfortable  Denies pain  Patient have a poor appetite, consumes 0 to 75% of his meals  Patient currently on Prosource and Jesu supplement  Patient needs assistance with turning and repositioning when in bed  Patient can ambulate  Currently with indwelling catheter  I reviewed the note of Dr Jesus Cabello on February 19, 2023  Review of Systems   Constitutional: Negative  HENT: Negative  Eyes: Negative  Respiratory: Negative  Cardiovascular: Negative  Gastrointestinal: Negative  Endocrine: Negative  Genitourinary: Negative  Musculoskeletal: Positive for gait problem  Skin: Positive for wound  See HPI   Hematological: Negative  Psychiatric/Behavioral: Negative  All other systems reviewed and are negative  Objective:    Physical Exam  Constitutional:       Appearance: Normal appearance  HENT:      Head: Normocephalic and atraumatic  Cardiovascular:      Rate and Rhythm: Normal rate  Pulmonary:      Effort: Pulmonary effort is normal    Abdominal:      Tenderness: There is no abdominal tenderness  Genitourinary:     Comments: With indwelling catheter  Musculoskeletal:      Cervical back: Normal range of motion  Right lower leg: No edema  Left lower leg: No edema  Comments: + DP and PT on the Right foot     Skin:     Findings: Lesion present  Comments: Left heel: 6 x 6 cm, purple, with no obvious sign of infection    Left foot dorsal: Wound size   5 x 0 8 x 0 1 cm ,  100% granulation, periwound is normal, with no obvious sign of infection, small amount of serous drainage    Left great toe: Wound size is 0 2 x 0 2 cm ,  100% eschar, no drainage, no obvious sign of infection   Neurological:      Mental Status: He is alert  Gait: Gait abnormal    Psychiatric:         Mood and Affect: Mood normal          Behavior: Behavior normal               Procedures     Results from last 6 Months   Lab Units 02/11/23  0001   WOUND CULTURE  4+ Growth of Staphylococcus aureus*  4+ Growth of       Patient's care was coordinated with nursing facility staff  Recent vitals, labs and updated medications were reviewed on EMR or chart system of facility  Past Medical, surgical, social, medication and allergy history and patient's previous records were reviewed and updated as appropriate: Most up-to date information is available in the facility EMR where the patient is currently admitted      Patient Active Problem List   Diagnosis   • Hypokalemia   • Type 2 diabetes mellitus with hyperglycemia, with long-term current use of insulin ([facility])   • Hypernatremia   • Abnormal EKG   • Foot ulcer ([facility])   • Acute systolic congestive heart failure ([facility])   • Urinary retention   • Hydrocele, bilateral   • Moderate protein-calorie malnutrition ([facility])   • Unintentional weight loss   • CAD (coronary artery disease)   • Suspected deep tissue injury of unknown depth   • Diabetic ulcer of left foot associated with type 2 diabetes mellitus ([facility])     Past Medical History:   Diagnosis Date   • 6th nerve palsy    • Blister of right leg    • BPH (benign prostatic hyperplasia)    • CAD (coronary artery disease)    • Cardiomyopathy ([facility] )     EF 25%   • CHF (congestive heart failure) ([facility])    • Depression    • Diabetes mellitus ([facility] )     type 2, insulin dependent   • Diabetic foot ulcer ([facility] )     left, local wound care   • Diabetic retinopathy ([facility] )    • Exposure to Agent Orange     while serving in [facility]   • Former tobacco use    • H/O urinary retention     w/ reagan placement   • Hyperlipidemia    • Hypertension    • Rupture of biceps tendon, traumatic 2017    right     Past Surgical History:   Procedure Laterality Date   • CARDIAC CATHETERIZATION     • CARDIAC CATHETERIZATION Left 2023    Procedure: Cardiac Left Heart Cath;  Surgeon: Margret Castro MD;  Location: [facility]; Service: Cardiology   • CARDIAC CATHETERIZATION N/A 2023    Procedure: Cardiac Coronary Angiogram;  Surgeon: Margret Castro MD;  Location: [facility]; Service: Cardiology   • CATARACT EXTRACTION, BILATERAL Bilateral      Social History     Socioeconomic History   • Marital status:      Spouse name: None   • Number of children: 2   • Years of education: None   • Highest education level: None   Occupational History   • None   Tobacco Use   • Smoking status: Former     Packs/day: 1 00     Years: 6 00     Pack years: 6 00     Types: Cigarettes     Start date:      Quit date:      Years since quittin 1   • Smokeless tobacco: Never   Vaping Use   • Vaping Use: Never used   Substance and Sexual Activity   • Alcohol use: Not Currently     Comment: Denies history of heavy alcohol use  At most will drink 1 or 2 drinks in a year (Updated 2023)  • Drug use: Never   • Sexual activity: None   Other Topics Concern   • None   Social History Narrative    Served in the [employer]; completed 18-month tour in [facility]     Previously worked for [employer]  Lives alone with his dog  Has a son and a daughter who both live into [place], [place] -does not maintain a close relationship with them  Denies having strong support system in place at home  Social Determinants of Health     Financial Resource Strain: Not on file   Food Insecurity: No Food Insecurity   • Worried About [address] in the Last Year: Never true   • Ran Out of Food in the Last Year: Never true   Transportation Needs: No Transportation Needs   • Lack of Transportation (Medical):  No   • Lack of Transportation (Non-Medical):  No   Physical Activity: Not on file   Stress: Not on file   Social Connections: Not on file   Intimate Partner Violence: Not on file   Housing Stability: Low Risk    • Unable to Pay for Housing in the Last Year: No   • Number of Places Lived in the Last Year: 1   • Unstable Housing in the Last Year: No        Current Outpatient Medications:   •  acetaminophen (TYLENOL) 325 mg tablet, Take 2 tablets (650 mg total) by mouth every 6 (six) hours as needed (mild pain), Disp: 30 tablet, Rfl: 0  •  aspirin (ECOTRIN LOW STRENGTH) 81 mg EC tablet, Take 1 tablet (81 mg total) by mouth daily Do not start before February 20, 2023 , Disp: 30 tablet, Rfl: 0  •  atorvastatin (LIPITOR) 40 mg tablet, Take 1 tablet (40 mg total) by mouth daily with dinner, Disp: 30 tablet, Rfl: 0  •  calcium carbonate (TUMS) 500 mg chewable tablet, Chew 2 tablets (1,000 mg total) daily as needed (indigestion,heartburn), Disp: 30 tablet, Rfl: 0  •  collagenase (SANTYL) ointment, Apply topically daily Do not start before February 20, 2023 , Disp: 15 g, Rfl: 0  •  insulin glargine (LANTUS) 100 units/mL subcutaneous injection, Inject 7 Units under the skin daily at bedtime, Disp: 10 mL, Rfl: 0  •  insulin lispro (HumaLOG) 100 units/mL injection, Inject 4 Units under the skin daily with breakfast Do not start before February 20, 2023 , Disp: 10 mL, Rfl: 0  •  insulin lispro (HumaLOG) 100 units/mL injection, Inject 4 Units under the skin daily with lunch Do not start before February 20, 2023 , Disp: 3 mL, Rfl: 0  •  insulin lispro (HumaLOG) 100 units/mL injection, Inject 7 Units under the skin daily with dinner, Disp: 3 mL, Rfl: 0  •  losartan (COZAAR) 25 mg tablet, Take 0 5 tablets (12 5 mg total) by mouth 2 (two) times a day, Disp: 30 tablet, Rfl: 0  •  metoprolol succinate (TOPROL-XL) 50 mg 24 hr tablet, Take 1 tablet (50 mg total) by mouth every 12 (twelve) hours, Disp: 30 tablet, Rfl: 0  •  sacubitril-valsartan (Entresto) 24-26 MG TABS, Take 1 tablet by mouth 2 (two) times a day For price check only, Disp: 30 tablet, Rfl: 2  •  spironolactone (ALDACTONE) 25 mg tablet, Take 0 5 tablets (12 5 mg total) by mouth 2 (two) times a day, Disp: 30 tablet, Rfl: 0  •  tamsulosin (FLOMAX) 0 4 mg, Take 1 capsule (0 4 mg total) by mouth daily with dinner, Disp: 30 capsule, Rfl: 0  Family History   Problem Relation Age of Onset   • No Known Problems Sister    • Other Daughter         chronic neck pain   • Other Son         chronic back pain   • Sudden death Neg Hx    • Cancer Neg Hx               Coordination of Care: Wound team aware of the treatment plan  Facility nurse will provide wound treatment and monitor the wound for any changes  Patient / Staff education : Patient / Staff was given education on sign of infection and pressure ulcer prevention  Patient/ Staff verbalized understanding     Follow up :  Next week    Voice-recognition software may have been used in the preparation of this document  Occasional wrong word or "sound-alike" substitutions may have occurred due to the inherent limitations of voice recognition software  Interpretation should be guided by context        JANIS Santana

## 2023-02-21 NOTE — ASSESSMENT & PLAN NOTE
Lab Results   Component Value Date    HGBA1C 12 0 (H) 02/08/2023   -Uncontrolled DM  -Currently under the care of geriatric team

## 2023-02-21 NOTE — ASSESSMENT & PLAN NOTE
Wound on left foot dorsal and left great toe  -Was seen by podiatry, ordered local wound care  -Wound is covered with 100% granulation on the left foot dorsal   Eschar on the left great toe   -Discontinue Santyl, ordered hydraguard for local wound care  -Continue to offload, ordered Prevalon boots

## 2023-02-21 NOTE — TELEPHONE ENCOUNTER
Called and spoke with patients daughter Adalgisa Cabrera  She advised that patient is currently admitted to [facility]  Paulina asked for our office to contact rehab to coordinate void trial and follow up  Call placed to [address] at [phone]  Spoke with patients nurse who confirmed patient has indwelling Mensah  Reviewed void trial instructions and [place] able to perform void trial- will perform this week and contact our office with results  Void trial orders signed by Starr Murguia and faxed to [address] at [phone]  Confirmation received  Will scan orders into chart as well  Patient scheduled for appointment with Starr Murguia in the [place] office in 4 weeks as he lives in [place] and will likely be discharged from rehab by that time  Office address provided and rehab will include appointment details on discharge instructions for patient

## 2023-02-23 ENCOUNTER — TELEPHONE (OUTPATIENT)
Dept: CARDIOLOGY CLINIC | Facility: CLINIC | Age: 78
End: 2023-02-23

## 2023-02-23 DIAGNOSIS — I50.20 HEART FAILURE WITH REDUCED EJECTION FRACTION (HCC): ICD-10-CM

## 2023-02-23 RX ORDER — SACUBITRIL AND VALSARTAN 24; 26 MG/1; MG/1
1 TABLET, FILM COATED ORAL 2 TIMES DAILY
Qty: 30 TABLET | Refills: 2
Start: 2023-02-23 | End: 2023-02-24

## 2023-02-23 NOTE — TELEPHONE ENCOUNTER
----- Message from Helene Velazquez sent at 2/23/2023 12:32 PM EST -----    ----- Message -----  From: Quiana Christian PA-C  Sent: 2/23/2023  12:31 PM EST  To: Cardiology [facility]    Please contact patient's rehab facility to request they send medication/orders list, daily weights, and most recent labs with patient for his hospital follow-up visit tomorrow  Thanks!   YOSI

## 2023-02-23 NOTE — PROGRESS NOTES
Advanced Heart Failure / Pulmonary Hypertension Outpatient Progress Note    Mely Oleary 69 y o  male   MRN: [id number]  Encounter: [id number]    Assessment:  Patient Active Problem List    Diagnosis Date Noted   • Unintentional weight loss 02/11/2023   • Suspected deep tissue injury of unknown depth 02/21/2023   • Diabetic ulcer of left foot associated with type 2 diabetes mellitus ([facility] ) 02/21/2023   • CAD (coronary artery disease) 02/14/2023   • Moderate protein-calorie malnutrition ([facility] ) 02/10/2023   • Hydrocele, bilateral    • Foot ulcer ([place] [address] ) 59/88/4642   • Acute systolic congestive heart failure ([place] [address] ) 02/08/2023   • Urinary retention 02/08/2023   • Hypernatremia 06/24/2021   • Abnormal EKG 06/24/2021   • Hypokalemia 06/23/2021   • Type 2 diabetes mellitus with hyperglycemia, with long-term current use of insulin ([place] [address] ) 06/23/2021       Today's Plan:  • Start Lasix 40 mg daily  • Increase Entresto to 49-51 mg BID  • Check BMP in 1 week  • Please remove duplicate metoprolol succinate from facility list - should only be receiving 50 mg q12 hours  • Consider adding back empagliflozin at next visit (previously on 25 mg daily for DM II)  • Losartan discontinued on 02/23/2023; see telephone encounter for additional details  • Begin daily weights  • Patient requesting new PCP for DM management; referral sent to family practice  • Plan to reassess LVEF with limited TTE after 3 months uninterrupted and optimized HF GDMT (earliest 05/2023)  Plan:  Newly diagnosed HFrEF; LVEF 25%; LVIDd 4 3 cm; NYHA II; ACC/AHA Stage B              Etiology: ischemic  Denies any family history of cardiac diseases and denied recent viral illness at time of diagnosis  TTE 02/09/2023: LVEF 25%  LVIDd 4 3 cm  Severe global hypokinesis with RWMA  Normal RV  Mild MR and TR  RVSP 46 mmHg  [facility] 02/14/2023: 50% stenosis ostial LM and distal LAD  60% stenosis proximal LAD   70% stenosis mid LAD, proximal RCA, and D1  90% stenosis distal RCA  100% stenosis mid Cx  Weight of 157 lbs on 02/19 (day of discharge, bed weight)  Today, weighs 161 lbs  Most recent BMP from 02/22/2023: sodium 141; potassium 4 5; BUN 26; creatinine 0 76; eGFR 93  Pharmacotherapies / Neurohormonal Blockade:  --Beta Blocker: metoprolol succinate 50 mg q12 hours  --ARNi / ACEi / ARB: Entresto 49-51 mg BID  --Aldosterone Antagonist: spironolactone 12 5 mg BID    --SGLT2 Inhibitor: No (previously on empagliflozin 25 mg daily for DM II)  --Home Diuretic: Lasix 40 mg daily       Sudden Cardiac Death Risk Reduction:  --LVEF 25%  Patient is DNR/DNI    --Plan to reassess LVEF with limited TTE after 3 months uninterrupted and optimized HF GDMT (earliest 05/2023)     Electrical Resynchronization:  --Candidacy for BiV device: narrow QRS     Advanced Therapies (if appropriate): Will continue to monitor  Concerns include HgA1c of 12 0 in 02/2023 and recent history of poor adherence to prescribed medical regimen      Coronary artery disease   Without active chest pain  [facility] 02/14/2023: 50% stenosis ostial LM and distal LAD  60% stenosis proximal LAD  70% stenosis mid LAD, proximal RCA, and D1  90% stenosis distal RCA  100% stenosis mid Cx  Deemed to "not be an appropriate candidate" by CT surgery at the time of his 02/2023 admission due multiple poorly controlled comorbidities  Continue on aspirin, statin, and BB as above  Unintentional weight loss: to follow-up with GI as outpatient for colon cancer screening  Diabetes mellitus, type II  History of tobacco abuse: quit in his 35s  Exposure to Agent Orange  Cataracts, bilateral: s/p bilateral extractions  HPI:   Sheela Stephens is a 18-year-old man with a PMH as above who presents to the office for hospital follow-up and to establish with outpatient cardiology practice      Presented to [facility] on 02/08/2023 with worsening bilateral LE swelling, scrotal swelling with urinary retention, abdominal bloating, SIMON, and orthopnea for 2 to 3 weeks  Upon arrival to ED, patient noted to be hypoxic with elevated JVD, decreased breath sounds, and significant lower extremity and scrotal swelling  Received 1 L of IV fluids in ED; unclear why  CXR with mild pulmonary vascular ingestions and small bilateral pleural effusions; BNP of 3345  Mensah catheter was placed due to urinary retention, and patient was started on oral antibiotics due to concern for acute toe/skin infection  Cardiology consulted for HF exacerbation and patient was started on IV Lasix as well as metoprolol succinate  TTE was ordered and revealed newly reduced LVEF of 25% with severe global hypokinesis and regional wall motion abnormalities  Was switched to IV Bumex on 02/10  Cardiology then recommended transfer to [facility] for new HFrEF diagnosis as well as advanced HF evaluation, and patient was transferred on 02/11  Started on Lasix drip and IV diuresis continued  Underwent LHC on 02/14 which revealed multi-vessel CAD; CABG evaluation recommended  Deemed to "not be an appropriate candidate" by CT surgery at this time due multiple poorly controlled comorbidities  HF GDMT gradually added  Patient declined LifeVest as he is DNR/DNI  Was to be transitioned to PO Lasix on 02/18, but no additional PO Lasix doses were ever given per medication history review  Lost 23 lbs and net negative 18 L this admission  Discharged without standing PO diuretic and with ARB and ARNi on medication list       02/24/2023: Patient presents for hospital follow-up  No current complaints  Continues with bilateral swelling  Feeling stronger and his no longer bed bound  Mensah catheter removed this week  Urinating without issue per patient  Denies PND and orthopnea  Eating well  Having difficulty with sleep at facility due to "loud neighbors " Expressed frustrations and concerns with his rehab facility today  Carmita Hernandes to return home   Requesting assistance in finding new PCP to help with DM management  Past Medical History:   Diagnosis Date   • 6th nerve palsy    • Blister of right leg    • BPH (benign prostatic hyperplasia)    • CAD (coronary artery disease)    • Cardiomyopathy (HCC)     EF 25%   • CHF (congestive heart failure) ([facility])    • Depression    • Diabetes mellitus ([facility] )     type 2, insulin dependent   • Diabetic foot ulcer ([facility] )     left, local wound care   • Diabetic retinopathy ([place] [address] )    • Exposure to Agent Orange     while serving in [employer]   • Former tobacco use    • H/O urinary retention     w/ reagan placement   • Hyperlipidemia    • Hypertension    • Rupture of biceps tendon, traumatic 2017    right       Review of Systems   Constitutional: Negative for activity change, appetite change, fatigue, fever and unexpected weight change  HENT: Negative for congestion, postnasal drip, rhinorrhea, sneezing, sore throat and trouble swallowing  Eyes: Negative  Respiratory: Negative for cough, chest tightness and shortness of breath  Cardiovascular: Positive for leg swelling  Negative for chest pain and palpitations  Gastrointestinal: Negative for abdominal distention, abdominal pain, diarrhea, nausea and vomiting  Endocrine: Negative  Genitourinary: Negative for decreased urine volume, difficulty urinating, dysuria and urgency  Musculoskeletal: Negative  Skin: Negative  Allergic/Immunologic: Negative  Neurological: Negative for dizziness, tremors, syncope, weakness, light-headedness and headaches  Hematological: Negative  Psychiatric/Behavioral: Negative for agitation, confusion and sleep disturbance  The patient is not nervous/anxious  14-point ROS completed and negative except as stated above and/or in the HPI      Allergies   Allergen Reactions   • Metformin Diarrhea         Current Outpatient Medications:   •  collagenase (SANTYL) ointment, Apply topically daily Do not start before February 20, 2023 , Disp: 15 g, Rfl: 0  •  furosemide (LASIX) 20 mg tablet, Take 2 tablets (40 mg total) by mouth daily, Disp: 90 tablet, Rfl: 1  •  metoprolol succinate (TOPROL-XL) 50 mg 24 hr tablet, Take 1 tablet (50 mg total) by mouth every 12 (twelve) hours, Disp: 30 tablet, Rfl: 0  •  sacubitril-valsartan (Entresto) 49-51 MG TABS, Take 1 tablet by mouth 2 (two) times a day, Disp: 60 tablet, Rfl: 1  •  spironolactone (ALDACTONE) 25 mg tablet, Take 0 5 tablets (12 5 mg total) by mouth 2 (two) times a day, Disp: 30 tablet, Rfl: 0  •  acetaminophen (TYLENOL) 325 mg tablet, Take 2 tablets (650 mg total) by mouth every 6 (six) hours as needed (mild pain), Disp: 30 tablet, Rfl: 0  •  aspirin (ECOTRIN LOW STRENGTH) 81 mg EC tablet, Take 1 tablet (81 mg total) by mouth daily Do not start before February 20, 2023 , Disp: 30 tablet, Rfl: 0  •  atorvastatin (LIPITOR) 40 mg tablet, Take 1 tablet (40 mg total) by mouth daily with dinner, Disp: 30 tablet, Rfl: 0  •  calcium carbonate (TUMS) 500 mg chewable tablet, Chew 2 tablets (1,000 mg total) daily as needed (indigestion,heartburn), Disp: 30 tablet, Rfl: 0  •  insulin glargine (LANTUS) 100 units/mL subcutaneous injection, Inject 7 Units under the skin daily at bedtime, Disp: 10 mL, Rfl: 0  •  insulin lispro (HumaLOG) 100 units/mL injection, Inject 4 Units under the skin daily with breakfast Do not start before February 20, 2023 , Disp: 10 mL, Rfl: 0  •  insulin lispro (HumaLOG) 100 units/mL injection, Inject 4 Units under the skin daily with lunch Do not start before February 20, 2023 , Disp: 3 mL, Rfl: 0  •  insulin lispro (HumaLOG) 100 units/mL injection, Inject 7 Units under the skin daily with dinner, Disp: 3 mL, Rfl: 0  •  tamsulosin (FLOMAX) 0 4 mg, Take 1 capsule (0 4 mg total) by mouth daily with dinner, Disp: 30 capsule, Rfl: 0    Social History     Socioeconomic History   • Marital status:      Spouse name: Not on file   • Number of children: 2   • Years of education: Not on file • Highest education level: Not on file   Occupational History   • Not on file   Tobacco Use   • Smoking status: Former     Packs/day: 1 00     Years: 6 00     Pack years: 6 00     Types: Cigarettes     Start date: 5     Quit date:      Years since quittin 1   • Smokeless tobacco: Never   Vaping Use   • Vaping Use: Never used   Substance and Sexual Activity   • Alcohol use: Not Currently     Comment: Denies history of heavy alcohol use  At most will drink 1 or 2 drinks in a year (Updated 2023)  • Drug use: Never   • Sexual activity: Not on file   Other Topics Concern   • Not on file   Social History Narrative    Served in the [employer]; completed 18-month tour in [facility]     Previously worked for [employer]  Lives alone with his dog  Has a son and a daughter who both live into [place], [place] -does not maintain a close relationship with them  Denies having strong support system in place at home  Social Determinants of Health     Financial Resource Strain: Not on file   Food Insecurity: No Food Insecurity   • Worried About [address] in the Last Year: Never true   • Ran Out of Food in the Last Year: Never true   Transportation Needs: No Transportation Needs   • Lack of Transportation (Medical): No   • Lack of Transportation (Non-Medical): No   Physical Activity: Not on file   Stress: Not on file   Social Connections: Not on file   Intimate Partner Violence: Not on file   Housing Stability: Low Risk    • Unable to Pay for Housing in the Last Year: No   • Number of Places Lived in the Last Year: 1   • Unstable Housing in the Last Year: No     Family History   Problem Relation Age of Onset   • No Known Problems Sister    • Other Daughter         chronic neck pain   • Other Son         chronic back pain   • Sudden death Neg Hx    • Cancer Neg Hx        Vitals:   Blood pressure 128/62, pulse 76, height 5' 10" (1 778 m), weight 73 kg (161 lb), SpO2 94 %      Body mass index is 23 1 kg/m²  Wt Readings from Last 10 Encounters:   23 73 kg (161 lb)   23 71 4 kg (157 lb 6 5 oz)   23 82 1 kg (181 lb)   21 68 6 kg (151 lb 3 8 oz)     Vitals:    23 1411   BP: 128/62   BP Location: Left arm   Patient Position: Sitting   Cuff Size: Standard   Pulse: 76   SpO2: 94%   Weight: 73 kg (161 lb)   Height: 5' 10" (1 778 m)       Physical Exam  Vitals reviewed  Constitutional:       General: He is awake  He is not in acute distress  Appearance: Normal appearance  He is well-developed  He is ill-appearing  He is not toxic-appearing or diaphoretic  HENT:      Head: Normocephalic  Nose: Nose normal       Mouth/Throat:      Mouth: Mucous membranes are moist    Eyes:      General: No scleral icterus  Conjunctiva/sclera: Conjunctivae normal    Neck:      Vascular: JVD present  Trachea: No tracheal deviation  Cardiovascular:      Rate and Rhythm: Normal rate and regular rhythm  Heart sounds: Murmur heard  Pulmonary:      Effort: Pulmonary effort is normal  No tachypnea, bradypnea or respiratory distress  Breath sounds: Normal air entry  No decreased air movement  No decreased breath sounds, wheezing or rhonchi  Abdominal:      General: Bowel sounds are normal  There is no distension  Palpations: Abdomen is soft  Tenderness: There is no abdominal tenderness  Musculoskeletal:      Cervical back: Neck supple  Right lower le+ Pitting Edema present  Left lower le+ Pitting Edema present  Skin:     General: Skin is warm and dry  Coloration: Skin is pale  Skin is not jaundiced  Neurological:      General: No focal deficit present  Mental Status: He is alert and oriented to person, place, and time  Psychiatric:         Attention and Perception: Attention normal          Mood and Affect: Mood and affect normal          Speech: Speech normal          Behavior: Behavior normal  Behavior is cooperative  Thought Content:  Thought content normal      Labs & Results:  Lab Results   Component Value Date    WBC 5 60 02/17/2023    HGB 11 1 (L) 02/17/2023    HCT 36 6 02/17/2023    MCV 85 02/17/2023     02/17/2023     Lab Results   Component Value Date    SODIUM 138 02/19/2023    K 4 0 02/19/2023     02/19/2023    CO2 30 02/19/2023    BUN 46 (H) 02/19/2023    CREATININE 0 92 02/19/2023    GLUC 180 (H) 02/19/2023    CALCIUM 8 6 02/19/2023     Lab Results   Component Value Date    INR 1 21 (H) 02/08/2023    PROTIME 15 6 (H) 02/08/2023     Lab Results   Component Value Date    NTBNP 14,829 (H) 02/11/2023      Lab Results   Component Value Date    BNP 3,345 (H) 02/08/2023      Jhoana Gates PA-C

## 2023-02-23 NOTE — TELEPHONE ENCOUNTER
Received requested information from [address] [place] [address] and reviewed in depth  Patient was discharged from hospital with orders for losartan 12 5 mg BID as well as Entresto 24-26 mg BID  Per chart review of recent hospitalization, Ceferino Ezio was not being given to patient and was likely accidentally left on medication list after being price checked  Unfortunately, does not appear that this information was carried over to patient's rehab facility as he has been receiving both losartan and Entresto since being discharged  BMP from 02/22 stable from renal perspective  I contacted facility and discontinued losartan order, continued Entresto (at current dose of 24-26 mg BID), and changed SBP hold parameters for metoprolol succinate to hold if SBP less than 90 mmHg  Per nursing, these changes will take effect today at 2100  Medication list in Epic updated with these changes  Of note, patient was not discharged on standing diuretic  To see me in cardiology office tomorrow and will assess volume status/need to add back PO diuretic at that time  Confirmed appointment with RN at facility

## 2023-02-23 NOTE — TELEPHONE ENCOUNTER
Called [facility] where pt resides and spoke to nurse, requested from them to fax over labs, weights and medication list

## 2023-02-24 ENCOUNTER — OFFICE VISIT (OUTPATIENT)
Dept: CARDIOLOGY CLINIC | Facility: CLINIC | Age: 78
End: 2023-02-24

## 2023-02-24 VITALS
HEIGHT: 70 IN | BODY MASS INDEX: 23.05 KG/M2 | HEART RATE: 76 BPM | DIASTOLIC BLOOD PRESSURE: 62 MMHG | SYSTOLIC BLOOD PRESSURE: 128 MMHG | WEIGHT: 161 LBS | OXYGEN SATURATION: 94 %

## 2023-02-24 DIAGNOSIS — Z09 HOSPITAL DISCHARGE FOLLOW-UP: Primary | ICD-10-CM

## 2023-02-24 DIAGNOSIS — Z79.4 TYPE 2 DIABETES MELLITUS WITH HYPERGLYCEMIA, WITH LONG-TERM CURRENT USE OF INSULIN (HCC): ICD-10-CM

## 2023-02-24 DIAGNOSIS — I25.10 CORONARY ARTERY DISEASE INVOLVING NATIVE CORONARY ARTERY OF NATIVE HEART WITHOUT ANGINA PECTORIS: ICD-10-CM

## 2023-02-24 DIAGNOSIS — I50.22 CHRONIC HFREF (HEART FAILURE WITH REDUCED EJECTION FRACTION) (HCC): ICD-10-CM

## 2023-02-24 DIAGNOSIS — E11.65 TYPE 2 DIABETES MELLITUS WITH HYPERGLYCEMIA, WITH LONG-TERM CURRENT USE OF INSULIN (HCC): ICD-10-CM

## 2023-02-24 RX ORDER — SACUBITRIL AND VALSARTAN 49; 51 MG/1; MG/1
1 TABLET, FILM COATED ORAL 2 TIMES DAILY
Qty: 60 TABLET | Refills: 1 | Status: SHIPPED | OUTPATIENT
Start: 2023-02-24

## 2023-02-24 RX ORDER — FUROSEMIDE 20 MG/1
40 TABLET ORAL DAILY
Qty: 90 TABLET | Refills: 1 | Status: SHIPPED | OUTPATIENT
Start: 2023-02-24

## 2023-02-24 NOTE — PATIENT INSTRUCTIONS
Resumed Lasix 40 mg daily  Increase Entresto to 49-51 mg twice a day  Needs daily weights  Complete blood work in 1-2 weeks  No need to fast for this  Referral sent to [facility] family practice  Please weigh yourself every day and keep a detailed log of weights  Contact the Heart Failure program at [phone] if you gain 3 lbs overnight or 5 lbs in 5-7 days  Limit daily sodium/salt intake to 1410-7218 mg daily to prevent fluid retention  Avoid canned foods, fast food/Chinese food, and processed meats (hot dogs, lunch meat, and sausage etc )  Limit fluid intake to 2000 mL or 2L (about 60-65 ounces) daily  Bring complete list of medications and log of daily weights to your follow-up appointment

## 2023-02-28 ENCOUNTER — NURSING HOME VISIT (OUTPATIENT)
Dept: WOUND CARE | Facility: HOSPITAL | Age: 78
End: 2023-02-28

## 2023-02-28 DIAGNOSIS — E11.621 DIABETIC ULCER OF LEFT FOOT ASSOCIATED WITH TYPE 2 DIABETES MELLITUS, UNSPECIFIED PART OF FOOT, UNSPECIFIED ULCER STAGE (HCC): ICD-10-CM

## 2023-02-28 DIAGNOSIS — L97.529 DIABETIC ULCER OF LEFT FOOT ASSOCIATED WITH TYPE 2 DIABETES MELLITUS, UNSPECIFIED PART OF FOOT, UNSPECIFIED ULCER STAGE (HCC): ICD-10-CM

## 2023-02-28 DIAGNOSIS — E11.65 TYPE 2 DIABETES MELLITUS WITH HYPERGLYCEMIA, WITH LONG-TERM CURRENT USE OF INSULIN (HCC): ICD-10-CM

## 2023-02-28 DIAGNOSIS — Z79.4 TYPE 2 DIABETES MELLITUS WITH HYPERGLYCEMIA, WITH LONG-TERM CURRENT USE OF INSULIN (HCC): ICD-10-CM

## 2023-02-28 DIAGNOSIS — S91.209A AVULSION OF TOENAIL, INITIAL ENCOUNTER: ICD-10-CM

## 2023-02-28 DIAGNOSIS — R68.89 SUSPECTED DEEP TISSUE INJURY OF UNKNOWN DEPTH: Primary | ICD-10-CM

## 2023-02-28 NOTE — PROGRESS NOTES
[address] MANAGEMENT   [facility]       Patient ID: Nery Guadalupe is a 69 y o  male Date of Birth 1945     Location of Service: [place] [address]    Performed wound round with: Wound team     Chief Complaint : Left foot dorsal, left heel, left great toe    Wound Instructions:  Wound:left foot dorsal  Discontinue previous wound order  Cleanse the wound bed with NSS   Apply hydraguard to wound bed, then cover with ABD and Kerlix  Frequency : daily and prn for soiling    Refer to podiatry    Wound: Left heel and left medial toe  Cleanse the wound bed with normal saline solution  Apply Skin-Prep to periwound area  Apply calcium alginate to wound bed and covered with dry sterile dressing/ABD and Kerlex  Daily and as needed for soiling    Offload all wounds  Turn and reposition frequently,   Increase protein intake  Monitor for any sign of infection or worsening, inform PCP or patient's primary physician in your facility  Allergies  Metformin      Assessment & Plan:  1  Suspected deep tissue injury of unknown depth  Assessment & Plan:  Left heel  Started as a deep tissue injury  · Open fluid-filled blister, with no obvious sign of infection, with moderate amount of serous drainage  · Local wound care with calcium alginate  · Continue to offload  · Patient is to follow-up with podiatry      2  Diabetic ulcer of left foot associated with type 2 diabetes mellitus, unspecified part of foot, unspecified ulcer stage (HCC)  Assessment & Plan:  Wound on left foot dorsal and left great toe  -Was seen by podiatry, ordered local wound care  -The wound on the left great toe is healed  The wound on the left foot dorsal is covered with eschar, seems superficial  -Continue hydraguard      3   Type 2 diabetes mellitus with hyperglycemia, with long-term current use of insulin [facility])  Assessment & Plan:    Lab Results   Component Value Date    HGBA1C 12 0 (H) 02/08/2023   Managed by geriatric team      4  Avulsion of toenail, initial encounter  Assessment & Plan:  Left foot middle toe  -100% granulation, with no obvious sign of infection  -Local wound care with calcium alginate and Band-Aid  -Follow-up with podiatry             Subjective:   2/21/2023  This is a new consult for wound on the left heel, left foot dorsal, and left great toe  Patient was referred by geriatric team   Patient have a complex medical history including but not limited to coronary artery disease, type 2 diabetes, congestive heart failure, and weight loss  As per medical record review, patient was admitted at [facility] from February 10 to February 19, 2023  Patient was seen with the facility wound team     Wound history: As per medical record review patient was admitted with wound on left foot and right lower leg  Patient was seen by podiatry and ordered enzymatic debridement  Received patient in bed, seems comfortable  Denies pain  Patient have a poor appetite, consumes 0 to 75% of his meals  Patient currently on Prosource and Jesu supplement  Patient needs assistance with turning and repositioning when in bed  Patient can ambulate  Currently with indwelling catheter  I reviewed the note of Dr Warner Jung on February 19, 2023 2/28/2023 Follow up for wound on the left heel, left foot dorsal, and new consult for wound on the left middle toe  Received patient, not in distress  Facility staff did not report any significant issues related to the wound  As per report, patient is for discharge today  Review of Systems   Constitutional: Negative  HENT: Negative  Eyes: Negative  Respiratory: Negative  Cardiovascular: Negative  Gastrointestinal: Negative  Endocrine: Negative  Genitourinary: Negative  Musculoskeletal: Positive for gait problem  Skin: Positive for wound  See HPI   Hematological: Negative  Psychiatric/Behavioral: Negative      All other systems reviewed and are negative  Objective:    Physical Exam  Constitutional:       Appearance: Normal appearance  HENT:      Head: Normocephalic and atraumatic  Cardiovascular:      Rate and Rhythm: Normal rate  Pulmonary:      Effort: Pulmonary effort is normal    Abdominal:      Tenderness: There is no abdominal tenderness  Musculoskeletal:      Cervical back: Normal range of motion  Right lower leg: No edema  Left lower leg: No edema  Comments: + DP and PT on the left foot     Skin:     Findings: Lesion present  Comments: Left heel: Wound size is 4 5 x 10 cm ,  Fluid-filled blister, not intact, moderate amount of serous drainage, with no obvious sign of infection    Left foot dorsal: Wound size is 0 5 x 0 5 cm ,  100% eschar, no drainage, no obvious sign of infection    Left middle toe: Wound size is 3 5 x 0 5 x 0 1 cm ,  100% granulation, moderate amount of serous drainage, with no obvious sign of infection   Neurological:      Mental Status: He is alert  Gait: Gait abnormal    Psychiatric:         Mood and Affect: Mood normal          Behavior: Behavior normal               Procedures     Results from last 6 Months   Lab Units 02/11/23  0001   WOUND CULTURE  4+ Growth of Staphylococcus aureus*  4+ Growth of       Patient's care was coordinated with nursing facility staff  Recent vitals, labs and updated medications were reviewed on EMR or chart system of facility  Past Medical, surgical, social, medication and allergy history and patient's previous records were reviewed and updated as appropriate: Most up-to date information is available in the facility EMR where the patient is currently admitted      Patient Active Problem List   Diagnosis   • Hypokalemia   • Type 2 diabetes mellitus with hyperglycemia, with long-term current use of insulin (HCC)   • Hypernatremia   • Abnormal EKG   • Foot ulcer (HCC)   • Acute systolic congestive heart failure (HCC)   • Urinary retention   • Hydrocele, bilateral   • Moderate protein-calorie malnutrition (HCC)   • Unintentional weight loss   • CAD (coronary artery disease)   • Suspected deep tissue injury of unknown depth   • Diabetic ulcer of left foot associated with type 2 diabetes mellitus ([place] [address] )   • Toenail avulsion     Past Medical History:   Diagnosis Date   • 6th nerve palsy    • Blister of right leg    • BPH (benign prostatic hyperplasia)    • CAD (coronary artery disease)    • Cardiomyopathy ([place] [address] )     EF 25%   • CHF (congestive heart failure) ([facility])    • Depression    • Diabetes mellitus ([place] [address] )     type 2, insulin dependent   • Diabetic foot ulcer ([place] [address] )     left, local wound care   • Diabetic retinopathy ([place] [address] )    • Exposure to Agent Orange     while serving in [facility]   • Former tobacco use    • H/O urinary retention     w/ reagan placement   • Hyperlipidemia    • Hypertension    • Rupture of biceps tendon, traumatic 2017    right     Past Surgical History:   Procedure Laterality Date   • CARDIAC CATHETERIZATION     • CARDIAC CATHETERIZATION Left 2023    Procedure: Cardiac Left Heart Cath;  Surgeon: Ryan Ellsworth MD;  Location: [facility] CARDIAC CATH LAB; Service: Cardiology   • CARDIAC CATHETERIZATION N/A 2023    Procedure: Cardiac Coronary Angiogram;  Surgeon: Ryan Ellsworth MD;  Location: [facility] CARDIAC CATH LAB;   Service: Cardiology   • CATARACT EXTRACTION, BILATERAL Bilateral      Social History     Socioeconomic History   • Marital status:      Spouse name: None   • Number of children: 2   • Years of education: None   • Highest education level: None   Occupational History   • None   Tobacco Use   • Smoking status: Former     Packs/day: 1 00     Years: 6 00     Pack years: 6 00     Types: Cigarettes     Start date:      Quit date:      Years since quittin 1   • Smokeless tobacco: Never   Vaping Use   • Vaping Use: Never used   Substance and Sexual Activity   • Alcohol use: Not Currently     Comment: Denies history of heavy alcohol use  At most will drink 1 or 2 drinks in a year (Updated 02/2023)  • Drug use: Never   • Sexual activity: None   Other Topics Concern   • None   Social History Narrative    Served in the [employer]; completed 18-month tour in [facility]     Previously worked for [employer]  Lives alone with his dog  Has a son and a daughter who both live into [place], [place] -does not maintain a close relationship with them  Denies having strong support system in place at home  Social Determinants of Health     Financial Resource Strain: Not on file   Food Insecurity: No Food Insecurity   • Worried About [address] in the Last Year: Never true   • Ran Out of Food in the Last Year: Never true   Transportation Needs: No Transportation Needs   • Lack of Transportation (Medical): No   • Lack of Transportation (Non-Medical):  No   Physical Activity: Not on file   Stress: Not on file   Social Connections: Not on file   Intimate Partner Violence: Not on file   Housing Stability: Low Risk    • Unable to Pay for Housing in the Last Year: No   • Number of Places Lived in the Last Year: 1   • Unstable Housing in the Last Year: No        Current Outpatient Medications:   •  acetaminophen (TYLENOL) 325 mg tablet, Take 2 tablets (650 mg total) by mouth every 6 (six) hours as needed (mild pain), Disp: 30 tablet, Rfl: 0  •  aspirin (ECOTRIN LOW STRENGTH) 81 mg EC tablet, Take 1 tablet (81 mg total) by mouth daily Do not start before February 20, 2023 , Disp: 30 tablet, Rfl: 0  •  atorvastatin (LIPITOR) 40 mg tablet, Take 1 tablet (40 mg total) by mouth daily with dinner, Disp: 30 tablet, Rfl: 0  •  calcium carbonate (TUMS) 500 mg chewable tablet, Chew 2 tablets (1,000 mg total) daily as needed (indigestion,heartburn), Disp: 30 tablet, Rfl: 0  •  collagenase (SANTYL) ointment, Apply topically daily Do not start before February 20, 2023 , Disp: 15 g, Rfl: 0  •  furosemide (LASIX) 20 mg tablet, Take 2 tablets (40 mg total) by mouth daily, Disp: 90 tablet, Rfl: 1  •  insulin glargine (LANTUS) 100 units/mL subcutaneous injection, Inject 7 Units under the skin daily at bedtime, Disp: 10 mL, Rfl: 0  •  insulin lispro (HumaLOG) 100 units/mL injection, Inject 4 Units under the skin daily with breakfast Do not start before February 20, 2023 , Disp: 10 mL, Rfl: 0  •  insulin lispro (HumaLOG) 100 units/mL injection, Inject 4 Units under the skin daily with lunch Do not start before February 20, 2023 , Disp: 3 mL, Rfl: 0  •  insulin lispro (HumaLOG) 100 units/mL injection, Inject 7 Units under the skin daily with dinner, Disp: 3 mL, Rfl: 0  •  metoprolol succinate (TOPROL-XL) 50 mg 24 hr tablet, Take 1 tablet (50 mg total) by mouth every 12 (twelve) hours, Disp: 30 tablet, Rfl: 0  •  sacubitril-valsartan (Entresto) 49-51 MG TABS, Take 1 tablet by mouth 2 (two) times a day, Disp: 60 tablet, Rfl: 1  •  spironolactone (ALDACTONE) 25 mg tablet, Take 0 5 tablets (12 5 mg total) by mouth 2 (two) times a day, Disp: 30 tablet, Rfl: 0  •  tamsulosin (FLOMAX) 0 4 mg, Take 1 capsule (0 4 mg total) by mouth daily with dinner, Disp: 30 capsule, Rfl: 0  Family History   Problem Relation Age of Onset   • No Known Problems Sister    • Other Daughter         chronic neck pain   • Other Son         chronic back pain   • Sudden death Neg Hx    • Cancer Neg Hx               Coordination of Care: Wound team aware of the treatment plan  Facility nurse will provide wound treatment and monitor the wound for any changes  Patient / Staff education : Patient / Staff was given education on sign of infection and pressure ulcer prevention  Patient/ Staff verbalized understanding     Follow up :  Sign off  Patient to continue to follow-up with podiatry    Voice-recognition software may have been used in the preparation of this document   Occasional wrong word or "sound-alike" substitutions may have occurred due to the inherent limitations of voice recognition software  Interpretation should be guided by context        JANIS Mcgraw

## 2023-03-01 NOTE — ASSESSMENT & PLAN NOTE
Left foot middle toe  -100% granulation, with no obvious sign of infection  -Local wound care with calcium alginate and Band-Aid  -Follow-up with podiatry

## 2023-03-01 NOTE — ASSESSMENT & PLAN NOTE
Wound on left foot dorsal and left great toe  -Was seen by podiatry, ordered local wound care  -The wound on the left great toe is healed    The wound on the left foot dorsal is covered with eschar, seems superficial  -Continue hydraguard

## 2023-03-01 NOTE — ASSESSMENT & PLAN NOTE
Left heel  Started as a deep tissue injury  · Open fluid-filled blister, with no obvious sign of infection, with moderate amount of serous drainage  · Local wound care with calcium alginate  · Continue to offload  · Patient is to follow-up with podiatry

## 2023-03-02 ENCOUNTER — OFFICE VISIT (OUTPATIENT)
Dept: PODIATRY | Facility: CLINIC | Age: 78
End: 2023-03-02

## 2023-03-02 VITALS — HEIGHT: 70 IN | WEIGHT: 161 LBS | BODY MASS INDEX: 23.05 KG/M2

## 2023-03-02 DIAGNOSIS — E11.649 UNCONTROLLED TYPE 2 DIABETES MELLITUS WITH HYPOGLYCEMIA WITHOUT COMA (HCC): ICD-10-CM

## 2023-03-02 DIAGNOSIS — L97.529 ULCER OF LEFT FOOT DUE TO TYPE 2 DIABETES MELLITUS (HCC): ICD-10-CM

## 2023-03-02 DIAGNOSIS — S90.822A BLISTER OF LEFT HEEL, INITIAL ENCOUNTER: ICD-10-CM

## 2023-03-02 DIAGNOSIS — E11.621 ULCER OF LEFT FOOT DUE TO TYPE 2 DIABETES MELLITUS (HCC): ICD-10-CM

## 2023-03-02 DIAGNOSIS — L97.412 SKIN ULCER OF RIGHT HEEL WITH FAT LAYER EXPOSED (HCC): Primary | ICD-10-CM

## 2023-03-02 DIAGNOSIS — L97.421 SKIN ULCER OF LEFT HEEL, LIMITED TO BREAKDOWN OF SKIN (HCC): ICD-10-CM

## 2023-03-02 DIAGNOSIS — L97.522 SKIN ULCER OF TOE OF LEFT FOOT WITH FAT LAYER EXPOSED (HCC): ICD-10-CM

## 2023-03-02 NOTE — PROGRESS NOTES
Diabetic Foot Exam    Patient's shoes and socks removed  Right Foot/Ankle   Right Foot Inspection  Skin Exam: erythema, maceration and ulcer  Skin not intact  Sensory   Vibration: absent  Proprioception: absent  Monofilament testing: absent    Vascular  Capillary refills: elevated  The right DP pulse is 1+  The right PT pulse is 0  Left Foot/Ankle  Left Foot Inspection  Skin Exam: dry skin, erythema, maceration and ulcer  Skin not intact  Sensory   Vibration: absent  Proprioception: absent  Monofilament testing: absent    Vascular  Capillary refills: elevated  The left DP pulse is 1+  The left PT pulse is 0  Assign Risk Category  Deformity present  Loss of protective sensation  Weak pulses  Risk: 3            PATIENT:  Dallas Rinne    1945    ASSESSMENT:     1  Skin ulcer of right heel with fat layer exposed ([address] )        2  Skin ulcer of left heel, limited to breakdown of skin ([address] )        3  Blister of left heel, initial encounter        4  Skin ulcer of toe of left foot with fat layer exposed ([address] )        5  Ulcer of left foot due to type 2 diabetes mellitus ([address] )              PLAN:  1  Patient was counseled on the condition and diagnosis  2   Educated disease prevention and risks related to diabetes  3   Educated proper daily foot care and exam   Instructed proper skin care / protection and footwear  Instructed to identify any signs of infection and related foot problem  4   The recent blood work was reviewed / discussed and the last HbA1c was 12  Discussed proper blood glucose control with diet and exercise      5   The patient will return in 12 months for diabetic foot exam       Imaging: I have personally reviewed pertinent films in PACS  Labs, pathology, and Other Studies: I have personally reviewed pertinent reports       -Patient is very high risk for proximal amputation due to the nature of his uncontrolled diabetes and significant peripheral arterial disease  - We will obtain arterial studies to rule out stenosis bilateral lower EXTR    - I do not anticipate that he is going to have significant interval healing unless his underlying vascularity is addressed,  - Due to the presence of bilateral heel ulcerations, uncontrolled diabetes and likely peripheral arterial disease he is at high risk for proximal amputation  - Previous admission from bedtime were reviewed podiatry was consulted and just perform local wound care with no evidence of arterial work-up  - X-rays deferred for now, but will also CBC, ESR and CRP for inflammation  - Advised by daily Betadine changes at the nursing home  - He is weightbearing as tolerated bilateral feet but he is to offload all areas at all times with pillows, he is to currently minimize his ambulation as he is causing overload of bilateral heels  - If he does experience any nausea vomiting fever chills he is immediately report to the hospital hospital for continued work-up of infection  -Last A1c is up from 9 2-12  - I discussed that he is placing himself at risk for severe complications from diabetes and lack of healing due to elevated blood sugars  -We discussed at length that his lack of PCP, not taking care of himself at home with increased A1c lack of medications and currently being in the nursing home are all complicating factors of his healthcare  Former smoker also contributing to severe arterial disease      Subjective:          Patient presents for evaluation and management of his bilateral feet, and also lower extremity wounds  He is currently in a nursing home reports wounds are present on admission to the nursing home bilateral heels and also area on the top of his left foot and also of the left second digit and also the outside of his right foot as well  They are keeping these areas covered  But states that he has not seen a primary care doctor in the past 10 years given that he has been diabetic    He was recently admitted to [place] and states he did not have any arterial studies performed    The patient presents for diabetic foot evaluation  The patient has diabetes for 10 years  The blood glucose is under control and the last HbA1c was 12  The following portions of the patient's history were reviewed and updated as appropriate: allergies, current medications, past family history, past medical history, past social history, past surgical history and problem list   All pertinent labs and images were reviewed  Past Medical History  Past Medical History:   Diagnosis Date   • 6th nerve palsy    • Blister of right leg    • BPH (benign prostatic hyperplasia)    • CAD (coronary artery disease)    • Cardiomyopathy ([facility])     EF 25%   • CHF (congestive heart failure) ([facility])    • Depression    • Diabetes mellitus ([facility] )     type 2, insulin dependent   • Diabetic foot ulcer ([facility] )     left, local wound care   • Diabetic retinopathy ([facility] )    • Exposure to Agent Orange     while serving in [facility]   • Former tobacco use    • H/O urinary retention     w/ [facility]agan placement   • Hyperlipidemia    • Hypertension    • Rupture of biceps tendon, traumatic 2017    right       Past Surgical History  Past Surgical History:   Procedure Laterality Date   • CARDIAC CATHETERIZATION     • CARDIAC CATHETERIZATION Left 2/14/2023    Procedure: Cardiac Left Heart Cath;  Surgeon: Miki Gotti MD;  Location: [facility] CARDIAC CATH LAB; Service: Cardiology   • CARDIAC CATHETERIZATION N/A 2/14/2023    Procedure: Cardiac Coronary Angiogram;  Surgeon: Miki Gotti MD;  Location: [facility] CARDIAC CATH LAB;   Service: Cardiology   • CATARACT EXTRACTION, BILATERAL Bilateral 2021        Allergies:  Metformin    Medications:  Current Outpatient Medications   Medication Sig Dispense Refill   • acetaminophen (TYLENOL) 325 mg tablet Take 2 tablets (650 mg total) by mouth every 6 (six) hours as needed (mild pain) 30 tablet 0   • aspirin (ECOTRIN LOW STRENGTH) 81 mg EC tablet Take 1 tablet (81 mg total) by mouth daily Do not start before February 20, 2023  30 tablet 0   • atorvastatin (LIPITOR) 40 mg tablet Take 1 tablet (40 mg total) by mouth daily with dinner 30 tablet 0   • calcium carbonate (TUMS) 500 mg chewable tablet Chew 2 tablets (1,000 mg total) daily as needed (indigestion,heartburn) 30 tablet 0   • collagenase (SANTYL) ointment Apply topically daily Do not start before February 20, 2023  15 g 0   • furosemide (LASIX) 20 mg tablet Take 2 tablets (40 mg total) by mouth daily 90 tablet 1   • insulin glargine (LANTUS) 100 units/mL subcutaneous injection Inject 7 Units under the skin daily at bedtime 10 mL 0   • insulin lispro (HumaLOG) 100 units/mL injection Inject 4 Units under the skin daily with breakfast Do not start before February 20, 2023  10 mL 0   • insulin lispro (HumaLOG) 100 units/mL injection Inject 4 Units under the skin daily with lunch Do not start before February 20, 2023  3 mL 0   • insulin lispro (HumaLOG) 100 units/mL injection Inject 7 Units under the skin daily with dinner 3 mL 0   • metoprolol succinate (TOPROL-XL) 50 mg 24 hr tablet Take 1 tablet (50 mg total) by mouth every 12 (twelve) hours 30 tablet 0   • sacubitril-valsartan (Entresto) 49-51 MG TABS Take 1 tablet by mouth 2 (two) times a day 60 tablet 1   • spironolactone (ALDACTONE) 25 mg tablet Take 0 5 tablets (12 5 mg total) by mouth 2 (two) times a day 30 tablet 0   • tamsulosin (FLOMAX) 0 4 mg Take 1 capsule (0 4 mg total) by mouth daily with dinner 30 capsule 0     No current facility-administered medications for this visit         Social History:  Social History     Socioeconomic History   • Marital status:      Spouse name: None   • Number of children: 2   • Years of education: None   • Highest education level: None   Occupational History   • None   Tobacco Use   • Smoking status: Former     Packs/day: 1 00     Years: 6 00     Pack years: 6 00     Types: Cigarettes     Start date: 1970 Quit date: 12     Years since quittin 1   • Smokeless tobacco: Never   Vaping Use   • Vaping Use: Never used   Substance and Sexual Activity   • Alcohol use: Not Currently     Comment: Denies history of heavy alcohol use  At most will drink 1 or 2 drinks in a year (Updated 2023)  • Drug use: Never   • Sexual activity: None   Other Topics Concern   • None   Social History Narrative    Served in the [employer]; completed 18-month tour in [facility]     Previously worked for [employer]  Lives alone with his dog  Has a son and a daughter who both live into [place], [place] -does not maintain a close relationship with them  Denies having strong support system in place at home  Social Determinants of Health     Financial Resource Strain: Not on file   Food Insecurity: No Food Insecurity   • Worried About [address] in the Last Year: Never true   • Ran Out of Food in the Last Year: Never true   Transportation Needs: No Transportation Needs   • Lack of Transportation (Medical): No   • Lack of Transportation (Non-Medical): No   Physical Activity: Not on file   Stress: Not on file   Social Connections: Not on file   Intimate Partner Violence: Not on file   Housing Stability: Low Risk    • Unable to Pay for Housing in the Last Year: No   • Number of Places Lived in the Last Year: 1   • Unstable Housing in the Last Year: No        Review of Systems   Constitutional: Negative for chills and fever  HENT: Negative for ear pain and sore throat  Eyes: Negative for pain and visual disturbance  Respiratory: Negative for cough and shortness of breath  Cardiovascular: Negative for chest pain and palpitations  Gastrointestinal: Negative for abdominal pain and vomiting  Genitourinary: Negative for dysuria and hematuria  Musculoskeletal: Negative for arthralgias and back pain  Skin: Negative for color change and rash  Neurological: Negative for seizures and syncope     All other systems reviewed and are negative  Objective:      Ht 5' 10" (1 778 m)   Wt 73 kg (161 lb)   BMI 23 10 kg/m²          Physical Exam  Cardiovascular:      Pulses: Pulses are weak  Dorsalis pedis pulses are 1+ on the right side and 1+ on the left side  Posterior tibial pulses are 0 on the right side and 0 on the left side  Musculoskeletal:      Comments: There are malodorous wounds bilaterally with some component of necrosis, no probe to bone or any visible wounds, there is a blister on the left heel with wound not intact, serous drainage limited to skin breakdown  There is an ulceration on the right posterior aspect of the heel with severe necrotic tissue unchanged with some eschar formation    There is also an ulceration with missing left third digit toenail and this area is erythematous and somewhat malodorous but no appropriate development  Minimal soft tissue swelling but dependent rubor and warmth    There is also a deep tissue injury on the lateral aspect of the right foot as well  Feet:      Right foot:      Skin integrity: Ulcer, skin breakdown and erythema present  Left foot:      Skin integrity: Ulcer, skin breakdown, erythema and dry skin present

## 2023-03-03 ENCOUNTER — TELEPHONE (OUTPATIENT)
Dept: PODIATRY | Facility: CLINIC | Age: 78
End: 2023-03-03

## 2023-03-03 NOTE — TELEPHONE ENCOUNTER
Caller: [facility]    Doctor: Mandie Ramirez    Reason for call: Please call with orders for dressing change/care until pt is seen in [facility] (THEY HAVE NOT CALLED W/ APPT YET)    Call back#: [phone]

## 2023-03-03 NOTE — TELEPHONE ENCOUNTER
NICOLEA called to see if Dr Laurie Puckett referred Ihsan Prattd to a PCP after checking his note I said he did not but I did see a referral from Cardiology to PCP - Dr Carrasquillo Greenhouse

## 2023-03-07 ENCOUNTER — OFFICE VISIT (OUTPATIENT)
Dept: FAMILY MEDICINE CLINIC | Facility: CLINIC | Age: 78
End: 2023-03-07

## 2023-03-07 ENCOUNTER — TELEPHONE (OUTPATIENT)
Dept: PODIATRY | Facility: CLINIC | Age: 78
End: 2023-03-07

## 2023-03-07 VITALS
OXYGEN SATURATION: 95 % | HEART RATE: 84 BPM | SYSTOLIC BLOOD PRESSURE: 136 MMHG | WEIGHT: 155 LBS | HEIGHT: 70 IN | BODY MASS INDEX: 22.19 KG/M2 | TEMPERATURE: 97.6 F | DIASTOLIC BLOOD PRESSURE: 80 MMHG

## 2023-03-07 DIAGNOSIS — E11.621 DIABETIC ULCER OF LEFT FOOT ASSOCIATED WITH TYPE 2 DIABETES MELLITUS, UNSPECIFIED PART OF FOOT, UNSPECIFIED ULCER STAGE (HCC): ICD-10-CM

## 2023-03-07 DIAGNOSIS — E11.65 TYPE 2 DIABETES MELLITUS WITH HYPERGLYCEMIA, WITH LONG-TERM CURRENT USE OF INSULIN (HCC): ICD-10-CM

## 2023-03-07 DIAGNOSIS — I50.21 ACUTE SYSTOLIC CONGESTIVE HEART FAILURE (HCC): ICD-10-CM

## 2023-03-07 DIAGNOSIS — L97.529 DIABETIC ULCER OF LEFT FOOT ASSOCIATED WITH TYPE 2 DIABETES MELLITUS, UNSPECIFIED PART OF FOOT, UNSPECIFIED ULCER STAGE (HCC): ICD-10-CM

## 2023-03-07 DIAGNOSIS — I25.83 CORONARY ARTERY DISEASE DUE TO LIPID RICH PLAQUE: ICD-10-CM

## 2023-03-07 DIAGNOSIS — I25.10 CAD (CORONARY ARTERY DISEASE): ICD-10-CM

## 2023-03-07 DIAGNOSIS — I25.10 CORONARY ARTERY DISEASE DUE TO LIPID RICH PLAQUE: ICD-10-CM

## 2023-03-07 DIAGNOSIS — Z79.4 TYPE 2 DIABETES MELLITUS WITH HYPERGLYCEMIA, WITH LONG-TERM CURRENT USE OF INSULIN (HCC): ICD-10-CM

## 2023-03-07 DIAGNOSIS — E44.0 MODERATE PROTEIN-CALORIE MALNUTRITION (HCC): ICD-10-CM

## 2023-03-07 DIAGNOSIS — I50.22 CHRONIC HFREF (HEART FAILURE WITH REDUCED EJECTION FRACTION) (HCC): ICD-10-CM

## 2023-03-07 DIAGNOSIS — I25.5 ISCHEMIC CARDIOMYOPATHY: Primary | ICD-10-CM

## 2023-03-07 DIAGNOSIS — Z76.89 ENCOUNTER FOR SUPPORT AND COORDINATION OF TRANSITION OF CARE: ICD-10-CM

## 2023-03-07 PROBLEM — E87.0 HYPERNATREMIA: Status: RESOLVED | Noted: 2021-06-24 | Resolved: 2023-03-07

## 2023-03-07 PROBLEM — R33.9 URINARY RETENTION: Status: RESOLVED | Noted: 2023-02-08 | Resolved: 2023-03-07

## 2023-03-07 LAB — SL AMB POCT HEMOGLOBIN AIC: 10.5 (ref ?–6.5)

## 2023-03-07 RX ORDER — ATORVASTATIN CALCIUM 40 MG/1
40 TABLET, FILM COATED ORAL
Qty: 30 TABLET | Refills: 0 | Status: SHIPPED | OUTPATIENT
Start: 2023-03-07

## 2023-03-07 RX ORDER — METOPROLOL SUCCINATE 50 MG/1
50 TABLET, EXTENDED RELEASE ORAL EVERY 12 HOURS SCHEDULED
Qty: 30 TABLET | Refills: 0 | Status: SHIPPED | OUTPATIENT
Start: 2023-03-07 | End: 2023-03-07 | Stop reason: SDUPTHER

## 2023-03-07 RX ORDER — SACUBITRIL AND VALSARTAN 49; 51 MG/1; MG/1
1 TABLET, FILM COATED ORAL 2 TIMES DAILY
Qty: 60 TABLET | Refills: 1 | Status: SHIPPED | OUTPATIENT
Start: 2023-03-07 | End: 2023-03-07 | Stop reason: SDUPTHER

## 2023-03-07 RX ORDER — METOPROLOL SUCCINATE 50 MG/1
50 TABLET, EXTENDED RELEASE ORAL EVERY 12 HOURS SCHEDULED
Qty: 30 TABLET | Refills: 0 | Status: SHIPPED | OUTPATIENT
Start: 2023-03-07

## 2023-03-07 RX ORDER — FUROSEMIDE 20 MG/1
40 TABLET ORAL DAILY
Qty: 90 TABLET | Refills: 1 | Status: SHIPPED | OUTPATIENT
Start: 2023-03-07 | End: 2023-03-07 | Stop reason: SDUPTHER

## 2023-03-07 RX ORDER — SPIRONOLACTONE 25 MG/1
12.5 TABLET ORAL 2 TIMES DAILY
Qty: 30 TABLET | Refills: 0 | Status: SHIPPED | OUTPATIENT
Start: 2023-03-07

## 2023-03-07 RX ORDER — PEN NEEDLE, DIABETIC 30 GX3/16"
NEEDLE, DISPOSABLE MISCELLANEOUS 2 TIMES DAILY
Qty: 100 EACH | Refills: 1 | Status: SHIPPED | OUTPATIENT
Start: 2023-03-07

## 2023-03-07 RX ORDER — INSULIN LISPRO 100 [IU]/ML
4 INJECTION, SOLUTION INTRAVENOUS; SUBCUTANEOUS
Qty: 10 ML | Refills: 0 | Status: SHIPPED | OUTPATIENT
Start: 2023-03-07

## 2023-03-07 RX ORDER — FUROSEMIDE 20 MG/1
40 TABLET ORAL DAILY
Qty: 90 TABLET | Refills: 1 | Status: SHIPPED | OUTPATIENT
Start: 2023-03-07

## 2023-03-07 RX ORDER — ASPIRIN 81 MG/1
81 TABLET ORAL DAILY
Qty: 30 TABLET | Refills: 0 | Status: SHIPPED | OUTPATIENT
Start: 2023-03-07

## 2023-03-07 RX ORDER — SPIRONOLACTONE 25 MG/1
12.5 TABLET ORAL 2 TIMES DAILY
Qty: 30 TABLET | Refills: 0 | Status: SHIPPED | OUTPATIENT
Start: 2023-03-07 | End: 2023-03-07 | Stop reason: SDUPTHER

## 2023-03-07 RX ORDER — TAMSULOSIN HYDROCHLORIDE 0.4 MG/1
0.4 CAPSULE ORAL
Qty: 30 CAPSULE | Refills: 0 | Status: SHIPPED | OUTPATIENT
Start: 2023-03-07 | End: 2023-03-07 | Stop reason: SDUPTHER

## 2023-03-07 RX ORDER — TAMSULOSIN HYDROCHLORIDE 0.4 MG/1
0.4 CAPSULE ORAL
Qty: 30 CAPSULE | Refills: 0 | Status: SHIPPED | OUTPATIENT
Start: 2023-03-07

## 2023-03-07 RX ORDER — PEN NEEDLE, DIABETIC 30 GX3/16"
NEEDLE, DISPOSABLE MISCELLANEOUS 2 TIMES DAILY
Qty: 100 EACH | Refills: 1 | Status: SHIPPED | OUTPATIENT
Start: 2023-03-07 | End: 2023-03-07 | Stop reason: SDUPTHER

## 2023-03-07 RX ORDER — SACUBITRIL AND VALSARTAN 49; 51 MG/1; MG/1
1 TABLET, FILM COATED ORAL 2 TIMES DAILY
Qty: 60 TABLET | Refills: 1 | Status: SHIPPED | OUTPATIENT
Start: 2023-03-07

## 2023-03-07 RX ORDER — ATORVASTATIN CALCIUM 40 MG/1
40 TABLET, FILM COATED ORAL
Qty: 30 TABLET | Refills: 0 | Status: SHIPPED | OUTPATIENT
Start: 2023-03-07 | End: 2023-03-07 | Stop reason: SDUPTHER

## 2023-03-07 NOTE — TELEPHONE ENCOUNTER
Caller: [facility]    Doctor/Office: Dr Prince Quintanilla    #: [phone]    Escalation: Care/review by the homecare nurse asking for new orders saying Betadine w/ calcium Alginate/wrap heels with ABD/curlex/ace wrap for light compression  Please call Joel Blevins back and give verbal OK and she will write order and fax to office for Dr signature/OK   Thanks

## 2023-03-07 NOTE — PROGRESS NOTES
Assessment/Plan:      Diagnoses and all orders for this visit:    Ischemic cardiomyopathy  -     Ambulatory Referral to [facility]; Future  -     Ambulatory Referral to [facility]; Future    Encounter for support and coordination of transition of care    Type 2 diabetes mellitus with hyperglycemia, with long-term current use of insulin ([facility] )  -     POCT hemoglobin A1c  -     Ambulatory Referral to [facility]; Future  -     Insulin Pen Needle (Pen Needles) 31G X 5 MM MISC; Use 2 (two) times a day    Moderate protein-calorie malnutrition ([facility] )  -     Ambulatory Referral to [facility]; Future    Diabetic ulcer of left foot associated with type 2 diabetes mellitus, unspecified part of foot, unspecified ulcer stage [facility])  -     Ambulatory Referral to [facility]; Future    Acute systolic congestive heart failure [facility])  -     Ambulatory Referral to [facility]; Future    Coronary artery disease due to lipid rich plaque  -     Ambulatory Referral to [facility]; Future    CAD (coronary artery disease)  -     aspirin (ECOTRIN LOW STRENGTH) 81 mg EC tablet; Take 1 tablet (81 mg total) by mouth daily  -     metoprolol succinate (TOPROL-XL) 50 mg 24 hr tablet; Take 1 tablet (50 mg total) by mouth every 12 (twelve) hours  -     atorvastatin (LIPITOR) 40 mg tablet; Take 1 tablet (40 mg total) by mouth daily with dinner  -     spironolactone (ALDACTONE) 25 mg tablet; Take 0 5 tablets (12 5 mg total) by mouth 2 (two) times a day  -     tamsulosin (FLOMAX) 0 4 mg; Take 1 capsule (0 4 mg total) by mouth daily with dinner    Chronic HFrEF (heart failure with reduced ejection fraction) ([facility])  -     furosemide (LASIX) 20 mg tablet; Take 2 tablets (40 mg total) by mouth daily  -     sacubitril-valsartan (Entresto) 49-51 MG TABS; Take 1 tablet by mouth 2 (two) times a day         Subjective:     Patient ID: Pb Stokes is a 69 y o  male     Patient recently hospitalized at [facility] and then at the [address] [place] [address] for ischemic cardiomyopathy 25% ejection fraction heel ulcer protein calorie now malnutrition insulin-dependent diabetes mellitus poorly controlled      Review of Systems   Constitutional: Positive for activity change  Negative for appetite change, diaphoresis, fatigue and fever  HENT: Negative  Negative for hearing loss  Eyes: Positive for visual disturbance  Respiratory: Positive for shortness of breath  Negative for apnea, cough, chest tightness and wheezing  Cardiovascular: Positive for leg swelling  Negative for chest pain and palpitations  Gastrointestinal: Negative for abdominal distention, abdominal pain, anal bleeding, constipation, diarrhea, nausea and vomiting  Endocrine: Negative for cold intolerance, heat intolerance, polydipsia, polyphagia and polyuria  Genitourinary: Positive for difficulty urinating  Negative for dysuria, flank pain, hematuria and urgency  Musculoskeletal: Negative for arthralgias, back pain, gait problem, joint swelling and myalgias  Skin: Negative for color change, rash and wound  Allergic/Immunologic: Negative for environmental allergies, food allergies and immunocompromised state  Neurological: Positive for light-headedness  Negative for dizziness, seizures, syncope, speech difficulty, numbness and headaches  Hematological: Negative for adenopathy  Does not bruise/bleed easily  Psychiatric/Behavioral: Negative for agitation, behavioral problems, hallucinations, sleep disturbance and suicidal ideas  Objective:     Physical Exam  Constitutional:       General: He is not in acute distress  Appearance: He is well-developed  He is ill-appearing  He is not diaphoretic  HENT:      Head: Normocephalic  Right Ear: External ear normal       Left Ear: External ear normal       Nose: Nose normal    Eyes:      General: No scleral icterus          Right eye: No discharge  Left eye: No discharge  Conjunctiva/sclera: Conjunctivae normal       Pupils: Pupils are equal, round, and reactive to light  Neck:      Thyroid: No thyromegaly  Trachea: No tracheal deviation  Cardiovascular:      Rate and Rhythm: Normal rate and regular rhythm  Heart sounds: Murmur heard  No friction rub  No gallop  Pulmonary:      Effort: Pulmonary effort is normal  No respiratory distress  Breath sounds: Normal breath sounds  No wheezing  Abdominal:      General: Bowel sounds are normal       Palpations: Abdomen is soft  There is no mass  Tenderness: There is no abdominal tenderness  There is no guarding  Musculoskeletal:         General: Swelling present  No deformity  Cervical back: Normal range of motion  Right lower leg: Edema present  Left lower leg: Edema present  Lymphadenopathy:      Cervical: No cervical adenopathy  Skin:     General: Skin is warm and dry  Findings: No erythema or rash  Neurological:      Mental Status: He is alert and oriented to person, place, and time  Cranial Nerves: No cranial nerve deficit  Psychiatric:         Thought Content:  Thought content normal            Vitals:    03/07/23 1307   BP: 136/80   BP Location: Left arm   Patient Position: Sitting   Cuff Size: Standard   Pulse: 84   Temp: 97 6 °F (36 4 °C)   TempSrc: Temporal   SpO2: 95%   Weight: 70 3 kg (155 lb)   Height: 5' 10" (1 778 m)       The following portions of the patient's history were reviewed and updated as appropriate: He  has a past medical history of 6th nerve palsy, Blister of right leg, BPH (benign prostatic hyperplasia), CAD (coronary artery disease), Cardiomyopathy ([address] ), CHF (congestive heart failure) ([address] ), Depression, Diabetes mellitus ([address] ), Diabetic foot ulcer ([address] ), Diabetic retinopathy ([address] ), Exposure to [employer], Former tobacco use, H/O urinary retention, Hyperlipidemia, Hypertension, and Rupture of biceps tendon, traumatic (2017)  He   Patient Active Problem List    Diagnosis Date Noted   • Toenail avulsion 02/28/2023   • Suspected deep tissue injury of unknown depth 02/21/2023   • Diabetic ulcer of left foot associated with type 2 diabetes mellitus ([place] [address] ) 02/21/2023   • CAD (coronary artery disease) 02/14/2023   • Unintentional weight loss 02/11/2023   • Moderate protein-calorie malnutrition ([place] [address] ) 02/10/2023   • Foot ulcer ([place] [address] ) 12/44/8312   • Acute systolic congestive heart failure ([place] [address] ) 02/08/2023   • Abnormal EKG 06/24/2021   • Hypokalemia 06/23/2021   • Type 2 diabetes mellitus with hyperglycemia, with long-term current use of insulin ([place] [address] ) 06/23/2021     He  has a past surgical history that includes Cataract extraction, bilateral (Bilateral, 2021); Cardiac catheterization; Cardiac catheterization (Left, 2/14/2023); and Cardiac catheterization (N/A, 2/14/2023)  His family history includes No Known Problems in his sister; Other in his daughter and son  He  reports that he quit smoking about 47 years ago  His smoking use included cigarettes  He started smoking about 53 years ago  He has a 6 00 pack-year smoking history  He has never used smokeless tobacco  He reports that he does not currently use alcohol  He reports that he does not use drugs    Current Outpatient Medications   Medication Sig Dispense Refill   • aspirin (ECOTRIN LOW STRENGTH) 81 mg EC tablet Take 1 tablet (81 mg total) by mouth daily 30 tablet 0   • atorvastatin (LIPITOR) 40 mg tablet Take 1 tablet (40 mg total) by mouth daily with dinner 30 tablet 0   • furosemide (LASIX) 20 mg tablet Take 2 tablets (40 mg total) by mouth daily 90 tablet 1   • Insulin Pen Needle (Pen Needles) 31G X 5 MM MISC Use 2 (two) times a day 100 each 1   • metoprolol succinate (TOPROL-XL) 50 mg 24 hr tablet Take 1 tablet (50 mg total) by mouth every 12 (twelve) hours 30 tablet 0   • sacubitril-valsartan (Entresto) 49-51 MG TABS Take 1 tablet by mouth 2 (two) times a day 60 tablet 1   • spironolactone (ALDACTONE) 25 mg tablet Take 0 5 tablets (12 5 mg total) by mouth 2 (two) times a day 30 tablet 0   • tamsulosin (FLOMAX) 0 4 mg Take 1 capsule (0 4 mg total) by mouth daily with dinner 30 capsule 0   • acetaminophen (TYLENOL) 325 mg tablet Take 2 tablets (650 mg total) by mouth every 6 (six) hours as needed (mild pain) (Patient not taking: Reported on 3/7/2023) 30 tablet 0   • calcium carbonate (TUMS) 500 mg chewable tablet Chew 2 tablets (1,000 mg total) daily as needed (indigestion,heartburn) (Patient not taking: Reported on 3/7/2023) 30 tablet 0   • collagenase (SANTYL) ointment Apply topically daily Do not start before February 20, 2023  (Patient not taking: Reported on 3/7/2023) 15 g 0   • insulin glargine (LANTUS) 100 units/mL subcutaneous injection Inject 7 Units under the skin daily at bedtime (Patient not taking: Reported on 3/7/2023) 10 mL 0   • insulin lispro (HumaLOG) 100 units/mL injection Inject 4 Units under the skin daily with breakfast Do not start before February 20, 2023  (Patient not taking: Reported on 3/7/2023) 10 mL 0   • insulin lispro (HumaLOG) 100 units/mL injection Inject 4 Units under the skin daily with lunch Do not start before February 20, 2023  (Patient not taking: Reported on 3/7/2023) 3 mL 0   • insulin lispro (HumaLOG) 100 units/mL injection Inject 7 Units under the skin daily with dinner (Patient not taking: Reported on 3/7/2023) 3 mL 0     No current facility-administered medications for this visit       Current Outpatient Medications on File Prior to Visit   Medication Sig   • [DISCONTINUED] atorvastatin (LIPITOR) 40 mg tablet Take 1 tablet (40 mg total) by mouth daily with dinner   • [DISCONTINUED] furosemide (LASIX) 20 mg tablet Take 2 tablets (40 mg total) by mouth daily   • [DISCONTINUED] metoprolol succinate (TOPROL-XL) 50 mg 24 hr tablet Take 1 tablet (50 mg total) by mouth every 12 (twelve) hours   • [DISCONTINUED] sacubitril-valsartan (Entresto) 49-51 MG TABS Take 1 tablet by mouth 2 (two) times a day   • [DISCONTINUED] spironolactone (ALDACTONE) 25 mg tablet Take 0 5 tablets (12 5 mg total) by mouth 2 (two) times a day   • [DISCONTINUED] tamsulosin (FLOMAX) 0 4 mg Take 1 capsule (0 4 mg total) by mouth daily with dinner   • acetaminophen (TYLENOL) 325 mg tablet Take 2 tablets (650 mg total) by mouth every 6 (six) hours as needed (mild pain) (Patient not taking: Reported on 3/7/2023)   • calcium carbonate (TUMS) 500 mg chewable tablet Chew 2 tablets (1,000 mg total) daily as needed (indigestion,heartburn) (Patient not taking: Reported on 3/7/2023)   • collagenase (SANTYL) ointment Apply topically daily Do not start before February 20, 2023  (Patient not taking: Reported on 3/7/2023)   • insulin glargine (LANTUS) 100 units/mL subcutaneous injection Inject 7 Units under the skin daily at bedtime (Patient not taking: Reported on 3/7/2023)   • insulin lispro (HumaLOG) 100 units/mL injection Inject 4 Units under the skin daily with breakfast Do not start before February 20, 2023  (Patient not taking: Reported on 3/7/2023)   • insulin lispro (HumaLOG) 100 units/mL injection Inject 4 Units under the skin daily with lunch Do not start before February 20, 2023  (Patient not taking: Reported on 3/7/2023)   • insulin lispro (HumaLOG) 100 units/mL injection Inject 7 Units under the skin daily with dinner (Patient not taking: Reported on 3/7/2023)   • [DISCONTINUED] aspirin (ECOTRIN LOW STRENGTH) 81 mg EC tablet Take 1 tablet (81 mg total) by mouth daily Do not start before February 20, 2023  (Patient not taking: Reported on 3/7/2023)     No current facility-administered medications on file prior to visit  He is allergic to metformin       Transitional Care Management Review:  Brandie Benítez is a 69 y o  male here for TCM follow up       During the TCM phone call patient stated:    TCM Call     None      TCM Call     None              Servando Perez DO

## 2023-03-08 ENCOUNTER — TELEPHONE (OUTPATIENT)
Dept: ADMINISTRATIVE | Facility: OTHER | Age: 78
End: 2023-03-08

## 2023-03-08 ENCOUNTER — TELEPHONE (OUTPATIENT)
Dept: FAMILY MEDICINE CLINIC | Facility: CLINIC | Age: 78
End: 2023-03-08

## 2023-03-08 NOTE — TELEPHONE ENCOUNTER
Joel Blevins with pt's home health returned a phone call from you answering your questions about their services  Michelle Oseguera they can do pt's labs so I will fax those orders to fax number provided ([phone])  Michelle Oseguera they will be seeing pt twice a week for now and then once a week until his wounds are healed  Also, they will be sending you his plan of care within the week to be signed and keep you updated

## 2023-03-08 NOTE — LETTER
Diabetic Eye Exam Form    Date Requested: 23  Patient: Brandie eBnítez  Patient : 1945   Referring Provider: Servando Perez DO      DIABETIC Eye Exam Date _______________________________      Type of Exam MUST be documented for Diabetic Eye Exams  Please CHECK ONE  Retinal Exam       Dilated Retinal Exam       OCT       Optomap-Iris Exam      Fundus Photography       Left Eye - Please check Retinopathy or No Retinopathy        Exam did show retinopathy    Exam did not show retinopathy       Right Eye - Please check Retinopathy or No Retinopathy       Exam did show retinopathy    Exam did not show retinopathy       Comments __________________________________________________________    Practice Providing Exam ______________________________________________    Exam Performed By (print name) _______________________________________      Provider Signature ___________________________________________________      These reports are needed for  compliance  Please fax this completed form and a copy of the Diabetic Eye Exam report to our office located at [place] [address] as soon as possible via Fax [phone] attention Luz Marina: Phone [phone]  We thank you for your assistance in treating our mutual patient

## 2023-03-08 NOTE — TELEPHONE ENCOUNTER
----- Message from Sola Arora sent at 3/7/2023  1:13 PM EST -----  03/07/23 1:13 PM    Sherrie, our patient Reese Ortiz has had Diabetic Eye Exam completed/performed  Please assist in updating the patient chart by making an External outreach to [facility] facility located in [place]   The date of service is 6 months ago      Thank you,  Es Calvin   [facility]

## 2023-03-08 NOTE — TELEPHONE ENCOUNTER
Upon review of the In Basket request and the patient's chart, initial outreach has been made via fax to facility  Please see Contacts section for details       Thank you  Vonzella Cranker, MA

## 2023-03-09 ENCOUNTER — PATIENT OUTREACH (OUTPATIENT)
Dept: FAMILY MEDICINE CLINIC | Facility: CLINIC | Age: 78
End: 2023-03-09

## 2023-03-09 NOTE — PROGRESS NOTES
TRISTIAN MOLINA reviewed chart  Patient was referred by PCP d/t needing assistance at home  TRISTIAN MOLINA outreached patient and he was agreeable to outreach  Patient stated he needs "general help" with vacuuming and making meals  He stated he is not supposed to be on his feet very often  He can stay on his feet for close to five minutes, but it is not great as he is unsteady on his feet  TRISTIAN MOLINA provided information on personal care hours with the aging office  He has VA benefits  Patient agreeable to referrals to aging and [facility]      Patient stated he is not happy with [facility] because they are cutting him down to one day a week  TRISTIAN MOLINA explained that these services do reduce over time, but he should discuss his concerns with his provider  Patient's neighbor provides transportation  He says this has been working well for him  He said he found some good neighbors in his apartment building  One of them helps care for his dog  He is very thankful for his neighbors  Patient stated that his medications are going to be quite expensive  He has PACE  Patient stated he is "keeping his head above water" with his bills  Stated he is not behind on any bills  Patient stated he would like information on how to get a will  TRISTIAN MOLINA stated she will call him back with information  TRISTIAN MOLINA ensured patient has contact information to reach her  Referral sent via website for [facility]  Referral faxed to [facility]  Next outreach scheduled for two weeks

## 2023-03-10 ENCOUNTER — TELEPHONE (OUTPATIENT)
Dept: UROLOGY | Facility: CLINIC | Age: 78
End: 2023-03-10

## 2023-03-10 NOTE — TELEPHONE ENCOUNTER
Upon review of the In Basket request we have found as a result of outreach that patient did not have the requested item(s) completed  Any additional questions or concerns should be emailed to the Practice Liaisons via the appropriate education email address, please do not reply via In Basket      Thank you  Pepe Gonzales MA

## 2023-03-10 NOTE — TELEPHONE ENCOUNTER
[facility] [phone]  Spoke with Mike Greco requesting the records for the void trial he stated that if he could find it, he would get it to me

## 2023-03-13 ENCOUNTER — TELEPHONE (OUTPATIENT)
Dept: PODIATRY | Facility: CLINIC | Age: 78
End: 2023-03-13

## 2023-03-13 ENCOUNTER — TELEPHONE (OUTPATIENT)
Dept: FAMILY MEDICINE CLINIC | Facility: CLINIC | Age: 78
End: 2023-03-13

## 2023-03-13 NOTE — TELEPHONE ENCOUNTER
Homecare nurse went in and did his B/L heel wound care  Noticed an odor today, right heel does have redness around it but no warmth, or fever, and no increased pain  Unsure if odor was due to dressing was on since Friday  Was unsure if you wanted to put him on Antibiotics?

## 2023-03-13 NOTE — TELEPHONE ENCOUNTER
Caller: Elias Rea RN from [facility]    Doctor: Yunior Arreola    Reason for call: saw Mr  Tara Sprung- Redness surrounding R heel - both heels have odor  Temp 98 9 and Wound Center still has not called pt with appt  She wants to know if he should have a wound culture at his next visit and/or antbx ordered?   Please advise    Call back#: [phone]

## 2023-03-13 NOTE — TELEPHONE ENCOUNTER
Homecare nurse will call Dr Amy Stovall, but he aj pushed him off to Wound care, which has not called him yet  She will reach out to Dr Amy Stovall and call us back if he will not address it

## 2023-03-14 ENCOUNTER — TELEPHONE (OUTPATIENT)
Dept: UROLOGY | Facility: AMBULATORY SURGERY CENTER | Age: 78
End: 2023-03-14

## 2023-03-14 ENCOUNTER — TELEPHONE (OUTPATIENT)
Dept: FAMILY MEDICINE CLINIC | Facility: CLINIC | Age: 78
End: 2023-03-14

## 2023-03-14 NOTE — TELEPHONE ENCOUNTER
Calvin Live from the [address] [place] [address] inquiring about a referral for the patient upcoming visit  The following is the response from the [facility]<    "No urology referral on file;  will need to contact his PCP to request referral"    Please let the patient know that he needs to contact their PCP to attain a valid referral, for the upcoming visit (3/21)

## 2023-03-15 ENCOUNTER — TELEPHONE (OUTPATIENT)
Dept: PODIATRY | Facility: CLINIC | Age: 78
End: 2023-03-15

## 2023-03-15 NOTE — TELEPHONE ENCOUNTER
Caller: Kimani Desai    Doctor: Lexis Lagos DPM    Reason for call: Mr Kelly Hashimoto called because he is waiting for a call from the office about his right heel  It is red and has an odor      Call back#: [phone]

## 2023-03-16 ENCOUNTER — LAB (OUTPATIENT)
Dept: LAB | Facility: MEDICAL CENTER | Age: 78
End: 2023-03-16

## 2023-03-16 ENCOUNTER — TELEPHONE (OUTPATIENT)
Dept: OTHER | Facility: OTHER | Age: 78
End: 2023-03-16

## 2023-03-16 ENCOUNTER — TELEPHONE (OUTPATIENT)
Dept: FAMILY MEDICINE CLINIC | Facility: CLINIC | Age: 78
End: 2023-03-16

## 2023-03-16 DIAGNOSIS — Z09 HOSPITAL DISCHARGE FOLLOW-UP: ICD-10-CM

## 2023-03-16 DIAGNOSIS — L97.522 SKIN ULCER OF TOE OF LEFT FOOT WITH FAT LAYER EXPOSED (HCC): ICD-10-CM

## 2023-03-16 DIAGNOSIS — L97.421 SKIN ULCER OF LEFT HEEL, LIMITED TO BREAKDOWN OF SKIN (HCC): ICD-10-CM

## 2023-03-16 DIAGNOSIS — S91.309A NON-HEALING OPEN WOUND OF HEEL, INITIAL ENCOUNTER: Primary | ICD-10-CM

## 2023-03-16 DIAGNOSIS — E11.621 ULCER OF LEFT FOOT DUE TO TYPE 2 DIABETES MELLITUS (HCC): ICD-10-CM

## 2023-03-16 DIAGNOSIS — I50.22 CHRONIC HFREF (HEART FAILURE WITH REDUCED EJECTION FRACTION) (HCC): ICD-10-CM

## 2023-03-16 DIAGNOSIS — S90.822A BLISTER OF LEFT HEEL, INITIAL ENCOUNTER: ICD-10-CM

## 2023-03-16 DIAGNOSIS — L97.412 SKIN ULCER OF RIGHT HEEL WITH FAT LAYER EXPOSED (HCC): ICD-10-CM

## 2023-03-16 DIAGNOSIS — I25.10 CAD (CORONARY ARTERY DISEASE): ICD-10-CM

## 2023-03-16 DIAGNOSIS — L08.9 INFECTION OF SKIN AND SUBCUTANEOUS TISSUE: Primary | ICD-10-CM

## 2023-03-16 DIAGNOSIS — L97.529 ULCER OF LEFT FOOT DUE TO TYPE 2 DIABETES MELLITUS (HCC): ICD-10-CM

## 2023-03-16 LAB
ALBUMIN SERPL BCP-MCNC: 2.5 G/DL (ref 3.5–5)
ALP SERPL-CCNC: 141 U/L (ref 46–116)
ALT SERPL W P-5'-P-CCNC: 21 U/L (ref 12–78)
ANION GAP SERPL CALCULATED.3IONS-SCNC: 6 MMOL/L (ref 4–13)
AST SERPL W P-5'-P-CCNC: 33 U/L (ref 5–45)
BASOPHILS # BLD AUTO: 0.02 THOUSANDS/ÂΜL (ref 0–0.1)
BASOPHILS NFR BLD AUTO: 0 % (ref 0–1)
BILIRUB SERPL-MCNC: 1.09 MG/DL (ref 0.2–1)
BUN SERPL-MCNC: 15 MG/DL (ref 5–25)
CALCIUM ALBUM COR SERPL-MCNC: 9.8 MG/DL (ref 8.3–10.1)
CALCIUM SERPL-MCNC: 8.6 MG/DL (ref 8.3–10.1)
CHLORIDE SERPL-SCNC: 97 MMOL/L (ref 96–108)
CO2 SERPL-SCNC: 36 MMOL/L (ref 21–32)
CREAT SERPL-MCNC: 0.77 MG/DL (ref 0.6–1.3)
CRP SERPL QL: 117 MG/L
EOSINOPHIL # BLD AUTO: 0.11 THOUSAND/ÂΜL (ref 0–0.61)
EOSINOPHIL NFR BLD AUTO: 1 % (ref 0–6)
ERYTHROCYTE [DISTWIDTH] IN BLOOD BY AUTOMATED COUNT: 17.2 % (ref 11.6–15.1)
ERYTHROCYTE [SEDIMENTATION RATE] IN BLOOD: 58 MM/HOUR (ref 0–19)
GFR SERPL CREATININE-BSD FRML MDRD: 88 ML/MIN/1.73SQ M
GLUCOSE SERPL-MCNC: 90 MG/DL (ref 65–140)
HCT VFR BLD AUTO: 35.6 % (ref 36.5–49.3)
HGB BLD-MCNC: 11.1 G/DL (ref 12–17)
IMM GRANULOCYTES # BLD AUTO: 0.03 THOUSAND/UL (ref 0–0.2)
IMM GRANULOCYTES NFR BLD AUTO: 0 % (ref 0–2)
LYMPHOCYTES # BLD AUTO: 1.19 THOUSANDS/ÂΜL (ref 0.6–4.47)
LYMPHOCYTES NFR BLD AUTO: 12 % (ref 14–44)
MCH RBC QN AUTO: 25.2 PG (ref 26.8–34.3)
MCHC RBC AUTO-ENTMCNC: 31.2 G/DL (ref 31.4–37.4)
MCV RBC AUTO: 81 FL (ref 82–98)
MONOCYTES # BLD AUTO: 0.86 THOUSAND/ÂΜL (ref 0.17–1.22)
MONOCYTES NFR BLD AUTO: 9 % (ref 4–12)
NEUTROPHILS # BLD AUTO: 7.76 THOUSANDS/ÂΜL (ref 1.85–7.62)
NEUTS SEG NFR BLD AUTO: 78 % (ref 43–75)
NRBC BLD AUTO-RTO: 0 /100 WBCS
PLATELET # BLD AUTO: 333 THOUSANDS/UL (ref 149–390)
PMV BLD AUTO: 8.9 FL (ref 8.9–12.7)
POTASSIUM SERPL-SCNC: 2.5 MMOL/L (ref 3.5–5.3)
PROT SERPL-MCNC: 6.4 G/DL (ref 6.4–8.4)
RBC # BLD AUTO: 4.41 MILLION/UL (ref 3.88–5.62)
SODIUM SERPL-SCNC: 139 MMOL/L (ref 135–147)
WBC # BLD AUTO: 9.97 THOUSAND/UL (ref 4.31–10.16)

## 2023-03-16 NOTE — TELEPHONE ENCOUNTER
Emely Chung wanted to know if you want to treat with antibiotic until he gets into wound care? There is drainage from the left heel, she did do bilateral wound cultures   She wants to thank you for taking the time with this patient since she was not getting anywhere with podiatrist

## 2023-03-16 NOTE — TELEPHONE ENCOUNTER
Kayla Carey with [facility] called and is asking if you could place referral for wound care since she doesn't seem to be getting anywhere calling his podiatrist, Dr Romero Koyanagi

## 2023-03-17 ENCOUNTER — TELEPHONE (OUTPATIENT)
Dept: FAMILY MEDICINE CLINIC | Facility: CLINIC | Age: 78
End: 2023-03-17

## 2023-03-17 DIAGNOSIS — L03.119 CELLULITIS AND ABSCESS OF FOOT: ICD-10-CM

## 2023-03-17 DIAGNOSIS — E87.6 HYPOKALEMIA: Primary | ICD-10-CM

## 2023-03-17 DIAGNOSIS — L02.619 CELLULITIS AND ABSCESS OF FOOT: ICD-10-CM

## 2023-03-17 RX ORDER — POTASSIUM CHLORIDE 20 MEQ/1
TABLET, EXTENDED RELEASE ORAL
Qty: 30 TABLET | Refills: 5 | Status: ON HOLD | OUTPATIENT
Start: 2023-03-17

## 2023-03-17 RX ORDER — CEPHALEXIN 500 MG/1
500 CAPSULE ORAL EVERY 6 HOURS SCHEDULED
Qty: 40 CAPSULE | Refills: 0 | Status: ON HOLD | OUTPATIENT
Start: 2023-03-17 | End: 2023-03-27

## 2023-03-17 NOTE — RESULT ENCOUNTER NOTE
Please call the patient regarding his abnormal result    Patient needs to go to the emergency room for replacement of his potassium and also to have his foot wound evaluated I did call the emergency room to let them know that he would probably be arriving sometime today please make sure the patient knows he needs to go to the ER

## 2023-03-17 NOTE — RESULT ENCOUNTER NOTE
Please call the patient regarding his abnormal result    Call patient he needs to be in the emergency room immediately his blood work shows a very very low potassium level he also has an infection in his heel so he needs to call an ambulance and go to the emergency room immediately this morning please alert the emergency room that he is coming and about his blood tests

## 2023-03-17 NOTE — TELEPHONE ENCOUNTER
Fyi Gerhardt Sep Gerhardt Sep He is not going to do physical therapy until his potassium level is stable  He will follow up

## 2023-03-17 NOTE — TELEPHONE ENCOUNTER
Isaac Griffith called regarding a critical result that is on a chemistry order  On call notified via TC

## 2023-03-18 LAB
BACTERIA WND AEROBE CULT: ABNORMAL
GRAM STN SPEC: ABNORMAL

## 2023-03-20 ENCOUNTER — LAB (OUTPATIENT)
Dept: LAB | Facility: MEDICAL CENTER | Age: 78
End: 2023-03-20

## 2023-03-20 DIAGNOSIS — E87.6 HYPOKALEMIA: ICD-10-CM

## 2023-03-20 DIAGNOSIS — Z79.4 TYPE 2 DIABETES MELLITUS WITH OTHER CIRCULATORY COMPLICATION, WITH LONG-TERM CURRENT USE OF INSULIN (HCC): Primary | ICD-10-CM

## 2023-03-20 DIAGNOSIS — E11.59 TYPE 2 DIABETES MELLITUS WITH OTHER CIRCULATORY COMPLICATION, WITH LONG-TERM CURRENT USE OF INSULIN (HCC): Primary | ICD-10-CM

## 2023-03-20 LAB
ANION GAP SERPL CALCULATED.3IONS-SCNC: 6 MMOL/L (ref 4–13)
BACTERIA WND AEROBE CULT: ABNORMAL
BUN SERPL-MCNC: 21 MG/DL (ref 5–25)
CALCIUM SERPL-MCNC: 8.8 MG/DL (ref 8.3–10.1)
CHLORIDE SERPL-SCNC: 97 MMOL/L (ref 96–108)
CO2 SERPL-SCNC: 35 MMOL/L (ref 21–32)
CREAT SERPL-MCNC: 0.73 MG/DL (ref 0.6–1.3)
GFR SERPL CREATININE-BSD FRML MDRD: 90 ML/MIN/1.73SQ M
GLUCOSE SERPL-MCNC: 106 MG/DL (ref 65–140)
GRAM STN SPEC: ABNORMAL
POTASSIUM SERPL-SCNC: 3.8 MMOL/L (ref 3.5–5.3)
SODIUM SERPL-SCNC: 138 MMOL/L (ref 135–147)

## 2023-03-20 NOTE — RESULT ENCOUNTER NOTE
Please call the patient regarding his abnormal result    Call patient and ask patient if what he is doing about his insulin did he get his supplies or is he still not taking his insulin and if that is the case we need to work on getting him his insulin so that we can get his wound healed up

## 2023-03-20 NOTE — RESULT ENCOUNTER NOTE
Please call the patient regarding his abnormal result    Wound culture reveals Pseudomonas find out if he is taking any antibiotic and if he is not we will prescribe 1 and he should be set up with wound care if he is not already

## 2023-03-21 ENCOUNTER — APPOINTMENT (EMERGENCY)
Dept: RADIOLOGY | Facility: HOSPITAL | Age: 78
End: 2023-03-21

## 2023-03-21 ENCOUNTER — OFFICE VISIT (OUTPATIENT)
Dept: WOUND CARE | Facility: CLINIC | Age: 78
End: 2023-03-21

## 2023-03-21 ENCOUNTER — HOSPITAL ENCOUNTER (EMERGENCY)
Facility: HOSPITAL | Age: 78
End: 2023-03-21
Attending: EMERGENCY MEDICINE | Admitting: EMERGENCY MEDICINE

## 2023-03-21 ENCOUNTER — APPOINTMENT (EMERGENCY)
Dept: NON INVASIVE DIAGNOSTICS | Facility: HOSPITAL | Age: 78
End: 2023-03-21

## 2023-03-21 VITALS
TEMPERATURE: 96.1 F | HEIGHT: 70 IN | RESPIRATION RATE: 20 BRPM | WEIGHT: 138 LBS | BODY MASS INDEX: 19.76 KG/M2 | DIASTOLIC BLOOD PRESSURE: 86 MMHG | SYSTOLIC BLOOD PRESSURE: 142 MMHG | HEART RATE: 100 BPM

## 2023-03-21 VITALS
HEART RATE: 93 BPM | TEMPERATURE: 97.1 F | DIASTOLIC BLOOD PRESSURE: 72 MMHG | BODY MASS INDEX: 19.38 KG/M2 | HEIGHT: 70 IN | RESPIRATION RATE: 15 BRPM | SYSTOLIC BLOOD PRESSURE: 149 MMHG | WEIGHT: 135.36 LBS | OXYGEN SATURATION: 94 %

## 2023-03-21 DIAGNOSIS — L02.611 ABSCESS OF RIGHT FOOT: ICD-10-CM

## 2023-03-21 DIAGNOSIS — L89.610 PRESSURE INJURY OF RIGHT HEEL, UNSTAGEABLE (HCC): ICD-10-CM

## 2023-03-21 DIAGNOSIS — L89.95: ICD-10-CM

## 2023-03-21 DIAGNOSIS — L03.115 CELLULITIS OF RIGHT FOOT: Primary | ICD-10-CM

## 2023-03-21 DIAGNOSIS — E11.621 DIABETIC ULCER OF RIGHT HEEL (HCC): Primary | ICD-10-CM

## 2023-03-21 DIAGNOSIS — E11.621 DIABETIC ULCER OF LEFT HEEL (HCC): ICD-10-CM

## 2023-03-21 DIAGNOSIS — L97.419 DIABETIC ULCER OF RIGHT HEEL (HCC): Primary | ICD-10-CM

## 2023-03-21 DIAGNOSIS — L89.622 PRESSURE INJURY OF LEFT HEEL, STAGE 2 (HCC): ICD-10-CM

## 2023-03-21 DIAGNOSIS — L97.429 DIABETIC ULCER OF LEFT HEEL (HCC): ICD-10-CM

## 2023-03-21 LAB
ALBUMIN SERPL BCP-MCNC: 3.3 G/DL (ref 3.5–5)
ALP SERPL-CCNC: 153 U/L (ref 34–104)
ALT SERPL W P-5'-P-CCNC: 14 U/L (ref 7–52)
ANION GAP SERPL CALCULATED.3IONS-SCNC: 12 MMOL/L (ref 4–13)
AST SERPL W P-5'-P-CCNC: 20 U/L (ref 13–39)
BACTERIA WND AEROBE CULT: ABNORMAL
BASOPHILS # BLD AUTO: 0.03 THOUSANDS/ÂΜL (ref 0–0.1)
BASOPHILS NFR BLD AUTO: 0 % (ref 0–1)
BILIRUB SERPL-MCNC: 1.1 MG/DL (ref 0.2–1)
BUN SERPL-MCNC: 18 MG/DL (ref 5–25)
CALCIUM ALBUM COR SERPL-MCNC: 9.6 MG/DL (ref 8.3–10.1)
CALCIUM SERPL-MCNC: 9 MG/DL (ref 8.4–10.2)
CHLORIDE SERPL-SCNC: 95 MMOL/L (ref 96–108)
CO2 SERPL-SCNC: 31 MMOL/L (ref 21–32)
CREAT SERPL-MCNC: 0.77 MG/DL (ref 0.6–1.3)
CRP SERPL QL: 143.1 MG/L
EOSINOPHIL # BLD AUTO: 0.07 THOUSAND/ÂΜL (ref 0–0.61)
EOSINOPHIL NFR BLD AUTO: 1 % (ref 0–6)
ERYTHROCYTE [DISTWIDTH] IN BLOOD BY AUTOMATED COUNT: 18.2 % (ref 11.6–15.1)
ERYTHROCYTE [SEDIMENTATION RATE] IN BLOOD: 75 MM/HOUR (ref 0–19)
GFR SERPL CREATININE-BSD FRML MDRD: 88 ML/MIN/1.73SQ M
GLUCOSE SERPL-MCNC: 91 MG/DL (ref 65–140)
GRAM STN SPEC: ABNORMAL
GRAM STN SPEC: ABNORMAL
HCT VFR BLD AUTO: 38 % (ref 36.5–49.3)
HGB BLD-MCNC: 11.7 G/DL (ref 12–17)
IMM GRANULOCYTES # BLD AUTO: 0.07 THOUSAND/UL (ref 0–0.2)
IMM GRANULOCYTES NFR BLD AUTO: 1 % (ref 0–2)
LACTATE SERPL-SCNC: 1.1 MMOL/L (ref 0.5–2)
LYMPHOCYTES # BLD AUTO: 1.14 THOUSANDS/ÂΜL (ref 0.6–4.47)
LYMPHOCYTES NFR BLD AUTO: 9 % (ref 14–44)
MCH RBC QN AUTO: 25.7 PG (ref 26.8–34.3)
MCHC RBC AUTO-ENTMCNC: 30.8 G/DL (ref 31.4–37.4)
MCV RBC AUTO: 84 FL (ref 82–98)
MONOCYTES # BLD AUTO: 0.75 THOUSAND/ÂΜL (ref 0.17–1.22)
MONOCYTES NFR BLD AUTO: 6 % (ref 4–12)
NEUTROPHILS # BLD AUTO: 10.24 THOUSANDS/ÂΜL (ref 1.85–7.62)
NEUTS SEG NFR BLD AUTO: 83 % (ref 43–75)
NRBC BLD AUTO-RTO: 0 /100 WBCS
PLATELET # BLD AUTO: 368 THOUSANDS/UL (ref 149–390)
PMV BLD AUTO: 8.4 FL (ref 8.9–12.7)
POTASSIUM SERPL-SCNC: 3.9 MMOL/L (ref 3.5–5.3)
PROT SERPL-MCNC: 7.5 G/DL (ref 6.4–8.4)
RBC # BLD AUTO: 4.55 MILLION/UL (ref 3.88–5.62)
SODIUM SERPL-SCNC: 138 MMOL/L (ref 135–147)
WBC # BLD AUTO: 12.3 THOUSAND/UL (ref 4.31–10.16)

## 2023-03-21 RX ORDER — CEFEPIME HYDROCHLORIDE 2 G/50ML
2000 INJECTION, SOLUTION INTRAVENOUS ONCE
Status: COMPLETED | OUTPATIENT
Start: 2023-03-21 | End: 2023-03-21

## 2023-03-21 RX ADMIN — CEFEPIME HYDROCHLORIDE 2000 MG: 2 INJECTION, SOLUTION INTRAVENOUS at 11:38

## 2023-03-21 RX ADMIN — VANCOMYCIN HYDROCHLORIDE 1250 MG: 5 INJECTION, POWDER, LYOPHILIZED, FOR SOLUTION INTRAVENOUS at 12:10

## 2023-03-21 NOTE — PROGRESS NOTES
Patient ID: Celestine Vick is a 69 y o  male Date of Birth 1945       Chief Complaint   Patient presents with   • New Patient Visit     Wounds bilateral heels       Allergies  Metformin    Diagnosis:  1  Cellulitis of right foot    2  Abscess of right foot    3  Pressure injury of left heel, unstageable ([address] )    4  Pressure injury of right heel, stage 4 (HCC)        Diagnosis ICD-10-CM Associated Orders   1  Cellulitis of right foot  L03 115       2  Abscess of right foot  L02 611       3  Pressure injury of left heel, unstageable ([address] )  L89 620       4  Pressure injury of right heel, stage 4 (HCC)  L89 614              Assessment & Plan:  - significant malodor erythema and pain to the right heel    - Recommend ER and admission to hospital for both vascular and infectious work up  - I and D performed to right heel with expression of significant necrotic tissue and aerobic and anaerobic cultures were obtained from this area  - ESR and CRP are elevated consistent with inflammation and likely underlying infection   - He is at very high risk for limb loss of the right LE  -He was scheduled for arterial studies on an outpatient basis  - On admission to the hospital, he will need arterial studies, XR calcaneal b/l  CBC, blood cultures  He also will likely need MRI of the right heel to r/o underlying OM  - If there is significant vascular stenosis or compromise which I fear there may be a large component of, he will likely need transfer for resvascularization prior to further I and D      Subjective:   Patient was seen by me in early March in my office and was directed to come to the wound care center for further management due to the complicated nature of his condition  He was scheduled for arterial studies on 3/23  They had difficulty getting an authorization for treatment from the [place]  He was directed to go to ER for low potassium on 3/17 by Dr Albina Patel   He also was prescribed abx by Dr Albina Patel and never picked them up  He reports worsening redness, drainage, and pain over the past couple of weeks  The following portions of the patient's history were reviewed and updated as appropriate:   Patient Active Problem List   Diagnosis   • Hypokalemia   • Type 2 diabetes mellitus with hyperglycemia, with long-term current use of insulin ([facility])   • Abnormal EKG   • Foot ulcer ([facility])   • Acute systolic congestive heart failure ([facility])   • Moderate protein-calorie malnutrition ([facility])   • Unintentional weight loss   • CAD (coronary artery disease)   • Suspected deep tissue injury of unknown depth   • Diabetic ulcer of left foot associated with type 2 diabetes mellitus ([place] [address] )   • Toenail avulsion     Past Medical History:   Diagnosis Date   • 6th nerve palsy    • Blister of right leg    • BPH (benign prostatic hyperplasia)    • CAD (coronary artery disease)    • Cardiomyopathy ([place] [address] )     EF 25%   • CHF (congestive heart failure) ([facility])    • Depression    • Diabetes mellitus ([place] [address] )     type 2, insulin dependent   • Diabetic foot ulcer ([place] [address] )     left, local wound care   • Diabetic retinopathy ([place] [address] )    • Exposure to Agent Orange     while serving in [employer] [facility]   • Former tobacco use    • H/O urinary retention     w/ reagan placement   • Hyperlipidemia    • Hypertension    • Rupture of biceps tendon, traumatic 2017    right     Past Surgical History:   Procedure Laterality Date   • CARDIAC CATHETERIZATION     • CARDIAC CATHETERIZATION Left 2/14/2023    Procedure: Cardiac Left Heart Cath;  Surgeon: Colette Ashraf MD;  Location: [facility]; Service: Cardiology   • CARDIAC CATHETERIZATION N/A 2/14/2023    Procedure: Cardiac Coronary Angiogram;  Surgeon: Colette Ashraf MD;  Location: [facility];   Service: Cardiology   • CATARACT EXTRACTION, BILATERAL Bilateral 2021     Social History     Socioeconomic History   • Marital status:      Spouse name: None   • Number of children: 2   • Years of education: None   • Highest education level: None   Occupational History   • None   Tobacco Use   • Smoking status: Former     Packs/day: 1 00     Years: 6 00     Pack years: 6 00     Types: Cigarettes     Start date:      Quit date:      Years since quittin 2   • Smokeless tobacco: Never   Vaping Use   • Vaping Use: Never used   Substance and Sexual Activity   • Alcohol use: Not Currently     Comment: Denies history of heavy alcohol use  At most will drink 1 or 2 drinks in a year (Updated 2023)  • Drug use: Never   • Sexual activity: None   Other Topics Concern   • None   Social History Narrative    Served in the [employer]; completed 18-month tour in [facility]     Previously worked for [employer]  Lives alone with his dog  Has a son and a daughter who both live into [place], [place] -does not maintain a close relationship with them  Denies having strong support system in place at home  Social Determinants of Health     Financial Resource Strain: Not on file   Food Insecurity: No Food Insecurity   • Worried About [address] in the Last Year: Never true   • Ran Out of Food in the Last Year: Never true   Transportation Needs: No Transportation Needs   • Lack of Transportation (Medical): No   • Lack of Transportation (Non-Medical):  No   Physical Activity: Not on file   Stress: Not on file   Social Connections: Not on file   Intimate Partner Violence: Not on file   Housing Stability: Low Risk    • Unable to Pay for Housing in the Last Year: No   • Number of Places Lived in the Last Year: 1   • Unstable Housing in the Last Year: No        Current Outpatient Medications:   •  acetaminophen (TYLENOL) 325 mg tablet, Take 2 tablets (650 mg total) by mouth every 6 (six) hours as needed (mild pain) (Patient not taking: Reported on 3/7/2023), Disp: 30 tablet, Rfl: 0  •  aspirin (ECOTRIN LOW STRENGTH) 81 mg EC tablet, Take 1 tablet (81 mg total) by mouth daily, Disp: 30 tablet, Rfl: 0  •  atorvastatin (LIPITOR) 40 mg tablet, Take 1 tablet (40 mg total) by mouth daily with dinner, Disp: 30 tablet, Rfl: 0  •  calcium carbonate (TUMS) 500 mg chewable tablet, Chew 2 tablets (1,000 mg total) daily as needed (indigestion,heartburn) (Patient not taking: Reported on 3/7/2023), Disp: 30 tablet, Rfl: 0  •  cephalexin (KEFLEX) 500 mg capsule, Take 1 capsule (500 mg total) by mouth every 6 (six) hours for 10 days, Disp: 40 capsule, Rfl: 0  •  furosemide (LASIX) 20 mg tablet, Take 2 tablets (40 mg total) by mouth daily, Disp: 90 tablet, Rfl: 1  •  insulin glargine (LANTUS) 100 units/mL subcutaneous injection, Inject 7 Units under the skin daily at bedtime (Patient not taking: Reported on 3/7/2023), Disp: 10 mL, Rfl: 0  •  insulin lispro (HumaLOG) 100 units/mL injection, Inject 4 Units under the skin daily with lunch Do not start before February 20, 2023   (Patient not taking: Reported on 3/7/2023), Disp: 3 mL, Rfl: 0  •  insulin lispro (HumaLOG) 100 units/mL injection, Inject 7 Units under the skin daily with dinner (Patient not taking: Reported on 3/7/2023), Disp: 3 mL, Rfl: 0  •  insulin lispro (HumaLOG) 100 units/mL injection, Inject 4 Units under the skin daily with breakfast, Disp: 10 mL, Rfl: 0  •  Insulin Pen Needle (Pen Needles) 31G X 5 MM MISC, Use 2 (two) times a day, Disp: 100 each, Rfl: 1  •  metoprolol succinate (TOPROL-XL) 50 mg 24 hr tablet, Take 1 tablet (50 mg total) by mouth every 12 (twelve) hours, Disp: 30 tablet, Rfl: 0  •  potassium chloride (K-DUR,KLOR-CON) 20 mEq tablet, 1 tablet twice daily for 2 days then decrease dose to 1 tablet daily, Disp: 30 tablet, Rfl: 5  •  sacubitril-valsartan (Entresto) 49-51 MG TABS, Take 1 tablet by mouth 2 (two) times a day, Disp: 60 tablet, Rfl: 1  •  spironolactone (ALDACTONE) 25 mg tablet, Take 0 5 tablets (12 5 mg total) by mouth 2 (two) times a day, Disp: 30 tablet, Rfl: 0  •  tamsulosin (FLOMAX) 0 4 mg, Take 1 capsule (0 4 mg total) by mouth daily with dinner, Disp: 30 capsule, Rfl: 0  Family History   Problem Relation Age of Onset   • No Known Problems Sister    • Other Daughter         chronic neck pain   • Other Son         chronic back pain   • Sudden death Neg Hx    • Cancer Neg Hx       Review of Systems   Constitutional: Negative for chills and fever  HENT: Negative for ear pain and sore throat  Eyes: Negative for pain and visual disturbance  Respiratory: Negative for cough and shortness of breath  Cardiovascular: Negative for chest pain and palpitations  Gastrointestinal: Negative for abdominal pain and vomiting  Genitourinary: Negative for dysuria and hematuria  Musculoskeletal: Negative for arthralgias and back pain  Skin: Negative for color change and rash  Neurological: Negative for seizures and syncope  All other systems reviewed and are negative  Objective:  Ht 5' 10" (1 778 m)   Wt 62 6 kg (138 lb)   BMI 19 80 kg/m²     Physical Exam  Musculoskeletal:      Comments: Extensive malodor and bogginess with fluctuance to the right heel with a large eschar on the posterior heel and also on the lateral aspect of the right foot  Lateral right foot eschar is not fluctuant  There is a pressure ulcer of the left heel  There is cellulitis, warmth, drainage, pain to the periwound  Wound 02/08/23 Buttocks (Active)       Wound 02/08/23 Foot Anterior; Left (Active)   Wound Image   03/21/23 0840   Wound Description Pink 03/21/23 0838   Hanh-wound Assessment Edema;Scaly 03/21/23 0838   Wound Length (cm) 0 2 cm 03/21/23 0838   Wound Width (cm) 0 2 cm 03/21/23 0838   Wound Depth (cm) 0 1 cm 03/21/23 0838   Wound Surface Area (cm^2) 0 04 cm^2 03/21/23 0838   Wound Volume (cm^3) 0 004 cm^3 03/21/23 0838   Calculated Wound Volume (cm^3) 0 cm^3 03/21/23 0838   Drainage Amount Scant 03/21/23 0838   Drainage Description Serous 03/21/23 0838   Treatments Irrigation with NSS 03/21/23 0838       Wound 03/21/23 Pressure Injury Heel Right (Active)   Wound Image 03/21/23 0831   Wound Description Black;Eschar;Yellow 03/21/23 0840   Hanh-wound Assessment Erythema;Scaly 03/21/23 0840   Wound Length (cm) 7 cm 03/21/23 0840   Wound Width (cm) 10 5 cm 03/21/23 0840   Wound Depth (cm) 0 1 cm 03/21/23 0840   Wound Surface Area (cm^2) 73 5 cm^2 03/21/23 0840   Wound Volume (cm^3) 7 35 cm^3 03/21/23 0840   Calculated Wound Volume (cm^3) 7 35 cm^3 03/21/23 0840   Drainage Amount Small 03/21/23 0840   Drainage Description Foul smelling;Serosanguineous 03/21/23 0840   Non-staged Wound Description Full thickness 03/21/23 0840   Treatments Irrigation with NSS 03/21/23 0840       Wound 03/21/23 Pressure Injury Heel Left (Active)   Wound Image   03/21/23 0834   Wound Description Eschar;Pink;Black 03/21/23 0843   Wound Length (cm) 4 5 cm 03/21/23 0843   Wound Width (cm) 8 5 cm 03/21/23 0843   Wound Depth (cm) 0 2 cm 03/21/23 0843   Wound Surface Area (cm^2) 38 25 cm^2 03/21/23 0843   Wound Volume (cm^3) 7 65 cm^3 03/21/23 0843   Calculated Wound Volume (cm^3) 7 65 cm^3 03/21/23 0843   Drainage Amount Small 03/21/23 0843   Drainage Description Serosanguineous 03/21/23 0843   Treatments Irrigation with NSS 03/21/23 0843       Wound 03/21/23 Pressure Injury Foot Right;Lateral (Active)   Wound Image   03/21/23 0836   Wound Description Black;Eschar 03/21/23 0836   Hanh-wound Assessment Scaly;Pink 03/21/23 0836   Wound Length (cm) 1 3 cm 03/21/23 0836   Wound Width (cm) 1 3 cm 03/21/23 0836   Wound Depth (cm) 0 cm 03/21/23 0836   Wound Surface Area (cm^2) 1 69 cm^2 03/21/23 0836   Wound Volume (cm^3) 0 cm^3 03/21/23 0836   Calculated Wound Volume (cm^3) 0 cm^3 03/21/23 0836   Drainage Amount None 03/21/23 0836                             Incision and Drainage    Date/Time: 3/21/2023 9:48 AM  Performed by: Aaron Swann DPM  Authorized by: Aaron Swann DPM   Universal Protocol:  Risks and benefits: risks, benefits and alternatives were discussed  Consent given by: patient  Patient understanding: patient states understanding of the procedure being performed  Patient consent: the patient's understanding of the procedure matches consent given  Patient identity confirmed: verbally with patient      Patient location:  Clinic  Location:     Type:  Abscess    Location:  Lower extremity    Lower extremity location:  R foot  Pre-procedure details:     Skin preparation:  Antiseptic wash  Procedure details:     Complexity:  Simple    Incision types:  Stab incision    Scalpel blade:  15    Approach:  Open    Incision depth:  Subcutaneous    Wound management:  Probed and deloculated    Drainage:  Purulent    Drainage amount: Moderate    Wound treatment:  Wound left open    Packing materials:  None  Post-procedure details:     Patient tolerance of procedure: Tolerated well, no immediate complications         Results from last 6 Months   Lab Units 03/16/23  0838   WOUND CULTURE  3+ Growth of Pseudomonas aeruginosa*  3+ Growth of  1+ Growth of Methicillin Resistant Staphylococcus aureus*       Wound Instructions:  No orders of the defined types were placed in this encounter  Macrina Terry DPM    Portions of the record may have been created with voice recognition software  Occasional wrong word or "sound a like" substitutions may have occurred due to the inherent limitations of voice recognition software  Read the chart carefully and recognize, using context, where substitutions have occurred

## 2023-03-21 NOTE — ED PROVIDER NOTES
History  Chief Complaint   Patient presents with   • Wound Check     Patient was sent in from podiatry for wound check to bilateral heels, states the patient needs to go to [facility]     Patient is a 70 y/o male with a PMHx of CAD, CHF, DM 2, HLD, HTN and pressure wounds of bilateral heels, presenting to the ED for evaluation of a wound infection  Patient was seen by podiatry today (Dr Debbie Kaur) and sent to the ED for admission due to infection of bilateral heel ulcers  Per podiatry, patient has necrosis of the right heel ulcer and drainage from the left heel ulcer with surrounding cellulitis  Patient says he has had these ulcers for awhile now, but worsening recently  He denies fevers or chills  He denies any significant pain associated with these wounds  He denies chest pain, shortness of breath, abdominal pain, NSAID use/D or urinary symptoms  Prior to Admission Medications   Prescriptions Last Dose Informant Patient Reported? Taking?    Insulin Pen Needle (Pen Needles) 31G X 5 MM MISC   No No   Sig: Use 2 (two) times a day   acetaminophen (TYLENOL) 325 mg tablet   No No   Sig: Take 2 tablets (650 mg total) by mouth every 6 (six) hours as needed (mild pain)   Patient not taking: Reported on 3/7/2023   aspirin (ECOTRIN LOW STRENGTH) 81 mg EC tablet   No No   Sig: Take 1 tablet (81 mg total) by mouth daily   atorvastatin (LIPITOR) 40 mg tablet   No No   Sig: Take 1 tablet (40 mg total) by mouth daily with dinner   calcium carbonate (TUMS) 500 mg chewable tablet   No No   Sig: Chew 2 tablets (1,000 mg total) daily as needed (indigestion,heartburn)   Patient not taking: Reported on 3/7/2023   cephalexin (KEFLEX) 500 mg capsule   No No   Sig: Take 1 capsule (500 mg total) by mouth every 6 (six) hours for 10 days   Patient not taking: Reported on 3/21/2023   furosemide (LASIX) 20 mg tablet   No No   Sig: Take 2 tablets (40 mg total) by mouth daily   insulin glargine (LANTUS) 100 units/mL subcutaneous injection   No No Sig: Inject 7 Units under the skin daily at bedtime   Patient not taking: Reported on 3/7/2023   insulin lispro (HumaLOG) 100 units/mL injection   No No   Sig: Inject 4 Units under the skin daily with lunch Do not start before 2023     Patient not taking: Reported on 3/7/2023   insulin lispro (HumaLOG) 100 units/mL injection   No No   Sig: Inject 7 Units under the skin daily with dinner   Patient not taking: Reported on 3/7/2023   insulin lispro (HumaLOG) 100 units/mL injection   No No   Sig: Inject 4 Units under the skin daily with breakfast   metoprolol succinate (TOPROL-XL) 50 mg 24 hr tablet   No No   Sig: Take 1 tablet (50 mg total) by mouth every 12 (twelve) hours   potassium chloride (K-DUR,KLOR-CON) 20 mEq tablet   No No   Si tablet twice daily for 2 days then decrease dose to 1 tablet daily   sacubitril-valsartan (Entresto) 49-51 MG TABS   No No   Sig: Take 1 tablet by mouth 2 (two) times a day   spironolactone (ALDACTONE) 25 mg tablet   No No   Sig: Take 0 5 tablets (12 5 mg total) by mouth 2 (two) times a day   tamsulosin (FLOMAX) 0 4 mg   No No   Sig: Take 1 capsule (0 4 mg total) by mouth daily with dinner      Facility-Administered Medications: None       Past Medical History:   Diagnosis Date   • 6th nerve palsy    • Blister of right leg    • BPH (benign prostatic hyperplasia)    • CAD (coronary artery disease)    • Cardiomyopathy ([facility])     EF 25%   • CHF (congestive heart failure) ([facility])    • Depression    • Diabetes mellitus ([facility] )     type 2, insulin dependent   • Diabetic foot ulcer ([facility] )     left, local wound care   • Diabetic retinopathy ([facility] )    • Exposure to Agent Orange     while serving in [facility]   • Former tobacco use    • H/O urinary retention     w/ reagan placement   • Hyperlipidemia    • Hypertension    • Rupture of biceps tendon, traumatic 2017    right       Past Surgical History:   Procedure Laterality Date   • CARDIAC CATHETERIZATION     • CARDIAC CATHETERIZATION Left 2023    Procedure: Cardiac Left Heart Cath;  Surgeon: Fei Angela MD;  Location: [facility] CARD[facility]AC [facility]ATH [facility]; Service: Cardiology   • CARDIAC CATHETERIZATION N/A 2023    Procedure: Cardiac Coronary Angiogram;  Surgeon: Fei Angela MD;  Location: [facility]ARD[facility]; Service: Cardiology   • CATARACT EXTRACTION, BILATERAL Bilateral        Family History   Problem Relation Age of Onset   • No Known Problems Sister    • Other Daughter         chronic neck pain   • Other Son         chronic back pain   • Sudden death Neg Hx    • Cancer Neg Hx      I have reviewed and agree with the history as documented  E-Cigarette/Vaping   • E-Cigarette Use Never User      E-Cigarette/Vaping Substances     Social History     Tobacco Use   • Smoking status: Former     Packs/day: 1 00     Years: 6      Pack years:      Types: Cigarettes     Start date:      Quit date:      Years since quittin 2   • Smokeless tobacco: Never   Vaping Use   • Vaping Use: Never used   Substance Use Topics   • Alcohol use: Not Currently     Comment: Denies history of heavy alcohol use  At most will drink 1 or 2 drinks in a year (Updated 2023)  • Drug use: Never       Review of Systems   Constitutional: Negative for chills, diaphoresis, fatigue and fever  HENT: Negative for congestion, ear pain, mouth sores, rhinorrhea, sinus pain, sore throat and trouble swallowing  Eyes: Negative for photophobia and visual disturbance  Respiratory: Negative for cough, chest tightness, shortness of breath and wheezing  Cardiovascular: Negative for chest pain, palpitations and leg swelling  Gastrointestinal: Negative for abdominal pain, blood in stool, constipation, diarrhea, nausea and vomiting  Genitourinary: Negative for dysuria, flank pain, frequency, hematuria and urgency  Musculoskeletal: Negative for arthralgias, back pain, gait problem, joint swelling, myalgias and neck pain     Skin: Positive for wound (bilateral heels)  Negative for color change, pallor and rash  Neurological: Negative for dizziness, syncope, speech difficulty, weakness, light-headedness, numbness and headaches  Psychiatric/Behavioral: Negative for confusion and sleep disturbance  All other systems reviewed and are negative  Physical Exam  Physical Exam  Vitals and nursing note reviewed  Constitutional:       General: He is awake  He is not in acute distress  Appearance: Normal appearance  He is well-developed  He is not ill-appearing or diaphoretic  HENT:      Head: Normocephalic and atraumatic  Right Ear: External ear normal       Left Ear: External ear normal       Nose: Nose normal       Mouth/Throat:      Lips: Pink  Mouth: Mucous membranes are moist    Eyes:      General: Lids are normal  No scleral icterus  Conjunctiva/sclera: Conjunctivae normal       Pupils: Pupils are equal, round, and reactive to light  Cardiovascular:      Rate and Rhythm: Normal rate and regular rhythm  Pulses:           Radial pulses are 2+ on the right side and 2+ on the left side  Dorsalis pedis pulses are 0 on the right side and detected w/ Doppler on the left side  Posterior tibial pulses are detected w/ Doppler on the right side and detected w/ Doppler on the left side  Heart sounds: Normal heart sounds, S1 normal and S2 normal    Pulmonary:      Effort: Pulmonary effort is normal  No accessory muscle usage  Breath sounds: Normal breath sounds  No stridor  No decreased breath sounds, wheezing, rhonchi or rales  Abdominal:      General: Abdomen is flat  Bowel sounds are normal  There is no distension  Palpations: Abdomen is soft  Tenderness: There is no abdominal tenderness  There is no right CVA tenderness, left CVA tenderness, guarding or rebound  Musculoskeletal:      Cervical back: Full passive range of motion without pain, normal range of motion and neck supple   No signs of trauma  No pain with movement  Normal range of motion  Right lower le+ Pitting Edema present  Left lower le+ Pitting Edema present  Feet:    Lymphadenopathy:      Cervical: No cervical adenopathy  Skin:     General: Skin is warm and dry  Capillary Refill: Capillary refill takes 2 to 3 seconds  Coloration: Skin is not cyanotic, jaundiced or pale  Neurological:      Mental Status: He is alert and oriented to person, place, and time  GCS: GCS eye subscore is 4  GCS verbal subscore is 5  GCS motor subscore is 6  Cranial Nerves: No dysarthria or facial asymmetry  Gait: Gait normal    Psychiatric:         Attention and Perception: Attention normal          Mood and Affect: Mood normal          Speech: Speech normal          Behavior: Behavior normal  Behavior is cooperative           Vital Signs  ED Triage Vitals   Temperature Pulse Respirations Blood Pressure SpO2   23 1022 23 1022 23 1022 23 1022 23 1022   (!) 97 1 °F (36 2 °C) 96 15 167/79 96 %      Temp Source Heart Rate Source Patient Position - Orthostatic VS BP Location FiO2 (%)   23 1022 23 1022 23 1022 23 1022 --   Tympanic Monitor Lying Right arm       Pain Score       23 1415       No Pain           Vitals:    23 1022 23 1415   BP: 167/79 149/72   Pulse: 96 93   Patient Position - Orthostatic VS: Lying Lying         Visual Acuity      ED Medications  Medications   vancomycin (VANCOCIN) 1250 mg in sodium chloride 0 9% 250 mL IVPB (1,250 mg Intravenous New Bag 3/21/23 1210)   cefepime (MAXIPIME) IVPB (premix in dextrose) 2,000 mg 50 mL (0 mg Intravenous Stopped 3/21/23 1208)       Diagnostic Studies  Results Reviewed     Procedure Component Value Units Date/Time    Blood culture #1 [[id number]] Collected: 23 1137    Lab Status: Preliminary result Specimen: Blood from Arm, Left Updated: 23 180     Blood Culture Received in Microbiology Lab  Culture in Progress  Blood culture #2 [[id number]] Collected: 03/21/23 1137    Lab Status: Preliminary result Specimen: Blood from Arm, Left Updated: 03/21/23 1801     Blood Culture Received in Microbiology Lab  Culture in Progress  Lactic acid [[id number]]  (Normal) Collected: 03/21/23 1137    Lab Status: Final result Specimen: Blood from Arm, Left Updated: 03/21/23 1206     LACTIC ACID 1 1 mmol/L     Narrative:      Result may be elevated if tourniquet was used during collection      Comprehensive metabolic panel [[id number]]  (Abnormal) Collected: 03/21/23 1137    Lab Status: Final result Specimen: Blood from Arm, Left Updated: 03/21/23 1203     Sodium 138 mmol/L      Potassium 3 9 mmol/L      Chloride 95 mmol/L      CO2 31 mmol/L      ANION GAP 12 mmol/L      BUN 18 mg/dL      Creatinine 0 77 mg/dL      Glucose 91 mg/dL      Calcium 9 0 mg/dL      Corrected Calcium 9 6 mg/dL      AST 20 U/L      ALT 14 U/L      Alkaline Phosphatase 153 U/L      Total Protein 7 5 g/dL      Albumin 3 3 g/dL      Total Bilirubin 1 10 mg/dL      eGFR 88 ml/min/1 73sq m     Narrative:      [place] guidelines for Chronic Kidney Disease (CKD):   •  Stage 1 with normal or high GFR (GFR > 90 mL/min/1 73 square meters)  •  Stage 2 Mild CKD (GFR = 60-89 mL/min/1 73 square meters)  •  Stage 3A Moderate CKD (GFR = 45-59 mL/min/1 73 square meters)  •  Stage 3B Moderate CKD (GFR = 30-44 mL/min/1 73 square meters)  •  Stage 4 Severe CKD (GFR = 15-29 mL/min/1 73 square meters)  •  Stage 5 End Stage CKD (GFR <15 mL/min/1 73 square meters)  Note: GFR calculation is accurate only with a steady state creatinine    C-reactive protein [[id number]]  (Abnormal) Collected: 03/21/23 1137    Lab Status: Final result Specimen: Blood from Arm, Left Updated: 03/21/23 1203      1 mg/L     Sedimentation rate, automated [[id number]]  (Abnormal) Collected: 03/21/23 1137    Lab Status: Final result Specimen: Blood from Arm, Left Updated: 03/21/23 1154     Sed Rate 75 mm/hour     CBC and differential [[id number]]  (Abnormal) Collected: 03/21/23 1137    Lab Status: Final result Specimen: Blood from Arm, Left Updated: 03/21/23 1146     WBC 12 30 Thousand/uL      RBC 4 55 Million/uL      Hemoglobin 11 7 g/dL      Hematocrit 38 0 %      MCV 84 fL      MCH 25 7 pg      MCHC 30 8 g/dL      RDW 18 2 %      MPV 8 4 fL      Platelets 949 Thousands/uL      nRBC 0 /100 WBCs      Neutrophils Relative 83 %      Immat GRANS % 1 %      Lymphocytes Relative 9 %      Monocytes Relative 6 %      Eosinophils Relative 1 %      Basophils Relative 0 %      Neutrophils Absolute 10 24 Thousands/µL      Immature Grans Absolute 0 07 Thousand/uL      Lymphocytes Absolute 1 14 Thousands/µL      Monocytes Absolute 0 75 Thousand/µL      Eosinophils Absolute 0 07 Thousand/µL      Basophils Absolute 0 03 Thousands/µL                  VAS lower limb arterial duplex, complete bilateral   Final Result by Rosa Santos MD (03/21 1644)      XR foot 3+ views RIGHT   Final Result by Estefany Sanabria DO (03/21 1607)      Plantar heel ulcer without radiographic changes of osteomyelitis  Workstation performed: [id number]         XR foot 3+ views LEFT   Final Result by Estefany Sanabria DO (03/21 1618)      No acute osseous abnormality  Workstation performed: [id number]                    Procedures  Procedures         ED Course  ED Course as of 03/21/23 2025   Tue Mar 21, 2023   1402 50 to 75% stenosis in bilateral mid SFAs and greater than 75% stenosis in his left distal popliteal artery  His right toe is below healing potential  His BRADY on the right is moderate to severe disease and on his left is moderate disease                                               Medical Decision Making  Patient is a 68 y/o male with a PMHx of CAD, CHF, DM 2, HLD, HTN and pressure wounds of bilateral heels, presenting to the ED for evaluation of a wound infection  Labs notable for a WBC count of 12 3 and a significantly elevated ESR/CRP  Blood cultures obtained and patient was started on broad-spectrum antibiotics  X-rays show no radiographic evidence of osteomyelitis or soft tissue gas  Arterial duplexes were obtained and showed moderate to severe disease of the right lower extremity (BRADY 0 67) and moderate disease of the left lower extremity (BRADY 0 8)  Discussed with vascular surgery, podiatry and IR and patient will require transfer to [facility] for angiogram and vascular intervention prior to podiatry intervention  Patient was accepted by [facility] at [facility] for transfer/admission  Patient aware of management plan and is in agreement  Amount and/or Complexity of Data Reviewed  Labs: ordered  Radiology: ordered  Discussion of management or test interpretation with external provider(s): Ana Paula Guerin PA-C (Vascular surgery)  Dr Hilario Osman (IR)  Dr Tanisha De (podiatry)  Dr Ryan Grimaldo ([facility])    Risk  Prescription drug management  Decision regarding hospitalization  Disposition  Final diagnoses:   Diabetic ulcer of right heel ([address] )   Pressure ulcer, unstageable, with eschar ([address] )   Diabetic ulcer of left heel ([address] )     Time reflects when diagnosis was documented in both MDM as applicable and the Disposition within this note     Time User Action Codes Description Comment    3/21/2023  1:08 PM Rashi Buitrago [S32 806,  L97 419] Diabetic ulcer of right heel ([address] )     3/21/2023  2:00 PM Juancho Head Add [N17 2] 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 ([address] )     3/21/2023  2:00 PM Heaven Proctor Remove [N17 2] 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 ([address] )     3/21/2023  2:00 PM Heaven Proctor Jenny Salvia  8XXA,  L08 9] Wound infection     3/21/2023  2:01 PM Heaven Proctor [L89 95] Pressure ulcer, unstageable, with eschar ([address] )     3/21/2023  2:01 PM Radha Proctor [C02 5DJO,  L08 9] Wound infection     3/21/2023  2:01 PM Heaven Proctor Add [R23 4] Eschar of heel     3/21/2023  2:01 PM Heaven Proctor Remove [R23 4] Eschar of heel     3/21/2023  2:02 PM Juancho Head Add [E11 621,  L97 429] Diabetic ulcer of left heel [facility])       ED Disposition     ED Disposition   Transfer to Another Facility-In Network    Condition   --    Date/Time   Tue Mar 21, 2023  3:46 PM    Comment   Mely Oleary should be transferred out to [address]             MD Documentation    Floyce Barefoot Most Recent Value   Patient Condition The patient has been stabilized such that within reasonable medical probability, no material deterioration of the patient condition or the condition of the unborn child(sarkis) is likely to result from the transfer   Reason for Transfer Level of Care needed not available at this facility   Benefits of Transfer Specialized equipment and/or services available at the receiving facility (Include comment)________________________, Continuity of care  [Vascular surgery, Podiatry, IR]   Risks of Transfer Potential for delay in receiving treatment, Potential deterioration of medical condition, Loss of IV, Increased discomfort during transfer, Possible worsening of condition or death during transfer   Accepting Physician Dr Eric Gregorio NameGabbi MD [facility]   Provider Certification General risk, such as traffic hazards, adverse weather conditions, rough terrain or turbulence, possible failure of equipment (including vehicle or aircraft), or consequences of actions of persons outside the control of the transport personnel, Unanticipated needs of medical equipment and personnel during transport, Risk of worsening condition      RN Documentation    Flowsheet Row [address] [facility] Name, Gabbi Rodrigues   Bed Assignment    Report Given to [facility]   Level of Care Basic life support   Patient Belongings Disposition Sent with patient      Follow-up Information    None         Discharge Medication List as of 3/21/2023  7:40 PM      CONTINUE these medications which have NOT CHANGED    Details   acetaminophen (TYLENOL) 325 mg tablet Take 2 tablets (650 mg total) by mouth every 6 (six) hours as needed (mild pain), Starting Sun 2/19/2023, No Print      aspirin (ECOTRIN LOW STRENGTH) 81 mg EC tablet Take 1 tablet (81 mg total) by mouth daily, Starting Tue 3/7/2023, Normal      atorvastatin (LIPITOR) 40 mg tablet Take 1 tablet (40 mg total) by mouth daily with dinner, Starting Tue 3/7/2023, Normal      calcium carbonate (TUMS) 500 mg chewable tablet Chew 2 tablets (1,000 mg total) daily as needed (indigestion,heartburn), Starting Sun 2/19/2023, No Print      cephalexin (KEFLEX) 500 mg capsule Take 1 capsule (500 mg total) by mouth every 6 (six) hours for 10 days, Starting Fri 3/17/2023, Until Mon 3/27/2023, Normal      furosemide (LASIX) 20 mg tablet Take 2 tablets (40 mg total) by mouth daily, Starting Tue 3/7/2023, Normal      insulin glargine (LANTUS) 100 units/mL subcutaneous injection Inject 7 Units under the skin daily at bedtime, Starting Sun 2/19/2023, No Print      !! insulin lispro (HumaLOG) 100 units/mL injection Inject 4 Units under the skin daily with lunch Do not start before February 20, 2023 , Starting Mon 2/20/2023, No Print      !! insulin lispro (HumaLOG) 100 units/mL injection Inject 7 Units under the skin daily with dinner, Starting Sun 2/19/2023, No Print      !! insulin lispro (HumaLOG) 100 units/mL injection Inject 4 Units under the skin daily with breakfast, Starting Tue 3/7/2023, Normal      Insulin Pen Needle (Pen Needles) 31G X 5 MM MISC Use 2 (two) times a day, Starting Tue 3/7/2023, Normal      metoprolol succinate (TOPROL-XL) 50 mg 24 hr tablet Take 1 tablet (50 mg total) by mouth every 12 (twelve) hours, Starting Tue 3/7/2023, Normal      potassium chloride (K-DUR,KLOR-CON) 20 mEq tablet 1 tablet twice daily for 2 days then decrease dose to 1 tablet daily, Normal      sacubitril-valsartan (Entresto) 49-51 MG TABS Take 1 tablet by mouth 2 (two) times a day, Starting Tue 3/7/2023, Normal      spironolactone (ALDACTONE) 25 mg tablet Take 0 5 tablets (12 5 mg total) by mouth 2 (two) times a day, Starting Tue 3/7/2023, Normal      tamsulosin (FLOMAX) 0 4 mg Take 1 capsule (0 4 mg total) by mouth daily with dinner, Starting Tue 3/7/2023, Normal       !! - Potential duplicate medications found  Please discuss with provider  No discharge procedures on file      PDMP Review     None          ED Provider  Electronically Signed by           Lyudmila Roblero PA-C  03/21/23 2025

## 2023-03-21 NOTE — PATIENT INSTRUCTIONS
Orders Placed This Encounter   Procedures    Wound cleansing and dressings     Right and Left heel   Apply betadine and ABD to wounds  Wrap and kerlix  Secure with tape  Culture of right heel taken today  Please go to ER directly       Standing Status:   Future     Standing Expiration Date:   3/21/2024

## 2023-03-21 NOTE — CONSULTS
Consultation - Vascular Surgery   Richa Adams 69 y o  male MRN: [id number]  Unit/Bed#: PW53 Encounter: [id number]      Assessment/Plan      Assessment:  75yo M with PMH significant for CAD, CHF, uncontrolled T2DM, HLD presenting from Podiatry office with infection of chronic lower extremity wounds   - Patient seen by Dr Ferny Owusu earlier today who recommended pt present to ED for management of b/l heel ulcers and vascular consultation prior to any further podiatric intervention  Wound cx obtained in podiatry office    - AVSS on RA  - WBC 12 3  - Elevated ESR/CRP  - last A1c (03/07/23) 10 5    Plan:  - follow up LEADs, if positive for significant arterial disease requiring intervention will need transfer for angiogram prior to Podiatry intervention   - Trend AM labs, monitor vitals   - follow up wound cx from podiatry office , blood cx   - Local wound care to b/l LE wounds per podiatry recs     Discussed with ED provider and Vascular surgery attending, Dr Bal Grimaldo Doctor     History of Present Illness   Physician Requesting Consult: Jamie Marie,*  Reason for Consult / Principal Problem: PAD  HPI: Richa Adams is a 69y o  year old male w/ PMH of who CAD, CHF, uncontrolled T2DM, HLD presents from podiatry office for further management of infected bilateral lower extremity wounds and vascular workup  He reports worsening redness, drainage, and pain over the past few weeks, right sided greater than left  Denies current tobacco use, has remote hx of tobacco use    Inpatient consult to Vascular Surgery  Consult performed by: Georgina Recinos PA-C  Consult ordered by: Jemima Hines PA-C          Review of Systems   Constitutional: Negative for chills and fever  Respiratory: Negative for shortness of breath  Cardiovascular: Negative for chest pain  Gastrointestinal: Negative for abdominal pain  Skin: Positive for color change and wound (b/l heels)     Psychiatric/Behavioral: Negative for confusion  All other Ros negative unless stated above     Historical Information   Past Medical History:   Diagnosis Date   • 6th nerve palsy    • Blister of right leg    • BPH (benign prostatic hyperplasia)    • CAD (coronary artery disease)    • Cardiomyopathy ([facility])     EF 25%   • CHF (congestive heart failure) ([facility])    • Depression    • Diabetes mellitus ([facility] )     type 2, insulin dependent   • Diabetic foot ulcer ([facility] )     left, local wound care   • Diabetic retinopathy ([facility] )    • Exposure to Agent Orange     while serving in [facility]   • Former tobacco use    • H/O urinary retention     w/ reagan placement   • Hyperlipidemia    • Hypertension    • Rupture of biceps tendon, traumatic 2017    right     Past Surgical History:   Procedure Laterality Date   • CARDIAC CATHETERIZATION     • CARDIAC CATHETERIZATION Left 2023    Procedure: Cardiac Left Heart Cath;  Surgeon: Rodriguez Vidal MD;  Location: [facility] CARDIAC CATH LAB; Service: Cardiology   • CARDIAC CATHETERIZATION N/A 2023    Procedure: Cardiac Coronary Angiogram;  Surgeon: Rodriguez Vidal MD;  Location: [facility] CARDIAC CATH LAB; Service: Cardiology   • CATARACT EXTRACTION, BILATERAL Bilateral      Social History   Social History     Substance and Sexual Activity   Alcohol Use Not Currently    Comment: Denies history of heavy alcohol use  At most will drink 1 or 2 drinks in a year (Updated 2023)       Social History     Substance and Sexual Activity   Drug Use Never     E-Cigarette/Vaping   • E-Cigarette Use Never User      E-Cigarette/Vaping Substances     Social History     Tobacco Use   Smoking Status Former   • Packs/day: 1 00   • Years: 6 00   • Pack years: 6 00   • Types: Cigarettes   • Start date:    • Quit date:    • Years since quittin 3   Smokeless Tobacco Never     Family History: non-contributory}    Meds/Allergies   all current active meds have been reviewed  Allergies   Allergen Reactions   • Metformin Diarrhea Objective   Vitals: Blood pressure 167/79, pulse 96, temperature (!) 97 1 °F (36 2 °C), temperature source Tympanic, resp  rate 15, height 5' 10" (1 778 m), weight 61 4 kg (135 lb 5 8 oz), SpO2 96 %  ,Body mass index is 19 42 kg/m²  Intake/Output Summary (Last 24 hours) at 3/21/2023 1315  Last data filed at 3/21/2023 1208  Gross per 24 hour   Intake 50 ml   Output --   Net 50 ml     Invasive Devices     Peripheral Intravenous Line  Duration           Peripheral IV 23 Left Antecubital <1 day                Physical Exam  Vitals and nursing note reviewed  Constitutional:       General: He is awake  He is not in acute distress  Appearance: He is not toxic-appearing  HENT:      Head: Normocephalic and atraumatic  Cardiovascular:      Rate and Rhythm: Normal rate  Pulses:           Radial pulses are 2+ on the right side and 2+ on the left side  Dorsalis pedis pulses are detected w/ Doppler on the right side and 1+ on the left side  Posterior tibial pulses are detected w/ Doppler on the right side and detected w/ Doppler on the left side  Pulmonary:      Effort: Pulmonary effort is normal  No respiratory distress  Breath sounds: Normal breath sounds  Abdominal:      Palpations: Abdomen is soft  Tenderness: There is no abdominal tenderness  Musculoskeletal:      Right lower le+ Pitting Edema present  Left lower le+ Pitting Edema present  Feet:      Comments: B/l heels wrapped with gauze, photos reviewed   Skin:     General: Skin is warm  Capillary Refill: Capillary refill takes 2 to 3 seconds  Neurological:      General: No focal deficit present  Mental Status: He is alert and oriented to person, place, and time  Psychiatric:         Mood and Affect: Mood normal          Behavior: Behavior normal  Behavior is cooperative  Lab Results:   I have personally reviewed pertinent reports    , CBC with diff:   Lab Results   Component Value Date    WBC 12 30 (H) 03/21/2023    HGB 11 7 (L) 03/21/2023    HCT 38 0 03/21/2023    MCV 84 03/21/2023     03/21/2023    MCH 25 7 (L) 03/21/2023    MCHC 30 8 (L) 03/21/2023    RDW 18 2 (H) 03/21/2023    MPV 8 4 (L) 03/21/2023    NRBC 0 03/21/2023   , BMP/CMP:   Lab Results   Component Value Date    SODIUM 138 03/21/2023    K 3 9 03/21/2023    CL 95 (L) 03/21/2023    CO2 31 03/21/2023    BUN 18 03/21/2023    CREATININE 0 77 03/21/2023    CALCIUM 9 0 03/21/2023    AST 20 03/21/2023    ALT 14 03/21/2023    ALKPHOS 153 (H) 03/21/2023    EGFR 88 03/21/2023     Imaging Studies: I have personally reviewed pertinent reports  EKG, Pathology, and Other Studies: I have personally reviewed pertinent reports  Code Status: Prior    Counseling / Coordination of Care  Counseling/Coordination of Care: Total floor / unit time spent today 45 minutes  Greater than 50% of total time was spent with the patient and / or family counseling and / or coordination of care  A description of the counseling / coordination of care: discussion with pt, ED provider, vascular team     Vaishali Lozoya  03/21/23  **Please Note: This note may have been constructed using a voice recognition system  **

## 2023-03-21 NOTE — EMTALA/ACUTE CARE TRANSFER
[facility] EMERGENCY DEPARTMENT  [address] [address]  Dept: [phone]      EMTALA TRANSFER CONSENT    NAME Agustina Titus                                                                       MRN [id number]    I have been informed of my rights regarding examination, treatment, and transfer   by Dr Tonja Fernandez,*    Benefits: Specialized equipment and/or services available at the receiving facility (Include comment)________________________, Continuity of care (Vascular surgery, Podiatry, IR)    Risks: Potential for delay in receiving treatment, Potential deterioration of medical condition, Loss of IV, Increased discomfort during transfer, Possible worsening of condition or death during transfer      Consent for Transfer:  I acknowledge that my medical condition has been evaluated and explained to me by the emergency department physician or other qualified medical person and/or my attending physician, who has recommended that I be transferred to the service of  Accepting Physician: Dr Chayito Zhong at [address] Name, [address] : [facility]  The above potential benefits of such transfer, the potential risks associated with such transfer, and the probable risks of not being transferred have been explained to me, and I fully understand them  The doctor has explained that, in my case, the benefits of transfer outweigh the risks  I agree to be transferred  I authorize the performance of emergency medical procedures and treatments upon me in both transit and upon arrival at the receiving facility  Additionally, I authorize the release of any and all medical records to the receiving facility and request they be transported with me, if possible  I understand that the safest mode of transportation during a medical emergency is an ambulance and that the Hospital advocates the use of this mode of transport   Risks of traveling to the receiving facility by car, including absence of medical control, life sustaining equipment, such as oxygen, and medical personnel has been explained to me and I fully understand them  (KATY CORRECT BOX BELOW)  [  ]  I consent to the stated transfer and to be transported by ambulance/helicopter  [  ]  I consent to the stated transfer, but refuse transportation by ambulance and accept full responsibility for my transportation by car  I understand the risks of non-ambulance transfers and I exonerate the Hospital and its staff from any deterioration in my condition that results from this refusal     X___________________________________________    DATE  23  TIME________  Signature of patient or legally responsible individual signing on patient behalf           RELATIONSHIP TO PATIENT_________________________          Provider Certification    NAME Angus Littlejohn                                        [facility] 1945                              MRN [id number]    A medical screening exam was performed on the above named patient  Based on the examination:    Condition Necessitating Transfer The primary encounter diagnosis was Diabetic ulcer of right heel ([address] )  Diagnoses of Pressure ulcer, unstageable, with eschar ([address] ) and Diabetic ulcer of left heel ([address] ) were also pertinent to this visit      Patient Condition: The patient has been stabilized such that within reasonable medical probability, no material deterioration of the patient condition or the condition of the unborn child(sarkis) is likely to result from the transfer    Reason for Transfer: Level of Care needed not available at this facility    Transfer Requirements: [address]   · Space available and qualified personnel available for treatment as acknowledged by    · Agreed to accept transfer and to provide appropriate medical treatment as acknowledged by       Dr Shyam Xiong  · Appropriate medical records of the examination and treatment of the patient are provided at the time of transfer   [address] _______  · Transfer will be performed by qualified personnel from    and appropriate transfer equipment as required, including the use of necessary and appropriate life support measures  Provider Certification: I have examined the patient and explained the following risks and benefits of being transferred/refusing transfer to the patient/family:  General risk, such as traffic hazards, adverse weather conditions, rough terrain or turbulence, possible failure of equipment (including vehicle or aircraft), or consequences of actions of persons outside the control of the transport personnel, Unanticipated needs of medical equipment and personnel during transport, Risk of worsening condition      Based on these reasonable risks and benefits to the patient and/or the unborn child(sarkis), and based upon the information available at the time of the patient’s examination, I certify that the medical benefits reasonably to be expected from the provision of appropriate medical treatments at another medical facility outweigh the increasing risks, if any, to the individual’s medical condition, and in the case of labor to the unborn child, from effecting the transfer      X____________________________________________ DATE 03/21/23        TIME_______      ORIGINAL - SEND TO MEDICAL RECORDS   COPY - SEND WITH PATIENT DURING TRANSFER

## 2023-03-21 NOTE — RESULT ENCOUNTER NOTE
Call patient to notify normal results calcium level has come up very nicely I believe he is taking 2 pills a day of his potassium supplement if that is the case we need to decrease it to 1 pill a day if he is really on 1 pill a day then we need to decrease him to 1 pill on even days of the month only and that way we will not overshoot the paco with his potassium he needs another potassium level done on Monday

## 2023-03-21 NOTE — CASE MANAGEMENT
Case Management Progress Note    Patient name Faye Cuellar  Location PZ61/MS11 MRN [id number]  : 1945 Date 3/21/2023       LOS (days): 0  Geometric Mean LOS (GMLOS) (days):   Days to GMLOS:        OBJECTIVE:        Current admission status: Emergency  Preferred Pharmacy:   [facility] [place] [address] [facility]  [address] [address]  Phone: [phone] Fax: [address] [place] [address] [address] [facility] [address] [place] [address] [facility] [address] [address]  Phone: [phone] Fax: [phone]    Primary Care Provider: Brooks Chapa DO    Primary Insurance: MEDICARE  Secondary Insurance: [id number] [facility]    PROGRESS NOTE:     CM responded to Code R alert  RECENT ADMISSION:  Pt was hospitalized at [facility] from 2/10/23- 23 after being treated for Foot Ulcer on L, CAD and CHF  Pt did have a left cardiac cath on 23  AGE:  69  SEX:  M  DX:  CHF, CAD , L foot ulcer   OUTCOME: Pt was discharged to [address] [place] [address] and [facility] D/C (previous admission): Pt was discharged to [address] [place] [address] and Rehab  I called [facility] to see how many days was there  PT was then set up with [facility] home care when discharged from  [place]  Pt was [facility] up with [facility]      CURRENT F/U APPTS:  3/23/23 Cardiology, 3/24/23 Urology   *REASON FOR READMISSION: Pt was sent by podiatry for wound check to Bilateral heels   *INTERVENTIONS:As per physician patient is being transferred to [facility]

## 2023-03-22 ENCOUNTER — PATIENT OUTREACH (OUTPATIENT)
Dept: FAMILY MEDICINE CLINIC | Facility: CLINIC | Age: 78
End: 2023-03-22

## 2023-03-22 ENCOUNTER — TELEPHONE (OUTPATIENT)
Dept: FAMILY MEDICINE CLINIC | Facility: CLINIC | Age: 78
End: 2023-03-22

## 2023-03-22 PROBLEM — A41.9 SEPSIS (HCC): Status: ACTIVE | Noted: 2023-02-08

## 2023-03-22 PROBLEM — L97.519 DIABETIC ULCER OF BOTH FEET ASSOCIATED WITH TYPE 2 DIABETES MELLITUS (HCC): Status: ACTIVE | Noted: 2023-02-21

## 2023-03-22 PROBLEM — I73.9 PAD (PERIPHERAL ARTERY DISEASE) (HCC): Status: ACTIVE | Noted: 2023-03-22

## 2023-03-22 PROBLEM — I10 ESSENTIAL HYPERTENSION: Status: ACTIVE | Noted: 2023-03-22

## 2023-03-22 PROBLEM — I50.22 CHRONIC SYSTOLIC CHF (CONGESTIVE HEART FAILURE) (HCC): Status: ACTIVE | Noted: 2023-02-08

## 2023-03-22 NOTE — PROGRESS NOTES
TRISTIAN MOLINA received ADT alert  Patient currently admitted at [facility]  TRISTIAN MOLINA will remain available for needs and f/u when patient is d/c

## 2023-03-22 NOTE — TELEPHONE ENCOUNTER
Patient currently admitted   Will follow up with patient s/p discharge to see if he wishes to proceed with appointment on 3/24 with Mayelin Gary

## 2023-03-22 NOTE — TELEPHONE ENCOUNTER
Pt is currently inpatient - BUT, [facility] stated they spoke to patient and he does NOT want to be a patient with [facility] at this time  ALSO, he has received a message reminding him of appt and has not indicated he would like to cancel, family is asking we keep him on schedule    (?)  This is a FYI

## 2023-03-23 ENCOUNTER — ANESTHESIA EVENT (INPATIENT)
Dept: PERIOP | Facility: HOSPITAL | Age: 78
End: 2023-03-23

## 2023-03-23 NOTE — ANESTHESIA PREPROCEDURE EVALUATION
Procedure:  Heel wound debridement (Right: Foot)    Relevant Problems   CARDIO  s/p cath and stents within 2 months   (+) CAD (coronary artery disease)   (+) Essential hypertension      ENDO   (+) Type 2 diabetes mellitus with hyperglycemia, with long-term current use of insulin (HCC)      Cardiovascular and Mediastinum   (+) Chronic systolic CHF (congestive heart failure) (HCC)      Other   (+) Moderate protein-calorie malnutrition (HCC)        Physical Exam    Airway    Mallampati score: II  TM Distance: <3 FB  Neck ROM: full     Dental   upper dentures and lower dentures,     Cardiovascular  Rhythm: regular, Rate: normal, Cardiovascular exam normal    Pulmonary  Pulmonary exam normal Breath sounds clear to auscultation,     Other Findings        Anesthesia Plan  ASA Score- 4 Emergent    Anesthesia Type- general with ASA Monitors  Additional Monitors:   Airway Plan:     Comment: •  Left Ventricle: Left ventricular cavity size is moderately dilated  Wall thickness is increased  There is borderline concentric hypertrophy  The left ventricular ejection fraction is 25%  Systolic function is severely reduced  There is severe global hypokinesis with regional variation  •  Left Atrium: The atrium is mildly dilated  •  Mitral Valve: There is mild regurgitation  •  Tricuspid Valve: There is mild regurgitation  The estimated right ventricular systolic pressure is 33 65 mmHg  •  Pericardium: There is a large left pleural effusion          Plan Factors-    Chart reviewed  EKG reviewed  Existing labs reviewed  Patient summary reviewed  Patient is not a current smoker  Induction- intravenous  Postoperative Plan-     Informed Consent- Anesthetic plan and risks discussed with patient

## 2023-03-23 NOTE — TELEPHONE ENCOUNTER
Patient still admitted at this time  Clerical aware and will keep appointment as scheduled for now  Clerical to cancel tomorrow morning if patient remains admitted

## 2023-03-24 ENCOUNTER — ANESTHESIA (INPATIENT)
Dept: PERIOP | Facility: HOSPITAL | Age: 78
End: 2023-03-24

## 2023-03-24 LAB — BACTERIA SPEC ANAEROBE CULT: ABNORMAL

## 2023-03-24 RX ORDER — KETAMINE HCL IN NACL, ISO-OSM 100MG/10ML
SYRINGE (ML) INJECTION AS NEEDED
Status: DISCONTINUED | OUTPATIENT
Start: 2023-03-24 | End: 2023-03-24

## 2023-03-24 RX ORDER — SODIUM CHLORIDE, SODIUM LACTATE, POTASSIUM CHLORIDE, CALCIUM CHLORIDE 600; 310; 30; 20 MG/100ML; MG/100ML; MG/100ML; MG/100ML
INJECTION, SOLUTION INTRAVENOUS CONTINUOUS PRN
Status: DISCONTINUED | OUTPATIENT
Start: 2023-03-24 | End: 2023-03-24

## 2023-03-24 RX ORDER — MIDAZOLAM HYDROCHLORIDE 2 MG/2ML
INJECTION, SOLUTION INTRAMUSCULAR; INTRAVENOUS AS NEEDED
Status: DISCONTINUED | OUTPATIENT
Start: 2023-03-24 | End: 2023-03-24

## 2023-03-24 RX ADMIN — SODIUM CHLORIDE, SODIUM LACTATE, POTASSIUM CHLORIDE, AND CALCIUM CHLORIDE: .6; .31; .03; .02 INJECTION, SOLUTION INTRAVENOUS at 15:56

## 2023-03-24 RX ADMIN — MIDAZOLAM 1 MG: 1 INJECTION INTRAMUSCULAR; INTRAVENOUS at 16:01

## 2023-03-24 RX ADMIN — Medication 30 MG: at 16:01

## 2023-03-24 NOTE — ANESTHESIA POSTPROCEDURE EVALUATION
Post-Op Assessment Note    CV Status:  Stable  Pain Score: 2    Pain management: adequate     Mental Status:  Alert and awake   Hydration Status:  Euvolemic and stable   PONV Controlled:  Controlled   Airway Patency:  Patent      Post Op Vitals Reviewed: Yes      Staff: Anesthesiologist         No notable events documented      BP      Temp     Pulse     Resp      SpO2      /64 (BP Location: Left arm)   Pulse 63   Temp (!) 97 1 °F (36 2 °C) (Temporal)   Resp 13   Ht 5' 10" (1 778 m)   Wt 63 8 kg (140 lb 10 5 oz)   SpO2 95%   BMI 20 18 kg/m²

## 2023-03-25 LAB
BACTERIA BLD CULT: ABNORMAL
BACTERIA WND AEROBE CULT: ABNORMAL
GRAM STN SPEC: ABNORMAL
S AUREUS+CONS DNA BLD POS NAA+NON-PROBE: DETECTED

## 2023-03-26 PROBLEM — F32.A DEPRESSION: Status: ACTIVE | Noted: 2023-03-26

## 2023-03-26 LAB — BACTERIA BLD CULT: NORMAL

## 2023-03-27 PROBLEM — E44.0 MODERATE PROTEIN-CALORIE MALNUTRITION (HCC): Status: ACTIVE | Noted: 2023-03-27

## 2023-03-27 PROBLEM — I25.5 ISCHEMIC CARDIOMYOPATHY: Status: ACTIVE | Noted: 2023-03-27

## 2023-03-28 ENCOUNTER — ANESTHESIA EVENT (INPATIENT)
Dept: PERIOP | Facility: HOSPITAL | Age: 78
End: 2023-03-28

## 2023-03-29 NOTE — ANESTHESIA PREPROCEDURE EVALUATION
Procedure:  (BKA), PSB AKA (Right: Leg Lower)    Relevant Problems   CARDIO   (+) CAD (coronary artery disease)   (+) Essential hypertension   (+) PAD (peripheral artery disease) (HCC)      ENDO   (+) Type 2 diabetes mellitus with hyperglycemia, with long-term current use of insulin (HCC)      NEURO/PSYCH   (+) Depression      Cardiovascular and Mediastinum   (+) Cardiomyopathy (HCC)   (+) Chronic systolic CHF (congestive heart failure) (HCC)   (+) Ischemic cardiomyopathy      Endocrine   (+) Diabetic ulcer of both feet associated with type 2 diabetes mellitus ([facility] )        Physical Exam    Airway    Mallampati score: I  TM Distance: >3 FB  Neck ROM: full     Dental   upper dentures and lower dentures,     Cardiovascular  Rhythm: regular, Rate: normal, Cardiovascular exam normal    Pulmonary  Pulmonary exam normal     Other Findings        Anesthesia Plan  ASA Score- 4     Anesthesia Type- general with ASA Monitors  Additional Monitors:   Airway Plan: ETT  Plan Factors-Exercise tolerance (METS): >4 METS  Chart reviewed  Existing labs reviewed  Patient summary reviewed  Patient is not a current smoker  Induction- intravenous  Postoperative Plan-     Informed Consent- Anesthetic plan and risks discussed with patient

## 2023-03-29 NOTE — RESULT ENCOUNTER NOTE
Please call the patient regarding his abnormal result    There is no need to call the patient this is a hospital report he is currently in the hospital

## 2023-03-30 ENCOUNTER — ANESTHESIA (INPATIENT)
Dept: PERIOP | Facility: HOSPITAL | Age: 78
End: 2023-03-30

## 2023-03-30 RX ORDER — FENTANYL CITRATE 50 UG/ML
INJECTION, SOLUTION INTRAMUSCULAR; INTRAVENOUS
Status: COMPLETED | OUTPATIENT
Start: 2023-03-30 | End: 2023-03-30

## 2023-03-30 RX ORDER — GLYCOPYRROLATE 0.2 MG/ML
INJECTION INTRAMUSCULAR; INTRAVENOUS AS NEEDED
Status: DISCONTINUED | OUTPATIENT
Start: 2023-03-30 | End: 2023-03-30

## 2023-03-30 RX ORDER — NEOSTIGMINE METHYLSULFATE 1 MG/ML
INJECTION INTRAVENOUS AS NEEDED
Status: DISCONTINUED | OUTPATIENT
Start: 2023-03-30 | End: 2023-03-30

## 2023-03-30 RX ORDER — ONDANSETRON 2 MG/ML
INJECTION INTRAMUSCULAR; INTRAVENOUS AS NEEDED
Status: DISCONTINUED | OUTPATIENT
Start: 2023-03-30 | End: 2023-03-30

## 2023-03-30 RX ORDER — PROPOFOL 10 MG/ML
INJECTION, EMULSION INTRAVENOUS AS NEEDED
Status: DISCONTINUED | OUTPATIENT
Start: 2023-03-30 | End: 2023-03-30

## 2023-03-30 RX ORDER — ROCURONIUM BROMIDE 10 MG/ML
INJECTION, SOLUTION INTRAVENOUS AS NEEDED
Status: DISCONTINUED | OUTPATIENT
Start: 2023-03-30 | End: 2023-03-30

## 2023-03-30 RX ORDER — LIDOCAINE HYDROCHLORIDE 20 MG/ML
INJECTION, SOLUTION EPIDURAL; INFILTRATION; INTRACAUDAL; PERINEURAL AS NEEDED
Status: DISCONTINUED | OUTPATIENT
Start: 2023-03-30 | End: 2023-03-30

## 2023-03-30 RX ORDER — ROPIVACAINE HYDROCHLORIDE 5 MG/ML
INJECTION, SOLUTION EPIDURAL; INFILTRATION; PERINEURAL
Status: COMPLETED | OUTPATIENT
Start: 2023-03-30 | End: 2023-03-30

## 2023-03-30 RX ORDER — MIDAZOLAM HYDROCHLORIDE 2 MG/2ML
INJECTION, SOLUTION INTRAMUSCULAR; INTRAVENOUS
Status: COMPLETED | OUTPATIENT
Start: 2023-03-30 | End: 2023-03-30

## 2023-03-30 RX ADMIN — FENTANYL CITRATE 100 MCG: 50 INJECTION INTRAMUSCULAR; INTRAVENOUS at 12:37

## 2023-03-30 RX ADMIN — NEOSTIGMINE METHYLSULFATE 3.5 MG: 1 INJECTION INTRAVENOUS at 14:24

## 2023-03-30 RX ADMIN — LIDOCAINE HYDROCHLORIDE 80 MG: 20 INJECTION, SOLUTION EPIDURAL; INFILTRATION; INTRACAUDAL; PERINEURAL at 13:20

## 2023-03-30 RX ADMIN — ONDANSETRON 4 MG: 2 INJECTION INTRAMUSCULAR; INTRAVENOUS at 14:24

## 2023-03-30 RX ADMIN — ROCURONIUM BROMIDE 40 MG: 10 INJECTION, SOLUTION INTRAVENOUS at 13:20

## 2023-03-30 RX ADMIN — SODIUM CHLORIDE, SODIUM LACTATE, POTASSIUM CHLORIDE, AND CALCIUM CHLORIDE: .6; .31; .03; .02 INJECTION, SOLUTION INTRAVENOUS at 14:32

## 2023-03-30 RX ADMIN — PROPOFOL 140 MG: 10 INJECTION, EMULSION INTRAVENOUS at 13:20

## 2023-03-30 RX ADMIN — ROPIVACAINE HYDROCHLORIDE 15 ML: 5 INJECTION EPIDURAL; INFILTRATION; PERINEURAL at 13:05

## 2023-03-30 RX ADMIN — MIDAZOLAM 2 MG: 1 INJECTION INTRAMUSCULAR; INTRAVENOUS at 12:35

## 2023-03-30 RX ADMIN — GLYCOPYRROLATE 0.6 MG: 0.2 INJECTION INTRAMUSCULAR; INTRAVENOUS at 14:24

## 2023-03-30 NOTE — ANESTHESIA PROCEDURE NOTES
Peripheral Block    Patient location during procedure: OR  Start time: 3/30/2023 12:27 PM  Reason for block: at surgeon's request and post-op pain management  Staffing  Anesthesiologist: Jesus Altamirano DO  Preanesthetic Checklist  Completed: patient identified, IV checked, site marked, risks and benefits discussed, surgical consent, monitors and equipment checked, pre-op evaluation and timeout performed  Peripheral Block  Patient position: supine  Prep: ChloraPrep  Patient monitoring: heart rate, cardiac monitor, continuous pulse ox and frequent blood pressure checks  Block type: femoral  Laterality: right  Ultrasound permanent image savedropivacaine (NAROPIN) 0 5 % - Perineural   15 mL - 3/30/2023 1:05:00 PM  midazolam (VERSED) 2 mg/2 mL - Intravenous   2 mg - 3/30/2023 12:35:00 PM  fentaNYL 50 mcg/mL - Intravenous   100 mcg - 3/30/2023 12:37:00 PM  Needle  Needle type: Stimuplex   Needle length: 4 in  Needle localization: anatomical landmarks, nerve stimulator and ultrasound guidance  Assessment  Injection assessment: incremental injection, local visualized surrounding nerve on ultrasound, negative aspiration for heme and no paresthesia on injection  Paresthesia pain: none  Heart rate change: no  Slow fractionated injection: yes  Post-procedure:  site cleaned  patient tolerated the procedure well with no immediate complications

## 2023-03-30 NOTE — ANESTHESIA POSTPROCEDURE EVALUATION
"Post-Op Assessment Note    CV Status:  Stable  Pain Score: 2    Pain management: adequate     Mental Status:  Alert and awake   Hydration Status:  Euvolemic and stable   PONV Controlled:  Controlled   Airway Patency:  Patent      Post Op Vitals Reviewed: Yes      Staff: Anesthesiologist         No notable events documented      BP      Temp     Pulse     Resp      SpO2      /66 (BP Location: Left arm)   Pulse 65   Temp (!) 96 8 °F (36 °C) (Temporal)   Resp 16   Ht 5' 10\" (1 778 m)   Wt 68 2 kg (150 lb 5 7 oz)   SpO2 95%   BMI 21 57 kg/m²     "

## 2023-03-31 ENCOUNTER — DOCUMENTATION (OUTPATIENT)
Dept: VASCULAR SURGERY | Facility: CLINIC | Age: 78
End: 2023-03-31

## 2023-03-31 NOTE — PROGRESS NOTES
Vascular Nurse Navigator Post Op Education    Met with patient at bedside to introduce myself as Vascular Nurse Navigator and explained my role  Patient is appropriate and accepting to education  Patient was educated with Review of written materials provided, Teachback, Explanation, Demonstration and Question & Answer on expectations of post op care and recovery on Right BKA  Patient is a former smoker, quit 47 years ago, as such Smoking effects on the lungs, tobacco triggers and Smoking cessation was reviewed  Education provided to patient on infection prevention, activity limitations, when to call the office, importance of follow up, and incisional care  Discharge instruction handout provided to patient to review  Provided patient with information on [facility] [employer] [employer] [facility] and a list of prosthetic suppliers

## 2023-04-01 PROBLEM — G93.40 ENCEPHALOPATHY ACUTE: Status: ACTIVE | Noted: 2023-03-26

## 2023-04-03 PROBLEM — K92.0 COFFEE GROUND EMESIS: Status: ACTIVE | Noted: 2023-04-03

## 2023-04-05 PROBLEM — R45.89 DEPRESSED MOOD: Status: ACTIVE | Noted: 2023-04-05

## 2023-04-07 PROBLEM — Z01.89 ENCOUNTER FOR COMPETENCY EVALUATION: Status: ACTIVE | Noted: 2023-04-07

## 2023-04-11 PROBLEM — E43 SEVERE PROTEIN-CALORIE MALNUTRITION (HCC): Status: ACTIVE | Noted: 2023-04-11

## 2023-04-11 PROBLEM — L03.90 CELLULITIS: Status: ACTIVE | Noted: 2023-04-11

## 2023-04-11 PROBLEM — R11.2 NAUSEA & VOMITING: Status: ACTIVE | Noted: 2023-04-03

## 2023-04-12 PROBLEM — R11.2 NAUSEA & VOMITING: Status: RESOLVED | Noted: 2023-04-03 | Resolved: 2023-04-12

## 2023-04-12 PROBLEM — L03.90 CELLULITIS: Status: RESOLVED | Noted: 2023-04-11 | Resolved: 2023-04-12

## 2023-04-12 PROBLEM — G93.40 ENCEPHALOPATHY ACUTE: Status: RESOLVED | Noted: 2023-03-26 | Resolved: 2023-04-12

## 2023-04-13 PROBLEM — Z71.89 GOALS OF CARE, COUNSELING/DISCUSSION: Status: ACTIVE | Noted: 2023-04-13

## 2023-04-21 ENCOUNTER — TELEPHONE (OUTPATIENT)
Dept: OTHER | Facility: HOSPITAL | Age: 78
End: 2023-04-21

## 2023-04-21 NOTE — TELEPHONE ENCOUNTER
Patient currently hospitalized due to complications related to diabetes  Patient seen for consultation regarding urinary retention  Has Mensah catheter  Patient will need outpatient follow-up visit after discharge  Patient likely to be discharged to rehab, can coordinate possible void trial at time of next catheter exchange  Current Mensah catheter placed on 4/19/2023

## 2023-04-24 NOTE — TELEPHONE ENCOUNTER
Attempted to contact [facility][1] where patient was discharged to ([phone])  Spoke with  and was transferred to nursing staff, however phone call was disconnected x's 2  Will attempt to contact again tomorrow to arrange appointment

## 2023-04-25 ENCOUNTER — TELEPHONE (OUTPATIENT)
Dept: VASCULAR SURGERY | Facility: CLINIC | Age: 78
End: 2023-04-25

## 2023-04-25 NOTE — TELEPHONE ENCOUNTER
Vascular Nurse Navigator Post Op Call      Contacted [facility] and spoke with Cristóbal Vazquez  She stated that it is change of shift and she has not seen patient's incision as of yet and requested contact number to call back once she sees patient  Contact number provided  Informed her that patient does not have a post op visit and she was agreeable to schedule appointment  Call transferred to [facility] in the Call Center to schedule patient's post op visit        Procedure:  Right - (BKA)    Date of Procedure:  3/30/23    Surgeon:     Radha Marcelo DO - Primary     * Ruperto Rico MD - Assisting    Discharge Date:  4/21/23    Discharge Disposition: [facility]hab - [facility]      Anticoagulation pt was discharged on post op?: Aspirin and Clopidogrel (Plavix)    Statin pt was discharged on post op?: Lipitor (atorvastatin)        NEXT OFFICE VISIT SCHEDULED:  Not scheduled at time of call    Transportation: Susana Davies will arrange transportation per Cristóbal Vazquez

## 2023-04-25 NOTE — TELEPHONE ENCOUNTER
Contacted Rehab again and spoke with   Asked to speak with the nurse taking care of patient  Call was transferred and just kept on ringing, then hung up  Will try again tomorrow

## 2023-04-26 NOTE — TELEPHONE ENCOUNTER
Called and spoke with patients nurse at [facility]  Verbal order provided for Mensah catheter removal on am of appointment  Plan to schedule patient for afternoon NP appointment with Neyda Downs in the [place] office in 2-3 weeks  Call was transferred to [facility], , to please return call to set up afternoon visit with Neyda Downs (around 2:30-3:30 pm)

## 2023-04-26 NOTE — TELEPHONE ENCOUNTER
Bernardino Maroon calling to schedule Hospital follow up and void trial     Due to limited transportation, patient scheduled on 5/18 at 2 pm with Katiana Salomon in [address]

## 2023-04-26 NOTE — TELEPHONE ENCOUNTER
Vascular Nurse Navigator Post Op Call    Procedure:  Right - (BKA)     Date of Procedure:  3/30/23     Surgeon:     * Lori Young DO - Primary     * Leti Patel MD - Assisting     Discharge Date:  4/21/23     Discharge Disposition: [facility] - [facility]  Chest Pain?:No    Shortness of Breath?:No     Increased Pain, Swelling, Warmth, or Redness? :No    Purulent Drainage?:No    Foul- smelling drainage?: No    Signs of dehiscence?:No    Fever/Chills? :No    Bleeding?:No    Anticoagulation pt was discharged on post op?: Aspirin and Clopidogrel (Plavix)    Statin pt was discharged on post op?: Lipitor (atorvastatin)    Uncontrolled Pain?:No      Reviewed discharge instructions and incision care with patient  NEXT OFFICE VISIT SCHEDULED:  5/3/23 at 4 pm with Imelda Tapia PA-C at [facility] [place] [id number]    Transportation:  Yes - facility to arrange transportation       Any further questions/concerns? Spoke with Haydee Ferreira at [facility] at [address] [place] [address] in regards to above  She stated that she has not seen patient's incision and the dressing is changed on Monday, Wednesday, Friday on evening shift  She stated that she reviewed patient's nursing notes and there is no documentation of the dressing change  She stated that there has been no report of any issues with his incision and his pain is controlled  All questions answered

## 2023-05-08 ENCOUNTER — TRANSITIONAL CARE MANAGEMENT (OUTPATIENT)
Dept: FAMILY MEDICINE CLINIC | Facility: CLINIC | Age: 78
End: 2023-05-08

## 2023-05-12 ENCOUNTER — PATIENT OUTREACH (OUTPATIENT)
Dept: FAMILY MEDICINE CLINIC | Facility: CLINIC | Age: 78
End: 2023-05-12

## 2023-05-12 NOTE — PROGRESS NOTES
TRISTIAN MOLINA reviewed chart  Pt currently admited to [address] [place] [address] at [facility] in [facility]  IB sent to OP CM admin coordinator who is reviewing SNF cases

## 2023-05-15 ENCOUNTER — PATIENT OUTREACH (OUTPATIENT)
Dept: CASE MANAGEMENT | Facility: OTHER | Age: 78
End: 2023-05-15

## 2023-05-15 NOTE — PROGRESS NOTES
Kendrick received indicating the patient discharged 5/5/23 from [address] [facility] to [facility]  Email sent to facility to inform them I will be following the patient during their skilled stay

## 2023-05-18 ENCOUNTER — OFFICE VISIT (OUTPATIENT)
Dept: UROLOGY | Facility: CLINIC | Age: 78
End: 2023-05-18

## 2023-05-18 VITALS
WEIGHT: 130 LBS | BODY MASS INDEX: 18.61 KG/M2 | SYSTOLIC BLOOD PRESSURE: 128 MMHG | DIASTOLIC BLOOD PRESSURE: 80 MMHG | HEIGHT: 70 IN

## 2023-05-18 DIAGNOSIS — R33.9 URINE RETENTION: Primary | ICD-10-CM

## 2023-05-18 DIAGNOSIS — R33.9 URINARY RETENTION: ICD-10-CM

## 2023-05-18 DIAGNOSIS — I25.10 CAD (CORONARY ARTERY DISEASE): ICD-10-CM

## 2023-05-18 LAB — POST-VOID RESIDUAL VOLUME, ML POC: 44 ML

## 2023-05-18 RX ORDER — DIPHENOXYLATE HYDROCHLORIDE AND ATROPINE SULFATE 2.5; .025 MG/1; MG/1
1 TABLET ORAL DAILY
COMMUNITY
Start: 2023-05-04 | End: 2024-05-03

## 2023-05-18 RX ORDER — FINASTERIDE 5 MG/1
5 TABLET, FILM COATED ORAL DAILY
Qty: 30 TABLET | Refills: 11 | Status: SHIPPED | OUTPATIENT
Start: 2023-05-18

## 2023-05-18 RX ORDER — TAMSULOSIN HYDROCHLORIDE 0.4 MG/1
0.4 CAPSULE ORAL
Qty: 30 CAPSULE | Refills: 11 | Status: SHIPPED | OUTPATIENT
Start: 2023-05-18

## 2023-05-18 RX ORDER — AMOXICILLIN 250 MG
1 CAPSULE ORAL DAILY PRN
COMMUNITY
Start: 2023-05-04 | End: 2024-05-03

## 2023-05-18 NOTE — PATIENT INSTRUCTIONS
1  Urinary retention  Multifactorial in setting of post-operative urinary retention as well as likely BPH and functional decline  Successful void trial today with PVR of 44 mL after reagan catheter was removed yesterday  He will continue with Flomax 0 4 mg daily and finasteride 5 mg daily as previously recommended  I do not recommend a cystoscopy at this time as he is voiding well on his own  Should he have recurrent issues with urinary retention, we can reconsider this in the future  Due to multiple comorbidities most notable recent pacemaker placement for complete heart block as well as uncontrolled diabetes with A1C of 12 he is not a strong surgical candidate at this time anyway  He will follow-up in 3 months for reassessment

## 2023-05-18 NOTE — PROGRESS NOTES
5/18/2023    No chief complaint on file  Assessment and Plan    69 y o  male new patient to office    1  Urinary retention  · Multifactorial in setting of post-operative urinary retention as well as likely BPH and functional decline  Successful void trial today with PVR of 44 mL after reagan catheter was removed yesterday  · He will continue with Flomax 0 4 mg daily and finasteride 5 mg daily as previously recommended  · I do not recommend a cystoscopy at this time as he is voiding well on his own  Should he have recurrent issues with urinary retention, we can reconsider this in the future  Due to multiple comorbidities most notable recent pacemaker placement for complete heart block as well as uncontrolled diabetes with A1C of 12 he is not a strong surgical candidate at this time anyway  He will follow-up in 3 months for reassessment  History of Present Illness  Melchor Muniz is a 69 y o  male new patient to office but was seen in consultation during hospitalization on 4/21/2023 for urinary retention  Has a past medical history significant for DM, CHF, and PAD  He was seen in consultation by me on 4/21/2023 during hospitalization at [address] [place] [address] following LLE angiogram w/SFA DCB PTA, unsuccessful attempt at BK pop/PT recanalization by IR on 3/28/23, and S/p RLE BKA 3/30/23 and left lower extremity angiogram with popliteal stent 4/6/2023  Urology was consulted due to urinary retention after failed void trial   His urinary tension was multifactorial in the setting of postoperative urinary tension as well as functional decline as well as BPH  Recommendations at that time were to continue with Flomax 0 4 mg daily with the addition of finasteride 5 mg daily and maintain the Reagan catheter until outpatient void trial   Patient also has multiple comorbidities as well as a hemoglobin A1c of 12 making him a poor surgical candidate      Most notably he was recently hospitalized at [facility] after being transferred from [facility] emergency department due to unresponsive episode while at rehab and was found to have a complete heart block  He would undergo PPM placement on 5/2  Urology was also consulted during that hospitalization and agree with recommendation to continue with reagan catheter until out patient follow-up  He presents today reporting doing well  He is currently at rehab at [facility] at [address]  His reagan catheter was removed yesterday by nursing staff and he has been voiding well since  His PVR today is 44 mL  He denies any issues with incomplete bladder emptying  Review of Systems   Constitutional: Negative for chills and fever  HENT: Negative for congestion and sore throat  Respiratory: Negative for cough and shortness of breath  Cardiovascular: Negative for chest pain and leg swelling  Gastrointestinal: Negative for abdominal pain, constipation, diarrhea, nausea and vomiting  Genitourinary: Negative for difficulty urinating, dysuria, frequency, hematuria and urgency  Musculoskeletal: Positive for gait problem (primarily wheelchair bound wiht right BKA)  Negative for back pain  Skin: Negative for wound  Allergic/Immunologic: Negative for immunocompromised state  Neurological: Negative for dizziness, weakness and numbness  Hematological: Does not bruise/bleed easily  Vitals  Vitals:    05/18/23 1405   BP: 128/80   BP Location: Left arm   Patient Position: Sitting   Cuff Size: Adult   Weight: 59 kg (130 lb)   Height: 5' 10\" (1 778 m)       Physical Exam  Vitals reviewed  Constitutional:       General: He is not in acute distress  Appearance: Normal appearance  He is not ill-appearing or toxic-appearing  HENT:      Head: Normocephalic and atraumatic  Eyes:      General: No scleral icterus  Conjunctiva/sclera: Conjunctivae normal    Cardiovascular:      Rate and Rhythm: Normal rate     Pulmonary:      Effort: " Pulmonary effort is normal  No respiratory distress  Abdominal:      Tenderness: There is no right CVA tenderness or left CVA tenderness  Hernia: No hernia is present  Musculoskeletal:      Cervical back: Normal range of motion  Right lower leg: No edema  Left lower leg: No edema  Right Lower Extremity: Right leg is amputated below knee  Skin:     General: Skin is warm and dry  Coloration: Skin is not jaundiced or pale  Neurological:      General: No focal deficit present  Mental Status: He is alert and oriented to person, place, and time  Mental status is at baseline  Gait: Gait normal    Psychiatric:         Mood and Affect: Mood normal          Behavior: Behavior normal          Thought Content:  Thought content normal          Judgment: Judgment normal          Past History  Past Medical History:   Diagnosis Date   • 6th nerve palsy    • Blister of right leg    • BPH (benign prostatic hyperplasia)    • CAD (coronary artery disease)    • Cardiomyopathy ([facility])     EF 25%   • CHF (congestive heart failure) ([facility])    • Depression    • Diabetes mellitus ([facility])     type 2, insulin dependent   • Diabetic foot ulcer ([facility] [facility] )     left, local wound care   • Diabetic retinopathy ([facility] )    • Exposure to Agent Orange     while serving in [facility] [employer]   • Former tobacco use    • H/O urinary retention     w/ reagan placement   • Hyperlipidemia    • Hypertension    • Rupture of biceps tendon, traumatic 2017    right     Social History     Socioeconomic History   • Marital status:      Spouse name: None   • Number of children: 2   • Years of education: None   • Highest education level: None   Occupational History   • None   Tobacco Use   • Smoking status: Former     Packs/day: 1 00     Years: 6 00     Pack years: 6 00     Types: Cigarettes     Start date:      Quit date:      Years since quittin 4   • Smokeless tobacco: Never   Vaping Use   • Vaping Use: Never used   Substance and Sexual Activity   • Alcohol use: Not Currently     Comment: Denies history of heavy alcohol use  At most will drink 1 or 2 drinks in a year (Updated 2023)  • Drug use: Never   • Sexual activity: None   Other Topics Concern   • None   Social History Narrative    Served in the [employer]; completed 18-month tour in [facility] [employer]     Previously worked for [employer]  Lives alone with his dog  Has a son and a daughter who both live into [place], [place] -does not maintain a close relationship with them  Denies having strong support system in place at home  Social Determinants of Health     Financial Resource Strain: Not on file   Food Insecurity: No Food Insecurity   • Worried About [address] in the Last Year: Never true   • Ran Out of Food in the Last Year: Never true   Transportation Needs: No Transportation Needs   • Lack of Transportation (Medical): No   • Lack of Transportation (Non-Medical):  No   Physical Activity: Not on file   Stress: Not on file   Social Connections: Not on file   Intimate Partner Violence: Not on file   Housing Stability: Low Risk    • Unable to Pay for Housing in the Last Year: No   • Number of Places Lived in the Last Year: 1   • Unstable Housing in the Last Year: No     Social History     Tobacco Use   Smoking Status Former   • Packs/day: 1 00   • Years: 6 00   • Pack years: 6 00   • Types: Cigarettes   • Start date: 5   • Quit date: 12   • Years since quittin 4   Smokeless Tobacco Never     Family History   Problem Relation Age of Onset   • No Known Problems Sister    • Other Daughter         chronic neck pain   • Other Son         chronic back pain   • Sudden death Neg Hx    • Cancer Neg Hx        The following portions of the patient's history were reviewed and updated as appropriate allergies, current medications, past medical history, past social history, past surgical history and problem list    Imaging:    Results  Recent Results (from the past 1 hour(s))   POCT Measure PVR    Collection Time: 05/18/23  2:12 PM   Result Value Ref Range    POST-VOID RESIDUAL VOLUME, ML POC 44 mL   ]  No results found for: PSA  Lab Results   Component Value Date    CALCIUM 8 0 (L) 04/21/2023    K 4 0 04/21/2023    CO2 33 (H) 04/21/2023     04/21/2023    BUN 15 04/21/2023    CREATININE 0 59 (L) 04/21/2023     Lab Results   Component Value Date    WBC 5 00 04/21/2023    HGB 9 1 (L) 04/21/2023    HCT 30 2 (L) 04/21/2023    MCV 88 04/21/2023     04/21/2023       Please Note:  Voice dictation software has been used to create this document  There may be inadvertent transcriptions errors       JANIS Torres 05/18/23

## 2023-05-22 ENCOUNTER — PATIENT OUTREACH (OUTPATIENT)
Dept: CASE MANAGEMENT | Facility: OTHER | Age: 78
End: 2023-05-22

## 2023-05-23 ENCOUNTER — OFFICE VISIT (OUTPATIENT)
Dept: WOUND CARE | Facility: CLINIC | Age: 78
End: 2023-05-23

## 2023-05-23 VITALS
SYSTOLIC BLOOD PRESSURE: 118 MMHG | DIASTOLIC BLOOD PRESSURE: 58 MMHG | TEMPERATURE: 97.1 F | HEART RATE: 60 BPM | RESPIRATION RATE: 18 BRPM

## 2023-05-23 DIAGNOSIS — L89.610 PRESSURE INJURY OF RIGHT HEEL, UNSTAGEABLE (HCC): Primary | ICD-10-CM

## 2023-05-23 RX ORDER — ACETAMINOPHEN 325 MG/1
650 TABLET ORAL EVERY 6 HOURS PRN
COMMUNITY

## 2023-05-23 RX ORDER — ASCORBIC ACID 500 MG
500 TABLET ORAL DAILY
COMMUNITY

## 2023-05-23 NOTE — PATIENT INSTRUCTIONS
Orders Placed This Encounter   Procedures    Wound cleansing and dressings     Right and Left heel   Apply (soak) betadine and ABD to wounds  Wrap and kerlix  Secure with tape  Change daily  Follow up with Dr Shine Godfrey for evaluation of right BKA for stable removal, PI on sacrum on Thursday       Standing Status:   Future     Standing Expiration Date:   5/23/2024

## 2023-05-23 NOTE — PROGRESS NOTES
Patient ID: Arsenio Jacobs is a 69 y o  male Date of Birth 1945       Chief Complaint   Patient presents with   • New Patient Visit     Left foot Dm heel ulcer for approximately 6 months  Right BKA 2-2 1/2 months ago per patient  Allergies:  Metformin    Diagnosis:  1  Pressure injury of right heel, unstageable ([facility])  -     Wound cleansing and dressings; Future       Diagnosis ICD-10-CM Associated Orders   1  Pressure injury of right heel, unstageable ([facility])  L89 610 Wound cleansing and dressings           Assessment & Plan:  See wound orders  - no debridement today  - continue betadine in attempts to keep this stable  - continue offloading in multipodus boot, advised to f/u with his vascular surgeon for staple removal  - RTC with Fitz for sacral examination  - RTC for repeat exmination of his left foot  Subjective:   Patient presents for evaluation and management of his left foot  Did have a R BKA on 3/30 and still has the staples in  Has been having betadine applied to the left foot daily         The following portions of the patient's history were reviewed and updated as appropriate:   Patient Active Problem List   Diagnosis   • Hypokalemia   • Type 2 diabetes mellitus with hyperglycemia, with long-term current use of insulin ([facility])   • Abnormal EKG   • Sepsis ([address] )   • Chronic systolic CHF (congestive heart failure) ([facility])   • Urinary retention   • Moderate protein malnutrition ([facility])   • Unintentional weight loss   • CAD (coronary artery disease)   • Suspected deep tissue injury of unknown depth   • Diabetic ulcer of both feet associated with type 2 diabetes mellitus ([facility])   • Toenail avulsion   • PAD (peripheral artery disease) ([facility])   • Essential hypertension   • Former tobacco use   • Cardiomyopathy ([address] )   • Moderate protein-calorie malnutrition ([facility])   • Ischemic cardiomyopathy   • Nausea & vomiting   • Depressed mood   • Encounter for competency evaluation   • Severe protein-calorie malnutrition ([place] [address] )   • Goals of care, counseling/discussion     Past Medical History:   Diagnosis Date   • 6th nerve palsy    • Blister of right leg    • BPH (benign prostatic hyperplasia)    • CAD (coronary artery disease)    • Cardiomyopathy ([place] [address] )     EF 25%   • CHF (congestive heart failure) ([facility])    • Depression    • Diabetes mellitus ([place] [address] )     type 2, insulin dependent   • Diabetic foot ulcer ([place] [address] )     left, local wound care   • Diabetic retinopathy ([place] [address] )    • Exposure to Agent Orange     while serving in [employer]   • Former tobacco use    • H/O urinary retention     w/ reagan placement   • Hyperlipidemia    • Hypertension    • Rupture of biceps tendon, traumatic 2017    right     Past Surgical History:   Procedure Laterality Date   • CARDIAC CATHETERIZATION     • CARDIAC CATHETERIZATION Left 2/14/2023    Procedure: Cardiac Left Heart Cath;  Surgeon: Cesar Alford MD;  [facility]; Service: Cardiology   • CARDIAC CATHETERIZATION N/A 2/14/2023    Procedure: Cardiac Coronary Angiogram;  Surgeon: Cesar Alford MD;  [facility]; Service: Cardiology   • CATARACT EXTRACTION, BILATERAL Bilateral 2021   • IR LOWER EXTREMITY ANGIOGRAM  3/28/2023   • IR LOWER EXTREMITY ANGIOGRAM  4/6/2023   • LEG AMPUTATION THROUGH LOWER TIBIA AND FIBULA Right 3/30/2023    Procedure: (BKA);   Surgeon: Tangela Berry DO;  [facility]: [facility];  Service: Vascular   • WOUND DEBRIDEMENT Right 3/24/2023    Procedure: debridement of heel ulcer;  Surgeon: Katherine Malcolm DPM;  [facility];  Service: Podiatry     Social History     Socioeconomic History   • Marital status:      Spouse name: Not on file   • Number of children: 2   • Years of education: Not on file   • Highest education level: Not on file   Occupational History   • Not on file   Tobacco Use   • Smoking status: Former     Packs/day: 1 00     Years: 6 00     Pack years: 6 00     Types: Cigarettes     Start date: 5 Quit date: 12     Years since quittin 4   • Smokeless tobacco: Never   Vaping Use   • Vaping Use: Never used   Substance and Sexual Activity   • Alcohol use: Not Currently     Comment: Denies history of heavy alcohol use  At most will drink 1 or 2 drinks in a year (Updated 2023)  • Drug use: Never   • Sexual activity: Not on file   Other Topics Concern   • Not on file   Social History Narrative    Served in the [employer]; completed 18-month tour in [facility]     Previously worked for [employer]  Lives alone with his dog  Has a son and a daughter who both live into [place], [place] -does not maintain a close relationship with them  Denies having strong support system in place at home  Social Determinants of Health     Financial Resource Strain: Not on file   Food Insecurity: No Food Insecurity   • Worried About [address] in the Last Year: Never true   • Ran Out of Food in the Last Year: Never true   Transportation Needs: No Transportation Needs   • Lack of Transportation (Medical): No   • Lack of Transportation (Non-Medical):  No   Physical Activity: Not on file   Stress: Not on file   Social Connections: Not on file   Intimate Partner Violence: Not on file   Housing Stability: Low Risk    • Unable to Pay for Housing in the Last Year: No   • Number of Places Lived in the Last Year: 1   • Unstable Housing in the Last Year: No        Current Outpatient Medications:   •  acetaminophen (TYLENOL) 325 mg tablet, Take 650 mg by mouth every 6 (six) hours as needed for mild pain or fever, Disp: , Rfl:   •  ascorbic acid (VITAMIN C) 500 MG tablet, Take 500 mg by mouth daily, Disp: , Rfl:   •  bisacodyl (FLEET) 10 MG/30ML ENEM, Insert 10 mg into the rectum once, Disp: , Rfl:   •  aspirin (ECOTRIN LOW STRENGTH) 81 mg EC tablet, Take 1 tablet (81 mg total) by mouth daily, Disp: 30 tablet, Rfl: 0  •  atorvastatin (LIPITOR) 40 mg tablet, Take 1 tablet (40 mg total) by mouth daily with dinner, Disp: 30 tablet, Rfl: 0  •  clopidogrel (PLAVIX) 75 mg tablet, Take 1 tablet (75 mg total) by mouth daily Do not start before April 22, 2023 , Disp: , Rfl: 0  •  escitalopram (LEXAPRO) 5 mg tablet, Take 1 tablet (5 mg total) by mouth daily Do not start before April 22, 2023 , Disp: , Rfl: 0  •  finasteride (PROSCAR) 5 mg tablet, Take 1 tablet (5 mg total) by mouth daily, Disp: 30 tablet, Rfl: 11  •  Insulin Pen Needle (Pen Needles) 31G X 5 MM MISC, Use 2 (two) times a day, Disp: 100 each, Rfl: 1  •  melatonin 3 mg, Take 1 tablet (3 mg total) by mouth daily at bedtime, Disp: , Rfl: 0  •  metoprolol succinate (TOPROL-XL) 50 mg 24 hr tablet, Take 1 tablet (50 mg total) by mouth every 12 (twelve) hours, Disp: 30 tablet, Rfl: 0  •  mirtazapine (REMERON) 7 5 MG tablet, Take 1 tablet (7 5 mg total) by mouth daily at bedtime, Disp: , Rfl: 0  •  multivitamin (THERAGRAN) TABS, Take 1 tablet by mouth daily, Disp: , Rfl:   •  pantoprazole (PROTONIX) 40 mg tablet, Take 1 tablet (40 mg total) by mouth daily, Disp: , Rfl: 0  •  sacubitril-valsartan (Entresto) 49-51 MG TABS, Take 1 tablet by mouth 2 (two) times a day, Disp: 60 tablet, Rfl: 1  •  senna-docusate sodium (SENOKOT S) 8 6-50 mg per tablet, Take 1 tablet by mouth daily as needed, Disp: , Rfl:   •  tamsulosin (FLOMAX) 0 4 mg, Take 1 capsule (0 4 mg total) by mouth daily with dinner, Disp: 30 capsule, Rfl: 11  Family History   Problem Relation Age of Onset   • No Known Problems Sister    • Other Daughter         chronic neck pain   • Other Son         chronic back pain   • Sudden death Neg Hx    • Cancer Neg Hx       Review of Systems   Constitutional: Negative for chills and fever  HENT: Negative for ear pain and sore throat  Eyes: Negative for pain and visual disturbance  Respiratory: Negative for cough and shortness of breath  Cardiovascular: Negative for chest pain and palpitations  Gastrointestinal: Negative for abdominal pain and vomiting     Genitourinary: Negative for dysuria and hematuria  Musculoskeletal: Negative for arthralgias and back pain  Skin: Negative for color change and rash  Neurological: Negative for seizures and syncope  All other systems reviewed and are negative  Objective:  /58   Pulse 60   Temp (!) 97 1 °F (36 2 °C)   Resp 18     Physical Exam  Musculoskeletal:      Comments: Staples are intact to the right BKA site, normal post op appearance    There are multiple areas of stable eschar on the left foot, no drainage  Wound 02/08/23 Foot Anterior; Left (Active)   Wound Image   05/23/23 1411   Wound Description Pink 03/21/23 0838   Hanh-wound Assessment Edema;Scaly 03/21/23 0838   Wound Length (cm) 0 2 cm 03/21/23 0838   Wound Width (cm) 0 2 cm 03/21/23 0838   Wound Depth (cm) 0 1 cm 03/21/23 0838   Wound Surface Area (cm^2) 0 04 cm^2 03/21/23 0838   Wound Volume (cm^3) 0 004 cm^3 03/21/23 0838   Calculated Wound Volume (cm^3) 0 cm^3 03/21/23 0838   Drainage Amount Scant 03/21/23 0838   Drainage Description Serous 03/21/23 0838   Treatments Irrigation with NSS 03/21/23 0838       Wound 03/21/23 Pressure Injury Heel Left (Active)   Wound Image   05/23/23 1411   Wound Description Eschar;Pink;Black 05/23/23 1414   Hanh-wound Assessment Edema 05/23/23 1414   Wound Length (cm) 2 cm 05/23/23 1414   Wound Width (cm) 2 5 cm 05/23/23 1414   Wound Depth (cm) 0 2 cm 05/23/23 1414   Wound Surface Area (cm^2) 5 cm^2 05/23/23 1414   Wound Volume (cm^3) 1 cm^3 05/23/23 1414   Calculated Wound Volume (cm^3) 1 cm^3 05/23/23 1414   Change in Wound Size % 86 93 05/23/23 1414   Drainage Amount None 05/23/23 1414   Drainage Description Serosanguineous 03/21/23 0843   Treatments Cleansed 05/23/23 1414       Wound 03/30/23 Leg Right (Active)   Wound Image   05/23/23 1415       Wound 04/21/23 Pressure Injury Buttocks (Active)       Wound 05/23/23 Heel Left;Lateral (Active)   Wound Image   05/23/23 1413   Wound Description Yellow 05/23/23 "1414   Hanh-wound Assessment Edema 05/23/23 1414   Wound Length (cm) 0 5 cm 05/23/23 1414   Wound Width (cm) 1 cm 05/23/23 1414   Wound Depth (cm) 0 1 cm 05/23/23 1414   Wound Surface Area (cm^2) 0 5 cm^2 05/23/23 1414   Wound Volume (cm^3) 0 05 cm^3 05/23/23 1414   Calculated Wound Volume (cm^3) 0 05 cm^3 05/23/23 1414   Drainage Amount None 05/23/23 1414   Treatments Cleansed 05/23/23 1414                         Procedures     Results from last 6 Months   Lab Units 03/21/23  1120   WOUND CULTURE  3+ Growth of Methicillin Resistant Staphylococcus aureus*  4+ Growth of Enterococcus faecalis*  1+ Growth of         Wound Instructions:  Orders Placed This Encounter   Procedures   • Wound cleansing and dressings     Right and Left heel   Apply (soak) betadine and ABD to wounds  Wrap and kerlix  Secure with tape  Change daily  Follow up with Dr Keith Ellis for evaluation of right BKA for stable removal, PI on sacrum on Thursday  Standing Status:   Future     Standing Expiration Date:   5/23/2024         Tra Joseph DPM      Portions of the record may have been created with voice recognition software  Occasional wrong word or \"sound a like\" substitutions may have occurred due to the inherent limitations of voice recognition software  Read the chart carefully and recognize, using context, where substitutions have occurred        "

## 2023-05-30 ENCOUNTER — PATIENT OUTREACH (OUTPATIENT)
Dept: CASE MANAGEMENT | Facility: OTHER | Age: 78
End: 2023-05-30

## 2023-05-30 NOTE — PROGRESS NOTES
Chart review completed  Email sent to facility requesting update on patient  This Admin Coordinator will continue to monitor via chart review throughout episode  Update received the patient is receiving skilled care at [facility]  No LCD at this time  This Admin Coordinator will continue to monitor via chart review

## 2023-06-01 ENCOUNTER — OFFICE VISIT (OUTPATIENT)
Dept: WOUND CARE | Facility: CLINIC | Age: 78
End: 2023-06-01

## 2023-06-01 VITALS
HEART RATE: 76 BPM | TEMPERATURE: 96.4 F | SYSTOLIC BLOOD PRESSURE: 112 MMHG | RESPIRATION RATE: 20 BRPM | DIASTOLIC BLOOD PRESSURE: 50 MMHG

## 2023-06-01 DIAGNOSIS — Z89.511 HX OF BKA, RIGHT (HCC): ICD-10-CM

## 2023-06-01 DIAGNOSIS — T14.8XXA DEEP TISSUE INJURY: Primary | ICD-10-CM

## 2023-06-01 NOTE — PROGRESS NOTES
Patient ID: Amna Cotton is a 69 y o  male Date of Birth 1945     Chief Complaint  Chief Complaint   Patient presents with   • Follow Up Wound Care Visit     Buttock wound       Allergies  Metformin    Assessment:    Deep tissue injury of sacrum  Healed  Continue pressure off loading  Follow up PRN    Hx of BKA, right ([address] )  S/p right BKA by vascular surgery two months ago  Staples removed  BKA incision well healed  Recommend follow-up with vascular surgeon  Diagnoses and all orders for this visit:    Deep tissue injury  -     Wound cleansing and dressings; Future    Hx of BKA, right ([facility])  -     Wound cleansing and dressings; Future              Procedures    Plan:     Wound 03/30/23 Leg Right (Active)   Wound Image Images linked 06/01/23 0938   Wound Description Epithelialization 06/01/23 0946   Hanh-wound Assessment Clean;Dry; Intact 06/01/23 0946   Wound Length (cm) 0 cm 06/01/23 0946   Wound Width (cm) 0 cm 06/01/23 0946   Wound Depth (cm) 0 cm 06/01/23 0946   Wound Surface Area (cm^2) 0 cm^2 06/01/23 0946   Wound Volume (cm^3) 0 cm^3 06/01/23 0946   Calculated Wound Volume (cm^3) 0 cm^3 06/01/23 0946   Wound Site Closure Staples 06/01/23 0946   Drainage Amount None 06/01/23 0946   Treatments Irrigation with NSS 06/01/23 0946       Wound 04/21/23 Pressure Injury Buttocks (Active)   Wound Image Images linked 06/01/23 0932   Wound Description Epithelialization;Pink 06/01/23 0930   Hanh-wound Assessment Pink 06/01/23 0930   Wound Length (cm) 0 cm 06/01/23 0930   Wound Width (cm) 0 cm 06/01/23 0930   Wound Depth (cm) 0 cm 06/01/23 0930   Wound Surface Area (cm^2) 0 cm^2 06/01/23 0930   Wound Volume (cm^3) 0 cm^3 06/01/23 0930   Calculated Wound Volume (cm^3) 0 cm^3 06/01/23 0930       Wound 02/08/23 Foot Anterior; Left (Active)   Date First Assessed/Time First Assessed: 02/08/23 1655   Location: Foot  Wound Location Orientation: Anterior; Left       Wound 03/21/23 Pressure Injury Heel Left (Active)   Date First Assessed/Time First Assessed: 03/21/23 0831   Primary Wound Type: Pressure Injury  Location: Heel  Wound Location Orientation: Left       Wound 03/30/23 Leg Right (Active)   Date First Assessed/Time First Assessed: 03/30/23 1404   Location: Leg  Wound Location Orientation: Right  Wound Description (Comments): Staples, Xeroform, Gauze, Abd Pads, Kerlix & Ace Wrap  Wound Outcome: Healed       Wound 04/21/23 Pressure Injury Buttocks (Active)   Date First Assessed/Time First Assessed: 04/21/23 0241   Pre-Existing Wound: Yes  Primary Wound Type: Pressure Injury  Location: Buttocks       Wound 05/23/23 Heel Left;Lateral (Active)   Date First Assessed/Time First Assessed: 05/23/23 1413   Location: Heel  Wound Location Orientation: Left;Lateral       Subjective:           79-year-old gentleman with known CAD, cardiomyopathy, CHF, diabetes, presents today for evaluation of potential deep tissue injury of the sacrum in addition to stay for removal of the right BKA  Patient underwent a BKA by vascular surgery approximately 2 months ago  He is also seen in the hospital due to a deep tissue injury which apparently had resolved  No new complaints  The following portions of the patient's history were reviewed and updated as appropriate:   He  has a past medical history of 6th nerve palsy, Blister of right leg, BPH (benign prostatic hyperplasia), CAD (coronary artery disease), Cardiomyopathy ([address] ), CHF (congestive heart failure) ([address] ), Depression, Diabetes mellitus ([address] ), Diabetic foot ulcer ([address] ), Diabetic retinopathy ([address] ), Exposure to [employer], Former tobacco use, H/O urinary retention, Hyperlipidemia, Hypertension, and Rupture of biceps tendon, traumatic (2017)    He   Patient Active Problem List    Diagnosis Date Noted   • Deep tissue injury of sacrum 06/01/2023   • Hx of BKA, right ([address] ) 06/01/2023   • Goals of care, counseling/discussion 04/13/2023   • Severe protein-calorie malnutrition ([address] ) 04/11/2023   • Encounter for competency evaluation 04/07/2023   • Depressed mood 04/05/2023   • Nausea & vomiting 04/03/2023   • Moderate protein-calorie malnutrition ([facility] ) 03/27/2023   • Ischemic cardiomyopathy 03/27/2023   • Former tobacco use    • Cardiomyopathy ([facility] )    • PAD (peripheral artery disease) ([facility] ) 03/22/2023   • Essential hypertension 03/22/2023   • Toenail avulsion 02/28/2023   • Suspected deep tissue injury of unknown depth 02/21/2023   • Diabetic ulcer of both feet associated with type 2 diabetes mellitus ([facility] ) 02/21/2023   • CAD (coronary artery disease) 02/14/2023   • Unintentional weight loss 02/11/2023   • Moderate protein malnutrition ([facility] ) 02/10/2023   • Sepsis ([place] [address] ) 02/08/2023   • Chronic systolic CHF (congestive heart failure) ([place] [address] ) 02/08/2023   • Urinary retention 02/08/2023   • Abnormal EKG 06/24/2021   • Hypokalemia 06/23/2021   • Type 2 diabetes mellitus with hyperglycemia, with long-term current use of insulin ([place] [address] ) 06/23/2021     He  has a past surgical history that includes Cataract extraction, bilateral (Bilateral, 2021); Cardiac catheterization; Cardiac catheterization (Left, 2/14/2023); Cardiac catheterization (N/A, 2/14/2023); Wound debridement (Right, 3/24/2023); IR lower extremity angiogram (3/28/2023); Leg amputation, lower tibia/fibula (Right, 3/30/2023); and IR lower extremity angiogram (4/6/2023)  His family history includes No Known Problems in his sister; Other in his daughter and son  He  reports that he quit smoking about 47 years ago  His smoking use included cigarettes  He started smoking about 53 years ago  He has a 6 00 pack-year smoking history  He has never used smokeless tobacco  He reports that he does not currently use alcohol  He reports that he does not use drugs    Current Outpatient Medications   Medication Sig Dispense Refill   • acetaminophen (TYLENOL) 325 mg tablet Take 650 mg by mouth every 6 (six) hours as needed for mild pain or fever     • ascorbic acid (VITAMIN C) 500 MG tablet Take 500 mg by mouth daily     • aspirin (ECOTRIN LOW STRENGTH) 81 mg EC tablet Take 1 tablet (81 mg total) by mouth daily 30 tablet 0   • atorvastatin (LIPITOR) 40 mg tablet Take 1 tablet (40 mg total) by mouth daily with dinner 30 tablet 0   • bisacodyl (FLEET) 10 MG/30ML ENEM Insert 10 mg into the rectum once     • clopidogrel (PLAVIX) 75 mg tablet Take 1 tablet (75 mg total) by mouth daily Do not start before April 22, 2023   0   • escitalopram (LEXAPRO) 5 mg tablet Take 1 tablet (5 mg total) by mouth daily Do not start before April 22, 2023   0   • finasteride (PROSCAR) 5 mg tablet Take 1 tablet (5 mg total) by mouth daily 30 tablet 11   • Insulin Pen Needle (Pen Needles) 31G X 5 MM MISC Use 2 (two) times a day 100 each 1   • melatonin 3 mg Take 1 tablet (3 mg total) by mouth daily at bedtime  0   • metoprolol succinate (TOPROL-XL) 50 mg 24 hr tablet Take 1 tablet (50 mg total) by mouth every 12 (twelve) hours 30 tablet 0   • mirtazapine (REMERON) 7 5 MG tablet Take 1 tablet (7 5 mg total) by mouth daily at bedtime  0   • multivitamin (THERAGRAN) TABS Take 1 tablet by mouth daily     • pantoprazole (PROTONIX) 40 mg tablet Take 1 tablet (40 mg total) by mouth daily  0   • sacubitril-valsartan (Entresto) 49-51 MG TABS Take 1 tablet by mouth 2 (two) times a day 60 tablet 1   • senna-docusate sodium (SENOKOT S) 8 6-50 mg per tablet Take 1 tablet by mouth daily as needed     • tamsulosin (FLOMAX) 0 4 mg Take 1 capsule (0 4 mg total) by mouth daily with dinner 30 capsule 11     No current facility-administered medications for this visit  He is allergic to metformin       Review of Systems   Constitutional: Negative for activity change, appetite change, chills, diaphoresis, fatigue, fever and unexpected weight change  Respiratory: Negative for cough and shortness of breath  Cardiovascular: Negative for chest pain and palpitations  Skin: Positive for wound (sacrum and right BKA)   Negative for color change, pallor and rash  Objective:       Wound 03/30/23 Leg Right (Active)   Wound Image Images linked 06/01/23 0938   Wound Description Epithelialization 06/01/23 0946   Hanh-wound Assessment Clean;Dry; Intact 06/01/23 0946   Wound Length (cm) 0 cm 06/01/23 0946   Wound Width (cm) 0 cm 06/01/23 0946   Wound Depth (cm) 0 cm 06/01/23 0946   Wound Surface Area (cm^2) 0 cm^2 06/01/23 0946   Wound Volume (cm^3) 0 cm^3 06/01/23 0946   Calculated Wound Volume (cm^3) 0 cm^3 06/01/23 0946   Wound Site Closure Staples 06/01/23 0946   Drainage Amount None 06/01/23 0946   Treatments Irrigation with NSS 06/01/23 0946       Wound 04/21/23 Pressure Injury Buttocks (Active)   Wound Image Images linked 06/01/23 0932   Wound Description Epithelialization;Pink 06/01/23 0930   Hanh-wound Assessment Pink 06/01/23 0930   Wound Length (cm) 0 cm 06/01/23 0930   Wound Width (cm) 0 cm 06/01/23 0930   Wound Depth (cm) 0 cm 06/01/23 0930   Wound Surface Area (cm^2) 0 cm^2 06/01/23 0930   Wound Volume (cm^3) 0 cm^3 06/01/23 0930   Calculated Wound Volume (cm^3) 0 cm^3 06/01/23 0930       /50   Pulse 76   Temp (!) 96 4 °F (35 8 °C)   Resp 20     Physical Exam  Vitals reviewed  Constitutional:       General: He is not in acute distress  Appearance: Normal appearance  He is not ill-appearing, toxic-appearing or diaphoretic  HENT:      Head: Normocephalic and atraumatic  Eyes:      General: No scleral icterus  Right eye: No discharge  Left eye: No discharge  Cardiovascular:      Rate and Rhythm: Normal rate  Pulmonary:      Effort: Pulmonary effort is normal  No respiratory distress  Musculoskeletal:         General: Deformity (right BKA) present  Cervical back: Normal range of motion  Skin:     General: Skin is warm  Coloration: Skin is not jaundiced or pale  Findings: No bruising or erythema        Comments: Previous deep tissue injury of the sacrum is now resolved  Incision of the right BKA well-healed  Staples in place  Neurological:      General: No focal deficit present  Mental Status: He is alert  Mental status is at baseline  Cranial Nerves: No cranial nerve deficit  Psychiatric:         Mood and Affect: Mood normal          Behavior: Behavior normal          Thought Content: Thought content normal          Judgment: Judgment normal                Wound Instructions:  Orders Placed This Encounter   Procedures   • Wound cleansing and dressings     Buttock wound is healed  May leave open to air  Right BKA wound is healed  Staples removed at today's visit  Swabbed with alcohol after staple removal and gauze applied at today's visit only  May leave open to air  Standing Status:   Future     Standing Expiration Date:   6/1/2024        Diagnosis ICD-10-CM Associated Orders   1  Deep tissue injury  T14  8XXA Wound cleansing and dressings      2   Hx of BKA, right ([facility] )  Z89 511 Wound cleansing and dressings

## 2023-06-01 NOTE — PATIENT INSTRUCTIONS
Orders Placed This Encounter   Procedures    Wound cleansing and dressings     Buttock wound is healed  May leave open to air  Right BKA wound is healed  Staples removed at today's visit  Swabbed with alcohol after staple removal and gauze applied at today's visit only  May leave open to air       Standing Status:   Future     Standing Expiration Date:   6/1/2024

## 2023-06-01 NOTE — ASSESSMENT & PLAN NOTE
S/p right BKA by vascular surgery two months ago  Staples removed  BKA incision well healed  Recommend follow-up with vascular surgeon

## 2023-06-05 ENCOUNTER — PATIENT OUTREACH (OUTPATIENT)
Dept: CASE MANAGEMENT | Facility: OTHER | Age: 78
End: 2023-06-05

## 2023-06-05 NOTE — PROGRESS NOTES
Call placed to [facility] and spoke with SW  The patient has no LCD at this time but will transition to LTC for a short time until his Prosthetic leg is ordered  [facility] [address] [place] [address] on 5/5/23  I will no longer be following the patient  Hand off to [facility] OPSW who will be following the patient at the SNF  I have removed myself from the care team, updated the Care Coordination note,sent a inbasket to the SW and closed the episode

## 2023-06-07 ENCOUNTER — PATIENT OUTREACH (OUTPATIENT)
Dept: FAMILY MEDICINE CLINIC | Facility: CLINIC | Age: 78
End: 2023-06-07

## 2023-06-07 ENCOUNTER — OFFICE VISIT (OUTPATIENT)
Dept: VASCULAR SURGERY | Facility: HOSPITAL | Age: 78
End: 2023-06-07

## 2023-06-07 VITALS — DIASTOLIC BLOOD PRESSURE: 54 MMHG | HEART RATE: 74 BPM | SYSTOLIC BLOOD PRESSURE: 110 MMHG

## 2023-06-07 DIAGNOSIS — I73.9 PAD (PERIPHERAL ARTERY DISEASE) (HCC): ICD-10-CM

## 2023-06-07 DIAGNOSIS — Z89.511 HX OF BKA, RIGHT (HCC): Primary | ICD-10-CM

## 2023-06-07 PROCEDURE — 99024 POSTOP FOLLOW-UP VISIT: CPT

## 2023-06-07 NOTE — PROGRESS NOTES
Assessment/Plan:    Hx of BKA, right ([facility] [address] )  77M minimally ambulatory w/ PMHx of DM (A1c 10 5), CHF, HTN, PAD with bilateral calcaneal wounds right > left s/p R BKA 3/30/23 [facility])  He presents today for post op evaluation    -Doing well post operatively  Followed by wound care, last seen 6/1 and had staples removed at that time  -R BKA incision well healed without evidence of infection, swelling, fluctuance  (clinical photo below)  -Denies pain or phantom limb pain  Plan:   -Continue ASA, plavix, statin  -Incision care reviewed  -PT/OT  -Instructed to contact the office with questions, concerns or new symptoms  PAD (peripheral artery disease) ([facility] )  -Minimally ambulatory with hx of PAD with nonhealing left foot/ankle wounds  -Vascular history:  S/p LLE angiogram w/SFA DCB PTA, unsuccessful attempt at BK pop/PT recanalization 3/28/23 (IR)  S/p RLE BKA 3/30/23 [facility])  S/p LLE angiogram w/retrograde PT access, Pop/PT PTA/DCB, Pop Supera stent 4/6/23 (IR)  -ROBERTO 4/10/23: Occluded left pop stent with BRADY 0 58/135/62   -Multiple scattered left foot and ankle wounds, dry without evidence of acute local infection    -Clinical photo below  -ROBERTO x3 months per protocol  Return to office to review and discuss possible bypass planning    -Continue ASA, plavix, statin    -Follow up with wound care as planned  -Reviewed s/sx of infection and when to seek medical attention  Diagnoses and all orders for this visit:    Hx of BKA, right ([facility] [address] )    PAD (peripheral artery disease) ([facility] )  -     VAS lower limb arterial duplex, limited/unilateral; Future          Subjective:      Patient ID: Brianna Hall is a 69 y o  male  Patient is s/p RT BKA  Incision site is healed  Pt has BLE numbness and a non-healing Lt foot wound  Pt denies fevers or chills       HPI    The following portions of the patient's history were reviewed and updated as appropriate: allergies, current medications, past family history, past medical history, past social history, past surgical history and problem list     Review of Systems   Musculoskeletal: Positive for gait problem  Skin: Positive for color change and wound  Objective:      Vitals:    06/07/23 1313   BP: 110/54   BP Location: Left arm   Patient Position: Sitting   Pulse: 74       Patient Active Problem List   Diagnosis   • Hypokalemia   • Type 2 diabetes mellitus with hyperglycemia, with long-term current use of insulin ([facility])   • Abnormal EKG   • Sepsis (HCC)   • Chronic systolic CHF (congestive heart failure) ([facility])   • Urinary retention   • Moderate protein malnutrition ([facility])   • Unintentional weight loss   • CAD (coronary artery disease)   • Suspected deep tissue injury of unknown depth   • Diabetic ulcer of both feet associated with type 2 diabetes mellitus ([facility])   • Toenail avulsion   • PAD (peripheral artery disease) ([facility])   • Essential hypertension   • Former tobacco use   • Cardiomyopathy ([facility] [address] )   • Moderate protein-calorie malnutrition ([facility])   • Ischemic cardiomyopathy   • Nausea & vomiting   • Depressed mood   • Encounter for competency evaluation   • Severe protein-calorie malnutrition ([facility] [address] )   • Goals of care, counseling/discussion   • Deep tissue injury of sacrum   • Hx of BKA, right [facility])       Past Surgical History:   Procedure Laterality Date   • CARDIAC CATHETERIZATION     • CARDIAC CATHETERIZATION Left 2/14/2023    Procedure: Cardiac Left Heart Cath;  Surgeon: Ayesha Sullivan MD;  Location: [facility]; Service: Cardiology   • CARDIAC CATHETERIZATION N/A 2/14/2023    Procedure: Cardiac Coronary Angiogram;  Surgeon: Ayesha Sullivan MD;  Location: [facility]; Service: Cardiology   • CATARACT EXTRACTION, BILATERAL Bilateral 2021   • IR LOWER EXTREMITY ANGIOGRAM  3/28/2023   • IR LOWER EXTREMITY ANGIOGRAM  4/6/2023   • LEG AMPUTATION THROUGH LOWER TIBIA AND FIBULA Right 3/30/2023    Procedure: (BKA);   Surgeon: Celso Webb DO;  Location: [facility]; Service: Vascular   • WOUND DEBRIDEMENT Right 3/24/2023    Procedure: debridement of heel ulcer;  Surgeon: Amara Perry DPM;  Location: [facility];  Service: Podiatry       Family History   Problem Relation Age of Onset   • No Known Problems Sister    • Other Daughter         chronic neck pain   • Other Son         chronic back pain   • Sudden death Neg Hx    • Cancer Neg Hx        Social History     Socioeconomic History   • Marital status:      Spouse name: Not on file   • Number of children: 2   • Years of education: Not on file   • Highest education level: Not on file   Occupational History   • Not on file   Tobacco Use   • Smoking status: Former     Packs/day: 1 00     Years: 6 00     Total pack years: 6 00     Types: Cigarettes     Start date: 5     Quit date: 12     Years since quittin 4   • Smokeless tobacco: Never   Vaping Use   • Vaping Use: Never used   Substance and Sexual Activity   • Alcohol use: Not Currently     Comment: Denies history of heavy alcohol use  At most will drink 1 or 2 drinks in a year (Updated 2023)  • Drug use: Never   • Sexual activity: Not on file   Other Topics Concern   • Not on file   Social History Narrative    Served in the [employer]; completed 18-month tour in [facility]     Previously worked for [employer]  Lives alone with his dog  Has a son and a daughter who both live into [place], [place] -does not maintain a close relationship with them  Denies having strong support system in place at home  Social Determinants of Health     Financial Resource Strain: Not on file   Food Insecurity: No Food Insecurity (3/25/2023)    Hunger Vital Sign    • Worried About Running Out of Food in the Last Year: Never true    • Ran Out of Food in the Last Year: Never true   Transportation Needs: No Transportation Needs (3/25/2023)    PRAPARE - Transportation    • Lack of Transportation (Medical): No    • Lack of Transportation (Non-Medical):  No Physical Activity: Not on file   Stress: Not on file   Social Connections: Not on file   Intimate Partner Violence: Not on file   Housing Stability: Low Risk  (3/25/2023)    Housing Stability Vital Sign    • Unable to Pay for Housing in the Last Year: No    • Number of Places Lived in the Last Year: 1    • Unstable Housing in the Last Year: No       Allergies   Allergen Reactions   • Metformin Diarrhea         Current Outpatient Medications:   •  acetaminophen (TYLENOL) 325 mg tablet, Take 650 mg by mouth every 6 (six) hours as needed for mild pain or fever, Disp: , Rfl:   •  ascorbic acid (VITAMIN C) 500 MG tablet, Take 500 mg by mouth daily, Disp: , Rfl:   •  aspirin (ECOTRIN LOW STRENGTH) 81 mg EC tablet, Take 1 tablet (81 mg total) by mouth daily, Disp: 30 tablet, Rfl: 0  •  atorvastatin (LIPITOR) 40 mg tablet, Take 1 tablet (40 mg total) by mouth daily with dinner, Disp: 30 tablet, Rfl: 0  •  bisacodyl (FLEET) 10 MG/30ML ENEM, Insert 10 mg into the rectum once, Disp: , Rfl:   •  clopidogrel (PLAVIX) 75 mg tablet, Take 1 tablet (75 mg total) by mouth daily Do not start before April 22, 2023 , Disp: , Rfl: 0  •  escitalopram (LEXAPRO) 5 mg tablet, Take 1 tablet (5 mg total) by mouth daily Do not start before April 22, 2023 , Disp: , Rfl: 0  •  finasteride (PROSCAR) 5 mg tablet, Take 1 tablet (5 mg total) by mouth daily, Disp: 30 tablet, Rfl: 11  •  Insulin Pen Needle (Pen Needles) 31G X 5 MM MISC, Use 2 (two) times a day, Disp: 100 each, Rfl: 1  •  melatonin 3 mg, Take 1 tablet (3 mg total) by mouth daily at bedtime, Disp: , Rfl: 0  •  metoprolol succinate (TOPROL-XL) 50 mg 24 hr tablet, Take 1 tablet (50 mg total) by mouth every 12 (twelve) hours, Disp: 30 tablet, Rfl: 0  •  mirtazapine (REMERON) 7 5 MG tablet, Take 1 tablet (7 5 mg total) by mouth daily at bedtime, Disp: , Rfl: 0  •  multivitamin (THERAGRAN) TABS, Take 1 tablet by mouth daily, Disp: , Rfl:   •  pantoprazole (PROTONIX) 40 mg tablet, Take 1 tablet (40 mg total) by mouth daily, Disp: , Rfl: 0  •  sacubitril-valsartan (Entresto) 49-51 MG TABS, Take 1 tablet by mouth 2 (two) times a day, Disp: 60 tablet, Rfl: 1  •  senna-docusate sodium (SENOKOT S) 8 6-50 mg per tablet, Take 1 tablet by mouth daily as needed, Disp: , Rfl:   •  tamsulosin (FLOMAX) 0 4 mg, Take 1 capsule (0 4 mg total) by mouth daily with dinner, Disp: 30 capsule, Rfl: 11      /54 (BP Location: Left arm, Patient Position: Sitting)   Pulse 74          Physical Exam  Vitals and nursing note reviewed  Constitutional:       Appearance: Normal appearance  HENT:      Head: Normocephalic and atraumatic  Cardiovascular:      Rate and Rhythm: Normal rate and regular rhythm  Pulses:           Carotid pulses are 2+ on the right side and 2+ on the left side  Radial pulses are 2+ on the right side and 2+ on the left side  Femoral pulses are 2+ on the right side and 2+ on the left side  Popliteal pulses are 2+ on the right side  Dorsalis pedis pulses are 0 on the left side  Posterior tibial pulses are 0 on the left side  Heart sounds: Normal heart sounds  Pulmonary:      Effort: Pulmonary effort is normal  No respiratory distress  Breath sounds: Normal breath sounds  Abdominal:      Comments: No abdominal bruit or pulsatile masses  Musculoskeletal:         General: No swelling  Normal range of motion  Cervical back: Normal range of motion and neck supple  Right lower leg: No edema  Left lower leg: No edema  Skin:     General: Skin is warm  Capillary Refill: Capillary refill takes 2 to 3 seconds  Findings: No erythema  Comments: Left foot and ankle dry gangrenous wounds   Neurological:      General: No focal deficit present  Mental Status: He is alert and oriented to person, place, and time     Psychiatric:         Mood and Affect: Mood normal          Behavior: Behavior normal  Teja Hernandez PA-C  [facility]  [phone]    I have spent a total time of 20 minutes on 06/07/23 in caring for this patient including Diagnostic results, Prognosis, Risks and benefits of tx options, Instructions for management, Patient and family education, Importance of tx compliance, Risk factor reductions, Impressions, Counseling / Coordination of care, Documenting in the medical record, Reviewing / ordering tests, medicine, procedures   and Obtaining or reviewing history

## 2023-06-07 NOTE — PROGRESS NOTES
TRISTIAN MOLINA received IB from [employer] coordinator stating pt is transitioning to LTC until new prosthetic leg is received   from facility asked if TRI[facility] MOLINA would be aware if [facility] would [facility] with getting [facility] fully [facility]  Insurance is only covering partially automatic  TRISTIAN MOLINA placed call to Al LUBIN at facility and left a message with contact information for [facility]  Pt has been in SNF for over 30 days  TRISTIAN MOLINA closed case at this time and will remain available for pt when he is d/c home

## 2023-06-07 NOTE — ASSESSMENT & PLAN NOTE
77M w/ PMHx of DM (A1c 10 5), CHF, HTN, PAD with bilateral calcaneal wounds right > left s/p R BKA 3/30/23 [facility])  He presents today for post op evaluation       Vascular history:  S/p LLE angiogram w/SFA DCB PTA, unsuccessful attempt at BK pop/PT recanalization 3/28/23 (IR)  S/p RLE BKA 3/30/23 [facility])  S/p LLE angiogram w/retrograde PT access, Pop/PT PTA/DCB, Pop Supera stent 4/6/23 (IR)      Plan:   -ASA, plavix, statin  -Incision care reviewed

## 2023-06-07 NOTE — ASSESSMENT & PLAN NOTE
-PAD with nonhealing left calcaneal wounds   -Vascular history:  S/p LLE angiogram w/SFA DCB PTA, unsuccessful attempt at BK pop/PT recanalization 3/28/23 (IR)  S/p RLE BKA 3/30/23 [facility])  S/p LLE angiogram w/retrograde PT access, Pop/PT PTA/DCB, Pop Supera stent 4/6/23 (IR)  -ROBERTO 4/10/23: Occluded left pop stent with BRADY 0 58/135/62  -ROBERTO x3 months per protocol  Return to office to review and discuss possible bypass planning    -Continue ASA, statin

## 2023-06-08 PROBLEM — A41.9 SEPSIS (HCC): Status: RESOLVED | Noted: 2023-02-08 | Resolved: 2023-06-08

## 2023-06-13 ENCOUNTER — OFFICE VISIT (OUTPATIENT)
Dept: WOUND CARE | Facility: CLINIC | Age: 78
End: 2023-06-13
Payer: COMMERCIAL

## 2023-06-13 VITALS
RESPIRATION RATE: 16 BRPM | TEMPERATURE: 97.6 F | HEART RATE: 74 BPM | SYSTOLIC BLOOD PRESSURE: 110 MMHG | DIASTOLIC BLOOD PRESSURE: 62 MMHG

## 2023-06-13 DIAGNOSIS — L89.620 PRESSURE INJURY OF LEFT HEEL, UNSTAGEABLE (HCC): Primary | ICD-10-CM

## 2023-06-13 DIAGNOSIS — L89.892: ICD-10-CM

## 2023-06-13 DIAGNOSIS — L89.522 PRESSURE INJURY OF LEFT ANKLE, STAGE 2 (HCC): ICD-10-CM

## 2023-06-13 PROCEDURE — 99213 OFFICE O/P EST LOW 20 MIN: CPT | Performed by: PODIATRIST

## 2023-06-13 PROCEDURE — 99215 OFFICE O/P EST HI 40 MIN: CPT | Performed by: PODIATRIST

## 2023-06-13 RX ORDER — FUROSEMIDE 40 MG/1
40 TABLET ORAL DAILY
COMMUNITY
Start: 2023-06-12

## 2023-06-13 RX ORDER — METOLAZONE 5 MG/1
5 TABLET ORAL DAILY
COMMUNITY
Start: 2023-06-12

## 2023-06-13 NOTE — PROGRESS NOTES
Patient ID: Kendrick Ferraro is a 69 y o  male Date of Birth 1945       Chief Complaint   Patient presents with   • Follow Up Wound Care Visit     Follow up visit for wound to left heel       Allergies:  Metformin    Diagnosis:  1  Pressure injury of left heel, unstageable ([address] )    2  Pressure injury of left ankle, stage 2 ([address] )    3  Pressure injury of left calf, stage 2 ([address] )       Diagnosis ICD-10-CM Associated Orders   1  Pressure injury of left heel, unstageable ([address] )  L89 620       2  Pressure injury of left ankle, stage 2 ([facility])  L89 [id number]       3  Pressure injury of left calf, stage 2 ([address] )  I32 047            Assessment & Plan:  See wound orders  -Greater than 40 minutes was spent with counseling the patient on the care of his left lower extremity with review of the records and also with discussion with the director of nursing about his care and how these areas worsened  - Per discussion with director of nursing, these were identified on Saturday night and had progressed to full-blown pressure ulcerations on Monday  They report that he is on a special mattress but not wearing a boot at this time  - Of note the dressings that were ordered by myself were also not present on the left foot and heel  - These are facility required pressure ulcerations  Unfortunately with his severely poor blood flow of the left lower extremity and the extensive nature of these lower extremity wounds it is very highly likely that he will likely lose the left lower extremity  - I discussed at length the imperative nature of immediate follow-up with vascular for bypass planning and intervention on the left lower extremity to optimize his blood flow  - I advised Betadine wet-to-dry to the left lower extremity and he is to be offloaded in a Multi-Podus boot with frequent skin checks    - I also discussed at length that the skin should also not just be checked but if there are new onset issues treatment should also be changed   -Unfortunately if these areas were identified on Saturday night, but not properly offloaded, they would progress to a severe nature by Monday, unfortunately they are even more severe today  - I discussed at length the patient the need to prevent sleeping on his side, he needs to sleep on his back, we will attempt to offload this areas with a Multi-Podus boot and be very vigilant of any further breakdown on the pressure from the kickstand area  - He is unfortunately very high risk for contralateral limb loss  Subjective:   Patient presents for evaluation management of his left lower extremity  He did see vascular, general surgery and has been admitted to her nursing home since previous visit  He presents today with multiple new onset wounds of the left lower extremity        The following portions of the patient's history were reviewed and updated as appropriate:   Patient Active Problem List   Diagnosis   • Hypokalemia   • Type 2 diabetes mellitus with hyperglycemia, with long-term current use of insulin ([facility])   • Abnormal EKG   • Chronic systolic CHF (congestive heart failure) ([facility])   • Urinary retention   • Moderate protein malnutrition ([facility])   • Unintentional weight loss   • CAD (coronary artery disease)   • Suspected deep tissue injury of unknown depth   • Diabetic ulcer of both feet associated with type 2 diabetes mellitus ([facility])   • Toenail avulsion   • PAD (peripheral artery disease) ([facility])   • Essential hypertension   • Former tobacco use   • Cardiomyopathy ([address] )   • Moderate protein-calorie malnutrition ([facility])   • Ischemic cardiomyopathy   • Nausea & vomiting   • Depressed mood   • Encounter for competency evaluation   • Severe protein-calorie malnutrition ([address] )   • Goals of care, counseling/discussion   • Deep tissue injury of sacrum   • Hx of BKA, right ([facility])     Past Medical History:   Diagnosis Date   • 6th nerve palsy    • Blister of right leg    • BPH (benign prostatic hyperplasia)    • CAD (coronary artery disease)    • Cardiomyopathy ([place] [address] )     EF 25%   • CHF (congestive heart failure) ([facility])    • Depression    • Diabetes mellitus ([place] [address] )     type 2, insulin dependent   • Diabetic foot ulcer ([place] [address] )     left, local wound care   • Diabetic retinopathy ([place] [address] )    • Exposure to Agent Orange     while serving in [facility]   • Former tobacco use    • H/O urinary retention     w/ reagan placement   • Hyperlipidemia    • Hypertension    • Rupture of biceps tendon, traumatic 2017    right     Past Surgical History:   Procedure Laterality Date   • CARDIAC CATHETERIZATION     • CARDIAC CATHETERIZATION Left 2023    Procedure: Cardiac Left Heart Cath;  Surgeon: Cathleen Euceda MD;  [facility]: [facility]; Service: Cardiology   • CARDIAC CATHETERIZATION N/A 2023    Procedure: Cardiac Coronary Angiogram;  Surgeon: Cathleen Euceda MD;  [facility]: [facility]; Service: Cardiology   • CATARACT EXTRACTION, BILATERAL Bilateral    • IR LOWER EXTREMITY ANGIOGRAM  3/28/2023   • IR LOWER EXTREMITY ANGIOGRAM  2023   • LEG AMPUTATION THROUGH LOWER TIBIA AND FIBULA Right 3/30/2023    Procedure: (BKA);   Surgeon: Luigi Garcia DO;  Location: [facility];  Service: Vascular   • WOUND DEBRIDEMENT Right 3/24/2023    Procedure: debridement of heel ulcer;  Surgeon: Celso Souza DPM;  Location: [facility];  Service: Podiatry     Social History     Socioeconomic History   • Marital status:      Spouse name: Not on file   • Number of children: 2   • Years of education: Not on file   • Highest education level: Not on file   Occupational History   • Not on file   Tobacco Use   • Smoking status: Former     Packs/day: 1 00     Years: 6 00     Total pack years: 6 00     Types: Cigarettes     Start date: 5     Quit date: 12     Years since quittin 4   • Smokeless tobacco: Never   Vaping Use   • Vaping Use: Never used   Substance and Sexual Activity   • Alcohol use: Not Currently     Comment: Denies history of heavy alcohol use  At most will drink 1 or 2 drinks in a year (Updated 02/2023)  • Drug use: Never   • Sexual activity: Not on file   Other Topics Concern   • Not on file   Social History Narrative    Served in the [employer]; completed 18-month tour in [facility]     Previously worked for [employer]  Lives alone with his dog  Has a son and a daughter who both live into [place], [place] -does not maintain a close relationship with them  Denies having strong support system in place at home  Social Determinants of Health     Financial Resource Strain: Not on file   Food Insecurity: No Food Insecurity (3/25/2023)    Hunger Vital Sign    • Worried About Running Out of Food in the Last Year: Never true    • Ran Out of Food in the Last Year: Never true   Transportation Needs: No Transportation Needs (3/25/2023)    PRAPARE - Transportation    • Lack of Transportation (Medical): No    • Lack of Transportation (Non-Medical):  No   Physical Activity: Not on file   Stress: Not on file   Social Connections: Not on file   Intimate Partner Violence: Not on file   Housing Stability: Low Risk  (3/25/2023)    Housing Stability Vital Sign    • Unable to Pay for Housing in the Last Year: No    • Number of Places Lived in the Last Year: 1    • Unstable Housing in the Last Year: No        Current Outpatient Medications:   •  acetaminophen (TYLENOL) 325 mg tablet, Take 650 mg by mouth every 6 (six) hours as needed for mild pain or fever, Disp: , Rfl:   •  ascorbic acid (VITAMIN C) 500 MG tablet, Take 500 mg by mouth daily, Disp: , Rfl:   •  aspirin (ECOTRIN LOW STRENGTH) 81 mg EC tablet, Take 1 tablet (81 mg total) by mouth daily, Disp: 30 tablet, Rfl: 0  •  atorvastatin (LIPITOR) 40 mg tablet, Take 1 tablet (40 mg total) by mouth daily with dinner, Disp: 30 tablet, Rfl: 0  •  bisacodyl (FLEET) 10 MG/30ML ENEM, Insert 10 mg into the rectum once, Disp: , Rfl:   •  clopidogrel (PLAVIX) 75 mg tablet, Take 1 tablet (75 mg total) by mouth daily Do not start before April 22, 2023 , Disp: , Rfl: 0  •  escitalopram (LEXAPRO) 5 mg tablet, Take 1 tablet (5 mg total) by mouth daily Do not start before April 22, 2023 , Disp: , Rfl: 0  •  finasteride (PROSCAR) 5 mg tablet, Take 1 tablet (5 mg total) by mouth daily, Disp: 30 tablet, Rfl: 11  •  furosemide (LASIX) 40 mg tablet, Take 40 mg by mouth in the morning, Disp: , Rfl:   •  Insulin Pen Needle (Pen Needles) 31G X 5 MM MISC, Use 2 (two) times a day, Disp: 100 each, Rfl: 1  •  melatonin 3 mg, Take 1 tablet (3 mg total) by mouth daily at bedtime, Disp: , Rfl: 0  •  metolazone (ZAROXOLYN) 5 mg tablet, Take 5 mg by mouth in the morning, Disp: , Rfl:   •  metoprolol succinate (TOPROL-XL) 50 mg 24 hr tablet, Take 1 tablet (50 mg total) by mouth every 12 (twelve) hours, Disp: 30 tablet, Rfl: 0  •  mirtazapine (REMERON) 7 5 MG tablet, Take 1 tablet (7 5 mg total) by mouth daily at bedtime, Disp: , Rfl: 0  •  multivitamin (THERAGRAN) TABS, Take 1 tablet by mouth daily, Disp: , Rfl:   •  pantoprazole (PROTONIX) 40 mg tablet, Take 1 tablet (40 mg total) by mouth daily, Disp: , Rfl: 0  •  sacubitril-valsartan (Entresto) 49-51 MG TABS, Take 1 tablet by mouth 2 (two) times a day, Disp: 60 tablet, Rfl: 1  •  senna-docusate sodium (SENOKOT S) 8 6-50 mg per tablet, Take 1 tablet by mouth daily as needed, Disp: , Rfl:   •  tamsulosin (FLOMAX) 0 4 mg, Take 1 capsule (0 4 mg total) by mouth daily with dinner, Disp: 30 capsule, Rfl: 11  Family History   Problem Relation Age of Onset   • No Known Problems Sister    • Other Daughter         chronic neck pain   • Other Son         chronic back pain   • Sudden death Neg Hx    • Cancer Neg Hx       Review of Systems   Constitutional: Negative for chills and fever  HENT: Negative for ear pain and sore throat  Eyes: Negative for pain and visual disturbance  Respiratory: Negative for cough and shortness of breath      Cardiovascular: Negative for chest pain and palpitations  Gastrointestinal: Negative for abdominal pain and vomiting  Genitourinary: Negative for dysuria and hematuria  Musculoskeletal: Negative for arthralgias and back pain  Skin: Negative for color change and rash  Neurological: Negative for seizures and syncope  All other systems reviewed and are negative  Objective:  /62   Pulse 74   Temp 97 6 °F (36 4 °C) (Tympanic)   Resp 16     Physical Exam  Musculoskeletal:      Comments: Severe lateral overload with new onset lateral heel wound, lateral ankle wound and left leg wound  There is a significant mount of reactive erythema with a mixed fibronecrotic base to all wounds  None of them do appear to be infected nor malodorous  There is also the initial remaining pressure eschar which remains intact on the posterior aspect of the left foot             Wound 03/21/23 Pressure Injury Heel Left;Medial (Active)   Wound Image    06/13/23 1326   Wound Description Eschar;Pink;Black 06/13/23 1326   Hanh-wound Assessment Edema 06/13/23 1326   Wound Length (cm) 2 cm 06/13/23 1326   Wound Width (cm) 3 cm 06/13/23 1326   Wound Depth (cm) 0 1 cm 06/13/23 1326   Wound Surface Area (cm^2) 6 cm^2 06/13/23 1326   Wound Volume (cm^3) 0 6 cm^3 06/13/23 1326   Calculated Wound Volume (cm^3) 0 6 cm^3 06/13/23 1326   Change in Wound Size % 92 16 06/13/23 1326   Drainage Amount Small 06/13/23 1326   Drainage Description Serous 06/13/23 1326   Treatments Irrigation with NSS 06/13/23 1326   Patient Tolerance Tolerated well 06/13/23 1326   Dressing Status Removed 06/13/23 1326       Wound 03/30/23 Leg Right (Active)   Wound Image   06/01/23 0938   Wound Description Epithelialization 06/01/23 0946   Hanh-wound Assessment Clean;Dry; Intact 06/01/23 0946   Wound Length (cm) 0 cm 06/01/23 0946   Wound Width (cm) 0 cm 06/01/23 0946   Wound Depth (cm) 0 cm 06/01/23 0946   Wound Surface Area (cm^2) 0 cm^2 06/01/23 0946   Wound Volume (cm^3) 0 cm^3 06/01/23 0946   Calculated Wound Volume (cm^3) 0 cm^3 06/01/23 0946   Wound Site Closure Staples 06/01/23 0946   Drainage Amount None 06/01/23 0946   Treatments Irrigation with NSS 06/01/23 0946       Wound 04/21/23 Pressure Injury Buttocks (Active)   Wound Image   06/01/23 0932   Wound Description Epithelialization;Pink 06/01/23 0930   Hanh-wound Assessment Pink 06/01/23 0930   Wound Length (cm) 0 cm 06/01/23 0930   Wound Width (cm) 0 cm 06/01/23 0930   Wound Depth (cm) 0 cm 06/01/23 0930   Wound Surface Area (cm^2) 0 cm^2 06/01/23 0930   Wound Volume (cm^3) 0 cm^3 06/01/23 0930   Calculated Wound Volume (cm^3) 0 cm^3 06/01/23 0930       Wound 05/23/23 Heel Left;Lateral (Active)   Wound Image    06/13/23 1329   Wound Description Yellow 06/13/23 1328   Hanh-wound Assessment Edema 05/23/23 1414   Wound Length (cm) 1 6 cm 06/13/23 1328   Wound Width (cm) 1 8 cm 06/13/23 1328   Wound Depth (cm) 0 2 cm 06/13/23 1328   Wound Surface Area (cm^2) 2 88 cm^2 06/13/23 1328   Wound Volume (cm^3) 0 576 cm^3 06/13/23 1328   Calculated Wound Volume (cm^3) 0 58 cm^3 06/13/23 1328   Change in Wound Size % -1060 06/13/23 1328   Drainage Amount Moderate 06/13/23 1328   Drainage Description Serous; Yellow 06/13/23 1328   Non-staged Wound Description Full thickness 06/13/23 1328   Treatments Irrigation with NSS 06/13/23 1328   Patient Tolerance Tolerated well 06/13/23 1328   Dressing Status Removed 06/13/23 1328       Wound 06/13/23 Ankle Left;Lateral (Active)   Wound Image   06/13/23 1321   Wound Description Eschar;Pink;Yellow 06/13/23 1331   Hanh-wound Assessment Erythema 06/13/23 1331   Wound Length (cm) 2 4 cm 06/13/23 1331   Wound Width (cm) 2 2 cm 06/13/23 1331   Wound Depth (cm) 0 1 cm 06/13/23 1331   Wound Surface Area (cm^2) 5 28 cm^2 06/13/23 1331   Wound Volume (cm^3) 0 528 cm^3 06/13/23 1331   Calculated Wound Volume (cm^3) 0 53 cm^3 06/13/23 1331   Drainage Amount Moderate 06/13/23 1331   Drainage Description Serous; Kelsi Jimy "06/13/23 1331   Non-staged Wound Description Full thickness 06/13/23 1331   Treatments Irrigation with NSS 06/13/23 1331   Patient Tolerance Tolerated well 06/13/23 1331   Dressing Status Removed 06/13/23 1331       Wound 06/13/23 Leg Left;Lateral (Active)   Wound Image   06/13/23 1318   Wound Description Epithelialization;Bleeding;Slough; Yellow;Pink 06/13/23 1332   Hanh-wound Assessment Erythema;Edema 06/13/23 1332                         Procedures     Results from last 6 Months   Lab Units 03/21/23  1120   WOUND CULTURE  3+ Growth of Methicillin Resistant Staphylococcus aureus*  4+ Growth of Enterococcus faecalis*  1+ Growth of         Wound Instructions:  No orders of the defined types were placed in this encounter  Evi Zamudio DPM      Portions of the record may have been created with voice recognition software  Occasional wrong word or \"sound a like\" substitutions may have occurred due to the inherent limitations of voice recognition software  Read the chart carefully and recognize, using context, where substitutions have occurred        "

## 2023-06-13 NOTE — PATIENT INSTRUCTIONS
Orders Placed This Encounter   Procedures    Wound cleansing and dressings     Wash your hands with soap and water  Remove old dressing, discard into plastic bag and place in trash  Cleanse the wound with Mild Soap & Water prior to applying a clean dressing  Do not use tissue or cotton balls  Do not scrub the wound  Pat dry using gauze  Shower no     Please get Multipodis boot for patient, use as directed by Dr Aan Paula Dukes  Left Foot & Leg Wounds:  Apply betadine soaked gauze to wound (betadine wet to dry dsg)  Cover with gauze or abd  Secure with rajwinder & tape  Change dressing daily    Please try to consume 3-4 servings of protein (30g) each day  Follow up in [address] [place] [address] in 1 week  Today's Treatment:  Wounds were cleansed with normal saline and redressed as ordered above,       Standing Status:   Future     Standing Expiration Date:   6/13/2024

## 2023-06-27 ENCOUNTER — OFFICE VISIT (OUTPATIENT)
Dept: WOUND CARE | Facility: CLINIC | Age: 78
End: 2023-06-27
Payer: COMMERCIAL

## 2023-06-27 VITALS
HEART RATE: 64 BPM | TEMPERATURE: 97.6 F | DIASTOLIC BLOOD PRESSURE: 72 MMHG | SYSTOLIC BLOOD PRESSURE: 112 MMHG | RESPIRATION RATE: 18 BRPM

## 2023-06-27 DIAGNOSIS — L89.522 PRESSURE INJURY OF LEFT ANKLE, STAGE 2 (HCC): Primary | ICD-10-CM

## 2023-06-27 DIAGNOSIS — L89.620 PRESSURE INJURY OF LEFT HEEL, UNSTAGEABLE (HCC): ICD-10-CM

## 2023-06-27 PROCEDURE — 99214 OFFICE O/P EST MOD 30 MIN: CPT | Performed by: PODIATRIST

## 2023-06-27 NOTE — PROGRESS NOTES
Patient ID: Mitch Eli is a 69 y o  male Date of Birth 1945       Chief Complaint   Patient presents with   • Follow Up Wound Care Visit     Left lower leg and foot wounds       Allergies:  Metformin    Diagnosis:  1  Pressure injury of left ankle, stage 2 ([facility] )    2  Pressure injury of left heel, unstageable ([facility] )       Diagnosis ICD-10-CM Associated Orders   1  Pressure injury of left ankle, stage 2 ([facility])  L89 522       2  Pressure injury of left heel, unstageable ([facility])  L89 620            Assessment & Plan:  See wound orders    -Continue Betadine wet-to-dry, continue offloading, continue being very careful of having any pressure overlying any of these areas  - Thankfully these areas are adherent and not infected,  - Arterial studies were on 7/5 and repeat vascular appointment is on 7/10, he is due to return following his vascular appointment for continued evaluation    Subjective:   Patient presents for evaluation management of left foot, he is not going to see vascular yet and states that he is somewhat sitting in dependent position in the wheelchair and they are changing his dressings daily    Has no new pedal complaints      The following portions of the patient's history were reviewed and updated as appropriate:   Patient Active Problem List   Diagnosis   • Hypokalemia   • Type 2 diabetes mellitus with hyperglycemia, with long-term current use of insulin ([facility])   • Abnormal EKG   • Chronic systolic CHF (congestive heart failure) ([facility])   • Urinary retention   • Moderate protein malnutrition ([facility])   • Unintentional weight loss   • CAD (coronary artery disease)   • Suspected deep tissue injury of unknown depth   • Diabetic ulcer of both feet associated with type 2 diabetes mellitus ([facility])   • Toenail avulsion   • PAD (peripheral artery disease) ([facility] )   • Essential hypertension   • Former tobacco use   • Cardiomyopathy ([facility] )   • Moderate protein-calorie malnutrition ([facility])   • Ischemic cardiomyopathy   • Nausea & vomiting   • Depressed mood   • Encounter for competency evaluation   • Severe protein-calorie malnutrition ([facility] )   • Goals of care, counseling/discussion   • Deep tissue injury of sacrum   • Hx of BKA, right (H[facility])     Past Medical History:   Diagnosis Date   • 6th nerve palsy    • Blister of right leg    • BPH (benign prostatic hyperplasia)    • CAD (coronary artery disease)    • Cardiomyopathy ([place] [address] )     EF 25%   • CHF (congestive heart failure) ([facility])    • Depression    • Diabetes mellitus ([place] [address] )     type 2, insulin dependent   • Di[facility]  )     left, local wound care   • Diabetic retinopathy ([place] [address] )    • Exposure to Agent Orange     while serving in [employer] [facility]   • Former tobacco use    • H/O urinary retention     w/ reagan placement   • Hyperlipidemia    • Hypertension    • Rupture of biceps tendon, traumatic 2017    right     Past Surgical History:   Procedure Laterality Date   • CARDIAC CATHETERIZATION     • CARDIAC CATHETERIZATION Left 2/14/2023    Procedure: Cardiac Left Heart Cath;  Surgeon: Jazmine José MD;  Location: [facility]; Service: Cardiology   • CARDIAC CATHETERIZATION N/A 2/14/2023    Procedure: Cardiac Coronary Angiogram;  Surgeon: Jazmine José MD;  Location: [facility]; Service: Cardiology   • CATARACT EXTRACTION, BILATERAL Bilateral 2021   • IR LOWER EXTREMITY ANGIOGRAM  3/28/2023   • IR LOWER EXTREMITY ANGIOGRAM  4/6/2023   • LEG AMPUTATION THROUGH LOWER TIBIA AND FIBULA Right 3/30/2023    Procedure: (BKA);   Surgeon: Lilly Salazar DO;  Location: [facility];  Service: Vascular   • WOUND DEBRIDEMENT Right 3/24/2023    Procedure: debridement of heel ulcer;  Surgeon: Mai Fraga DPM;  Location: [facility];  Service: Podiatry     Social History     Socioeconomic History   • Marital status:      Spouse name: Not on file   • Number of children: 2   • Years of education: Not on file   • Highest education level: Not on file   Occupational History • Not on file   Tobacco Use   • Smoking status: Former     Packs/day: 1 00     Years: 6 00     Total pack years: 6 00     Types: Cigarettes     Start date: 5     Quit date:      Years since quittin 5   • Smokeless tobacco: Never   Vaping Use   • Vaping Use: Never used   Substance and Sexual Activity   • Alcohol use: Not Currently     Comment: Denies history of heavy alcohol use  At most will drink 1 or 2 drinks in a year (Updated 2023)  • Drug use: Never   • Sexual activity: Not on file   Other Topics Concern   • Not on file   Social History Narrative    Served in the [employer]; completed 18-month tour in [facility]     Previously worked for [employer]  Lives alone with his dog  Has a son and a daughter who both live into [place], [place] -does not maintain a close relationship with them  Denies having strong support system in place at home  Social Determinants of Health     Financial Resource Strain: Not on file   Food Insecurity: No Food Insecurity (3/25/2023)    Hunger Vital Sign    • Worried About Running Out of Food in the Last Year: Never true    • Ran Out of Food in the Last Year: Never true   Transportation Needs: No Transportation Needs (3/25/2023)    PRAPARE - Transportation    • Lack of Transportation (Medical): No    • Lack of Transportation (Non-Medical):  No   Physical Activity: Not on file   Stress: Not on file   Social Connections: Not on file   Intimate Partner Violence: Not on file   Housing Stability: Low Risk  (3/25/2023)    Housing Stability Vital Sign    • Unable to Pay for Housing in the Last Year: No    • Number of Places Lived in the Last Year: 1    • Unstable Housing in the Last Year: No        Current Outpatient Medications:   •  acetaminophen (TYLENOL) 325 mg tablet, Take 650 mg by mouth every 6 (six) hours as needed for mild pain or fever, Disp: , Rfl:   •  ascorbic acid (VITAMIN C) 500 MG tablet, Take 500 mg by mouth daily, Disp: , Rfl:   •  aspirin (ECOTRIN LOW STRENGTH) 81 mg EC tablet, Take 1 tablet (81 mg total) by mouth daily, Disp: 30 tablet, Rfl: 0  •  atorvastatin (LIPITOR) 40 mg tablet, Take 1 tablet (40 mg total) by mouth daily with dinner, Disp: 30 tablet, Rfl: 0  •  bisacodyl (FLEET) 10 MG/30ML ENEM, Insert 10 mg into the rectum once, Disp: , Rfl:   •  clopidogrel (PLAVIX) 75 mg tablet, Take 1 tablet (75 mg total) by mouth daily Do not start before April 22, 2023 , Disp: , Rfl: 0  •  escitalopram (LEXAPRO) 5 mg tablet, Take 1 tablet (5 mg total) by mouth daily Do not start before April 22, 2023 , Disp: , Rfl: 0  •  finasteride (PROSCAR) 5 mg tablet, Take 1 tablet (5 mg total) by mouth daily, Disp: 30 tablet, Rfl: 11  •  furosemide (LASIX) 40 mg tablet, Take 40 mg by mouth in the morning, Disp: , Rfl:   •  Insulin Pen Needle (Pen Needles) 31G X 5 MM MISC, Use 2 (two) times a day, Disp: 100 each, Rfl: 1  •  melatonin 3 mg, Take 1 tablet (3 mg total) by mouth daily at bedtime, Disp: , Rfl: 0  •  metolazone (ZAROXOLYN) 5 mg tablet, Take 5 mg by mouth in the morning, Disp: , Rfl:   •  metoprolol succinate (TOPROL-XL) 50 mg 24 hr tablet, Take 1 tablet (50 mg total) by mouth every 12 (twelve) hours, Disp: 30 tablet, Rfl: 0  •  mirtazapine (REMERON) 7 5 MG tablet, Take 1 tablet (7 5 mg total) by mouth daily at bedtime, Disp: , Rfl: 0  •  multivitamin (THERAGRAN) TABS, Take 1 tablet by mouth daily, Disp: , Rfl:   •  pantoprazole (PROTONIX) 40 mg tablet, Take 1 tablet (40 mg total) by mouth daily, Disp: , Rfl: 0  •  sacubitril-valsartan (Entresto) 49-51 MG TABS, Take 1 tablet by mouth 2 (two) times a day, Disp: 60 tablet, Rfl: 1  •  senna-docusate sodium (SENOKOT S) 8 6-50 mg per tablet, Take 1 tablet by mouth daily as needed, Disp: , Rfl:   •  tamsulosin (FLOMAX) 0 4 mg, Take 1 capsule (0 4 mg total) by mouth daily with dinner, Disp: 30 capsule, Rfl: 11  Family History   Problem Relation Age of Onset   • No Known Problems Sister    • Other Daughter chronic neck pain   • Other Son         chronic back pain   • Sudden death Neg Hx    • Cancer Neg Hx       Review of Systems   Constitutional: Negative for chills and fever  HENT: Negative for ear pain and sore throat  Eyes: Negative for pain and visual disturbance  Respiratory: Negative for cough and shortness of breath  Cardiovascular: Negative for chest pain and palpitations  Gastrointestinal: Negative for abdominal pain and vomiting  Genitourinary: Negative for dysuria and hematuria  Musculoskeletal: Negative for arthralgias and back pain  Skin: Negative for color change and rash  Neurological: Negative for seizures and syncope  All other systems reviewed and are negative  Objective: There were no vitals taken for this visit  Physical Exam  Musculoskeletal:      Comments: There is severe change made from the eschars, eschars are hard and stable, plus 2 out of 4 pitting edema no signs of infection             Wound 03/21/23 Pressure Injury Heel Left;Medial (Active)   Wound Image   06/27/23 1614   Wound Description Eschar;Pink;Black 06/13/23 1326   Hanh-wound Assessment Edema 06/13/23 1326   Wound Length (cm) 2 cm 06/13/23 1326   Wound Width (cm) 3 cm 06/13/23 1326   Wound Depth (cm) 0 1 cm 06/13/23 1326   Wound Surface Area (cm^2) 6 cm^2 06/13/23 1326   Wound Volume (cm^3) 0 6 cm^3 06/13/23 1326   Calculated Wound Volume (cm^3) 0 6 cm^3 06/13/23 1326   Change in Wound Size % 92 16 06/13/23 1326   Drainage Amount Small 06/13/23 1326   Drainage Description Serous 06/13/23 1326   Treatments Irrigation with NSS 06/13/23 1326   Patient Tolerance Tolerated well 06/13/23 1326   Dressing Status Removed 06/13/23 1326       Wound 03/30/23 Leg Right (Active)   Wound Image   06/01/23 0938   Wound Description Epithelialization 06/01/23 0946   Hanh-wound Assessment Clean;Dry; Intact 06/01/23 0946   Wound Length (cm) 0 cm 06/01/23 0946   Wound Width (cm) 0 cm 06/01/23 0946   Wound Depth (cm) 0 cm 06/01/23 0946   Wound Surface Area (cm^2) 0 cm^2 06/01/23 0946   Wound Volume (cm^3) 0 cm^3 06/01/23 0946   Calculated Wound Volume (cm^3) 0 cm^3 06/01/23 0946   Wound Site Closure Staples 06/01/23 0946   Drainage Amount None 06/01/23 0946   Treatments Irrigation with NSS 06/01/23 0946       Wound 04/21/23 Pressure Injury Buttocks (Active)   Wound Image   06/01/23 0932   Wound Description Epithelialization;Pink 06/01/23 0930   Hanh-wound Assessment Pink 06/01/23 0930   Wound Length (cm) 0 cm 06/01/23 0930   Wound Width (cm) 0 cm 06/01/23 0930   Wound Depth (cm) 0 cm 06/01/23 0930   Wound Surface Area (cm^2) 0 cm^2 06/01/23 0930   Wound Volume (cm^3) 0 cm^3 06/01/23 0930   Calculated Wound Volume (cm^3) 0 cm^3 06/01/23 0930       Wound 05/23/23 Heel Left;Lateral (Active)   Wound Image   06/27/23 1614   Wound Description Yellow 06/13/23 1328   Hanh-wound Assessment Edema 05/23/23 1414   Wound Length (cm) 1 6 cm 06/13/23 1328   Wound Width (cm) 1 8 cm 06/13/23 1328   Wound Depth (cm) 0 2 cm 06/13/23 1328   Wound Surface Area (cm^2) 2 88 cm^2 06/13/23 1328   Wound Volume (cm^3) 0 576 cm^3 06/13/23 1328   Calculated Wound Volume (cm^3) 0 58 cm^3 06/13/23 1328   Change in Wound Size % -1060 06/13/23 1328   Drainage Amount Moderate 06/13/23 1328   Drainage Description Serous; Yellow 06/13/23 1328   Non-staged Wound Description Full thickness 06/13/23 1328   Treatments Irrigation with NSS 06/13/23 1328   Patient Tolerance Tolerated well 06/13/23 1328   Dressing Status Removed 06/13/23 1328       Wound 06/13/23 Ankle Left;Lateral (Active)   Wound Image   06/27/23 1613   Wound Description Eschar;Pink;Yellow 06/13/23 1331   Hanh-wound Assessment Erythema 06/13/23 1331   Wound Length (cm) 2 4 cm 06/13/23 1331   Wound Width (cm) 2 2 cm 06/13/23 1331   Wound Depth (cm) 0 1 cm 06/13/23 1331   Wound Surface Area (cm^2) 5 28 cm^2 06/13/23 1331   Wound Volume (cm^3) 0 528 cm^3 06/13/23 1331   Calculated Wound Volume (cm^3) 0 53 cm^3 "06/13/23 1331   Drainage Amount Moderate 06/13/23 1331   Drainage Description Serous; Perez 06/13/23 1331   Non-staged Wound Description Full thickness 06/13/23 1331   Treatments Irrigation with NSS 06/13/23 1331   Patient Tolerance Tolerated well 06/13/23 1331   Dressing Status Removed 06/13/23 1331       Wound 06/13/23 Leg Left;Lateral (Active)   Wound Image   06/27/23 1613   Wound Description Epithelialization;Bleeding;Slough; Yellow;Pink 06/13/23 1332   Hanh-wound Assessment Erythema;Edema 06/13/23 1332                         Procedures     Results from last 6 Months   Lab Units 03/21/23  1120   WOUND CULTURE  3+ Growth of Methicillin Resistant Staphylococcus aureus*  4+ Growth of Enterococcus faecalis*  1+ Growth of         Wound Instructions:  No orders of the defined types were placed in this encounter  Lynn Up DPM      Portions of the record may have been created with voice recognition software  Occasional wrong word or \"sound a like\" substitutions may have occurred due to the inherent limitations of voice recognition software  Read the chart carefully and recognize, using context, where substitutions have occurred        "

## 2023-06-27 NOTE — PATIENT INSTRUCTIONS
Orders Placed This Encounter   Procedures    Wound cleansing and dressings     Wash your hands with soap and water  Remove old dressing, discard into plastic bag and place in trash  Cleanse the wound with Mild Soap & Water prior to applying a clean dressing  Do not use tissue or cotton balls  Do not scrub the wound  Pat dry using gauze  Shower no      Please get Multipodis boot for patient, use as directed by Dr Sandhya Kumar  Wear boot at ALL times  Left Foot & Leg Wounds:  Apply betadine soaked gauze to wound (betadine wet to dry dsg)  DO NOT PAINT WITH BETADINE STICK  Cover with abd  Secure with rajwinder & tape  Change dressing daily     Please try to consume 3-4 servings of protein (30g) each day  Follow up in [facility] in 3 weeks    Off-loading Instructions:    Keep weight and pressure off wound at all times  Back to bed with left leg elevated on 2 pillows after lunch for 2 hours every day  Today's Treatment:  Wounds were cleansed with normal saline and redressed as ordered above,       Standing Status:   Future     Standing Expiration Date:   6/27/2024

## 2023-07-06 ENCOUNTER — TELEPHONE (OUTPATIENT)
Dept: FAMILY MEDICINE CLINIC | Facility: CLINIC | Age: 78
End: 2023-07-06

## 2023-07-06 NOTE — TELEPHONE ENCOUNTER
PER SANDRA FROM VASCULAR THEY ARE USING RT-#[id number] VALID FROM 3/21/23 TO 9/17/23- [id number] [facility] DENIED ALL CLAIMS

## 2023-07-13 ENCOUNTER — TELEPHONE (OUTPATIENT)
Dept: PODIATRY | Facility: CLINIC | Age: 78
End: 2023-07-13

## 2023-07-13 NOTE — TELEPHONE ENCOUNTER
Caller: Brittani/RN/[facility]     Doctor/Office: Dr. Stephenson [facility]#: [phone]    Escalation: Care/Facility called WC to report issues and they were told to call office-pt's wound smells foul/white "Skin" forming at edges of wounds/they are following WC orders daily. Please advise as to what pt should do/call facility back to advise.  Thanks

## 2023-07-14 NOTE — TELEPHONE ENCOUNTER
[facility] [place] and she said they will monitor and send to ER if needed. Thank You!

## 2023-07-18 ENCOUNTER — OFFICE VISIT (OUTPATIENT)
Dept: WOUND CARE | Facility: CLINIC | Age: 78
End: 2023-07-18
Payer: COMMERCIAL

## 2023-07-18 VITALS
SYSTOLIC BLOOD PRESSURE: 130 MMHG | RESPIRATION RATE: 18 BRPM | TEMPERATURE: 97.3 F | HEART RATE: 60 BPM | DIASTOLIC BLOOD PRESSURE: 64 MMHG

## 2023-07-18 DIAGNOSIS — L89.892: ICD-10-CM

## 2023-07-18 DIAGNOSIS — L89.522 PRESSURE INJURY OF LEFT ANKLE, STAGE 2 (HCC): Primary | ICD-10-CM

## 2023-07-18 DIAGNOSIS — L89.620 PRESSURE INJURY OF LEFT HEEL, UNSTAGEABLE (HCC): ICD-10-CM

## 2023-07-18 PROCEDURE — 99214 OFFICE O/P EST MOD 30 MIN: CPT | Performed by: PODIATRIST

## 2023-07-18 NOTE — PATIENT INSTRUCTIONS
Orders Placed This Encounter   Procedures    Wound cleansing and dressings     Wash your hands with soap and water. Remove old dressing, discard into plastic bag and place in trash. Cleanse the wound with Mild Soap & Water prior to applying a clean dressing. Do not use tissue or cotton balls. Do not scrub the wound. Pat dry using gauze. Shower no       Multipodus boot when in bed. Prevalon boot when OOB. Left Foot & Leg Wounds:   Apply betadine soaked gauze to wound (betadine wet to dry dsg). Betadine paint is not adequate, please saturate the gauze with betadine. Cover with abd. Secure with rajwinder & tape. Change dressing daily       Please try to consume 3-4 servings of protein (30g) each day. Follow up in [address] [place] [address] in 3 weeks     Off-loading Instructions:   Keep weight and pressure off wounds at all times. Back to bed with left leg elevated on 2 pillows after lunch for 2 hours every day. Please keep patient's leg elevated as much as possible. Pt needs to offload these wounds. Today's Treatment:   Wounds were cleansed with normal saline and redressed as ordered above,.      Standing Status:   Future     Standing Expiration Date:   7/18/2024

## 2023-07-18 NOTE — PROGRESS NOTES
Patient ID: Yadira Bergeron is a 69 y.o. male Date of Birth 1945       Chief Complaint   Patient presents with   • Follow Up Wound Care Visit     Follow up visit for wound to left heel & ankle       Allergies:  Metformin    Diagnosis:  1. Pressure injury of left ankle, stage 2 (HCC)  -     Wound cleansing and dressings; Future  -     XR ankle 3+ vw left; Future; Expected date: 07/18/2023  -     XR heel / calcaneus 2+ vw left; Future; Expected date: 07/18/2023  -     Sedimentation rate, automated; Future  -     C-reactive protein; Future    2. Pressure injury of left heel, unstageable (HCC)  -     Wound cleansing and dressings; Future  -     XR ankle 3+ vw left; Future; Expected date: 07/18/2023  -     XR heel / calcaneus 2+ vw left; Future; Expected date: 07/18/2023  -     Sedimentation rate, automated; Future  -     C-reactive protein; Future    3. Pressure injury of left calf, stage 2 (HCC)  -     Wound cleansing and dressings; Future  -     XR ankle 3+ vw left; Future; Expected date: 07/18/2023  -     XR heel / calcaneus 2+ vw left; Future; Expected date: 07/18/2023  -     Sedimentation rate, automated; Future  -     C-reactive protein; Future       Diagnosis ICD-10-CM Associated Orders   1. Pressure injury of left ankle, stage 2 (HCC)  L89.522 Wound cleansing and dressings     XR ankle 3+ vw left     XR heel / calcaneus 2+ vw left     Sedimentation rate, automated     C-reactive protein      2. Pressure injury of left heel, unstageable (HCC)  L89.620 Wound cleansing and dressings     XR ankle 3+ vw left     XR heel / calcaneus 2+ vw left     Sedimentation rate, automated     C-reactive protein      3.  Pressure injury of left calf, stage 2 (HCC)  L89.892 Wound cleansing and dressings     XR ankle 3+ vw left     XR heel / calcaneus 2+ vw left     Sedimentation rate, automated     C-reactive protein           Assessment & Plan:  See wound orders.   -His left lower extremity is worsening on an acute basis drainage is worsening, swelling is worsening, and he is spiking his blood sugars  - His eschars are somewhat boggy but no purulence and no erythema and no malodor, I am worried about deep extension of infection beneath his boggy eschars  - He has no hope of healing his wounds with his current vascular status, he is incredibly high risk for new onset deep underlying infection and proximal limb loss  - No debridement today advised Betadine wet-to-dry with copious amounts of Betadine  - We will obtain new ankle x-rays and also calcaneal axial x-rays for the left heel  - He is to return weekly for now due to the high risk nature of his left lower extremity  -For some reason it appears as though his vascular appointment was canceled, the patient states that he did go and was then canceled. And we will call the vascular center to attempt to have this moved up as he is incredibly high risk for amputation and infection  -We will also obtain new ESR and CRP      Subjective:   Patient transfer evaluation management of his left leg, reports worsening drainage. He states that he did go to his vascular appointment but was then canceled. He is unsure why and his next appointment has been reappointed for August.  His blood sugars have been running somewhat high and his drainage is getting worse out of his left lower extremity.       The following portions of the patient's history were reviewed and updated as appropriate:   Patient Active Problem List   Diagnosis   • Hypokalemia   • Type 2 diabetes mellitus with hyperglycemia, with long-term current use of insulin ([facility])   • Abnormal EKG   • Chronic systolic CHF (congestive heart failure) ([facility])   • Urinary retention   • Moderate protein malnutrition ([facility])   • Unintentional weight loss   • CAD (coronary artery disease)   • Suspected deep tissue injury of unknown depth   • Diabetic ulcer of both feet associated with type 2 diabetes mellitus ([facility])   • Toenail avulsion   • PAD (peripheral artery disease) ([address])   • Essential hypertension   • Former tobacco use   • Cardiomyopathy ([address])   • Moderate protein-calorie malnutrition ([address])   • Ischemic cardiomyopathy   • Nausea & vomiting   • Depressed mood   • Encounter for competency evaluation   • Severe protein-calorie malnutrition ([address])   • Goals of care, counseling/discussion   • Deep tissue injury of sacrum   • Hx of BKA, right ([address])     Past Medical History:   Diagnosis Date   • 6th nerve palsy    • Blister of right leg    • BPH (benign prostatic hyperplasia)    • CAD (coronary artery disease)    • Cardiomyopathy ([address])     EF 25%   • CHF (congestive heart failure) ([facility])    • Depression    • Diabetes mellitus ([address])     type 2, insulin dependent   • Diabetic foot ulcer ([address])     left, local wound care   • Diabetic retinopathy ([address])    • Exposure to Agent Orange     while serving in [employer]   • Former tobacco use    • H/O urinary retention     w/ reagan placement   • Hyperlipidemia    • Hypertension    • Rupture of biceps tendon, traumatic 2017    right     Past Surgical History:   Procedure Laterality Date   • CARDIAC CATHETERIZATION     • CARDIAC CATHETERIZATION Left 2/14/2023    Procedure: Cardiac Left Heart Cath;  Surgeon: Mark Lamar MD;  [facility]: [facility]; Service: Cardiology   • CARDIAC CATHETERIZATION N/A 2/14/2023    Procedure: Cardiac Coronary Angiogram;  Surgeon: Mark Lamar MD;  [facility]; Service: Cardiology   • CATARACT EXTRACTION, BILATERAL Bilateral 2021   • IR LOWER EXTREMITY ANGIOGRAM  3/28/2023   • IR LOWER EXTREMITY ANGIOGRAM  4/6/2023   • LEG AMPUTATION THROUGH LOWER TIBIA AND FIBULA Right 3/30/2023    Procedure: (BKA);   Surgeon: Brooklynn Mesa DO;  Location: [facility];  Service: Vascular   • WOUND DEBRIDEMENT Right 3/24/2023    Procedure: debridement of heel ulcer;  Surgeon: Jim Teixeira DPM;  Location: [facility];  Service: Podiatry     Social History     Socioeconomic History   • Marital status:      Spouse name: None   • Number of children: 2   • Years of education: None   • Highest education level: None   Occupational History   • None   Tobacco Use   • Smoking status: Former     Packs/day: 1.00     Years: 6.00     Total pack years: 6.00     Types: Cigarettes     Start date: 5     Quit date:      Years since quittin.5   • Smokeless tobacco: Never   Vaping Use   • Vaping Use: Never used   Substance and Sexual Activity   • Alcohol use: Not Currently     Comment: Denies history of heavy alcohol use. At most will drink 1 or 2 drinks in a year (Updated 2023). • Drug use: Never   • Sexual activity: None   Other Topics Concern   • None   Social History Narrative    Served in the [employer]; completed 18-month tour in [place].    Previously worked for [employer]. Lives alone with his dog. Has a son and a daughter who both live into [place], [place] -does not maintain a close relationship with them. Denies having strong support system in place at home. Social Determinants of Health     Financial Resource Strain: Not on file   Food Insecurity: No Food Insecurity (3/25/2023)    Hunger Vital Sign    • Worried About Running Out of Food in the Last Year: Never true    • Ran Out of Food in the Last Year: Never true   Transportation Needs: No Transportation Needs (3/25/2023)    PRAPARE - Transportation    • Lack of Transportation (Medical): No    • Lack of Transportation (Non-Medical):  No   Physical Activity: Not on file   Stress: Not on file   Social Connections: Not on file   Intimate Partner Violence: Not on file   Housing Stability: Low Risk  (3/25/2023)    Housing Stability Vital Sign    • Unable to Pay for Housing in the Last Year: No    • Number of Places Lived in the Last Year: 1    • Unstable Housing in the Last Year: No        Current Outpatient Medications:   •  acetaminophen (TYLENOL) 325 mg tablet, Take 650 mg by mouth every 6 (six) hours as needed for mild pain or fever, Disp: , Rfl:   •  ascorbic acid (VITAMIN C) 500 MG tablet, Take 500 mg by mouth daily, Disp: , Rfl:   •  aspirin (ECOTRIN LOW STRENGTH) 81 mg EC tablet, Take 1 tablet (81 mg total) by mouth daily, Disp: 30 tablet, Rfl: 0  •  atorvastatin (LIPITOR) 40 mg tablet, Take 1 tablet (40 mg total) by mouth daily with dinner, Disp: 30 tablet, Rfl: 0  •  bisacodyl (FLEET) 10 MG/30ML ENEM, Insert 10 mg into the rectum once, Disp: , Rfl:   •  clopidogrel (PLAVIX) 75 mg tablet, Take 1 tablet (75 mg total) by mouth daily Do not start before April 22, 2023., Disp: , Rfl: 0  •  escitalopram (LEXAPRO) 5 mg tablet, Take 1 tablet (5 mg total) by mouth daily Do not start before April 22, 2023., Disp: , Rfl: 0  •  finasteride (PROSCAR) 5 mg tablet, Take 1 tablet (5 mg total) by mouth daily, Disp: 30 tablet, Rfl: 11  •  furosemide (LASIX) 40 mg tablet, Take 40 mg by mouth in the morning, Disp: , Rfl:   •  Insulin Pen Needle (Pen Needles) 31G X 5 MM MISC, Use 2 (two) times a day, Disp: 100 each, Rfl: 1  •  melatonin 3 mg, Take 1 tablet (3 mg total) by mouth daily at bedtime, Disp: , Rfl: 0  •  metolazone (ZAROXOLYN) 5 mg tablet, Take 5 mg by mouth in the morning, Disp: , Rfl:   •  metoprolol succinate (TOPROL-XL) 50 mg 24 hr tablet, Take 1 tablet (50 mg total) by mouth every 12 (twelve) hours, Disp: 30 tablet, Rfl: 0  •  mirtazapine (REMERON) 7.5 MG tablet, Take 1 tablet (7.5 mg total) by mouth daily at bedtime, Disp: , Rfl: 0  •  multivitamin (THERAGRAN) TABS, Take 1 tablet by mouth daily, Disp: , Rfl:   •  pantoprazole (PROTONIX) 40 mg tablet, Take 1 tablet (40 mg total) by mouth daily, Disp: , Rfl: 0  •  sacubitril-valsartan (Entresto) 49-51 MG TABS, Take 1 tablet by mouth 2 (two) times a day, Disp: 60 tablet, Rfl: 1  •  senna-docusate sodium (SENOKOT S) 8.6-50 mg per tablet, Take 1 tablet by mouth daily as needed, Disp: , Rfl:   •  tamsulosin (FLOMAX) 0.4 mg, Take 1 capsule (0.4 mg total) by mouth daily with dinner, Disp: 30 capsule, Rfl: 11  Family History   Problem Relation Age of Onset   • No Known Problems Sister    • Other Daughter         chronic neck pain   • Other Son         chronic back pain   • Sudden death Neg Hx    • Cancer Neg Hx       Review of Systems      Objective:  /64   Pulse 60   Temp (!) 97.3 °F (36.3 °C) (Tympanic)   Resp 18     Physical Exam        Wound 03/21/23 Pressure Injury Heel Left;Medial (Active)   Wound Image   07/18/23 1330   Wound Description Eschar;Black 07/18/23 1332   Pressure Injury Stage U 06/27/23 1636   Hanh-wound Assessment Edema;Callus 07/18/23 1332   Wound Length (cm) 1.5 cm 07/18/23 1332   Wound Width (cm) 3 cm 07/18/23 1332   Wound Depth (cm) 0.1 cm 07/18/23 1332   Wound Surface Area (cm^2) 4.5 cm^2 07/18/23 1332   Wound Volume (cm^3) 0.45 cm^3 07/18/23 1332   Calculated Wound Volume (cm^3) 0.45 cm^3 07/18/23 1332   Change in Wound Size % 94.12 07/18/23 1332   Drainage Amount Scant 07/18/23 1332   Drainage Description Serosanguineous 07/18/23 1332   Non-staged Wound Description Full thickness 07/18/23 1332   Treatments Cleansed 06/27/23 1636   Patient Tolerance Tolerated well 06/13/23 1326   Dressing Status Removed 06/13/23 1326       Wound 05/23/23 Heel Left;Lateral (Active)   Wound Image    07/18/23 1329   Wound Description Black;Eschar;Pink 07/18/23 1333   Pressure Injury Stage U 06/27/23 1635   Hanh-wound Assessment Edema;Callus; Maceration 07/18/23 1333   Wound Length (cm) 0.8 cm 07/18/23 1333   Wound Width (cm) 1.1 cm 07/18/23 1333   Wound Depth (cm) 0.2 cm 07/18/23 1333   Wound Surface Area (cm^2) 0.88 cm^2 07/18/23 1333   Wound Volume (cm^3) 0.176 cm^3 07/18/23 1333   Calculated Wound Volume (cm^3) 0.18 cm^3 07/18/23 1333   Change in Wound Size % -260 07/18/23 1333   Drainage Amount Moderate 07/18/23 1333   Drainage Description Serosanguineous 07/18/23 1333   Non-staged Wound Description Full thickness 07/18/23 1333   Treatments Cleansed 06/27/23 0593 Patient Tolerance Tolerated well 06/13/23 1328   Dressing Status Removed 06/13/23 1328       Wound 06/13/23 Ankle Left;Lateral (Active)   Wound Image    07/18/23 1328   Wound Description Eschar;Pink;Yellow 07/18/23 1335   Pressure Injury Stage U 06/27/23 1638   Hanh-wound Assessment Edema; Maceration 07/18/23 1335   Wound Length (cm) 2 cm 07/18/23 1335   Wound Width (cm) 2.2 cm 07/18/23 1335   Wound Depth (cm) 0.1 cm 07/18/23 1335   Wound Surface Area (cm^2) 4.4 cm^2 07/18/23 1335   Wound Volume (cm^3) 0.44 cm^3 07/18/23 1335   Calculated Wound Volume (cm^3) 0.44 cm^3 07/18/23 1335   Change in Wound Size % 16.98 07/18/23 1335   Drainage Amount Moderate 07/18/23 1335   Drainage Description Serosanguineous 07/18/23 1335   Non-staged Wound Description Full thickness 07/18/23 1335   Treatments Cleansed 06/27/23 1638   Patient Tolerance Tolerated well 06/13/23 1331   Dressing Status Removed 06/13/23 1331       Wound 06/13/23 Leg Left;Lateral (Active)   Wound Image    07/18/23 1328   Wound Description Black;Eschar;Slough; Yellow;Pink;Hypergranulation 07/18/23 1335   Pressure Injury Stage U 06/27/23 1628   Hanh-wound Assessment Edema; White;[place] 07/18/23 1335   Wound Length (cm) 7.1 cm 07/18/23 1335   Wound Width (cm) 4 cm 07/18/23 1335   Wound Depth (cm) 0.1 cm 07/18/23 1335   Wound Surface Area (cm^2) 28.4 cm^2 07/18/23 1335   Wound Volume (cm^3) 2.84 cm^3 07/18/23 1335   Calculated Wound Volume (cm^3) 2.84 cm^3 07/18/23 1335   Change in Wound Size % -26.22 07/18/23 1335   Drainage Amount Small 07/18/23 1335   Drainage Description Serosanguineous 07/18/23 1335   Non-staged Wound Description Full thickness 07/18/23 1335   Treatments Cleansed 06/27/23 1628                         Procedures     Results from last 6 Months   Lab Units 03/21/23  1120   WOUND CULTURE  3+ Growth of Methicillin Resistant Staphylococcus aureus*  4+ Growth of Enterococcus faecalis*  1+ Growth of         Wound Instructions:  Orders Placed This Encounter   Procedures   • Wound cleansing and dressings     Wash your hands with soap and water.     Remove old dressing, discard into plastic bag and place in trash.     Cleanse the wound with Mild Soap & Water prior to applying a clean dressing. Do not use tissue or cotton balls. Do not scrub the wound. Pat dry using gauze.       Shower no       Multipodus boot when in bed. Prevalon boot when OOB.       Left Foot & Leg Wounds:   Apply betadine soaked gauze to wound (betadine wet to dry dsg). Betadine paint is not adequate, please saturate the gauze with betadine. Cover with abd. Secure with rajwinder & tape. Change dressing daily       Please try to consume 3-4 servings of protein (30g) each day.       Follow up in [address] [place] [address] in 3 weeks     Off-loading Instructions:   Keep weight and pressure off wounds at all times. Back to bed with left leg elevated on 2 pillows after lunch for 2 hours every day. Please keep patient's leg elevated as much as possible. Pt needs to offload these wounds.         Today's Treatment:   Wounds were cleansed with normal saline and redressed as ordered above,. Standing Status:   Future     Standing Expiration Date:   7/18/2024   • XR ankle 3+ vw left     Standing Status:   Future     Standing Expiration Date:   7/18/2027     Scheduling Instructions:      Bring along any outside films relating to this procedure. Weight-bearing three views of left ankle   • XR heel / calcaneus 2+ vw left     Standing Status:   Future     Standing Expiration Date:   7/18/2027     Scheduling Instructions:      Bring along any outside films relating to this procedure.          • Sedimentation rate, automated     Standing Status:   Future     Standing Expiration Date:   7/18/2024   • C-reactive protein     Standing Status:   Future     Standing Expiration Date:   7/18/2024         Jacqui Corley DPM      Portions of the record may have been created with voice recognition software. Occasional wrong word or "sound a like" substitutions may have occurred due to the inherent limitations of voice recognition software. Read the chart carefully and recognize, using context, where substitutions have occurred.

## 2023-07-25 ENCOUNTER — OFFICE VISIT (OUTPATIENT)
Dept: WOUND CARE | Facility: CLINIC | Age: 78
End: 2023-07-25
Payer: COMMERCIAL

## 2023-07-25 ENCOUNTER — TELEPHONE (OUTPATIENT)
Dept: VASCULAR SURGERY | Facility: CLINIC | Age: 78
End: 2023-07-25

## 2023-07-25 VITALS
HEART RATE: 68 BPM | RESPIRATION RATE: 18 BRPM | SYSTOLIC BLOOD PRESSURE: 106 MMHG | TEMPERATURE: 96.6 F | DIASTOLIC BLOOD PRESSURE: 64 MMHG

## 2023-07-25 DIAGNOSIS — L89.522 PRESSURE INJURY OF LEFT ANKLE, STAGE 2 (HCC): ICD-10-CM

## 2023-07-25 DIAGNOSIS — I70.222 CRITICAL LIMB ISCHEMIA OF LEFT LOWER EXTREMITY (HCC): ICD-10-CM

## 2023-07-25 DIAGNOSIS — L89.620 PRESSURE INJURY OF LEFT HEEL, UNSTAGEABLE (HCC): Primary | ICD-10-CM

## 2023-07-25 PROCEDURE — 99214 OFFICE O/P EST MOD 30 MIN: CPT | Performed by: PODIATRIST

## 2023-07-25 NOTE — PATIENT INSTRUCTIONS
Orders Placed This Encounter   Procedures    Wound cleansing and dressings     Go directly to [address] [place] [address] emergency department for evaluation of left leg and foot wounds. Patient refusing to go at this time, but will go later today or tomorrow. Must be evaluated at ED for left leg wounds. Left Foot & Leg Wounds:   Apply betadine soaked gauze to wound (betadine wet to dry dsg). Betadine paint is not adequate, please saturate the gauze with betadine. Cover with abd. Secure with rajwinder & tape. Change dressing daily         Today's Treatment:   Wounds were cleansed with normal saline and redressed as ordered above,.      Standing Status:   Future     Standing Expiration Date:   7/25/2024

## 2023-07-25 NOTE — PROGRESS NOTES
Patient ID: Reyna Borja is a 69 y.o. male Date of Birth 1945       Chief Complaint   Patient presents with   • Follow Up Wound Care Visit     Left Lower leg and foot wounds       Allergies:  Metformin    Diagnosis:  1. Pressure injury of left heel, unstageable (HCC)  -     Wound cleansing and dressings; Future    2. Pressure injury of left ankle, stage 2 (HCC)  -     Wound cleansing and dressings; Future    3. Critical limb ischemia of left lower extremity (HCC)  -     Wound cleansing and dressings; Future       Diagnosis ICD-10-CM Associated Orders   1. Pressure injury of left heel, unstageable (HCC)  L89.620 Wound cleansing and dressings      2. Pressure injury of left ankle, stage 2 (HCC)  L89.522 Wound cleansing and dressings      3.  Critical limb ischemia of left lower extremity (HCC)  I70.222 Wound cleansing and dressings           Assessment & Plan:  See wound orders.   -Unfortunately outpatient work-up for this is failing, he has had worsening tissue loss over past 3 to 4 weeks, he does have an arterial test scheduled but with the worsening fluctuance with increasing drainage I am very concerned about possible new onset underlying infection  -Without vascular intervention and optimization his left lower extremity will continue to worsen with an BRADY 0.58 which was reviewed, the great toe pressure is within healing range at 62 but I believe that the BRADY is likely more representative of what is going on with his actual pathology here  - I have recommended admission to the hospital for work-up for worsening tissue loss, from podiatric standpoint I am currently unable to intervene with an BRADY so severe, I would recommend vascular consult, new arterial duplex with segmental pressures and waveforms, x-rays of the tibia, fibula, ankle, heel and MRI for further work-up for underlying osteomyelitis as necessary  - Dressings on admission would be Betadine soaked 4 x 4's, wet to dry Betadine alone.  -He is very high risk for proximal limb loss and we discussed this at length    Subjective:   Patient presents for evaluation management of his left lower extremity, he states that he been compliant with medications and elevation and reports increasing drainage from the left lower extremity.   He did not get the x-rays done but did get the blood work done      The following portions of the patient's history were reviewed and updated as appropriate:   Patient Active Problem List   Diagnosis   • Hypokalemia   • Type 2 diabetes mellitus with hyperglycemia, with long-term current use of insulin ([facility])   • Abnormal EKG   • Chronic systolic CHF (congestive heart failure) ([facility])   • Urinary retention   • Moderate protein malnutrition ([facility])   • Unintentional weight loss   • CAD (coronary artery disease)   • Suspected deep tissue injury of unknown depth   • Diabetic ulcer of both feet associated with type 2 diabetes mellitus ([facility])   • Toenail avulsion   • PAD (peripheral artery disease) ([facility])   • Essential hypertension   • Former tobacco use   • Cardiomyopathy ([address])   • Moderate protein-calorie malnutrition ([facility])   • Ischemic cardiomyopathy   • Nausea & vomiting   • Depressed mood   • Encounter for competency evaluation   • Severe protein-calorie malnutrition ([address])   • Goals of care, counseling/discussion   • Deep tissue injury of sacrum   • Hx of BKA, right ([address])     Past Medical History:   Diagnosis Date   • 6th nerve palsy    • Blister of right leg    • BPH (benign prostatic hyperplasia)    • CAD (coronary artery disease)    • Cardiomyopathy ([address])     EF 25%   • CHF (congestive heart failure) ([address])    • Depression    • Diabetes mellitus ([address])     type 2, insulin dependent   • Diabetic foot ulcer ([address])     left, local wound care   • Diabetic retinopathy ([address])    • Exposure to Agent Orange     while serving in [employer]   • Former tobacco use    • H/O urinary retention     w/ reagan placement   • Hyperlipidemia    • Hypertension    • Rupture of biceps tendon, traumatic 2017    right     Past Surgical History:   Procedure Laterality Date   • CARDIAC CATHETERIZATION     • CARDIAC CATHETERIZATION Left 2023    Procedure: Cardiac Left Heart Cath;  Surgeon: Calli Mcnamara MD;  Location: [facility]; Service: Cardiology   • CARDIAC CATHETERIZATION N/A 2023    Procedure: Cardiac Coronary Angiogram;  Surgeon: Calli Mcnamara MD;  Location: [facility]; Service: Cardiology   • CATARACT EXTRACTION, BILATERAL Bilateral    • IR LOWER EXTREMITY ANGIOGRAM  3/28/2023   • IR LOWER EXTREMITY ANGIOGRAM  2023   • LEG AMPUTATION THROUGH LOWER TIBIA AND FIBULA Right 3/30/2023    Procedure: (BKA); Surgeon: Denisse Lara DO;  Location: [facility];  Service: Vascular   • WOUND DEBRIDEMENT Right 3/24/2023    Procedure: debridement of heel ulcer;  Surgeon: Hammad Del Cid DPM;  Location: [facility];  Service: Podiatry     Social History     Socioeconomic History   • Marital status:      Spouse name: None   • Number of children: 2   • Years of education: None   • Highest education level: None   Occupational History   • None   Tobacco Use   • Smoking status: Former     Packs/day: 1.00     Years: 6.00     Total pack years: 6.00     Types: Cigarettes     Start date:      Quit date:      Years since quittin.5   • Smokeless tobacco: Never   Vaping Use   • Vaping Use: Never used   Substance and Sexual Activity   • Alcohol use: Not Currently     Comment: Denies history of heavy alcohol use. At most will drink 1 or 2 drinks in a year (Updated 2023). • Drug use: Never   • Sexual activity: None   Other Topics Concern   • None   Social History Narrative    Served in the [employer]; completed 18-month tour in [employer].    Previously worked for [employer]. Lives alone with his dog. Has a son and a daughter who both live into [place], [place] -does not maintain a close relationship with them.     Denies having strong support system in place at home. Social Determinants of Health     Financial Resource Strain: Not on file   Food Insecurity: No Food Insecurity (3/25/2023)    Hunger Vital Sign    • Worried About Running Out of Food in the Last Year: Never true    • Ran Out of Food in the Last Year: Never true   Transportation Needs: No Transportation Needs (3/25/2023)    PRAPARE - Transportation    • Lack of Transportation (Medical): No    • Lack of Transportation (Non-Medical):  No   Physical Activity: Not on file   Stress: Not on file   Social Connections: Not on file   Intimate Partner Violence: Not on file   Housing Stability: Low Risk  (3/25/2023)    Housing Stability Vital Sign    • Unable to Pay for Housing in the Last Year: No    • Number of Places Lived in the Last Year: 1    • Unstable Housing in the Last Year: No        Current Outpatient Medications:   •  acetaminophen (TYLENOL) 325 mg tablet, Take 650 mg by mouth every 6 (six) hours as needed for mild pain or fever, Disp: , Rfl:   •  ascorbic acid (VITAMIN C) 500 MG tablet, Take 500 mg by mouth daily, Disp: , Rfl:   •  aspirin (ECOTRIN LOW STRENGTH) 81 mg EC tablet, Take 1 tablet (81 mg total) by mouth daily, Disp: 30 tablet, Rfl: 0  •  atorvastatin (LIPITOR) 40 mg tablet, Take 1 tablet (40 mg total) by mouth daily with dinner, Disp: 30 tablet, Rfl: 0  •  bisacodyl (FLEET) 10 MG/30ML ENEM, Insert 10 mg into the rectum once, Disp: , Rfl:   •  clopidogrel (PLAVIX) 75 mg tablet, Take 1 tablet (75 mg total) by mouth daily Do not start before April 22, 2023., Disp: , Rfl: 0  •  escitalopram (LEXAPRO) 5 mg tablet, Take 1 tablet (5 mg total) by mouth daily Do not start before April 22, 2023., Disp: , Rfl: 0  •  finasteride (PROSCAR) 5 mg tablet, Take 1 tablet (5 mg total) by mouth daily, Disp: 30 tablet, Rfl: 11  •  furosemide (LASIX) 40 mg tablet, Take 40 mg by mouth in the morning, Disp: , Rfl:   •  Insulin Pen Needle (Pen Needles) 31G X 5 MM MISC, Use 2 (two) times a day, Disp: 100 each, Rfl: 1  •  melatonin 3 mg, Take 1 tablet (3 mg total) by mouth daily at bedtime, Disp: , Rfl: 0  •  metolazone (ZAROXOLYN) 5 mg tablet, Take 5 mg by mouth in the morning, Disp: , Rfl:   •  metoprolol succinate (TOPROL-XL) 50 mg 24 hr tablet, Take 1 tablet (50 mg total) by mouth every 12 (twelve) hours, Disp: 30 tablet, Rfl: 0  •  mirtazapine (REMERON) 7.5 MG tablet, Take 1 tablet (7.5 mg total) by mouth daily at bedtime, Disp: , Rfl: 0  •  multivitamin (THERAGRAN) TABS, Take 1 tablet by mouth daily, Disp: , Rfl:   •  pantoprazole (PROTONIX) 40 mg tablet, Take 1 tablet (40 mg total) by mouth daily, Disp: , Rfl: 0  •  sacubitril-valsartan (Entresto) 49-51 MG TABS, Take 1 tablet by mouth 2 (two) times a day, Disp: 60 tablet, Rfl: 1  •  senna-docusate sodium (SENOKOT S) 8.6-50 mg per tablet, Take 1 tablet by mouth daily as needed, Disp: , Rfl:   •  tamsulosin (FLOMAX) 0.4 mg, Take 1 capsule (0.4 mg total) by mouth daily with dinner, Disp: 30 capsule, Rfl: 11  Family History   Problem Relation Age of Onset   • No Known Problems Sister    • Other Daughter         chronic neck pain   • Other Son         chronic back pain   • Sudden death Neg Hx    • Cancer Neg Hx       Review of Systems   Constitutional: Negative for chills and fever. HENT: Negative for ear pain and sore throat. Eyes: Negative for pain and visual disturbance. Respiratory: Negative for cough and shortness of breath. Cardiovascular: Negative for chest pain and palpitations. Gastrointestinal: Negative for abdominal pain and vomiting. Genitourinary: Negative for dysuria and hematuria. Musculoskeletal: Negative for arthralgias and back pain. Skin: Negative for color change and rash. Neurological: Negative for seizures and syncope. All other systems reviewed and are negative. Objective:  /64   Pulse 68   Temp (!) 96.6 °F (35.9 °C)   Resp 18     Physical Exam  Vitals reviewed.    Constitutional: Appearance: Normal appearance. HENT:      Head: Normocephalic and atraumatic. Musculoskeletal:      Comments: Drastically improved left lower extremity swelling and also the stump of the right lower extremity, unfortunately there is fluctuance and hypergranular tissue along the lateral aspect of the left lower extremity, there is moderate to severe serosanguineous drainage emanating from the eschar from the lateral leg and also along the lateral ankle and also the heel. There is mixed fibronecrotic base and these wounds are worsening. Neurological:      Mental Status: He is alert. Wound 03/21/23 Pressure Injury Heel Left;Medial (Active)   Wound Image   07/25/23 1440   Wound Description Eschar;Black; Yellow;Slough;Pink;Granulation tissue 07/25/23 1449   Pressure Injury Stage U 07/25/23 1449   Hanh-wound Assessment Edema;Callus; Maceration 07/25/23 1449   Wound Length (cm) 2 cm 07/25/23 1449   Wound Width (cm) 3.3 cm 07/25/23 1449   Wound Depth (cm) 0.6 cm 07/25/23 1449   Wound Surface Area (cm^2) 6.6 cm^2 07/25/23 1449   Wound Volume (cm^3) 3.96 cm^3 07/25/23 1449   Calculated Wound Volume (cm^3) 3.96 cm^3 07/25/23 1449   Change in Wound Size % 48.24 07/25/23 1449   Drainage Amount Moderate 07/25/23 1449   Drainage Description Serosanguineous 07/25/23 1449   Non-staged Wound Description Full thickness 07/25/23 1449   Treatments Cleansed 07/25/23 1449   Patient Tolerance Tolerated well 06/13/23 1326   Dressing Status Removed 06/13/23 1326       Wound 05/23/23 Heel Left;Lateral (Active)   Wound Image   07/25/23 1440   Wound Description Black;Eschar;Pink;Yellow 07/25/23 1450   Pressure Injury Stage U 06/27/23 1635   Hanh-wound Assessment Edema;Callus; Maceration 07/25/23 1450   Wound Length (cm) 1.2 cm 07/25/23 1450   Wound Width (cm) 1.2 cm 07/25/23 1450   Wound Depth (cm) 0.3 cm 07/25/23 1450   Wound Surface Area (cm^2) 1.44 cm^2 07/25/23 1450   Wound Volume (cm^3) 0.432 cm^3 07/25/23 1450   Calculated Wound Volume (cm^3) 0.43 cm^3 07/25/23 1450   Change in Wound Size % -760 07/25/23 1450   Drainage Amount Moderate 07/25/23 1450   Drainage Description Serosanguineous 07/25/23 1450   Non-staged Wound Description Full thickness 07/25/23 1450   Treatments Cleansed 07/25/23 1450   Patient Tolerance Tolerated well 06/13/23 1328   Dressing Status Removed 06/13/23 1328       Wound 06/13/23 Ankle Left;Lateral (Active)   Wound Image   07/25/23 1439   Wound Description Eschar;Pink;Yellow 07/25/23 1451   Pressure Injury Stage U 06/27/23 1638   Hanh-wound Assessment Edema; Maceration 07/25/23 1451   Wound Length (cm) 2.5 cm 07/25/23 1451   Wound Width (cm) 2.6 cm 07/25/23 1451   Wound Depth (cm) 0.4 cm 07/25/23 1451   Wound Surface Area (cm^2) 6.5 cm^2 07/25/23 1451   Wound Volume (cm^3) 2.6 cm^3 07/25/23 1451   Calculated Wound Volume (cm^3) 2.6 cm^3 07/25/23 1451   Change in Wound Size % -390.57 07/25/23 1451   Drainage Amount Moderate 07/25/23 1451   Drainage Description Serosanguineous 07/25/23 1451   Non-staged Wound Description Full thickness 07/25/23 1451   Treatments Cleansed 07/25/23 1451   Patient Tolerance Tolerated well 06/13/23 1331   Dressing Status Removed 06/13/23 1331       Wound 06/13/23 Leg Left;Lateral (Active)   Wound Image   07/25/23 1438   Wound Description Black;Eschar;Slough; Yellow;Pink;Hypergranulation; Other (Comment) 07/25/23 1452   Pressure Injury Stage U 06/27/23 1628   Hanh-wound Assessment Edema; White;Pink 07/25/23 1452   Wound Length (cm) 7.5 cm 07/25/23 1452   Wound Width (cm) 4.4 cm 07/25/23 1452   Wound Depth (cm) 0.3 cm 07/25/23 1452   Wound Surface Area (cm^2) 33 cm^2 07/25/23 1452   Wound Volume (cm^3) 9.9 cm^3 07/25/23 1452   Calculated Wound Volume (cm^3) 9.9 cm^3 07/25/23 1452   Change in Wound Size % -340 07/25/23 1452   Drainage Amount Moderate 07/25/23 1452   Drainage Description Serosanguineous 07/25/23 1452   Non-staged Wound Description Full thickness 07/25/23 1452   Treatments Cleansed 07/25/23 1452                         Procedures     Results from last 6 Months   Lab Units 03/21/23  1120   WOUND CULTURE  3+ Growth of Methicillin Resistant Staphylococcus aureus*  4+ Growth of Enterococcus faecalis*  1+ Growth of         Wound Instructions:  Orders Placed This Encounter   Procedures   • Wound cleansing and dressings     Go directly to [facility] emergency department for evaluation of left leg and foot wounds.       Left Foot & Leg Wounds:   Apply betadine soaked gauze to wound (betadine wet to dry dsg). Betadine paint is not adequate, please saturate the gauze with betadine. Cover with abd. Secure with rajwinder & tape. Change dressing daily                Today's Treatment:   Wounds were cleansed with normal saline and redressed as ordered above,. Standing Status:   Future     Standing Expiration Date:   7/25/2024         Yaquelin Proctor DPM      Portions of the record may have been created with voice recognition software. Occasional wrong word or "sound a like" substitutions may have occurred due to the inherent limitations of voice recognition software. Read the chart carefully and recognize, using context, where substitutions have occurred.

## 2023-07-25 NOTE — TELEPHONE ENCOUNTER
Received call from Alberto at the [facility] at [address] (rehab center in [place]). She states pt returned to facility today after seeing Dr. Celine Olson in wound care w/ a note to send pt immediately to [facility] ED for eval of worsening wounds LLE (see his note for additional info)  . Alberto states that due to their location pt will be sent to [facility] ED, regardless if she calls 911 or arranges transport for pt. This is because [facility] is the closest ED. She request I make Dr. Nolan Fleming aware of this. Pt is s/p R BKA by Dr. Nolan Fleming 3/30/23. Advised I would message our triage provider as well as Dr. Nolan Fleming to make them aware, however I  requested she also notify Dr. Celine Olson as he is the provider that made the recommendation to go to [facility] ED, she states she will do so.

## 2023-07-26 ENCOUNTER — APPOINTMENT (EMERGENCY)
Dept: RADIOLOGY | Facility: HOSPITAL | Age: 78
End: 2023-07-26
Payer: COMMERCIAL

## 2023-07-26 ENCOUNTER — APPOINTMENT (EMERGENCY)
Dept: NON INVASIVE DIAGNOSTICS | Facility: HOSPITAL | Age: 78
End: 2023-07-26
Payer: COMMERCIAL

## 2023-07-26 ENCOUNTER — HOSPITAL ENCOUNTER (EMERGENCY)
Facility: HOSPITAL | Age: 78
End: 2023-07-27
Attending: EMERGENCY MEDICINE | Admitting: FAMILY MEDICINE
Payer: COMMERCIAL

## 2023-07-26 DIAGNOSIS — I73.9 PERIPHERAL ARTERIAL DISEASE (HCC): ICD-10-CM

## 2023-07-26 DIAGNOSIS — L97.929: ICD-10-CM

## 2023-07-26 DIAGNOSIS — Z86.79 HISTORY OF ISCHEMIC CARDIOMYOPATHY: ICD-10-CM

## 2023-07-26 DIAGNOSIS — Z51.89 VISIT FOR WOUND CHECK: Primary | ICD-10-CM

## 2023-07-26 DIAGNOSIS — E11.65 HYPERGLYCEMIA DUE TO TYPE 2 DIABETES MELLITUS (HCC): ICD-10-CM

## 2023-07-26 LAB
ALBUMIN SERPL BCP-MCNC: 2.9 G/DL (ref 3.5–5)
ALP SERPL-CCNC: 472 U/L (ref 34–104)
ALT SERPL W P-5'-P-CCNC: 44 U/L (ref 7–52)
ANION GAP SERPL CALCULATED.3IONS-SCNC: 6 MMOL/L
APTT PPP: 37 SECONDS (ref 23–37)
AST SERPL W P-5'-P-CCNC: 41 U/L (ref 13–39)
ATRIAL RATE: 62 BPM
BASOPHILS # BLD AUTO: 0.01 THOUSANDS/ÂΜL (ref 0–0.1)
BASOPHILS NFR BLD AUTO: 0 % (ref 0–1)
BILIRUB SERPL-MCNC: 0.6 MG/DL (ref 0.2–1)
BUN SERPL-MCNC: 36 MG/DL (ref 5–25)
CALCIUM ALBUM COR SERPL-MCNC: 9.4 MG/DL (ref 8.3–10.1)
CALCIUM SERPL-MCNC: 8.5 MG/DL (ref 8.4–10.2)
CHLORIDE SERPL-SCNC: 95 MMOL/L (ref 96–108)
CO2 SERPL-SCNC: 36 MMOL/L (ref 21–32)
CREAT SERPL-MCNC: 0.95 MG/DL (ref 0.6–1.3)
CRP SERPL QL: 35.8 MG/L
EOSINOPHIL # BLD AUTO: 0.32 THOUSAND/ÂΜL (ref 0–0.61)
EOSINOPHIL NFR BLD AUTO: 4 % (ref 0–6)
ERYTHROCYTE [DISTWIDTH] IN BLOOD BY AUTOMATED COUNT: 17.1 % (ref 11.6–15.1)
ERYTHROCYTE [SEDIMENTATION RATE] IN BLOOD: 60 MM/HOUR (ref 0–19)
GFR SERPL CREATININE-BSD FRML MDRD: 76 ML/MIN/1.73SQ M
GLUCOSE SERPL-MCNC: 283 MG/DL (ref 65–140)
HCT VFR BLD AUTO: 26.6 % (ref 36.5–49.3)
HGB BLD-MCNC: 7.9 G/DL (ref 12–17)
IMM GRANULOCYTES # BLD AUTO: 0.03 THOUSAND/UL (ref 0–0.2)
IMM GRANULOCYTES NFR BLD AUTO: 0 % (ref 0–2)
INR PPP: 1.25 (ref 0.84–1.19)
LACTATE SERPL-SCNC: 1.3 MMOL/L (ref 0.5–2)
LYMPHOCYTES # BLD AUTO: 1.14 THOUSANDS/ÂΜL (ref 0.6–4.47)
LYMPHOCYTES NFR BLD AUTO: 14 % (ref 14–44)
MCH RBC QN AUTO: 25.4 PG (ref 26.8–34.3)
MCHC RBC AUTO-ENTMCNC: 29.7 G/DL (ref 31.4–37.4)
MCV RBC AUTO: 86 FL (ref 82–98)
MONOCYTES # BLD AUTO: 0.53 THOUSAND/ÂΜL (ref 0.17–1.22)
MONOCYTES NFR BLD AUTO: 7 % (ref 4–12)
NEUTROPHILS # BLD AUTO: 5.89 THOUSANDS/ÂΜL (ref 1.85–7.62)
NEUTS SEG NFR BLD AUTO: 75 % (ref 43–75)
NRBC BLD AUTO-RTO: 0 /100 WBCS
P AXIS: 70 DEGREES
PLATELET # BLD AUTO: 314 THOUSANDS/UL (ref 149–390)
PMV BLD AUTO: 8.2 FL (ref 8.9–12.7)
POTASSIUM SERPL-SCNC: 3.7 MMOL/L (ref 3.5–5.3)
PR INTERVAL: 194 MS
PROCALCITONIN SERPL-MCNC: 0.05 NG/ML
PROT SERPL-MCNC: 6.4 G/DL (ref 6.4–8.4)
PROTHROMBIN TIME: 15.9 SECONDS (ref 11.6–14.5)
QRS AXIS: 65 DEGREES
QRSD INTERVAL: 86 MS
QT INTERVAL: 408 MS
QTC INTERVAL: 414 MS
RBC # BLD AUTO: 3.11 MILLION/UL (ref 3.88–5.62)
SODIUM SERPL-SCNC: 137 MMOL/L (ref 135–147)
T WAVE AXIS: -47 DEGREES
VENTRICULAR RATE: 62 BPM
WBC # BLD AUTO: 7.92 THOUSAND/UL (ref 4.31–10.16)

## 2023-07-26 PROCEDURE — 85610 PROTHROMBIN TIME: CPT

## 2023-07-26 PROCEDURE — 85730 THROMBOPLASTIN TIME PARTIAL: CPT

## 2023-07-26 PROCEDURE — 93922 UPR/L XTREMITY ART 2 LEVELS: CPT | Performed by: SURGERY

## 2023-07-26 PROCEDURE — 73630 X-RAY EXAM OF FOOT: CPT

## 2023-07-26 PROCEDURE — 83605 ASSAY OF LACTIC ACID: CPT

## 2023-07-26 PROCEDURE — 93926 LOWER EXTREMITY STUDY: CPT

## 2023-07-26 PROCEDURE — 85025 COMPLETE CBC W/AUTO DIFF WBC: CPT

## 2023-07-26 PROCEDURE — 36415 COLL VENOUS BLD VENIPUNCTURE: CPT

## 2023-07-26 PROCEDURE — 87040 BLOOD CULTURE FOR BACTERIA: CPT

## 2023-07-26 PROCEDURE — 93926 LOWER EXTREMITY STUDY: CPT | Performed by: SURGERY

## 2023-07-26 PROCEDURE — 93005 ELECTROCARDIOGRAM TRACING: CPT

## 2023-07-26 PROCEDURE — 73590 X-RAY EXAM OF LOWER LEG: CPT

## 2023-07-26 PROCEDURE — 85652 RBC SED RATE AUTOMATED: CPT

## 2023-07-26 PROCEDURE — 84145 PROCALCITONIN (PCT): CPT

## 2023-07-26 PROCEDURE — 80053 COMPREHEN METABOLIC PANEL: CPT

## 2023-07-26 PROCEDURE — 73610 X-RAY EXAM OF ANKLE: CPT

## 2023-07-26 PROCEDURE — 93010 ELECTROCARDIOGRAM REPORT: CPT | Performed by: INTERNAL MEDICINE

## 2023-07-26 PROCEDURE — 86140 C-REACTIVE PROTEIN: CPT

## 2023-07-26 RX ADMIN — Medication 3.38 G: at 13:00

## 2023-07-26 RX ADMIN — Medication 1750 MG: at 13:47

## 2023-07-26 NOTE — QUICK NOTE
Update provided by TT messaging with Dr. Zuleika Aguirre, on-call vascular surgeon  Now that results of VAS arterial lower extremity duplex study have been posted. 50-year-old male seen to the ED here with a history of significant peripheral artery disease and nonhealing ulcers and left lower extremity. Recent right BKA for similar situation. Currently labs, x-ray and arterial duplex left lower extremity pending. Patient was seen yesterday by podiatry and recommended admission with vascular surgery consultation due to increased tissue loss and nonhealing wounds. Discussed personally with Dr. Huber Soares, interventional radiologist upon review of recent IR left lower extremity angiogram with vascular intervention. Patient has new finding of occlusion of the mid SFA and given recent retrograde stenting with cannulization at the level of the ankle. Dr. Albina Loya feels that angiogram and the need for further IR intervention would not be able to be performed here at [facility] or [facility]. At this time recommendation for tertiary transfer from the ED here to [id number] service with consultation there formally with vascular surgery per Dr. Rosalina Salazar instruction. The patient and recent left lower extremity angiogram with IR intervention performed on April 6, 2023 as follows:    1. Recanalization of occluded distal left popliteal artery from retrograde approach through posterior tibialis access. Angioplasty of left popliteal artery using 3 mm and 4 mm balloons resulted in dissection. 4.5 mm x 40 mm and 4.5 mm x 60 mm Supera   stents were deployed in the distal left popliteal artery in overlapping fashion. 2. Multifocal stenoses of left posterior tibial artery treated with 2.5 mm balloon angioplasty. 3. There was two-vessel runoff of left lower extremity through the posterior tibial and peroneal arteries. 4. Left anterior tibial artery was occluded.       [id number] REPORT  CLINICAL:7/26/2023  Indications:  Patient presents with an ulcer on his left lateral calf which started about 1  month ago and an ulcer on his left heel which started over 4 months ago. Known  PVD. Patient denies any pain, but has not been walking. Operative History:  2023-04-06 Left PTA of posterior tibial artery, popliteal artery stent  2023-02-14 Cardiac catheterization  Risk Factors  The patient has history of Diabetes (IDDM), Hyperlipidemia and CAD. Clinical  Right Pressure:  117/ mm Hg, Left Pressure:  110/ mm Hg. FINDINGS:     Left                   Impression  PSV (cm/s)  EDV    Common Femoral Artery                      84    8    Prox Profunda                              96    0    Prox SFA                                   68   11    Mid SFA                50-75%             184   11    Dist SFA                                   44   10    Proximal Pop                               56   14    Distal Pop - Stent     Occluded                       Tibioperoneal          Occluded                       Dist Post Tibial                           43   18    Prox. Ant. Tibial      Occluded             0    0    Dist. Ant. Tibial      Occluded             0    0    Dist Peroneal                              25   10       CONCLUSION:  Impression:  RIGHT LOWER LIMB:  BKA     LEFT LOWER LIMB:  Duplex evaluation reveals a 50-75% stenosis in the mid superficial femoral  artery and an occlusion of the distal popliteal artery stent, tibio-peroneal  trunk and anterior tibial artery. Diffuse atherosclerotic disease throughout the  remaining femoro-popliteal and tibio-peroneal segments. (BRADY performed 4/7/2023)  Ankle/Brachial Index: 0.75, moderate claudication range. Prior 0.58  PPG/PVR Tracings are dampened. Metatarsal Pressure 42 mm Hg  Great Toe Pressure: 19 mm Hg, below the healing range. Prior 62 mm Hg.      In comparison to the study of 4/10/2023, there is a new stenosis in the mid  left SFA and new occlusion of the TPT and MARIA GUADALUPE.  The left toe pressure is now  below healing potential.     SIGNATURE:  Electronically Signed by: Laurie Madrigal MD, RPVI on 2023-07-26 02:42:53 PM    Bright Barboza PA-C

## 2023-07-26 NOTE — TREATMENT PLAN
Discussed via TT messaging with Dr. Justin Turner, on-call vascular surgeon, for his recommendations. Formal vascular surgery consultation will be done once the patient is admitted, nonurgent. 50-year-old male seen to the ED here with a history of significant peripheral artery disease and nonhealing ulcers and left lower extremity. Recent right BKA for similar situation. Currently labs, x-ray and arterial duplex left lower extremity pending. Patient was seen yesterday by podiatry and recommended admission with vascular surgery consultation due to increased tissue loss and nonhealing wounds. Dr. Rhiannon Lees recommended that the patient will need an angiogram if IR can accommodate that here at [facility]. Interventional radiology next availability here will be in 2 days on Friday. No urgent need for angiogram or vascular intervention at this time. Patient to be admitted to medicine and podiatry to see as an inpatient. Plan for angiogram this Friday, June 28.     Maynor Cardona PA-C

## 2023-07-26 NOTE — ED ATTENDING ATTESTATION
7/26/2023  I, Baylee Castaneda DO, saw and evaluated the patient. I have discussed the patient with the resident/non-physician practitioner and agree with the resident's/non-physician practitioner's findings, Plan of Care, and MDM as documented in the resident's/non-physician practitioner's note, except where noted. All available labs and Radiology studies were reviewed. I was present for key portions of any procedure(s) performed by the resident/non-physician practitioner and I was immediately available to provide assistance. At this point I agree with the current assessment done in the Emergency Department. I have conducted an independent evaluation of this patient a history and physical is as follows:    ED Course  ED Course as of 07/26/23 1504   Wed Jul 26, 2023   1341 Alkaline Phosphatase(!): 472   1447 EKG interpreted by myself. EKG dated 7/26/2023 at 1353 demonstrates normal sinus rhythm at 60 bpm, normal CA, QRS, QTc intervals, no STEMI, no acute ischemic change compared to prior EKG on file from 3/27/2023. Briefly, this is a 27-year-old male with a past medical history significant for peripheral arterial disease, CAD, ischemic cardiomyopathy with severely reduced ejection fraction, diabetes, history of right BKA, presented to the ER for evaluation of worsening left lower extremity wounds/ulcers. Patient reports these wounds for several months they are worsening. He is noted to have left heel, left lateral foot left lateral lower leg ulcer/wounds with twice daily dressing changes currently at nursing home was evaluated by podiatry yesterday and they had significant concerns for worsening wounds possibility of deeper infection and advised patient to be transported to the ER for admission x-rays labs vascular studies, vascular consult, possible MRI. Patient endorses story is corroborated by podiatry note. Denies any constitutional symptoms fevers chills new falls or injury.   Reports the right BKA site which is several months old it is well healing. No complaints in the right lower extremity. Review of systems:  Positive for left leg wounds, drainage. Negative for fevers, chills, leg swelling, falls, chest pain, shortness of breath, nausea, vomiting, abdominal pain. Physical exam:  Physical Exam  Vitals and nursing note reviewed. Exam conducted with a chaperone present. Constitutional:       General: He is not in acute distress. Appearance: Normal appearance. He is underweight. He is ill-appearing. He is not toxic-appearing. HENT:      Head: Normocephalic and atraumatic. Right Ear: External ear normal.      Left Ear: External ear normal.      Nose: Nose normal.      Mouth/Throat:      Mouth: Mucous membranes are moist.      Pharynx: Oropharynx is clear. Eyes:      General: No scleral icterus. Pupils: Pupils are equal, round, and reactive to light. Cardiovascular:      Rate and Rhythm: Normal rate and regular rhythm. Pulses: Normal pulses. Heart sounds: Normal heart sounds. Pulmonary:      Effort: Pulmonary effort is normal.      Breath sounds: Normal breath sounds. Abdominal:      General: Abdomen is flat. Tenderness: There is no abdominal tenderness. There is no guarding or rebound. Musculoskeletal:      Left lower leg: No edema. Right Lower Extremity: Right leg is amputated below knee. Skin:     General: Skin is warm and dry. Coloration: Skin is pale. Findings: Wound present. Comments: 3 ulcers/wounds involving the left lower extremity. 1 located on lateral left lower leg the other on the lateral foot on the left, third on the heel of the left foot. All lab necrotic eschar, drainage. Neurological:      General: No focal deficit present. Mental Status: He is alert. Mental status is at baseline.      A/P:  41-year-old male presenting for worsening left lower extremity wounds in the setting of peripheral arterial disease, diabetes. Sent to ER by podiatry recommending admission for failed outpatient management. Work-up does not reveal evidence of sepsis. Case was discussed with vascular surgery PA who discussed the case with vascular surgeon on-call. Arterial duplex was obtained in the ER. X-rays were obtained. IV antibiotics were begun. The case was discussed with Feliberto for admission was in originally felt that the patient could remain here at [facility] and undergo a nonemergent inpatient IR angiogram to determine neck steps for management of this problem. It appeared that the plan for admission with sound afterwards the vascular PA did discuss with us that the findings on the duplex and discussion with IR provider, Dr. Nash Cutler, necessitated transfer as the complexity of the case was beyond the scope of what could be evaluated and managed at [facility]. Patient was arranged for transfer.      Critical Care Time  Procedures

## 2023-07-26 NOTE — ED PROVIDER NOTES
History  Chief Complaint   Patient presents with   • Wound Check     Patient was seen at wound care yesterday and they recommended patient comes to the ED for the ulcers on his L heel; patient did not want to come yesterday but was okay with coming today. Per wound care they said he might need to be transferred to [facility] for further eval/surgery     81-year-old male with significant history including severe PAD, s/p right BKA, diabetes, cardiomyopathy, significant pressure ulcers on left lower extremity, presents today after podiatrist recommended hospitalization due to nonhealing ulcers of the left side and decreased blood flow due to PAD. Upon arrival to the ED patient was in no acute distress and resting comfortably on the stretcher. Had no complaints. Denies any pain to the area. Decreased sensation and 3 large pressure ulcers. One on the lateral left lower leg, 1 on the left heel, and 1 on the left lateral malleolus. Patient endorses compliance with podiatry use outpatient regimen. Per the chart, and podiatry's note, they recommended further x-rays, blood work, arterial duplex, and hospitalization with a vascular consult due to increased tissue loss, and suspicion for deeper infection/osteomyelitis. Patient denies any other symptoms at this time. Prior to Admission Medications   Prescriptions Last Dose Informant Patient Reported? Taking?    Insulin Pen Needle (Pen Needles) 31G X 5 MM MISC  Outside Facility (Specify) No No   Sig: Use 2 (two) times a day   acetaminophen (TYLENOL) 325 mg tablet   Yes No   Sig: Take 650 mg by mouth every 6 (six) hours as needed for mild pain or fever   ascorbic acid (VITAMIN C) 500 MG tablet   Yes No   Sig: Take 500 mg by mouth daily   aspirin (ECOTRIN LOW STRENGTH) 81 mg EC tablet  Outside Facility (Specify) No No   Sig: Take 1 tablet (81 mg total) by mouth daily   atorvastatin (LIPITOR) 40 mg tablet  Outside Facility (Specify) No No   Sig: Take 1 tablet (40 mg total) by mouth daily with dinner   bisacodyl (FLEET) 10 MG/30ML ENEM   Yes No   Sig: Insert 10 mg into the rectum once   clopidogrel (PLAVIX) 75 mg tablet  Outside Facility (Specify) No No   Sig: Take 1 tablet (75 mg total) by mouth daily Do not start before April 22, 2023. escitalopram (LEXAPRO) 5 mg tablet  Outside Facility (Specify) No No   Sig: Take 1 tablet (5 mg total) by mouth daily Do not start before April 22, 2023.    finasteride (PROSCAR) 5 mg tablet   No No   Sig: Take 1 tablet (5 mg total) by mouth daily   furosemide (LASIX) 40 mg tablet   Yes No   Sig: Take 40 mg by mouth in the morning   melatonin 3 mg  Outside Facility (Specify) No No   Sig: Take 1 tablet (3 mg total) by mouth daily at bedtime   metolazone (ZAROXOLYN) 5 mg tablet   Yes No   Sig: Take 5 mg by mouth in the morning   metoprolol succinate (TOPROL-XL) 50 mg 24 hr tablet  Outside Facility (Specify) No No   Sig: Take 1 tablet (50 mg total) by mouth every 12 (twelve) hours   mirtazapine (REMERON) 7.5 MG tablet  Outside Facility (Specify) No No   Sig: Take 1 tablet (7.5 mg total) by mouth daily at bedtime   multivitamin (THERAGRAN) TABS  Outside Facility (Specify) Yes No   Sig: Take 1 tablet by mouth daily   pantoprazole (PROTONIX) 40 mg tablet  Outside Facility (Specify) No No   Sig: Take 1 tablet (40 mg total) by mouth daily   sacubitril-valsartan (Entresto) 49-51 MG TABS  Outside Facility (Specify) No No   Sig: Take 1 tablet by mouth 2 (two) times a day   senna-docusate sodium (SENOKOT S) 8.6-50 mg per tablet  Outside Facility (Specify) Yes No   Sig: Take 1 tablet by mouth daily as needed   tamsulosin (FLOMAX) 0.4 mg   No No   Sig: Take 1 capsule (0.4 mg total) by mouth daily with dinner      Facility-Administered Medications: None       Past Medical History:   Diagnosis Date   • 6th nerve palsy    • Blister of right leg    • BPH (benign prostatic hyperplasia)    • CAD (coronary artery disease)    • Cardiomyopathy (HCC)     EF 25%   • CHF (congestive heart failure) ([facility])    • Depression    • Diabetes mellitus ([address])     type 2, insulin dependent   • Diabetic foot ulcer ([address])     left, local wound care   • Diabetic retinopathy ([address])    • Exposure to Agent Orange     while serving in [employer]   • Former tobacco use    • H/O urinary retention     w/ reagan placement   • Hyperlipidemia    • Hypertension    • Rupture of biceps tendon, traumatic 2017    right       Past Surgical History:   Procedure Laterality Date   • CARDIAC CATHETERIZATION     • CARDIAC CATHETERIZATION Left 2023    Procedure: Cardiac Left Heart Cath;  Surgeon: Jaxon Martinez MD;  Location: [facility]; Service: Cardiology   • CARDIAC CATHETERIZATION N/A 2023    Procedure: Cardiac Coronary Angiogram;  Surgeon: Jaxon Martinez MD;  Location: [facility]; Service: Cardiology   • CATARACT EXTRACTION, BILATERAL Bilateral    • IR LOWER EXTREMITY ANGIOGRAM  3/28/2023   • IR LOWER EXTREMITY ANGIOGRAM  2023   • LEG AMPUTATION THROUGH LOWER TIBIA AND FIBULA Right 3/30/2023    Procedure: (BKA); Surgeon: Wallace Tran DO;  Location: [facility];  Service: Vascular   • WOUND DEBRIDEMENT Right 3/24/2023    Procedure: debridement of heel ulcer;  Surgeon: Edy Telles DPM;  Location: [facility];  Service: Podiatry       Family History   Problem Relation Age of Onset   • No Known Problems Sister    • Other Daughter         chronic neck pain   • Other Son         chronic back pain   • Sudden death Neg Hx    • Cancer Neg Hx      I have reviewed and agree with the history as documented.     E-Cigarette/Vaping   • E-Cigarette Use Never User      E-Cigarette/Vaping Substances   • Nicotine No      Social History     Tobacco Use   • Smoking status: Former     Packs/day: 1.00     Years: 6.00     Total pack years: 6.00     Types: Cigarettes     Start date: 5     Quit date:      Years since quittin.5   • Smokeless tobacco: Never   Vaping Use   • Vaping Use: Never used   Substance Use Topics   • Alcohol use: Not Currently     Comment: Denies history of heavy alcohol use. At most will drink 1 or 2 drinks in a year (Updated 02/2023). • Drug use: Never        Review of Systems   Constitutional: Negative for chills and fever. Patient otherwise states he is at his baseline. HENT: Negative for hearing loss and rhinorrhea. Eyes: Negative for visual disturbance. Respiratory: Negative for cough and shortness of breath. Cardiovascular: Negative for chest pain. Gastrointestinal: Negative for abdominal pain, constipation, diarrhea, nausea and vomiting. Genitourinary: Negative for dysuria and hematuria. Musculoskeletal: Negative for back pain. Skin: Positive for wound (Multiple LLE pressure ulcers). Negative for color change and rash. Neurological: Negative for weakness and numbness. Psychiatric/Behavioral: Negative for agitation. All other systems reviewed and are negative. Physical Exam  ED Triage Vitals [07/26/23 1144]   Temp Pulse Respirations Blood Pressure SpO2   -- 60 18 127/60 96 %      Temp src Heart Rate Source Patient Position - Orthostatic VS BP Location FiO2 (%)   -- Monitor Sitting Right arm --      Pain Score       No Pain             Orthostatic Vital Signs  Vitals:    07/26/23 2030 07/26/23 2100 07/26/23 2130 07/26/23 2200   BP: 119/60 124/58 122/57 114/59   Pulse: 60 60 60 60   Patient Position - Orthostatic VS:           Physical Exam  Constitutional:       Appearance: He is ill-appearing. HENT:      Head: Normocephalic and atraumatic. Right Ear: External ear normal.      Left Ear: External ear normal.      Nose: Nose normal.      Mouth/Throat:      Pharynx: Oropharynx is clear. Eyes:      General:         Right eye: No discharge. Left eye: No discharge. Extraocular Movements: Extraocular movements intact. Cardiovascular:      Rate and Rhythm: Normal rate and regular rhythm. Pulses: Normal pulses. Heart sounds: Normal heart sounds. Pulmonary:      Effort: Pulmonary effort is normal.      Breath sounds: Normal breath sounds. Abdominal:      Palpations: Abdomen is soft. Tenderness: There is no abdominal tenderness. Musculoskeletal:         General: Normal range of motion. Cervical back: Normal range of motion. Right Lower Extremity: Right leg is amputated below knee. Skin:     Findings: Wound (Multiple pressure ulcers on left lower extremity. All appear with a black eschar with no acute signs of superficial infection. Do look worrisome for deeper/osteomyelitis. Decreased sensation in the area. See pictures in chart for more details.) present. Neurological:      Mental Status: He is alert. ED Medications  Medications   piperacillin-tazobactam (ZOSYN) IVPB 3.375 g (0 g Intravenous Stopped 7/26/23 1330)   vancomycin (VANCOCIN) 1750 mg in sodium chloride 0.9% 500 mL IVPB (0 mg Intravenous Stopped 7/26/23 1547)       Diagnostic Studies  Results Reviewed     Procedure Component Value Units Date/Time    Blood culture #1 [[id number]] Collected: 07/26/23 1227    Lab Status: Preliminary result Specimen: Blood from Arm, Left Updated: 07/26/23 1901     Blood Culture Received in Microbiology Lab. Culture in Progress. Blood culture #2 [[id number]] Collected: 07/26/23 1227    Lab Status: Preliminary result Specimen: Blood from Arm, Left Updated: 07/26/23 1901     Blood Culture Received in Microbiology Lab. Culture in Progress. Procalcitonin [[id number]]  (Normal) Collected: 07/26/23 1227    Lab Status: Final result Specimen: Blood from Arm, Left Updated: 07/26/23 1302     Procalcitonin 0.05 ng/ml     Lactic acid [[id number]]  (Normal) Collected: 07/26/23 1227    Lab Status: Final result Specimen: Blood from Arm, Left Updated: 07/26/23 1256     LACTIC ACID 1.3 mmol/L     Narrative:      Result may be elevated if tourniquet was used during collection.     C-reactive protein [[id number]] (Abnormal) Collected: 07/26/23 1227    Lab Status: Final result Specimen: Blood from Arm, Left Updated: 07/26/23 1255     CRP 35.8 mg/L     Comprehensive metabolic panel [[id number]]  (Abnormal) Collected: 07/26/23 1227    Lab Status: Final result Specimen: Blood from Arm, Left Updated: 07/26/23 1255     Sodium 137 mmol/L      Potassium 3.7 mmol/L      Chloride 95 mmol/L      CO2 36 mmol/L      ANION GAP 6 mmol/L      BUN 36 mg/dL      Creatinine 0.95 mg/dL      Glucose 283 mg/dL      Calcium 8.5 mg/dL      Corrected Calcium 9.4 mg/dL      AST 41 U/L      ALT 44 U/L      Alkaline Phosphatase 472 U/L      Total Protein 6.4 g/dL      Albumin 2.9 g/dL      Total Bilirubin 0.60 mg/dL      eGFR 76 ml/min/1.73sq m     Narrative:      [facility] guidelines for Chronic Kidney Disease (CKD):   •  Stage 1 with normal or high GFR (GFR > 90 mL/min/1.73 square meters)  •  Stage 2 Mild CKD (GFR = 60-89 mL/min/1.73 square meters)  •  Stage 3A Moderate CKD (GFR = 45-59 mL/min/1.73 square meters)  •  Stage 3B Moderate CKD (GFR = 30-44 mL/min/1.73 square meters)  •  Stage 4 Severe CKD (GFR = 15-29 mL/min/1.73 square meters)  •  Stage 5 End Stage CKD (GFR <15 mL/min/1.73 square meters)  Note: GFR calculation is accurate only with a steady state creatinine    Protime-INR [[id number]]  (Abnormal) Collected: 07/26/23 1227    Lab Status: Final result Specimen: Blood from Arm, Left Updated: 07/26/23 1248     Protime 15.9 seconds      INR 1.25    APTT [[id number]]  (Normal) Collected: 07/26/23 1227    Lab Status: Final result Specimen: Blood from Arm, Left Updated: 07/26/23 1248     PTT 37 seconds     Sedimentation rate, automated [[id number]]  (Abnormal) Collected: 07/26/23 1227    Lab Status: Final result Specimen: Blood from Arm, Left Updated: 07/26/23 1241     Sed Rate 60 mm/hour     CBC and differential [[id number]]  (Abnormal) Collected: 07/26/23 1227    Lab Status: Final result Specimen: Blood from Arm, Left Updated: 07/26/23 1238     WBC 7.92 Thousand/uL      RBC 3.11 Million/uL      Hemoglobin 7.9 g/dL      Hematocrit 26.6 %      MCV 86 fL      MCH 25.4 pg      MCHC 29.7 g/dL      RDW 17.1 %      MPV 8.2 fL      Platelets 347 Thousands/uL      nRBC 0 /100 WBCs      Neutrophils Relative 75 %      Immat GRANS % 0 %      Lymphocytes Relative 14 %      Monocytes Relative 7 %      Eosinophils Relative 4 %      Basophils Relative 0 %      Neutrophils Absolute 5.89 Thousands/µL      Immature Grans Absolute 0.03 Thousand/uL      Lymphocytes Absolute 1.14 Thousands/µL      Monocytes Absolute 0.53 Thousand/µL      Eosinophils Absolute 0.32 Thousand/µL      Basophils Absolute 0.01 Thousands/µL                  VAS lower limb arterial duplex, limited/unilateral   Final Result by Yan Christie MD (07/26 1442)      XR tibia fibula 2 views LEFT    (Results Pending)   XR foot 3+ views LEFT    (Results Pending)   XR ankle 3+ views LEFT    (Results Pending)         Procedures  Procedures      ED Course  ED Course as of 07/26/23 2220 Wed Jul 26, 2023   1245 CBC and differential(!)  Hemoglobin decreased from recent baseline, still above transfusion levels. Platelet count appropriate. 1246 Sed Rate(!): 60   1314 C-REACTIVE PROTEIN(!): 35.8   12 Spoke with Dr. Tai Hines from vascular surgery who recommended admission here at [facility] with podiatry and them on consult. Dr. Tai Hines spoke with IR and they stated they can do a non-urgent angiogram here on Friday 7/28.   12 Spoke with vascular surgery PA, who now states that after results of arterial duplex the patient needs transfer down to [facility] for further management and treatment. Transfer initiated. SBIRT 22yo+    Flowsheet Row Most Recent Value   Initial Alcohol Screen: US AUDIT-C     1. How often do you have a drink containing alcohol? 0 Filed at: 07/26/2023 1147   2.  How many drinks containing alcohol do you have on a typical day you are drinking? 0 Filed at: 07/26/2023 1147   3b. FEMALE Any Age, or MALE 65+: How often do you have 4 or more drinks on one occassion? 0 Filed at: 07/26/2023 1147   Audit-C Score 0 Filed at: 07/26/2023 1147   ELIZABETH: How many times in the past year have you. .. Used an illegal drug or used a prescription medication for non-medical reasons? Never Filed at: 07/26/2023 [address] [facility]  Diane Modi presented to the ER from his podiatrist's recommendation due to nonhealing pressure ulcers on his left lower extremity. Patient had a history of this on his right side which ended up resulting in a BKA. X-rays were done with questionable bony involvement. Patient has a history of significant PAD so duplex arterial study was performed. Patient has had prior stents placed and these appear to be occluded and the blood flow was suboptimal for healing. Due to this vascular surgery was consulted, patient was discussed with them and ultimately it was decided to send patient to [facility] for IR intervention and angiogram.  Patient otherwise appeared well and had no other complaints, lab work and vitals had no indications for sepsis. Previous wound cultures grew MRSA, so Zosyn and vancomycin were started here in the ED. Patient signed out to Dr. Thom Morris while awaiting transfer to [facility]. Amount and/or Complexity of Data Reviewed  Labs: ordered. Decision-making details documented in ED Course. Radiology: ordered.             Disposition  Final diagnoses:   Visit for wound check   Peripheral arterial disease ([address])   Nonhealing ulcer of multiple sites of left lower extremity, unspecified ulcer stage ([address])   History of ischemic cardiomyopathy   Hyperglycemia due to type 2 diabetes mellitus ([address])     Time reflects when diagnosis was documented in both MDM as applicable and the Disposition within this note     Time User Action Codes Description Comment    7/26/2023  1:00 PM Vasile Pineda Add [Z51.89] Visit for wound check     7/26/2023  1:00 PM Earnstine Radar Add [I73.9] Peripheral arterial disease ([address])     7/26/2023  1:02 PM Earnstine Radar Add [L97.929] Nonhealing ulcer of multiple sites of left lower extremity, unspecified ulcer stage ([address])     7/26/2023  1:03 PM Earnstine Radar Add [Z86.79] History of ischemic cardiomyopathy     7/26/2023  1:03 PM Earnstine Radar Add [E11.65] Hyperglycemia due to type 2 diabetes mellitus [facility])       ED Disposition     ED Disposition   Transfer to Another Facility-In Network    Condition   --    Date/Time   Wed Jul 26, 2023  3:23 PM    Comment   Warner Watson should be transferred out to [facility]. Follow-up Information    None         Patient's Medications   Discharge Prescriptions    No medications on file     No discharge procedures on file. PDMP Review     None           ED Provider  Attending physically available and evaluated Warner Watson. I managed the patient along with the ED Attending.     Electronically Signed by         Benja Lin DO  07/26/23 8864

## 2023-07-26 NOTE — EMTALA/ACUTE CARE TRANSFER
[address] EMERGENCY [address]  [facility] [address]  Dept: [phone]      EMTALA TRANSFER CONSENT    NAME Leonard Cuevas                                         9121                              MRN [id number]    I have been informed of my rights regarding examination, treatment, and transfer   by Dr. Viola Mcgowan DO    Benefits:  Benefits of specialties not available this facility, equipment not available this facility    Risks:  Risks of injury during transfer, delays in care due to transport times, decompensated illness during transport      Consent for Transfer:  I acknowledge that my medical condition has been evaluated and explained to me by the emergency department physician or other qualified medical person and/or my attending physician, who has recommended that I be transferred to the service of    at  . The above potential benefits of such transfer, the potential risks associated with such transfer, and the probable risks of not being transferred have been explained to me, and I fully understand them. The doctor has explained that, in my case, the benefits of transfer outweigh the risks. I agree to be transferred. I authorize the performance of emergency medical procedures and treatments upon me in both transit and upon arrival at the receiving facility. Additionally, I authorize the release of any and all medical records to the receiving facility and request they be transported with me, if possible. I understand that the safest mode of transportation during a medical emergency is an ambulance and that the Hospital advocates the use of this mode of transport. Risks of traveling to the receiving facility by car, including absence of medical control, life sustaining equipment, such as oxygen, and medical personnel has been explained to me and I fully understand them.     (KATY CORRECT BOX BELOW)  [ x ]  I consent to the stated transfer and to be transported by ambulance/helicopter. [  ]  I consent to the stated transfer, but refuse transportation by ambulance and accept full responsibility for my transportation by car. I understand the risks of non-ambulance transfers and I exonerate the Hospital and its staff from any deterioration in my condition that results from this refusal.    X___________________________________________    DATE  23  TIME________  Signature of patient or legally responsible individual signing on patient behalf           RELATIONSHIP TO PATIENT_________________________          Provider Certification    NAME Julisa Rosales                                         1945                              MRN [id number]    A medical screening exam was performed on the above named patient. Based on the examination:    Condition Necessitating Transfer The primary encounter diagnosis was Visit for wound check. Diagnoses of Peripheral arterial disease ([address]), Nonhealing ulcer of multiple sites of left lower extremity, unspecified ulcer stage ([address]), History of ischemic cardiomyopathy, and Hyperglycemia due to type 2 diabetes mellitus ([address]) were also pertinent to this visit. Patient Condition:  Stable    Reason for Transfer:  Nonhealing wounds left lower extremity in the setting of severe peripheral vascular disease with new occlusive disease needs IR consult for angiogram, possible thrombectomy (not available this facility), vascular consult.      Transfer Requirements: Facility   [facility]  · Space available and qualified personnel available for treatment as acknowledged by  PACs  · Agreed to accept transfer and to provide appropriate medical treatment as acknowledged by: [facility] Dr Berna Law          · Appropriate medical records of the examination and treatment of the patient are provided at the time of transfer   [address] [facility] [place] [address] _______  · Transfer will be performed by qualified personnel from    and appropriate transfer equipment as required, including the use of necessary and appropriate life support measures. Provider Certification: I have examined the patient and explained the following risks and benefits of being transferred/refusing transfer to the patient/family:         Based on these reasonable risks and benefits to the patient and/or the unborn child(sarkis), and based upon the information available at the time of the patient’s examination, I certify that the medical benefits reasonably to be expected from the provision of appropriate medical treatments at another medical facility outweigh the increasing risks, if any, to the individual’s medical condition, and in the case of labor to the unborn child, from effecting the transfer.     X____________________________________________ DATE 07/26/23        TIME_______      ORIGINAL - SEND TO MEDICAL RECORDS   COPY - SEND WITH PATIENT DURING TRANSFER

## 2023-07-27 ENCOUNTER — HOSPITAL ENCOUNTER (INPATIENT)
Facility: HOSPITAL | Age: 78
LOS: 14 days | Discharge: NURSING, REHAB OR LONG-TERM CARE | DRG: 239 | End: 2023-08-10
Attending: INTERNAL MEDICINE | Admitting: INTERNAL MEDICINE
Payer: COMMERCIAL

## 2023-07-27 VITALS
TEMPERATURE: 97.5 F | HEART RATE: 71 BPM | BODY MASS INDEX: 21.37 KG/M2 | HEIGHT: 70 IN | SYSTOLIC BLOOD PRESSURE: 116 MMHG | WEIGHT: 149.25 LBS | DIASTOLIC BLOOD PRESSURE: 65 MMHG | OXYGEN SATURATION: 95 % | RESPIRATION RATE: 20 BRPM

## 2023-07-27 DIAGNOSIS — I73.9 PERIPHERAL ARTERIAL DISEASE (HCC): ICD-10-CM

## 2023-07-27 DIAGNOSIS — Z89.511 S/P BKA (BELOW KNEE AMPUTATION), RIGHT (HCC): ICD-10-CM

## 2023-07-27 DIAGNOSIS — Z89.511 HX OF BKA, RIGHT (HCC): Primary | ICD-10-CM

## 2023-07-27 DIAGNOSIS — L97.929: ICD-10-CM

## 2023-07-27 PROBLEM — Z51.89 VISIT FOR WOUND CHECK: Status: ACTIVE | Noted: 2023-07-27

## 2023-07-27 LAB
GLUCOSE SERPL-MCNC: 241 MG/DL (ref 65–140)
GLUCOSE SERPL-MCNC: 262 MG/DL (ref 65–140)
GLUCOSE SERPL-MCNC: 274 MG/DL (ref 65–140)

## 2023-07-27 PROCEDURE — 99223 1ST HOSP IP/OBS HIGH 75: CPT | Performed by: INTERNAL MEDICINE

## 2023-07-27 PROCEDURE — 86850 RBC ANTIBODY SCREEN: CPT

## 2023-07-27 PROCEDURE — 82948 REAGENT STRIP/BLOOD GLUCOSE: CPT

## 2023-07-27 PROCEDURE — 86901 BLOOD TYPING SEROLOGIC RH(D): CPT

## 2023-07-27 PROCEDURE — 87040 BLOOD CULTURE FOR BACTERIA: CPT | Performed by: INTERNAL MEDICINE

## 2023-07-27 PROCEDURE — 86900 BLOOD TYPING SEROLOGIC ABO: CPT

## 2023-07-27 RX ORDER — ATORVASTATIN CALCIUM 40 MG/1
40 TABLET, FILM COATED ORAL
Status: DISCONTINUED | OUTPATIENT
Start: 2023-07-28 | End: 2023-08-10 | Stop reason: HOSPADM

## 2023-07-27 RX ORDER — INSULIN LISPRO 100 [IU]/ML
1-6 INJECTION, SOLUTION INTRAVENOUS; SUBCUTANEOUS
Status: DISCONTINUED | OUTPATIENT
Start: 2023-07-28 | End: 2023-08-04

## 2023-07-27 RX ORDER — ASPIRIN 81 MG/1
81 TABLET, CHEWABLE ORAL DAILY
Status: DISCONTINUED | OUTPATIENT
Start: 2023-07-28 | End: 2023-08-10 | Stop reason: HOSPADM

## 2023-07-27 RX ORDER — ASPIRIN 81 MG/1
81 TABLET, CHEWABLE ORAL DAILY
Status: DISCONTINUED | OUTPATIENT
Start: 2023-07-27 | End: 2023-07-27 | Stop reason: HOSPADM

## 2023-07-27 RX ORDER — PANTOPRAZOLE SODIUM 40 MG/1
40 TABLET, DELAYED RELEASE ORAL DAILY
Status: DISCONTINUED | OUTPATIENT
Start: 2023-07-28 | End: 2023-08-10 | Stop reason: HOSPADM

## 2023-07-27 RX ORDER — FINASTERIDE 5 MG/1
5 TABLET, FILM COATED ORAL DAILY
Status: DISCONTINUED | OUTPATIENT
Start: 2023-07-28 | End: 2023-07-27 | Stop reason: HOSPADM

## 2023-07-27 RX ORDER — MIRTAZAPINE 15 MG/1
7.5 TABLET, FILM COATED ORAL
Status: DISCONTINUED | OUTPATIENT
Start: 2023-07-27 | End: 2023-07-27 | Stop reason: HOSPADM

## 2023-07-27 RX ORDER — INSULIN GLARGINE 100 [IU]/ML
15 INJECTION, SOLUTION SUBCUTANEOUS
Status: DISCONTINUED | OUTPATIENT
Start: 2023-07-28 | End: 2023-08-01

## 2023-07-27 RX ORDER — METOLAZONE 2.5 MG/1
5 TABLET ORAL DAILY
Status: DISCONTINUED | OUTPATIENT
Start: 2023-07-28 | End: 2023-07-27

## 2023-07-27 RX ORDER — METOPROLOL SUCCINATE 50 MG/1
50 TABLET, EXTENDED RELEASE ORAL EVERY 12 HOURS SCHEDULED
Status: DISCONTINUED | OUTPATIENT
Start: 2023-07-27 | End: 2023-07-27 | Stop reason: HOSPADM

## 2023-07-27 RX ORDER — INSULIN LISPRO 100 [IU]/ML
1-6 INJECTION, SOLUTION INTRAVENOUS; SUBCUTANEOUS
Status: DISCONTINUED | OUTPATIENT
Start: 2023-07-27 | End: 2023-07-27 | Stop reason: HOSPADM

## 2023-07-27 RX ORDER — INSULIN GLARGINE 100 [IU]/ML
10 INJECTION, SOLUTION SUBCUTANEOUS
Status: DISCONTINUED | OUTPATIENT
Start: 2023-07-27 | End: 2023-07-27

## 2023-07-27 RX ORDER — PANTOPRAZOLE SODIUM 40 MG/1
40 TABLET, DELAYED RELEASE ORAL DAILY
Status: DISCONTINUED | OUTPATIENT
Start: 2023-07-27 | End: 2023-07-27 | Stop reason: HOSPADM

## 2023-07-27 RX ORDER — MIRTAZAPINE 15 MG/1
7.5 TABLET, FILM COATED ORAL
Status: DISCONTINUED | OUTPATIENT
Start: 2023-07-27 | End: 2023-08-10 | Stop reason: HOSPADM

## 2023-07-27 RX ORDER — HEPARIN SODIUM 5000 [USP'U]/ML
5000 INJECTION, SOLUTION INTRAVENOUS; SUBCUTANEOUS EVERY 8 HOURS SCHEDULED
Status: DISCONTINUED | OUTPATIENT
Start: 2023-07-27 | End: 2023-08-10 | Stop reason: HOSPADM

## 2023-07-27 RX ORDER — ATORVASTATIN CALCIUM 40 MG/1
40 TABLET, FILM COATED ORAL
Status: DISCONTINUED | OUTPATIENT
Start: 2023-07-27 | End: 2023-07-27 | Stop reason: HOSPADM

## 2023-07-27 RX ORDER — CHLORHEXIDINE GLUCONATE 0.12 MG/ML
15 RINSE ORAL ONCE
Status: COMPLETED | OUTPATIENT
Start: 2023-07-27 | End: 2023-07-28

## 2023-07-27 RX ORDER — CLOPIDOGREL BISULFATE 75 MG/1
75 TABLET ORAL DAILY
Status: DISCONTINUED | OUTPATIENT
Start: 2023-07-28 | End: 2023-08-10 | Stop reason: HOSPADM

## 2023-07-27 RX ORDER — ESCITALOPRAM OXALATE 5 MG/1
5 TABLET ORAL DAILY
Status: DISCONTINUED | OUTPATIENT
Start: 2023-07-28 | End: 2023-08-10 | Stop reason: HOSPADM

## 2023-07-27 RX ORDER — TAMSULOSIN HYDROCHLORIDE 0.4 MG/1
0.4 CAPSULE ORAL
Status: DISCONTINUED | OUTPATIENT
Start: 2023-07-28 | End: 2023-08-10 | Stop reason: HOSPADM

## 2023-07-27 RX ORDER — METOPROLOL SUCCINATE 50 MG/1
50 TABLET, EXTENDED RELEASE ORAL EVERY 12 HOURS SCHEDULED
Status: DISCONTINUED | OUTPATIENT
Start: 2023-07-27 | End: 2023-08-04

## 2023-07-27 RX ORDER — ESCITALOPRAM OXALATE 5 MG/1
5 TABLET ORAL DAILY
Status: DISCONTINUED | OUTPATIENT
Start: 2023-07-28 | End: 2023-07-27 | Stop reason: HOSPADM

## 2023-07-27 RX ORDER — CLOPIDOGREL BISULFATE 75 MG/1
75 TABLET ORAL DAILY
Status: DISCONTINUED | OUTPATIENT
Start: 2023-07-27 | End: 2023-07-27 | Stop reason: HOSPADM

## 2023-07-27 RX ORDER — CEFAZOLIN SODIUM 1 G/50ML
1000 SOLUTION INTRAVENOUS ONCE
Status: CANCELLED | OUTPATIENT
Start: 2023-07-27 | End: 2023-07-27

## 2023-07-27 RX ORDER — TAMSULOSIN HYDROCHLORIDE 0.4 MG/1
0.4 CAPSULE ORAL
Status: DISCONTINUED | OUTPATIENT
Start: 2023-07-27 | End: 2023-07-27 | Stop reason: HOSPADM

## 2023-07-27 RX ORDER — SODIUM CHLORIDE 9 MG/ML
50 INJECTION, SOLUTION INTRAVENOUS CONTINUOUS
Status: DISCONTINUED | OUTPATIENT
Start: 2023-07-28 | End: 2023-07-28

## 2023-07-27 RX ORDER — FUROSEMIDE 40 MG/1
40 TABLET ORAL DAILY
Status: DISCONTINUED | OUTPATIENT
Start: 2023-07-27 | End: 2023-07-27

## 2023-07-27 RX ORDER — FINASTERIDE 5 MG/1
5 TABLET, FILM COATED ORAL DAILY
Status: DISCONTINUED | OUTPATIENT
Start: 2023-07-28 | End: 2023-08-10 | Stop reason: HOSPADM

## 2023-07-27 RX ADMIN — VANCOMYCIN HYDROCHLORIDE 1750 MG: 10 INJECTION, POWDER, LYOPHILIZED, FOR SOLUTION INTRAVENOUS at 23:44

## 2023-07-27 RX ADMIN — TAMSULOSIN HYDROCHLORIDE 0.4 MG: 0.4 CAPSULE ORAL at 16:59

## 2023-07-27 RX ADMIN — INSULIN GLARGINE 10 UNITS: 100 INJECTION, SOLUTION SUBCUTANEOUS at 22:34

## 2023-07-27 RX ADMIN — INSULIN LISPRO 4 UNITS: 100 INJECTION, SOLUTION INTRAVENOUS; SUBCUTANEOUS at 17:06

## 2023-07-27 RX ADMIN — SODIUM CHLORIDE 75 ML/HR: 0.9 INJECTION, SOLUTION INTRAVENOUS at 23:44

## 2023-07-27 RX ADMIN — METOPROLOL SUCCINATE 50 MG: 50 TABLET, EXTENDED RELEASE ORAL at 21:04

## 2023-07-27 RX ADMIN — ATORVASTATIN CALCIUM 40 MG: 40 TABLET, FILM COATED ORAL at 16:59

## 2023-07-27 RX ADMIN — PANTOPRAZOLE SODIUM 40 MG: 40 TABLET, DELAYED RELEASE ORAL at 12:27

## 2023-07-27 RX ADMIN — HEPARIN SODIUM 5000 UNITS: 5000 INJECTION INTRAVENOUS; SUBCUTANEOUS at 22:34

## 2023-07-27 RX ADMIN — INSULIN LISPRO 3 UNITS: 100 INJECTION, SOLUTION INTRAVENOUS; SUBCUTANEOUS at 12:28

## 2023-07-27 RX ADMIN — PIPERACILLIN AND TAZOBACTAM 3.38 G: 3; .375 INJECTION, POWDER, LYOPHILIZED, FOR SOLUTION INTRAVENOUS at 09:50

## 2023-07-27 RX ADMIN — ASPIRIN 81 MG: 81 TABLET, CHEWABLE ORAL at 12:27

## 2023-07-27 RX ADMIN — CLOPIDOGREL BISULFATE 75 MG: 75 TABLET ORAL at 12:27

## 2023-07-27 RX ADMIN — PIPERACILLIN SODIUM AND TAZOBACTAM SODIUM 3.38 G: 36; 4.5 INJECTION, POWDER, LYOPHILIZED, FOR SOLUTION INTRAVENOUS at 20:59

## 2023-07-27 RX ADMIN — PIPERACILLIN AND TAZOBACTAM 3.38 G: 3; .375 INJECTION, POWDER, LYOPHILIZED, FOR SOLUTION INTRAVENOUS at 15:30

## 2023-07-27 NOTE — ED CARE HANDOFF
Emergency Department Sign Out Note        Sign out and transfer of care from Dr. Shea Hollis. See Separate Emergency Department note. The patient, Yun Vegas, was evaluated by the previous provider for LLE wounds in setting of PAD. Workup Completed:  Labs and imaging    ED Course / Workup Pending (followup): Medications   piperacillin-tazobactam (ZOSYN) IVPB 3.375 g (0 g Intravenous Stopped 7/26/23 1330)   vancomycin (VANCOCIN) 1750 mg in sodium chloride 0.9% 500 mL IVPB (0 mg Intravenous Stopped 7/26/23 1547)         ED Course as of 07/27/23 0606   Wed Jul 26, 2023   2200 Patient care received in signout from Dr. Shea Hollis. This is a 67-year-old male presenting with left lower extremity wounds in setting of severe peripheral arterial disease. At the time of handoff, patient is awaiting transfer to [address] [place] [address] for IR/vascular surgery evaluation. Patient is receiving vancomycin and Zosyn given concern for infection of the left lower extremity ulcers.      Procedures  MDM        Disposition  Final diagnoses:   Visit for wound check   Peripheral arterial disease ([address])   Nonhealing ulcer of multiple sites of left lower extremity, unspecified ulcer stage ([address])   History of ischemic cardiomyopathy   Hyperglycemia due to type 2 diabetes mellitus ([address])     Time reflects when diagnosis was documented in both MDM as applicable and the Disposition within this note     Time User Action Codes Description Comment    7/26/2023  1:00 PM Obinna Rodgers [Z51.89] Visit for wound check     7/26/2023  1:00 PM Obinna Rodgers [I73.9] Peripheral arterial disease ([address])     7/26/2023  1:02 PM Obinna Rodgers [L97.929] Nonhealing ulcer of multiple sites of left lower extremity, unspecified ulcer stage ([address])     7/26/2023  1:03 PM Obinna Rodgers [Z86.79] History of ischemic cardiomyopathy     7/26/2023  1:03 PM Barbara Chávez [E11.65] Hyperglycemia due to type 2 diabetes mellitus [facility])       ED Disposition     ED Disposition   Transfer to Another Facility-In Network    Condition   --    Date/Time   Wed Jul 26, 2023  3:23 PM    Comment   Manav Sim should be transferred out to [facility]. Follow-up Information    None       Patient's Medications   Discharge Prescriptions    No medications on file     No discharge procedures on file.        ED Provider  Electronically Signed by     Shelbi Holden MD  07/27/23 0752

## 2023-07-27 NOTE — ED NOTES
Patient provided with update regarding transfer; no bed placement at [facility] or pick-up time available yet. Patient provided with sandwich, Jello, crackers, peanut butter, water, and milk. Patient resting comfortably in bed, offering no complaints. Call bell within reach and patient instructed to ring if needed.      Melody Cedillo RN  07/26/23 0217

## 2023-07-27 NOTE — UTILIZATION REVIEW
Initial Clinical Review    Admission: Date/Time/Statement:   Admission Orders (From admission, onward)     Ordered        07/26/23 [facility]  Once,   Status:  Canceled                      No orders of the defined types were placed in this encounter. ED Arrival Information     Expected   7/26/2023     Arrival   7/26/2023 11:41    Acuity   Urgent            Means of arrival   Walk-In    Escorted by   [id number]    Hospitalist    Admission type   Emergency            Arrival complaint   Wound Check           Chief Complaint   Patient presents with   • Wound Check     Patient was seen at wound care yesterday and they recommended patient comes to the ED for the ulcers on his L heel; patient did not want to come yesterday but was okay with coming today. Per wound care they said he might need to be transferred to [facility] for further eval/surgery       Initial Presentation: 69 y.o. male presents to ed from home for evaluation and treatment of  Left heel wound. Patient seen in wound care center as advised. Clinical assessment significant for soft tissue ulcers LLE. CRP 35.8, BUN 36, ALK PHOS 472, AST 41, SED 60, HB 7.9, HCT 26.6. Imaging shows LLE osteomyelitis. LLE duplex 50-70 % stenosis of femoral artery and occlusion of popliteal artery stent. Initially treated with iv piperacillin, iv vancomycin. Admit to inpatient med surg. Date: 7-27-23   Day 2: inpatient med surg    Continue iv piperacillin. Plan to transfer to higher level of care. Plan for LLE angiogram tomorrow, with possible retrograde pedal access 7/28/23     Discharged to [facility] for higher level of care    ED Triage Vitals   07/27/23 0737 07/26/23 1144 07/26/23 1144 07/26/23 1144 07/26/23 1144   97.5 °F (36.4 °C) 60 18 127/60 96 %      Temporal Monitor         No Pain          07/26/23 67.7 kg (149 lb 4 oz)     Additional Vital Signs:     Patient Vitals for the past 24 hrs:   BP Temp Pulse Resp SpO2   07/27/23 1100 125/64 -- 71 16 90 %   07/27/23 1000 122/70 -- 69 18 91 %   07/27/23 0800 129/59 -- 65 18 92 %   07/27/23 0737 -- 97.5 °F (36.4 °C) -- -- --   07/27/23 0200 118/56 -- 63 13 92 %   07/26/23 2230 120/58 -- 60 19 95 %   07/26/23 2200 114/59 -- 60 13 95 %   07/26/23 2130 122/57 -- 60 13 95 %   07/26/23 2100 124/58 -- 60 13 96 %   07/26/23 2030 119/60 -- 60 20 95 %   07/26/23 1930 121/60 -- 60 13 95 %   07/26/23 1900 124/60 -- 60 13 96 %   07/26/23 1830 127/60 -- 60 12 94 %   07/26/23 1800 119/57 -- 59 14 96 %   07/26/23 1730 131/61 -- 61 14 97 %   07/26/23 1700 123/59 -- 61 15 96 %   07/26/23 1630 129/60 -- 60 17 97 %   07/26/23 1600 138/63 -- 63 12 94 %   07/26/23 1530 110/59 -- 62 17 95 %   07/26/23 1500 107/55 -- 60 14 95 %   07/26/23 1430 113/57 -- 60 12 96 %   07/26/23 1400 129/61 -- 62 14 95 %   07/26/23 1330 118/56 -- 61 16 96 %   07/26/23 1230 105/64 -- 61 16 95 %   07/26/23 1200 125/59 -- 60 16 95 %   07/26/23 1144 127/60 -- 60 18 96 %     Pertinent Labs/Diagnostic Test Results:       VAS lower limb arterial duplex, limited/unilateral   Final  (07/26 1442)   RIGHT LOWER LIMB:  BKA     LEFT LOWER LIMB:  Duplex evaluation reveals a 50-75% stenosis in the mid superficial femoral  artery and an occlusion of the distal popliteal artery stent, tibio-peroneal  trunk and anterior tibial artery. Diffuse atherosclerotic disease throughout the  remaining femoro-popliteal and tibio-peroneal segments. (BRADY performed 4/7/2023)  Ankle/Brachial Index: 0.75, moderate claudication range. Prior 0.58  PPG/PVR Tracings are dampened. Metatarsal Pressure 42 mm Hg  Great Toe Pressure: 19 mm Hg, below the healing range. Prior 62 mm Hg. XR tibia fibula 2 views LEFT   Final  (07/27 0075)      Soft tissue ulceration distal lateral lower extremity with loss of cortical conspicuity posterior distal fibula shaft suspicious for osteomyelitis.  This could be confirmed with MRI if deemed clinically necessary. XR foot 3+ views LEFT   Final (07/27 0818)      Soft tissue ulceration distal lateral lower extremity and potentially along the posterior heel. Loss of bony conspicuity posterior distal fibular shaft suspicious for osteomyelitis. No discernible interval bony destruction involving the calcaneus. XR ankle 3+ views LEFT   Final  (07/27 0824)      Soft tissue ulcerations seen along the distal lateral lower extremity. Periosteal reaction suggested along the distal posterior fibular shaft, suspicious for osteomyelitis. Results from last 7 days   Lab Units 07/26/23  1227   WBC Thousand/uL 7.92   HEMOGLOBIN g/dL 7.9*   HEMATOCRIT % 26.6*   PLATELETS Thousands/uL 314   NEUTROS ABS Thousands/µL 5.89         Results from last 7 days   Lab Units 07/26/23  1227   SODIUM mmol/L 137   POTASSIUM mmol/L 3.7   CHLORIDE mmol/L 95*   CO2 mmol/L 36*   ANION GAP mmol/L 6   BUN mg/dL 36*   CREATININE mg/dL 0.95   EGFR ml/min/1.73sq m 76   CALCIUM mg/dL 8.5     Results from last 7 days   Lab Units 07/26/23  1227   AST U/L 41*   ALT U/L 44   ALK PHOS U/L 472*   TOTAL PROTEIN g/dL 6.4   ALBUMIN g/dL 2.9*   TOTAL BILIRUBIN mg/dL 0.60         Results from last 7 days   Lab Units 07/26/23  1227   GLUCOSE RANDOM mg/dL 283*         Results from last 7 days   Lab Units 07/26/23  1227   PROTIME seconds 15.9*   INR  1.25*   PTT seconds 37         Results from last 7 days   Lab Units 07/26/23  1227   PROCALCITONIN ng/ml 0.05     Results from last 7 days   Lab Units 07/26/23  1227   LACTIC ACID mmol/L 1.3       Results from last 7 days   Lab Units 07/26/23  1227   CRP mg/L 35.8*   SED RATE mm/hour 60*       Results from last 7 days   Lab Units 07/26/23  1227   BLOOD CULTURE  Received in Microbiology Lab. Culture in Progress. Received in Microbiology Lab. Culture in Progress.        ED Treatment:   Medication Administration from 07/26/2023 1141 to 07/27/2023 1138       Date/Time Order Dose Route Action 07/26/2023 1300 EDT piperacillin-tazobactam (ZOSYN) IVPB 3.375 g 3.375 g Intravenous New Bag     07/26/2023 1347 EDT vancomycin (VANCOCIN) 1750 mg in sodium chloride 0.9% 500 mL IVPB 1,750 mg Intravenous New Bag     07/27/2023 0950 EDT piperacillin-tazobactam (ZOSYN) 3.375 g in sodium chloride 0.9 % 100 mL IVPB 3.375 g Intravenous New Bag        Past Medical History:   Diagnosis Date   • 6th nerve palsy    • Blister of right leg    • BPH (benign prostatic hyperplasia)    • CAD (coronary artery disease)    • Cardiomyopathy (HCC)     EF 25%   • CHF (congestive heart failure) ([facility])    • Depression    • Diabetes mellitus ([address])     type 2, insulin dependent   • Diabetic foot ulcer ([address])     left, local wound care   • Diabetic retinopathy ([address])    • Exposure to Agent Orange     while serving in [employer]   • Former tobacco use    • H/O urinary retention     w/ reagan placement   • Hyperlipidemia    • Hypertension    • Rupture of biceps tendon, traumatic 2017    right     Present on Admission:  none      Admitting Diagnosis: Leg wound, left [S81.802A]     Age/Sex: 69 y.o. male    Scheduled Medications:    piperacillin-tazobactam, 3.375 g, Intravenous, Q6H      Continuous IV Infusions:     PRN Meds:       IP CONSULT TO VASCULAR SURGERY  IP CONSULT TO PODIATRY  IP CONSULT TO INTERNAL MEDICINE    Network Utilization Review Department  ATTENTION: Please call with any questions or concerns to [phone] and carefully listen to the prompts so that you are directed to the right person. All voicemails are confidential.  Maryan Gitelman all requests for admission clinical reviews, approved or denied determinations and any other requests to dedicated fax number below belonging to the campus where the patient is receiving treatment.  List of dedicated fax numbers for the Facilities:  [place] DENIALS (Administrative/Medical Necessity) [phone]   [address] [place] [address] (Maternity/NICU/Pediatrics) [phone]   [facility] Pepe Devries [address] [facility] [phone]   [address] [facility] [address] [address] [facility] [address] [place] [address] [address] [phone]

## 2023-07-27 NOTE — CASE MANAGEMENT
Case Management Assessment    Patient name Teresa Olson  Location [address] [facility] [address]/[facility] [id number] MRN [id number]  : 1945 Date 2023       Current Admission Date: 2023  Current Admission Diagnosis:Visit for wound check   Patient Active Problem List    Diagnosis Date Noted   • Deep tissue injury of sacrum 2023   • Hx of BKA, right ([address]) 2023   • Goals of care, counseling/discussion 2023   • Severe protein-calorie malnutrition ([address]) 2023   • Encounter for competency evaluation 2023   • Depressed mood 2023   • Nausea & vomiting 2023   • Moderate protein-calorie malnutrition ([address]) 2023   • Ischemic cardiomyopathy 2023   • Former tobacco use    • Cardiomyopathy ([address])    • PAD (peripheral artery disease) ([address]) 2023   • Essential hypertension 2023   • Toenail avulsion 2023   • Suspected deep tissue injury of unknown depth 2023   • Diabetic ulcer of both feet associated with type 2 diabetes mellitus ([address]) 2023   • CAD (coronary artery disease) 2023   • Unintentional weight loss 2023   • Moderate protein malnutrition ([address]) 02/10/2023   • Chronic systolic CHF (congestive heart failure) ([address]) 2023   • Urinary retention 2023   • Abnormal EKG 2021   • Hypokalemia 2021   • Type 2 diabetes mellitus with hyperglycemia, with long-term current use of insulin ([address]) 2021      LOS (days): 0  Geometric Mean LOS (GMLOS) (days):   Days to GMLOS:     OBJECTIVE:  PATIENT READMITTED TO HOSPITAL            Current admission status: Inpatient       Preferred Pharmacy:   [address] [place] [address] - [address] [place]  [address] [facility] [address]  Phone: [phone] Fax: [address] [place], [place] - [address] DAMIEN Randall  Phone: [phone] Fax: [phone]    Primary Care Provider: Alem Heranndes, DO    Primary Insurance: [employer] [facility] REP  Secondary Insurance: [facility]    ASSESSMENT:  Active Health Care Proxies     [facility] [address] Representative - Friend   Primary Phone: [phone] (Home)                              Patient Information  Admitted from[de-identified] Other (comment) (doctor appointment)  Mental Status: Alert  During Assessment patient was accompanied by: Not accompanied during assessment  Assessment information provided by[de-identified] Patient  Primary Caregiver: Self  Home entry access options.  Select all that apply.: Elevator  Type of Current Residence: Apartment  Floor Level: 3  Upon entering residence, is there a bedroom on the main floor (no further steps)?: Yes  Upon entering residence, is there a bathroom on the main floor (no further steps)?: Yes  Homeless/housing insecurity resource given?: N/A  Living Arrangements: Lives Alone    Activities of Daily Living Prior to Admission  Functional Status: Independent  Completes ADLs independently?: Yes  Ambulates independently?: Yes  Does patient use assisted devices?: Yes  Assisted Devices (DME) used: Straight Elvera Jacks  Does patient currently own DME?: Yes  What DME does the patient currently own?: Kanu Fang, Shower Chair, Bedside Commode  Does patient have a history of Outpatient Therapy (PT/OT)?: Yes  Does the patient have a history of Short-Term Rehab?: Yes  Does patient have a history of HHC?: Yes  Does patient currently have [address]?: No         Patient Information Continued  Income Source: Pension/assisted  Food insecurity resource given?: N/A  Does patient receive dialysis treatments?: No  Does patient have a history of substance abuse?: No  Does patient have a history of Mental Health Diagnosis?: No         Means of Transportation  Means of Transport to Appts[de-identified] Friends (or bus service (pt forgot the name of the company))  Was application for public transport provided?: N/A

## 2023-07-27 NOTE — QUICK NOTE
- Patient currently being transferred recommend podiatry evaluation in the [facility] areas covered with Betadine wet-to-dry  - will cancel consult for now

## 2023-07-28 PROBLEM — D64.9 ANEMIA: Status: ACTIVE | Noted: 2023-07-28

## 2023-07-28 LAB
ABO GROUP BLD: NORMAL
ABO GROUP BLD: NORMAL
ALBUMIN SERPL BCP-MCNC: 2.1 G/DL (ref 3.5–5)
ALP SERPL-CCNC: 425 U/L (ref 46–116)
ALT SERPL W P-5'-P-CCNC: 42 U/L (ref 12–78)
ANION GAP SERPL CALCULATED.3IONS-SCNC: 2 MMOL/L
APTT PPP: 39 SECONDS (ref 23–37)
AST SERPL W P-5'-P-CCNC: 30 U/L (ref 5–45)
BASOPHILS # BLD AUTO: 0.03 THOUSANDS/ÂΜL (ref 0–0.1)
BASOPHILS NFR BLD AUTO: 0 % (ref 0–1)
BILIRUB SERPL-MCNC: 0.42 MG/DL (ref 0.2–1)
BLD GP AB SCN SERPL QL: NEGATIVE
BUN SERPL-MCNC: 38 MG/DL (ref 5–25)
CALCIUM ALBUM COR SERPL-MCNC: 10 MG/DL (ref 8.3–10.1)
CALCIUM SERPL-MCNC: 8.5 MG/DL (ref 8.3–10.1)
CHLORIDE SERPL-SCNC: 104 MMOL/L (ref 96–108)
CO2 SERPL-SCNC: 35 MMOL/L (ref 21–32)
CREAT SERPL-MCNC: 1.08 MG/DL (ref 0.6–1.3)
EOSINOPHIL # BLD AUTO: 0.4 THOUSAND/ÂΜL (ref 0–0.61)
EOSINOPHIL NFR BLD AUTO: 5 % (ref 0–6)
ERYTHROCYTE [DISTWIDTH] IN BLOOD BY AUTOMATED COUNT: 17.2 % (ref 11.6–15.1)
GFR SERPL CREATININE-BSD FRML MDRD: 65 ML/MIN/1.73SQ M
GLUCOSE SERPL-MCNC: 159 MG/DL (ref 65–140)
GLUCOSE SERPL-MCNC: 160 MG/DL (ref 65–140)
GLUCOSE SERPL-MCNC: 164 MG/DL (ref 65–140)
GLUCOSE SERPL-MCNC: 172 MG/DL (ref 65–140)
GLUCOSE SERPL-MCNC: 204 MG/DL (ref 65–140)
HCT VFR BLD AUTO: 24.9 % (ref 36.5–49.3)
HGB BLD-MCNC: 7.5 G/DL (ref 12–17)
IMM GRANULOCYTES # BLD AUTO: 0.03 THOUSAND/UL (ref 0–0.2)
IMM GRANULOCYTES NFR BLD AUTO: 0 % (ref 0–2)
INR PPP: 1.33 (ref 0.84–1.19)
LYMPHOCYTES # BLD AUTO: 1.41 THOUSANDS/ÂΜL (ref 0.6–4.47)
LYMPHOCYTES NFR BLD AUTO: 18 % (ref 14–44)
MCH RBC QN AUTO: 25.6 PG (ref 26.8–34.3)
MCHC RBC AUTO-ENTMCNC: 30.1 G/DL (ref 31.4–37.4)
MCV RBC AUTO: 85 FL (ref 82–98)
MONOCYTES # BLD AUTO: 0.56 THOUSAND/ÂΜL (ref 0.17–1.22)
MONOCYTES NFR BLD AUTO: 7 % (ref 4–12)
NEUTROPHILS # BLD AUTO: 5.32 THOUSANDS/ÂΜL (ref 1.85–7.62)
NEUTS SEG NFR BLD AUTO: 70 % (ref 43–75)
NRBC BLD AUTO-RTO: 0 /100 WBCS
PLATELET # BLD AUTO: 323 THOUSANDS/UL (ref 149–390)
PMV BLD AUTO: 8.4 FL (ref 8.9–12.7)
POTASSIUM SERPL-SCNC: 4.1 MMOL/L (ref 3.5–5.3)
PROT SERPL-MCNC: 6.4 G/DL (ref 6.4–8.4)
PROTHROMBIN TIME: 16.7 SECONDS (ref 11.6–14.5)
RBC # BLD AUTO: 2.93 MILLION/UL (ref 3.88–5.62)
RH BLD: NEGATIVE
RH BLD: NEGATIVE
SODIUM SERPL-SCNC: 141 MMOL/L (ref 135–147)
SPECIMEN EXPIRATION DATE: NORMAL
WBC # BLD AUTO: 7.75 THOUSAND/UL (ref 4.31–10.16)

## 2023-07-28 PROCEDURE — 99222 1ST HOSP IP/OBS MODERATE 55: CPT | Performed by: PODIATRIST

## 2023-07-28 PROCEDURE — 80053 COMPREHEN METABOLIC PANEL: CPT | Performed by: INTERNAL MEDICINE

## 2023-07-28 PROCEDURE — 82948 REAGENT STRIP/BLOOD GLUCOSE: CPT

## 2023-07-28 PROCEDURE — 85025 COMPLETE CBC W/AUTO DIFF WBC: CPT | Performed by: INTERNAL MEDICINE

## 2023-07-28 PROCEDURE — 99232 SBSQ HOSP IP/OBS MODERATE 35: CPT | Performed by: INTERNAL MEDICINE

## 2023-07-28 PROCEDURE — 85730 THROMBOPLASTIN TIME PARTIAL: CPT

## 2023-07-28 PROCEDURE — 85610 PROTHROMBIN TIME: CPT | Performed by: INTERNAL MEDICINE

## 2023-07-28 PROCEDURE — 99223 1ST HOSP IP/OBS HIGH 75: CPT | Performed by: SURGERY

## 2023-07-28 RX ORDER — METOLAZONE 5 MG/1
5 TABLET ORAL DAILY
Status: DISCONTINUED | OUTPATIENT
Start: 2023-07-28 | End: 2023-07-29

## 2023-07-28 RX ORDER — INSULIN LISPRO 100 [IU]/ML
5 INJECTION, SOLUTION INTRAVENOUS; SUBCUTANEOUS
Status: DISCONTINUED | OUTPATIENT
Start: 2023-07-28 | End: 2023-07-31

## 2023-07-28 RX ADMIN — HEPARIN SODIUM 5000 UNITS: 5000 INJECTION INTRAVENOUS; SUBCUTANEOUS at 15:06

## 2023-07-28 RX ADMIN — ASPIRIN 81 MG CHEWABLE TABLET 81 MG: 81 TABLET CHEWABLE at 08:18

## 2023-07-28 RX ADMIN — INSULIN LISPRO 1 UNITS: 100 INJECTION, SOLUTION INTRAVENOUS; SUBCUTANEOUS at 08:18

## 2023-07-28 RX ADMIN — INSULIN GLARGINE 15 UNITS: 100 INJECTION, SOLUTION SUBCUTANEOUS at 20:44

## 2023-07-28 RX ADMIN — PIPERACILLIN SODIUM AND TAZOBACTAM SODIUM 3.38 G: 36; 4.5 INJECTION, POWDER, LYOPHILIZED, FOR SOLUTION INTRAVENOUS at 20:41

## 2023-07-28 RX ADMIN — HEPARIN SODIUM 5000 UNITS: 5000 INJECTION INTRAVENOUS; SUBCUTANEOUS at 20:45

## 2023-07-28 RX ADMIN — FINASTERIDE 5 MG: 5 TABLET, FILM COATED ORAL at 08:18

## 2023-07-28 RX ADMIN — HEPARIN SODIUM 5000 UNITS: 5000 INJECTION INTRAVENOUS; SUBCUTANEOUS at 05:14

## 2023-07-28 RX ADMIN — VANCOMYCIN HYDROCHLORIDE 750 MG: 750 INJECTION, SOLUTION INTRAVENOUS at 12:18

## 2023-07-28 RX ADMIN — INSULIN LISPRO 1 UNITS: 100 INJECTION, SOLUTION INTRAVENOUS; SUBCUTANEOUS at 12:18

## 2023-07-28 RX ADMIN — CLOPIDOGREL BISULFATE 75 MG: 75 TABLET ORAL at 08:18

## 2023-07-28 RX ADMIN — VANCOMYCIN HYDROCHLORIDE 750 MG: 750 INJECTION, SOLUTION INTRAVENOUS at 23:29

## 2023-07-28 RX ADMIN — INSULIN LISPRO 1 UNITS: 100 INJECTION, SOLUTION INTRAVENOUS; SUBCUTANEOUS at 16:18

## 2023-07-28 RX ADMIN — ATORVASTATIN CALCIUM 40 MG: 40 TABLET, FILM COATED ORAL at 16:17

## 2023-07-28 RX ADMIN — METOPROLOL SUCCINATE 50 MG: 50 TABLET, EXTENDED RELEASE ORAL at 08:18

## 2023-07-28 RX ADMIN — PIPERACILLIN SODIUM AND TAZOBACTAM SODIUM 3.38 G: 36; 4.5 INJECTION, POWDER, LYOPHILIZED, FOR SOLUTION INTRAVENOUS at 16:20

## 2023-07-28 RX ADMIN — PIPERACILLIN SODIUM AND TAZOBACTAM SODIUM 3.38 G: 36; 4.5 INJECTION, POWDER, LYOPHILIZED, FOR SOLUTION INTRAVENOUS at 10:13

## 2023-07-28 RX ADMIN — CHLORHEXIDINE GLUCONATE 15 ML: 1.2 SOLUTION ORAL at 08:18

## 2023-07-28 RX ADMIN — ESCITSLOPRAM 5 MG: 5 TABLET ORAL at 08:18

## 2023-07-28 RX ADMIN — PIPERACILLIN SODIUM AND TAZOBACTAM SODIUM 3.38 G: 36; 4.5 INJECTION, POWDER, LYOPHILIZED, FOR SOLUTION INTRAVENOUS at 03:35

## 2023-07-28 RX ADMIN — INSULIN LISPRO 5 UNITS: 100 INJECTION, SOLUTION INTRAVENOUS; SUBCUTANEOUS at 16:18

## 2023-07-28 RX ADMIN — PANTOPRAZOLE SODIUM 40 MG: 40 TABLET, DELAYED RELEASE ORAL at 08:18

## 2023-07-28 RX ADMIN — TAMSULOSIN HYDROCHLORIDE 0.4 MG: 0.4 CAPSULE ORAL at 16:17

## 2023-07-28 NOTE — ASSESSMENT & PLAN NOTE
Patient with hx of pad sp right sided bka  Now presenting with worsening lle wounds  Transferred for vascular carolin, angio tomorrow   Hold entresto, gentle fluid hydration  Continue abx to cover for infection/osteo although low concern for infection of current wounds as there is  No drainage, only mild surrounding erythema, and patient is systemically well/non toxic. Xray with concern for osteo, mildly elevated crp.  Blood cultures pending

## 2023-07-28 NOTE — H&P
[facility]  H&P  Name: Paul Eugene 69 y.o. male I MRN: [id number]  [facility] I Date of Admission: 7/27/2023   Date of Service: 7/27/2023 I Hospital Day: 0      Assessment/Plan   * PAD (peripheral artery disease) ([address])  Assessment & Plan  Patient with hx of pad sp right sided bka  Now presenting with worsening lle wounds  Transferred for vascular carolin, angio tomorrow   Hold entresto, gentle fluid hydration  Continue abx to cover for infection/osteo although low concern for infection of current wounds as there is  No drainage, only mild surrounding erythema, and patient is systemically well/non toxic. Xray with concern for osteo, mildly elevated crp. Blood cultures pending    Hx of BKA, right ([address])  Assessment & Plan  Hx of right sided bka  At risk for left bka     Essential hypertension  Assessment & Plan  Continue beta blocker  Hold enresto    CAD (coronary artery disease)  Assessment & Plan  Patient with history of CAD   Continue dapt, statin, beta blocker    Chronic systolic CHF (congestive heart failure) (HCC)  Assessment & Plan  Wt Readings from Last 3 Encounters:   07/27/23 67.8 kg (149 lb 7.6 oz)   07/26/23 67.7 kg (149 lb 4 oz)   05/18/23 59 kg (130 lb)       Patient with hx of chf, currently appears euvolemic  Continue beta blocker  Hold entresto prior to angio  Monitor daily weights, ins and outs      Type 2 diabetes mellitus with hyperglycemia, with long-term current use of insulin [facility])  Assessment & Plan  Lab Results   Component Value Date    HGBA1C 7.3 (H) 05/04/2023       Recent Labs     07/27/23  1215 07/27/23  1701 07/27/23  2108   POCGLU 262* 274* 241*       Blood Sugar Average: Last 72 hrs:  (P) 241   With poorly controlled blood sugars, will add Lantus 15 units at bedtime, continue sliding scale             VTE Pharmacologic Prophylaxis: VTE Score: 4 Moderate Risk (Score 3-4) - Pharmacological DVT Prophylaxis Ordered: heparin.   Code Status: Level 1 - Full Code   Discussion with family: Patient declined call to . Anticipated Length of Stay: Patient will be admitted on an inpatient basis with an anticipated length of stay of greater than 2 midnights secondary to pad. Total Time Spent on Date of Encounter in care of patient: 40 minutes This time was spent on one or more of the following: performing physical exam; counseling and coordination of care; obtaining or reviewing history; documenting in the medical record; reviewing/ordering tests, medications or procedures; communicating with other healthcare professionals and discussing with patient's family/caregivers. Chief Complaint: Lower extremity wounds    History of Present Illness:  Sheila Singh is a 69 y.o. male with a PMH of significant peripheral arterial disease following with vascular with multiple procedures and right-sided BKA, presents for worsening left lower extremity wounds. She was evaluated in the ER and recommended transfer to [facility] by the vascular team.  Started on vancomycin and Zosyn due to concern for infection in his lower extremity wound and osteomyelitis. Patient is planned for left lower extremity angiogram tomorrow. Review of Systems:  Review of Systems   Constitutional: Positive for fatigue. Negative for chills and fever. HENT: Negative for ear pain and sore throat. Eyes: Negative for pain and visual disturbance. Respiratory: Negative for cough and shortness of breath. Cardiovascular: Negative for chest pain, palpitations and leg swelling. Gastrointestinal: Negative for abdominal pain, nausea and vomiting. Endocrine: Negative for polydipsia, polyphagia and polyuria. Genitourinary: Negative for dysuria and hematuria. Musculoskeletal: Negative for arthralgias and back pain. Skin: Positive for wound. Negative for color change and rash. Neurological: Negative for dizziness, seizures, syncope, weakness and headaches.    All other systems reviewed and are negative. Past Medical and Surgical History:   Past Medical History:   Diagnosis Date   • 6th nerve palsy    • Blister of right leg    • BPH (benign prostatic hyperplasia)    • CAD (coronary artery disease)    • Cardiomyopathy ([facility])     EF 25%   • CHF (congestive heart failure) ([facility])    • Depression    • Diabetes mellitus ([address])     type 2, insulin dependent   • Diabetic foot ulcer ([address])     left, local wound care   • Diabetic retinopathy ([address])    • Exposure to Agent Orange     while serving in [employer]   • Former tobacco use    • H/O urinary retention     w/ reagan placement   • Hyperlipidemia    • Hypertension    • Rupture of biceps tendon, traumatic 2017    right       Past Surgical History:   Procedure Laterality Date   • CARDIAC CATHETERIZATION     • CARDIAC CATHETERIZATION Left 2/14/2023    Procedure: Cardiac Left Heart Cath;  Surgeon: Jennifer Montgomery MD;  Location: [facility] CARDIAC [facility] LAB; Service: Cardiology   • CARDIAC CATHETERIZATION N/A 2/14/2023    Procedure: Cardiac Coronary Angiogram;  Surgeon: Jennifer Montgomery MD;  Location: [facility] CARDIAC CATH LAB; Service: Cardiology   • CATARACT EXTRACTION, BILATERAL Bilateral 2021   • IR LOWER EXTREMITY ANGIOGRAM  3/28/2023   • IR LOWER EXTREMITY ANGIOGRAM  4/6/2023   • LEG AMPUTATION THROUGH LOWER TIBIA AND FIBULA Right 3/30/2023    Procedure: (BKA); Surgeon: Frank Sexton DO;  Location: [facility];  Service: Vascular   • WOUND DEBRIDEMENT Right 3/24/2023    Procedure: debridement of heel ulcer;  Surgeon: Ramses Cox DPM;  Location: [facility];  Service: Podiatry       Meds/Allergies:  Prior to Admission medications    Medication Sig Start Date End Date Taking?  Authorizing Provider   ascorbic acid (VITAMIN C) 500 MG tablet Take 500 mg by mouth daily   Yes Historical Provider, MD   atorvastatin (LIPITOR) 40 mg tablet Take 1 tablet (40 mg total) by mouth daily with dinner 3/7/23  Yes Wilman Akbar DO   acetaminophen (TYLENOL) 325 mg tablet Take 650 mg by mouth every 6 (six) hours as needed for mild pain or fever  Patient not taking: Reported on 7/27/2023    Historical Provider, MD   aspirin (ECOTRIN LOW STRENGTH) 81 mg EC tablet Take 1 tablet (81 mg total) by mouth daily 3/7/23   Verdia Cornea, DO   bisacodyl (FLEET) 10 MG/30ML ENEM Insert 10 mg into the rectum once  Patient not taking: Reported on 7/27/2023    Historical Provider, MD   clopidogrel (PLAVIX) 75 mg tablet Take 1 tablet (75 mg total) by mouth daily Do not start before April 22, 2023. 4/22/23   Danny Stone MD   escitalopram (LEXAPRO) 5 mg tablet Take 1 tablet (5 mg total) by mouth daily Do not start before April 22, 2023.   Patient not taking: Reported on 7/27/2023 4/22/23   Danny Stone MD   finasteride (PROSCAR) 5 mg tablet Take 1 tablet (5 mg total) by mouth daily 5/18/23   JANIS Nolan   furosemide (LASIX) 40 mg tablet Take 40 mg by mouth in the morning  Patient not taking: Reported on 7/27/2023 6/12/23   Historical Provider, MD   Insulin Pen Needle (Pen Needles) 31G X 5 MM MISC Use 2 (two) times a day 3/7/23   Verdia Cornea, DO   melatonin 3 mg Take 1 tablet (3 mg total) by mouth daily at bedtime 4/21/23   Danny Stone MD   metolazone (ZAROXOLYN) 5 mg tablet Take 5 mg by mouth in the morning 6/12/23   Historical Provider, MD   metoprolol succinate (TOPROL-XL) 50 mg 24 hr tablet Take 1 tablet (50 mg total) by mouth every 12 (twelve) hours 3/7/23   Verdia Cornea, DO   mirtazapine (REMERON) 7.5 MG tablet Take 1 tablet (7.5 mg total) by mouth daily at bedtime  Patient not taking: Reported on 7/27/2023 4/21/23   Danny Stone MD   multivitamin [facility]) TABS Take 1 tablet by mouth daily 5/4/23 5/3/24  Historical Provider, MD   pantoprazole (PROTONIX) 40 mg tablet Take 1 tablet (40 mg total) by mouth daily 4/21/23   Danny Stone MD   sacubitril-valsartan [facility]) 49-51 MG TABS Take 1 tablet by mouth 2 (two) times a day 3/7/23   DO sim Luther-docusate sodium (SENOKOT S) 8.6-50 mg per tablet Take 1 tablet by mouth daily as needed  Patient not taking: Reported on 2023 5/4/23 5/3/24  Historical Provider, MD   tamsulosin (FLOMAX) 0.4 mg Take 1 capsule (0.4 mg total) by mouth daily with dinner 23   JANIS Cordero     I have reviewed home medications with patient personally. Allergies: Allergies   Allergen Reactions   • Metformin Diarrhea       Social History:  Marital Status:    Occupation:   Patient Pre-hospital Living Situation: Home  Patient Pre-hospital Level of Mobility: non-ambulatory/bed bound  Patient Pre-hospital Diet Restrictions:   Substance Use History:   Social History     Substance and Sexual Activity   Alcohol Use Not Currently    Comment: Denies history of heavy alcohol use. At most will drink 1 or 2 drinks in a year (Updated 2023). Social History     Tobacco Use   Smoking Status Former   • Packs/day: 1.00   • Years: 6.00   • Total pack years: 6.00   • Types: Cigarettes   • Start date: 5   • Quit date: 12   • Years since quittin.5   Smokeless Tobacco Never     Social History     Substance and Sexual Activity   Drug Use Never       Family History:  Family History   Problem Relation Age of Onset   • No Known Problems Sister    • Other Daughter         chronic neck pain   • Other Son         chronic back pain   • Sudden death Neg Hx    • Cancer Neg Hx        Physical Exam:     Vitals:   Blood Pressure: 121/58 (23)  Pulse: 74 (23)  Temperature: 98.9 °F (37.2 °C) (23)  Temp Source: Temporal (23)  Respirations: 16 (23)  Height: 5' 10" (177.8 cm) (23)  Weight - Scale: 67.8 kg (149 lb 7.6 oz) (23)  SpO2: 95 % (23)    Physical Exam  Vitals and nursing note reviewed. Constitutional:       General: He is not in acute distress. Appearance: He is well-developed. He is not toxic-appearing or diaphoretic.    HENT:      Head: Normocephalic and atraumatic. Eyes:      General: No scleral icterus. Conjunctiva/sclera: Conjunctivae normal.   Cardiovascular:      Rate and Rhythm: Normal rate and regular rhythm. Heart sounds: No murmur heard. No friction rub. No gallop. Pulmonary:      Effort: Pulmonary effort is normal. No respiratory distress. Breath sounds: Normal breath sounds. No stridor. No wheezing, rhonchi or rales. Chest:      Chest wall: No tenderness. Abdominal:      General: There is no distension. Palpations: Abdomen is soft. There is no mass. Tenderness: There is no abdominal tenderness. There is no guarding or rebound. Hernia: No hernia is present. Musculoskeletal:         General: No swelling or tenderness. Cervical back: Neck supple. Comments: Lower extremity wounds as under media, black eschar noted, mild drainage and very mild surrounding erythema  Sp right bka   Skin:     General: Skin is warm and dry. Capillary Refill: Capillary refill takes less than 2 seconds. Neurological:      Mental Status: He is alert and oriented to person, place, and time.    Psychiatric:         Mood and Affect: Mood normal.          Additional Data:     Lab Results:  Results from last 7 days   Lab Units 07/26/23  1227   WBC Thousand/uL 7.92   HEMOGLOBIN g/dL 7.9*   HEMATOCRIT % 26.6*   PLATELETS Thousands/uL 314   NEUTROS PCT % 75   LYMPHS PCT % 14   MONOS PCT % 7   EOS PCT % 4     Results from last 7 days   Lab Units 07/26/23  1227   SODIUM mmol/L 137   POTASSIUM mmol/L 3.7   CHLORIDE mmol/L 95*   CO2 mmol/L 36*   BUN mg/dL 36*   CREATININE mg/dL 0.95   ANION GAP mmol/L 6   CALCIUM mg/dL 8.5   ALBUMIN g/dL 2.9*   TOTAL BILIRUBIN mg/dL 0.60   ALK PHOS U/L 472*   ALT U/L 44   AST U/L 41*   GLUCOSE RANDOM mg/dL 283*     Results from last 7 days   Lab Units 07/26/23  1227   INR  1.25*     Results from last 7 days   Lab Units 07/27/23  2108 07/27/23  1701 07/27/23  1215   POC GLUCOSE mg/dl 241* 274* 262*         Results from last 7 days   Lab Units 07/26/23  1227   LACTIC ACID mmol/L 1.3   PROCALCITONIN ng/ml 0.05       Lines/Drains:  Invasive Devices     Peripheral Intravenous Line  Duration           Peripheral IV 07/26/23 Left Forearm 1 day                    Imaging: Reviewed radiology reports from this admission including: vas  No orders to display       EKG and Other Studies Reviewed on Admission:   · EKG: No EKG obtained. ** Please Note: This note has been constructed using a voice recognition system.  **

## 2023-07-28 NOTE — ASSESSMENT & PLAN NOTE
Lab Results   Component Value Date    HGBA1C 7.3 (H) 05/04/2023       Recent Labs     07/27/23  1701 07/27/23  2108 07/28/23  0730 07/28/23  1108   POCGLU 274* 241* 172* 159*       Blood Sugar Average: Last 72 hrs:  (P) [id number]   Patient refusing cardiac/diabetic diet  He wants regular diet   continue with Lantus and insulin sliding scale  Monitor

## 2023-07-28 NOTE — ASSESSMENT & PLAN NOTE
Lab Results   Component Value Date    HGBA1C 7.3 (H) 05/04/2023       Recent Labs     07/27/23  1215 07/27/23  1701 07/27/23  2108   POCGLU 262* 274* 241*       Blood Sugar Average: Last 72 hrs:  (P) 241   With poorly controlled blood sugars, will add Lantus 15 units at bedtime, continue sliding scale

## 2023-07-28 NOTE — WOUND OSTOMY CARE
Consult Note - Wound   Lefty Nones 69 y.o. male MRN: [id number]  Unit/Bed#: [facility] 801-01 Encounter: [id number]      History and Present Illness: Patient is seen for wound care consult today . The patient is a 69year old male that is admitted with PAD , type 2 diabetes, chronic CHF , CAD , HTN , Cardiomyopathy , and  hx of right BKA . Patient has left lower leg / foot wounds that are a managed by podiatry . Continent of bowel and bladder . Min A to roll in the bed . Heel boot on the left leg . Assessment Findings:   1. Sacral - POA stage 2 area 100% pink and fragile . Located on the upper sacral bone area . Scant serosanguinous drainage . 2. Podiatry managing the left lower leg/ foot wounds   3. Right BKA clean dry and intact     Reviewed the plan of care with the patient . Skin care plans:  1- Cleanse sacral buttocks with soap and water  Apply convatec foam paco with a T and date check skin integrity every shift change every 3 days   2-Elevate heels to offload pressure. 3-Ehob cushion in chair when out of bed. 4-Moisturize skin daily with skin nourishing cream.  5-Turn/reposition q2h or when medically stable for pressure re-distribution on skin. Wound 07/27/23 Other (comment) Sacrum (Active)   Wound Image   07/28/23 1029   Wound Description Fragile;Pink 07/28/23 1029   Pressure Injury Stage 2 07/28/23 1029   Hanh-wound Assessment Clean;Dry; Intact 07/28/23 1029   Wound Length (cm) 1.5 cm 07/28/23 1029   Wound Width (cm) 3 cm 07/28/23 1029   Wound Depth (cm) 0.1 cm 07/28/23 1029   Wound Surface Area (cm^2) 4.5 cm^2 07/28/23 1029   Wound Volume (cm^3) 0.45 cm^3 07/28/23 1029   Calculated Wound Volume (cm^3) 0.45 cm^3 07/28/23 1029   Drainage Amount Scant 07/28/23 1029   Drainage Description Serosanguineous 07/28/23 1029   Non-staged Wound Description Partial thickness 07/28/23 1029   Treatments Cleansed;Site care 07/28/23 1029   Dressing Foam, Silicon (eg.  Allevyn, etc) 07/28/23 1028 Dressing Changed Changed 07/28/23 1029   Patient Tolerance Tolerated well 07/28/23 1029     Wound care will follow weekly call or tiger text with questions or concerns      Delon Egan RN BSN CWOCN

## 2023-07-28 NOTE — ASSESSMENT & PLAN NOTE
• Patient has a history of coronary artery disease, previous cardiac cath with diffuse CAD recommending GDMT  • Continue aspirin, Plavix statin, beta-blocker

## 2023-07-28 NOTE — CONSULTS
Podiatry - Consultation    Patient Information:   Erlinda Otto 69 y.o. male MRN: [id number]  Unit/Bed#: [facility] 801-01 Encounter: [id number]  PCP: Surinder Lind DO  Date of Admission:  7/27/2023  Date of Consultation: 07/28/23  Requesting Physician: Britton Bee DO      ASSESSMENT:    Erlinda Otto is a 69 y.o. male with:    1. Multiple diabetic ulcerations of left leg and foot  2. Type 2 Diabetes Mellitus  3. PAD  4. CHF  5. CAD  6. History of Right BKA    PLAN:    · Dressing changed today at bedside. Patient planned for angiogram with Vascular Surgery today, 7/28/23. Discussed with patient the potential for osteomyelitis in the fibula based in the radiographs and plan to have MRI done to determine extent of possible osteomyelitis. · X-rays were reviewed, final read: Loss of bony conspicuity posterior distal fibular shaft suspicious for osteomyelitis. · Follow up MRI of left leg. · Local wound care consisting of betadine adaptic DSD to all wounds on the left leg and foot. Wound care instructions placed. · Elevation in prevalon boot or on green foam wedges or pillows when non-ambulatory  · Rest of care per primary team.  · Will discuss this plan with my attending and update as needed. Weightbearing status: Weightbearing as tolerated    SUBJECTIVE:    History of Present Illness:    Erlinda Otto is a 69 y.o. male who is originally admitted 7/27/2023 due to peripheral arterial disease. Patient has a past medical history of  congestive heart failure, type 2 diabetes mellitus, coronary artery disease, and history of right BKA. We are consulted for multiple wounds on the lateral leg and posterior heel. Patient reports that he has been seeing Dr. Celine Olson for his wounds over the past several months. He was at [address] [place] [address] but was transferred here for his vascular procedure. Review of Systems:    Constitutional: Negative. HENT: Negative. Eyes: Negative. Respiratory: Negative.     Cardiovascular: Negative. Gastrointestinal: Negative. Musculoskeletal: Right BKA  Skin: Leg and foot wounds   Neurological: Negative   Psych: Negative. Past Medical and Surgical History:     Past Medical History:   Diagnosis Date   • 6th nerve palsy    • Blister of right leg    • BPH (benign prostatic hyperplasia)    • CAD (coronary artery disease)    • Cardiomyopathy ([facility])     EF 25%   • CHF (congestive heart failure) ([facility])    • Depression    • Diabetes mellitus ([address])     type 2, insulin dependent   • Diabetic foot ulcer ([address])     left, local wound care   • Diabetic retinopathy ([address])    • Exposure to Agent Orange     while serving in [employer]   • Former tobacco use    • H/O urinary retention     w/ reagan placement   • Hyperlipidemia    • Hypertension    • Rupture of biceps tendon, traumatic 2017    right       Past Surgical History:   Procedure Laterality Date   • CARDIAC CATHETERIZATION     • CARDIAC CATHETERIZATION Left 2/14/2023    Procedure: Cardiac Left Heart Cath;  Surgeon: Shasta Mcclendon MD;  [facility]; Service: Cardiology   • CARDIAC CATHETERIZATION N/A 2/14/2023    Procedure: Cardiac Coronary Angiogram;  Surgeon: Shasta Mcclendon MD;  Location: [facility]; Service: Cardiology   • CATARACT EXTRACTION, BILATERAL Bilateral 2021   • IR LOWER EXTREMITY ANGIOGRAM  3/28/2023   • IR LOWER EXTREMITY ANGIOGRAM  4/6/2023   • LEG AMPUTATION THROUGH LOWER TIBIA AND FIBULA Right 3/30/2023    Procedure: (BKA);   Surgeon: Aj Izquierdo DO;  [facility]: [facility];  Service: Vascular   • WOUND DEBRIDEMENT Right 3/24/2023    Procedure: debridement of heel ulcer;  Surgeon: Tanner Gomez DPM;  [facility];  Service: Podiatry       Meds/Allergies:    Medications Prior to Admission   Medication   • ascorbic acid (VITAMIN C) 500 MG tablet   • atorvastatin (LIPITOR) 40 mg tablet   • acetaminophen (TYLENOL) 325 mg tablet   • aspirin (ECOTRIN LOW STRENGTH) 81 mg EC tablet   • bisacodyl (FLEET) 10 MG/30ML ENEM   • clopidogrel (PLAVIX) 75 mg tablet   • escitalopram (LEXAPRO) 5 mg tablet   • finasteride (PROSCAR) 5 mg tablet   • furosemide (LASIX) 40 mg tablet   • Insulin Pen Needle (Pen Needles) 31G X 5 MM MISC   • melatonin 3 mg   • metolazone (ZAROXOLYN) 5 mg tablet   • metoprolol succinate (TOPROL-XL) 50 mg 24 hr tablet   • mirtazapine (REMERON) 7.5 MG tablet   • multivitamin (THERAGRAN) TABS   • pantoprazole (PROTONIX) 40 mg tablet   • sacubitril-valsartan (Entresto) 49-51 MG TABS   • senna-docusate sodium (SENOKOT S) 8.6-50 mg per tablet   • tamsulosin (FLOMAX) 0.4 mg       Allergies   Allergen Reactions   • Metformin Diarrhea       Social History:     Marital Status:     Substance Use History:   Social History     Substance and Sexual Activity   Alcohol Use Not Currently    Comment: Denies history of heavy alcohol use. At most will drink 1 or 2 drinks in a year (Updated 2023). Social History     Tobacco Use   Smoking Status Former   • Packs/day: 1.00   • Years: 6.00   • Total pack years: 6.00   • Types: Cigarettes   • Start date: 5   • Quit date: 12   • Years since quittin.5   Smokeless Tobacco Never     Social History     Substance and Sexual Activity   Drug Use Never       Family History:    Family History   Problem Relation Age of Onset   • No Known Problems Sister    • Other Daughter         chronic neck pain   • Other Son         chronic back pain   • Sudden death Neg Hx    • Cancer Neg Hx          OBJECTIVE:    Vitals:   Blood Pressure: 137/69 (23)  Pulse: 68 (23)  Temperature: 98.3 °F (36.8 °C) (23)  Temp Source: Temporal (23)  Respirations: 16 (23)  Height: 5' 10" (177.8 cm) (23)  Weight - Scale: 68.6 kg (151 lb 4.8 oz) (23 0546)  SpO2: 97 % (23)    Physical Exam:    General Appearance: Alert, cooperative, no distress.   HEENT: Head normocephalic, atraumatic, without obvious abnormality. Heart: Normal rate and rhythm. Lungs: Non-labored breathing. No respiratory distress. Abdomen: Without distension. Psychiatric: AAOx3  Lower Extremity:  Vascular:   Left DP and PT pulses are Dopplerable. CRT < 3 seconds at the digits. +0/4 edema noted at bilateral lower extremities. Pedal hair is absent. Skin temperature is WNL bilaterally. Musculoskeletal:  MMT is 3/5 in all muscle compartments bilaterally. ROM at the 1st MPJ and ankle joint are reduced bilaterally with the leg extended. No Pain on palpation. S/p Right BKA     Dermatological:  Lower extremity wound(s) as noted below:    Wound #: 1  Location: Left Lateral leg  Length 7.5 cm: Width 4.0 cm: Depth Unknown   Deepest Tissue Noted in Base: Eschar  Probe to Bone: No  Peripheral Skin Description: Attached  Granulation: 0% Fibrotic Tissue: 30% Necrotic Tissue: 70%   Drainage Amount: minimal, serous  Signs of Infection: Yes    Malodor present    Wound #: 2  Location: Left lateral ankle  Length 2.7 cm: Width 2.6 cm: Depth Unknown   Deepest Tissue Noted in Base: Eschar  Probe to Bone: No  Peripheral Skin Description: Attached  Granulation: 0% Fibrotic Tissue: 30% Necrotic Tissue: 70%   Drainage Amount: minimal, serous  Signs of Infection: Yes    Malodor present    Wound #: 3  Location: Left lateral heel  Length 1.0 cm: Width 1.6 cm: Depth Unknown   Deepest Tissue Noted in Base: Eschar  Probe to Bone: No  Peripheral Skin Description: Attached  Granulation: 0% Fibrotic Tissue: 10% Necrotic Tissue: 90%   Drainage Amount: minimal  Signs of Infection: Yes    Malodor present    Wound #: 4  Location: Posterior left heel  Length 3.4 cm: Width 2 cm: Depth Unknown   Deepest Tissue Noted in Base: Eschar  Probe to Bone: No  Peripheral Skin Description: Attached  Granulation: 0% Fibrotic Tissue: 0% Necrotic Tissue: 100%   Drainage Amount: minimal  Signs of Infection: No      Neurological:  Gross sensation is diminished.  Protective sensation is diminished. Patient Reports numbness and/or paresthesias. Clinical Images 07/28/23: Additional data:     Lab Results: I have personally reviewed pertinent labs including:    Results from last 7 days   Lab Units 07/28/23  0443   WBC Thousand/uL 7.75   HEMOGLOBIN g/dL 7.5*   HEMATOCRIT % 24.9*   PLATELETS Thousands/uL 323   NEUTROS PCT % 70   LYMPHS PCT % 18   MONOS PCT % 7   EOS PCT % 5     Results from last 7 days   Lab Units 07/28/23  0443   POTASSIUM mmol/L 4.1   CHLORIDE mmol/L 104   CO2 mmol/L 35*   BUN mg/dL 38*   CREATININE mg/dL 1.08   CALCIUM mg/dL 8.5   ALK PHOS U/L 425*   ALT U/L 42   AST U/L 30     Results from last 7 days   Lab Units 07/28/23  0443   INR  1.33*       Cultures: I have personally reviewed pertinent cultures including:    Results from last 7 days   Lab Units 07/27/23  2252 07/26/23  1227   BLOOD CULTURE  Received in Microbiology Lab. Culture in Progress. Received in Microbiology Lab. Culture in Progress. No Growth at 24 hrs. No Growth at 24 hrs. Imaging: I have personally reviewed pertinent reports in PACS. EKG, Pathology, and Other Studies: I have personally reviewed pertinent reports. ** Please Note: Portions of the record may have been created with voice recognition software. Occasional wrong word or "sound a like" substitutions may have occurred due to the inherent limitations of voice recognition software. Read the chart carefully and recognize, using context, where substitutions have occurred.  **

## 2023-07-28 NOTE — CONSULTS
Consultation - Vascular Surgery   Eliza Other 69 y.o. male MRN: [id number]  Unit/Bed#: [facility] 801-01 Encounter: [id number]    ASSESMENT:  77yoM with history of DM (A1C 7.3), CHF (EF 35-40%), HTN, PAD s/p multiple LLE endovascular interventions (as below), s/p R BKA in setting of non-salvageable R foot with non-healing wound. He now presents for evaluation for LCLTI with poor healing LLE wounds, XR concerning for possible OM. Vascular Surgery History:  - 3/28/23 Aortogram with L adductor canal PTA; L pop occlusion, primary runoff via PT; R SFA occl with primary runoff through peroneal (IR)  - 3/30/23 R BKA Alfredia Soda)  - 4/6/23 LLE Agram distal pop occl PTA/Supera; PT PTA with PT access; PT/peroneal a. Runoff (IR)    Imaging  7/26/23 LE Arterial Duplex  - 50-75% stenosis mid SFA; occl distal pop stent, TP trunk, AT   trunk and anterior tibial artery. Diffuse femoro-popliteal and tibio-peroneal  atherosclerotic disease. BRADY 0.75/42/19  In comparison to the study of 4/10/2023, there is a new stenosis in the mid left SFA and new occlusion of the TPT and MARIA GUADALUPE. The left toe pressure is now below healing potential.    Vein Mapping 4/13/23  RIGHT: GSV patent groin to distal thigh. Intraluminal diameter measurements range from 5.6 mm to 1.7 mm  LEFT: GSV patent  from the groin to the pro. Intraluminal diameter measurements range from 6.9 mm to 1.5 mm   - RUE with superficial thrombus in cephalic v. Mid to distal forearm; cephalic v. With intraluminal diameters 2.6-1.8mm  - LUE superficial thrombus in cephalic vein distal UE and antecubital fossa (0.7mm-2.6mm).  Patent basilic vein (5.8EI-7.0RB)    7/26/23   - BCx NG x 24  - Lactic 1.3  - WBC 7.92  - Hgb 7.9  - sCr 0.96    7/27/23 BCx pending    PLAN:  - Plan for LLE angiogram tomorrow, with possible retrograde pedal access 7/28/23 with Dr. Ayesha Rudd to optimize perfusion and healing potential in setting of non-healing wounds to LLE  - Palpable femoral pulses, likely able to tolerate laying flat for the procedure with sCr 0.95   - Keep NPOpMN  - IVF 50cc/hr   - Holding home entresto for MATTHEW risk reduction  - Appreciate podiatry assessment and local wound care  - Continue ASA/Plavix/Statin  - Continue antibiotics and remainder of care per primary medicine service    Consulting Service: SLIM    Chief Complaint: intermittent L foot pain    HPI: Kurt Rosales is a 69 y.o. male who presents following routine follow-up with podiatry 7/25/23. With podiatrist's concern for failing outpatient wound care with continued worsening tissue loss in the last 3-4 weeks, fluctuance, and increased drainage, arterial duplex, and XR was ordered to assess perfusion and to work-up possible osteomyelitis, with discussion at that time with high risk of more proximal limb loss. Patient presented to [facility] for evaluation of LLE wounds in setting of PAD. States wounds have been present for 3-4 weeks, with waxing and waning healing. Patient denies fevers/chills, states that wounds had increased serous drainage without purulence, had intermittent but non-sustained pain. Denies rest pain. In order to offload LLE due to wounds, patient has not been ambulatory and has primarily been using a wheelchair. His R BKA is healing well, with good mobility without noted contracture. Patient states he was unable to be fitted for prosthesis due to worsening clinical status of LLE prompting deferral of R BKA casting/fitting. Of note, patient was recently admitted at [facility] in April of 2023, and at that time discussion was had regarding bypass candidacy. Vein mapping was reviewed, without adequate UE or LE veins adequate for conduit, and would need cryovein for bypass. He was evaluated for cardiology and deemed high risk, and at time of evaluation extremely malnourished, with mental decline and concern for ability to make informed medical decisions, POA during that time was his friend Ed.     Patient states that he does not take any medications at home. Denies use of ASA/Plavix, though recalls being on these meds in the past, states he has been recently re-initiated on his diabetic medications. Denies personal history of MI, CVA, DVT/PE    Review of Systems:  General: negative for - chills or fever  Cardiovascular: negative for - chest pain  Respiratory: negative for - shortness of breath  Gastrointestinal: negative for - abdominal pain  Genitourinary ROS: negative  Musculoskeletal ROS: positive for - gait disturbance  Neurological ROS: positive for long standing decreased sensation to L foot  Hematological and Lymphatic ROS: negative  Dermatological ROS: positive for LLE wounds as pictured  Psychological ROS: negative  Ophthalmic ROS: negative  ENT ROS: negative    Past Medical History:  Past Medical History:   Diagnosis Date   • 6th nerve palsy    • Blister of right leg    • BPH (benign prostatic hyperplasia)    • CAD (coronary artery disease)    • Cardiomyopathy ([address])     EF 25%   • CHF (congestive heart failure) ([facility])    • Depression    • Diabetes mellitus ([address])     type 2, insulin dependent   • Diabetic foot ulcer ([address])     left, local wound care   • Diabetic retinopathy ([address])    • Exposure to Agent Orange     while serving in [employer]   • Former tobacco use    • H/O urinary retention     w/ reagan placement   • Hyperlipidemia    • Hypertension    • Rupture of biceps tendon, traumatic 2017    right       Past Surgical History:  Past Surgical History:   Procedure Laterality Date   • CARDIAC CATHETERIZATION     • CARDIAC CATHETERIZATION Left 2/14/2023    Procedure: Cardiac Left Heart Cath;  Surgeon: Deedee Grover MD;  [facility]; Service: Cardiology   • CARDIAC CATHETERIZATION N/A 2/14/2023    Procedure: Cardiac Coronary Angiogram;  Surgeon: Deedee Grover MD;  [facility];   Service: Cardiology   • CATARACT EXTRACTION, BILATERAL Bilateral 2021   • IR LOWER EXTREMITY ANGIOGRAM  3/28/2023   • IR LOWER EXTREMITY ANGIOGRAM  2023   • LEG AMPUTATION THROUGH LOWER TIBIA AND FIBULA Right 3/30/2023    Procedure: (BKA); Surgeon: Luis Lara DO;  [facility];  Service: Vascular   • WOUND DEBRIDEMENT Right 3/24/2023    Procedure: debridement of heel ulcer;  Surgeon: Lucille Kelsey DPM;  [facility];  Service: Podiatry       Social History:  Social History     Substance and Sexual Activity   Alcohol Use Not Currently    Comment: Denies history of heavy alcohol use. At most will drink 1 or 2 drinks in a year (Updated 2023). Social History     Substance and Sexual Activity   Drug Use Never     Social History     Tobacco Use   Smoking Status Former   • Packs/day: 1.00   • Years: 6.00   • Total pack years: 6.00   • Types: Cigarettes   • Start date:    • Quit date:    • Years since quittin.5   Smokeless Tobacco Never       Family History:  Family History   Problem Relation Age of Onset   • No Known Problems Sister    • Other Daughter         chronic neck pain   • Other Son         chronic back pain   • Sudden death Neg Hx    • Cancer Neg Hx        Allergies:   Allergies   Allergen Reactions   • Metformin Diarrhea       Medications:  Current Facility-Administered Medications   Medication Dose Route Frequency   • [START ON 2023] aspirin chewable tablet 81 mg  81 mg Oral Daily   • [START ON 2023] atorvastatin (LIPITOR) tablet 40 mg  40 mg Oral Daily With Dinner   • chlorhexidine (PERIDEX) 0.12 % oral rinse 15 mL  15 mL Swish & Spit Once   • [START ON 2023] clopidogrel (PLAVIX) tablet 75 mg  75 mg Oral Daily   • [START ON 2023] escitalopram (LEXAPRO) tablet 5 mg  5 mg Oral Daily   • [START ON 2023] finasteride (PROSCAR) tablet 5 mg  5 mg Oral Daily   • [START ON 2023] insulin lispro (HumaLOG) 100 units/mL subcutaneous injection 1-6 Units  1-6 Units Subcutaneous TID AC   • metoprolol succinate (TOPROL-XL) 24 hr tablet 50 mg  50 mg Oral Q12H [facility] • mirtazapine (REMERON) tablet 7.5 mg  7.5 mg Oral HS   • [START ON 7/28/2023] pantoprazole (PROTONIX) EC tablet 40 mg  40 mg Oral Daily   • piperacillin-tazobactam (ZOSYN) 3.375 g in sodium chloride 0.9 % 100 mL IVPB  3.375 g Intravenous Q6H   • [START ON 7/28/2023] sodium chloride 0.9 % infusion  75 mL/hr Intravenous Continuous   • [START ON 7/28/2023] tamsulosin (FLOMAX) capsule 0.4 mg  0.4 mg Oral Daily With Dinner       Vitals:  /76   Pulse 75   Temp 98.2 °F (36.8 °C)   Resp 16   SpO2 95%     I/Os:  No intake/output data recorded.     Lab Results and Cultures:   Lab Results   Component Value Date    WBC 7.92 07/26/2023    HGB 7.9 (L) 07/26/2023    HCT 26.6 (L) 07/26/2023    MCV 86 07/26/2023     07/26/2023     Lab Results   Component Value Date    CALCIUM 8.5 07/26/2023    K 3.7 07/26/2023    CO2 36 (H) 07/26/2023    CL 95 (L) 07/26/2023    BUN 36 (H) 07/26/2023    CREATININE 0.95 07/26/2023     Lab Results   Component Value Date    INR 1.25 (H) 07/26/2023    INR 1.21 (H) 02/08/2023    PROTIME 15.9 (H) 07/26/2023    PROTIME 15.6 (H) 02/08/2023       Lipid Panel: No results found for: "CHOL",     Blood Culture:   Lab Results   Component Value Date    BLOODCX No Growth at 24 hrs. 07/26/2023    BLOODCX No Growth at 24 hrs. 07/26/2023   ,   Urinalysis:   Lab Results   Component Value Date    COLORU Charlene 04/02/2023    CLARITYU Cloudy 04/02/2023    SPECGRAV >=1.030 04/02/2023    PHUR 5.5 04/02/2023    LEUKOCYTESUR Negative 04/02/2023    NITRITE Negative 04/02/2023    GLUCOSEU 100 (1/10%) (A) 04/02/2023    KETONESU Trace (A) 04/02/2023    BILIRUBINUR Negative 04/02/2023    BLOODU Large (A) 04/02/2023   ,   Urine Culture:   Lab Results   Component Value Date    URINECX No Growth <1000 cfu/mL 02/08/2023   ,   Wound Culure:   Lab Results   Component Value Date    WOUNDCULT (A) 03/21/2023     3+ Growth of Methicillin Resistant Staphylococcus aureus    WOUNDCULT 4+ Growth of Enterococcus faecalis (A) 2023    WOUNDCULT 1+ Growth of 2023       Imagin23 XR Tib/Fib; Ankle  - Soft tissue ulceration distal lateral lower extremity with loss of cortical conspicuity posterior distal fibula shaft suspicious for osteomyelitis. This could be confirmed with MRI if deemed clinically necessary.  - Soft tissue ulceration distal lateral lower extremity and potentially along the posterior heel. Loss of bony conspicuity posterior distal fibular shaft suspicious for osteomyelitis. No discernible interval bony destruction involving the calcaneus.  - Soft tissue ulcerations seen along the distal lateral lower extremity. Periosteal reaction suggested along the distal posterior fibular shaft, suspicious for osteomyelitis. Physical Exam:  General appearance: alert and oriented, in no acute distress  Skin: R BKA well-healed. LLE with significant wounds, malodorous, erythematous, without active bleeding or draingage noted. See images as depicted below. Neurologic: Long-standing decreased sensation to L foot with minimal wiggling of toes, with intact dorsi-/plantarflexion, knee flexion, hip flexion. RLE with hip/knee flexion without noted contracture to RLE  Head: Normocephalic, without obvious abnormality, atraumatic  Eyes: EOMI bilaterally  Neck: supple, symmetrical, trachea midline  Lungs: Normal work of breathing, no accessory muscle use  Heart: Regular rate, grossly warm and well-perfused  Abdomen: Soft, non-tender abdomen  Extremities: R BKA well-healed.  See skin and neuro exam and images below    Wound/Incision:          Pulse exam:  Radial: Right: 2+   Femoral: Right: 2+ Left: 2+  Popliteal: Left: non-palpable  DP: Left: doppler signal  PT: Left: doppler signal    Kirk Ventura PA-C  2023

## 2023-07-28 NOTE — OCCUPATIONAL THERAPY NOTE
Occupational Therapy Cancel Note       07/28/23 0735   OT Last Visit   OT Visit Date 07/28/23   Note Type   Note type Cancelled Session   Cancel Reasons Medical status     OT orders received and chart reviewed. Pt is admitted to [facility] with ulcer on L heel and PAD. Pt pending podiatry consult and plans for LLE Agram today. Will continue to follow and complete OT evaluation when appropriate.      Lenard Zarate MS, OTR/L

## 2023-07-28 NOTE — ASSESSMENT & PLAN NOTE
Patient with hx of PAD  sp right sided BKA  Now presenting with worsening multiple diabetic ulcerations of left leg and foot  Transferred for vascular carolin  She was evaluated by vascular and podiatry  Likely he may need BKA/AKA, angiogram was canceled  Hold entresto, gentle fluid hydration  Left lower extremity x-ray suspicious for osteomyelitis  Podiatry order lower extremity MRI  Continue with IV antibiotics  Follow on blood cultures  Vascular and podiatry are following

## 2023-07-28 NOTE — DISCHARGE INSTR - OTHER ORDERS
LLE amputation incisional care:  Wash incision daily w/ soap and water. Pat dry thoroughly. Betadine paint  ABD pad  ACE wrap to assist w/ edema control/ stump shrinkage        Skin Care Plan:  1-Cleanse sacro-buttocks with soap and water. Apply allevyn foam dressing to sacro-buttocks. Rasheed with P for Prevention and change every 3 days or PRN soilage/displacement. Peel back and inspect skin Q-shift. 2-Turn/reposition q2h or when medically stable for pressure re-distribution on skin . 3-P-500 [employer] Mattress Ordered for Patient  4-Moisturize skin daily with skin nourishing cream  5-Ehob cushion in chair when out of bed.

## 2023-07-28 NOTE — UTILIZATION REVIEW
Initial Clinical Review    Admission: Date/Time/Statement:   Admission Orders (From admission, onward)     Ordered        07/27/23 1910  Inpatient Admission  Once                      Orders Placed This Encounter   Procedures   • Inpatient Admission     Standing Status:   Standing     Number of Occurrences:   1     Order Specific Question:   Level of Care     Answer:   Med Surg [16]     Order Specific Question:   Estimated length of stay     Answer:   More than 2 Midnights     Order Specific Question:   Certification     Answer:   I certify that inpatient services are medically necessary for this patient for a duration of greater than two midnights. See H&P and MD Progress Notes for additional information about the patient's course of treatment. Initial Presentation: 69 y.o. male to [facility] from [facility] d/t worsening lle wounds with possible osteo now in need for angio. PMHx of CHF, CAD, HTN, T2 DM, PAD, h/o R BKA  Admitted inpatient to M/S unit -- hold entresto, gentle IVFs. Continue abx to cover for infection/osteo. X-ray with concern for osteo. Blood cxs pending. Continue other po meds, insulin. Accuchecks w/ ssi. I/Os, daily wts. Date: 7/28   Day 2:   Vascular consult -- plan for LLE angiogram tomorrow with possible retrograde pedal access to optimize perfusion and healing potential in setting of non-healing wounds to LLE  Podiatry consult - Multiple diabetic ulcerations of left leg and foot - Dressing changed today at bedside. MRI left leg. Local wound care consisting of betadine adaptic DSD to all wounds on the left leg and foot. Elevation in prevalon boot or on green foam wedges or pillows when non-ambulatory    Intern med note - Likely he may need BKA/AKA, angiogram was canceled -- LLE suspicious for osteo. F/u MRI. Continue abx. Patient refusing cardiac/diabetic diet, he wants regular diet. Continue with Lantus and insulin sliding scale.     Date: 7/29    Day 3: Has surpassed a 2nd midnight with active treatments and services, which include continued tx of non-healing LLE wounds with cont IV abx and tentative plan for BKA/AKA d/t severe PAD. Wt Readings from Last 1 Encounters:   07/28/23 68.6 kg (151 lb 4.8 oz)     Vital Signs:   Date/Time Temp Pulse Resp BP MAP (mmHg) SpO2   07/28/23 14:22:45 98 °F (36.7 °C) 63 -- 112/56 75 97 %   07/28/23 07:30:48 98.3 °F (36.8 °C) 68 16 137/69 92 97 %   07/27/23 22:52:04 98.9 °F (37.2 °C) 74 16 121/58 79 95 %   07/27/23 2100 98.2 °F (36.8 °C) -- 16 -- -- --   07/27/23 20:59:45 -- 75 -- 129/76 94 95 %   07/27/23 19:04:37 98.2 °F (36.8 °C) 73 16 132/56 81 95 %     Pertinent Labs/Diagnostic Test Results:   MRI inpatient order    (Results Pending)     Imaging  7/26/23 LE Arterial Duplex  - 50-75% stenosis mid SFA; occl distal pop stent, TP trunk, AT   trunk and anterior tibial artery. Diffuse femoro-popliteal and tibio-peroneal  atherosclerotic disease. BRADY 0.75/42/19  In comparison to the study of 4/10/2023, there is a new stenosis in the mid left SFA and new occlusion of the TPT and MARIA GUADALUPE.  The left toe pressure is now below healing potential.      Results from last 7 days   Lab Units 07/28/23  0443 07/26/23  1227   WBC Thousand/uL 7.75 7.92   HEMOGLOBIN g/dL 7.5* 7.9*   HEMATOCRIT % 24.9* 26.6*   PLATELETS Thousands/uL 323 314   NEUTROS ABS Thousands/µL 5.32 5.89       Results from last 7 days   Lab Units 07/28/23  0443 07/26/23  1227   SODIUM mmol/L 141 137   POTASSIUM mmol/L 4.1 3.7   CHLORIDE mmol/L 104 95*   CO2 mmol/L 35* 36*   ANION GAP mmol/L 2 6   BUN mg/dL 38* 36*   CREATININE mg/dL 1.08 0.95   EGFR ml/min/1.73sq m 65 76   CALCIUM mg/dL 8.5 8.5     Results from last 7 days   Lab Units 07/28/23  0443 07/26/23  1227   AST U/L 30 41*   ALT U/L 42 44   ALK PHOS U/L 425* 472*   TOTAL PROTEIN g/dL 6.4 6.4   ALBUMIN g/dL 2.1* 2.9*   TOTAL BILIRUBIN mg/dL 0.42 0.60     Results from last 7 days   Lab Units 07/28/23  1550 07/28/23  1108 07/28/23  0730 07/27/23  2108 07/27/23  1701 07/27/23  1215   POC GLUCOSE mg/dl 164* 159* 172* 241* 274* 262*     Results from last 7 days   Lab Units 07/28/23  0443 07/26/23  1227   GLUCOSE RANDOM mg/dL 204* 283*       Results from last 7 days   Lab Units 07/28/23  0443 07/26/23  1227   PROTIME seconds 16.7* 15.9*   INR  1.33* 1.25*   PTT seconds 39* 37         Results from last 7 days   Lab Units 07/26/23  1227   CRP mg/L 35.8*   SED RATE mm/hour 60*       Results from last 7 days   Lab Units 07/27/23  2252 07/26/23  1227   BLOOD CULTURE  Received in Microbiology Lab. Culture in Progress. Received in Microbiology Lab. Culture in Progress. No Growth at 24 hrs. No Growth at 24 hrs.        Past Medical History:   Diagnosis Date   • 6th nerve palsy    • Blister of right leg    • BPH (benign prostatic hyperplasia)    • CAD (coronary artery disease)    • Cardiomyopathy ([facility])     EF 25%   • CHF (congestive heart failure) ([facility])    • Depression    • Diabetes mellitus ([facility])     type 2, insulin dependent   • Diabetic foot ulcer ([address])     left, local wound care   • Diabetic retinopathy ([address])    • Exposure to Agent Orange     while serving in [employer]   • Former tobacco use    • H/O urinary retention     w/ reagan placement   • Hyperlipidemia    • Hypertension    • Rupture of biceps tendon, traumatic 2017    right     Present on Admission:  • Essential hypertension  • Cardiomyopathy ([address])  • PAD (peripheral artery disease) ([address])  • CAD (coronary artery disease)  • Chronic systolic CHF (congestive heart failure) ([facility])      Admitting Diagnosis: Visit for wound check [Z51.89]  Peripheral arterial disease ([address]) [I73.9]  Age/Sex: 69 y.o. male  Admission Orders:  Scheduled Medications:  aspirin, 81 mg, Oral, Daily  atorvastatin, 40 mg, Oral, Daily With Dinner  clopidogrel, 75 mg, Oral, Daily  escitalopram, 5 mg, Oral, Daily  finasteride, 5 mg, Oral, Daily  heparin (porcine), 5,000 Units, Subcutaneous, Q8H [facility]  insulin glargine, 15 Units, Subcutaneous, HS  insulin lispro, 1-6 Units, Subcutaneous, TID AC  insulin lispro, 5 Units, Subcutaneous, TID With Meals  metolazone, 5 mg, Oral, Daily  metoprolol succinate, 50 mg, Oral, Q12H [place]  mirtazapine, 7.5 mg, Oral, HS  pantoprazole, 40 mg, Oral, Daily  piperacillin-tazobactam, 3.375 g, Intravenous, Q6H  sacubitril-valsartan, 1 tablet, Oral, BID  tamsulosin, 0.4 mg, Oral, Daily With Dinner  vancomycin, 12.5 mg/kg, Intravenous, Q12H        IP CONSULT TO PODIATRY  IP CONSULT TO VASCULAR SURGERY  IP CONSULT TO PHARMACY  IP CONSULT TO CASE MANAGEMENT    Network Utilization Review Department  ATTENTION: Please call with any questions or concerns to [phone] and carefully listen to the prompts so that you are directed to the right person. All voicemails are confidential.  Brielle Muse all requests for admission clinical reviews, approved or denied determinations and any other requests to dedicated fax number below belonging to the campus where the patient is receiving treatment.  List of dedicated fax numbers for the Facilities:  [place] DENIALS (Administrative/Medical Necessity) [phone] [facility] [facility] [phone]   [address] [place] [address] Drive [phone]   [facility] [phone] [id number] [place] [address] [phone]   [address] [address] [phone]

## 2023-07-28 NOTE — PLAN OF CARE
Problem: MOBILITY - ADULT  Goal: Maintain or return to baseline ADL function  Description: INTERVENTIONS:  -  Assess patient's ability to carry out ADLs; assess patient's baseline for ADL function and identify physical deficits which impact ability to perform ADLs (bathing, care of mouth/teeth, toileting, grooming, dressing, etc.)  - Assess/evaluate cause of self-care deficits   - Assess range of motion  - Assess patient's mobility; develop plan if impaired  - Assess patient's need for assistive devices and provide as appropriate  - Encourage maximum independence but intervene and supervise when necessary  - Involve family in performance of ADLs  - Assess for home care needs following discharge   - Consider OT consult to assist with ADL evaluation and planning for discharge  - Provide patient education as appropriate  Outcome: Progressing  Goal: Maintains/Returns to pre admission functional level  Description: INTERVENTIONS:  - Perform BMAT or MOVE assessment daily.   - Set and communicate daily mobility goal to care team and patient/family/caregiver. - Collaborate with rehabilitation services on mobility goals if consulted  - Perform Range of Motion  times a day. - Reposition patient every  hours.   - Dangle patient  times a day  - Stand patient  times a day  - Ambulate patient  times a day  - Out of bed to chair  times a day   - Out of bed for meals  times a day  - Out of bed for toileting  - Record patient progress and toleration of activity level   Outcome: Progressing     Problem: PAIN - ADULT  Goal: Verbalizes/displays adequate comfort level or baseline comfort level  Description: Interventions:  - Encourage patient to monitor pain and request assistance  - Assess pain using appropriate pain scale  - Administer analgesics based on type and severity of pain and evaluate response  - Implement non-pharmacological measures as appropriate and evaluate response  - Consider cultural and social influences on pain and pain management  - Notify physician/advanced practitioner if interventions unsuccessful or patient reports new pain  Outcome: Progressing     Problem: INFECTION - ADULT  Goal: Absence or prevention of progression during hospitalization  Description: INTERVENTIONS:  - Assess and monitor for signs and symptoms of infection  - Monitor lab/diagnostic results  - Monitor all insertion sites, i.e. indwelling lines, tubes, and drains  - Monitor endotracheal if appropriate and nasal secretions for changes in amount and color  - [place] appropriate cooling/warming therapies per order  - Administer medications as ordered  - Instruct and encourage patient and family to use good hand hygiene technique  - Identify and instruct in appropriate isolation precautions for identified infection/condition  Outcome: Progressing  Goal: Absence of fever/infection during neutropenic period  Description: INTERVENTIONS:  - Monitor WBC    Outcome: Progressing     Problem: SAFETY ADULT  Goal: Maintain or return to baseline ADL function  Description: INTERVENTIONS:  -  Assess patient's ability to carry out ADLs; assess patient's baseline for ADL function and identify physical deficits which impact ability to perform ADLs (bathing, care of mouth/teeth, toileting, grooming, dressing, etc.)  - Assess/evaluate cause of self-care deficits   - Assess range of motion  - Assess patient's mobility; develop plan if impaired  - Assess patient's need for assistive devices and provide as appropriate  - Encourage maximum independence but intervene and supervise when necessary  - Involve family in performance of ADLs  - Assess for home care needs following discharge   - Consider OT consult to assist with ADL evaluation and planning for discharge  - Provide patient education as appropriate  Outcome: Progressing  Goal: Maintains/Returns to pre admission functional level  Description: INTERVENTIONS:  - Perform BMAT or MOVE assessment daily.   - Set and communicate daily mobility goal to care team and patient/family/caregiver. - Collaborate with rehabilitation services on mobility goals if consulted  - Perform Range of Motion  times a day. - Reposition patient every  hours.   - Dangle patient  times a day  - Stand patient  times a day  - Ambulate patient  times a day  - Out of bed to chair  times a day   - Out of bed for meals  times a day  - Out of bed for toileting  - Record patient progress and toleration of activity level   Outcome: Progressing  Goal: Patient will remain free of falls  Description: INTERVENTIONS:  - Educate patient/family on patient safety including physical limitations  - Instruct patient to call for assistance with activity   - Consult OT/PT to assist with strengthening/mobility   - Keep Call bell within reach  - Keep bed low and locked with side rails adjusted as appropriate  - Keep care items and personal belongings within reach  - Initiate and maintain comfort rounds  - Make Fall Risk Sign visible to staff  - Offer Toileting every  Hours, in advance of need  - Initiate/Maintain alarm  - Obtain necessary fall risk management equipment  - Apply yellow socks and bracelet for high fall risk patients  - Consider moving patient to room near nurses station  Outcome: Progressing     Problem: DISCHARGE PLANNING  Goal: Discharge to home or other facility with appropriate resources  Description: INTERVENTIONS:  - Identify barriers to discharge w/patient and caregiver  - Arrange for needed discharge resources and transportation as appropriate  - Identify discharge learning needs (meds, wound care, etc.)  - Arrange for interpretive services to assist at discharge as needed  - Refer to Case Management Department for coordinating discharge planning if the patient needs post-hospital services based on physician/advanced practitioner order or complex needs related to functional status, cognitive ability, or social support system  Outcome: Progressing Problem: Knowledge Deficit  Goal: Patient/family/caregiver demonstrates understanding of disease process, treatment plan, medications, and discharge instructions  Description: Complete learning assessment and assess knowledge base.   Interventions:  - Provide teaching at level of understanding  - Provide teaching via preferred learning methods  Outcome: Progressing

## 2023-07-28 NOTE — PHYSICAL THERAPY NOTE
Physical Therapy Cancellation Note     07/28/23 0741   PT Last Visit   PT Visit Date 07/28/23   Note Type   Note type Cancelled Session   Cancel Reasons Medical status     PT order received, chart review completed. Pt with prior R BKA and now with JIM Nuñez, awaiting Podiatry consult. PT will hold until further recommendations and follow as appropriate.   Bob [place] PT DPT

## 2023-07-28 NOTE — ASSESSMENT & PLAN NOTE
Wt Readings from Last 3 Encounters:   07/28/23 68.6 kg (151 lb 4.8 oz)   07/26/23 67.7 kg (149 lb 4 oz)   05/18/23 59 kg (130 lb)     History of CHF with EF 25%  Euvolemic on exam  Continue with Entresto, Toprol-XL and Zaroxolyn  Monitor daily weights, ins and outs

## 2023-07-28 NOTE — ASSESSMENT & PLAN NOTE
Wt Readings from Last 3 Encounters:   07/27/23 67.8 kg (149 lb 7.6 oz)   07/26/23 67.7 kg (149 lb 4 oz)   05/18/23 59 kg (130 lb)       Patient with hx of chf, currently appears euvolemic  Continue beta blocker  Hold entresto prior to angio  Monitor daily weights, ins and outs

## 2023-07-28 NOTE — PROGRESS NOTES
[facility]  Progress Note  Name: Kell Monaco  MRN: [id number]  [facility] Date of Admission: 7/27/2023   Date of Service: 7/28/2023 I Hospital Day: 1    Assessment/Plan   * PAD (peripheral artery disease) ([address] )  Assessment & Plan  Patient with hx of PAD  sp right sided BKA  Now presenting with worsening multiple diabetic ulcerations of left leg and foot  Transferred for vascular carolin  She was evaluated by vascular and podiatry  Likely he may need BKA/AKA, angiogram was canceled  Hold entresto, gentle fluid hydration  Left lower extremity x-ray suspicious for osteomyelitis  Podiatry order lower extremity MRI  Continue with IV antibiotics  Follow on blood cultures  Vascular and podiatry are following    Anemia  Assessment & Plan  Check anemia panel  Monitor    Essential hypertension  Assessment & Plan  Stable BP  Continue to monitor    CAD (coronary artery disease)  Assessment & Plan  • Patient has a history of coronary artery disease, previous cardiac cath with diffuse CAD recommending GDMT  • Continue aspirin, Plavix statin, beta-blocker    Chronic systolic CHF (congestive heart failure) (HCC)  Assessment & Plan  Wt Readings from Last 3 Encounters:   07/28/23 68.6 kg (151 lb 4.8 oz)   07/26/23 67.7 kg (149 lb 4 oz)   05/18/23 59 kg (130 lb)     History of CHF with EF 25%  Euvolemic on exam  Continue with Entresto, Toprol-XL and Zaroxolyn  Monitor daily weights, ins and outs      Type 2 diabetes mellitus with hyperglycemia, with long-term current use of insulin [facility])  Assessment & Plan  Lab Results   Component Value Date    HGBA1C 7.3 (H) 05/04/2023       Recent Labs     07/27/23  1701 07/27/23  2108 07/28/23  0730 07/28/23  1108   POCGLU 274* 241* 172* 159*       Blood Sugar Average: Last 72 hrs:  (P) [id number]   Patient refusing cardiac/diabetic diet  He wants regular diet   continue with Lantus and insulin sliding scale  Monitor           VTE Pharmacologic Prophylaxis: VTE Score: 4 Moderate Risk (Score 3-4) - Pharmacological DVT Prophylaxis Ordered: heparin. Patient Centered Rounds: I performed bedside rounds with nursing staff today. Discussions with Specialists or Other Care Team Provider: Vascular,     Education and Discussions with Family / Patient: Patient declined call to . Total Time Spent on Date of Encounter in care of patient: 35 minutes This time was spent on one or more of the following: performing physical exam; counseling and coordination of care; obtaining or reviewing history; documenting in the medical record; reviewing/ordering tests, medications or procedures; communicating with other healthcare professionals and discussing with patient's family/caregivers. Current Length of Stay: 1 day(s)  Current Patient Status: Inpatient   Certification Statement: The patient will continue to require additional inpatient hospital stay due to Management of PAD  Discharge Plan: Anticipate discharge in >72 hrs to rehab facility. Code Status: Level 1 - Full Code    Subjective:   Patient seen and examined  Comfortable in bed  No chest pain or shortness of breath  No leg pain    Objective:     Vitals:   Temp (24hrs), Av.4 °F (36.9 °C), Min:98.2 °F (36.8 °C), Max:98.9 °F (37.2 °C)    Temp:  [98.2 °F (36.8 °C)-98.9 °F (37.2 °C)] 98.3 °F (36.8 °C)  HR:  [68-75] 68  Resp:  [16-20] 16  BP: (116-146)/(56-76) 137/69  SpO2:  [93 %-97 %] 97 %  Body mass index is 21.71 kg/m². Input and Output Summary (last 24 hours):      Intake/Output Summary (Last 24 hours) at 2023 1407  Last data filed at 2023 1215  Gross per 24 hour   Intake 651.67 ml   Output 450 ml   Net 201.67 ml       Physical Exam:   Physical Exam   Patient is awake alert oriented no acute distress  Comfortable in bed  Lung clear to auscultation bilateral anteriorly  Heart positive S1-S2 no murmur  Abdomen soft nontender  R BKA  Left leg wrapped with dressing    Additional Data:     Labs:  Results from last 7 days   Lab Units 07/28/23  0443   WBC Thousand/uL 7.75   HEMOGLOBIN g/dL 7.5*   HEMATOCRIT % 24.9*   PLATELETS Thousands/uL 323   NEUTROS PCT % 70   LYMPHS PCT % 18   MONOS PCT % 7   EOS PCT % 5     Results from last 7 days   Lab Units 07/28/23  0443   SODIUM mmol/L 141   POTASSIUM mmol/L 4.1   CHLORIDE mmol/L 104   CO2 mmol/L 35*   BUN mg/dL 38*   CREATININE mg/dL 1.08   ANION GAP mmol/L 2   CALCIUM mg/dL 8.5   ALBUMIN g/dL 2.1*   TOTAL BILIRUBIN mg/dL 0.42   ALK PHOS U/L 425*   ALT U/L 42   AST U/L 30   GLUCOSE RANDOM mg/dL 204*     Results from last 7 days   Lab Units 07/28/23  0443   INR  1.33*     Results from last 7 days   Lab Units 07/28/23  1108 07/28/23  0730 07/27/23  2108 07/27/23  1701 07/27/23  1215   POC GLUCOSE mg/dl 159* 172* 241* 274* 262*         Results from last 7 days   Lab Units 07/26/23  1227   LACTIC ACID mmol/L 1.3   PROCALCITONIN ng/ml 0.05       Lines/Drains:  Invasive Devices     Peripheral Intravenous Line  Duration           Peripheral IV 07/26/23 Left Forearm 2 days                      Imaging: Reviewed    Recent Cultures (last 7 days):   Results from last 7 days   Lab Units 07/27/23  2252 07/26/23  1227   BLOOD CULTURE  Received in Microbiology Lab. Culture in Progress. Received in Microbiology Lab. Culture in Progress. No Growth at 24 hrs. No Growth at 24 hrs.        Last 24 Hours Medication List:   Current Facility-Administered Medications   Medication Dose Route Frequency Provider Last Rate   • aspirin  81 mg Oral Daily Brandyn Connieai, DO     • atorvastatin  40 mg Oral Daily With [employer], DO     • clopidogrel  75 mg Oral Daily Brandyn Connieai, DO     • escitalopram  5 mg Oral Daily Brandyn Connieai, DO     • finasteride  5 mg Oral Daily Brandyn Connieai, DO     • heparin (porcine)  5,000 Units Subcutaneous Q8H [address] Brandyn Connieai, DO     • insulin glargine  15 Units Subcutaneous HS Brandyn Connieai, DO     • insulin lispro 1-6 Units Subcutaneous TID AC Brandyn Dalton DO     • insulin lispro  5 Units Subcutaneous TID With Meals Griffin Arboleda DO     • metolazone  5 mg Oral Daily DoARIADNA OrtegaC     • metoprolol succinate  50 mg Oral Q12H [facility] Brandyn Dalton DO     • mirtazapine  7.5 mg Oral HS Brandyn Dalton DO     • pantoprazole  40 mg Oral Daily Brandyn Dalton DO     • piperacillin-tazobactam  3.375 g Intravenous Q6H Brandyn Dalton DO 3.375 g (07/28/23 1013)   • sacubitril-valsartan  1 tablet Oral BID ARIADNA HajiC     • tamsulosin  0.4 mg Oral Daily With Dinner Brandyn Gomez DO     • vancomycin  12.5 mg/kg Intravenous Q12H Brandyn Dalton  mg (07/28/23 1218)        Today, Patient Was Seen By: Griffin Arboleda DO    **Please Note: This note may have been constructed using a voice recognition system. **

## 2023-07-28 NOTE — PROGRESS NOTES
Jordan Balderrama is a 69 y.o. male who is currently ordered Vancomycin IV with management by the Pharmacy Consult service. Relevant clinical data and objective / subjective history reviewed. Vancomycin Assessment:  Indication and Goal AUC/Trough: Soft tissue (goal -600, trough >10), -600, trough >10  Clinical Status: stable  Micro:   pending  Renal Function:  SCr: 0.95 mg/dL  CrCl: 62.4 mL/min  Renal replacement: Not on dialysis  Days of Therapy: 1  Current Dose: 750mg every 12 hours  Vancomycin Plan:  New Dosinmg every 12 hours  Estimated AUC: 536 mcghr/mL  Estimated Trough: 17.9mcg/mL  Next Level: 23 at 11.30  Renal Function Monitoring: Daily BMP and [place] will continue to follow closely for s/sx of nephrotoxicity, infusion reactions and appropriateness of therapy. BMP and CBC will be ordered per protocol. We will continue to follow the patient’s culture results and clinical progress daily.     Jess Perez, Pharmacist

## 2023-07-29 LAB
ANION GAP SERPL CALCULATED.3IONS-SCNC: 2 MMOL/L
BUN SERPL-MCNC: 42 MG/DL (ref 5–25)
CALCIUM SERPL-MCNC: 8.5 MG/DL (ref 8.3–10.1)
CHLORIDE SERPL-SCNC: 106 MMOL/L (ref 96–108)
CO2 SERPL-SCNC: 32 MMOL/L (ref 21–32)
CREAT SERPL-MCNC: 1.03 MG/DL (ref 0.6–1.3)
ERYTHROCYTE [DISTWIDTH] IN BLOOD BY AUTOMATED COUNT: 17.3 % (ref 11.6–15.1)
FERRITIN SERPL-MCNC: 43 NG/ML (ref 24–336)
FOLATE SERPL-MCNC: 12 NG/ML
GFR SERPL CREATININE-BSD FRML MDRD: 69 ML/MIN/1.73SQ M
GLUCOSE SERPL-MCNC: 105 MG/DL (ref 65–140)
GLUCOSE SERPL-MCNC: 110 MG/DL (ref 65–140)
GLUCOSE SERPL-MCNC: 123 MG/DL (ref 65–140)
GLUCOSE SERPL-MCNC: 187 MG/DL (ref 65–140)
GLUCOSE SERPL-MCNC: 87 MG/DL (ref 65–140)
HCT VFR BLD AUTO: 23.7 % (ref 36.5–49.3)
HGB BLD-MCNC: 7.1 G/DL (ref 12–17)
IRON SATN MFR SERPL: 12 % (ref 20–50)
IRON SERPL-MCNC: 26 UG/DL (ref 65–175)
MCH RBC QN AUTO: 25.6 PG (ref 26.8–34.3)
MCHC RBC AUTO-ENTMCNC: 30 G/DL (ref 31.4–37.4)
MCV RBC AUTO: 86 FL (ref 82–98)
PLATELET # BLD AUTO: 283 THOUSANDS/UL (ref 149–390)
PMV BLD AUTO: 8.6 FL (ref 8.9–12.7)
POTASSIUM SERPL-SCNC: 3.8 MMOL/L (ref 3.5–5.3)
RBC # BLD AUTO: 2.77 MILLION/UL (ref 3.88–5.62)
SODIUM SERPL-SCNC: 140 MMOL/L (ref 135–147)
TIBC SERPL-MCNC: 214 UG/DL (ref 250–450)
VANCOMYCIN TROUGH SERPL-MCNC: 18.3 UG/ML (ref 10–20)
VIT B12 SERPL-MCNC: 348 PG/ML (ref 180–914)
WBC # BLD AUTO: 7.11 THOUSAND/UL (ref 4.31–10.16)

## 2023-07-29 PROCEDURE — 82746 ASSAY OF FOLIC ACID SERUM: CPT | Performed by: INTERNAL MEDICINE

## 2023-07-29 PROCEDURE — 83540 ASSAY OF IRON: CPT | Performed by: INTERNAL MEDICINE

## 2023-07-29 PROCEDURE — 82728 ASSAY OF FERRITIN: CPT | Performed by: INTERNAL MEDICINE

## 2023-07-29 PROCEDURE — 80048 BASIC METABOLIC PNL TOTAL CA: CPT | Performed by: PHYSICIAN ASSISTANT

## 2023-07-29 PROCEDURE — 82948 REAGENT STRIP/BLOOD GLUCOSE: CPT

## 2023-07-29 PROCEDURE — 85027 COMPLETE CBC AUTOMATED: CPT | Performed by: PHYSICIAN ASSISTANT

## 2023-07-29 PROCEDURE — 87081 CULTURE SCREEN ONLY: CPT | Performed by: INTERNAL MEDICINE

## 2023-07-29 PROCEDURE — 80202 ASSAY OF VANCOMYCIN: CPT | Performed by: INTERNAL MEDICINE

## 2023-07-29 PROCEDURE — 99223 1ST HOSP IP/OBS HIGH 75: CPT | Performed by: INTERNAL MEDICINE

## 2023-07-29 PROCEDURE — 82607 VITAMIN B-12: CPT | Performed by: INTERNAL MEDICINE

## 2023-07-29 PROCEDURE — 99232 SBSQ HOSP IP/OBS MODERATE 35: CPT | Performed by: INTERNAL MEDICINE

## 2023-07-29 PROCEDURE — 87147 CULTURE TYPE IMMUNOLOGIC: CPT | Performed by: INTERNAL MEDICINE

## 2023-07-29 PROCEDURE — 83550 IRON BINDING TEST: CPT | Performed by: INTERNAL MEDICINE

## 2023-07-29 PROCEDURE — NC001 PR NO CHARGE: Performed by: SURGERY

## 2023-07-29 RX ORDER — TORSEMIDE 20 MG/1
20 TABLET ORAL DAILY
Status: DISCONTINUED | OUTPATIENT
Start: 2023-07-30 | End: 2023-08-03

## 2023-07-29 RX ADMIN — INSULIN GLARGINE 15 UNITS: 100 INJECTION, SOLUTION SUBCUTANEOUS at 23:12

## 2023-07-29 RX ADMIN — HEPARIN SODIUM 5000 UNITS: 5000 INJECTION INTRAVENOUS; SUBCUTANEOUS at 12:33

## 2023-07-29 RX ADMIN — PIPERACILLIN SODIUM AND TAZOBACTAM SODIUM 3.38 G: 36; 4.5 INJECTION, POWDER, LYOPHILIZED, FOR SOLUTION INTRAVENOUS at 14:48

## 2023-07-29 RX ADMIN — HEPARIN SODIUM 5000 UNITS: 5000 INJECTION INTRAVENOUS; SUBCUTANEOUS at 05:59

## 2023-07-29 RX ADMIN — ASPIRIN 81 MG CHEWABLE TABLET 81 MG: 81 TABLET CHEWABLE at 08:20

## 2023-07-29 RX ADMIN — VANCOMYCIN HYDROCHLORIDE 750 MG: 750 INJECTION, SOLUTION INTRAVENOUS at 12:33

## 2023-07-29 RX ADMIN — INSULIN LISPRO 5 UNITS: 100 INJECTION, SOLUTION INTRAVENOUS; SUBCUTANEOUS at 17:10

## 2023-07-29 RX ADMIN — PIPERACILLIN SODIUM AND TAZOBACTAM SODIUM 3.38 G: 36; 4.5 INJECTION, POWDER, LYOPHILIZED, FOR SOLUTION INTRAVENOUS at 21:30

## 2023-07-29 RX ADMIN — TAMSULOSIN HYDROCHLORIDE 0.4 MG: 0.4 CAPSULE ORAL at 17:09

## 2023-07-29 RX ADMIN — SACUBITRIL AND VALSARTAN 1 TABLET: 49; 51 TABLET, FILM COATED ORAL at 08:21

## 2023-07-29 RX ADMIN — PIPERACILLIN SODIUM AND TAZOBACTAM SODIUM 3.38 G: 36; 4.5 INJECTION, POWDER, LYOPHILIZED, FOR SOLUTION INTRAVENOUS at 08:23

## 2023-07-29 RX ADMIN — ATORVASTATIN CALCIUM 40 MG: 40 TABLET, FILM COATED ORAL at 17:09

## 2023-07-29 RX ADMIN — SACUBITRIL AND VALSARTAN 1 TABLET: 49; 51 TABLET, FILM COATED ORAL at 17:09

## 2023-07-29 RX ADMIN — INSULIN LISPRO 5 UNITS: 100 INJECTION, SOLUTION INTRAVENOUS; SUBCUTANEOUS at 12:33

## 2023-07-29 RX ADMIN — MIRTAZAPINE 7.5 MG: 15 TABLET, FILM COATED ORAL at 23:12

## 2023-07-29 RX ADMIN — ESCITSLOPRAM 5 MG: 5 TABLET ORAL at 08:20

## 2023-07-29 RX ADMIN — FINASTERIDE 5 MG: 5 TABLET, FILM COATED ORAL at 08:20

## 2023-07-29 RX ADMIN — HEPARIN SODIUM 5000 UNITS: 5000 INJECTION INTRAVENOUS; SUBCUTANEOUS at 23:12

## 2023-07-29 RX ADMIN — PIPERACILLIN SODIUM AND TAZOBACTAM SODIUM 3.38 G: 36; 4.5 INJECTION, POWDER, LYOPHILIZED, FOR SOLUTION INTRAVENOUS at 02:55

## 2023-07-29 RX ADMIN — METOLAZONE 5 MG: 5 TABLET ORAL at 08:21

## 2023-07-29 RX ADMIN — METOPROLOL SUCCINATE 50 MG: 50 TABLET, EXTENDED RELEASE ORAL at 08:20

## 2023-07-29 RX ADMIN — PANTOPRAZOLE SODIUM 40 MG: 40 TABLET, DELAYED RELEASE ORAL at 08:24

## 2023-07-29 RX ADMIN — CLOPIDOGREL BISULFATE 75 MG: 75 TABLET ORAL at 08:20

## 2023-07-29 RX ADMIN — INSULIN LISPRO 5 UNITS: 100 INJECTION, SOLUTION INTRAVENOUS; SUBCUTANEOUS at 08:21

## 2023-07-29 RX ADMIN — INSULIN LISPRO 1 UNITS: 100 INJECTION, SOLUTION INTRAVENOUS; SUBCUTANEOUS at 17:11

## 2023-07-29 NOTE — PROGRESS NOTES
Progress Note - Vascular Surgery  Eliza Other 69 y.o. male MRN: [id number]  Unit/Bed#: [facility] 801-01 Encounter: [id number]    Assessment:  69 y.o. male with history of DM (A1C 7.3), CHF (EF 35-40%), HTN, PAD s/p multiple LLE endovascular interventions (as below), s/p R BKA in setting of non-salvageable R foot with non-healing wound, who now presents for evaluation for LCLTI with poor healing open LLE wounds, XR concerning for OM. Plan:  - Diet Regular; Regular House  - Pain and Nausea control PRN  -Will discuss timing for amputation sometime next week  -Continue antibiotics per primary team and ID  - Follow-up podiatry plan following MRI results    Subjective/Objective     Subjective: No acute overnight events. Patient has minimal pain to the lower extremities. Objective:   Vitals: Blood pressure 116/58, pulse 63, temperature 98.1 °F (36.7 °C), resp. rate 16, height 5' 10" (1.778 m), weight 69.4 kg (153 lb), SpO2 98 %. ,Body mass index is 21.95 kg/m². I/O       07/27 0701  07/28 0700 07/28 0701  07/29 0700 07/29 0701 07/30 0700    I.V. (mL/kg)  651.7 (9.4)     Total Intake(mL/kg)  651.7 (9.4)     Urine (mL/kg/hr)  450 (0.3)     Total Output  450     Net  +201.7                  Physical Exam:  Gen: NAD, Aox3, Comfortable in bed  Chest: Normal work of breathing, no respiratory distress  Abd: Soft, ND, NT. Ext: Previous right BKA stump. Left LE wounds to lateral distal leg, lateral malleolus, and heel. Area is tender as and open tissue within the wound. No active drainage. Skin: Warm, Dry, Intact      Lab, Imaging and other studies:   I have personally reviewed pertinent reports.   , CBC with diff:   Lab Results   Component Value Date    WBC 7.11 07/29/2023    HGB 7.1 (L) 07/29/2023    HCT 23.7 (L) 07/29/2023    MCV 86 07/29/2023     07/29/2023    RBC 2.77 (L) 07/29/2023    MCH 25.6 (L) 07/29/2023    MCHC 30.0 (L) 07/29/2023    RDW 17.3 (H) 07/29/2023    MPV 8.6 (L) 07/29/2023   , BMP/CMP: Lab Results   Component Value Date    SODIUM 140 07/29/2023    K 3.8 07/29/2023     07/29/2023    CO2 32 07/29/2023    BUN 42 (H) 07/29/2023    CREATININE 1.03 07/29/2023    CALCIUM 8.5 07/29/2023    EGFR 69 07/29/2023     VTE Pharmacologic Prophylaxis: Heparin  VTE Mechanical Prophylaxis: sequential compression device        Lilian Gregorio MD  7/29/2023  9:55 AM complains of pain/discomfort

## 2023-07-29 NOTE — CONSULTS
Cardiology   Manav Sim 69 y.o. male MRN: [id number]  Unit/Bed#: [facility] 801-01 Encounter: [id number]      Reason for Consult / Principal Problem: Preoperative cardiac restratification    Physician Requesting Consult:  Mely Rider DO    Outpatient Cardiologist: Dr. Lopez Weber  1. Pre operative cardiac re stratification  -Problem: PAD w/ nonhealing LLE wound w/ concern for osteomyelitis  -Surgery: LE amputation   -Timing: TBD, likely next week per vascular   -No evidence of acutely decompensated CHF. No recent subjective respiratory complaints  -No active anginal symptoms - at low-level activity  -No sustained bradycardia or tachyarrhythmias identified on presenting ECG. Cardiac imaging  -ECG 7/26/2023; NSR, no acute ischemic changes. -TTE 4/28/2023; LVEF 35 to 40%, grade 3 DD, RV systolic function mildly reduced, PFO visualized, trace AI, moderate MR, moderate TR.  -[facility] 2/14/2023: 50% stenosis ostial LM and distal LAD. 60% stenosis proximal LAD. 70% stenosis mid LAD, proximal RCA, and D1. 90% stenosis distal RCA. 100% stenosis mid Cx  2. Chronic HFrEF  3. Ischemic cardiomyopathy w/improved LVEF 35-40% (per TTE in 4/2023, previously 25% in 2/2023)  -TTE 4/28/2023; LVEF 35 to 40%, grade 3 DD, RV systolic function mildly reduced, PFO visualized, trace AI, moderate MR, moderate TR.  -Outpatient GDMT: Entresto 49-51 mg twice daily, and metoprolol succinate 50 mg twice daily; previously on spironolactone 12.5 mg BID -not on med list -unclear when this was discontinued  -Outpatient diuretic regimen; furosemide 40 mg daily + metolazone 5 mg daily ?  previously on spironolactone 12.5 mg BID -not on med list -unclear when this was discontinued  -Recent outpatient weights at rehab; 155 to 160 pounds; BS weight 153 pounds today  4. Multivessel CAD -medically managed  -Previously evaluated by CT surgery and felt to not be a good surgical candidate  -[facility] 2/14/2023: 50% stenosis ostial LM and distal LAD.  60% stenosis proximal LAD. 70% stenosis mid LAD, proximal RCA, and D1. 90% stenosis distal RCA. 100% stenosis mid Cx.   5. Severe PAD w/ prior lower extremity interventions and prior right-sided BKA  -On aspirin 81 mg daily, clopidogrel 75 mg daily, atorvastatin 40 mg daily, and metoprolol succinate 50 mg BID  6. DM type II  -Hgb A1c 7.3  7. Dyslipidemia  -Lipid profile 2/11/23; cholesterol 108, triglycerides 57, HDL 36, and LDL calculated at 61  -On atorvastatin 40 mg daily  8. History of nicotine dependence; quit in his 35s. Plan  -Pt appears well compensated from both a CHF and CAD perspective. No current contraindications from a cardiac perspective to proceeding with outlined surgical procedure per the vascular surgery team.   -It appears as though there are some discrepancies with his home medications in reference to his outpatient HF GDMT's . Continue Entresto 49-51 mg BID (w/hold parameters), and metoprolol succinate 50 mg BID. It appears he was placed on metolazone 5 mg daily this admission, but was not on a standing loop diuretic. Pt states that he had previously been on both furosemide and spironolactone in the past but thinks he may have been taken off of these recently and placed on a 'newer diuretic' as he was gaining weight/filling up with fluid on his 'older diuretics' - however there is no clear documentation to reflect this. Hold metolazone for now. Would recommend starting torsemide 20 mg daily, and add spironolactone 12.5 mg - 25 mg daily if BP allows.  -Continue aspirin 81 mg daily and clopidogrel 75 mg daily.  -Continue high intensity statin therapy.  -Strict I's and O's, daily weights, 2 g Na+ diet to LFR.  -Monitor renal function and electrolytes closely. Replete to maintain K+ level of 4.0 magnesium level at 2.0.  -No indication for telemetry monitoring.     HPI: Emigdio Yo 69y.o. year old male with a medical history of chronic HFrEF LVEF 25%, ischemic cardiomyopathy - w/ improved EF now 35 to 40%, previously 25%, multivessel CAD under medical management, severe PAD with prior lower extremity interventions and a prior right-sided BKA in March 2023, DM type II, and dyslipidemia. He is a former smoker, denies excessive alcohol or recreational drug use. He has not established himself fully with a routine cardiology provider as an outpatient, he was seen as a hospital follow-up by the heart failure PA back in February 2023. He presented to the [address] [place] [address] on 7/27/2023 for having been sent over from his outpatient podiatry office given the presence of poorly healing LLE wounds with concern for osteomyelitis. He was evaluated by both the podiatry and vascular surgery services during this admission. His left lower extremity was deemed to be not salvageable and both services in agreements that the patient will need a left lower extremity amputation likely BKA. Been his cardiac history and multiple comorbid conditions the cardiology service was consulted for a formal preoperative cardiac restratification.     Family History:   Family History   Problem Relation Age of Onset   • No Known Problems Sister    • Other Daughter         chronic neck pain   • Other Son         chronic back pain   • Sudden death Neg Hx    • Cancer Neg Hx      Historical Information   Past Medical History:   Diagnosis Date   • 6th nerve palsy    • Blister of right leg    • BPH (benign prostatic hyperplasia)    • CAD (coronary artery disease)    • Cardiomyopathy ([address])     EF 25%   • CHF (congestive heart failure) ([address])    • Depression    • Diabetes mellitus ([address])     type 2, insulin dependent   • Diabetic foot ulcer ([address])     left, local wound care   • Diabetic retinopathy ([address])    • Exposure to Agent Orange     while serving in [employer]   • Former tobacco use    • H/O urinary retention     w/ reagan placement   • Hyperlipidemia    • Hypertension    • Rupture of biceps tendon, traumatic 2017    right     Past Surgical History: Procedure Laterality Date   • CARDIAC CATHETERIZATION     • CARDIAC CATHETERIZATION Left 2023    Procedure: Cardiac Left Heart Cath;  Surgeon: Reena Marques MD;  Location: [facility]; Service: Cardiology   • CARDIAC CATHETERIZATION N/A 2023    Procedure: Cardiac Coronary Angiogram;  Surgeon: Reena Marques MD;  Location: [facility]; Service: Cardiology   • CATARACT EXTRACTION, BILATERAL Bilateral    • IR LOWER EXTREMITY ANGIOGRAM  3/28/2023   • IR LOWER EXTREMITY ANGIOGRAM  2023   • LEG AMPUTATION THROUGH LOWER TIBIA AND FIBULA Right 3/30/2023    Procedure: (BKA); Surgeon: Vincenzo Downey DO;  Location: [facility];  Service: Vascular   • WOUND DEBRIDEMENT Right 3/24/2023    Procedure: debridement of heel ulcer;  Surgeon: Gerald Marcano DPM;  Location: [facility];  Service: Podiatry     Social History   Social History     Substance and Sexual Activity   Alcohol Use Not Currently    Comment: Denies history of heavy alcohol use. At most will drink 1 or 2 drinks in a year (Updated 2023). Social History     Substance and Sexual Activity   Drug Use Never     Social History     Tobacco Use   Smoking Status Former   • Packs/day: 1.00   • Years: 6.00   • Total pack years: 6.00   • Types: Cigarettes   • Start date:    • Quit date: 12   • Years since quittin.5   Smokeless Tobacco Never     Family History:   Family History   Problem Relation Age of Onset   • No Known Problems Sister    • Other Daughter         chronic neck pain   • Other Son         chronic back pain   • Sudden death Neg Hx    • Cancer Neg Hx        Review of Systems:  Review of Systems   Constitutional: Negative for chills, fatigue and fever. Eyes: Negative for visual disturbance. Respiratory: Negative for cough, chest tightness and shortness of breath. Cardiovascular: Negative for chest pain, palpitations and leg swelling. Gastrointestinal: Negative for abdominal pain.    Skin: Positive for wound. Neurological: Negative for dizziness, light-headedness and headaches. All other systems reviewed and are negative.           Scheduled Meds:  Current Facility-Administered Medications   Medication Dose Route Frequency Provider Last Rate   • aspirin  81 mg Oral Daily Brandyn Banai, DO     • atorvastatin  40 mg Oral Daily With [employer], DO     • clopidogrel  75 mg Oral Daily Brandyn Banai, DO     • escitalopram  5 mg Oral Daily Brandyn Banai, DO     • finasteride  5 mg Oral Daily Brandyn Banai, DO     • heparin (porcine)  5,000 Units Subcutaneous Q8H [address] [facility], DO     • insulin glargine  15 Units Subcutaneous HS [facility], DO     • insulin lispro  1-6 Units Subcutaneous TID AC [facility], DO     • insulin lispro  5 Units Subcutaneous TID With Meals Linwood Thomas, DO     • metolazone  5 mg Oral Daily Stacia Rodrigez PA-C     • metoprolol succinate  50 mg Oral Q12H [address] [facility], DO     • mirtazapine  7.5 mg Oral HS [facility], DO     • pantoprazole  40 mg Oral Daily [facility], DO     • piperacillin-tazobactam  3.375 g Intravenous Q6H [facility], DO 3.375 g (07/29/23 3213)   • sacubitril-valsartan  1 tablet Oral BID Stacia Rodrigez PA-C     • tamsulosin  0.4 mg Oral Daily With [employer], DO     • vancomycin  12.5 mg/kg Intravenous Q12H [facility],  mg (07/28/23 2329)     Continuous Infusions:   PRN Meds:.  all current active meds have been reviewed and current meds:   Current Facility-Administered Medications   Medication Dose Route Frequency   • aspirin chewable tablet 81 mg  81 mg Oral Daily   • atorvastatin (LIPITOR) tablet 40 mg  40 mg Oral Daily With Dinner   • clopidogrel (PLAVIX) tablet 75 mg  75 mg Oral Daily   • escitalopram (LEXAPRO) tablet 5 mg  5 mg Oral Daily   • finasteride (PROSCAR) tablet 5 mg  5 mg Oral Daily   • heparin (porcine) subcutaneous injection 5,000 Units  5,000 Units Subcutaneous Q8H [address]   • insulin glargine (LANTUS) subcutaneous injection 15 Units 0.15 mL  15 Units Subcutaneous HS   • insulin lispro (HumaLOG) 100 units/mL subcutaneous injection 1-6 Units  1-6 Units Subcutaneous TID AC   • insulin lispro (HumaLOG) 100 units/mL subcutaneous injection 5 Units  5 Units Subcutaneous TID With Meals   • metolazone (ZAROXOLYN) tablet 5 mg  5 mg Oral Daily   • metoprolol succinate (TOPROL-XL) 24 hr tablet 50 mg  50 mg Oral Q12H [facility]   • mirtazapine (REMERON) tablet 7.5 mg  7.5 mg Oral HS   • pantoprazole (PROTONIX) EC tablet 40 mg  40 mg Oral Daily   • piperacillin-tazobactam (ZOSYN) 3.375 g in sodium chloride 0.9 % 100 mL IVPB  3.375 g Intravenous Q6H   • sacubitril-valsartan (ENTRESTO) 49-51 MG per tablet 1 tablet  1 tablet Oral BID   • tamsulosin (FLOMAX) capsule 0.4 mg  0.4 mg Oral Daily With Dinner   • vancomycin (VANCOCIN) IVPB (premix in dextrose) 750 mg 150 mL  12.5 mg/kg Intravenous Q12H       Allergies   Allergen Reactions   • Metformin Diarrhea       Objective   Vitals: Blood pressure 116/58, pulse 63, temperature 98.1 °F (36.7 °C), resp. rate 16, height 5' 10" (1.778 m), weight 69.4 kg (153 lb), SpO2 98 %. , Body mass index is 21.95 kg/m².,   Orthostatic Blood Pressures    Flowsheet Row Most Recent Value   Blood Pressure 116/58 filed at 07/29/2023 8404            Intake/Output Summary (Last 24 hours) at 7/29/2023 1031  Last data filed at 7/28/2023 1215  Gross per 24 hour   Intake 651.67 ml   Output --   Net 651.67 ml       Invasive Devices     Peripheral Intravenous Line  Duration           Peripheral IV 07/26/23 Left Forearm 2 days              Physical Exam:  Physical Exam  Vitals and nursing note reviewed. Constitutional:       General: He is not in acute distress. Appearance: He is not diaphoretic. HENT:      Head: Normocephalic and atraumatic. Eyes:      General: No scleral icterus. Cardiovascular:      Rate and Rhythm: Normal rate and regular rhythm. Pulses: Normal pulses.       Heart sounds: Normal heart sounds. Pulmonary:      Effort: Pulmonary effort is normal.      Breath sounds: Normal breath sounds. No wheezing or rales. Abdominal:      Palpations: Abdomen is soft. Musculoskeletal:         General: Deformity present. Right lower leg: No edema. Left lower leg: No edema. Comments: Right bka   Skin:     General: Skin is warm and dry. Capillary Refill: Capillary refill takes less than 2 seconds. Coloration: Skin is pale. Neurological:      General: No focal deficit present. Mental Status: He is alert and oriented to person, place, and time.    Psychiatric:         Mood and Affect: Mood normal.         Lab Results:   Recent Results (from the past 24 hour(s))   Fingerstick Glucose (POCT)    Collection Time: 07/28/23 11:08 AM   Result Value Ref Range    POC Glucose 159 (H) 65 - 140 mg/dl   Fingerstick Glucose (POCT)    Collection Time: 07/28/23  3:50 PM   Result Value Ref Range    POC Glucose 164 (H) 65 - 140 mg/dl   Fingerstick Glucose (POCT)    Collection Time: 07/28/23  9:17 PM   Result Value Ref Range    POC Glucose 160 (H) 65 - 140 mg/dl   Vancomycin, trough    Collection Time: 07/29/23  5:57 AM   Result Value Ref Range    Vancomycin Tr 18.3 10.0 - 20.0 ug/mL   CBC    Collection Time: 07/29/23  5:57 AM   Result Value Ref Range    WBC 7.11 4.31 - 10.16 Thousand/uL    RBC 2.77 (L) 3.88 - 5.62 Million/uL    Hemoglobin 7.1 (L) 12.0 - 17.0 g/dL    Hematocrit 23.7 (L) 36.5 - 49.3 %    MCV 86 82 - 98 fL    MCH 25.6 (L) 26.8 - 34.3 pg    MCHC 30.0 (L) 31.4 - 37.4 g/dL    RDW 17.3 (H) 11.6 - 15.1 %    Platelets 351 474 - 042 Thousands/uL    MPV 8.6 (L) 8.9 - 12.7 fL   Basic metabolic panel    Collection Time: 07/29/23  5:57 AM   Result Value Ref Range    Sodium 140 135 - 147 mmol/L    Potassium 3.8 3.5 - 5.3 mmol/L    Chloride 106 96 - 108 mmol/L    CO2 32 21 - 32 mmol/L    ANION GAP 2 mmol/L    BUN 42 (H) 5 - 25 mg/dL    Creatinine 1.03 0.60 - 1.30 mg/dL    Glucose 87 65 - 140 mg/dL Calcium 8.5 8.3 - 10.1 mg/dL    eGFR 69 ml/min/1.73sq m   Vitamin B12    Collection Time: 07/29/23  5:57 AM   Result Value Ref Range    Vitamin B-12 348 180 - 914 pg/mL   Folate    Collection Time: 07/29/23  5:57 AM   Result Value Ref Range    Folate 12.0 >5.9 ng/mL   Iron Saturation %    Collection Time: 07/29/23  5:57 AM   Result Value Ref Range    Iron Saturation 12 (L) 20 - 50 %    TIBC 214 (L) 250 - 450 ug/dL    Iron 26 (L) 65 - 175 ug/dL   Ferritin    Collection Time: 07/29/23  5:57 AM   Result Value Ref Range    Ferritin 43 24 - 336 ng/mL   Fingerstick Glucose (POCT)    Collection Time: 07/29/23  7:16 AM   Result Value Ref Range    POC Glucose 105 65 - 140 mg/dl     Imaging: I have personally reviewed pertinent reports. and I have personally reviewed pertinent films in PACS  Code Status: Level 1 full code  [facility] Everywhere [facility] reviewed: Yes    * Please Note: Fluency DirectDictation voice to text software may have been used in the creation of this document.  **

## 2023-07-29 NOTE — PLAN OF CARE
Problem: MOBILITY - ADULT  Goal: Maintain or return to baseline ADL function  Description: INTERVENTIONS:  -  Assess patient's ability to carry out ADLs; assess patient's baseline for ADL function and identify physical deficits which impact ability to perform ADLs (bathing, care of mouth/teeth, toileting, grooming, dressing, etc.)  - Assess/evaluate cause of self-care deficits   - Assess range of motion  - Assess patient's mobility; develop plan if impaired  - Assess patient's need for assistive devices and provide as appropriate  - Encourage maximum independence but intervene and supervise when necessary  - Involve family in performance of ADLs  - Assess for home care needs following discharge   - Consider OT consult to assist with ADL evaluation and planning for discharge  - Provide patient education as appropriate  Outcome: Progressing  Goal: Maintains/Returns to pre admission functional level  Description: INTERVENTIONS:  - Perform BMAT or MOVE assessment daily.   - Set and communicate daily mobility goal to care team and patient/family/caregiver. - Collaborate with rehabilitation services on mobility goals if consulted  - Perform Range of Motion  times a day. - Reposition patient every  hours.   - Dangle patient  times a day  - Stand patient  times a day  - Ambulate patient  times a day  - Out of bed to chair  times a day   - Out of bed for meals  times a day  - Out of bed for toileting  - Record patient progress and toleration of activity level   Outcome: Progressing     Problem: PAIN - ADULT  Goal: Verbalizes/displays adequate comfort level or baseline comfort level  Description: Interventions:  - Encourage patient to monitor pain and request assistance  - Assess pain using appropriate pain scale  - Administer analgesics based on type and severity of pain and evaluate response  - Implement non-pharmacological measures as appropriate and evaluate response  - Consider cultural and social influences on pain and pain management  - Notify physician/advanced practitioner if interventions unsuccessful or patient reports new pain  Outcome: Progressing     Problem: INFECTION - ADULT  Goal: Absence or prevention of progression during hospitalization  Description: INTERVENTIONS:  - Assess and monitor for signs and symptoms of infection  - Monitor lab/diagnostic results  - Monitor all insertion sites, i.e. indwelling lines, tubes, and drains  - Monitor endotracheal if appropriate and nasal secretions for changes in amount and color  - [place] appropriate cooling/warming therapies per order  - Administer medications as ordered  - Instruct and encourage patient and family to use good hand hygiene technique  - Identify and instruct in appropriate isolation precautions for identified infection/condition  Outcome: Progressing  Goal: Absence of fever/infection during neutropenic period  Description: INTERVENTIONS:  - Monitor WBC    Outcome: Progressing     Problem: SAFETY ADULT  Goal: Maintain or return to baseline ADL function  Description: INTERVENTIONS:  -  Assess patient's ability to carry out ADLs; assess patient's baseline for ADL function and identify physical deficits which impact ability to perform ADLs (bathing, care of mouth/teeth, toileting, grooming, dressing, etc.)  - Assess/evaluate cause of self-care deficits   - Assess range of motion  - Assess patient's mobility; develop plan if impaired  - Assess patient's need for assistive devices and provide as appropriate  - Encourage maximum independence but intervene and supervise when necessary  - Involve family in performance of ADLs  - Assess for home care needs following discharge   - Consider OT consult to assist with ADL evaluation and planning for discharge  - Provide patient education as appropriate  Outcome: Progressing  Goal: Maintains/Returns to pre admission functional level  Description: INTERVENTIONS:  - Perform BMAT or MOVE assessment daily.   - Set and communicate daily mobility goal to care team and patient/family/caregiver. - Collaborate with rehabilitation services on mobility goals if consulted  - Perform Range of Motion  times a day. - Reposition patient every  hours.   - Dangle patient  times a day  - Stand patient  times a day  - Ambulate patient  times a day  - Out of bed to chair  times a day   - Out of bed for meals  times a day  - Out of bed for toileting  - Record patient progress and toleration of activity level   Outcome: Progressing  Goal: Patient will remain free of falls  Description: INTERVENTIONS:  - Educate patient/family on patient safety including physical limitations  - Instruct patient to call for assistance with activity   - Consult OT/PT to assist with strengthening/mobility   - Keep Call bell within reach  - Keep bed low and locked with side rails adjusted as appropriate  - Keep care items and personal belongings within reach  - Initiate and maintain comfort rounds  - Make Fall Risk Sign visible to staff  - Offer Toileting every  Hours, in advance of need  - Initiate/Maintain alarm  - Obtain necessary fall risk management equipment  - Apply yellow socks and bracelet for high fall risk patients  - Consider moving patient to room near nurses station  Outcome: Progressing     Problem: DISCHARGE PLANNING  Goal: Discharge to home or other facility with appropriate resources  Description: INTERVENTIONS:  - Identify barriers to discharge w/patient and caregiver  - Arrange for needed discharge resources and transportation as appropriate  - Identify discharge learning needs (meds, wound care, etc.)  - Arrange for interpretive services to assist at discharge as needed  - Refer to Case Management Department for coordinating discharge planning if the patient needs post-hospital services based on physician/advanced practitioner order or complex needs related to functional status, cognitive ability, or social support system  Outcome: Progressing Problem: Knowledge Deficit  Goal: Patient/family/caregiver demonstrates understanding of disease process, treatment plan, medications, and discharge instructions  Description: Complete learning assessment and assess knowledge base. Interventions:  - Provide teaching at level of understanding  - Provide teaching via preferred learning methods  Outcome: Progressing     Problem: Nutrition/Hydration-ADULT  Goal: Nutrient/Hydration intake appropriate for improving, restoring or maintaining nutritional needs  Description: Monitor and assess patient's nutrition/hydration status for malnutrition. Collaborate with interdisciplinary team and initiate plan and interventions as ordered. Monitor patient's weight and dietary intake as ordered or per policy. Utilize nutrition screening tool and intervene as necessary. Determine patient's food preferences and provide high-protein, high-caloric foods as appropriate.      INTERVENTIONS:  - Monitor oral intake, urinary output, labs, and treatment plans  - Assess nutrition and hydration status and recommend course of action  - Evaluate amount of meals eaten  - Assist patient with eating if necessary   - Allow adequate time for meals  - Recommend/ encourage appropriate diets, oral nutritional supplements, and vitamin/mineral supplements  - Order, calculate, and assess calorie counts as needed  - Recommend, monitor, and adjust tube feedings and TPN/PPN based on assessed needs  - Assess need for intravenous fluids  - Provide specific nutrition/hydration education as appropriate  - Include patient/family/caregiver in decisions related to nutrition  Outcome: Progressing

## 2023-07-29 NOTE — ASSESSMENT & PLAN NOTE
Wt Readings from Last 3 Encounters:   07/29/23 69.4 kg (153 lb)   07/26/23 67.7 kg (149 lb 4 oz)   05/18/23 59 kg (130 lb)     History of CHF with EF 25%  Euvolemic on exam  Continue with Rosmery George, Toprol-XL and Demadex  Cardiology is following

## 2023-07-29 NOTE — PROGRESS NOTES
Sol Wilcox is a 69 y.o. male who is currently ordered Vancomycin IV with management by the Pharmacy Consult service. Relevant clinical data and objective / subjective history reviewed. Vancomycin Assessment:  Indication and Goal AUC/Trough: Soft tissue (goal -600, trough >10), -600, trough >10  Clinical Status: stable  Micro:     Renal Function:  SCr: 1 mg/dL  CrCl: 60 mL/min  Renal replacement: Not on dialysis  Days of Therapy: 4  Current Dose: 750mg every 12 hours  Vancomycin Plan:  New Dosing:    Estimated AUC: 502 mcg*hr/mL  Estimated Trough: 16.9 mcg/mL  Next Level: amdraw 8-4  Renal Function Monitoring: Daily BMP and UOP  Pharmacy will continue to follow closely for s/sx of nephrotoxicity, infusion reactions and appropriateness of therapy. BMP and CBC will be ordered per protocol. We will continue to follow the patient’s culture results and clinical progress daily.     Benny Shell, Pharmacist

## 2023-07-29 NOTE — PROGRESS NOTES
[facility]  Progress Note  Name: Fernando Nurse  MRN: [id number]  Unit/Bed#: [facility] 801-01 I Date of Admission: 7/27/2023   Date of Service: 7/29/2023 I Hospital Day: 2    Assessment/Plan   * PAD (peripheral artery disease) ([address])  Assessment & Plan  Patient with hx of PAD sp right sided BKA  Now presenting with worsening multiple diabetic ulcerations of left leg and foot  Transferred for vascular carolin  He  was evaluated by vascular and podiatry  Likely he may need BKA/AKA, angiogram was canceled  Left lower extremity x-ray suspicious for osteomyelitis  Podiatry ordered lower extremity MRI  Continue with IV Zosyn and vancomycin  Check MRSA culture  Negative blood culture  Vascular and podiatry are following    Anemia  Assessment & Plan  Anemia panel consistent with anemia of chronic disease  Consider blood transfusion for hemoglobin below 7  Monitor    Essential hypertension  Assessment & Plan  Stable BP  Continue to monitor    CAD (coronary artery disease)  Assessment & Plan  • Patient has a history of coronary artery disease, previous cardiac cath with diffuse CAD recommending GDMT  • Continue aspirin, Plavix statin, beta-blocker    Chronic systolic CHF (congestive heart failure) (HCC)  Assessment & Plan  Wt Readings from Last 3 Encounters:   07/29/23 69.4 kg (153 lb)   07/26/23 67.7 kg (149 lb 4 oz)   05/18/23 59 kg (130 lb)     History of CHF with EF 25%  Euvolemic on exam  Continue with Entresto, Toprol-XL and Demadex  Cardiology is following      Type 2 diabetes mellitus with hyperglycemia, with long-term current use of insulin [facility])  Assessment & Plan  Lab Results   Component Value Date    HGBA1C 7.3 (H) 05/04/2023       Recent Labs     07/28/23  1108 07/28/23  1550 07/28/23  2117 07/29/23  0716   POCGLU 159* 164* 160* 105       Blood Sugar Average: Last 72 hrs:  (P) [id number]   Patient refusing cardiac/diabetic diet  He wants regular diet   continue with Lantus and insulin sliding scale  Monitor           VTE Pharmacologic Prophylaxis: VTE Score: 4 Moderate Risk (Score 3-4) - Pharmacological DVT Prophylaxis Ordered: heparin. Patient Centered Rounds: I performed bedside rounds with nursing staff today. Discussions with Specialists or Other Care Team Provider: TRACY    Education and Discussions with Family / Patient: Patient declined call to . Total Time Spent on Date of Encounter in care of patient: 35 minutes This time was spent on one or more of the following: performing physical exam; counseling and coordination of care; obtaining or reviewing history; documenting in the medical record; reviewing/ordering tests, medications or procedures; communicating with other healthcare professionals and discussing with patient's family/caregivers. Current Length of Stay: 2 day(s)  Current Patient Status: Inpatient   Certification Statement: The patient will continue to require additional inpatient hospital stay due to Management of PAD  Discharge Plan: Anticipate discharge in >72 hrs to rehab facility. Code Status: Level 1 - Full Code    Subjective:   Patient seen and examined  Comfortable in bed  No event overnight  No chest pain or shortness of breath    Objective:     Vitals:   Temp (24hrs), Av.1 °F (36.7 °C), Min:98 °F (36.7 °C), Max:98.1 °F (36.7 °C)    Temp:  [98 °F (36.7 °C)-98.1 °F (36.7 °C)] 98.1 °F (36.7 °C)  HR:  [63-69] 63  Resp:  [16-18] 16  BP: ()/(44-58) 116/58  SpO2:  [91 %-98 %] 98 %  Body mass index is 21.95 kg/m². Input and Output Summary (last 24 hours):      Intake/Output Summary (Last 24 hours) at 2023 1151  Last data filed at 2023 1215  Gross per 24 hour   Intake 651.67 ml   Output --   Net 651.67 ml       Physical Exam:   Physical Exam     Patient is awake alert oriented no acute distress   Chronically ill-appearing, pale skin  Lungs clear to auscultation bilateral  Heart positive S1-S2 no murmur  Abdomen soft nontender  Status post right below-knee amputation  Left leg wrapped with dressing      Additional Data:     Labs:  Results from last 7 days   Lab Units 07/29/23  0557 07/28/23  0443   WBC Thousand/uL 7.11 7.75   HEMOGLOBIN g/dL 7.1* 7.5*   HEMATOCRIT % 23.7* 24.9*   PLATELETS Thousands/uL 283 323   NEUTROS PCT %  --  70   LYMPHS PCT %  --  18   MONOS PCT %  --  7   EOS PCT %  --  5     Results from last 7 days   Lab Units 07/29/23  0557 07/28/23  0443   SODIUM mmol/L 140 141   POTASSIUM mmol/L 3.8 4.1   CHLORIDE mmol/L 106 104   CO2 mmol/L 32 35*   BUN mg/dL 42* 38*   CREATININE mg/dL 1.03 1.08   ANION GAP mmol/L 2 2   CALCIUM mg/dL 8.5 8.5   ALBUMIN g/dL  --  2.1*   TOTAL BILIRUBIN mg/dL  --  0.42   ALK PHOS U/L  --  425*   ALT U/L  --  42   AST U/L  --  30   GLUCOSE RANDOM mg/dL 87 204*     Results from last 7 days   Lab Units 07/28/23  0443   INR  1.33*     Results from last 7 days   Lab Units 07/29/23  1139 07/29/23  0716 07/28/23  2117 07/28/23  1550 07/28/23  1108 07/28/23  0730 07/27/23  2108 07/27/23  1701 07/27/23  1215   POC GLUCOSE mg/dl 110 105 160* 164* 159* 172* 241* 274* 262*         Results from last 7 days   Lab Units 07/26/23  1227   LACTIC ACID mmol/L 1.3   PROCALCITONIN ng/ml 0.05       Lines/Drains:  Invasive Devices     Peripheral Intravenous Line  Duration           Peripheral IV 07/26/23 Left Forearm 2 days                      Imaging: No pertinent imaging reviewed. Recent Cultures (last 7 days):   Results from last 7 days   Lab Units 07/27/23  2252 07/26/23  1227   BLOOD CULTURE  No Growth at 24 hrs. No Growth at 24 hrs. No Growth at 48 hrs. No Growth at 48 hrs.        Last 24 Hours Medication List:   Current Facility-Administered Medications   Medication Dose Route Frequency Provider Last Rate   • aspirin  81 mg Oral Daily Brandyn Dalton DO     • atorvastatin  40 mg Oral Daily With [employer], DO     • clopidogrel  75 mg Oral Daily Brandyn Dalton DO     • escitalopram  5 mg Oral Daily Brandyn Dalton, DO     • finasteride  5 mg Oral Daily Brandynshayne Dalton, DO     • heparin (porcine)  5,000 Units Subcutaneous Q8H [facility]     • insulin glargine  15 Units Subcutaneous HS Brandynshayne Dalton, DO     • insulin lispro  1-6 Units Subcutaneous TID AC Brandyn Dalton, DO     • insulin lispro  5 Units Subcutaneous TID With Meals Carla Boss DO     • metoprolol succinate  50 mg Oral Q12H [facility] Krystle, DO     • mirtazapine  7.5 mg Oral HS Brandynshayne Dalton, DO     • pantoprazole  40 mg Oral Daily Brandynshayne Dalton, DO     • piperacillin-tazobactam  3.375 g Intravenous Q6H Brandynshayne Dalton, DO 3.375 g (07/29/23 8750)   • sacubitril-valsartan  1 tablet Oral BID Grisel Mccrary PA-C     • tamsulosin  0.4 mg Oral Daily With Dinner Brandyn Gomez DO     • [START ON 7/30/2023] torsemide  20 mg Oral Daily JANIS Mejia     • vancomycin  12.5 mg/kg Intravenous Q12H Brandynshayne Dalton,  mg (07/28/23 3170)        Today, Patient Was Seen By: Carla Boss DO    **Please Note: This note may have been constructed using a voice recognition system. **

## 2023-07-29 NOTE — ASSESSMENT & PLAN NOTE
Anemia panel consistent with anemia of chronic disease  Consider blood transfusion for hemoglobin below 7  Monitor

## 2023-07-29 NOTE — ASSESSMENT & PLAN NOTE
Lab Results   Component Value Date    HGBA1C 7.3 (H) 05/04/2023       Recent Labs     07/28/23  1108 07/28/23  1550 07/28/23 2117 07/29/23  0716   POCGLU 159* 164* 160* 105       Blood Sugar Average: Last 72 hrs:  (P) [id number]   Patient refusing cardiac/diabetic diet  He wants regular diet   continue with Lantus and insulin sliding scale  Monitor

## 2023-07-29 NOTE — ASSESSMENT & PLAN NOTE
Patient with hx of PAD sp right sided BKA  Now presenting with worsening multiple diabetic ulcerations of left leg and foot  Transferred for vascular carolin  He  was evaluated by vascular and podiatry  Likely he may need BKA/AKA, angiogram was canceled  Left lower extremity x-ray suspicious for osteomyelitis  Podiatry ordered lower extremity MRI  Continue with IV Zosyn and vancomycin  Check MRSA culture  Negative blood culture  Vascular and podiatry are following

## 2023-07-29 NOTE — PLAN OF CARE
Problem: PAIN - ADULT  Goal: Verbalizes/displays adequate comfort level or baseline comfort level  Description: Interventions:  - Encourage patient to monitor pain and request assistance  - Assess pain using appropriate pain scale  - Administer analgesics based on type and severity of pain and evaluate response  - Implement non-pharmacological measures as appropriate and evaluate response  - Consider cultural and social influences on pain and pain management  - Notify physician/advanced practitioner if interventions unsuccessful or patient reports new pain  Outcome: Progressing     Problem: INFECTION - ADULT  Goal: Absence or prevention of progression during hospitalization  Description: INTERVENTIONS:  - Assess and monitor for signs and symptoms of infection  - Monitor lab/diagnostic results  - Monitor all insertion sites, i.e. indwelling lines, tubes, and drains  - Monitor endotracheal if appropriate and nasal secretions for changes in amount and color  - [place] appropriate cooling/warming therapies per order  - Administer medications as ordered  - Instruct and encourage patient and family to use good hand hygiene technique  - Identify and instruct in appropriate isolation precautions for identified infection/condition  Outcome: Progressing     Problem: DISCHARGE PLANNING  Goal: Discharge to home or other facility with appropriate resources  Description: INTERVENTIONS:  - Identify barriers to discharge w/patient and caregiver  - Arrange for needed discharge resources and transportation as appropriate  - Identify discharge learning needs (meds, wound care, etc.)  - Arrange for interpretive services to assist at discharge as needed  - Refer to Case Management Department for coordinating discharge planning if the patient needs post-hospital services based on physician/advanced practitioner order or complex needs related to functional status, cognitive ability, or social support system  Outcome: Progressing Problem: Knowledge Deficit  Goal: Patient/family/caregiver demonstrates understanding of disease process, treatment plan, medications, and discharge instructions  Description: Complete learning assessment and assess knowledge base. Interventions:  - Provide teaching at level of understanding  - Provide teaching via preferred learning methods  Outcome: Progressing     Problem: Nutrition/Hydration-ADULT  Goal: Nutrient/Hydration intake appropriate for improving, restoring or maintaining nutritional needs  Description: Monitor and assess patient's nutrition/hydration status for malnutrition. Collaborate with interdisciplinary team and initiate plan and interventions as ordered. Monitor patient's weight and dietary intake as ordered or per policy. Utilize nutrition screening tool and intervene as necessary. Determine patient's food preferences and provide high-protein, high-caloric foods as appropriate.      INTERVENTIONS:  - Monitor oral intake, urinary output, labs, and treatment plans  - Assess nutrition and hydration status and recommend course of action  - Evaluate amount of meals eaten  - Assist patient with eating if necessary   - Allow adequate time for meals  - Recommend/ encourage appropriate diets, oral nutritional supplements, and vitamin/mineral supplements  - Order, calculate, and assess calorie counts as needed  - Recommend, monitor, and adjust tube feedings and TPN/PPN based on assessed needs  - Assess need for intravenous fluids  - Provide specific nutrition/hydration education as appropriate  - Include patient/family/caregiver in decisions related to nutrition  Outcome: Progressing     Problem: METABOLIC, FLUID AND ELECTROLYTES - ADULT  Goal: Electrolytes maintained within normal limits  Description: INTERVENTIONS:  - Monitor labs and assess patient for signs and symptoms of electrolyte imbalances  - Administer electrolyte replacement as ordered  - Monitor response to electrolyte replacements, including repeat lab results as appropriate  - Instruct patient on fluid and nutrition as appropriate  Outcome: Progressing     Problem: SKIN/TISSUE INTEGRITY - ADULT  Goal: Incision(s), wounds(s) or drain site(s) healing without S/S of infection  Description: INTERVENTIONS  - Assess and document dressing, incision, wound bed, drain sites and surrounding tissue  - Provide patient and family education    Outcome: Progressing     Problem: HEMATOLOGIC - ADULT  Goal: Maintains hematologic stability  Description: INTERVENTIONS  - Assess for signs and symptoms of bleeding or hemorrhage  - Monitor labs  - Administer supportive blood products/factors as ordered and appropriate  Outcome: Progressing     Problem: MUSCULOSKELETAL - ADULT  Goal: Maintain or return mobility to safest level of function  Description: INTERVENTIONS:  - Assess patient's ability to carry out ADLs; assess patient's baseline for ADL function and identify physical deficits which impact ability to perform ADLs (bathing, care of mouth/teeth, toileting, grooming, dressing, etc.)  - Assess/evaluate cause of self-care deficits   - Assess range of motion  - Assess patient's mobility  - Assess patient's need for assistive devices and provide as appropriate  - Encourage maximum independence but intervene and supervise when necessary  - Involve family in performance of ADLs  - Assess for home care needs following discharge   - Consider OT consult to assist with ADL evaluation and planning for discharge  - Provide patient education as appropriate  Outcome: Progressing  Goal: Maintain proper alignment of affected body part  Description: INTERVENTIONS:  - Support, maintain and protect limb and body alignment  - Provide patient/ family with appropriate education  Outcome: Progressing

## 2023-07-30 LAB
BACTERIA BLD CULT: NORMAL
BACTERIA BLD CULT: NORMAL
GLUCOSE SERPL-MCNC: 107 MG/DL (ref 65–140)
GLUCOSE SERPL-MCNC: 153 MG/DL (ref 65–140)
GLUCOSE SERPL-MCNC: 164 MG/DL (ref 65–140)
GLUCOSE SERPL-MCNC: 170 MG/DL (ref 65–140)
GLUCOSE SERPL-MCNC: 72 MG/DL (ref 65–140)
HCT VFR BLD AUTO: 25.8 % (ref 36.5–49.3)
HGB BLD-MCNC: 7.7 G/DL (ref 12–17)

## 2023-07-30 PROCEDURE — 99233 SBSQ HOSP IP/OBS HIGH 50: CPT | Performed by: PODIATRIST

## 2023-07-30 PROCEDURE — 85018 HEMOGLOBIN: CPT | Performed by: INTERNAL MEDICINE

## 2023-07-30 PROCEDURE — 82948 REAGENT STRIP/BLOOD GLUCOSE: CPT

## 2023-07-30 PROCEDURE — 85014 HEMATOCRIT: CPT | Performed by: INTERNAL MEDICINE

## 2023-07-30 PROCEDURE — 99232 SBSQ HOSP IP/OBS MODERATE 35: CPT | Performed by: INTERNAL MEDICINE

## 2023-07-30 RX ORDER — INSULIN LISPRO 100 [IU]/ML
2 INJECTION, SOLUTION INTRAVENOUS; SUBCUTANEOUS
Status: DISCONTINUED | OUTPATIENT
Start: 2023-07-30 | End: 2023-07-30

## 2023-07-30 RX ADMIN — PANTOPRAZOLE SODIUM 40 MG: 40 TABLET, DELAYED RELEASE ORAL at 04:54

## 2023-07-30 RX ADMIN — INSULIN LISPRO 5 UNITS: 100 INJECTION, SOLUTION INTRAVENOUS; SUBCUTANEOUS at 09:01

## 2023-07-30 RX ADMIN — ATORVASTATIN CALCIUM 40 MG: 40 TABLET, FILM COATED ORAL at 16:47

## 2023-07-30 RX ADMIN — HEPARIN SODIUM 5000 UNITS: 5000 INJECTION INTRAVENOUS; SUBCUTANEOUS at 22:02

## 2023-07-30 RX ADMIN — MIRTAZAPINE 7.5 MG: 15 TABLET, FILM COATED ORAL at 22:02

## 2023-07-30 RX ADMIN — PIPERACILLIN SODIUM AND TAZOBACTAM SODIUM 3.38 G: 36; 4.5 INJECTION, POWDER, LYOPHILIZED, FOR SOLUTION INTRAVENOUS at 14:58

## 2023-07-30 RX ADMIN — PIPERACILLIN SODIUM AND TAZOBACTAM SODIUM 3.38 G: 36; 4.5 INJECTION, POWDER, LYOPHILIZED, FOR SOLUTION INTRAVENOUS at 02:45

## 2023-07-30 RX ADMIN — VANCOMYCIN HYDROCHLORIDE 1250 MG: 10 INJECTION, POWDER, LYOPHILIZED, FOR SOLUTION INTRAVENOUS at 16:47

## 2023-07-30 RX ADMIN — SACUBITRIL AND VALSARTAN 1 TABLET: 49; 51 TABLET, FILM COATED ORAL at 16:48

## 2023-07-30 RX ADMIN — ESCITSLOPRAM 5 MG: 5 TABLET ORAL at 08:46

## 2023-07-30 RX ADMIN — HEPARIN SODIUM 5000 UNITS: 5000 INJECTION INTRAVENOUS; SUBCUTANEOUS at 04:54

## 2023-07-30 RX ADMIN — PIPERACILLIN SODIUM AND TAZOBACTAM SODIUM 3.38 G: 36; 4.5 INJECTION, POWDER, LYOPHILIZED, FOR SOLUTION INTRAVENOUS at 09:01

## 2023-07-30 RX ADMIN — INSULIN GLARGINE 15 UNITS: 100 INJECTION, SOLUTION SUBCUTANEOUS at 22:02

## 2023-07-30 RX ADMIN — PIPERACILLIN SODIUM AND TAZOBACTAM SODIUM 3.38 G: 36; 4.5 INJECTION, POWDER, LYOPHILIZED, FOR SOLUTION INTRAVENOUS at 22:08

## 2023-07-30 RX ADMIN — VANCOMYCIN HYDROCHLORIDE 750 MG: 750 INJECTION, SOLUTION INTRAVENOUS at 00:04

## 2023-07-30 RX ADMIN — METOPROLOL SUCCINATE 50 MG: 50 TABLET, EXTENDED RELEASE ORAL at 08:46

## 2023-07-30 RX ADMIN — TORSEMIDE 20 MG: 20 TABLET ORAL at 08:46

## 2023-07-30 RX ADMIN — SACUBITRIL AND VALSARTAN 1 TABLET: 49; 51 TABLET, FILM COATED ORAL at 08:47

## 2023-07-30 RX ADMIN — FINASTERIDE 5 MG: 5 TABLET, FILM COATED ORAL at 08:46

## 2023-07-30 RX ADMIN — HEPARIN SODIUM 5000 UNITS: 5000 INJECTION INTRAVENOUS; SUBCUTANEOUS at 14:58

## 2023-07-30 RX ADMIN — INSULIN LISPRO 5 UNITS: 100 INJECTION, SOLUTION INTRAVENOUS; SUBCUTANEOUS at 11:23

## 2023-07-30 RX ADMIN — TAMSULOSIN HYDROCHLORIDE 0.4 MG: 0.4 CAPSULE ORAL at 16:47

## 2023-07-30 RX ADMIN — METOPROLOL SUCCINATE 50 MG: 50 TABLET, EXTENDED RELEASE ORAL at 22:07

## 2023-07-30 RX ADMIN — INSULIN LISPRO 1 UNITS: 100 INJECTION, SOLUTION INTRAVENOUS; SUBCUTANEOUS at 09:01

## 2023-07-30 RX ADMIN — CLOPIDOGREL BISULFATE 75 MG: 75 TABLET ORAL at 08:46

## 2023-07-30 RX ADMIN — ASPIRIN 81 MG CHEWABLE TABLET 81 MG: 81 TABLET CHEWABLE at 08:46

## 2023-07-30 NOTE — PROGRESS NOTES
Sol Wilcox is a 69 y.o. male who is currently ordered Vancomycin IV with management by the Pharmacy Consult service. Relevant clinical data and objective / subjective history reviewed. Vancomycin Assessment:  Indication and Goal AUC/Trough: Soft tissue (goal -600, trough >10), -600, trough >10  Clinical Status: stable  Micro:     Renal Function:  SCr: 1 mg/dL  CrCl: 60 mL/min  Renal replacement: Not on dialysis  Days of Therapy: 5  Current Dose: 1250mg q24  Vancomycin Plan:  New Dosing:    Estimated AUC: 426 mcg*hr/mL  Estimated Trough: 12.1 mcg/mL  Next Level: amdraw 8-4  Renal Function Monitoring: Daily BMP and UOP  Pharmacy will continue to follow closely for s/sx of nephrotoxicity, infusion reactions and appropriateness of therapy. BMP and CBC will be ordered per protocol. We will continue to follow the patient’s culture results and clinical progress daily.     Benny Shell, Pharmacist

## 2023-07-30 NOTE — PLAN OF CARE
Problem: PAIN - ADULT  Goal: Verbalizes/displays adequate comfort level or baseline comfort level  Description: Interventions:  - Encourage patient to monitor pain and request assistance  - Assess pain using appropriate pain scale  - Administer analgesics based on type and severity of pain and evaluate response  - Implement non-pharmacological measures as appropriate and evaluate response  - Consider cultural and social influences on pain and pain management  - Notify physician/advanced practitioner if interventions unsuccessful or patient reports new pain  Outcome: Progressing     Problem: INFECTION - ADULT  Goal: Absence or prevention of progression during hospitalization  Description: INTERVENTIONS:  - Assess and monitor for signs and symptoms of infection  - Monitor lab/diagnostic results  - Monitor all insertion sites, i.e. indwelling lines, tubes, and drains  - Monitor endotracheal if appropriate and nasal secretions for changes in amount and color  - [place] appropriate cooling/warming therapies per order  - Administer medications as ordered  - Instruct and encourage patient and family to use good hand hygiene technique  - Identify and instruct in appropriate isolation precautions for identified infection/condition  Outcome: Progressing     Problem: DISCHARGE PLANNING  Goal: Discharge to home or other facility with appropriate resources  Description: INTERVENTIONS:  - Identify barriers to discharge w/patient and caregiver  - Arrange for needed discharge resources and transportation as appropriate  - Identify discharge learning needs (meds, wound care, etc.)  - Arrange for interpretive services to assist at discharge as needed  - Refer to Case Management Department for coordinating discharge planning if the patient needs post-hospital services based on physician/advanced practitioner order or complex needs related to functional status, cognitive ability, or social support system  Outcome: Progressing Problem: Knowledge Deficit  Goal: Patient/family/caregiver demonstrates understanding of disease process, treatment plan, medications, and discharge instructions  Description: Complete learning assessment and assess knowledge base. Interventions:  - Provide teaching at level of understanding  - Provide teaching via preferred learning methods  Outcome: Progressing     Problem: Nutrition/Hydration-ADULT  Goal: Nutrient/Hydration intake appropriate for improving, restoring or maintaining nutritional needs  Description: Monitor and assess patient's nutrition/hydration status for malnutrition. Collaborate with interdisciplinary team and initiate plan and interventions as ordered. Monitor patient's weight and dietary intake as ordered or per policy. Utilize nutrition screening tool and intervene as necessary. Determine patient's food preferences and provide high-protein, high-caloric foods as appropriate.      INTERVENTIONS:  - Monitor oral intake, urinary output, labs, and treatment plans  - Assess nutrition and hydration status and recommend course of action  - Evaluate amount of meals eaten  - Assist patient with eating if necessary   - Allow adequate time for meals  - Recommend/ encourage appropriate diets, oral nutritional supplements, and vitamin/mineral supplements  - Order, calculate, and assess calorie counts as needed  - Recommend, monitor, and adjust tube feedings and TPN/PPN based on assessed needs  - Assess need for intravenous fluids  - Provide specific nutrition/hydration education as appropriate  - Include patient/family/caregiver in decisions related to nutrition  Outcome: Progressing     Problem: METABOLIC, FLUID AND ELECTROLYTES - ADULT  Goal: Electrolytes maintained within normal limits  Description: INTERVENTIONS:  - Monitor labs and assess patient for signs and symptoms of electrolyte imbalances  - Administer electrolyte replacement as ordered  - Monitor response to electrolyte replacements, including repeat lab results as appropriate  - Instruct patient on fluid and nutrition as appropriate  Outcome: Progressing     Problem: SKIN/TISSUE INTEGRITY - ADULT  Goal: Incision(s), wounds(s) or drain site(s) healing without S/S of infection  Description: INTERVENTIONS  - Assess and document dressing, incision, wound bed, drain sites and surrounding tissue  - Provide patient and family education  - Perform skin care/dressing changes every shift  Problem: HEMATOLOGIC - ADULT  Goal: Maintains hematologic stability  Description: INTERVENTIONS  - Assess for signs and symptoms of bleeding or hemorrhage  - Monitor labs  - Administer supportive blood products/factors as ordered and appropriate  Outcome: Progressing     Problem: MUSCULOSKELETAL - ADULT  Goal: Maintain or return mobility to safest level of function  Description: INTERVENTIONS:  - Assess patient's ability to carry out ADLs; assess patient's baseline for ADL function and identify physical deficits which impact ability to perform ADLs (bathing, care of mouth/teeth, toileting, grooming, dressing, etc.)  - Assess/evaluate cause of self-care deficits   - Assess range of motion  - Assess patient's mobility  - Assess patient's need for assistive devices and provide as appropriate  - Encourage maximum independence but intervene and supervise when necessary  - Involve family in performance of ADLs  - Assess for home care needs following discharge   - Consider OT consult to assist with ADL evaluation and planning for discharge  - Provide patient education as appropriate  Outcome: Progressing  Goal: Maintain proper alignment of affected body part  Description: INTERVENTIONS:  - Support, maintain and protect limb and body alignment  - Provide patient/ family with appropriate education  Outcome: Progressing     Outcome: Progressing

## 2023-07-30 NOTE — ASSESSMENT & PLAN NOTE
Anemia panel consistent with anemia of chronic disease  Blood transfusion for hemoglobin below 7  Monitor

## 2023-07-30 NOTE — PROGRESS NOTES
[address] [facility]  Progress Note  Name: Rula Phillip  MRN: [id number]  Unit/Bed#: [facility] 801-01 I Date of Admission: 7/27/2023   Date of Service: 7/30/2023 I Hospital Day: 3    Assessment/Plan   * PAD (peripheral artery disease) ([address])  Assessment & Plan  Patient with hx of PAD sp right sided BKA  Now presenting with worsening multiple diabetic ulcerations of left leg and foot  Transferred for vascular carolin  He  was evaluated by vascular and podiatry  Left lower extremity x-ray suspicious for osteomyelitis  Podiatry ordered lower extremity MRI  Continue with IV Zosyn and vancomycin day # 3  Follow on MRSA culture  Negative blood culture  Per vascular plan for left below-knee amputation when cleared by podiatry    Anemia  Assessment & Plan  Anemia panel consistent with anemia of chronic disease  Blood transfusion for hemoglobin below 7  Monitor    Essential hypertension  Assessment & Plan  Stable BP  Continue to monitor    CAD (coronary artery disease)  Assessment & Plan  • Patient has a history of coronary artery disease, previous cardiac cath with diffuse CAD recommending GDMT  • Continue aspirin, Plavix statin, beta-blocker    Chronic systolic CHF (congestive heart failure) (HCC)  Assessment & Plan  Wt Readings from Last 3 Encounters:   07/30/23 70.7 kg (155 lb 13.8 oz)   07/26/23 67.7 kg (149 lb 4 oz)   05/18/23 59 kg (130 lb)     History of CHF with EF 25%  Euvolemic on exam  Continue with Entresto, Toprol-XL and Demadex  Cleared by cardiology to proceed with surgery  Cardiology is following      Type 2 diabetes mellitus with hyperglycemia, with long-term current use of insulin [facility])  Assessment & Plan  Lab Results   Component Value Date    HGBA1C 7.3 (H) 05/04/2023       Recent Labs     07/29/23  2106 07/30/23  0653 07/30/23  0727 07/30/23  1053   POCGLU 123 164* 153* 107       Blood Sugar Average: Last 72 hrs:  (P) 153.75   Patient refusing cardiac/diabetic diet  He wants regular diet   continue with Lantus and insulin sliding scale  Add Humalog with meals  Monitor             VTE Pharmacologic Prophylaxis: VTE Score: 4 Moderate Risk (Score 3-4) - Pharmacological DVT Prophylaxis Ordered: heparin. Patient Centered Rounds: I performed bedside rounds with nursing staff today. Discussions with Specialists or Other Care Team Provider:     Education and Discussions with Family / Patient: Patient. Total Time Spent on Date of Encounter in care of patient: 35 minutes This time was spent on one or more of the following: performing physical exam; counseling and coordination of care; obtaining or reviewing history; documenting in the medical record; reviewing/ordering tests, medications or procedures; communicating with other healthcare professionals and discussing with patient's family/caregivers. Current Length of Stay: 3 day(s)  Current Patient Status: Inpatient   Certification Statement: The patient will continue to require additional inpatient hospital stay due to Management of PAD  Discharge Plan: Anticipate discharge in >72 hrs to rehab facility. Code Status: Level 1 - Full Code    Subjective:     Patient is comfortable in bed  No complaints  No event overnight    Objective:     Vitals:   Temp (24hrs), Av.3 °F (36.8 °C), Min:97.9 °F (36.6 °C), Max:98.5 °F (36.9 °C)    Temp:  [97.9 °F (36.6 °C)-98.5 °F (36.9 °C)] 97.9 °F (36.6 °C)  HR:  [64-68] 65  Resp:  [16-18] 18  BP: (107-123)/(53-66) 123/66  SpO2:  [95 %] 95 %  Body mass index is 22.36 kg/m². Input and Output Summary (last 24 hours):      Intake/Output Summary (Last 24 hours) at 2023 1229  Last data filed at 2023 1200  Gross per 24 hour   Intake 1290 ml   Output 2350 ml   Net -1060 ml       Physical Exam:   Physical Exam     Patient is awake alert oriented no acute distress   Chronically ill-appearing, pale skin  Lungs clear to auscultation bilateral  Heart positive S1-S2 no murmur  Abdomen soft nontender  Status post right below-knee amputation  Left leg wrapped with dressing  Additional Data:     Labs:  Results from last 7 days   Lab Units 07/30/23  0502 07/29/23  0557 07/28/23  0443   WBC Thousand/uL  --  7.11 7.75   HEMOGLOBIN g/dL 7.7* 7.1* 7.5*   HEMATOCRIT % 25.8* 23.7* 24.9*   PLATELETS Thousands/uL  --  283 323   NEUTROS PCT %  --   --  70   LYMPHS PCT %  --   --  18   MONOS PCT %  --   --  7   EOS PCT %  --   --  5     Results from last 7 days   Lab Units 07/29/23  0557 07/28/23  0443   SODIUM mmol/L 140 141   POTASSIUM mmol/L 3.8 4.1   CHLORIDE mmol/L 106 104   CO2 mmol/L 32 35*   BUN mg/dL 42* 38*   CREATININE mg/dL 1.03 1.08   ANION GAP mmol/L 2 2   CALCIUM mg/dL 8.5 8.5   ALBUMIN g/dL  --  2.1*   TOTAL BILIRUBIN mg/dL  --  0.42   ALK PHOS U/L  --  425*   ALT U/L  --  42   AST U/L  --  30   GLUCOSE RANDOM mg/dL 87 204*     Results from last 7 days   Lab Units 07/28/23  0443   INR  1.33*     Results from last 7 days   Lab Units 07/30/23  1053 07/30/23  0727 07/30/23  0653 07/29/23  2106 07/29/23  1613 07/29/23  1139 07/29/23  0716 07/28/23  2117 07/28/23  1550 07/28/23  1108 07/28/23  0730 07/27/23  2108   POC GLUCOSE mg/dl 107 153* 164* 123 187* 110 105 160* 164* 159* 172* 241*         Results from last 7 days   Lab Units 07/26/23  1227   LACTIC ACID mmol/L 1.3   PROCALCITONIN ng/ml 0.05       Lines/Drains:  Invasive Devices     Peripheral Intravenous Line  Duration           Peripheral IV 07/26/23 Left Forearm 4 days    Peripheral IV 07/30/23 Right;Ventral (anterior) Forearm <1 day                      Imaging: No pertinent imaging reviewed. Recent Cultures (last 7 days):   Results from last 7 days   Lab Units 07/27/23  2252 07/26/23  1227   BLOOD CULTURE  No Growth at 48 hrs. No Growth at 48 hrs. No Growth at 72 hrs. No Growth at 72 hrs.        Last 24 Hours Medication List:   Current Facility-Administered Medications   Medication Dose Route Frequency Provider Last Rate   • aspirin  81 mg Oral Daily Brandyn Banai, DO     • atorvastatin  40 mg Oral Daily With [employer], DO     • clopidogrel  75 mg Oral Daily Brandyn Banai, DO     • escitalopram  5 mg Oral Daily Brandyn Banai, DO     • finasteride  5 mg Oral Daily Brandyn Banai, DO     • heparin (porcine)  5,000 Units Subcutaneous Q8H [address] Brandyn Banai, DO     • insulin glargine  15 Units Subcutaneous HS Brandyn Banai, DO     • insulin lispro  1-6 Units Subcutaneous TID AC Brandyn Rosalesai, DO     • insulin lispro  5 Units Subcutaneous TID With Meals Albert Pierson, DO     • metoprolol succinate  50 mg Oral Q12H [address] Brandyn Banai, DO     • mirtazapine  7.5 mg Oral HS Brandyn Banai, DO     • pantoprazole  40 mg Oral Daily Brandyn Banai, DO     • piperacillin-tazobactam  3.375 g Intravenous Q6H Brandyn Banai, DO Stopped (07/30/23 3100)   • sacubitril-valsartan  1 tablet Oral BID Nabeel Meléndez PA-C     • tamsulosin  0.4 mg Oral Daily With [employer], DO     • torsemide  20 mg Oral Daily JANIS Edgar     • vancomycin  1,250 mg Intravenous Q24H Albert Pierson DO          Today, Patient Was Seen By: Albert Pierson DO    **Please Note: This note may have been constructed using a voice recognition system. **

## 2023-07-30 NOTE — ASSESSMENT & PLAN NOTE
Patient with hx of PAD sp right sided BKA  Now presenting with worsening multiple diabetic ulcerations of left leg and foot  Transferred for vascular carolin  He  was evaluated by vascular and podiatry  Left lower extremity x-ray suspicious for osteomyelitis  Podiatry ordered lower extremity MRI  Continue with IV Zosyn and vancomycin day # 3  Follow on MRSA culture  Negative blood culture  Per vascular plan for left below-knee amputation when cleared by podiatry

## 2023-07-30 NOTE — PLAN OF CARE
Problem: PAIN - ADULT  Goal: Verbalizes/displays adequate comfort level or baseline comfort level  Description: Interventions:  - Encourage patient to monitor pain and request assistance  - Assess pain using appropriate pain scale  - Administer analgesics based on type and severity of pain and evaluate response  - Implement non-pharmacological measures as appropriate and evaluate response  - Consider cultural and social influences on pain and pain management  - Notify physician/advanced practitioner if interventions unsuccessful or patient reports new pain  Outcome: Progressing     Problem: PAIN - ADULT  Goal: Verbalizes/displays adequate comfort level or baseline comfort level  Description: Interventions:  - Encourage patient to monitor pain and request assistance  - Assess pain using appropriate pain scale  - Administer analgesics based on type and severity of pain and evaluate response  - Implement non-pharmacological measures as appropriate and evaluate response  - Consider cultural and social influences on pain and pain management  - Notify physician/advanced practitioner if interventions unsuccessful or patient reports new pain  Outcome: Progressing     Problem: PAIN - ADULT  Goal: Verbalizes/displays adequate comfort level or baseline comfort level  Description: Interventions:  - Encourage patient to monitor pain and request assistance  - Assess pain using appropriate pain scale  - Administer analgesics based on type and severity of pain and evaluate response  - Implement non-pharmacological measures as appropriate and evaluate response  - Consider cultural and social influences on pain and pain management  - Notify physician/advanced practitioner if interventions unsuccessful or patient reports new pain  Outcome: Progressing     Problem: INFECTION - ADULT  Goal: Absence or prevention of progression during hospitalization  Description: INTERVENTIONS:  - Assess and monitor for signs and symptoms of infection  - Monitor lab/diagnostic results  - Monitor all insertion sites, i.e. indwelling lines, tubes, and drains  - Monitor endotracheal if appropriate and nasal secretions for changes in amount and color  - [place] appropriate cooling/warming therapies per order  - Administer medications as ordered  - Instruct and encourage patient and family to use good hand hygiene technique  - Identify and instruct in appropriate isolation precautions for identified infection/condition  Outcome: Progressing     Problem: DISCHARGE PLANNING  Goal: Discharge to home or other facility with appropriate resources  Description: INTERVENTIONS:  - Identify barriers to discharge w/patient and caregiver  - Arrange for needed discharge resources and transportation as appropriate  - Identify discharge learning needs (meds, wound care, etc.)  - Arrange for interpretive services to assist at discharge as needed  - Refer to Case Management Department for coordinating discharge planning if the patient needs post-hospital services based on physician/advanced practitioner order or complex needs related to functional status, cognitive ability, or social support system  Outcome: Progressing     Problem: Knowledge Deficit  Goal: Patient/family/caregiver demonstrates understanding of disease process, treatment plan, medications, and discharge instructions  Description: Complete learning assessment and assess knowledge base. Interventions:  - Provide teaching at level of understanding  - Provide teaching via preferred learning methods  Outcome: Progressing     Problem: Nutrition/Hydration-ADULT  Goal: Nutrient/Hydration intake appropriate for improving, restoring or maintaining nutritional needs  Description: Monitor and assess patient's nutrition/hydration status for malnutrition. Collaborate with interdisciplinary team and initiate plan and interventions as ordered. Monitor patient's weight and dietary intake as ordered or per policy.  Utilize nutrition screening tool and intervene as necessary. Determine patient's food preferences and provide high-protein, high-caloric foods as appropriate.      INTERVENTIONS:  - Monitor oral intake, urinary output, labs, and treatment plans  - Assess nutrition and hydration status and recommend course of action  - Evaluate amount of meals eaten  - Assist patient with eating if necessary   - Allow adequate time for meals  - Recommend/ encourage appropriate diets, oral nutritional supplements, and vitamin/mineral supplements  - Order, calculate, and assess calorie counts as needed  - Recommend, monitor, and adjust tube feedings and TPN/PPN based on assessed needs  - Assess need for intravenous fluids  - Provide specific nutrition/hydration education as appropriate  - Include patient/family/caregiver in decisions related to nutrition  Outcome: Progressing     Problem: METABOLIC, FLUID AND ELECTROLYTES - ADULT  Goal: Electrolytes maintained within normal limits  Description: INTERVENTIONS:  - Monitor labs and assess patient for signs and symptoms of electrolyte imbalances  - Administer electrolyte replacement as ordered  - Monitor response to electrolyte replacements, including repeat lab results as appropriate  - Instruct patient on fluid and nutrition as appropriate  Outcome: Progressing     Problem: SKIN/TISSUE INTEGRITY - ADULT  Goal: Incision(s), wounds(s) or drain site(s) healing without S/S of infection  Description: INTERVENTIONS  - Assess and document dressing, incision, wound bed, drain sites and surrounding tissue  - Provide patient and family education    Outcome: Progressing     Problem: HEMATOLOGIC - ADULT  Goal: Maintains hematologic stability  Description: INTERVENTIONS  - Assess for signs and symptoms of bleeding or hemorrhage  - Monitor labs  - Administer supportive blood products/factors as ordered and appropriate  Outcome: Progressing     Problem: MUSCULOSKELETAL - ADULT  Goal: Maintain or return mobility to safest level of function  Description: INTERVENTIONS:  - Assess patient's ability to carry out ADLs; assess patient's baseline for ADL function and identify physical deficits which impact ability to perform ADLs (bathing, care of mouth/teeth, toileting, grooming, dressing, etc.)  - Assess/evaluate cause of self-care deficits   - Assess range of motion  - Assess patient's mobility  - Assess patient's need for assistive devices and provide as appropriate  - Encourage maximum independence but intervene and supervise when necessary  - Involve family in performance of ADLs  - Assess for home care needs following discharge   - Consider OT consult to assist with ADL evaluation and planning for discharge  - Provide patient education as appropriate  Outcome: Progressing  Goal: Maintain proper alignment of affected body part  Description: INTERVENTIONS:  - Support, maintain and protect limb and body alignment  - Provide patient/ family with appropriate education  Outcome: Progressing

## 2023-07-30 NOTE — ASSESSMENT & PLAN NOTE
Lab Results   Component Value Date    HGBA1C 7.3 (H) 05/04/2023       Recent Labs     07/29/23  2106 07/30/23  0653 07/30/23  0727 07/30/23  1053   POCGLU 123 164* 153* 107       Blood Sugar Average: Last 72 hrs:  (P) 153.75   Patient refusing cardiac/diabetic diet  He wants regular diet   continue with Lantus and Humalog with meals  Continue insulin sliding scale  Monitor

## 2023-07-30 NOTE — PHYSICAL THERAPY NOTE
Physical Therapy Cancellation Note    Patient's Name: Kurt Rosales       07/30/23 6230   Note Type   Note type Cancelled Session   Cancel Reasons Medical status   Additional Comments Chart reviewed. Per vascular surgery, plans for L BKA. Will complete PT order. Please re-consult post-op as appropriate. Thank you.          Nayeli Ortiz, PT, DPT, GCS

## 2023-07-30 NOTE — PROGRESS NOTES
[facility] Podiatry - Progress Note  Patient: Yun Vegas 69 y.o. male   MRN: [id number]  PCP: Ugo Castillo DO  Unit/Bed#: [facility] 801-01 Encounter: [id number]  Date Of Visit: 23    ASSESSMENT:    Yun Vegas is a 69 y.o. male with:    1. Multiple diabetic ulcerations of left leg and foot  2. Type 2 Diabetes Mellitus  3. PAD  4. CHF  5. CAD  6. History of Right BKA      PLAN:    · Dressed changed today. Discussed MRI and BKA recommendation from Vascular Surgery. Patient is amenable to left BKA. Plan to discuss with Vascular and follow up MRI if study is possible. · MRI delayed due to patient's medtronic pacemaker. MRI will reach on to [employer] rep on Monday to see if patient can have MRI. · Continue local wound care betadine, appreciate nursing assistance with dressing changes. · Continue IV Zosyn and Vancomycin per primary team.  · Elevation on green foam wedges or pillows when non-ambulatory. · Rest of care per primary team.    Weight bearing status: Weightbearing at tolerated      SUBJECTIVE:     The patient was seen, evaluated, and assessed at bedside today. The patient was awake, alert, and in no acute distress. No acute events overnight. The patient reports he feels okay and he would prefer to not have an amputation, he understands that he may not be able to heal the wounds and infection in his left leg. Patient denies N/V/F/chills/SOB/CP. OBJECTIVE:     Vitals:   BP (!) 100/46   Pulse 62   Temp (!) 97.4 °F (36.3 °C)   Resp 20   Ht 5' 10" (1.778 m)   Wt 70.7 kg (155 lb 13.8 oz)   SpO2 93%   BMI 22.36 kg/m²     Temp (24hrs), Av.9 °F (36.6 °C), Min:97.4 °F (36.3 °C), Max:98.5 °F (36.9 °C)      Physical Exam:     General:  Alert, cooperative, and in no distress. Lower extremity exam:  Cardiovascular status at baseline. Neurological status at baseline. Musculoskeletal status at baseline. No calf tenderness noted.      Lower extremity wound(s) as noted below:  Wound #: 1   Location: Left lateral leg   Length 7.5 cm: Width 4.0 cm: Depth Unknown   Deepest Tissue Noted in Base: Eschar  Probe to Bone: No  Peripheral Skin Description: Attached  Granulation: 0% Fibrotic Tissue: 30% Necrotic Tissue: 70%   Drainage Amount: minimal, serous  Signs of Infection: Yes, malodor present    Wound #: 2  Location: Left lateral ankle  Length 2.7 cm: Width 2.6 cm: Depth unknown    Deepest Tissue Noted in Base: Eschar  Probe to Bone: No  Peripheral Skin Description: Attached  Granulation: 0% Fibrotic Tissue: 30% Necrotic Tissue: 70%   Drainage Amount: minimal, serous  Signs of Infection: Yes, malodor present    Wound #: 3  Location: Left lateral heel  Length 1.0 cm: Width 1.6 cm: Depth Unknown    Deepest Tissue Noted in Base: Eschar  Probe to Bone: No  Peripheral Skin Description: Attached  Granulation: 0% Fibrotic Tissue: 10% Necrotic Tissue: 90%   Drainage Amount: minimal  Signs of Infection: Yes, malodor present    Wound #: 4  Location: Left posterior heel  Length 3.4 cm: Width 2 cm: Depth unknown   Deepest Tissue Noted in Base: Eschar  Probe to Bone: No  Peripheral Skin Description: Attached  Granulation: 0% Fibrotic Tissue: 0% Necrotic Tissue: 100%   Drainage Amount: minimal  Signs of Infection: Yes, malodor present    Clinical Images 07/30/23:                   Additional Data:     Labs:    Results from last 7 days   Lab Units 07/30/23  0502 07/29/23  0557 07/28/23  0443   WBC Thousand/uL  --  7.11 7.75   HEMOGLOBIN g/dL 7.7* 7.1* 7.5*   HEMATOCRIT % 25.8* 23.7* 24.9*   PLATELETS Thousands/uL  --  283 323   NEUTROS PCT %  --   --  70   LYMPHS PCT %  --   --  18   MONOS PCT %  --   --  7   EOS PCT %  --   --  5     Results from last 7 days   Lab Units 07/29/23  0557 07/28/23  0443   POTASSIUM mmol/L 3.8 4.1   CHLORIDE mmol/L 106 104   CO2 mmol/L 32 35*   BUN mg/dL 42* 38*   CREATININE mg/dL 1.03 1.08   CALCIUM mg/dL 8.5 8.5   ALK PHOS U/L  --  425*   ALT U/L  --  42   AST U/L  --  30     Results from last 7 days   Lab Units 07/28/23  0443   INR  1.33*       * I Have Reviewed All Lab Data Listed Above. Recent Cultures (last 7 days):     Results from last 7 days   Lab Units 07/27/23  2252 07/26/23  1227   BLOOD CULTURE  No Growth at 48 hrs. No Growth at 48 hrs. No Growth at 72 hrs. No Growth at 72 hrs. Imaging: I have personally reviewed pertinent films in PACS  EKG, Pathology, and Other Studies: I have personally reviewed pertinent reports. ** Please Note: Portions of the record may have been created with voice recognition software. Occasional wrong word or "sound a like" substitutions may have occurred due to the inherent limitations of voice recognition software. Read the chart carefully and recognize, using context, where substitutions have occurred.  **

## 2023-07-30 NOTE — ASSESSMENT & PLAN NOTE
Wt Readings from Last 3 Encounters:   07/30/23 70.7 kg (155 lb 13.8 oz)   07/26/23 67.7 kg (149 lb 4 oz)   05/18/23 59 kg (130 lb)     History of CHF with EF 25%  Euvolemic on exam  Continue with Entresto, Toprol-XL and Demadex  Cleared by cardiology to proceed with surgery  Cardiology is following

## 2023-07-31 LAB
BACTERIA BLD CULT: NORMAL
BACTERIA BLD CULT: NORMAL
GLUCOSE SERPL-MCNC: 104 MG/DL (ref 65–140)
GLUCOSE SERPL-MCNC: 133 MG/DL (ref 65–140)
GLUCOSE SERPL-MCNC: 146 MG/DL (ref 65–140)
GLUCOSE SERPL-MCNC: 220 MG/DL (ref 65–140)

## 2023-07-31 PROCEDURE — 82948 REAGENT STRIP/BLOOD GLUCOSE: CPT

## 2023-07-31 PROCEDURE — 99232 SBSQ HOSP IP/OBS MODERATE 35: CPT | Performed by: INTERNAL MEDICINE

## 2023-07-31 RX ORDER — INSULIN LISPRO 100 [IU]/ML
8 INJECTION, SOLUTION INTRAVENOUS; SUBCUTANEOUS
Status: DISCONTINUED | OUTPATIENT
Start: 2023-07-31 | End: 2023-08-02

## 2023-07-31 RX ORDER — POTASSIUM CHLORIDE 20 MEQ/1
20 TABLET, EXTENDED RELEASE ORAL DAILY
Status: DISCONTINUED | OUTPATIENT
Start: 2023-07-31 | End: 2023-08-02

## 2023-07-31 RX ADMIN — HEPARIN SODIUM 5000 UNITS: 5000 INJECTION INTRAVENOUS; SUBCUTANEOUS at 21:12

## 2023-07-31 RX ADMIN — METOPROLOL SUCCINATE 50 MG: 50 TABLET, EXTENDED RELEASE ORAL at 08:09

## 2023-07-31 RX ADMIN — POTASSIUM CHLORIDE 20 MEQ: 1500 TABLET, EXTENDED RELEASE ORAL at 11:45

## 2023-07-31 RX ADMIN — ESCITSLOPRAM 5 MG: 5 TABLET ORAL at 08:09

## 2023-07-31 RX ADMIN — HEPARIN SODIUM 5000 UNITS: 5000 INJECTION INTRAVENOUS; SUBCUTANEOUS at 04:50

## 2023-07-31 RX ADMIN — FINASTERIDE 5 MG: 5 TABLET, FILM COATED ORAL at 08:09

## 2023-07-31 RX ADMIN — PIPERACILLIN SODIUM AND TAZOBACTAM SODIUM 3.38 G: 36; 4.5 INJECTION, POWDER, LYOPHILIZED, FOR SOLUTION INTRAVENOUS at 21:12

## 2023-07-31 RX ADMIN — PIPERACILLIN SODIUM AND TAZOBACTAM SODIUM 3.38 G: 36; 4.5 INJECTION, POWDER, LYOPHILIZED, FOR SOLUTION INTRAVENOUS at 04:22

## 2023-07-31 RX ADMIN — CLOPIDOGREL BISULFATE 75 MG: 75 TABLET ORAL at 08:09

## 2023-07-31 RX ADMIN — TORSEMIDE 20 MG: 20 TABLET ORAL at 08:09

## 2023-07-31 RX ADMIN — ATORVASTATIN CALCIUM 40 MG: 40 TABLET, FILM COATED ORAL at 15:17

## 2023-07-31 RX ADMIN — TAMSULOSIN HYDROCHLORIDE 0.4 MG: 0.4 CAPSULE ORAL at 15:17

## 2023-07-31 RX ADMIN — INSULIN LISPRO 8 UNITS: 100 INJECTION, SOLUTION INTRAVENOUS; SUBCUTANEOUS at 17:17

## 2023-07-31 RX ADMIN — ASPIRIN 81 MG CHEWABLE TABLET 81 MG: 81 TABLET CHEWABLE at 08:09

## 2023-07-31 RX ADMIN — INSULIN LISPRO 5 UNITS: 100 INJECTION, SOLUTION INTRAVENOUS; SUBCUTANEOUS at 08:10

## 2023-07-31 RX ADMIN — INSULIN GLARGINE 15 UNITS: 100 INJECTION, SOLUTION SUBCUTANEOUS at 21:12

## 2023-07-31 RX ADMIN — INSULIN LISPRO 2 UNITS: 100 INJECTION, SOLUTION INTRAVENOUS; SUBCUTANEOUS at 08:10

## 2023-07-31 RX ADMIN — PIPERACILLIN SODIUM AND TAZOBACTAM SODIUM 3.38 G: 36; 4.5 INJECTION, POWDER, LYOPHILIZED, FOR SOLUTION INTRAVENOUS at 15:17

## 2023-07-31 RX ADMIN — VANCOMYCIN HYDROCHLORIDE 1250 MG: 10 INJECTION, POWDER, LYOPHILIZED, FOR SOLUTION INTRAVENOUS at 16:05

## 2023-07-31 RX ADMIN — SACUBITRIL AND VALSARTAN 1 TABLET: 49; 51 TABLET, FILM COATED ORAL at 08:10

## 2023-07-31 RX ADMIN — INSULIN LISPRO 8 UNITS: 100 INJECTION, SOLUTION INTRAVENOUS; SUBCUTANEOUS at 11:46

## 2023-07-31 RX ADMIN — PANTOPRAZOLE SODIUM 40 MG: 40 TABLET, DELAYED RELEASE ORAL at 04:51

## 2023-07-31 RX ADMIN — PIPERACILLIN SODIUM AND TAZOBACTAM SODIUM 3.38 G: 36; 4.5 INJECTION, POWDER, LYOPHILIZED, FOR SOLUTION INTRAVENOUS at 08:09

## 2023-07-31 RX ADMIN — SACUBITRIL AND VALSARTAN 1 TABLET: 49; 51 TABLET, FILM COATED ORAL at 17:21

## 2023-07-31 RX ADMIN — HEPARIN SODIUM 5000 UNITS: 5000 INJECTION INTRAVENOUS; SUBCUTANEOUS at 15:17

## 2023-07-31 RX ADMIN — MIRTAZAPINE 7.5 MG: 15 TABLET, FILM COATED ORAL at 21:12

## 2023-07-31 NOTE — PLAN OF CARE
Problem: PAIN - ADULT  Goal: Verbalizes/displays adequate comfort level or baseline comfort level  Description: Interventions:  - Encourage patient to monitor pain and request assistance  - Assess pain using appropriate pain scale  - Administer analgesics based on type and severity of pain and evaluate response  - Implement non-pharmacological measures as appropriate and evaluate response  - Consider cultural and social influences on pain and pain management  - Notify physician/advanced practitioner if interventions unsuccessful or patient reports new pain  Outcome: Progressing     Problem: INFECTION - ADULT  Goal: Absence or prevention of progression during hospitalization  Description: INTERVENTIONS:  - Assess and monitor for signs and symptoms of infection  - Monitor lab/diagnostic results  - Monitor all insertion sites, i.e. indwelling lines, tubes, and drains  - Monitor endotracheal if appropriate and nasal secretions for changes in amount and color  - [place] appropriate cooling/warming therapies per order  - Administer medications as ordered  - Instruct and encourage patient and family to use good hand hygiene technique  - Identify and instruct in appropriate isolation precautions for identified infection/condition  Outcome: Progressing     Problem: DISCHARGE PLANNING  Goal: Discharge to home or other facility with appropriate resources  Description: INTERVENTIONS:  - Identify barriers to discharge w/patient and caregiver  - Arrange for needed discharge resources and transportation as appropriate  - Identify discharge learning needs (meds, wound care, etc.)  - Arrange for interpretive services to assist at discharge as needed  - Refer to Case Management Department for coordinating discharge planning if the patient needs post-hospital services based on physician/advanced practitioner order or complex needs related to functional status, cognitive ability, or social support system  Outcome: Progressing Problem: Knowledge Deficit  Goal: Patient/family/caregiver demonstrates understanding of disease process, treatment plan, medications, and discharge instructions  Description: Complete learning assessment and assess knowledge base. Interventions:  - Provide teaching at level of understanding  - Provide teaching via preferred learning methods  Outcome: Progressing     Problem: Nutrition/Hydration-ADULT  Goal: Nutrient/Hydration intake appropriate for improving, restoring or maintaining nutritional needs  Description: Monitor and assess patient's nutrition/hydration status for malnutrition. Collaborate with interdisciplinary team and initiate plan and interventions as ordered. Monitor patient's weight and dietary intake as ordered or per policy. Utilize nutrition screening tool and intervene as necessary. Determine patient's food preferences and provide high-protein, high-caloric foods as appropriate.      INTERVENTIONS:  - Monitor oral intake, urinary output, labs, and treatment plans  - Assess nutrition and hydration status and recommend course of action  - Evaluate amount of meals eaten  - Assist patient with eating if necessary   - Allow adequate time for meals  - Recommend/ encourage appropriate diets, oral nutritional supplements, and vitamin/mineral supplements  - Order, calculate, and assess calorie counts as needed  - Recommend, monitor, and adjust tube feedings and TPN/PPN based on assessed needs  - Assess need for intravenous fluids  - Provide specific nutrition/hydration education as appropriate  - Include patient/family/caregiver in decisions related to nutrition  Outcome: Progressing     Problem: METABOLIC, FLUID AND ELECTROLYTES - ADULT  Goal: Electrolytes maintained within normal limits  Description: INTERVENTIONS:  - Monitor labs and assess patient for signs and symptoms of electrolyte imbalances  - Administer electrolyte replacement as ordered  - Monitor response to electrolyte replacements, including repeat lab results as appropriate  - Instruct patient on fluid and nutrition as appropriate  Outcome: Progressing     Problem: SKIN/TISSUE INTEGRITY - ADULT  Goal: Incision(s), wounds(s) or drain site(s) healing without S/S of infection  Description: INTERVENTIONS  - Assess and document dressing, incision, wound bed, drain sites and surrounding tissue  - Provide patient and family education    Outcome: Progressing     Problem: HEMATOLOGIC - ADULT  Goal: Maintains hematologic stability  Description: INTERVENTIONS  - Assess for signs and symptoms of bleeding or hemorrhage  - Monitor labs  - Administer supportive blood products/factors as ordered and appropriate  Outcome: Progressing     Problem: MUSCULOSKELETAL - ADULT  Goal: Maintain or return mobility to safest level of function  Description: INTERVENTIONS:  - Assess patient's ability to carry out ADLs; assess patient's baseline for ADL function and identify physical deficits which impact ability to perform ADLs (bathing, care of mouth/teeth, toileting, grooming, dressing, etc.)  - Assess/evaluate cause of self-care deficits   - Assess range of motion  - Assess patient's mobility  - Assess patient's need for assistive devices and provide as appropriate  - Encourage maximum independence but intervene and supervise when necessary  - Involve family in performance of ADLs  - Assess for home care needs following discharge   - Consider OT consult to assist with ADL evaluation and planning for discharge  - Provide patient education as appropriate  Outcome: Progressing  Goal: Maintain proper alignment of affected body part  Description: INTERVENTIONS:  - Support, maintain and protect limb and body alignment  - Provide patient/ family with appropriate education  Outcome: Progressing     Problem: Prexisting or High Potential for Compromised Skin Integrity  Goal: Skin integrity is maintained or improved  Description: INTERVENTIONS:  - Identify patients at risk for skin breakdown  - Assess and monitor skin integrity  - Assess and monitor nutrition and hydration status  - Monitor labs   - Assess for incontinence   - Turn and reposition patient  - Assist with mobility/ambulation  - Relieve pressure over bony prominences  - Avoid friction and shearing  - Provide appropriate hygiene as needed including keeping skin clean and dry  - Evaluate need for skin moisturizer/barrier cream  - Collaborate with interdisciplinary team   - Patient/family teaching  - Consider wound care consult   Outcome: Progressing

## 2023-07-31 NOTE — PROGRESS NOTES
[facility]  Progress Note  Name: Pravin Merida  MRN: [id number]  Unit/Bed#: [facility] 801-01 I Date of Admission: 7/27/2023   Date of Service: 7/31/2023 I Hospital Day: 4    Assessment/Plan   * PAD (peripheral artery disease) ([address] )  Assessment & Plan  Patient with hx of PAD sp right sided BKA  Now presenting with worsening multiple diabetic ulcerations of left leg and foot  Transferred for vascular carolin  He  was evaluated by vascular and podiatry  Left lower extremity x-ray suspicious for osteomyelitis  Podiatry ordered lower extremity MRI. Does MRI change the management since patient is going for below-knee amputation?    Continue with IV Zosyn and vancomycin day # 4  Follow on MRSA culture, if negative discontinue vancomycin  Negative blood culture  Per vascular plan for left below-knee amputation when cleared by podiatry    Anemia  Assessment & Plan  Anemia panel consistent with anemia of chronic disease  Blood transfusion for hemoglobin below 7  Monitor    Essential hypertension  Assessment & Plan  Stable BP  Continue to monitor    CAD (coronary artery disease)  Assessment & Plan  • Patient has a history of coronary artery disease, previous cardiac cath with diffuse CAD recommending GDMT  • Continue aspirin, Plavix statin, beta-blocker    Chronic systolic CHF (congestive heart failure) (HCC)  Assessment & Plan  Wt Readings from Last 3 Encounters:   07/31/23 71 kg (156 lb 8.4 oz)   07/26/23 67.7 kg (149 lb 4 oz)   05/18/23 59 kg (130 lb)     History of CHF with EF 25%  Euvolemic on exam  Continue with Entresto, Toprol-XL and Demadex  Cleared by cardiology to proceed with surgery  Cardiology is following      Type 2 diabetes mellitus with hyperglycemia, with long-term current use of insulin [facility])  Assessment & Plan  Lab Results   Component Value Date    HGBA1C 7.3 (H) 05/04/2023       Recent Labs     07/30/23  1053 07/30/23  1604 07/30/23  2108 07/31/23  0651   POCGLU 107 72 170* 220*       Blood Sugar Average: Last 72 hrs:  (P) [id number]   Patient refusing cardiac/diabetic diet  He wants regular diet   continue with Lantus and Humalog with meals  Continue insulin sliding scale  Monitor           VTE Pharmacologic Prophylaxis: VTE Score: 4 Moderate Risk (Score 3-4) - Pharmacological DVT Prophylaxis Ordered: heparin. Patient Centered Rounds: I performed bedside rounds with nursing staff today. Discussions with Specialists or Other Care Team Provider: TRACY    Education and Discussions with Family / Patient: Patient declined call to . Total Time Spent on Date of Encounter in care of patient: 35 minutes This time was spent on one or more of the following: performing physical exam; counseling and coordination of care; obtaining or reviewing history; documenting in the medical record; reviewing/ordering tests, medications or procedures; communicating with other healthcare professionals and discussing with patient's family/caregivers. Current Length of Stay: 4 day(s)  Current Patient Status: Inpatient   Certification Statement: The patient will continue to require additional inpatient hospital stay due to Left below-knee amputation  Discharge Plan: Anticipate discharge in >72 hrs to rehab facility. Code Status: Level 1 - Full Code    Subjective:   Patient seen and examined  Comfortable in bed   no event overnight  No complaints    Objective:     Vitals:   Temp (24hrs), Av.6 °F (36.4 °C), Min:97.4 °F (36.3 °C), Max:97.9 °F (36.6 °C)    Temp:  [97.4 °F (36.3 °C)-97.9 °F (36.6 °C)] 97.6 °F (36.4 °C)  HR:  [62-67] 65  Resp:  [16-20] 16  BP: (100-125)/(46-60) 124/59  SpO2:  [93 %-100 %] 100 %  Body mass index is 22.46 kg/m². Input and Output Summary (last 24 hours):      Intake/Output Summary (Last 24 hours) at 2023 1128  Last data filed at 2023 1022  Gross per 24 hour   Intake 1040 ml   Output 1775 ml   Net -735 ml       Physical Exam: Physical Exam   Patient is awake alert oriented no acute distress   Chronically ill-appearing, pale skin  Lungs clear to auscultation bilateral  Heart positive S1-S2 no murmur  Abdomen soft nontender  Status post right below-knee amputation  Left leg wrapped with dressing  Additional Data:     Labs:  Results from last 7 days   Lab Units 07/30/23  0502 07/29/23  0557 07/28/23  0443   WBC Thousand/uL  --  7.11 7.75   HEMOGLOBIN g/dL 7.7* 7.1* 7.5*   HEMATOCRIT % 25.8* 23.7* 24.9*   PLATELETS Thousands/uL  --  283 323   NEUTROS PCT %  --   --  70   LYMPHS PCT %  --   --  18   MONOS PCT %  --   --  7   EOS PCT %  --   --  5     Results from last 7 days   Lab Units 07/29/23  0557 07/28/23  0443   SODIUM mmol/L 140 141   POTASSIUM mmol/L 3.8 4.1   CHLORIDE mmol/L 106 104   CO2 mmol/L 32 35*   BUN mg/dL 42* 38*   CREATININE mg/dL 1.03 1.08   ANION GAP mmol/L 2 2   CALCIUM mg/dL 8.5 8.5   ALBUMIN g/dL  --  2.1*   TOTAL BILIRUBIN mg/dL  --  0.42   ALK PHOS U/L  --  425*   ALT U/L  --  42   AST U/L  --  30   GLUCOSE RANDOM mg/dL 87 204*     Results from last 7 days   Lab Units 07/28/23  0443   INR  1.33*     Results from last 7 days   Lab Units 07/31/23  1124 07/31/23  0651 07/30/23  2108 07/30/23  1604 07/30/23  1053 07/30/23  0727 07/30/23  0653 07/29/23  2106 07/29/23  1613 07/29/23  1139 07/29/23  0716 07/28/23  2117   POC GLUCOSE mg/dl 146* 220* 170* 72 107 153* 164* 123 187* 110 105 160*         Results from last 7 days   Lab Units 07/26/23  1227   LACTIC ACID mmol/L 1.3   PROCALCITONIN ng/ml 0.05       Lines/Drains:  Invasive Devices     Peripheral Intravenous Line  Duration           Peripheral IV 07/30/23 Right;Ventral (anterior) Forearm 1 day                      Imaging: No pertinent imaging reviewed. Recent Cultures (last 7 days):   Results from last 7 days   Lab Units 07/27/23  1372 07/26/23  1227   BLOOD CULTURE  No Growth at 72 hrs. No Growth at 72 hrs. No Growth After 4 Days. No Growth After 4 Days. Last 24 Hours Medication List:   Current Facility-Administered Medications   Medication Dose Route Frequency Provider Last Rate   • aspirin  81 mg Oral Daily Brandyn Dalton, DO     • atorvastatin  40 mg Oral Daily With [employer], DO     • clopidogrel  75 mg Oral Daily Brandynshayne Dalton, DO     • escitalopram  5 mg Oral Daily Brandyn Rosalesai, DO     • finasteride  5 mg Oral Daily Brandyn Rosalesai, DO     • heparin (porcine)  5,000 Units Subcutaneous Q8H [facility] Krystle, DO     • insulin glargine  15 Units Subcutaneous HS Brandynshayne Rosalesai, DO     • insulin lispro  1-6 Units Subcutaneous TID AC Brandynshayne Dalton, DO     • insulin lispro  8 Units Subcutaneous TID With Meals Edwar Daily, DO     • metoprolol succinate  50 mg Oral Q12H [facility]     • mirtazapine  7.5 mg Oral HS [facility] Krystle, DO     • pantoprazole  40 mg Oral Daily Brandynshayne Dalton, DO     • piperacillin-tazobactam  3.375 g Intravenous Q6H Brandyn Dalton, DO Stopped (07/31/23 0845)   • potassium chloride  20 mEq Oral Daily JANIS Cole     • sacubitril-valsartan  1 tablet Oral BID Adam Gutierrez PA-C     • tamsulosin  0.4 mg Oral Daily With Dinner Brandyn Gomez, DO     • torsemide  20 mg Oral Daily JANIS Beatty     • vancomycin  1,250 mg Intravenous Q24H Edwar Daily, DO 1,250 mg (07/30/23 1647)        Today, Patient Was Seen By: Edwar Daily, DO    **Please Note: This note may have been constructed using a voice recognition system. **

## 2023-07-31 NOTE — PROGRESS NOTES
Attempted to visit patient when rounding on the unit. Introduced self and cultivated a relationship of care and support. Patient thanked for stop by his room and declined  support today.  remains availability.    07/31/23 1400   Clinical Encounter Type   Visited With Patient   Routine Visit Introduction

## 2023-07-31 NOTE — ASSESSMENT & PLAN NOTE
Lab Results   Component Value Date    HGBA1C 7.3 (H) 05/04/2023       Recent Labs     07/30/23  1053 07/30/23  1604 07/30/23  2108 07/31/23  0651   POCGLU 107 72 170* 220*       Blood Sugar Average: Last 72 hrs:  (P) 147.4804206357521174   Patient refusing cardiac/diabetic diet  He wants regular diet   continue with Lantus and Humalog with meals  Continue insulin sliding scale  Monitor

## 2023-07-31 NOTE — OCCUPATIONAL THERAPY NOTE
Occupational Therapy Cancel Note     07/31/23 0741   OT Last Visit   OT Visit Date 07/31/23   Note Type   Note type Cancelled Session   Cancel Reasons Medical status     OT orders received and chart reviewed. Pt with plans for L BKA per podiatry. Pt with no acute OT needs at this time. DC skilled OT services. Please re-consult post-op. Thank you.     Wilmer Pichardo MS, OTR/L

## 2023-07-31 NOTE — PROGRESS NOTES
General Cardiology   Progress Note -  Team One   [facility] 69 y.o. male MRN: [id number]    Unit/Bed#: [facility] 801-01 Encounter: [id number]    Assessment  1. Pre operative cardiac re stratification  -Problem: PAD w/ nonhealing LLE wound w/ concern for osteomyelitis  -Surgery: LLE amputation   -Timing: TBD  -Functional status: Limited, mostly restricted to wheelchair and bed, since Rt BK amputation  -No evidence of acutely decompensated CHF. No recent subjective respiratory complaints  -No active anginal symptoms - at low-level activity  -No sustained bradycardia or tachyarrhythmias identified on presenting ECG. Cardiac imaging  -ECG 7/26/2023; NSR, no acute ischemic changes. -TTE 4/28/2023; LVEF 35 to 40%, grade 3 DD, RV systolic function mildly reduced, PFO visualized, trace AI, moderate MR, moderate TR.  -[facility] 2/14/2023: 50% stenosis ostial LM and distal LAD. 60% stenosis proximal LAD. 70% stenosis mid LAD, proximal RCA, and D1. 90% stenosis distal RCA. 100% stenosis mid Cx  2. Chronic HFrEF  3. Ischemic cardiomyopathy w/improved LVEF 35-40% (per TTE in 4/2023, previously 25% in 2/2023)  -TTE 4/28/2023; LVEF 35 to 40%, grade 3 DD, RV systolic function mildly reduced, PFO visualized, trace AI, moderate MR, moderate TR.  -Outpatient GDMT: Entresto 49-51 mg twice daily, and metoprolol succinate 50 mg BID  -Outpatient diuretic regimen; metolazone 5 mg daily ?; not previously on a loop diuretic. -Inpatient diuretic regimen; torsemide 20 mg daily; metolazone placed on hold. -Recent outpatient weights at rehab; 155 to 160 pounds; BS weight 156 pounds today  4. Multivessel CAD -medically managed  -Previously evaluated by CT surgery and felt to not be a good surgical candidate  -[facility] 2/14/2023: 50% stenosis ostial LM and distal LAD. 60% stenosis proximal LAD. 70% stenosis mid LAD, proximal RCA, and D1. 90% stenosis distal RCA.  100% stenosis mid Cx.  -On aspirin 81 mg daily, clopidogrel 75 mg daily, atorvastatin 40 mg daily, and metoprolol succinate 50 mg BID  5. 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 Severe PAD w/ prior lower extremity interventions and prior right-sided BKA  -On aspirin 81 mg daily, clopidogrel 75 mg daily, atorvastatin 40 mg daily, and metoprolol succinate 50 mg BID  7. DM type II  -Hgb A1c 7.3  8. Dyslipidemia  -Lipid profile 2/11/23; cholesterol 108, triglycerides 57, HDL 36, and LDL calculated at 61  -On atorvastatin 40 mg daily  9. History of nicotine dependence; quit in his 35s.       Plan  -Pt appears well compensated from both a CHF and CAD perspective. No current contraindications from a cardiac perspective to proceeding with outlined surgical procedure per the vascular surgery team.  He has been deemed at elevated but acceptable cardiac risk for a necessary surgical procedure.  -Continue Entresto 49-51 mg BID (w/hold parameters), and metoprolol succinate 50 mg BID. -Continue torsemide 20 mg daily add K-Dur 20 meq daily.  -Continue aspirin 81 mg daily and clopidogrel 75 mg daily.  -Continue high intensity statin therapy.  -Strict I's and O's, daily weights, 2 g Na+ diet to LFR.  -Monitor renal function and electrolytes closely. Replete to maintain K+ level of 4.0 magnesium level at 2.0.  -No indication for telemetry monitoring. Subjective  Review of Systems   Constitutional: Negative for chills, fever and malaise/fatigue. Eyes: Negative for visual disturbance. Cardiovascular: Negative for chest pain, dyspnea on exertion, leg swelling, orthopnea and palpitations. Respiratory: Negative for cough and shortness of breath. Gastrointestinal: Negative for abdominal pain. Neurological: Negative for dizziness, headaches and light-headedness. Objective:   Physical Exam  Vitals and nursing note reviewed. Constitutional:       General: He is not in acute distress. Appearance: He is not diaphoretic.    HENT:      Head: Normocephalic and atraumatic. Eyes:      General: No scleral icterus. Cardiovascular:      Rate and Rhythm: Normal rate and regular rhythm. Pulses: Normal pulses. Heart sounds: Normal heart sounds. Pulmonary:      Effort: Pulmonary effort is normal.      Breath sounds: Normal breath sounds. No wheezing or rales. Abdominal:      Palpations: Abdomen is soft. Musculoskeletal:         General: Deformity present. Skin:     General: Skin is warm and dry. Coloration: Skin is pale. Neurological:      General: No focal deficit present. Mental Status: He is alert and oriented to person, place, and time. Psychiatric:         Mood and Affect: Mood normal.         Vitals: Blood pressure 124/59, pulse 65, temperature 97.6 °F (36.4 °C), resp. rate 16, height 5' 10" (1.778 m), weight 71 kg (156 lb 8.4 oz), SpO2 100 %. ,     Body mass index is 22.46 kg/m². ,   Systolic (98AXH), RR , Min:100 , NJD:014     Diastolic (40YTB), ACE:79, Min:46, Max:60      Intake/Output Summary (Last 24 hours) at 2023 1019  Last data filed at 2023 0800  Gross per 24 hour   Intake 940 ml   Output 1875 ml   Net -935 ml     Weight (last 2 days)     Date/Time Weight    23 0600 71 (156.53)    23 0600 70.7 (155.87)    23 0600 69.4 (153)          LABORATORY RESULTS      CBC with diff:   Results from last 7 days   Lab Units 23  0502 23  0557 23  0443 23  1227   WBC Thousand/uL  --  7.11 7.75 7.92   HEMOGLOBIN g/dL 7.7* 7.1* 7.5* 7.9*   HEMATOCRIT % 25.8* 23.7* 24.9* 26.6*   MCV fL  --  86 85 86   PLATELETS Thousands/uL  --  283 323 314   RBC Million/uL  --  2.77* 2.93* 3.11*   MCH pg  --  25.6* 25.6* 25.4*   MCHC g/dL  --  30.0* 30.1* 29.7*   RDW %  --  17.3* 17.2* 17.1*   MPV fL  --  8.6* 8.4* 8.2*   NRBC AUTO /100 WBCs  --   --  0 0       CMP:  Results from last 7 days   Lab Units 23  0557 23  0443 23  1227   POTASSIUM mmol/L 3.8 4.1 3.7   CHLORIDE mmol/L 106 104 95*   CO2 mmol/L 32 35* 36*   BUN mg/dL 42* 38* 36*   CREATININE mg/dL 1.03 1.08 0.95   CALCIUM mg/dL 8.5 8.5 8.5   AST U/L  --  30 41*   ALT U/L  --  42 44   ALK PHOS U/L  --  425* 472*   EGFR ml/min/1.73sq m 69 65 76       BMP:  Results from last 7 days   Lab Units 07/29/23  0557 07/28/23  0443 07/26/23  1227   POTASSIUM mmol/L 3.8 4.1 3.7   CHLORIDE mmol/L 106 104 95*   CO2 mmol/L 32 35* 36*   BUN mg/dL 42* 38* 36*   CREATININE mg/dL 1.03 1.08 0.95   CALCIUM mg/dL 8.5 8.5 8.5       Lab Results   Component Value Date    NTBNP 14,829 (H) 02/11/2023                          Results from last 7 days   Lab Units 07/28/23  0443 07/26/23  1227   INR  1.33* 1.25*       Lipid Profile:   No results found for: "CHOL"  Lab Results   Component Value Date    HDL 36 (L) 02/11/2023     Lab Results   Component Value Date    LDLCALC 61 02/11/2023     Lab Results   Component Value Date    TRIG 57 02/11/2023       Cardiac testing:   No results found for this or any previous visit. No results found for this or any previous visit. No results found for this or any previous visit. No valid procedures specified. No results found for this or any previous visit.       Meds/Allergies   all current active meds have been reviewed and current meds:   Current Facility-Administered Medications   Medication Dose Route Frequency   • aspirin chewable tablet 81 mg  81 mg Oral Daily   • atorvastatin (LIPITOR) tablet 40 mg  40 mg Oral Daily With Dinner   • clopidogrel (PLAVIX) tablet 75 mg  75 mg Oral Daily   • escitalopram (LEXAPRO) tablet 5 mg  5 mg Oral Daily   • finasteride (PROSCAR) tablet 5 mg  5 mg Oral Daily   • heparin (porcine) subcutaneous injection 5,000 Units  5,000 Units Subcutaneous Q8H [facility]   • insulin glargine (LANTUS) subcutaneous injection 15 Units 0.15 mL  15 Units Subcutaneous HS   • insulin lispro (HumaLOG) 100 units/mL subcutaneous injection 1-6 Units  1-6 Units Subcutaneous TID AC   • insulin lispro (HumaLOG) 100 units/mL subcutaneous injection 5 Units  5 Units Subcutaneous TID With Meals   • metoprolol succinate (TOPROL-XL) 24 hr tablet 50 mg  50 mg Oral Q12H [address]   • mirtazapine (REMERON) tablet 7.5 mg  7.5 mg Oral HS   • pantoprazole (PROTONIX) EC tablet 40 mg  40 mg Oral Daily   • piperacillin-tazobactam (ZOSYN) 3.375 g in sodium chloride 0.9 % 100 mL IVPB  3.375 g Intravenous Q6H   • sacubitril-valsartan (ENTRESTO) 49-51 MG per tablet 1 tablet  1 tablet Oral BID   • tamsulosin (FLOMAX) capsule 0.4 mg  0.4 mg Oral Daily With Dinner   • torsemide (DEMADEX) tablet 20 mg  20 mg Oral Daily   • vancomycin (VANCOCIN) 1,250 mg in sodium chloride 0.9 % 250 mL IVPB  1,250 mg Intravenous Q24H          EKG personally reviewed by JANIS Dunlap. Assessment:  Principal Problem:    PAD (peripheral artery disease) ([facility])  Active Problems:    Type 2 diabetes mellitus with hyperglycemia, with long-term current use of insulin ([facility])    Chronic systolic CHF (congestive heart failure) ([facility])    CAD (coronary artery disease)    Essential hypertension    Cardiomyopathy ([address])    Hx of BKA, right ([address])    Visit for wound check    Anemia    Counseling / Coordination of Care  Total floor / unit time spent today 20 minutes. Greater than 50% of total time was spent with the patient and / or family counseling and / or coordination of care. ** Please Note: Dragon 360 Dictation voice to text software may have been used in the creation of this document.  **

## 2023-07-31 NOTE — ASSESSMENT & PLAN NOTE
Wt Readings from Last 3 Encounters:   07/31/23 71 kg (156 lb 8.4 oz)   07/26/23 67.7 kg (149 lb 4 oz)   05/18/23 59 kg (130 lb)     History of CHF with EF 25%  Euvolemic on exam  Continue with Entresto, Toprol-XL and Demadex  Cleared by cardiology to proceed with surgery  Cardiology is following

## 2023-07-31 NOTE — PROGRESS NOTES
Vancomycin Monitoring    Demographics    Marco Callejas    Assessment    Today is day 5 of vancomycin therapy for ssti. Patient is currently ordered vanco 1,250 mg q24h. Cx pending.    Plan    Continue vancomycin 1,250 mg q24h    Next level ordered for 8/4 am labs     Frantz Martinez PharmD

## 2023-07-31 NOTE — ASSESSMENT & PLAN NOTE
Patient with hx of PAD sp right sided BKA  Now presenting with worsening multiple diabetic ulcerations of left leg and foot  Transferred for vascular carolin  He  was evaluated by vascular and podiatry  Left lower extremity x-ray suspicious for osteomyelitis  Podiatry ordered lower extremity MRI. Does MRI change the management since patient is going for below-knee amputation?    Continue with IV Zosyn and vancomycin day # 4  Follow on MRSA culture, if negative discontinue vancomycin  Negative blood culture  Per vascular plan for left below-knee amputation when cleared by podiatry

## 2023-08-01 LAB
ANION GAP SERPL CALCULATED.3IONS-SCNC: 5 MMOL/L
BUN SERPL-MCNC: 39 MG/DL (ref 5–25)
CALCIUM SERPL-MCNC: 8.2 MG/DL (ref 8.3–10.1)
CHLORIDE SERPL-SCNC: 108 MMOL/L (ref 96–108)
CO2 SERPL-SCNC: 31 MMOL/L (ref 21–32)
CREAT SERPL-MCNC: 1.53 MG/DL (ref 0.6–1.3)
GFR SERPL CREATININE-BSD FRML MDRD: 43 ML/MIN/1.73SQ M
GLUCOSE SERPL-MCNC: 111 MG/DL (ref 65–140)
GLUCOSE SERPL-MCNC: 153 MG/DL (ref 65–140)
GLUCOSE SERPL-MCNC: 50 MG/DL (ref 65–140)
GLUCOSE SERPL-MCNC: 67 MG/DL (ref 65–140)
GLUCOSE SERPL-MCNC: 78 MG/DL (ref 65–140)
GLUCOSE SERPL-MCNC: 84 MG/DL (ref 65–140)
GLUCOSE SERPL-MCNC: 99 MG/DL (ref 65–140)
MAGNESIUM SERPL-MCNC: 1.7 MG/DL (ref 1.6–2.6)
POTASSIUM SERPL-SCNC: 3.4 MMOL/L (ref 3.5–5.3)
SODIUM SERPL-SCNC: 144 MMOL/L (ref 135–147)

## 2023-08-01 PROCEDURE — 80048 BASIC METABOLIC PNL TOTAL CA: CPT | Performed by: NURSE PRACTITIONER

## 2023-08-01 PROCEDURE — 82948 REAGENT STRIP/BLOOD GLUCOSE: CPT

## 2023-08-01 PROCEDURE — 99232 SBSQ HOSP IP/OBS MODERATE 35: CPT | Performed by: INTERNAL MEDICINE

## 2023-08-01 PROCEDURE — 83735 ASSAY OF MAGNESIUM: CPT | Performed by: NURSE PRACTITIONER

## 2023-08-01 RX ORDER — INSULIN GLARGINE 100 [IU]/ML
10 INJECTION, SOLUTION SUBCUTANEOUS
Status: DISCONTINUED | OUTPATIENT
Start: 2023-08-01 | End: 2023-08-02

## 2023-08-01 RX ADMIN — INSULIN LISPRO 1 UNITS: 100 INJECTION, SOLUTION INTRAVENOUS; SUBCUTANEOUS at 08:55

## 2023-08-01 RX ADMIN — ESCITSLOPRAM 5 MG: 5 TABLET ORAL at 08:54

## 2023-08-01 RX ADMIN — METOPROLOL SUCCINATE 50 MG: 50 TABLET, EXTENDED RELEASE ORAL at 21:28

## 2023-08-01 RX ADMIN — HEPARIN SODIUM 5000 UNITS: 5000 INJECTION INTRAVENOUS; SUBCUTANEOUS at 14:30

## 2023-08-01 RX ADMIN — PANTOPRAZOLE SODIUM 40 MG: 40 TABLET, DELAYED RELEASE ORAL at 06:37

## 2023-08-01 RX ADMIN — MIRTAZAPINE 7.5 MG: 15 TABLET, FILM COATED ORAL at 21:29

## 2023-08-01 RX ADMIN — PIPERACILLIN SODIUM AND TAZOBACTAM SODIUM 3.38 G: 36; 4.5 INJECTION, POWDER, LYOPHILIZED, FOR SOLUTION INTRAVENOUS at 21:29

## 2023-08-01 RX ADMIN — VANCOMYCIN HYDROCHLORIDE 1250 MG: 10 INJECTION, POWDER, LYOPHILIZED, FOR SOLUTION INTRAVENOUS at 17:11

## 2023-08-01 RX ADMIN — ATORVASTATIN CALCIUM 40 MG: 40 TABLET, FILM COATED ORAL at 17:11

## 2023-08-01 RX ADMIN — CLOPIDOGREL BISULFATE 75 MG: 75 TABLET ORAL at 08:54

## 2023-08-01 RX ADMIN — PIPERACILLIN SODIUM AND TAZOBACTAM SODIUM 3.38 G: 36; 4.5 INJECTION, POWDER, LYOPHILIZED, FOR SOLUTION INTRAVENOUS at 14:30

## 2023-08-01 RX ADMIN — INSULIN GLARGINE 10 UNITS: 100 INJECTION, SOLUTION SUBCUTANEOUS at 21:26

## 2023-08-01 RX ADMIN — TAMSULOSIN HYDROCHLORIDE 0.4 MG: 0.4 CAPSULE ORAL at 17:11

## 2023-08-01 RX ADMIN — SACUBITRIL AND VALSARTAN 1 TABLET: 49; 51 TABLET, FILM COATED ORAL at 08:55

## 2023-08-01 RX ADMIN — PIPERACILLIN SODIUM AND TAZOBACTAM SODIUM 3.38 G: 36; 4.5 INJECTION, POWDER, LYOPHILIZED, FOR SOLUTION INTRAVENOUS at 08:57

## 2023-08-01 RX ADMIN — INSULIN LISPRO 8 UNITS: 100 INJECTION, SOLUTION INTRAVENOUS; SUBCUTANEOUS at 17:12

## 2023-08-01 RX ADMIN — PIPERACILLIN SODIUM AND TAZOBACTAM SODIUM 3.38 G: 36; 4.5 INJECTION, POWDER, LYOPHILIZED, FOR SOLUTION INTRAVENOUS at 03:18

## 2023-08-01 RX ADMIN — HEPARIN SODIUM 5000 UNITS: 5000 INJECTION INTRAVENOUS; SUBCUTANEOUS at 21:27

## 2023-08-01 RX ADMIN — INSULIN LISPRO 8 UNITS: 100 INJECTION, SOLUTION INTRAVENOUS; SUBCUTANEOUS at 08:55

## 2023-08-01 RX ADMIN — POTASSIUM CHLORIDE 20 MEQ: 1500 TABLET, EXTENDED RELEASE ORAL at 08:54

## 2023-08-01 RX ADMIN — HEPARIN SODIUM 5000 UNITS: 5000 INJECTION INTRAVENOUS; SUBCUTANEOUS at 06:37

## 2023-08-01 RX ADMIN — TORSEMIDE 20 MG: 20 TABLET ORAL at 08:55

## 2023-08-01 RX ADMIN — METOPROLOL SUCCINATE 50 MG: 50 TABLET, EXTENDED RELEASE ORAL at 08:54

## 2023-08-01 RX ADMIN — FINASTERIDE 5 MG: 5 TABLET, FILM COATED ORAL at 08:54

## 2023-08-01 RX ADMIN — ASPIRIN 81 MG CHEWABLE TABLET 81 MG: 81 TABLET CHEWABLE at 08:54

## 2023-08-01 NOTE — PROGRESS NOTES
General Cardiology   Progress Note -  Team One   Jefferson Salcido 69 y.o. male MRN: [id number]    Unit/Bed#: [facility] 801-01 Encounter: [id number]    Assessment  1.  Pre operative cardiac re stratification  -Problem: PAD w/ nonhealing LLE wound w/ concern for osteomyelitis  -Surgery: LLE amputation   -Timing: TBD  -Functional status: Limited, mostly restricted to wheelchair and bed, since Rt BK amputation  -No evidence of acutely decompensated CHF.  No recent subjective respiratory complaints  -No active anginal symptoms - at low-level activity  -No sustained bradycardia or tachyarrhythmias identified on presenting ECG. Cardiac imaging  -ECG 7/26/2023; NSR, no acute ischemic changes. -TTE 4/28/2023; LVEF 35 to 40%, grade 3 DD, RV systolic function mildly reduced, PFO visualized, trace AI, moderate MR, moderate TR.  -[facility] 2/14/2023: 50% stenosis ostial LM and distal LAD. 60% stenosis proximal LAD. 70% stenosis mid LAD, proximal RCA, and D1. 90% stenosis distal RCA. 100% stenosis mid Cx  2. Chronic HFrEF  3. Ischemic cardiomyopathy w/improved LVEF 35-40% (per TTE in 4/2023, previously 25% in 2/2023)  -Compensated  -TTE 4/28/2023; LVEF 35 to 40%, grade 3 DD, RV systolic function mildly reduced, PFO visualized, trace AI, moderate MR, moderate TR.  -Outpatient GDMT: Entresto 49-51 mg twice daily, and metoprolol succinate 50 mg BID  -Outpatient diuretic regimen; metolazone 5 mg daily ?; not previously on a loop diuretic ?  -Inpatient diuretic regimen; torsemide 20 mg daily; 24 hr I&O balance; -480 ml  -Recent outpatient weights at rehab; 155 to 160 pounds; BS weight 157 pounds today  4. Multivessel CAD -medically managed  -Previously evaluated by CT surgery and felt to not be a good surgical candidate  -[facility] 2/14/2023: 50% stenosis ostial LM and distal LAD. 60% stenosis proximal LAD. 70% stenosis mid LAD, proximal RCA, and D1. 90% stenosis distal RCA.  100% stenosis mid Cx.  -On aspirin 81 mg daily, clopidogrel 75 mg daily, atorvastatin 40 mg daily, and metoprolol succinate 50 mg BID  5. 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 Severe PAD w/ prior lower extremity interventions and prior right-sided BKA  -On aspirin 81 mg daily, clopidogrel 75 mg daily, atorvastatin 40 mg daily, and metoprolol succinate 50 mg BID  7. DM type II  -Hgb A1c 7.3  8. Dyslipidemia  -Lipid profile 2/11/23; cholesterol 108, triglycerides 57, HDL 36, and LDL calculated at 61  -On atorvastatin 40 mg daily  9. History of nicotine dependence; quit in his 30s.       Plan  -Pt appears well compensated from both a CHF and CAD perspective.  No current contraindications from a cardiac perspective to proceeding with outlined surgical procedure per the vascular surgery team.  He has been deemed at elevated but acceptable cardiac risk for a necessary surgical procedure.  -Continue Entresto 49-51 mg BID (w/hold parameters), and metoprolol succinate 50 mg BID. -Continue torsemide 20 mg daily + K-Dur 20 meq daily.  -Continue aspirin 81 mg daily and clopidogrel 75 mg daily.  -Continue high intensity statin therapy.  -Strict I's and O's, daily weights, 2 g Na+ diet to LFR.  -Monitor renal function and electrolytes closely.  Replete to maintain K+ level of 4.0 magnesium level at 2.0. Obtain BMP/mag level this am  -No indication for telemetry monitoring.     Subjective  Review of Systems   Constitutional: Negative for chills, fever and malaise/fatigue. Eyes: Negative for visual disturbance. Cardiovascular: Negative for chest pain, dyspnea on exertion, leg swelling, orthopnea and palpitations. Respiratory: Negative for cough and shortness of breath. Gastrointestinal: Negative for abdominal pain. Neurological: Negative for dizziness, headaches and light-headedness. Objective:   Physical Exam  Vitals and nursing note reviewed. Constitutional:       General: He is not in acute distress.      Appearance: He is not diaphoretic. HENT:      Head: Normocephalic and atraumatic. Eyes:      General: No scleral icterus. Cardiovascular:      Rate and Rhythm: Normal rate and regular rhythm. Pulses: Normal pulses. Heart sounds: Normal heart sounds. Pulmonary:      Effort: Pulmonary effort is normal.      Breath sounds: Normal breath sounds. No wheezing or rales. Abdominal:      Palpations: Abdomen is soft. Musculoskeletal:         General: Deformity present. Comments: Right bka   Skin:     General: Skin is warm and dry. Capillary Refill: Capillary refill takes less than 2 seconds. Coloration: Skin is pale. Neurological:      General: No focal deficit present. Mental Status: He is alert and oriented to person, place, and time. Psychiatric:         Mood and Affect: Mood normal.         Vitals: Blood pressure 120/66, pulse 71, temperature 97.7 °F (36.5 °C), resp. rate 16, height 5' 10" (1.778 m), weight 71.4 kg (157 lb 6.5 oz), SpO2 97 %. ,     Body mass index is 22.59 kg/m². ,   Systolic (08BJE), ZDB:904 , Min:98 , ZEP:071     Diastolic (80ESV), EZN:86, Min:50, Max:66      Intake/Output Summary (Last 24 hours) at 8/1/2023 0926  Last data filed at 7/31/2023 2301  Gross per 24 hour   Intake 420 ml   Output 1180 ml   Net -760 ml     Weight (last 2 days)     Date/Time Weight    08/01/23 0600 71.4 (157.41)    07/31/23 0600 71 (156.53)    07/30/23 0600 70.7 (155.87)          LABORATORY RESULTS      CBC with diff:   Results from last 7 days   Lab Units 07/30/23  0502 07/29/23  0557 07/28/23  0443 07/26/23  1227   WBC Thousand/uL  --  7.11 7.75 7.92   HEMOGLOBIN g/dL 7.7* 7.1* 7.5* 7.9*   HEMATOCRIT % 25.8* 23.7* 24.9* 26.6*   MCV fL  --  86 85 86   PLATELETS Thousands/uL  --  283 323 314   RBC Million/uL  --  2.77* 2.93* 3.11*   MCH pg  --  25.6* 25.6* 25.4*   MCHC g/dL  --  30.0* 30.1* 29.7*   RDW %  --  17.3* 17.2* 17.1*   MPV fL  --  8.6* 8.4* 8.2*   NRBC AUTO /100 WBCs  --   --  0 0 CMP:  Results from last 7 days   Lab Units 07/29/23  0557 07/28/23  0443 07/26/23  1227   POTASSIUM mmol/L 3.8 4.1 3.7   CHLORIDE mmol/L 106 104 95*   CO2 mmol/L 32 35* 36*   BUN mg/dL 42* 38* 36*   CREATININE mg/dL 1.03 1.08 0.95   CALCIUM mg/dL 8.5 8.5 8.5   AST U/L  --  30 41*   ALT U/L  --  42 44   ALK PHOS U/L  --  425* 472*   EGFR ml/min/1.73sq m 69 65 76       BMP:  Results from last 7 days   Lab Units 07/29/23  0557 07/28/23  0443 07/26/23  1227   POTASSIUM mmol/L 3.8 4.1 3.7   CHLORIDE mmol/L 106 104 95*   CO2 mmol/L 32 35* 36*   BUN mg/dL 42* 38* 36*   CREATININE mg/dL 1.03 1.08 0.95   CALCIUM mg/dL 8.5 8.5 8.5       Lab Results   Component Value Date    NTBNP 14,829 (H) 02/11/2023                          Results from last 7 days   Lab Units 07/28/23  0443 07/26/23  1227   INR  1.33* 1.25*       Lipid Profile:   No results found for: "CHOL"  Lab Results   Component Value Date    HDL 36 (L) 02/11/2023     Lab Results   Component Value Date    LDLCALC 61 02/11/2023     Lab Results   Component Value Date    TRIG 57 02/11/2023       Cardiac testing:   No results found for this or any previous visit. No results found for this or any previous visit. No results found for this or any previous visit. No valid procedures specified. No results found for this or any previous visit.       Meds/Allergies   all current active meds have been reviewed and current meds:   Current Facility-Administered Medications   Medication Dose Route Frequency   • aspirin chewable tablet 81 mg  81 mg Oral Daily   • atorvastatin (LIPITOR) tablet 40 mg  40 mg Oral Daily With Dinner   • clopidogrel (PLAVIX) tablet 75 mg  75 mg Oral Daily   • escitalopram (LEXAPRO) tablet 5 mg  5 mg Oral Daily   • finasteride (PROSCAR) tablet 5 mg  5 mg Oral Daily   • heparin (porcine) subcutaneous injection 5,000 Units  5,000 Units Subcutaneous Q8H [address]   • insulin glargine (LANTUS) subcutaneous injection 15 Units 0.15 mL  15 Units Subcutaneous HS   • insulin lispro (HumaLOG) 100 units/mL subcutaneous injection 1-6 Units  1-6 Units Subcutaneous TID AC   • insulin lispro (HumaLOG) 100 units/mL subcutaneous injection 8 Units  8 Units Subcutaneous TID With Meals   • metoprolol succinate (TOPROL-XL) 24 hr tablet 50 mg  50 mg Oral Q12H [place]   • mirtazapine (REMERON) tablet 7.5 mg  7.5 mg Oral HS   • pantoprazole (PROTONIX) EC tablet 40 mg  40 mg Oral Daily   • piperacillin-tazobactam (ZOSYN) 3.375 g in sodium chloride 0.9 % 100 mL IVPB  3.375 g Intravenous Q6H   • potassium chloride (K-DUR,KLOR-CON) CR tablet 20 mEq  20 mEq Oral Daily   • sacubitril-valsartan (ENTRESTO) 49-51 MG per tablet 1 tablet  1 tablet Oral BID   • tamsulosin (FLOMAX) capsule 0.4 mg  0.4 mg Oral Daily With Dinner   • torsemide (DEMADEX) tablet 20 mg  20 mg Oral Daily   • vancomycin (VANCOCIN) 1,250 mg in sodium chloride 0.9 % 250 mL IVPB  1,250 mg Intravenous Q24H          EKG personally reviewed by JANIS Adames    Assessment:  Principal Problem:    PAD (peripheral artery disease) ([address])  Active Problems:    Type 2 diabetes mellitus with hyperglycemia, with long-term current use of insulin ([facility])    Chronic systolic CHF (congestive heart failure) ([facility])    CAD (coronary artery disease)    Essential hypertension    Cardiomyopathy ([address])    Hx of BKA, right ([address])    Visit for wound check    Anemia    Counseling / Coordination of Care  Total floor / unit time spent today 20 minutes. Greater than 50% of total time was spent with the patient and / or family counseling and / or coordination of care. ** Please Note: Dragon 360 Dictation voice to text software may have been used in the creation of this document.  **

## 2023-08-01 NOTE — ASSESSMENT & PLAN NOTE
Patient with hx of PAD sp right sided BKA  Now presenting with worsening multiple diabetic ulcerations of left leg and foot  Transferred for vascular carolin  He  was evaluated by vascular and podiatry  Left lower extremity x-ray suspicious for osteomyelitis    Continue with IV Zosyn and vancomycin day # 5  Follow on MRSA culture, if negative discontinue vancomycin  Negative blood culture  Per vascular plan for left below-knee amputation when cleared by podiatry

## 2023-08-01 NOTE — ASSESSMENT & PLAN NOTE
Lab Results   Component Value Date    HGBA1C 7.3 (H) 05/04/2023       Recent Labs     08/01/23  0742 08/01/23  1054 08/01/23  1134 08/01/23  1244   POCGLU 153* 50* 67 78       Blood Sugar Average: Last 72 hrs:  (P) 126   Patient refusing cardiac/diabetic diet  He wants regular diet   continue with Lantus and Humalog with meals  Continue insulin sliding scale  Monitor

## 2023-08-01 NOTE — PROGRESS NOTES
Blood sugar 50 at 11am; pt asymptomatic; pt given pudding/milk/peanut butter per request; blood sugar 67; pt remained asymptomatic; lunch given with repeat blood sugar at 78 "I am having contraction pain"

## 2023-08-01 NOTE — PROGRESS NOTES
[address] [facility]  Progress Note  Name: Sharif Mendosa  MRN: [id number]  Unit/Bed#: [facility] 801-01 I Date of Admission: 7/27/2023   Date of Service: 8/1/2023 I Hospital Day: 5    Assessment/Plan   * PAD (peripheral artery disease) ([address] )  Assessment & Plan  Patient with hx of PAD sp right sided BKA  Now presenting with worsening multiple diabetic ulcerations of left leg and foot  Transferred for vascular carolin  He  was evaluated by vascular and podiatry  Left lower extremity x-ray suspicious for osteomyelitis    Continue with IV Zosyn and vancomycin day # 5  Follow on MRSA culture, if negative discontinue vancomycin  Negative blood culture  Per vascular plan for left below-knee amputation when cleared by podiatry    Anemia  Assessment & Plan  Anemia panel consistent with anemia of chronic disease  Blood transfusion for hemoglobin below 7  Monitor    Hx of BKA, right (HCC)  Assessment & Plan  Hx of right sided bka  At risk for left bka     Essential hypertension  Assessment & Plan  Stable BP  Continue to monitor    CAD (coronary artery disease)  Assessment & Plan  • Patient has a history of coronary artery disease, previous cardiac cath with diffuse CAD recommending GDMT  • Continue aspirin, Plavix statin, beta-blocker    Chronic systolic CHF (congestive heart failure) (HCC)  Assessment & Plan  Wt Readings from Last 3 Encounters:   08/01/23 71.4 kg (157 lb 6.5 oz)   07/26/23 67.7 kg (149 lb 4 oz)   05/18/23 59 kg (130 lb)     History of CHF with EF 25%  Euvolemic on exam  Continue with Entresto, Toprol-XL and Demadex-bmp pending   Cleared by cardiology to proceed with surgery  Cardiology is following      Type 2 diabetes mellitus with hyperglycemia, with long-term current use of insulin [facility])  Assessment & Plan  Lab Results   Component Value Date    HGBA1C 7.3 (H) 05/04/2023       Recent Labs     08/01/23  0742 08/01/23  1054 08/01/23  1134 08/01/23  1244   POCGLU 153* 50* 67 78 Blood Sugar Average: Last 72 hrs:  (P) 126   Patient refusing cardiac/diabetic diet  He wants regular diet   continue with Lantus and Humalog with meals  Continue insulin sliding scale  Monitor               VTE Pharmacologic Prophylaxis: VTE Score: 4 Moderate Risk (Score 3-4) - Pharmacological DVT Prophylaxis Ordered: heparin. Patient Centered Rounds: I performed bedside rounds with nursing staff today. Discussions with Specialists or Other Care Team Provider: vascular    Education and Discussions with Family / Patient: Patient declined call to . Total Time Spent on Date of Encounter in care of patient: 35 minutes This time was spent on one or more of the following: performing physical exam; counseling and coordination of care; obtaining or reviewing history; documenting in the medical record; reviewing/ordering tests, medications or procedures; communicating with other healthcare professionals and discussing with patient's family/caregivers. Current Length of Stay: 5 day(s)  Current Patient Status: Inpatient   Certification Statement: The patient will continue to require additional inpatient hospital stay due to Pending amputation  Discharge Plan: Anticipate discharge in >72 hrs to discharge location to be determined pending rehab evaluations. Code Status: Level 1 - Full Code    Subjective:   Patient denies any acute complaints today    Objective:     Vitals:   Temp (24hrs), Av.7 °F (37.1 °C), Min:97.7 °F (36.5 °C), Max:100.7 °F (38.2 °C)    Temp:  [97.7 °F (36.5 °C)-100.7 °F (38.2 °C)] 98.1 °F (36.7 °C)  HR:  [69-72] 70  Resp:  [15-18] 18  BP: ()/(50-66) 112/56  SpO2:  [94 %-97 %] 96 %  Body mass index is 22.59 kg/m². Input and Output Summary (last 24 hours):      Intake/Output Summary (Last 24 hours) at 2023 1506  Last data filed at 2023 0900  Gross per 24 hour   Intake 180 ml   Output 580 ml   Net -400 ml       Physical Exam:   Physical Exam  Vitals and nursing note reviewed. Constitutional:       General: He is not in acute distress. Appearance: He is well-developed. He is not toxic-appearing or diaphoretic. HENT:      Head: Normocephalic and atraumatic. Eyes:      General: No scleral icterus. Conjunctiva/sclera: Conjunctivae normal.   Cardiovascular:      Rate and Rhythm: Normal rate and regular rhythm. Heart sounds: No murmur heard. No friction rub. No gallop. Pulmonary:      Effort: Pulmonary effort is normal. No respiratory distress. Breath sounds: Normal breath sounds. No stridor. No wheezing, rhonchi or rales. Chest:      Chest wall: No tenderness. Abdominal:      General: There is no distension. Palpations: Abdomen is soft. There is no mass. Tenderness: There is no abdominal tenderness. There is no guarding or rebound. Hernia: No hernia is present. Musculoskeletal:         General: No swelling or tenderness. Cervical back: Neck supple. Comments: Right bka leg wrapped clean dry intact   Skin:     General: Skin is warm and dry. Capillary Refill: Capillary refill takes less than 2 seconds. Neurological:      Mental Status: He is alert and oriented to person, place, and time.    Psychiatric:         Mood and Affect: Mood normal.          Additional Data:     Labs:  Results from last 7 days   Lab Units 07/30/23  0502 07/29/23  0557 07/28/23  0443   WBC Thousand/uL  --  7.11 7.75   HEMOGLOBIN g/dL 7.7* 7.1* 7.5*   HEMATOCRIT % 25.8* 23.7* 24.9*   PLATELETS Thousands/uL  --  283 323   NEUTROS PCT %  --   --  70   LYMPHS PCT %  --   --  18   MONOS PCT %  --   --  7   EOS PCT %  --   --  5     Results from last 7 days   Lab Units 08/01/23  1001 07/29/23  0557 07/28/23  0443   SODIUM mmol/L 144   < > 141   POTASSIUM mmol/L 3.4*   < > 4.1   CHLORIDE mmol/L 108   < > 104   CO2 mmol/L 31   < > 35*   BUN mg/dL 39*   < > 38*   CREATININE mg/dL 1.53*   < > 1.08   ANION GAP mmol/L 5   < > 2   CALCIUM mg/dL 8.2*   < > 8.5   ALBUMIN g/dL  --   --  2.1*   TOTAL BILIRUBIN mg/dL  --   --  0.42   ALK PHOS U/L  --   --  425*   ALT U/L  --   --  42   AST U/L  --   --  30   GLUCOSE RANDOM mg/dL 99   < > 204*    < > = values in this interval not displayed. Results from last 7 days   Lab Units 07/28/23  0443   INR  1.33*     Results from last 7 days   Lab Units 08/01/23  1244 08/01/23  1134 08/01/23  1054 08/01/23  0742 07/31/23  2058 07/31/23  1619 07/31/23  1124 07/31/23  0651 07/30/23  2108 07/30/23  1604 07/30/23  1053 07/30/23  0727   POC GLUCOSE mg/dl 78 67 50* 153* 104 133 146* 220* 170* 72 107 153*         Results from last 7 days   Lab Units 07/26/23  1227   LACTIC ACID mmol/L 1.3   PROCALCITONIN ng/ml 0.05       Lines/Drains:  Invasive Devices     Peripheral Intravenous Line  Duration           Peripheral IV 07/30/23 Right;Ventral (anterior) Forearm 2 days                      Imaging: No pertinent imaging reviewed. Recent Cultures (last 7 days):   Results from last 7 days   Lab Units 07/27/23  2252 07/26/23  1227   BLOOD CULTURE  No Growth After 4 Days. No Growth After 4 Days. No Growth After 5 Days. No Growth After 5 Days.        Last 24 Hours Medication List:   Current Facility-Administered Medications   Medication Dose Route Frequency Provider Last Rate   • aspirin  81 mg Oral Daily Brandyn Dalton, DO     • atorvastatin  40 mg Oral Daily With [employer], DO     • clopidogrel  75 mg Oral Daily Brandynshayne Dalton, DO     • escitalopram  5 mg Oral Daily Branydnshayne Dalton, DO     • finasteride  5 mg Oral Daily Brandynshayne Dalton, DO     • heparin (porcine)  5,000 Units Subcutaneous Q8H [address] [facility] Krystle, DO     • insulin glargine  10 Units Subcutaneous HS Brandynshayne Dalton, DO     • insulin lispro  1-6 Units Subcutaneous TID  Brandyn Dalton, DO     • insulin lispro  8 Units Subcutaneous TID With Meals Jose Alejandro Ureña, DO     • metoprolol succinate  50 mg Oral Q12H [address] Brandyn Dalton DO     • mirtazapine  7.5 mg Oral  Brandyn DO Krystle     • pantoprazole  40 mg Oral Daily Brandyn Dalton DO     • piperacillin-tazobactam  3.375 g Intravenous Q6H Brandyn Dalton DO 3.375 g (08/01/23 1430)   • potassium chloride  20 mEq Oral Daily JANIS Chapman     • tamsulosin  0.4 mg Oral Daily With Dinner Brandyn Gomez DO     • torsemide  20 mg Oral Daily JANIS Chinchilla     • vancomycin  1,250 mg Intravenous Q24H Linwood Thomas DO 1,250 mg (07/31/23 1605)        Today, Patient Was Seen By: Jennifer Henriquez DO    **Please Note: This note may have been constructed using a voice recognition system. **

## 2023-08-01 NOTE — TREATMENT PLAN
Patient reevaluated this morning. Exam unchanged. Pt is a poor candidate for revascularization. Angio will likely prove to be non beneficial. Will have podiatry evaluate for limb salvageability, he has a high likelihood of requiring an amputation.
Patient seen at bedside with vascular surgery. Discussed with patient that MRI can not be performed due to stents. Discussed bone scan study as another advanced imaging option but informed patient that this may not ultimately change the course of treatment of recommendation of proximal amputation due to presence of fibular osteomyelitis. Patient amenable to left proximal amputation. Patient reports he would rather have a amputation of his left leg that is at the same level as his right than risk waiting and having a more proximal amputation. He is hopeful he can learn to walk again with bilateral proximal amputations.
Alert and oriented to person, place and time

## 2023-08-01 NOTE — ASSESSMENT & PLAN NOTE
Wt Readings from Last 3 Encounters:   08/01/23 71.4 kg (157 lb 6.5 oz)   07/26/23 67.7 kg (149 lb 4 oz)   05/18/23 59 kg (130 lb)     History of CHF with EF 25%  Euvolemic on exam  Continue with Entresto, Toprol-XL and Demadex-bmp pending   Cleared by cardiology to proceed with surgery  Cardiology is following

## 2023-08-01 NOTE — PLAN OF CARE
Problem: PAIN - ADULT  Goal: Verbalizes/displays adequate comfort level or baseline comfort level  Description: Interventions:  - Encourage patient to monitor pain and request assistance  - Assess pain using appropriate pain scale  - Administer analgesics based on type and severity of pain and evaluate response  - Implement non-pharmacological measures as appropriate and evaluate response  - Consider cultural and social influences on pain and pain management  - Notify physician/advanced practitioner if interventions unsuccessful or patient reports new pain  Outcome: Progressing     Problem: INFECTION - ADULT  Goal: Absence or prevention of progression during hospitalization  Description: INTERVENTIONS:  - Assess and monitor for signs and symptoms of infection  - Monitor lab/diagnostic results  - Monitor all insertion sites, i.e. indwelling lines, tubes, and drains  - Monitor endotracheal if appropriate and nasal secretions for changes in amount and color  - [place] appropriate cooling/warming therapies per order  - Administer medications as ordered  - Instruct and encourage patient and family to use good hand hygiene technique  - Identify and instruct in appropriate isolation precautions for identified infection/condition  Outcome: Progressing     Problem: DISCHARGE PLANNING  Goal: Discharge to home or other facility with appropriate resources  Description: INTERVENTIONS:  - Identify barriers to discharge w/patient and caregiver  - Arrange for needed discharge resources and transportation as appropriate  - Identify discharge learning needs (meds, wound care, etc.)  - Arrange for interpretive services to assist at discharge as needed  - Refer to Case Management Department for coordinating discharge planning if the patient needs post-hospital services based on physician/advanced practitioner order or complex needs related to functional status, cognitive ability, or social support system  Outcome: Progressing Problem: Knowledge Deficit  Goal: Patient/family/caregiver demonstrates understanding of disease process, treatment plan, medications, and discharge instructions  Description: Complete learning assessment and assess knowledge base. Interventions:  - Provide teaching at level of understanding  - Provide teaching via preferred learning methods  Outcome: Progressing     Problem: Nutrition/Hydration-ADULT  Goal: Nutrient/Hydration intake appropriate for improving, restoring or maintaining nutritional needs  Description: Monitor and assess patient's nutrition/hydration status for malnutrition. Collaborate with interdisciplinary team and initiate plan and interventions as ordered. Monitor patient's weight and dietary intake as ordered or per policy. Utilize nutrition screening tool and intervene as necessary. Determine patient's food preferences and provide high-protein, high-caloric foods as appropriate.      INTERVENTIONS:  - Monitor oral intake, urinary output, labs, and treatment plans  - Assess nutrition and hydration status and recommend course of action  - Evaluate amount of meals eaten  - Assist patient with eating if necessary   - Allow adequate time for meals  - Recommend/ encourage appropriate diets, oral nutritional supplements, and vitamin/mineral supplements  - Order, calculate, and assess calorie counts as needed  - Recommend, monitor, and adjust tube feedings and TPN/PPN based on assessed needs  - Assess need for intravenous fluids  - Provide specific nutrition/hydration education as appropriate  - Include patient/family/caregiver in decisions related to nutrition  Outcome: Progressing     Problem: METABOLIC, FLUID AND ELECTROLYTES - ADULT  Goal: Electrolytes maintained within normal limits  Description: INTERVENTIONS:  - Monitor labs and assess patient for signs and symptoms of electrolyte imbalances  - Administer electrolyte replacement as ordered  - Monitor response to electrolyte replacements, including repeat lab results as appropriate  - Instruct patient on fluid and nutrition as appropriate  Outcome: Progressing     Problem: SKIN/TISSUE INTEGRITY - ADULT  Goal: Incision(s), wounds(s) or drain site(s) healing without S/S of infection  Description: INTERVENTIONS  - Assess and document dressing, incision, wound bed, drain sites and surrounding tissue  - Provide patient and family education    Outcome: Progressing     Problem: HEMATOLOGIC - ADULT  Goal: Maintains hematologic stability  Description: INTERVENTIONS  - Assess for signs and symptoms of bleeding or hemorrhage  - Monitor labs  - Administer supportive blood products/factors as ordered and appropriate  Outcome: Progressing     Problem: MUSCULOSKELETAL - ADULT  Goal: Maintain or return mobility to safest level of function  Description: INTERVENTIONS:  - Assess patient's ability to carry out ADLs; assess patient's baseline for ADL function and identify physical deficits which impact ability to perform ADLs (bathing, care of mouth/teeth, toileting, grooming, dressing, etc.)  - Assess/evaluate cause of self-care deficits   - Assess range of motion  - Assess patient's mobility  - Assess patient's need for assistive devices and provide as appropriate  - Encourage maximum independence but intervene and supervise when necessary  - Involve family in performance of ADLs  - Assess for home care needs following discharge   - Consider OT consult to assist with ADL evaluation and planning for discharge  - Provide patient education as appropriate  Outcome: Progressing  Goal: Maintain proper alignment of affected body part  Description: INTERVENTIONS:  - Support, maintain and protect limb and body alignment  - Provide patient/ family with appropriate education  Outcome: Progressing     Problem: Prexisting or High Potential for Compromised Skin Integrity  Goal: Skin integrity is maintained or improved  Description: INTERVENTIONS:  - Identify patients at risk for skin breakdown  - Assess and monitor skin integrity  - Assess and monitor nutrition and hydration status  - Monitor labs   - Assess for incontinence   - Turn and reposition patient  - Assist with mobility/ambulation  - Relieve pressure over bony prominences  - Avoid friction and shearing  - Provide appropriate hygiene as needed including keeping skin clean and dry  - Evaluate need for skin moisturizer/barrier cream  - Collaborate with interdisciplinary team   - Patient/family teaching  - Consider wound care consult   Outcome: Progressing

## 2023-08-02 ENCOUNTER — ANESTHESIA EVENT (INPATIENT)
Dept: PERIOP | Facility: HOSPITAL | Age: 78
DRG: 239 | End: 2023-08-02
Payer: COMMERCIAL

## 2023-08-02 LAB
ABO GROUP BLD: NORMAL
ALBUMIN SERPL BCP-MCNC: 2 G/DL (ref 3.5–5)
ALP SERPL-CCNC: 387 U/L (ref 46–116)
ALT SERPL W P-5'-P-CCNC: 33 U/L (ref 12–78)
ANION GAP SERPL CALCULATED.3IONS-SCNC: 4 MMOL/L
AST SERPL W P-5'-P-CCNC: 31 U/L (ref 5–45)
BACTERIA BLD CULT: NORMAL
BACTERIA BLD CULT: NORMAL
BASOPHILS # BLD AUTO: 0.03 THOUSANDS/ÂΜL (ref 0–0.1)
BASOPHILS NFR BLD AUTO: 0 % (ref 0–1)
BILIRUB SERPL-MCNC: 0.39 MG/DL (ref 0.2–1)
BLD GP AB SCN SERPL QL: NEGATIVE
BUN SERPL-MCNC: 47 MG/DL (ref 5–25)
CALCIUM ALBUM COR SERPL-MCNC: 9.9 MG/DL (ref 8.3–10.1)
CALCIUM SERPL-MCNC: 8.3 MG/DL (ref 8.3–10.1)
CHLORIDE SERPL-SCNC: 109 MMOL/L (ref 96–108)
CO2 SERPL-SCNC: 31 MMOL/L (ref 21–32)
CREAT SERPL-MCNC: 1.55 MG/DL (ref 0.6–1.3)
EOSINOPHIL # BLD AUTO: 0.5 THOUSAND/ÂΜL (ref 0–0.61)
EOSINOPHIL NFR BLD AUTO: 5 % (ref 0–6)
ERYTHROCYTE [DISTWIDTH] IN BLOOD BY AUTOMATED COUNT: 17.9 % (ref 11.6–15.1)
GFR SERPL CREATININE-BSD FRML MDRD: 42 ML/MIN/1.73SQ M
GLUCOSE SERPL-MCNC: 111 MG/DL (ref 65–140)
GLUCOSE SERPL-MCNC: 152 MG/DL (ref 65–140)
GLUCOSE SERPL-MCNC: 32 MG/DL (ref 65–140)
GLUCOSE SERPL-MCNC: 37 MG/DL (ref 65–140)
GLUCOSE SERPL-MCNC: 73 MG/DL (ref 65–140)
GLUCOSE SERPL-MCNC: 88 MG/DL (ref 65–140)
GLUCOSE SERPL-MCNC: 93 MG/DL (ref 65–140)
GLUCOSE SERPL-MCNC: 98 MG/DL (ref 65–140)
HCT VFR BLD AUTO: 25.8 % (ref 36.5–49.3)
HGB BLD-MCNC: 7.6 G/DL (ref 12–17)
IMM GRANULOCYTES # BLD AUTO: 0.04 THOUSAND/UL (ref 0–0.2)
IMM GRANULOCYTES NFR BLD AUTO: 0 % (ref 0–2)
LYMPHOCYTES # BLD AUTO: 1.38 THOUSANDS/ÂΜL (ref 0.6–4.47)
LYMPHOCYTES NFR BLD AUTO: 14 % (ref 14–44)
MCH RBC QN AUTO: 25.9 PG (ref 26.8–34.3)
MCHC RBC AUTO-ENTMCNC: 29.5 G/DL (ref 31.4–37.4)
MCV RBC AUTO: 88 FL (ref 82–98)
MONOCYTES # BLD AUTO: 0.74 THOUSAND/ÂΜL (ref 0.17–1.22)
MONOCYTES NFR BLD AUTO: 7 % (ref 4–12)
MRSA NOSE QL CULT: ABNORMAL
MRSA NOSE QL CULT: ABNORMAL
NEUTROPHILS # BLD AUTO: 7.44 THOUSANDS/ÂΜL (ref 1.85–7.62)
NEUTS SEG NFR BLD AUTO: 74 % (ref 43–75)
NRBC BLD AUTO-RTO: 0 /100 WBCS
PLATELET # BLD AUTO: 325 THOUSANDS/UL (ref 149–390)
PMV BLD AUTO: 8.7 FL (ref 8.9–12.7)
POTASSIUM SERPL-SCNC: 3.4 MMOL/L (ref 3.5–5.3)
PROT SERPL-MCNC: 6.6 G/DL (ref 6.4–8.4)
RBC # BLD AUTO: 2.94 MILLION/UL (ref 3.88–5.62)
RH BLD: NEGATIVE
SODIUM SERPL-SCNC: 144 MMOL/L (ref 135–147)
SPECIMEN EXPIRATION DATE: NORMAL
WBC # BLD AUTO: 10.13 THOUSAND/UL (ref 4.31–10.16)

## 2023-08-02 PROCEDURE — 99232 SBSQ HOSP IP/OBS MODERATE 35: CPT | Performed by: SURGERY

## 2023-08-02 PROCEDURE — 86901 BLOOD TYPING SEROLOGIC RH(D): CPT | Performed by: NURSE PRACTITIONER

## 2023-08-02 PROCEDURE — 86850 RBC ANTIBODY SCREEN: CPT | Performed by: NURSE PRACTITIONER

## 2023-08-02 PROCEDURE — 99232 SBSQ HOSP IP/OBS MODERATE 35: CPT | Performed by: INTERNAL MEDICINE

## 2023-08-02 PROCEDURE — 86923 COMPATIBILITY TEST ELECTRIC: CPT

## 2023-08-02 PROCEDURE — 80053 COMPREHEN METABOLIC PANEL: CPT | Performed by: INTERNAL MEDICINE

## 2023-08-02 PROCEDURE — 85025 COMPLETE CBC W/AUTO DIFF WBC: CPT | Performed by: INTERNAL MEDICINE

## 2023-08-02 PROCEDURE — 86900 BLOOD TYPING SEROLOGIC ABO: CPT | Performed by: NURSE PRACTITIONER

## 2023-08-02 PROCEDURE — 82948 REAGENT STRIP/BLOOD GLUCOSE: CPT

## 2023-08-02 RX ORDER — INSULIN LISPRO 100 [IU]/ML
5 INJECTION, SOLUTION INTRAVENOUS; SUBCUTANEOUS
Status: DISCONTINUED | OUTPATIENT
Start: 2023-08-03 | End: 2023-08-04

## 2023-08-02 RX ORDER — SODIUM CHLORIDE 9 MG/ML
50 INJECTION, SOLUTION INTRAVENOUS CONTINUOUS
Status: DISCONTINUED | OUTPATIENT
Start: 2023-08-03 | End: 2023-08-03

## 2023-08-02 RX ORDER — CHLORHEXIDINE GLUCONATE 0.12 MG/ML
15 RINSE ORAL ONCE
Status: DISCONTINUED | OUTPATIENT
Start: 2023-08-02 | End: 2023-08-03 | Stop reason: HOSPADM

## 2023-08-02 RX ORDER — DEXTROSE MONOHYDRATE 25 G/50ML
INJECTION, SOLUTION INTRAVENOUS
Status: COMPLETED
Start: 2023-08-02 | End: 2023-08-02

## 2023-08-02 RX ORDER — POTASSIUM CHLORIDE 20 MEQ/1
20 TABLET, EXTENDED RELEASE ORAL 2 TIMES DAILY
Status: DISCONTINUED | OUTPATIENT
Start: 2023-08-02 | End: 2023-08-04

## 2023-08-02 RX ORDER — INSULIN GLARGINE 100 [IU]/ML
5 INJECTION, SOLUTION SUBCUTANEOUS
Status: DISCONTINUED | OUTPATIENT
Start: 2023-08-02 | End: 2023-08-06

## 2023-08-02 RX ORDER — VANCOMYCIN HYDROCHLORIDE 1 G/200ML
1000 INJECTION, SOLUTION INTRAVENOUS EVERY 24 HOURS
Status: DISCONTINUED | OUTPATIENT
Start: 2023-08-02 | End: 2023-08-04

## 2023-08-02 RX ADMIN — INSULIN GLARGINE 5 UNITS: 100 INJECTION, SOLUTION SUBCUTANEOUS at 22:15

## 2023-08-02 RX ADMIN — METOPROLOL SUCCINATE 50 MG: 50 TABLET, EXTENDED RELEASE ORAL at 08:31

## 2023-08-02 RX ADMIN — ESCITSLOPRAM 5 MG: 5 TABLET ORAL at 08:31

## 2023-08-02 RX ADMIN — VANCOMYCIN HYDROCHLORIDE 1000 MG: 1 INJECTION, SOLUTION INTRAVENOUS at 17:13

## 2023-08-02 RX ADMIN — SODIUM CHLORIDE 50 ML/HR: 0.9 INJECTION, SOLUTION INTRAVENOUS at 23:45

## 2023-08-02 RX ADMIN — HEPARIN SODIUM 5000 UNITS: 5000 INJECTION INTRAVENOUS; SUBCUTANEOUS at 05:28

## 2023-08-02 RX ADMIN — PIPERACILLIN SODIUM AND TAZOBACTAM SODIUM 3.38 G: 36; 4.5 INJECTION, POWDER, LYOPHILIZED, FOR SOLUTION INTRAVENOUS at 03:19

## 2023-08-02 RX ADMIN — DEXTROSE MONOHYDRATE: 25 INJECTION, SOLUTION INTRAVENOUS at 16:30

## 2023-08-02 RX ADMIN — PIPERACILLIN SODIUM AND TAZOBACTAM SODIUM 3.38 G: 36; 4.5 INJECTION, POWDER, LYOPHILIZED, FOR SOLUTION INTRAVENOUS at 09:54

## 2023-08-02 RX ADMIN — CLOPIDOGREL BISULFATE 75 MG: 75 TABLET ORAL at 08:31

## 2023-08-02 RX ADMIN — ASPIRIN 81 MG CHEWABLE TABLET 81 MG: 81 TABLET CHEWABLE at 08:31

## 2023-08-02 RX ADMIN — PANTOPRAZOLE SODIUM 40 MG: 40 TABLET, DELAYED RELEASE ORAL at 05:28

## 2023-08-02 RX ADMIN — DEXTROSE MONOHYDRATE 25 ML: 25 INJECTION, SOLUTION INTRAVENOUS at 15:57

## 2023-08-02 RX ADMIN — INSULIN LISPRO 8 UNITS: 100 INJECTION, SOLUTION INTRAVENOUS; SUBCUTANEOUS at 08:33

## 2023-08-02 RX ADMIN — HEPARIN SODIUM 5000 UNITS: 5000 INJECTION INTRAVENOUS; SUBCUTANEOUS at 14:40

## 2023-08-02 RX ADMIN — ATORVASTATIN CALCIUM 40 MG: 40 TABLET, FILM COATED ORAL at 17:12

## 2023-08-02 RX ADMIN — PIPERACILLIN SODIUM AND TAZOBACTAM SODIUM 3.38 G: 36; 4.5 INJECTION, POWDER, LYOPHILIZED, FOR SOLUTION INTRAVENOUS at 14:40

## 2023-08-02 RX ADMIN — POTASSIUM CHLORIDE 20 MEQ: 1500 TABLET, EXTENDED RELEASE ORAL at 08:31

## 2023-08-02 RX ADMIN — MIRTAZAPINE 7.5 MG: 15 TABLET, FILM COATED ORAL at 21:59

## 2023-08-02 RX ADMIN — TORSEMIDE 20 MG: 20 TABLET ORAL at 08:31

## 2023-08-02 RX ADMIN — HEPARIN SODIUM 5000 UNITS: 5000 INJECTION INTRAVENOUS; SUBCUTANEOUS at 21:59

## 2023-08-02 RX ADMIN — POTASSIUM CHLORIDE 20 MEQ: 1500 TABLET, EXTENDED RELEASE ORAL at 17:12

## 2023-08-02 RX ADMIN — FINASTERIDE 5 MG: 5 TABLET, FILM COATED ORAL at 08:30

## 2023-08-02 RX ADMIN — INSULIN LISPRO 8 UNITS: 100 INJECTION, SOLUTION INTRAVENOUS; SUBCUTANEOUS at 13:02

## 2023-08-02 RX ADMIN — TAMSULOSIN HYDROCHLORIDE 0.4 MG: 0.4 CAPSULE ORAL at 17:12

## 2023-08-02 RX ADMIN — PIPERACILLIN SODIUM AND TAZOBACTAM SODIUM 3.38 G: 36; 4.5 INJECTION, POWDER, LYOPHILIZED, FOR SOLUTION INTRAVENOUS at 22:15

## 2023-08-02 NOTE — ASSESSMENT & PLAN NOTE
69 y.o. male with history of DM (A1C 7.3), CHF (EF 35-40%), HTN, PAD s/p multiple LLE endovascular interventions (as below), s/p R BKA in setting of non-salvageable R foot with non-healing wound, who now presented for evaluation for LCLTI with poor healing open LLE wounds, XR concerning for OM    Not MRI candidate due to history of stents, patient amenable to L BKA now, does not want to delay further    S/p L BKA 8/4/2023 with Dr. Norman Pena hypotension in PACU requiring RBC transfusion x1, brief norepi requirement, second unit overnight  Remains in ICU off pressors but with SBP 90s, mentating well  Trend hgb, transfuse PRN  Appreciate ICU level of care  L knee immobilizer to remain in place  PT/OT, PM&R ocnsults  L BKA stump dressing down by vascular team Saturday 8/5

## 2023-08-02 NOTE — RESTORATIVE TECHNICIAN NOTE
Restorative Technician Note      Patient Name: Paola Roblero     Restorative Tech Visit Date: 08/02/23  Note Type: Mobility (Pt pending amputation, educated that sitting up for meals and repositioning is still important; pt states he prefers laying in bed and will call for assistance if he changes his mind)  Patient Position Upon Consult: Supine  Education Provided: Yes  Patient Position at End of Consult: Supine;  All needs within reach    Rigoberto Lam  DPT, Restorative Technician

## 2023-08-02 NOTE — PROGRESS NOTES
Jefferson Salcido is a 69 y.o. male who is currently ordered Vancomycin IV with management by the Pharmacy Consult service. Relevant clinical data and objective / subjective history reviewed. Vancomycin Assessment:  Indication and Goal AUC/Trough: Soft tissue (goal -600, trough >10), -600, trough >10  Clinical Status: stable  Micro:     Renal Function:  SCr: 1.55 mg/dL (was 1.53 mg/dL)   CrCl: 41.2 mL/min (was 40.8 mL/min)  Renal replacement: Not on dialysis  Days of Therapy: 7  Current Dose: Vancomycin 1250mg q24  Vancomycin Plan:  New Dosing: due to better coverage and less toxicity, decrease dose to 1000 mg IV q 24 hr, same admin time at 1700. Estimated AUC: 447 mcg hr/mL  Estimated Trough: 14 mcg/mL  Next Level: Fri 8-4 0600  Renal Function Monitoring: Daily BMP and [place] will continue to follow closely for s/sx of nephrotoxicity, infusion reactions and appropriateness of therapy. BMP and CBC will be ordered per protocol. We will continue to follow the patient’s culture results and clinical progress daily. Gloria Downey.  Kasia Maya, R.Ph., Pharmacist

## 2023-08-02 NOTE — PROGRESS NOTES
Progress Note - Vascular Surgery  : SHANNAN Vascular Surgery role on Jourdan Sim 69 y.o. male MRN: [id number]  Unit/Bed#: [facility] 801-01 Encounter: [id number]    Assessment:  69 y.o. male with nonsalvagable LLE for amputation       Plan:  * PAD (peripheral artery disease) ([address])  Assessment & Plan  69 y.o. male with history of DM (A1C 7.3), CHF (EF 35-40%), HTN, PAD s/p multiple LLE endovascular interventions (as below), s/p R BKA in setting of non-salvageable R foot with non-healing wound, who now presents for evaluation for LCLTI with poor healing open LLE wounds, XR concerning for OM    Not MRI candidate due to history of stents, patient amenable to L BKA now, does not want to delay further    Will arrange scheduling of L BKA this admission      Subjective/Objective     Subjective: No complaints      Objective:     Pulse exam:  R BKA  L foot wrapped in dressing/pressure boot    Physical Exam:  GEN: NAD  HEENT: MMM  CV: RRR  Lung: Normal effort  Ab: Soft, ND/NT   Neuro: A+Ox3         Vitals:  /56   Pulse 76   Temp 98.7 °F (37.1 °C)   Resp 18   Ht 5' 10" (1.778 m)   Wt 73.5 kg (162 lb 1.6 oz)   SpO2 92%   BMI 23.26 kg/m²     I/Os:  I/O last 3 completed shifts:   In: 980 [P.O.:780; IV Piggyback:200]  Out: 1780 [Urine:1780]  I/O this shift:  In: -   Out: 1000 [Urine:1000]    Lab Results and Cultures:   Lab Results   Component Value Date    WBC 10.13 08/02/2023    HGB 7.6 (L) 08/02/2023    HCT 25.8 (L) 08/02/2023    MCV 88 08/02/2023     08/02/2023     Lab Results   Component Value Date    CALCIUM 8.3 08/02/2023    K 3.4 (L) 08/02/2023    CO2 31 08/02/2023     (H) 08/02/2023    BUN 47 (H) 08/02/2023    CREATININE 1.55 (H) 08/02/2023     Lab Results   Component Value Date    INR 1.33 (H) 07/28/2023    INR 1.25 (H) 07/26/2023    INR 1.21 (H) 02/08/2023    PROTIME 16.7 (H) 07/28/2023    PROTIME 15.9 (H) 07/26/2023    PROTIME 15.6 (H) 02/08/2023        Blood Culture: Lab Results   Component Value Date    BLOODCX No Growth After 4 Days. 07/27/2023    BLOODCX No Growth After 4 Days.  07/27/2023   ,   Urinalysis:   Lab Results   Component Value Date    COLORU Charlene 04/02/2023    CLARITYU Cloudy 04/02/2023    SPECGRAV >=1.030 04/02/2023    PHUR 5.5 04/02/2023    LEUKOCYTESUR Negative 04/02/2023    NITRITE Negative 04/02/2023    GLUCOSEU 100 (1/10%) (A) 04/02/2023    KETONESU Trace (A) 04/02/2023    BILIRUBINUR Negative 04/02/2023    BLOODU Large (A) 04/02/2023   ,   Urine Culture:   Lab Results   Component Value Date    URINECX No Growth <1000 cfu/mL 02/08/2023   ,   Wound Culure:   Lab Results   Component Value Date    WOUNDCULT (A) 03/21/2023     3+ Growth of Methicillin Resistant Staphylococcus aureus    WOUNDCULT 4+ Growth of Enterococcus faecalis (A) 03/21/2023    WOUNDCULT 1+ Growth of 03/21/2023       Medications:  Current Facility-Administered Medications   Medication Dose Route Frequency   • aspirin chewable tablet 81 mg  81 mg Oral Daily   • atorvastatin (LIPITOR) tablet 40 mg  40 mg Oral Daily With Dinner   • clopidogrel (PLAVIX) tablet 75 mg  75 mg Oral Daily   • escitalopram (LEXAPRO) tablet 5 mg  5 mg Oral Daily   • finasteride (PROSCAR) tablet 5 mg  5 mg Oral Daily   • heparin (porcine) subcutaneous injection 5,000 Units  5,000 Units Subcutaneous Q8H [facility]   • insulin glargine (LANTUS) subcutaneous injection 10 Units 0.1 mL  10 Units Subcutaneous HS   • insulin lispro (HumaLOG) 100 units/mL subcutaneous injection 1-6 Units  1-6 Units Subcutaneous TID AC   • insulin lispro (HumaLOG) 100 units/mL subcutaneous injection 8 Units  8 Units Subcutaneous TID With Meals   • metoprolol succinate (TOPROL-XL) 24 hr tablet 50 mg  50 mg Oral Q12H [facility]   • mirtazapine (REMERON) tablet 7.5 mg  7.5 mg Oral HS   • pantoprazole (PROTONIX) EC tablet 40 mg  40 mg Oral Daily   • piperacillin-tazobactam (ZOSYN) 3.375 g in sodium chloride 0.9 % 100 mL IVPB  3.375 g Intravenous Q6H   • potassium chloride (K-DUR,KLOR-CON) CR tablet 20 mEq  20 mEq Oral Daily   • tamsulosin (FLOMAX) capsule 0.4 mg  0.4 mg Oral Daily With Dinner   • torsemide (DEMADEX) tablet 20 mg  20 mg Oral Daily   • vancomycin (VANCOCIN) 1,250 mg in sodium chloride 0.9 % 250 mL IVPB  1,250 mg Intravenous Q24H           Grisel Connors MD  8/2/2023

## 2023-08-02 NOTE — PLAN OF CARE
Problem: PAIN - ADULT  Goal: Verbalizes/displays adequate comfort level or baseline comfort level  Description: Interventions:  - Encourage patient to monitor pain and request assistance  - Assess pain using appropriate pain scale  - Administer analgesics based on type and severity of pain and evaluate response  - Implement non-pharmacological measures as appropriate and evaluate response  - Consider cultural and social influences on pain and pain management  - Notify physician/advanced practitioner if interventions unsuccessful or patient reports new pain  Outcome: Progressing     Problem: INFECTION - ADULT  Goal: Absence or prevention of progression during hospitalization  Description: INTERVENTIONS:  - Assess and monitor for signs and symptoms of infection  - Monitor lab/diagnostic results  - Monitor all insertion sites, i.e. indwelling lines, tubes, and drains  - Monitor endotracheal if appropriate and nasal secretions for changes in amount and color  - Aspers appropriate cooling/warming therapies per order  - Administer medications as ordered  - Instruct and encourage patient and family to use good hand hygiene technique  - Identify and instruct in appropriate isolation precautions for identified infection/condition  Outcome: Progressing     Problem: Nutrition/Hydration-ADULT  Goal: Nutrient/Hydration intake appropriate for improving, restoring or maintaining nutritional needs  Description: Monitor and assess patient's nutrition/hydration status for malnutrition. Collaborate with interdisciplinary team and initiate plan and interventions as ordered. Monitor patient's weight and dietary intake as ordered or per policy. Utilize nutrition screening tool and intervene as necessary. Determine patient's food preferences and provide high-protein, high-caloric foods as appropriate.      INTERVENTIONS:  - Monitor oral intake, urinary output, labs, and treatment plans  - Assess nutrition and hydration status and recommend course of action  - Evaluate amount of meals eaten  - Assist patient with eating if necessary   - Allow adequate time for meals  - Recommend/ encourage appropriate diets, oral nutritional supplements, and vitamin/mineral supplements  - Order, calculate, and assess calorie counts as needed  - Recommend, monitor, and adjust tube feedings based on assessed needs  - Assess need for intravenous fluids  - Provide nutrition/hydration education as appropriate  - Include patient/family/caregiver in decisions related to nutrition  Outcome: Progressing

## 2023-08-02 NOTE — ANESTHESIA PREPROCEDURE EVALUATION
Procedure:  AMPUTATION BELOW KNEE (BKA) (Left: Leg Lower)    Relevant Problems   CARDIO   (+) CAD (coronary artery disease)   (+) Essential hypertension      ENDO   (+) Type 2 diabetes mellitus with hyperglycemia, with long-term current use of insulin (HCC)      HEMATOLOGY   (+) Anemia      Cardiovascular and Mediastinum   (+) Cardiomyopathy (HCC)   (+) Chronic systolic CHF (congestive heart failure) (HCC)   (+) Ischemic cardiomyopathy      Other   (+) Hx of BKA, right ([address])      Recent labs personally reviewed:  Lab Results   Component Value Date    WBC 10.13 08/02/2023    HGB 7.6 (L) 08/02/2023     08/02/2023     Lab Results   Component Value Date    K 3.4 (L) 08/02/2023    BUN 47 (H) 08/02/2023    CREATININE 1.55 (H) 08/02/2023     Lab Results   Component Value Date    PTT 39 (H) 07/28/2023      Lab Results   Component Value Date    INR 1.33 (H) 07/28/2023       Lab Results   Component Value Date    HGBA1C 7.3 (H) 05/04/2023       Type and Screen:  O    TTE 4/2023  Narrative    This result has an attachment that is not available. •  Left Ventricle: Left ventricle is mildly dilated. Mild basal septal   thickening. Systolic function is moderately decreased with an ejection   fraction of 35-40%. Global hypokinesis. There is grade III (severe)   diastolic dysfunction. •  Right Ventricle: Systolic function is mildly reduced. •  Left Atrium: Patent foramen ovale visualized. •  Aortic Valve: The aortic valve is trileaflet. The leaflets are mildly   thickened. There is trace regurgitation. •  Mitral Valve: Mitral valve structure is normal. The leaflets are mildly   thickened. There is mild annular calcification. There is moderate   regurgitation. •  IVC/SVC: The inferior vena cava demonstrates a diameter of >21 mm and   collapses <50%; therefore, the right atrial pressure is estimated at   greater than 15 mmHg.    •  Tricuspid Valve: Tricuspid valve structure is normal. The leaflets are   mildly thickened. There is moderate regurgitation. The right ventricular   systolic pressure is mildly to moderately elevated. •  Pulmonic Valve: There is mild regurgitation. Multivessel CAD -medically managed  -Previously evaluated by CT surgery and felt to not be a good surgical candidate  -[facility] 2/14/2023: 50% stenosis ostial LM and distal LAD. 60% stenosis proximal LAD. 70% stenosis mid LAD, proximal RCA, and D1. 90% stenosis distal RCA. 100% stenosis mid Cx. Cardiac dual chamber pacemaker in-situ  -For h/o complete heart block at [facility] (5/2/23)  Physical Exam    Airway    Mallampati score: II  TM Distance: >3 FB  Neck ROM: full     Dental       Cardiovascular      Pulmonary      Other Findings        Anesthesia Plan  ASA Score- 4     Anesthesia Type- general with ASA Monitors. Additional Monitors:   Airway Plan:     Comment: Nerve blocks discussed for postop pain control. Plan Factors-Exercise tolerance (METS): <4 METS. Chart reviewed. Patient summary reviewed. Induction- intravenous. Postoperative Plan-     Informed Consent- Anesthetic plan and risks discussed with patient. I personally reviewed this patient with the CRNA. Discussed and agreed on the Anesthesia Plan with the CRNA. Jeanette Chung

## 2023-08-02 NOTE — PROGRESS NOTES
[address] [facility]  Progress Note  Name: Inocencia Greco  MRN: [id number]  Unit/Bed#: [facility]PHP 801-01 I Date of Admission: 7/27/2023   Date of Service: 8/2/2023 I Hospital Day: 6    Assessment/Plan   * PAD (peripheral artery disease) ([address] [facility])  Assessment & Plan  Patient with hx of PAD sp right sided BKA  Now presenting with worsening multiple diabetic ulcerations of left leg and foot  Transferred for vascular carolin  He  was evaluated by vascular and podiatry  Left lower extremity x-ray suspicious for osteomyelitis    Continue with IV Zosyn and vancomycin day # 6  Follow on MRSA culture, if negative discontinue vancomycin  Negative blood culture  Per vascular plan for left below-knee amputation when cleared by podiatry    Anemia  Assessment & Plan  Anemia panel consistent with anemia of chronic disease  Blood transfusion for hemoglobin below 7  Monitor    Hx of BKA, right (HCC)  Assessment & Plan  Hx of right sided bka  Now pending left BKA    Essential hypertension  Assessment & Plan  Stable BP  Continue to monitor    CAD (coronary artery disease)  Assessment & Plan  • Patient has a history of coronary artery disease, previous cardiac cath with diffuse CAD recommending GDMT  • Continue aspirin, Plavix statin, beta-blocker    Chronic systolic CHF (congestive heart failure) (HCC)  Assessment & Plan  Wt Readings from Last 3 Encounters:   08/02/23 73.5 kg (162 lb 1.6 oz)   07/26/23 67.7 kg (149 lb 4 oz)   05/18/23 59 kg (130 lb)     History of CHF with EF 25%  Euvolemic on exam  Continue with Entresto, Toprol-XL and Demadex  Cleared by cardiology to proceed with surgery  Cardiology is following      Type 2 diabetes mellitus with hyperglycemia, with long-term current use of insulin [facility])  Assessment & Plan  Lab Results   Component Value Date    HGBA1C 7.3 (H) 05/04/2023       Recent Labs     08/02/23  1552 08/02/23  1555 08/02/23  1628 08/02/23  1659   POCGLU 32* 37* 73 98       Blood Sugar Average: Last 72 hrs:  (P) [id number]   Patient refusing cardiac/diabetic diet  He wants regular diet   continue with Lantus and Humalog with meals  Continue insulin sliding scale  Monitor               VTE Pharmacologic Prophylaxis: VTE Score: 4 Moderate Risk (Score 3-4) - Pharmacological DVT Prophylaxis Ordered: heparin. Patient Centered Rounds: I performed bedside rounds with nursing staff today. Discussions with Specialists or Other Care Team Provider: vascular    Education and Discussions with Family / Patient: Patient declined call to . Total Time Spent on Date of Encounter in care of patient: 35 minutes This time was spent on one or more of the following: performing physical exam; counseling and coordination of care; obtaining or reviewing history; documenting in the medical record; reviewing/ordering tests, medications or procedures; communicating with other healthcare professionals and discussing with patient's family/caregivers. Current Length of Stay: 6 day(s)  Current Patient Status: Inpatient   Certification Statement: The patient will continue to require additional inpatient hospital stay due to Think left BKA  Discharge Plan: Anticipate discharge in >72 hrs to discharge location to be determined pending rehab evaluations. Code Status: Level 1 - Full Code    Subjective:   Patient denies any acute complaints, states he is ready for his amputation    Objective:     Vitals:   Temp (24hrs), Av.3 °F (36.8 °C), Min:97.9 °F (36.6 °C), Max:98.7 °F (37.1 °C)    Temp:  [97.9 °F (36.6 °C)-98.7 °F (37.1 °C)] 98.3 °F (36.8 °C)  HR:  [73-76] 73  Resp:  [14-20] 20  BP: (105-121)/(54-65) 105/54  SpO2:  [92 %-97 %] 97 %  Body mass index is 23.26 kg/m². Input and Output Summary (last 24 hours):      Intake/Output Summary (Last 24 hours) at 2023 1803  Last data filed at 2023 0801  Gross per 24 hour   Intake 840 ml   Output 1000 ml   Net -160 ml       Physical Exam: Physical Exam  Vitals and nursing note reviewed. Constitutional:       General: He is not in acute distress. Appearance: He is well-developed. He is not toxic-appearing or diaphoretic. HENT:      Head: Normocephalic and atraumatic. Eyes:      General: No scleral icterus. Conjunctiva/sclera: Conjunctivae normal.   Cardiovascular:      Rate and Rhythm: Normal rate and regular rhythm. Heart sounds: No murmur heard. No friction rub. No gallop. Pulmonary:      Effort: Pulmonary effort is normal. No respiratory distress. Breath sounds: Normal breath sounds. No stridor. No wheezing, rhonchi or rales. Chest:      Chest wall: No tenderness. Abdominal:      General: There is no distension. Palpations: Abdomen is soft. There is no mass. Tenderness: There is no abdominal tenderness. There is no guarding or rebound. Hernia: No hernia is present. Musculoskeletal:         General: No swelling or tenderness. Cervical back: Neck supple. Comments: Right bka leg wrapped clean dry intact   Skin:     General: Skin is warm and dry. Capillary Refill: Capillary refill takes less than 2 seconds. Neurological:      Mental Status: He is alert and oriented to person, place, and time.    Psychiatric:         Mood and Affect: Mood normal.          Additional Data:     Labs:  Results from last 7 days   Lab Units 08/02/23  0430   WBC Thousand/uL 10.13   HEMOGLOBIN g/dL 7.6*   HEMATOCRIT % 25.8*   PLATELETS Thousands/uL 325   NEUTROS PCT % 74   LYMPHS PCT % 14   MONOS PCT % 7   EOS PCT % 5     Results from last 7 days   Lab Units 08/02/23  0430   SODIUM mmol/L 144   POTASSIUM mmol/L 3.4*   CHLORIDE mmol/L 109*   CO2 mmol/L 31   BUN mg/dL 47*   CREATININE mg/dL 1.55*   ANION GAP mmol/L 4   CALCIUM mg/dL 8.3   ALBUMIN g/dL 2.0*   TOTAL BILIRUBIN mg/dL 0.39   ALK PHOS U/L 387*   ALT U/L 33   AST U/L 31   GLUCOSE RANDOM mg/dL 93     Results from last 7 days   Lab Units 07/28/23  2153 INR  1.33*     Results from last 7 days   Lab Units 08/02/23  1659 08/02/23  1628 08/02/23  1555 08/02/23  1552 08/02/23  1059 08/02/23  0717 08/01/23  2108 08/01/23  1608 08/01/23  1244 08/01/23  1134 08/01/23  1054 08/01/23  0742   POC GLUCOSE mg/dl 98 73 37* 32* 88 111 84 111 78 67 50* 153*               Lines/Drains:  Invasive Devices     Peripheral Intravenous Line  Duration           Peripheral IV 08/02/23 Left;Ventral (anterior) Forearm <1 day                      Imaging: No pertinent imaging reviewed. Recent Cultures (last 7 days):   Results from last 7 days   Lab Units 07/27/23  2252   BLOOD CULTURE  No Growth After 5 Days. No Growth After 5 Days.        Last 24 Hours Medication List:   Current Facility-Administered Medications   Medication Dose Route Frequency Provider Last Rate   • aspirin  81 mg Oral Daily Brandyn Banai, DO     • atorvastatin  40 mg Oral Daily With [employer], DO     • chlorhexidine  15 mL Swish & Spit Once JANIS Moctezuma     • clopidogrel  75 mg Oral Daily Brandyn Banai, DO     • escitalopram  5 mg Oral Daily Brandyn Banai, DO     • finasteride  5 mg Oral Daily Brandyn Banai, DO     • heparin (porcine)  5,000 Units Subcutaneous Q8H [address] Brandyn Banai, DO     • insulin glargine  5 Units Subcutaneous HS Brandyn Banai, DO     • insulin lispro  1-6 Units Subcutaneous TID AC Brandyn Banai, DO     • [START ON 8/3/2023] insulin lispro  5 Units Subcutaneous TID With Meals Brandyn Banai, DO     • metoprolol succinate  50 mg Oral Q12H [address] Brandyn Banai, DO     • mirtazapine  7.5 mg Oral HS Brandyn Banai, DO     • pantoprazole  40 mg Oral Daily Brandyn Banai, DO     • piperacillin-tazobactam  3.375 g Intravenous Q6H Brandyn Banai, DO 3.375 g (08/02/23 1440)   • potassium chloride  20 mEq Oral BID Bharath Washington MD     • [START ON 8/3/2023] sodium chloride  50 mL/hr Intravenous Continuous JANIS Moctezuma     • tamsulosin  0.4 mg Oral Daily With Dinner Ki Jeet, DO     • torsemide  20 mg Oral Daily JANIS Olguin     • vancomycin  1,000 mg Intravenous Q24H Brandyn Dalton DO 1,000 mg (08/02/23 1713)        Today, Patient Was Seen By: Girish Irvin DO    **Please Note: This note may have been constructed using a voice recognition system. **

## 2023-08-02 NOTE — ASSESSMENT & PLAN NOTE
Wt Readings from Last 3 Encounters:   08/02/23 73.5 kg (162 lb 1.6 oz)   07/26/23 67.7 kg (149 lb 4 oz)   05/18/23 59 kg (130 lb)     History of CHF with EF 25%  Euvolemic on exam  Continue with Entresto, Toprol-XL and Demadex  Cleared by cardiology to proceed with surgery  Cardiology is following

## 2023-08-02 NOTE — ASSESSMENT & PLAN NOTE
Patient with hx of PAD sp right sided BKA  Now presenting with worsening multiple diabetic ulcerations of left leg and foot  Transferred for vascular carolin  He  was evaluated by vascular and podiatry  Left lower extremity x-ray suspicious for osteomyelitis    Continue with IV Zosyn and vancomycin day # 6  Follow on MRSA culture, if negative discontinue vancomycin  Negative blood culture  Per vascular plan for left below-knee amputation when cleared by podiatry

## 2023-08-02 NOTE — PROGRESS NOTES
Cardiology Progress Note - Manav Sim 69 y.o. male MRN: [id number]    Unit/Bed#: [facility] 801-01 Encounter: [id number]      Assessment:  Principal Problem:    PAD (peripheral artery disease) ([address])  Active Problems:    Type 2 diabetes mellitus with hyperglycemia, with long-term current use of insulin (HCC)    Chronic systolic CHF (congestive heart failure) ([facility])    CAD (coronary artery disease)    Essential hypertension    Cardiomyopathy ([address])    Hx of BKA, right ([address])    Visit for wound check    Anemia      Plan:  Patient with no issues this morning. He has no chest pain or significant dyspnea. Vascular note appreciated. Patient for left BKA during this admission. BMP today with potassium of 3.4 and creatinine of 1.55. Hemoglobin 7.6. On a potassium supplement. Will increase to twice daily. Vital signs are stable. Subjective:   Patient seen and examined. No significant events overnight.  negative. Objective:     Vitals: Blood pressure 121/65, pulse 73, temperature 97.9 °F (36.6 °C), resp. rate 14, height 5' 10" (1.778 m), weight 73.5 kg (162 lb 1.6 oz), SpO2 95 %. , Body mass index is 23.26 kg/m².,   Orthostatic Blood Pressures    Flowsheet Row Most Recent Value   Blood Pressure 121/65 filed at 08/02/2023 0719      ,      Intake/Output Summary (Last 24 hours) at 8/2/2023 0237  Last data filed at 8/2/2023 0801  Gross per 24 hour   Intake 940 ml   Output 1600 ml   Net -660 ml           Physical Exam:    GEN: Manav Sim   NECK: supple, no carotid bruits, no JVD or HJR  HEART: normal rate, regular rhythm, normal S1 and S2, no murmurs, clicks, gallops or rubs   LUNGS: clear to auscultation bilaterally; no wheezes, rales, or rhonchi   ABDOMEN: normal bowel sounds, soft, no tenderness, no distention  EXTREMITIES: Right BKA    Labs & Results:    Admission on 07/27/2023   Component Date Value   • ABO Grouping 07/27/2023 O    • Rh Factor 07/27/2023 Negative    • Antibody Screen 07/27/2023 Negative    • Specimen Expiration Date 07/27/2023 [id number]    • POC Glucose 07/27/2023 241 (H)    • Blood Culture 07/27/2023 No Growth After 5 Days. • Blood Culture 07/27/2023 No Growth After 5 Days.     • ABO Grouping 07/28/2023 O    • Rh Factor 07/28/2023 Negative    • WBC 07/28/2023 7.75    • RBC 07/28/2023 2.93 (L)    • Hemoglobin 07/28/2023 7.5 (L)    • Hematocrit 07/28/2023 24.9 (L)    • MCV 07/28/2023 85    • MCH 07/28/2023 25.6 (L)    • MCHC 07/28/2023 30.1 (L)    • RDW 07/28/2023 17.2 (H)    • MPV 07/28/2023 8.4 (L)    • Platelets 81/13/9172 323    • nRBC 07/28/2023 0    • Neutrophils Relative 07/28/2023 70    • Immat GRANS % 07/28/2023 0    • Lymphocytes Relative 07/28/2023 18    • Monocytes Relative 07/28/2023 7    • Eosinophils Relative 07/28/2023 5    • Basophils Relative 07/28/2023 0    • Neutrophils Absolute 07/28/2023 5.32    • Immature Grans Absolute 07/28/2023 0.03    • Lymphocytes Absolute 07/28/2023 1.41    • Monocytes Absolute 07/28/2023 0.56    • Eosinophils Absolute 07/28/2023 0.40    • Basophils Absolute 07/28/2023 0.03    • Sodium 07/28/2023 141    • Potassium 07/28/2023 4.1    • Chloride 07/28/2023 104    • CO2 07/28/2023 35 (H)    • ANION GAP 07/28/2023 2    • BUN 07/28/2023 38 (H)    • Creatinine 07/28/2023 1.08    • Glucose 07/28/2023 204 (H)    • Calcium 07/28/2023 8.5    • Corrected Calcium 07/28/2023 10.0    • AST 07/28/2023 30    • ALT 07/28/2023 42    • Alkaline Phosphatase 07/28/2023 425 (H)    • Total Protein 07/28/2023 6.4    • Albumin 07/28/2023 2.1 (L)    • Total Bilirubin 07/28/2023 0.42    • eGFR 07/28/2023 65    • Protime 07/28/2023 16.7 (H)    • INR 07/28/2023 1.33 (H)    • PTT 07/28/2023 39 (H)    • POC Glucose 07/28/2023 172 (H)    • POC Glucose 07/28/2023 159 (H)    • POC Glucose 07/28/2023 164 (H)    • POC Glucose 07/28/2023 160 (H)    • Vancomycin Tr 07/29/2023 18.3    • WBC 07/29/2023 7.11    • RBC 07/29/2023 2.77 (L)    • Hemoglobin 07/29/2023 7.1 (L)    • Hematocrit 07/29/2023 23.7 (L)    • MCV 07/29/2023 86    • MCH 07/29/2023 25.6 (L)    • MCHC 07/29/2023 30.0 (L)    • RDW 07/29/2023 17.3 (H)    • Platelets 87/68/1109 283    • MPV 07/29/2023 8.6 (L)    • Sodium 07/29/2023 140    • Potassium 07/29/2023 3.8    • Chloride 07/29/2023 106    • CO2 07/29/2023 32    • ANION GAP 07/29/2023 2    • BUN 07/29/2023 42 (H)    • Creatinine 07/29/2023 1.03    • Glucose 07/29/2023 87    • Calcium 07/29/2023 8.5    • eGFR 07/29/2023 69    • Vitamin B-12 07/29/2023 348    • Folate 07/29/2023 12.0    • Iron Saturation 07/29/2023 12 (L)    • TIBC 07/29/2023 214 (L)    • Iron 07/29/2023 26 (L)    • Ferritin 07/29/2023 43    • POC Glucose 07/29/2023 105    • POC Glucose 07/29/2023 110    • MRSA Culture Only 07/29/2023 Methicillin Resistant Staphylococcus aureus isolated (A)    • MRSA Culture Only 07/29/2023 This patient requires contact isolation precautions per [facility] law. Contact precautions are not required in [place] for nasal surveillance cultures.     • POC Glucose 07/29/2023 187 (H)    • POC Glucose 07/29/2023 123    • Hemoglobin 07/30/2023 7.7 (L)    • Hematocrit 07/30/2023 25.8 (L)    • POC Glucose 07/30/2023 164 (H)    • POC Glucose 07/30/2023 153 (H)    • POC Glucose 07/30/2023 107    • POC Glucose 07/30/2023 72    • POC Glucose 07/30/2023 170 (H)    • POC Glucose 07/31/2023 220 (H)    • POC Glucose 07/31/2023 146 (H)    • POC Glucose 07/31/2023 133    • POC Glucose 07/31/2023 104    • POC Glucose 08/01/2023 153 (H)    • Sodium 08/01/2023 144    • Potassium 08/01/2023 3.4 (L)    • Chloride 08/01/2023 108    • CO2 08/01/2023 31    • ANION GAP 08/01/2023 5    • BUN 08/01/2023 39 (H)    • Creatinine 08/01/2023 1.53 (H)    • Glucose 08/01/2023 99    • Calcium 08/01/2023 8.2 (L)    • eGFR 08/01/2023 43    • Magnesium 08/01/2023 1.7    • POC Glucose 08/01/2023 50 (L)    • POC Glucose 08/01/2023 67    • POC Glucose 08/01/2023 78    • POC Glucose 08/01/2023 111    • POC Glucose 08/01/2023 84    • WBC 08/02/2023 10.13    • RBC 08/02/2023 2.94 (L)    • Hemoglobin 08/02/2023 7.6 (L)    • Hematocrit 08/02/2023 25.8 (L)    • MCV 08/02/2023 88    • MCH 08/02/2023 25.9 (L)    • MCHC 08/02/2023 29.5 (L)    • RDW 08/02/2023 17.9 (H)    • MPV 08/02/2023 8.7 (L)    • Platelets 02/01/9489 325    • nRBC 08/02/2023 0    • Neutrophils Relative 08/02/2023 74    • Immat GRANS % 08/02/2023 0    • Lymphocytes Relative 08/02/2023 14    • Monocytes Relative 08/02/2023 7    • Eosinophils Relative 08/02/2023 5    • Basophils Relative 08/02/2023 0    • Neutrophils Absolute 08/02/2023 7.44    • Immature Grans Absolute 08/02/2023 0.04    • Lymphocytes Absolute 08/02/2023 1.38    • Monocytes Absolute 08/02/2023 0.74    • Eosinophils Absolute 08/02/2023 0.50    • Basophils Absolute 08/02/2023 0.03    • Sodium 08/02/2023 144    • Potassium 08/02/2023 3.4 (L)    • Chloride 08/02/2023 109 (H)    • CO2 08/02/2023 31    • ANION GAP 08/02/2023 4    • BUN 08/02/2023 47 (H)    • Creatinine 08/02/2023 1.55 (H)    • Glucose 08/02/2023 93    • Calcium 08/02/2023 8.3    • Corrected Calcium 08/02/2023 9.9    • AST 08/02/2023 31    • ALT 08/02/2023 33    • Alkaline Phosphatase 08/02/2023 387 (H)    • Total Protein 08/02/2023 6.6    • Albumin 08/02/2023 2.0 (L)    • Total Bilirubin 08/02/2023 0.39    • eGFR 08/02/2023 42    • POC Glucose 08/02/2023 111        XR tibia fibula 2 views LEFT    Result Date: 7/27/2023  Narrative: LEFT TIBIA AND FIBULA INDICATION:  Left lower extremity wounds. COMPARISON: MRI left heel 4/11/2023 VIEWS:  XR TIBIA FIBULA 2 VW LEFT Images: 4 FINDINGS: There is no acute fracture or dislocation. Degenerative changes of the knee noted. There is cortical irregularity and suspected bone loss along the posterior distal fibula which may be indicative of osteomyelitis. Soft tissue irregularity seen along the distal lateral lower extremity. Vascular calcifications and vascular stent noted proximally.      Impression: Soft tissue ulceration distal lateral lower extremity with loss of cortical conspicuity posterior distal fibula shaft suspicious for osteomyelitis. This could be confirmed with MRI if deemed clinically necessary. The study was marked in [facility] for immediate notification. Workstation performed: [id number]     XR ankle 3+ views LEFT    Result Date: 7/27/2023  Narrative: LEFT ANKLE INDICATION:  Left lower extremity wounds. COMPARISON: Left foot 3/21/2023, MRI of the left heel 4/11/2023 VIEWS:  XR ANKLE 3+ VW LEFT Images: 3 FINDINGS: There is no acute fracture or dislocation. Tiny plantar calcaneal spur. Periosteal reaction suggested along the distal posterior fibular shaft, suspicious for osteomyelitis. Soft tissue ulcerations seen along the distal lateral lower extremity about the distal fibula as well as the lateral malleolus without radiopaque foreign bodies. Vascular calcifications. Impression: Soft tissue ulcerations seen along the distal lateral lower extremity. Periosteal reaction suggested along the distal posterior fibular shaft, suspicious for osteomyelitis. Workstation performed: [id number]     XR foot 3+ views LEFT    Result Date: 7/27/2023  Narrative: LEFT FOOT INDICATION:  Left lower extremity wounds. COMPARISON: Left foot 3/21/2023 VIEWS:  XR FOOT 3+ VW LEFT FINDINGS: There is no acute fracture or dislocation. Mild hallux valgus. Minuscule plantar calcaneal spur. There is loss of bony conspicuity along the posterior distal fibular shaft, new from previous study. Cortical margins of the calcaneus appear grossly stable. Soft tissue ulceration is seen along the distal lateral lower extremity along the fibula. Question slight soft tissue irregularity along the posterior heel. Vascular calcifications. Impression: Soft tissue ulceration distal lateral lower extremity and potentially along the posterior heel. Loss of bony conspicuity posterior distal fibular shaft suspicious for osteomyelitis.  No discernible interval bony destruction involving the calcaneus. Workstation performed: [id number]     VAS lower limb arterial duplex, limited/unilateral    Result Date: 7/26/2023  Narrative:  [facility] REPORT CLINICAL: Indications: Patient presents with an ulcer on his left lateral calf which started about 1 month ago and an ulcer on his left heel which started over 4 months ago. Known PVD. Patient denies any pain, but has not been walking. Operative History: 2023-04-06 Left PTA of posterior tibial artery, popliteal artery stent 2023-02-14 Cardiac catheterization Risk Factors The patient has history of Diabetes (IDDM), Hyperlipidemia and CAD. Clinical Right Pressure:  117/ mm Hg, Left Pressure:  110/ mm Hg. FINDINGS:  Left                   Impression  PSV (cm/s)  EDV  Common Femoral Artery                      84    8  Prox Profunda                              96    0  Prox SFA                                   68   11  Mid SFA                50-75%             184   11  Dist SFA                                   44   10  Proximal Pop                               56   14  Distal Pop - Stent     Occluded                     Tibioperoneal          Occluded                     Dist Post Tibial                           43   18  Prox. Ant. Tibial      Occluded             0    0  Dist. Ant. Tibial      Occluded             0    0  Dist Peroneal                              25   10     CONCLUSION: Impression: RIGHT LOWER LIMB: BKA  LEFT LOWER LIMB: Duplex evaluation reveals a 50-75% stenosis in the mid superficial femoral artery and an occlusion of the distal popliteal artery stent, tibio-peroneal trunk and anterior tibial artery. Diffuse atherosclerotic disease throughout the remaining femoro-popliteal and tibio-peroneal segments. (BRADY performed 4/7/2023) Ankle/Brachial Index: 0.75, moderate claudication range. Prior 0.58 PPG/PVR Tracings are dampened.  Metatarsal Pressure 42 mm Hg Great Toe Pressure: 19 mm Hg, below the healing range. Prior 62 mm Hg. In comparison to the study of 4/10/2023, there is a new stenosis in the mid left SFA and new occlusion of the TPT and MARIA GUADALUPE. The left toe pressure is now below healing potential.  SIGNATURE: Electronically Signed by: Vandana Meyer MD, RPVI on 2023-07-26 02:42:53 PM      EKG personally reviewed by Suzanna Brennan MD.     Counseling / Coordination of Care  Total floor / unit time spent today 30 minutes. Greater than 50% of total time was spent with the patient and / or family counseling and / or coordination of care.

## 2023-08-02 NOTE — ASSESSMENT & PLAN NOTE
Lab Results   Component Value Date    HGBA1C 7.3 (H) 05/04/2023       Recent Labs     08/02/23  1552 08/02/23  1555 08/02/23  1628 08/02/23  1659   POCGLU 32* 37* 73 98       Blood Sugar Average: Last 72 hrs:  (P) [id number]   Patient refusing cardiac/diabetic diet  He wants regular diet   continue with Lantus and Humalog with meals  Continue insulin sliding scale  Monitor

## 2023-08-02 NOTE — PLAN OF CARE
Problem: PAIN - ADULT  Goal: Verbalizes/displays adequate comfort level or baseline comfort level  Description: Interventions:  - Encourage patient to monitor pain and request assistance  - Assess pain using appropriate pain scale  - Administer analgesics based on type and severity of pain and evaluate response  - Implement non-pharmacological measures as appropriate and evaluate response  - Consider cultural and social influences on pain and pain management  - Notify physician/advanced practitioner if interventions unsuccessful or patient reports new pain  Outcome: Progressing     Problem: INFECTION - ADULT  Goal: Absence or prevention of progression during hospitalization  Description: INTERVENTIONS:  - Assess and monitor for signs and symptoms of infection  - Monitor lab/diagnostic results  - Monitor all insertion sites, i.e. indwelling lines, tubes, and drains  - Monitor endotracheal if appropriate and nasal secretions for changes in amount and color  - [place] appropriate cooling/warming therapies per order  - Administer medications as ordered  - Instruct and encourage patient and family to use good hand hygiene technique  - Identify and instruct in appropriate isolation precautions for identified infection/condition  Outcome: Progressing     Problem: DISCHARGE PLANNING  Goal: Discharge to home or other facility with appropriate resources  Description: INTERVENTIONS:  - Identify barriers to discharge w/patient and caregiver  - Arrange for needed discharge resources and transportation as appropriate  - Identify discharge learning needs (meds, wound care, etc.)  - Arrange for interpretive services to assist at discharge as needed  - Refer to Case Management Department for coordinating discharge planning if the patient needs post-hospital services based on physician/advanced practitioner order or complex needs related to functional status, cognitive ability, or social support system  Outcome: Progressing Problem: Knowledge Deficit  Goal: Patient/family/caregiver demonstrates understanding of disease process, treatment plan, medications, and discharge instructions  Description: Complete learning assessment and assess knowledge base. Interventions:  - Provide teaching at level of understanding  - Provide teaching via preferred learning methods  Outcome: Progressing     Problem: Nutrition/Hydration-ADULT  Goal: Nutrient/Hydration intake appropriate for improving, restoring or maintaining nutritional needs  Description: Monitor and assess patient's nutrition/hydration status for malnutrition. Collaborate with interdisciplinary team and initiate plan and interventions as ordered. Monitor patient's weight and dietary intake as ordered or per policy. Utilize nutrition screening tool and intervene as necessary. Determine patient's food preferences and provide high-protein, high-caloric foods as appropriate.      INTERVENTIONS:  - Monitor oral intake, urinary output, labs, and treatment plans  - Assess nutrition and hydration status and recommend course of action  - Evaluate amount of meals eaten  - Assist patient with eating if necessary   - Allow adequate time for meals  - Recommend/ encourage appropriate diets, oral nutritional supplements, and vitamin/mineral supplements  - Order, calculate, and assess calorie counts as needed  - Recommend, monitor, and adjust tube feedings and TPN/PPN based on assessed needs  - Assess need for intravenous fluids  - Provide specific nutrition/hydration education as appropriate  - Include patient/family/caregiver in decisions related to nutrition  Outcome: Progressing     Problem: METABOLIC, FLUID AND ELECTROLYTES - ADULT  Goal: Electrolytes maintained within normal limits  Description: INTERVENTIONS:  - Monitor labs and assess patient for signs and symptoms of electrolyte imbalances  - Administer electrolyte replacement as ordered  - Monitor response to electrolyte replacements, including repeat lab results as appropriate  - Instruct patient on fluid and nutrition as appropriate  Outcome: Progressing     Problem: SKIN/TISSUE INTEGRITY - ADULT  Goal: Incision(s), wounds(s) or drain site(s) healing without S/S of infection  Description: INTERVENTIONS  - Assess and document dressing, incision, wound bed, drain sites and surrounding tissue  - Provide patient and family education  - Perform skin care/dressing changes que  Outcome: Progressing     Problem: HEMATOLOGIC - ADULT  Goal: Maintains hematologic stability  Description: INTERVENTIONS  - Assess for signs and symptoms of bleeding or hemorrhage  - Monitor labs  - Administer supportive blood products/factors as ordered and appropriate  Outcome: Progressing     Problem: MUSCULOSKELETAL - ADULT  Goal: Maintain or return mobility to safest level of function  Description: INTERVENTIONS:  - Assess patient's ability to carry out ADLs; assess patient's baseline for ADL function and identify physical deficits which impact ability to perform ADLs (bathing, care of mouth/teeth, toileting, grooming, dressing, etc.)  - Assess/evaluate cause of self-care deficits   - Assess range of motion  - Assess patient's mobility  - Assess patient's need for assistive devices and provide as appropriate  - Encourage maximum independence but intervene and supervise when necessary  - Involve family in performance of ADLs  - Assess for home care needs following discharge   - Consider OT consult to assist with ADL evaluation and planning for discharge  - Provide patient education as appropriate  Outcome: Progressing  Goal: Maintain proper alignment of affected body part  Description: INTERVENTIONS:  - Support, maintain and protect limb and body alignment  - Provide patient/ family with appropriate education  Outcome: Progressing     Problem: Prexisting or High Potential for Compromised Skin Integrity  Goal: Skin integrity is maintained or improved  Description: INTERVENTIONS:  - Identify patients at risk for skin breakdown  - Assess and monitor skin integrity  - Assess and monitor nutrition and hydration status  - Monitor labs   - Assess for incontinence   - Turn and reposition patient  - Assist with mobility/ambulation  - Relieve pressure over bony prominences  - Avoid friction and shearing  - Provide appropriate hygiene as needed including keeping skin clean and dry  - Evaluate need for skin moisturizer/barrier cream  - Collaborate with interdisciplinary team   - Patient/family teaching  - Consider wound care consult   Outcome: Progressing

## 2023-08-03 ENCOUNTER — ANESTHESIA (INPATIENT)
Dept: PERIOP | Facility: HOSPITAL | Age: 78
DRG: 239 | End: 2023-08-03
Payer: COMMERCIAL

## 2023-08-03 LAB
ANION GAP SERPL CALCULATED.3IONS-SCNC: 5 MMOL/L
ANION GAP SERPL CALCULATED.3IONS-SCNC: 7 MMOL/L
ATRIAL RATE: 83 BPM
BASOPHILS # BLD AUTO: 0.03 THOUSANDS/ÂΜL (ref 0–0.1)
BASOPHILS NFR BLD AUTO: 0 % (ref 0–1)
BUN SERPL-MCNC: 53 MG/DL (ref 5–25)
BUN SERPL-MCNC: 56 MG/DL (ref 5–25)
CALCIUM SERPL-MCNC: 8.1 MG/DL (ref 8.3–10.1)
CALCIUM SERPL-MCNC: 8.3 MG/DL (ref 8.3–10.1)
CHLORIDE SERPL-SCNC: 110 MMOL/L (ref 96–108)
CHLORIDE SERPL-SCNC: 111 MMOL/L (ref 96–108)
CO2 SERPL-SCNC: 26 MMOL/L (ref 21–32)
CO2 SERPL-SCNC: 26 MMOL/L (ref 21–32)
CREAT SERPL-MCNC: 1.32 MG/DL (ref 0.6–1.3)
CREAT SERPL-MCNC: 1.38 MG/DL (ref 0.6–1.3)
EOSINOPHIL # BLD AUTO: 0.29 THOUSAND/ÂΜL (ref 0–0.61)
EOSINOPHIL NFR BLD AUTO: 3 % (ref 0–6)
ERYTHROCYTE [DISTWIDTH] IN BLOOD BY AUTOMATED COUNT: 18.1 % (ref 11.6–15.1)
ERYTHROCYTE [DISTWIDTH] IN BLOOD BY AUTOMATED COUNT: 18.2 % (ref 11.6–15.1)
GFR SERPL CREATININE-BSD FRML MDRD: 48 ML/MIN/1.73SQ M
GFR SERPL CREATININE-BSD FRML MDRD: 51 ML/MIN/1.73SQ M
GLUCOSE SERPL-MCNC: 116 MG/DL (ref 65–140)
GLUCOSE SERPL-MCNC: 117 MG/DL (ref 65–140)
GLUCOSE SERPL-MCNC: 150 MG/DL (ref 65–140)
GLUCOSE SERPL-MCNC: 152 MG/DL (ref 65–140)
GLUCOSE SERPL-MCNC: 173 MG/DL (ref 65–140)
GLUCOSE SERPL-MCNC: 217 MG/DL (ref 65–140)
GLUCOSE SERPL-MCNC: 62 MG/DL (ref 65–140)
HCT VFR BLD AUTO: 23.6 % (ref 36.5–49.3)
HCT VFR BLD AUTO: 26.4 % (ref 36.5–49.3)
HGB BLD-MCNC: 6.8 G/DL (ref 12–17)
HGB BLD-MCNC: 7.6 G/DL (ref 12–17)
HGB BLD-MCNC: 7.9 G/DL (ref 12–17)
HGB BLD-MCNC: 8.7 G/DL (ref 12–17)
IMM GRANULOCYTES # BLD AUTO: 0.08 THOUSAND/UL (ref 0–0.2)
IMM GRANULOCYTES NFR BLD AUTO: 1 % (ref 0–2)
LYMPHOCYTES # BLD AUTO: 0.75 THOUSANDS/ÂΜL (ref 0.6–4.47)
LYMPHOCYTES NFR BLD AUTO: 9 % (ref 14–44)
MCH RBC QN AUTO: 25.2 PG (ref 26.8–34.3)
MCH RBC QN AUTO: 26.2 PG (ref 26.8–34.3)
MCHC RBC AUTO-ENTMCNC: 28.8 G/DL (ref 31.4–37.4)
MCHC RBC AUTO-ENTMCNC: 29.9 G/DL (ref 31.4–37.4)
MCV RBC AUTO: 87 FL (ref 82–98)
MCV RBC AUTO: 87 FL (ref 82–98)
MONOCYTES # BLD AUTO: 0.28 THOUSAND/ÂΜL (ref 0.17–1.22)
MONOCYTES NFR BLD AUTO: 3 % (ref 4–12)
NEUTROPHILS # BLD AUTO: 7.3 THOUSANDS/ÂΜL (ref 1.85–7.62)
NEUTS SEG NFR BLD AUTO: 84 % (ref 43–75)
NRBC BLD AUTO-RTO: 0 /100 WBCS
P AXIS: 59 DEGREES
PLATELET # BLD AUTO: 291 THOUSANDS/UL (ref 149–390)
PLATELET # BLD AUTO: 305 THOUSANDS/UL (ref 149–390)
PMV BLD AUTO: 8.4 FL (ref 8.9–12.7)
PMV BLD AUTO: 9 FL (ref 8.9–12.7)
POTASSIUM SERPL-SCNC: 3.9 MMOL/L (ref 3.5–5.3)
POTASSIUM SERPL-SCNC: 4.6 MMOL/L (ref 3.5–5.3)
PR INTERVAL: 175 MS
QRS AXIS: 65 DEGREES
QRSD INTERVAL: 100 MS
QT INTERVAL: 375 MS
QTC INTERVAL: 441 MS
RBC # BLD AUTO: 2.7 MILLION/UL (ref 3.88–5.62)
RBC # BLD AUTO: 3.02 MILLION/UL (ref 3.88–5.62)
SODIUM SERPL-SCNC: 142 MMOL/L (ref 135–147)
SODIUM SERPL-SCNC: 143 MMOL/L (ref 135–147)
T WAVE AXIS: 251 DEGREES
VENTRICULAR RATE: 83 BPM
WBC # BLD AUTO: 7.03 THOUSAND/UL (ref 4.31–10.16)
WBC # BLD AUTO: 8.73 THOUSAND/UL (ref 4.31–10.16)

## 2023-08-03 PROCEDURE — 82948 REAGENT STRIP/BLOOD GLUCOSE: CPT

## 2023-08-03 PROCEDURE — 85018 HEMOGLOBIN: CPT | Performed by: NURSE PRACTITIONER

## 2023-08-03 PROCEDURE — 80048 BASIC METABOLIC PNL TOTAL CA: CPT | Performed by: SURGERY

## 2023-08-03 PROCEDURE — 88311 DECALCIFY TISSUE: CPT | Performed by: PATHOLOGY

## 2023-08-03 PROCEDURE — 99232 SBSQ HOSP IP/OBS MODERATE 35: CPT | Performed by: STUDENT IN AN ORGANIZED HEALTH CARE EDUCATION/TRAINING PROGRAM

## 2023-08-03 PROCEDURE — 93010 ELECTROCARDIOGRAM REPORT: CPT | Performed by: INTERNAL MEDICINE

## 2023-08-03 PROCEDURE — 85027 COMPLETE CBC AUTOMATED: CPT | Performed by: NURSE PRACTITIONER

## 2023-08-03 PROCEDURE — 99223 1ST HOSP IP/OBS HIGH 75: CPT

## 2023-08-03 PROCEDURE — 93005 ELECTROCARDIOGRAM TRACING: CPT

## 2023-08-03 PROCEDURE — 99233 SBSQ HOSP IP/OBS HIGH 50: CPT | Performed by: SURGERY

## 2023-08-03 PROCEDURE — 80048 BASIC METABOLIC PNL TOTAL CA: CPT | Performed by: INTERNAL MEDICINE

## 2023-08-03 PROCEDURE — 27880 AMPUTATION OF LOWER LEG: CPT | Performed by: SURGERY

## 2023-08-03 PROCEDURE — 99232 SBSQ HOSP IP/OBS MODERATE 35: CPT | Performed by: INTERNAL MEDICINE

## 2023-08-03 PROCEDURE — 88307 TISSUE EXAM BY PATHOLOGIST: CPT | Performed by: PATHOLOGY

## 2023-08-03 PROCEDURE — 99233 SBSQ HOSP IP/OBS HIGH 50: CPT | Performed by: EMERGENCY MEDICINE

## 2023-08-03 PROCEDURE — 85018 HEMOGLOBIN: CPT | Performed by: SURGERY

## 2023-08-03 PROCEDURE — 85025 COMPLETE CBC W/AUTO DIFF WBC: CPT | Performed by: SURGERY

## 2023-08-03 PROCEDURE — P9016 RBC LEUKOCYTES REDUCED: HCPCS

## 2023-08-03 PROCEDURE — 0Y6J0Z2 DETACHMENT AT LEFT LOWER LEG, MID, OPEN APPROACH: ICD-10-PCS | Performed by: INTERNAL MEDICINE

## 2023-08-03 RX ORDER — CALCIUM GLUCONATE 20 MG/ML
2 INJECTION, SOLUTION INTRAVENOUS ONCE
Status: COMPLETED | OUTPATIENT
Start: 2023-08-03 | End: 2023-08-03

## 2023-08-03 RX ORDER — ACETAMINOPHEN 325 MG/1
975 TABLET ORAL EVERY 6 HOURS PRN
Status: DISCONTINUED | OUTPATIENT
Start: 2023-08-03 | End: 2023-08-10 | Stop reason: HOSPADM

## 2023-08-03 RX ORDER — HYDROMORPHONE HCL/PF 1 MG/ML
0.5 SYRINGE (ML) INJECTION
Status: DISCONTINUED | OUTPATIENT
Start: 2023-08-03 | End: 2023-08-03 | Stop reason: HOSPADM

## 2023-08-03 RX ORDER — ONDANSETRON 2 MG/ML
INJECTION INTRAMUSCULAR; INTRAVENOUS AS NEEDED
Status: DISCONTINUED | OUTPATIENT
Start: 2023-08-03 | End: 2023-08-03

## 2023-08-03 RX ORDER — LIDOCAINE HYDROCHLORIDE 20 MG/ML
INJECTION, SOLUTION EPIDURAL; INFILTRATION; INTRACAUDAL; PERINEURAL AS NEEDED
Status: DISCONTINUED | OUTPATIENT
Start: 2023-08-03 | End: 2023-08-03

## 2023-08-03 RX ORDER — ALBUMIN, HUMAN INJ 5% 5 %
12.5 SOLUTION INTRAVENOUS ONCE
Status: COMPLETED | OUTPATIENT
Start: 2023-08-03 | End: 2023-08-04

## 2023-08-03 RX ORDER — FENTANYL CITRATE 50 UG/ML
INJECTION, SOLUTION INTRAMUSCULAR; INTRAVENOUS AS NEEDED
Status: DISCONTINUED | OUTPATIENT
Start: 2023-08-03 | End: 2023-08-03

## 2023-08-03 RX ORDER — DEXAMETHASONE SODIUM PHOSPHATE 10 MG/ML
INJECTION, SOLUTION INTRAMUSCULAR; INTRAVENOUS AS NEEDED
Status: DISCONTINUED | OUTPATIENT
Start: 2023-08-03 | End: 2023-08-03

## 2023-08-03 RX ORDER — FENTANYL CITRATE/PF 50 MCG/ML
25 SYRINGE (ML) INJECTION
Status: DISCONTINUED | OUTPATIENT
Start: 2023-08-03 | End: 2023-08-03 | Stop reason: HOSPADM

## 2023-08-03 RX ORDER — ONDANSETRON 2 MG/ML
4 INJECTION INTRAMUSCULAR; INTRAVENOUS ONCE AS NEEDED
Status: DISCONTINUED | OUTPATIENT
Start: 2023-08-03 | End: 2023-08-03 | Stop reason: HOSPADM

## 2023-08-03 RX ORDER — DIPHENHYDRAMINE HYDROCHLORIDE 50 MG/ML
12.5 INJECTION INTRAMUSCULAR; INTRAVENOUS ONCE AS NEEDED
Status: DISCONTINUED | OUTPATIENT
Start: 2023-08-03 | End: 2023-08-03 | Stop reason: HOSPADM

## 2023-08-03 RX ORDER — OXYCODONE HYDROCHLORIDE 5 MG/1
5 TABLET ORAL EVERY 4 HOURS PRN
Status: DISCONTINUED | OUTPATIENT
Start: 2023-08-03 | End: 2023-08-10 | Stop reason: HOSPADM

## 2023-08-03 RX ORDER — DEXTROSE MONOHYDRATE 25 G/50ML
25 INJECTION, SOLUTION INTRAVENOUS ONCE
Status: COMPLETED | OUTPATIENT
Start: 2023-08-03 | End: 2023-08-03

## 2023-08-03 RX ORDER — EPHEDRINE SULFATE 50 MG/ML
INJECTION INTRAVENOUS AS NEEDED
Status: DISCONTINUED | OUTPATIENT
Start: 2023-08-03 | End: 2023-08-03

## 2023-08-03 RX ORDER — DEXTROSE MONOHYDRATE 25 G/50ML
25 INJECTION, SOLUTION INTRAVENOUS ONCE
Status: DISCONTINUED | OUTPATIENT
Start: 2023-08-03 | End: 2023-08-03

## 2023-08-03 RX ORDER — OXYCODONE HYDROCHLORIDE 10 MG/1
10 TABLET ORAL EVERY 4 HOURS PRN
Status: DISCONTINUED | OUTPATIENT
Start: 2023-08-03 | End: 2023-08-10 | Stop reason: HOSPADM

## 2023-08-03 RX ORDER — ALBUMIN, HUMAN INJ 5% 5 %
12.5 SOLUTION INTRAVENOUS ONCE
Status: COMPLETED | OUTPATIENT
Start: 2023-08-03 | End: 2023-08-03

## 2023-08-03 RX ORDER — ROPIVACAINE HYDROCHLORIDE 5 MG/ML
INJECTION, SOLUTION EPIDURAL; INFILTRATION; PERINEURAL
Status: COMPLETED | OUTPATIENT
Start: 2023-08-03 | End: 2023-08-03

## 2023-08-03 RX ORDER — PHENYLEPHRINE HCL IN 0.9% NACL 1 MG/10 ML
SYRINGE (ML) INTRAVENOUS AS NEEDED
Status: DISCONTINUED | OUTPATIENT
Start: 2023-08-03 | End: 2023-08-03

## 2023-08-03 RX ORDER — MAGNESIUM HYDROXIDE 1200 MG/15ML
LIQUID ORAL AS NEEDED
Status: DISCONTINUED | OUTPATIENT
Start: 2023-08-03 | End: 2023-08-03 | Stop reason: HOSPADM

## 2023-08-03 RX ORDER — SODIUM CHLORIDE, SODIUM GLUCONATE, SODIUM ACETATE, POTASSIUM CHLORIDE, MAGNESIUM CHLORIDE, SODIUM PHOSPHATE, DIBASIC, AND POTASSIUM PHOSPHATE .53; .5; .37; .037; .03; .012; .00082 G/100ML; G/100ML; G/100ML; G/100ML; G/100ML; G/100ML; G/100ML
75 INJECTION, SOLUTION INTRAVENOUS CONTINUOUS
Status: DISCONTINUED | OUTPATIENT
Start: 2023-08-03 | End: 2023-08-04

## 2023-08-03 RX ORDER — PROPOFOL 10 MG/ML
INJECTION, EMULSION INTRAVENOUS AS NEEDED
Status: DISCONTINUED | OUTPATIENT
Start: 2023-08-03 | End: 2023-08-03

## 2023-08-03 RX ORDER — HYDROMORPHONE HCL/PF 1 MG/ML
0.5 SYRINGE (ML) INJECTION
Status: DISCONTINUED | OUTPATIENT
Start: 2023-08-03 | End: 2023-08-10 | Stop reason: HOSPADM

## 2023-08-03 RX ADMIN — ATORVASTATIN CALCIUM 40 MG: 40 TABLET, FILM COATED ORAL at 18:40

## 2023-08-03 RX ADMIN — ALBUMIN (HUMAN) 12.5 G: 12.5 INJECTION, SOLUTION INTRAVENOUS at 15:50

## 2023-08-03 RX ADMIN — SODIUM CHLORIDE, SODIUM GLUCONATE, SODIUM ACETATE, POTASSIUM CHLORIDE, MAGNESIUM CHLORIDE, SODIUM PHOSPHATE, DIBASIC, AND POTASSIUM PHOSPHATE 75 ML/HR: .53; .5; .37; .037; .03; .012; .00082 INJECTION, SOLUTION INTRAVENOUS at 20:49

## 2023-08-03 RX ADMIN — FENTANYL CITRATE 25 MCG: 50 INJECTION, SOLUTION INTRAMUSCULAR; INTRAVENOUS at 13:00

## 2023-08-03 RX ADMIN — DEXAMETHASONE SODIUM PHOSPHATE 2 MG: 10 INJECTION, SOLUTION INTRAMUSCULAR; INTRAVENOUS at 12:15

## 2023-08-03 RX ADMIN — LIDOCAINE HYDROCHLORIDE 50 MG: 20 INJECTION, SOLUTION EPIDURAL; INFILTRATION; INTRACAUDAL at 13:00

## 2023-08-03 RX ADMIN — Medication 200 MCG: at 13:00

## 2023-08-03 RX ADMIN — PIPERACILLIN SODIUM AND TAZOBACTAM SODIUM 3.38 G: 36; 4.5 INJECTION, POWDER, LYOPHILIZED, FOR SOLUTION INTRAVENOUS at 22:27

## 2023-08-03 RX ADMIN — DEXAMETHASONE SODIUM PHOSPHATE 2 MG: 10 INJECTION, SOLUTION INTRAMUSCULAR; INTRAVENOUS at 12:10

## 2023-08-03 RX ADMIN — INSULIN LISPRO 1 UNITS: 100 INJECTION, SOLUTION INTRAVENOUS; SUBCUTANEOUS at 07:50

## 2023-08-03 RX ADMIN — PIPERACILLIN SODIUM AND TAZOBACTAM SODIUM 3.38 G: 36; 4.5 INJECTION, POWDER, LYOPHILIZED, FOR SOLUTION INTRAVENOUS at 03:31

## 2023-08-03 RX ADMIN — TAMSULOSIN HYDROCHLORIDE 0.4 MG: 0.4 CAPSULE ORAL at 18:40

## 2023-08-03 RX ADMIN — Medication 200 MCG: at 14:01

## 2023-08-03 RX ADMIN — INSULIN GLARGINE 5 UNITS: 100 INJECTION, SOLUTION SUBCUTANEOUS at 22:10

## 2023-08-03 RX ADMIN — PIPERACILLIN SODIUM AND TAZOBACTAM SODIUM 3.38 G: 36; 4.5 INJECTION, POWDER, LYOPHILIZED, FOR SOLUTION INTRAVENOUS at 09:15

## 2023-08-03 RX ADMIN — INSULIN LISPRO 5 UNITS: 100 INJECTION, SOLUTION INTRAVENOUS; SUBCUTANEOUS at 07:50

## 2023-08-03 RX ADMIN — HEPARIN SODIUM 5000 UNITS: 5000 INJECTION INTRAVENOUS; SUBCUTANEOUS at 22:10

## 2023-08-03 RX ADMIN — ROPIVACAINE HYDROCHLORIDE 20 ML: 5 INJECTION, SOLUTION EPIDURAL; INFILTRATION; PERINEURAL at 12:15

## 2023-08-03 RX ADMIN — PANTOPRAZOLE SODIUM 40 MG: 40 TABLET, DELAYED RELEASE ORAL at 05:17

## 2023-08-03 RX ADMIN — POTASSIUM CHLORIDE 20 MEQ: 1500 TABLET, EXTENDED RELEASE ORAL at 18:39

## 2023-08-03 RX ADMIN — ROPIVACAINE HYDROCHLORIDE 20 ML: 5 INJECTION, SOLUTION EPIDURAL; INFILTRATION; PERINEURAL at 12:10

## 2023-08-03 RX ADMIN — MIRTAZAPINE 7.5 MG: 15 TABLET, FILM COATED ORAL at 22:10

## 2023-08-03 RX ADMIN — Medication 200 MCG: at 13:43

## 2023-08-03 RX ADMIN — ALBUMIN (HUMAN) 12.5 G: 12.5 INJECTION, SOLUTION INTRAVENOUS at 16:09

## 2023-08-03 RX ADMIN — CALCIUM GLUCONATE 2 G: 20 INJECTION, SOLUTION INTRAVENOUS at 20:51

## 2023-08-03 RX ADMIN — EPHEDRINE SULFATE 10 MG: 50 INJECTION INTRAVENOUS at 16:16

## 2023-08-03 RX ADMIN — NOREPINEPHRINE BITARTRATE 1 MCG/MIN: 1 INJECTION, SOLUTION, CONCENTRATE INTRAVENOUS at 17:18

## 2023-08-03 RX ADMIN — FENTANYL CITRATE 25 MCG: 50 INJECTION, SOLUTION INTRAMUSCULAR; INTRAVENOUS at 13:12

## 2023-08-03 RX ADMIN — PHENYLEPHRINE HYDROCHLORIDE 50 MCG/MIN: 10 INJECTION INTRAVENOUS at 14:03

## 2023-08-03 RX ADMIN — PROPOFOL 100 MG: 10 INJECTION, EMULSION INTRAVENOUS at 13:00

## 2023-08-03 RX ADMIN — NOREPINEPHRINE BITARTRATE 2 MCG/MIN: 1 INJECTION, SOLUTION, CONCENTRATE INTRAVENOUS at 18:04

## 2023-08-03 RX ADMIN — HEPARIN SODIUM 5000 UNITS: 5000 INJECTION INTRAVENOUS; SUBCUTANEOUS at 05:17

## 2023-08-03 RX ADMIN — SODIUM CHLORIDE: 0.9 INJECTION, SOLUTION INTRAVENOUS at 14:58

## 2023-08-03 RX ADMIN — DEXTROSE MONOHYDRATE 25 ML: 25 INJECTION, SOLUTION INTRAVENOUS at 15:51

## 2023-08-03 RX ADMIN — ONDANSETRON 4 MG: 2 INJECTION INTRAMUSCULAR; INTRAVENOUS at 14:04

## 2023-08-03 NOTE — CONSULTS
PHYSICAL MEDICINE AND REHABILITATION CONSULT NOTE  Julisa Rosales 69 y.o. male MRN: [id number]  Unit/Bed#: [facility] 801-01 Encounter: [id number]    Requested by (Physician/Service): Noemy Briggs DO  Reason for Consultation:  Assessment of rehabilitation needs    Assessment:  Rehabilitation Diagnosis:   • Severe PAD with likely osteomyelitis pending left BKA  • Hx of right BKA   • Impaired mobility and self care    Recommendations:  Rehabilitation Plan:  • Therapy is pending left BKA however the patient may require return to sub-acute inpatient rehabilitation for a longer course. Will follow at this time. • Covid-19 Testing: [facility] inpatient rehabilitation units require testing within 48 hours of all potential admissions at this time. *Re-testing is NOT required for patients recovering from COVID-19 infection if isolation has been discontinued per CDC criteria. Medical Co-morbidities Plan:  · Peripheral artery disease   · Anemia   · Hypertension   · Coronary artery disease   · Congestive heart failure   · Diabetes type 2  · DVT ppx: Heparin SQ and SCD    Thank you for this consultation. Do not hesitate to contact service with further questions. Mina Lesch, CRNP  PM&R    I have spent a total time of 30 minutes on 08/03/23 in caring for this patient including Patient and family education, Counseling / Coordination of care, Documenting in the medical record, Obtaining or reviewing history   and Communicating with other healthcare professionals . History of Present Illness:  Julisa Rosales is a 69 y.o. male with a PMH of significant peripheral arterial disease s/p multiple procedures and right sided BKA, ischemic cardiomyopathy with EF 35-40%, multivessel CAD who presented to the [address] [place] [address] on 7/27/2023 with worsening left lower extremity wounds. He was started on vancomycin and Zosyn due to concern for infection and osteomyelitis.  He was transferred to [facility] and left BKA was recommended which he will undergo no 8/3/2023. PM&R are consulted for rehabilitation recommendations. The patient was seen in his room. He reports that he was able to ambulate with a wheelchair at the facility. He stayed in his room for meals and transferred with a slide board. He has not been home in several months but reports he still has his apartment. He denies any chest pain or SOB. His right residual limb is well healed and he reports minimal pain since previous surgery. Review of Systems: 10 point ROS negative except for what is noted in HPI    Function:  Prior level of function and living situation: Prior to SNF the patient lives in a 3rd floor apartment elevator access and was independent. He was admitted to [facility] 5/2023 and was transitioning to LTC until he receives his new prosthetic. Current level of function:  Physical Therapy: Pending   Occupational Therapy: Pending       Physical Exam:  /55   Pulse 63   Temp (!) 97.3 °F (36.3 °C)   Resp 16   Ht 5' 10" (1.778 m)   Wt 72.8 kg (160 lb 8 oz)   SpO2 96%   BMI 23.03 kg/m²        Intake/Output Summary (Last 24 hours) at 8/3/2023 1015  Last data filed at 8/3/2023 0800  Gross per 24 hour   Intake 1734 ml   Output 900 ml   Net 834 ml       Body mass index is 23.03 kg/m². Physical Exam  Constitutional:       Comments: Frail appearing    HENT:      Head: Normocephalic and atraumatic. Right Ear: External ear normal.      Left Ear: External ear normal.      Nose: Nose normal.      Mouth/Throat:      Mouth: Mucous membranes are moist.      Pharynx: Oropharynx is clear. Eyes:      Extraocular Movements: Extraocular movements intact. Pulmonary:      Effort: Pulmonary effort is normal. No respiratory distress. Abdominal:      General: There is no distension. Musculoskeletal:      Comments: Right BKA well healed, patient with difficulty lifting left leg off of the bed.     Skin:     Coloration: Skin is pale. Findings: Bruising present. Neurological:      Mental Status: He is alert and oriented to person, place, and time. Psychiatric:         Mood and Affect: Mood normal.        Social History:    Social History     Socioeconomic History   • Marital status:      Spouse name: Not on file   • Number of children: 2   • Years of education: Not on file   • Highest education level: Not on file   Occupational History   • Not on file   Tobacco Use   • Smoking status: Former     Packs/day: 1.00     Years: 6.00     Total pack years: 6.00     Types: Cigarettes     Start date: 5     Quit date: 12     Years since quittin.6   • Smokeless tobacco: Never   Vaping Use   • Vaping Use: Never used   Substance and Sexual Activity   • Alcohol use: Not Currently     Comment: Denies history of heavy alcohol use. At most will drink 1 or 2 drinks in a year (Updated 2023). • Drug use: Never   • Sexual activity: Not on file   Other Topics Concern   • Not on file   Social History Narrative    Served in the [employer]; completed 18-month tour in [employer].    Previously worked for [employer]. Lives alone with his dog. Has a son and a daughter who both live into [place], [place] -does not maintain a close relationship with them. Denies having strong support system in place at home. Social Determinants of Health     Financial Resource Strain: Not on file   Food Insecurity: No Food Insecurity (3/25/2023)    Hunger Vital Sign    • Worried About Running Out of Food in the Last Year: Never true    • Ran Out of Food in the Last Year: Never true   Transportation Needs: No Transportation Needs (3/25/2023)    PRAPARE - Transportation    • Lack of Transportation (Medical): No    • Lack of Transportation (Non-Medical):  No   Physical Activity: Not on file   Stress: Not on file   Social Connections: Not on file   Intimate Partner Violence: Not on file   Housing Stability: Low Risk  (3/25/2023)    Housing Stability Vital Sign    • Unable to Pay for Housing in the Last Year: No    • Number of Places Lived in the Last Year: 1    • Unstable Housing in the Last Year: No        Family History:    Family History   Problem Relation Age of Onset   • No Known Problems Sister    • Other Daughter         chronic neck pain   • Other Son         chronic back pain   • Sudden death Neg Hx    • Cancer Neg Hx          Medications:     Current Facility-Administered Medications:   •  aspirin chewable tablet 81 mg, 81 mg, Oral, Daily, Brandyn Banai, DO, 81 mg at 08/02/23 0831  •  atorvastatin (LIPITOR) tablet 40 mg, 40 mg, Oral, Daily With Dinner, Brandyn Banai, DO, 40 mg at 08/02/23 1712  •  chlorhexidine (PERIDEX) 0.12 % oral rinse 15 mL, 15 mL, Swish & Spit, Once, JANIS Kim  •  clopidogrel (PLAVIX) tablet 75 mg, 75 mg, Oral, Daily, Brandyn Banai, DO, 75 mg at 08/02/23 0831  •  escitalopram (LEXAPRO) tablet 5 mg, 5 mg, Oral, Daily, Brandyn Banai, DO, 5 mg at 08/02/23 0831  •  finasteride (PROSCAR) tablet 5 mg, 5 mg, Oral, Daily, Brandyn Banai, DO, 5 mg at 08/02/23 0830  •  heparin (porcine) subcutaneous injection 5,000 Units, 5,000 Units, Subcutaneous, Q8H [address], Brandyn Banai, DO, 5,000 Units at 08/03/23 0517  •  insulin glargine (LANTUS) subcutaneous injection 5 Units 0.05 mL, 5 Units, Subcutaneous, HS, Brandyn Banai, DO, 5 Units at 08/02/23 2215  •  insulin lispro (HumaLOG) 100 units/mL subcutaneous injection 1-6 Units, 1-6 Units, Subcutaneous, TID AC, 1 Units at 08/03/23 0750 **AND** Fingerstick Glucose (POCT), , , TID AC, Brandyn Banai, DO  •  insulin lispro (HumaLOG) 100 units/mL subcutaneous injection 5 Units, 5 Units, Subcutaneous, TID With Meals, Brandyn Banai, DO, 5 Units at 08/03/23 0750  •  metoprolol succinate (TOPROL-XL) 24 hr tablet 50 mg, 50 mg, Oral, Q12H [address], Brandyn Banai, DO, 50 mg at 08/02/23 0831  •  mirtazapine (REMERON) tablet 7.5 mg, 7.5 mg, Oral, HS, Brandyn Rosalesai, DO, 7.5 mg at 08/02/23 2251  •  pantoprazole (PROTONIX) EC tablet 40 mg, 40 mg, Oral, Daily, Brandyn Banai, DO, 40 mg at 08/03/23 0517  •  piperacillin-tazobactam (ZOSYN) 3.375 g in sodium chloride 0.9 % 100 mL IVPB, 3.375 g, Intravenous, Q6H, Brandyn Banai, DO, Stopped at 08/03/23 1013  •  potassium chloride (K-DUR,KLOR-CON) CR tablet 20 mEq, 20 mEq, Oral, BID, Fely Dykes MD, 20 mEq at 08/02/23 1712  •  sodium chloride 0.9 % infusion, 50 mL/hr, Intravenous, Continuous, JANIS Chu, Last Rate: 50 mL/hr at 08/02/23 2345, 50 mL/hr at 08/02/23 2345  •  tamsulosin (FLOMAX) capsule 0.4 mg, 0.4 mg, Oral, Daily With Dinner, Florence Tam, DO, 0.4 mg at 08/02/23 1712  •  vancomycin (VANCOCIN) IVPB (premix in dextrose) 1,000 mg 200 mL, 1,000 mg, Intravenous, Q24H, Brandyn Banai, DO, Last Rate: 200 mL/hr at 08/02/23 1713, 1,000 mg at 08/02/23 1713    Past Medical History:     Past Medical History:   Diagnosis Date   • 6th nerve palsy    • Blister of right leg    • BPH (benign prostatic hyperplasia)    • CAD (coronary artery disease)    • Cardiomyopathy ([address])     EF 25%   • CHF (congestive heart failure) ([address])    • Depression    • Diabetes mellitus ([address])     type 2, insulin dependent   • Diabetic foot ulcer ([address])     left, local wound care   • Diabetic retinopathy ([address])    • Exposure to Agent Orange     while serving in [employer]   • Former tobacco use    • H/O urinary retention     w/ reagan placement   • Hyperlipidemia    • Hypertension    • Rupture of biceps tendon, traumatic 2017    right        Past Surgical History:     Past Surgical History:   Procedure Laterality Date   • CARDIAC CATHETERIZATION     • CARDIAC CATHETERIZATION Left 2/14/2023    Procedure: Cardiac Left Heart Cath;  Surgeon: Tyrone Roberts MD;  Location: [facility]; Service: Cardiology   • CARDIAC CATHETERIZATION N/A 2/14/2023    Procedure: Cardiac Coronary Angiogram;  Surgeon: Tyrone Roberts MD;  Location: [facility];   Service: Cardiology   • CATARACT EXTRACTION, BILATERAL Bilateral 2021   • IR LOWER EXTREMITY ANGIOGRAM  3/28/2023   • IR LOWER EXTREMITY ANGIOGRAM  4/6/2023   • LEG AMPUTATION THROUGH LOWER TIBIA AND FIBULA Right 3/30/2023    Procedure: (BKA); Surgeon: Radha Guevara DO;  Location: [facility];  Service: Vascular   • WOUND DEBRIDEMENT Right 3/24/2023    Procedure: debridement of heel ulcer;  Surgeon: Naveed Santiago DPM;  Location: [facility];  Service: Podiatry         Allergies: Allergies   Allergen Reactions   • Metformin Diarrhea           LABORATORY RESULTS:      Lab Results   Component Value Date    HGB 7.9 (L) 08/03/2023    HCT 26.4 (L) 08/03/2023    WBC 7.03 08/03/2023     Lab Results   Component Value Date    BUN 56 (H) 08/03/2023    K 3.9 08/03/2023     (H) 08/03/2023    CREATININE 1.38 (H) 08/03/2023     Lab Results   Component Value Date    PROTIME 16.7 (H) 07/28/2023    INR 1.33 (H) 07/28/2023        DIAGNOSTIC STUDIES: Reviewed  No results found.

## 2023-08-03 NOTE — QUICK NOTE
Post Op Check Note   Francisco Yin 69 y.o. male MRN: [id number]  [facility] Encounter: [id number]    ASSESSMENT:  Francisco Yin is a 69 y.o. male who is status post left BKA earlier today, was hypotensive, received blood. Comfortable at this time. Continue BP monitoring/management. Will reevaluate in AM.    Subjective: Patient is a 69year old male who had a L BKA today. He was hypotensive in PACU. He was transfused with two units of blood. He is seen eating in bed and is comfortable. He has no complaints at this time. He is no longer hypotensive. Physical Exam:  GEN: NAD  CV: RRR  Lung: Normal effort  Ab: Soft, NT/ND  Neuro: A+Ox3  Incisions: dressing c/d/i. Blood pressure 104/59, pulse 86, temperature 98.3 °F (36.8 °C), temperature source Oral, resp. rate 13, height 5' 10" (1.778 m), weight 72.8 kg (160 lb 8 oz), SpO2 99 %. ,Body mass index is 23.03 kg/m².       Intake/Output Summary (Last 24 hours) at 8/3/2023 1954  Last data filed at 8/3/2023 1730  Gross per 24 hour   Intake 1385 ml   Output 1550 ml   Net -165 ml       Invasive Devices     Peripheral Intravenous Line  Duration           Peripheral IV 08/02/23 Left;Ventral (anterior) Forearm 1 day    Peripheral IV 08/03/23 Right Forearm <1 day                VTE Pharmacologic Prophylaxis: Heparin

## 2023-08-03 NOTE — ANESTHESIA PROCEDURE NOTES
Peripheral Block    Patient location during procedure: holding area  Start time: 8/3/2023 12:10 PM  Reason for block: at surgeon's request and post-op pain management  Staffing  Performed by: Petra Bryan MD  Authorized by: Petra Bryan MD    Preanesthetic Checklist  Completed: patient identified, IV checked, site marked, risks and benefits discussed, surgical consent, monitors and equipment checked, pre-op evaluation and timeout performed  Peripheral Block  Patient position: supine  Prep: ChloraPrep  Patient monitoring: continuous pulse ox and frequent blood pressure checks  Block type: popliteal  Laterality: left  Injection technique: single-shot  Procedures: ultrasound guided, Ultrasound guidance required for the procedure to increase accuracy and safety of medication placement and decrease risk of complications.   Ultrasound permanent image savedropivacaine (NAROPIN) 0.5 % - Perineural   20 mL - 8/3/2023 12:10:00 PM  Needle  Needle type: Stimuplex   Needle gauge: 21 G  Needle length: 4 in  Needle localization: ultrasound guidance  Test dose: negative  Assessment  Injection assessment: incremental injection, local visualized surrounding nerve on ultrasound, negative aspiration for heme and no paresthesia on injection  Paresthesia pain: none  Heart rate change: no  Slow fractionated injection: yes  Post-procedure:  site cleaned  patient tolerated the procedure well with no immediate complications

## 2023-08-03 NOTE — ANESTHESIA PROCEDURE NOTES
Peripheral Block    Patient location during procedure: holding area  Start time: 8/3/2023 12:15 PM  Reason for block: at surgeon's request and post-op pain management  Staffing  Performed by: Yusef Camacho MD  Authorized by: Yusef Camacho MD    Preanesthetic Checklist  Completed: patient identified, IV checked, site marked, risks and benefits discussed, surgical consent, monitors and equipment checked, pre-op evaluation and timeout performed  Peripheral Block  Patient position: supine  Prep: ChloraPrep  Patient monitoring: continuous pulse ox and frequent blood pressure checks  Block type: adductor canal block  Laterality: left  Injection technique: single-shot  Procedures: ultrasound guided, Ultrasound guidance required for the procedure to increase accuracy and safety of medication placement and decrease risk of complications.   Ultrasound permanent image savedropivacaine (NAROPIN) 0.5 % - Perineural   20 mL - 8/3/2023 12:15:00 PM  Needle  Needle type: Stimuplex   Needle gauge: 20 G  Needle length: 4 in  Needle localization: ultrasound guidance  Test dose: negative  Assessment  Injection assessment: incremental injection, local visualized surrounding nerve on ultrasound and negative aspiration for heme  Paresthesia pain: none  Heart rate change: no  Slow fractionated injection: yes  Post-procedure:  site cleaned  patient tolerated the procedure well with no immediate complications

## 2023-08-03 NOTE — CONSULTS
Inpatient consult to Acute Pain Service  Consult performed by: Hue Martinez MD  Consult ordered by: Layla De Jesus DO        Progress Note - Acute Pain Service    Jose Lam 69 y.o. male MRN: [id number]  Unit/Bed#: [facility] Encounter: [id number]      Assessment:   Principal Problem:    PAD (peripheral artery disease) ([address])  Active Problems:    Type 2 diabetes mellitus with hyperglycemia, with long-term current use of insulin (HCC)    Chronic systolic CHF (congestive heart failure) (HCC)    CAD (coronary artery disease)    Essential hypertension    Cardiomyopathy ([address])    Hx of BKA, right ([address])    Visit for wound check    Anemia    Jose Lam is a 69 y.o. male  history of DM (A1C 7.3), CHF (EF 35-40%), HTN, PAD s/p multiple LLE endovascular interventions (as below), s/p R BKA in setting of non-salvageable R foot with non-healing wound, who now presents for L BKA 8/3/23. Consulted for periop pain control. Pt going to OR today. Coordinated with Anesthesia team for two single shot peripheral nerve blocks preoperatively for pain control following surgery. At this time, patient is comfortable and not using any pain medicines. NPO for upcoming surgery. Plan:   - Nerve blocks prior to surgery   - Anticipate:   - Oxycodone 2.5-5mg q4hrs PRN mod-severe pain   - IV Dilaudid 0.2mg q3hrs PRN breakthrough pain   - Tylenol 975mg TID   - Gabapentin 100mg TID (CrCl 46)   - Robaxin 500mg QID  - Bowel regimen to prevent OIC  - IS OOB PT    APS will continue to follow. Please contact Acute Pain Service - [facility] via [email or web address] from [phone] with additional questions or concerns. See [email or web address] for additional contacts and after hours information.     Pain History  Current pain location(s): denies  Pain Scale:   n/a  Quality: n/a  24 hour history: as above    Opioid requirement previous 24 hours: 0    Meds/Allergies   all current active meds have been reviewed    Allergies   Allergen Reactions   • Metformin Diarrhea     Review of Systems - ROS reviewed and negative except as indicated    FH/SH - reviewed    Objective     Temp:  [97.3 °F (36.3 °C)-98.3 °F (36.8 °C)] 97.3 °F (36.3 °C)  HR:  [63-73] 63  Resp:  [16-20] 16  BP: (105-106)/(54-55) 106/55    Physical Exam  Vitals and nursing note reviewed. Constitutional:       General: He is not in acute distress. Appearance: Normal appearance. HENT:      Head: Normocephalic and atraumatic. Mouth/Throat:      Mouth: Mucous membranes are moist.   Eyes:      Extraocular Movements: Extraocular movements intact. Cardiovascular:      Rate and Rhythm: Normal rate. Pulmonary:      Effort: Pulmonary effort is normal.   Abdominal:      General: Abdomen is flat. Musculoskeletal:      Comments: LLE bandaged   Skin:     General: Skin is warm and dry. Neurological:      Mental Status: He is alert and oriented to person, place, and time. Psychiatric:         Mood and Affect: Mood normal.         Behavior: Behavior normal.         Lab Results:   Results from last 7 days   Lab Units 08/03/23  0501   WBC Thousand/uL 7.03   HEMOGLOBIN g/dL 7.9*   HEMATOCRIT % 26.4*   PLATELETS Thousands/uL 305      Results from last 7 days   Lab Units 08/03/23  0501 08/02/23  0430   POTASSIUM mmol/L 3.9 3.4*   CHLORIDE mmol/L 111* 109*   CO2 mmol/L 26 31   BUN mg/dL 56* 47*   CREATININE mg/dL 1.38* 1.55*   CALCIUM mg/dL 8.3 8.3   ALK PHOS U/L  --  387*   ALT U/L  --  33   AST U/L  --  31       Imaging Studies: I have personally reviewed pertinent reports. EKG, Pathology, and Other Studies: I have personally reviewed pertinent reports. Counseling / Coordination of Care  Total floor / unit time spent today 20 minutes. Greater than 50% of total time was spent with the patient and / or family counseling and / or coordination of care.  A description of the counseling / coordination of care: chart review, post op pain and regional/neuraxial pain management, discussion/planning with nursing/medical/surgical teams    Please note that the APS provides consultative services regarding pain management only. With the exception of ketamine and epidural infusions and except when indicated, final decisions regarding starting or changing doses of analgesic medications are at the discretion of the consulting service. Off hours consultation and/or medication management is generally not available.     Rosalia King MD  Acute Pain Service

## 2023-08-03 NOTE — PROGRESS NOTES
Progress Note - Vascular Surgery  : SHANNAN Vascular Surgery role on Jourdan Pelaez Bias 69 y.o. male MRN: [id number]  Unit/Bed#: [facility] 801-01 Encounter: [id number]    Assessment:  69 y.o. male with nonsalvagable LLE for amputation       Plan:  * PAD (peripheral artery disease) ([address])  Assessment & Plan  69 y.o. male with history of DM (A1C 7.3), CHF (EF 35-40%), HTN, PAD s/p multiple LLE endovascular interventions (as below), s/p R BKA in setting of non-salvageable R foot with non-healing wound, who now presents for evaluation for LCLTI with poor healing open LLE wounds, XR concerning for OM    Not MRI candidate due to history of stents, patient amenable to L BKA now, does not want to delay further    L BKA today      Subjective/Objective     Subjective: No complaints      Objective:     Pulse exam:  R BKA  L foot wrapped in dressing/pressure boot    Physical Exam:  GEN: NAD  HEENT: MMM  CV: RRR  Lung: Normal effort  Ab: Soft, ND/NT   Neuro: A+Ox3         Vitals:  /55   Pulse 63   Temp (!) 97.3 °F (36.3 °C)   Resp 16   Ht 5' 10" (1.778 m)   Wt 72.8 kg (160 lb 8 oz)   SpO2 96%   BMI 23.03 kg/m²     I/Os:  I/O last 3 completed shifts: In: 7550 [P.O.:2574]  Out: 1900 [Urine:1900]  No intake/output data recorded. Lab Results and Cultures:   Lab Results   Component Value Date    WBC 7.03 08/03/2023    HGB 7.9 (L) 08/03/2023    HCT 26.4 (L) 08/03/2023    MCV 87 08/03/2023     08/03/2023     Lab Results   Component Value Date    CALCIUM 8.3 08/02/2023    K 3.4 (L) 08/02/2023    CO2 31 08/02/2023     (H) 08/02/2023    BUN 47 (H) 08/02/2023    CREATININE 1.55 (H) 08/02/2023     Lab Results   Component Value Date    INR 1.33 (H) 07/28/2023    INR 1.25 (H) 07/26/2023    INR 1.21 (H) 02/08/2023    PROTIME 16.7 (H) 07/28/2023    PROTIME 15.9 (H) 07/26/2023    PROTIME 15.6 (H) 02/08/2023        Blood Culture:   Lab Results   Component Value Date    BLOODCX No Growth After 5 Days. 07/27/2023    BLOODCX No Growth After 5 Days.  07/27/2023   ,   Urinalysis:   Lab Results   Component Value Date    COLORU Charlene 04/02/2023    CLARITYU Cloudy 04/02/2023    SPECGRAV >=1.030 04/02/2023    PHUR 5.5 04/02/2023    LEUKOCYTESUR Negative 04/02/2023    NITRITE Negative 04/02/2023    GLUCOSEU 100 (1/10%) (A) 04/02/2023    KETONESU Trace (A) 04/02/2023    BILIRUBINUR Negative 04/02/2023    BLOODU Large (A) 04/02/2023   ,   Urine Culture:   Lab Results   Component Value Date    URINECX No Growth <1000 cfu/mL 02/08/2023   ,   Wound Culure:   Lab Results   Component Value Date    WOUNDCULT (A) 03/21/2023     3+ Growth of Methicillin Resistant Staphylococcus aureus    WOUNDCULT 4+ Growth of Enterococcus faecalis (A) 03/21/2023    WOUNDCULT 1+ Growth of 03/21/2023       Medications:  Current Facility-Administered Medications   Medication Dose Route Frequency   • aspirin chewable tablet 81 mg  81 mg Oral Daily   • atorvastatin (LIPITOR) tablet 40 mg  40 mg Oral Daily With Dinner   • chlorhexidine (PERIDEX) 0.12 % oral rinse 15 mL  15 mL Swish & Spit Once   • clopidogrel (PLAVIX) tablet 75 mg  75 mg Oral Daily   • escitalopram (LEXAPRO) tablet 5 mg  5 mg Oral Daily   • finasteride (PROSCAR) tablet 5 mg  5 mg Oral Daily   • heparin (porcine) subcutaneous injection 5,000 Units  5,000 Units Subcutaneous Q8H [facility]   • insulin glargine (LANTUS) subcutaneous injection 5 Units 0.05 mL  5 Units Subcutaneous HS   • insulin lispro (HumaLOG) 100 units/mL subcutaneous injection 1-6 Units  1-6 Units Subcutaneous TID AC   • insulin lispro (HumaLOG) 100 units/mL subcutaneous injection 5 Units  5 Units Subcutaneous TID With Meals   • metoprolol succinate (TOPROL-XL) 24 hr tablet 50 mg  50 mg Oral Q12H [place]   • mirtazapine (REMERON) tablet 7.5 mg  7.5 mg Oral HS   • pantoprazole (PROTONIX) EC tablet 40 mg  40 mg Oral Daily   • piperacillin-tazobactam (ZOSYN) 3.375 g in sodium chloride 0.9 % 100 mL IVPB  3.375 g Intravenous Q6H   • potassium chloride (K-DUR,KLOR-CON) CR tablet 20 mEq  20 mEq Oral BID   • sodium chloride 0.9 % infusion  50 mL/hr Intravenous Continuous   • tamsulosin (FLOMAX) capsule 0.4 mg  0.4 mg Oral Daily With Dinner   • torsemide (DEMADEX) tablet 20 mg  20 mg Oral Daily   • vancomycin (VANCOCIN) IVPB (premix in dextrose) 1,000 mg 200 mL  1,000 mg Intravenous Q24H           Thalia Herzog MD  8/3/2023

## 2023-08-03 NOTE — PROGRESS NOTES
[facility]  Transfer/Accept Note: Critical Care  Name: Nancy Rico 69 y.o. male I MRN: [id number]  Unit/Bed#: OR POOL I Date of Admission: 7/27/2023   Date of Service: 8/3/2023 I Hospital Day: 7    Assessment/Plan   Neuro:   · Diagnosis: Acute pain, depression  · Plan: Frequent neurologic monitoring, continue PRN oxycodone/hydromorphone, continue home Lexapro, appreciate acute pain recommendations  CV:   · Diagnosis: Hypotension, chronic heart failure with reduced ejection fraction, ischemic cardiomyopathy, coronary artery disease, history of complete heart block s/p pacemaker 5/23, hyperlipidemia, severe peripheral arterial disease s/p right BKA now POD #0 left BKA  · Plan: Continue aspirin/Plavix/statin, hold metoprolol for now, hold further diuretics, hypotension possibly related to anesthesia, follow endpoints and pressures, if continued hypotension would strongly consider central access to trend VBG/recheck limited ECHO for volume status, appreciate cardiology recommendations  Pulm:  · Diagnosis: Respiratory insufficiency  · Plan: Continue nasal cannula for goal SpO2>94%, encourage good pulmonary hygiene, respiratory protocol  GI:   · Plan: Begin bowel regimen  :   · Diagnosis: Acute kidney injury  · Plan: Gentle IV resuscitation, closely follow intake and output, daily weights, trend serum creatinine  F/E/N:   · Plan: Gentle IVF, replete electroyltes as necessary, diet per surgical team  Heme/Onc:   · Diagnosis: Anemia  · Plan: Transfuse for hemoglobin greater than 7, would trend overnight, consider iron panels, continue heparin for DVT prophylaxis  Endo:   · Diagnosis: Type 2 diabetes  · Plan: Continue sliduing scale insulin with Lantus at night  ID:   · Diagnosis: Concern for osteomyelitis  · Plan: Day 8 of Zosyn, negative cultures, discuss duration with vascular surgery service  MSK/Skin:   · Diagnosis: Deconditioning  · Plan: PT/OT as able, outo f bed      Disposition: Critical care    ICU Core Measures     A: Assess, Prevent, and Manage Pain · Has pain been assessed? Yes  · Need for changes to pain regimen? No   B: Both SAT/SAT  · N/A   C: Choice of Sedation · RASS Goal: N/A patient not on sedation  · Need for changes to sedation or analgesia regimen? NA   D: Delirium · CAM-ICU: Negative   E: Early Mobility  · Plan for early mobility? Yes   F: Family Engagement · Plan for family engagement today? Yes       Antibiotic Review: Continue broad spectrum secondary to severity of illness. Review of Invasive Devices:            Prophylaxis:  VTE VTE covered by:  [JUQ Hold] heparin (porcine), Subcutaneous, 5,000 Units at 08/03/23 0517       Stress Ulcer  covered bypantoprazole (PROTONIX) EC tablet 40 mg [[id number]]          Subjective   HPI/24hr events: Patient is a 69year old presenting on 7/27 with a complaint of worsening left lower extremity wounds. He has a past medical history of coronary artery disease, heart failure with reduced ejection fraction, hypertension, hyperlipidemia, cardiomyopathy, peripheral arterial disease s/p right BKA, depression, and heart block s/p permanent pacemaker in 5/23. He was evaluated by the podiatry/vascular surgery, started on Zosyn for suspected osteomyelitis, and was taken to the operating room for a left BKA today. Postoperatively, he was hypotensive and is being referred to the critical care unit for further management. Review of Systems   Constitutional: Positive for appetite change (hungry) and fatigue. HENT: Negative for sore throat. Respiratory: Negative for shortness of breath. Cardiovascular: Negative for chest pain. Gastrointestinal: Negative for abdominal pain. Musculoskeletal: Negative for arthralgias and back pain.              Objective                            Vitals I/O      Most Recent Min/Max in 24hrs   Temp 97.8 °F (36.6 °C) Temp  Min: 97.3 °F (36.3 °C)  Max: 98.8 °F (37.1 °C)   Pulse 82 Pulse  Min: 62  Max: 86   Resp 12 Resp  Min: 12  Max: 20   BP (!) 84/50 BP  Min: 79/47  Max: 106/55   O2 Sat 97 % SpO2  Min: 95 %  Max: 100 %      Intake/Output Summary (Last 24 hours) at 8/3/2023 1707  Last data filed at 8/3/2023 1458  Gross per 24 hour   Intake 2734 ml   Output 1550 ml   Net 1184 ml         Diet Regular; Regular House     Invasive Monitoring Physical exam    Physical Exam  Constitutional:       General: He is not in acute distress. Appearance: He is ill-appearing. Interventions: Nasal cannula in place. HENT:      Head: Normocephalic and atraumatic. Mouth/Throat:      Mouth: Mucous membranes are dry. Eyes:      Pupils: Pupils are equal, round, and reactive to light. Cardiovascular:      Rate and Rhythm: Normal rate and regular rhythm. Pulmonary:      Effort: Pulmonary effort is normal. No respiratory distress. Breath sounds: Normal breath sounds. Abdominal:      General: Abdomen is flat. Bowel sounds are normal. There is no distension. Palpations: Abdomen is soft. Genitourinary:     Comments: Scrotal edema  Musculoskeletal:      Comments: Bilateral BKAs, left lower extremity with dressing in place and immobilizer, no obvious drainage   Skin:     General: Skin is warm. Coloration: Skin is pale. Neurological:      Mental Status: He is alert. GCS: GCS eye subscore is 4. GCS verbal subscore is 5. GCS motor subscore is 6. Diagnostic Studies      EKG: Sinus rhythm  Imaging:  I have personally reviewed pertinent reports.        Medications:  Scheduled PRN   [MAR Hold] aspirin, 81 mg, Daily  [MAR Hold] atorvastatin, 40 mg, Daily With Dinner  chlorhexidine, 15 mL, Once  [MAR Hold] clopidogrel, 75 mg, Daily  [MAR Hold] escitalopram, 5 mg, Daily  [MAR Hold] finasteride, 5 mg, Daily  [MAR Hold] heparin (porcine), 5,000 Units, Q8H [address]  [MAR Hold] insulin glargine, 5 Units, HS  [MAR Hold] insulin lispro, 1-6 Units, TID AC  [MAR Hold] insulin lispro, 5 Units, TID With Meals  [MAR Hold] metoprolol succinate, 50 mg, Q12H [address]  [MAR Hold] mirtazapine, 7.5 mg, HS  [MAR Hold] pantoprazole, 40 mg, Daily  [MAR Hold] piperacillin-tazobactam, 3.375 g, Q6H  [MAR Hold] potassium chloride, 20 mEq, BID  [MAR Hold] tamsulosin, 0.4 mg, Daily With Dinner  [facility] Hold] vancomycin, 1,000 mg, Q24H      acetaminophen, 975 mg, Q6H PRN  diphenhydrAMINE, 12.5 mg, Once PRN  fentaNYL, 25 mcg, Q3 min PRN  HYDROmorphone, 0.5 mg, Q5 Min PRN  HYDROmorphone, 0.5 mg, Q3H PRN  ondansetron, 4 mg, Once PRN  oxyCODONE, 10 mg, Q4H PRN  oxyCODONE, 5 mg, Q4H PRN       Continuous    sodium chloride, 50 mL/hr, Last Rate: 50 mL/hr (08/02/23 2345)         Labs:    CBC    Recent Labs     08/03/23  0501 08/03/23  1556   WBC 7.03 8.73   HGB 7.9* 6.8*   HCT 26.4* 23.6*    291     BMP    Recent Labs     08/03/23  0501 08/03/23  1556   SODIUM 142 143   K 3.9 4.6   * 110*   CO2 26 26   AGAP 5 7   BUN 56* 53*   CREATININE 1.38* 1.32*   CALCIUM 8.3 8.1*       Coags    No recent results     Additional Electrolytes  No recent results       Blood Gas    No recent results  No recent results LFTs  Recent Labs     08/02/23  0430   ALT 33   AST 31   ALKPHOS 387*   ALB 2.0*   TBILI 0.39       Infectious  No recent results  Glucose  Recent Labs     08/02/23  0430 08/03/23  0501 08/03/23  1556   GLUC 93 152* [address], CRNP

## 2023-08-03 NOTE — DISCHARGE INSTR - AVS FIRST PAGE
DISCHARGE INSTRUCTIONS  AMPUTATION    REHABILITATION:   You will require some form of rehabilitation after this procedure. This will assist with your return to daily activities by incorporating exercise and balance training. This may be in the form of visiting nurses and physical therapy in your home or more commonly, admission to a rehabilitation facility for a period of time before you return home. ACTIVITY:  You cannot put any weight on the bottom of your residual limb. Physical therapy and rehab staff will direct progression of your activity based on your progress. Please follow their recommendations closely. It is incredibly important to avoid falling on your residual limb as this can cause bleeding and the wound to open up. Your surgeon will decide when you are ready to be referred to the prosthetist for a fitting. This will occur sometime after your 4-week appointment and often later. DIET:  Resume your normal diet. Good nutrition is important for healing of your incision. INCISION:  Wash incision daily with soap and water and thoroughly pat dry. Apply clean, dry gauze and an ACE wrap daily to decrease swelling and get your stump ready to be fit for a prosthetic. It is normal to have swelling or discoloration around the incision. If increasing redness or pain develops, call our office immediately. You will have stitches or staples and these will be evaluated for removal at your first post-operative visit around 4 weeks after surgery. If any of your incisions are open and require dressing changes, you will be given instructions for your daily incision care. If you are not able to change the dressings, a visiting nurse will be arranged. Do not bathe in a tub or swim until your incision is completely healed. DO NOT put any powders, creams, ointments, or lotions on your incision.     FOLLOW UP APPOINTMENTS:  Making and keeping follow up appointments and ultrasound tests are important to your recovery. If you have difficulty making it to or keeping your follow up appointments, call the office. If you have increased pain, fever >101.5, increased drainage, redness or a bad smell at your surgery site, new coldness/numbness of your arm or leg, please call us immediately and GO directly to the ER. Appt chato/ Dr. Livingston Larger: 8/28/2023 at 9:30am, [facility] [address]  [phone]  [phone]   [facility] [address], [place]), [address] [facility]  [address] [facility] [address], [facility], [facility], [address]  [address], [facility], [address] [facility]  [address] [facility], [address] [facility], [address] [facility], [address]  [address] [facility]Sixth [address], 1st Floor, [place], [address]  [address], [address], [address], [facility], [address]  [address], [address] [facility] , [place]), [address] [facility]  [address] [facility], [address] [facility], [address] [place] [address] [address] [place] [address] [address] , Tere [place]row  [address] [facility], [address]

## 2023-08-03 NOTE — PLAN OF CARE
Problem: PAIN - ADULT  Goal: Verbalizes/displays adequate comfort level or baseline comfort level  Description: Interventions:  - Encourage patient to monitor pain and request assistance  - Assess pain using appropriate pain scale  - Administer analgesics based on type and severity of pain and evaluate response  - Implement non-pharmacological measures as appropriate and evaluate response  - Consider cultural and social influences on pain and pain management  - Notify physician/advanced practitioner if interventions unsuccessful or patient reports new pain  Outcome: Progressing     Problem: INFECTION - ADULT  Goal: Absence or prevention of progression during hospitalization  Description: INTERVENTIONS:  - Assess and monitor for signs and symptoms of infection  - Monitor lab/diagnostic results  - Monitor all insertion sites, i.e. indwelling lines, tubes, and drains  - Monitor endotracheal if appropriate and nasal secretions for changes in amount and color  - [place] appropriate cooling/warming therapies per order  - Administer medications as ordered  - Instruct and encourage patient and family to use good hand hygiene technique  - Identify and instruct in appropriate isolation precautions for identified infection/condition  Outcome: Progressing     Problem: DISCHARGE PLANNING  Goal: Discharge to home or other facility with appropriate resources  Description: INTERVENTIONS:  - Identify barriers to discharge w/patient and caregiver  - Arrange for needed discharge resources and transportation as appropriate  - Identify discharge learning needs (meds, wound care, etc.)  - Arrange for interpretive services to assist at discharge as needed  - Refer to Case Management Department for coordinating discharge planning if the patient needs post-hospital services based on physician/advanced practitioner order or complex needs related to functional status, cognitive ability, or social support system  Outcome: Progressing Problem: Knowledge Deficit  Goal: Patient/family/caregiver demonstrates understanding of disease process, treatment plan, medications, and discharge instructions  Description: Complete learning assessment and assess knowledge base. Interventions:  - Provide teaching at level of understanding  - Provide teaching via preferred learning methods  Outcome: Progressing     Problem: Nutrition/Hydration-ADULT  Goal: Nutrient/Hydration intake appropriate for improving, restoring or maintaining nutritional needs  Description: Monitor and assess patient's nutrition/hydration status for malnutrition. Collaborate with interdisciplinary team and initiate plan and interventions as ordered. Monitor patient's weight and dietary intake as ordered or per policy. Utilize nutrition screening tool and intervene as necessary. Determine patient's food preferences and provide high-protein, high-caloric foods as appropriate.      INTERVENTIONS:  - Monitor oral intake, urinary output, labs, and treatment plans  - Assess nutrition and hydration status and recommend course of action  - Evaluate amount of meals eaten  - Assist patient with eating if necessary   - Allow adequate time for meals  - Recommend/ encourage appropriate diets, oral nutritional supplements, and vitamin/mineral supplements  - Order, calculate, and assess calorie counts as needed  - Recommend, monitor, and adjust tube feedings based on assessed needs  - Assess need for intravenous fluids  - Provide nutrition/hydration education as appropriate  - Include patient/family/caregiver in decisions related to nutrition  Outcome: Progressing     Problem: METABOLIC, FLUID AND ELECTROLYTES - ADULT  Goal: Electrolytes maintained within normal limits  Description: INTERVENTIONS:  - Monitor labs and assess patient for signs and symptoms of electrolyte imbalances  - Administer electrolyte replacement as ordered  - Monitor response to electrolyte replacements, including repeat lab results as appropriate  - Instruct patient on fluid and nutrition as appropriate  Outcome: Progressing     Problem: SKIN/TISSUE INTEGRITY - ADULT  Goal: Incision(s), wounds(s) or drain site(s) healing without S/S of infection  Description: INTERVENTIONS  - Assess and document dressing, incision, wound bed, drain sites and surrounding tissue  - Provide patient and family education  - Perform skin care/dressing changes every shift    Outcome: Progressing     Problem: HEMATOLOGIC - ADULT  Goal: Maintains hematologic stability  Description: INTERVENTIONS  - Assess for signs and symptoms of bleeding or hemorrhage  - Monitor labs  - Administer supportive blood products/factors as ordered and appropriate  Outcome: Progressing     Problem: MUSCULOSKELETAL - ADULT  Goal: Maintain or return mobility to safest level of function  Description: INTERVENTIONS:  - Assess patient's ability to carry out ADLs; assess patient's baseline for ADL function and identify physical deficits which impact ability to perform ADLs (bathing, care of mouth/teeth, toileting, grooming, dressing, etc.)  - Assess/evaluate cause of self-care deficits   - Assess range of motion  - Assess patient's mobility  - Assess patient's need for assistive devices and provide as appropriate  - Encourage maximum independence but intervene and supervise when necessary  - Involve family in performance of ADLs  - Assess for home care needs following discharge   - Consider OT consult to assist with ADL evaluation and planning for discharge  - Provide patient education as appropriate  Outcome: Progressing  Goal: Maintain proper alignment of affected body part  Description: INTERVENTIONS:  - Support, maintain and protect limb and body alignment  - Provide patient/ family with appropriate education  Outcome: Progressing     Problem: Prexisting or High Potential for Compromised Skin Integrity  Goal: Skin integrity is maintained or improved  Description: INTERVENTIONS:  - Identify patients at risk for skin breakdown  - Assess and monitor skin integrity  - Assess and monitor nutrition and hydration status  - Monitor labs   - Assess for incontinence   - Turn and reposition patient  - Assist with mobility/ambulation  - Relieve pressure over bony prominences  - Avoid friction and shearing  - Provide appropriate hygiene as needed including keeping skin clean and dry  - Evaluate need for skin moisturizer/barrier cream  - Collaborate with interdisciplinary team   - Patient/family teaching  - Consider wound care consult   Outcome: Progressing David Palomares(Attending)

## 2023-08-03 NOTE — ANESTHESIA POSTPROCEDURE EVALUATION
Post-Op Assessment Note    CV Status:  Stable    Pain management: adequate     Mental Status:  Alert and awake   Hydration Status:  Euvolemic   PONV Controlled:  Controlled   Airway Patency:  Patent      Post Op Vitals Reviewed: Yes      Staff: CRNA         No notable events documented.     BP   87/46   Temp 98   Pulse 63   Resp 15   SpO2 100

## 2023-08-03 NOTE — PLAN OF CARE
Problem: INFECTION - ADULT  Goal: Absence or prevention of progression during hospitalization  Description: INTERVENTIONS:  - Assess and monitor for signs and symptoms of infection  - Monitor lab/diagnostic results  - Monitor all insertion sites, i.e. indwelling lines, tubes, and drains  - Monitor endotracheal if appropriate and nasal secretions for changes in amount and color  - [place] appropriate cooling/warming therapies per order  - Administer medications as ordered  - Instruct and encourage patient and family to use good hand hygiene technique  - Identify and instruct in appropriate isolation precautions for identified infection/condition  Outcome: Progressing     Problem: SKIN/TISSUE INTEGRITY - ADULT  Goal: Incision(s), wounds(s) or drain site(s) healing without S/S of infection  Description: INTERVENTIONS  - Assess and document dressing, incision, wound bed, drain sites and surrounding tissue  - Provide patient and family education  - Perform skin care/dressing changes every shift    Outcome: Progressing     Problem: Prexisting or High Potential for Compromised Skin Integrity  Goal: Skin integrity is maintained or improved  Description: INTERVENTIONS:  - Identify patients at risk for skin breakdown  - Assess and monitor skin integrity  - Assess and monitor nutrition and hydration status  - Monitor labs   - Assess for incontinence   - Turn and reposition patient  - Assist with mobility/ambulation  - Relieve pressure over bony prominences  - Avoid friction and shearing  - Provide appropriate hygiene as needed including keeping skin clean and dry  - Evaluate need for skin moisturizer/barrier cream  - Collaborate with interdisciplinary team   - Patient/family teaching  - Consider wound care consult   Outcome: Progressing

## 2023-08-03 NOTE — PROGRESS NOTES
Sol Wilcox is a 69 y.o. male who is currently ordered Vancomycin IV with management by the Pharmacy Consult service. Relevant clinical data and objective / subjective history reviewed. Vancomycin Assessment:  Indication and Goal AUC/Trough: Soft tissue (goal -600, trough >10), -600, trough >10  Clinical Status: stable  Micro:      Renal Function:  SCr: 1.38 mg/dL (was 1.55 mg/dL)   CrCl: 46.2 mL/min (was 41.2 mL/min)  Renal replacement: Not on dialysis  Days of Therapy: 8  Current Dose: Vancomycin 1000mg q24  Vancomycin Plan:  New Dosing: Continue 1000mg q24  Estimated AUC: 413 mcg hr/mL  Estimated Trough: 12.6mcg/mL  Next Level: Fri 8-4 0600  Renal Function Monitoring: Daily BMP and [place] will continue to follow closely for s/sx of nephrotoxicity, infusion reactions and appropriateness of therapy. BMP and CBC will be ordered per protocol. We will continue to follow the patient’s culture results and clinical progress daily.      Doreen KABA.Ph., Pharmacist

## 2023-08-03 NOTE — ASSESSMENT & PLAN NOTE
Lab Results   Component Value Date    HGBA1C 7.3 (H) 05/04/2023       Recent Labs     08/02/23  1628 08/02/23  1659 08/02/23  2213 08/03/23  0703   POCGLU 73 98 152* 150*       Blood Sugar Average: Last 72 hrs:  (P) [id number]   Patient refusing cardiac/diabetic diet  He wants regular diet   continue with Lantus and Humalog with meals  Continue insulin sliding scale  Monitor

## 2023-08-03 NOTE — OP NOTE
OPERATIVE REPORT  PATIENT NAME: Leonard Cuevas    :  1945  MRN: [id number]  Pt Location: [facility] OR ROOM 02    SURGERY DATE: 8/3/2023    Surgeon(s) and Role:     * Monse Ren DO - Primary     * Mika Polanco MD - Assisting     * Royal Margot DO - Fellow    Preop Diagnosis:  Peripheral arterial disease ([address]) [I73.9]  Nonhealing ulcer of multiple sites of left lower extremity, unspecified ulcer stage ([address]) [L97.929]    Post-Op Diagnosis Codes:     * Peripheral arterial disease ([address]) [I73.9]     * Nonhealing ulcer of multiple sites of left lower extremity, unspecified ulcer stage ([address]) [L97.929]    Procedure(s):  Left - AMPUTATION BELOW KNEE (BKA)    Specimen(s):  ID Type Source Tests Collected by Time Destination   1 : BKA Tissue Leg, Left TISSUE EXAM Monse Ren DO 8/3/2023 1349        Estimated Blood Loss:   150 mL    Drains:  * No LDAs found *    Anesthesia Type:   General    Operative Indications:  Peripheral arterial disease ([address]) [I73.9]  Nonhealing ulcer of multiple sites of left lower extremity, unspecified ulcer stage ([address]) [L97.929]    Operative Findings:  Successful left BKA    Complications:   None    Procedure and Technique:  Patient was positively identified by name, birthdate, hospital wristband. Transferred operating room table and secured supine. Anesthesia was induced by the anesthesia team and airway was secured via LMA. Pneumatic tourniquet was placed on the left thigh. Left foot dressing was left in place and left lower extremity was circumferentially prepped and draped in the usual sterile fashion. Stockinette was used to sterilely cover left foot, stockinette secured with Coban. Timeout was called per protocol. X paco was used to compress the left lower extremity after which the tourniquet was inflated to 300 mmHg. Timer was started. We marked our incision to transect the bone 4 fingerbreadths below the tibial tuberosity on the left lower extremity.   Marked incision such that the posterior flap will comprise two thirds of the eventual stump. Incision was made with 10 blade scalpel and dissection was carried down to the layer of the fascia. Great saphenous vein was ligated near the ankle. The fascia was incised with Bovie electrocautery. Muscle was dissected with combination of blunt Lenora and Bovie likely cautery. We encountered the anterior tibial artery which was clamped with a Lenora clamp and transected. We carried our dissection further between the tibia and the fibula and encountered the peroneal and posterior tibial nerve bundles which were similarly clamped and transected. Muscle as well as skin and soft tissue was noted to be diffusely edematous. A plane was created posterior to the tibia, periosteum and soft tissue was elevated with a Grant elevator. Gigli saw was passed posterior to the tibia and used to transect the tibia at the specified level. Diagonal cutter was used to transect the fibula at approximately the level of the tibia. Electrocautery and amputation saw were used to complete posterior flap. After passing the specimen off the field, diagonal cutter was used to transect the fibula approximately 3 cm above the level of the tibia, passed off bone specimen. Tibial nerve was isolated, clamped as high as possible, transected, and ligated x2 for allowing to retract into the muscle. Remaining neurovascular bundles were suture ligated with good hemostasis. Posterior flap was examined. Soleus muscle did not appear healthy, especially edematous with some gelatinous exudate more consistent with poorly perfused muscle, did not appear infected. Soleus muscle was removed with electrocautery. Field was reexamined, hemostasis was obtained with combination of electrocautery and suture ligating some small bleeding muscle perforators. Tourniquet was taken down, 66 minutes tourniquet time.   File was used to bevel the anterior edge of the tibia with resultant smooth rounded surface. Field was copiously irrigated, hemostasis was again checked and found to be excellent. Fascia was approximated with 2-0 Vicryl in buried interrupted fashion. Skin was closed with staples. Wound was dressed with Xeroform, 4 x 4, ABD, Kerlix, Ace. Patient emerged from anesthesia uneventfully and was taken to recovery area in good condition. Dr. Miki Patiño was present for the entire operation.     Patient Disposition:  PACU         SIGNATURE: Carolina Clay MD  DATE: August 3, 2023  TIME: 3:49 PM

## 2023-08-03 NOTE — PROGRESS NOTES
General Cardiology   Progress Note -  Team One   Paola Roblero 69 y.o. male MRN: [id number]    Unit/Bed#: [facility] 801-01 Encounter: [id number]    Assessment  1.  Pre operative cardiac re stratification  -Problem: PAD w/ nonhealing LLE wound w/ concern for osteomyelitis  -Surgery: LLE amputation   -Timin/3/2023  -Functional status: Limited, mostly restricted to wheelchair and bed, since Rt BK amputation  -No evidence of acutely decompensated CHF.  No recent subjective respiratory complaints  -No active anginal symptoms - at low-level activity  -No sustained bradycardia or tachyarrhythmias identified on presenting ECG. Cardiac imaging  -ECG 2023; NSR, no acute ischemic changes. -TTE 2023; LVEF 35 to 40%, grade 3 DD, RV systolic function mildly reduced, PFO visualized, trace AI, moderate MR, moderate TR.  -[facility] 2023: 50% stenosis ostial LM and distal LAD. 60% stenosis proximal LAD. 70% stenosis mid LAD, proximal RCA, and D1. 90% stenosis distal RCA. 100% stenosis mid Cx  2. Chronic HFrEF  3. Ischemic cardiomyopathy w/improved LVEF 35-40% (per TTE in 2023, previously 25% in 2023)  -Compensated  -TTE 2023; LVEF 35 to 40%, grade 3 DD, RV systolic function mildly reduced, PFO visualized, trace AI, moderate MR, moderate TR.  -Outpatient GDMT: Entresto 49-51 mg twice daily, and metoprolol succinate 50 mg BID  -Outpatient diuretic regimen; metolazone 5 mg daily ?; not previously on a loop diuretic ?  -Inpatient diuretic regimen; torsemide 20 mg daily; 24 hr I&O balance; +1.7L; overall -1.6 L  -Recent outpatient weights at rehab; 155 to 160 pounds; BS weight 160 pounds today  4. Multivessel CAD -medically managed  -Previously evaluated by CT surgery and felt to not be a good surgical candidate  -[facility] 2023: 50% stenosis ostial LM and distal LAD. 60% stenosis proximal LAD. 70% stenosis mid LAD, proximal RCA, and D1. 90% stenosis distal RCA.  100% stenosis mid Cx.  -On aspirin 81 mg daily, clopidogrel 75 mg daily, atorvastatin 40 mg daily, and metoprolol succinate 50 mg BID  5. Cardiac dual chamber pacemaker in-situ  -For h/o complete heart block at [facility] (5/2/23)  6. Severe PAD w/ prior lower extremity interventions and prior right-sided BKA  -On aspirin 81 mg daily, clopidogrel 75 mg daily, atorvastatin 40 mg daily, and metoprolol succinate 50 mg BID  7. DM type II  -Hgb A1c 7.3  8. Dyslipidemia  -Lipid profile 2/11/23; cholesterol 108, triglycerides 57, HDL 36, and LDL calculated at 61  -On atorvastatin 40 mg daily  9. History of nicotine dependence; quit in his 35s.       Plan  -Pt appears well compensated from both a CHF and CAD perspective.  No current contraindications from a cardiac perspective to proceeding with outlined surgical procedure per the vascular surgery team.  He has been deemed at elevated but acceptable cardiac risk for a necessary surgical procedure.  -Creatinine/BUN level uptrending over the past 2 days, level up to 1.55 down to 1.38 today. Hold torsemide this a.m.; tentatively scheduled for the OR today.  -Continue Entresto 49-51 mg BID (w/hold parameters), and metoprolol succinate 50 mg BID. -Continue aspirin 81 mg daily and clopidogrel 75 mg daily.  -Continue high intensity statin therapy.  -Strict I's and O's, daily weights, 2 g Na+ diet to LFR.  -Monitor renal function and electrolytes closely.  Replete to maintain K+ level of 4.0 magnesium level at 2.0.   -No indication for telemetry monitoring. Subjective  Review of Systems   Constitutional: Negative for chills, fever and malaise/fatigue. Eyes: Negative for visual disturbance. Cardiovascular: Negative for chest pain, dyspnea on exertion, leg swelling, orthopnea and palpitations. Respiratory: Negative for cough and shortness of breath. Gastrointestinal: Negative for abdominal pain. Neurological: Negative for dizziness, headaches and light-headedness.        Objective:   Physical Exam  Vitals and nursing note reviewed. Constitutional:       General: He is not in acute distress. Appearance: He is not diaphoretic. HENT:      Head: Normocephalic and atraumatic. Eyes:      General: No scleral icterus. Cardiovascular:      Rate and Rhythm: Normal rate and regular rhythm. Pulses: Normal pulses. Heart sounds: Normal heart sounds. Pulmonary:      Effort: Pulmonary effort is normal.      Breath sounds: Normal breath sounds. No wheezing or rales. Abdominal:      Palpations: Abdomen is soft. Musculoskeletal:         General: Deformity present. Right lower leg: No edema. Left lower leg: Edema present. Comments: R BKA   Skin:     General: Skin is warm and dry. Capillary Refill: Capillary refill takes less than 2 seconds. Coloration: Skin is pale. Neurological:      General: No focal deficit present. Mental Status: He is alert and oriented to person, place, and time. Psychiatric:         Mood and Affect: Mood normal.         Vitals: Blood pressure 106/55, pulse 63, temperature (!) 97.3 °F (36.3 °C), resp. rate 16, height 5' 10" (1.778 m), weight 72.8 kg (160 lb 8 oz), SpO2 96 %. ,     Body mass index is 23.03 kg/m². ,   Systolic (84EYD), TQO:992 , Min:105 , XVC:973     Diastolic (31FRP), THM:53, Min:54, Max:55      Intake/Output Summary (Last 24 hours) at 8/3/2023 0845  Last data filed at 8/3/2023 0800  Gross per 24 hour   Intake 1734 ml   Output 900 ml   Net 834 ml     Weight (last 2 days)     Date/Time Weight    08/03/23 0600 72.8 (160.5)    08/02/23 0600 73.5 (162.1)    08/01/23 0600 71.4 (157.41)          LABORATORY RESULTS      CBC with diff:   Results from last 7 days   Lab Units 08/03/23  0501 08/02/23  0430 07/30/23  0502 07/29/23  0557 07/28/23  0443   WBC Thousand/uL 7.03 10.13  --  7.11 7.75   HEMOGLOBIN g/dL 7.9* 7.6* 7.7* 7.1* 7.5*   HEMATOCRIT % 26.4* 25.8* 25.8* 23.7* 24.9*   MCV fL 87 88  --  86 85   PLATELETS Thousands/uL 305 325  --  283 323   RBC Million/uL 3.02* 2.94*  --  2.77* 2.93*   MCH pg 26.2* 25.9*  --  25.6* 25.6*   MCHC g/dL 29.9* 29.5*  --  30.0* 30.1*   RDW % 18.2* 17.9*  --  17.3* 17.2*   MPV fL 9.0 8.7*  --  8.6* 8.4*   NRBC AUTO /100 WBCs  --  0  --   --  0       CMP:  Results from last 7 days   Lab Units 08/03/23  0501 08/02/23  0430 08/01/23  1001 07/29/23  0557 07/28/23  0443   POTASSIUM mmol/L 3.9 3.4* 3.4* 3.8 4.1   CHLORIDE mmol/L 111* 109* 108 106 104   CO2 mmol/L 26 31 31 32 35*   BUN mg/dL 56* 47* 39* 42* 38*   CREATININE mg/dL 1.38* 1.55* 1.53* 1.03 1.08   CALCIUM mg/dL 8.3 8.3 8.2* 8.5 8.5   AST U/L  --  31  --   --  30   ALT U/L  --  33  --   --  42   ALK PHOS U/L  --  387*  --   --  425*   EGFR ml/min/1.73sq m 48 42 43 69 65       BMP:  Results from last 7 days   Lab Units 08/03/23  0501 08/02/23  0430 08/01/23  1001 07/29/23  0557 07/28/23  0443   POTASSIUM mmol/L 3.9 3.4* 3.4* 3.8 4.1   CHLORIDE mmol/L 111* 109* 108 106 104   CO2 mmol/L 26 31 31 32 35*   BUN mg/dL 56* 47* 39* 42* 38*   CREATININE mg/dL 1.38* 1.55* 1.53* 1.03 1.08   CALCIUM mg/dL 8.3 8.3 8.2* 8.5 8.5       Lab Results   Component Value Date    [facility] [address] (H) 02/11/2023        Results from last 7 days   Lab Units 08/01/23  1001   MAGNESIUM mg/dL 1.7                   Results from last 7 days   Lab Units 07/28/23  0443   INR  1.33*       Lipid Profile:   No results found for: "CHOL"  Lab Results   Component Value Date    HDL 36 (L) 02/11/2023     Lab Results   Component Value Date    LDLCALC 61 02/11/2023     Lab Results   Component Value Date    TRIG 57 02/11/2023       Cardiac testing:   No results found for this or any previous visit. No results found for this or any previous visit. No results found for this or any previous visit. No valid procedures specified. No results found for this or any previous visit.       Meds/Allergies   all current active meds have been reviewed and current meds:   Current Facility-Administered Medications   Medication Dose Route Frequency   • aspirin chewable tablet 81 mg  81 mg Oral Daily   • atorvastatin (LIPITOR) tablet 40 mg  40 mg Oral Daily With Dinner   • chlorhexidine (PERIDEX) 0.12 % oral rinse 15 mL  15 mL Swish & Spit Once   • clopidogrel (PLAVIX) tablet 75 mg  75 mg Oral Daily   • escitalopram (LEXAPRO) tablet 5 mg  5 mg Oral Daily   • finasteride (PROSCAR) tablet 5 mg  5 mg Oral Daily   • heparin (porcine) subcutaneous injection 5,000 Units  5,000 Units Subcutaneous Q8H [facility]   • insulin glargine (LANTUS) subcutaneous injection 5 Units 0.05 mL  5 Units Subcutaneous HS   • insulin lispro (HumaLOG) 100 units/mL subcutaneous injection 1-6 Units  1-6 Units Subcutaneous TID AC   • insulin lispro (HumaLOG) 100 units/mL subcutaneous injection 5 Units  5 Units Subcutaneous TID With Meals   • metoprolol succinate (TOPROL-XL) 24 hr tablet 50 mg  50 mg Oral Q12H [facility]   • mirtazapine (REMERON) tablet 7.5 mg  7.5 mg Oral HS   • pantoprazole (PROTONIX) EC tablet 40 mg  40 mg Oral Daily   • piperacillin-tazobactam (ZOSYN) 3.375 g in sodium chloride 0.9 % 100 mL IVPB  3.375 g Intravenous Q6H   • potassium chloride (K-DUR,KLOR-CON) CR tablet 20 mEq  20 mEq Oral BID   • sodium chloride 0.9 % infusion  50 mL/hr Intravenous Continuous   • tamsulosin (FLOMAX) capsule 0.4 mg  0.4 mg Oral Daily With Dinner   • torsemide (DEMADEX) tablet 20 mg  20 mg Oral Daily   • vancomycin (VANCOCIN) IVPB (premix in dextrose) 1,000 mg 200 mL  1,000 mg Intravenous Q24H     sodium chloride, 50 mL/hr, Last Rate: 50 mL/hr (08/02/23 5408)      EKG personally reviewed by JANIS Tucker.   Assessment:  Principal Problem:    PAD (peripheral artery disease) ([facility])  Active Problems:    Type 2 diabetes mellitus with hyperglycemia, with long-term current use of insulin (HCC)    Chronic systolic CHF (congestive heart failure) ([facility])    CAD (coronary artery disease)    Essential hypertension    Cardiomyopathy ([address])    Hx of BKA, right ([address])    Visit for wound check    Anemia    Counseling / Coordination of Care  Total floor / unit time spent today 20 minutes. Greater than 50% of total time was spent with the patient and / or family counseling and / or coordination of care. ** Please Note: Dragon 360 Dictation voice to text software may have been used in the creation of this document.  **

## 2023-08-03 NOTE — ASSESSMENT & PLAN NOTE
Patient with hx of PAD sp right sided BKA  Now presenting with worsening multiple diabetic ulcerations of left leg and foot  Transferred for vascular carolin  He  was evaluated by vascular and podiatry  Left lower extremity x-ray suspicious for osteomyelitis    Continue abx for another 24 hours  Per vascular plan for left below-knee amputation today

## 2023-08-03 NOTE — PROGRESS NOTES
[address] [facility]  Progress Note  Name: Giuliano Rowe  MRN: [id number]  Unit/Bed#: OR POOL I Date of Admission: 7/27/2023   Date of Service: 8/3/2023 I Hospital Day: 7    Assessment/Plan   * PAD (peripheral artery disease) ([address])  Assessment & Plan  Patient with hx of PAD sp right sided BKA  Now presenting with worsening multiple diabetic ulcerations of left leg and foot  Transferred for vascular carolin  He  was evaluated by vascular and podiatry  Left lower extremity x-ray suspicious for osteomyelitis    Continue abx for another 24 hours  Per vascular plan for left below-knee amputation today    Anemia  Assessment & Plan  Anemia panel consistent with anemia of chronic disease  Blood transfusion for hemoglobin below 7  Monitor    Hx of BKA, right (HCC)  Assessment & Plan  Hx of right sided bka  Now pending left BKA today    Essential hypertension  Assessment & Plan  Stable BP  Continue to monitor    CAD (coronary artery disease)  Assessment & Plan  • Patient has a history of coronary artery disease, previous cardiac cath with diffuse CAD recommending GDMT  • Continue aspirin, Plavix statin, beta-blocker    Chronic systolic CHF (congestive heart failure) (HCC)  Assessment & Plan  Wt Readings from Last 3 Encounters:   08/03/23 72.8 kg (160 lb 8 oz)   07/26/23 67.7 kg (149 lb 4 oz)   05/18/23 59 kg (130 lb)     History of CHF with EF 25%  Euvolemic on exam  Continue with Entresto, Toprol-XL and Demadex  Cleared by cardiology to proceed with surgery  Cardiology is following      Type 2 diabetes mellitus with hyperglycemia, with long-term current use of insulin [facility])  Assessment & Plan  Lab Results   Component Value Date    HGBA1C 7.3 (H) 05/04/2023       Recent Labs     08/02/23  1628 08/02/23  1659 08/02/23  2213 08/03/23  0703   POCGLU 73 98 152* 150*       Blood Sugar Average: Last 72 hrs:  (P) [id number]   Patient refusing cardiac/diabetic diet  He wants regular diet continue with Lantus and Humalog with meals  Continue insulin sliding scale  Monitor               VTE Pharmacologic Prophylaxis: VTE Score: 4 High Risk (Score >/= 5) - Pharmacological DVT Prophylaxis Ordered: heparin. Sequential Compression Devices Ordered. Patient Centered Rounds: I performed bedside rounds with nursing staff today. Discussions with Specialists or Other Care Team Provider: n/a    Education and Discussions with Family / Patient: Patient declined call to . Total Time Spent on Date of Encounter in care of patient: 35 minutes This time was spent on one or more of the following: performing physical exam; counseling and coordination of care; obtaining or reviewing history; documenting in the medical record; reviewing/ordering tests, medications or procedures; communicating with other healthcare professionals and discussing with patient's family/caregivers. Current Length of Stay: 7 day(s)  Current Patient Status: Inpatient   Certification Statement: The patient will continue to require additional inpatient hospital stay due to pending bka left  Discharge Plan: Anticipate discharge in >72 hrs to discharge location to be determined pending rehab evaluations. Code Status: Level 1 - Full Code    Subjective:   Patient denies any acute complaints, ready for his left bka,    Objective:     Vitals:   Temp (24hrs), Av.6 °F (36.4 °C), Min:97.3 °F (36.3 °C), Max:97.9 °F (36.6 °C)    Temp:  [97.3 °F (36.3 °C)-97.9 °F (36.6 °C)] 97.3 °F (36.3 °C)  HR:  [63-67] 63  Resp:  [16-20] 16  BP: (105-106)/(54-55) 106/55  SpO2:  [96 %] 96 %  Body mass index is 23.03 kg/m². Input and Output Summary (last 24 hours): Intake/Output Summary (Last 24 hours) at 8/3/2023 1529  Last data filed at 8/3/2023 1458  Gross per 24 hour   Intake 2734 ml   Output 1550 ml   Net 1184 ml       Physical Exam:   Physical Exam  Vitals and nursing note reviewed.    Constitutional:       General: He is not in acute distress. Appearance: He is well-developed. He is not toxic-appearing or diaphoretic. HENT:      Head: Normocephalic and atraumatic. Eyes:      General: No scleral icterus. Conjunctiva/sclera: Conjunctivae normal.   Cardiovascular:      Rate and Rhythm: Normal rate and regular rhythm. Heart sounds: No murmur heard. No friction rub. No gallop. Pulmonary:      Effort: Pulmonary effort is normal. No respiratory distress. Breath sounds: Normal breath sounds. No stridor. No wheezing, rhonchi or rales. Chest:      Chest wall: No tenderness. Abdominal:      General: There is no distension. Palpations: Abdomen is soft. There is no mass. Tenderness: There is no abdominal tenderness. There is no guarding or rebound. Hernia: No hernia is present. Musculoskeletal:         General: No swelling or tenderness. Cervical back: Neck supple. Comments: Right bka leg wrapped clean dry intact   Skin:     General: Skin is warm and dry. Capillary Refill: Capillary refill takes less than 2 seconds. Neurological:      Mental Status: He is alert and oriented to person, place, and time.    Psychiatric:         Mood and Affect: Mood normal.          Additional Data:     Labs:  Results from last 7 days   Lab Units 08/03/23  0501 08/02/23  0430   WBC Thousand/uL 7.03 10.13   HEMOGLOBIN g/dL 7.9* 7.6*   HEMATOCRIT % 26.4* 25.8*   PLATELETS Thousands/uL 305 325   NEUTROS PCT %  --  74   LYMPHS PCT %  --  14   MONOS PCT %  --  7   EOS PCT %  --  5     Results from last 7 days   Lab Units 08/03/23  0501 08/02/23  0430   SODIUM mmol/L 142 144   POTASSIUM mmol/L 3.9 3.4*   CHLORIDE mmol/L 111* 109*   CO2 mmol/L 26 31   BUN mg/dL 56* 47*   CREATININE mg/dL 1.38* 1.55*   ANION GAP mmol/L 5 4   CALCIUM mg/dL 8.3 8.3   ALBUMIN g/dL  --  2.0*   TOTAL BILIRUBIN mg/dL  --  0.39   ALK PHOS U/L  --  387*   ALT U/L  --  33   AST U/L  --  31   GLUCOSE RANDOM mg/dL 152* 93     Results from last 7 days   Lab Units 07/28/23  0443   INR  1.33*     Results from last 7 days   Lab Units 08/03/23  0703 08/02/23  2213 08/02/23  1659 08/02/23  1628 08/02/23  1555 08/02/23  1552 08/02/23  1059 08/02/23  0717 08/01/23  2108 08/01/23  1608 08/01/23  1244 08/01/23  1134   POC GLUCOSE mg/dl 150* 152* 98 73 37* 32* 88 111 84 111 78 67               Lines/Drains:  Invasive Devices     Peripheral Intravenous Line  Duration           Peripheral IV 08/02/23 Left;Ventral (anterior) Forearm <1 day    Peripheral IV 08/03/23 Right Forearm <1 day          Airway  Duration           Supraglottic Airway LMA 4 <1 day                      Imaging: No pertinent imaging reviewed. Recent Cultures (last 7 days):   Results from last 7 days   Lab Units 07/27/23  2252   BLOOD CULTURE  No Growth After 5 Days. No Growth After 5 Days.        Last 24 Hours Medication List:   Current Facility-Administered Medications   Medication Dose Route Frequency Provider Last Rate   • [MAR Hold] aspirin  81 mg Oral Daily Brandyn Banai, DO     • [MAR Hold] atorvastatin  40 mg Oral Daily With [employer], DO     • chlorhexidine  15 mL Swish & Spit Once JANIS Maciel     • [facility] Hold] clopidogrel  75 mg Oral Daily Brandyn Banai, DO     • [MAR Hold] escitalopram  5 mg Oral Daily Brandyn Banai, DO     • [MAR Hold] finasteride  5 mg Oral Daily Brandyn Banai, DO     • [MAR Hold] heparin (porcine)  5,000 Units Subcutaneous Q8H [address] Brandyn Banai, DO     • [MAR Hold] insulin glargine  5 Units Subcutaneous HS Brandyn Banai, DO     • [MAR Hold] insulin lispro  1-6 Units Subcutaneous TID AC Brandyn Banai, DO     • [MAR Hold] insulin lispro  5 Units Subcutaneous TID With Meals Brandyn Banai, DO     • [MAR Hold] metoprolol succinate  50 mg Oral Q12H [address] Brandyn Banai, DO     • [MAR Hold] mirtazapine  7.5 mg Oral HS Brandyn Banai, DO     • [MAR Hold] pantoprazole  40 mg Oral Daily Brandyn Banai, DO     • [MAR Hold] piperacillin-tazobactam  3.375 g Intravenous Q6H Brandyn Dalton DO Stopped (08/03/23 1013)   • [MAR Hold] potassium chloride  20 mEq Oral BID Jessy Landis MD     • sodium chloride  50 mL/hr Intravenous Continuous JANIS Srivastava 50 mL/hr (08/02/23 2685)   • sodium chloride    PRN [place], DO     • sterile water    PRN [place], DO     • [facility] Hold] tamsulosin  0.4 mg Oral Daily With Dinner Brandyn Gomez DO     • [facility] Hold] vancomycin  1,000 mg Intravenous Q24H Brandyn Dalton DO 1,000 mg (08/02/23 1713)     Facility-Administered Medications Ordered in Other Encounters   Medication Dose Route Frequency Provider Last Rate   • dexamethasone (PF)   Infiltration PRN Cailin Zazueta MD     • fentanyl citrate (PF)   Intravenous PRN Leonor Baird CRNA     • lidocaine (PF)   Intravenous PRN Leonor Baird CRNA     • ondansetron   Intravenous PRN Leonor Baird CRNA     • phenylephrine (ANTHONY-SYNEPHRINE) 50 mg (STANDARD CONCENTRATION) in sodium chloride 0.9% 250 mL   Intravenous Continuous PRN Leonor Baird CRNA Stopped (08/03/23 1524)   • phenylephrine   Intravenous PRN Leonor Baird CRNA     • propofol   Intravenous PRN Leonor Baird CRNA          Today, Patient Was Seen By: Layla De Jesus DO    **Please Note: This note may have been constructed using a voice recognition system. **

## 2023-08-03 NOTE — ASSESSMENT & PLAN NOTE
Wt Readings from Last 3 Encounters:   08/03/23 72.8 kg (160 lb 8 oz)   07/26/23 67.7 kg (149 lb 4 oz)   05/18/23 59 kg (130 lb)     History of CHF with EF 25%  Euvolemic on exam  Continue with Entresto, Toprol-XL and Demadex  Cleared by cardiology to proceed with surgery  Cardiology is following

## 2023-08-04 LAB
ABO GROUP BLD BPU: NORMAL
ALBUMIN SERPL BCP-MCNC: 2.4 G/DL (ref 3.5–5)
ALP SERPL-CCNC: 256 U/L (ref 46–116)
ALT SERPL W P-5'-P-CCNC: 22 U/L (ref 12–78)
ANION GAP SERPL CALCULATED.3IONS-SCNC: 6 MMOL/L
AST SERPL W P-5'-P-CCNC: 24 U/L (ref 5–45)
BILIRUB DIRECT SERPL-MCNC: 0.49 MG/DL (ref 0–0.2)
BILIRUB SERPL-MCNC: 0.9 MG/DL (ref 0.2–1)
BPU ID: NORMAL
BUN SERPL-MCNC: 54 MG/DL (ref 5–25)
CALCIUM SERPL-MCNC: 8.6 MG/DL (ref 8.3–10.1)
CHLORIDE SERPL-SCNC: 110 MMOL/L (ref 96–108)
CO2 SERPL-SCNC: 25 MMOL/L (ref 21–32)
CREAT SERPL-MCNC: 1.45 MG/DL (ref 0.6–1.3)
CROSSMATCH: NORMAL
ERYTHROCYTE [DISTWIDTH] IN BLOOD BY AUTOMATED COUNT: 17.5 % (ref 11.6–15.1)
GFR SERPL CREATININE-BSD FRML MDRD: 46 ML/MIN/1.73SQ M
GLUCOSE SERPL-MCNC: 109 MG/DL (ref 65–140)
GLUCOSE SERPL-MCNC: 271 MG/DL (ref 65–140)
GLUCOSE SERPL-MCNC: 282 MG/DL (ref 65–140)
GLUCOSE SERPL-MCNC: 406 MG/DL (ref 65–140)
GLUCOSE SERPL-MCNC: 83 MG/DL (ref 65–140)
HCT VFR BLD AUTO: 22.3 % (ref 36.5–49.3)
HGB BLD-MCNC: 7 G/DL (ref 12–17)
HGB BLD-MCNC: 8.2 G/DL (ref 12–17)
MAGNESIUM SERPL-MCNC: 2 MG/DL (ref 1.6–2.6)
MCH RBC QN AUTO: 27.1 PG (ref 26.8–34.3)
MCHC RBC AUTO-ENTMCNC: 31.4 G/DL (ref 31.4–37.4)
MCV RBC AUTO: 86 FL (ref 82–98)
PHOSPHATE SERPL-MCNC: 4.1 MG/DL (ref 2.3–4.1)
PLATELET # BLD AUTO: 232 THOUSANDS/UL (ref 149–390)
PMV BLD AUTO: 8.6 FL (ref 8.9–12.7)
POTASSIUM SERPL-SCNC: 4.9 MMOL/L (ref 3.5–5.3)
PROT SERPL-MCNC: 6.1 G/DL (ref 6.4–8.4)
RBC # BLD AUTO: 2.58 MILLION/UL (ref 3.88–5.62)
SODIUM SERPL-SCNC: 141 MMOL/L (ref 135–147)
UNIT DISPENSE STATUS: NORMAL
UNIT PRODUCT CODE: NORMAL
UNIT PRODUCT VOLUME: 350 ML
UNIT RH: NORMAL
VANCOMYCIN SERPL-MCNC: 26.7 UG/ML (ref 10–20)
WBC # BLD AUTO: 8.19 THOUSAND/UL (ref 4.31–10.16)

## 2023-08-04 PROCEDURE — 97163 PT EVAL HIGH COMPLEX 45 MIN: CPT

## 2023-08-04 PROCEDURE — 84100 ASSAY OF PHOSPHORUS: CPT | Performed by: SURGERY

## 2023-08-04 PROCEDURE — 97167 OT EVAL HIGH COMPLEX 60 MIN: CPT

## 2023-08-04 PROCEDURE — 80048 BASIC METABOLIC PNL TOTAL CA: CPT | Performed by: SURGERY

## 2023-08-04 PROCEDURE — 99232 SBSQ HOSP IP/OBS MODERATE 35: CPT | Performed by: ANESTHESIOLOGY

## 2023-08-04 PROCEDURE — 82948 REAGENT STRIP/BLOOD GLUCOSE: CPT

## 2023-08-04 PROCEDURE — 80076 HEPATIC FUNCTION PANEL: CPT | Performed by: SURGERY

## 2023-08-04 PROCEDURE — 83735 ASSAY OF MAGNESIUM: CPT | Performed by: SURGERY

## 2023-08-04 PROCEDURE — NC001 PR NO CHARGE: Performed by: EMERGENCY MEDICINE

## 2023-08-04 PROCEDURE — 99233 SBSQ HOSP IP/OBS HIGH 50: CPT | Performed by: INTERNAL MEDICINE

## 2023-08-04 PROCEDURE — 85027 COMPLETE CBC AUTOMATED: CPT | Performed by: SURGERY

## 2023-08-04 PROCEDURE — P9016 RBC LEUKOCYTES REDUCED: HCPCS

## 2023-08-04 PROCEDURE — 99291 CRITICAL CARE FIRST HOUR: CPT | Performed by: EMERGENCY MEDICINE

## 2023-08-04 PROCEDURE — 99024 POSTOP FOLLOW-UP VISIT: CPT | Performed by: SURGERY

## 2023-08-04 PROCEDURE — 85018 HEMOGLOBIN: CPT

## 2023-08-04 PROCEDURE — 80202 ASSAY OF VANCOMYCIN: CPT | Performed by: SURGERY

## 2023-08-04 RX ORDER — FUROSEMIDE 10 MG/ML
20 INJECTION INTRAMUSCULAR; INTRAVENOUS ONCE
Status: DISCONTINUED | OUTPATIENT
Start: 2023-08-04 | End: 2023-08-04

## 2023-08-04 RX ORDER — AMOXICILLIN 250 MG
1 CAPSULE ORAL
Status: DISCONTINUED | OUTPATIENT
Start: 2023-08-04 | End: 2023-08-10 | Stop reason: HOSPADM

## 2023-08-04 RX ORDER — INSULIN LISPRO 100 [IU]/ML
2-12 INJECTION, SOLUTION INTRAVENOUS; SUBCUTANEOUS
Status: DISCONTINUED | OUTPATIENT
Start: 2023-08-04 | End: 2023-08-10 | Stop reason: HOSPADM

## 2023-08-04 RX ORDER — INSULIN LISPRO 100 [IU]/ML
8 INJECTION, SOLUTION INTRAVENOUS; SUBCUTANEOUS
Status: DISCONTINUED | OUTPATIENT
Start: 2023-08-04 | End: 2023-08-05

## 2023-08-04 RX ORDER — INSULIN LISPRO 100 [IU]/ML
2-12 INJECTION, SOLUTION INTRAVENOUS; SUBCUTANEOUS
Status: DISCONTINUED | OUTPATIENT
Start: 2023-08-04 | End: 2023-08-06 | Stop reason: DRUGHIGH

## 2023-08-04 RX ORDER — VANCOMYCIN HYDROCHLORIDE 1 G/200ML
15 INJECTION, SOLUTION INTRAVENOUS DAILY PRN
Status: DISCONTINUED | OUTPATIENT
Start: 2023-08-04 | End: 2023-08-04

## 2023-08-04 RX ORDER — INSULIN LISPRO 100 [IU]/ML
2-12 INJECTION, SOLUTION INTRAVENOUS; SUBCUTANEOUS
Status: DISCONTINUED | OUTPATIENT
Start: 2023-08-04 | End: 2023-08-04

## 2023-08-04 RX ORDER — METOPROLOL SUCCINATE 25 MG/1
25 TABLET, EXTENDED RELEASE ORAL EVERY 12 HOURS SCHEDULED
Status: DISCONTINUED | OUTPATIENT
Start: 2023-08-04 | End: 2023-08-10 | Stop reason: HOSPADM

## 2023-08-04 RX ORDER — FUROSEMIDE 10 MG/ML
20 INJECTION INTRAMUSCULAR; INTRAVENOUS ONCE
Status: COMPLETED | OUTPATIENT
Start: 2023-08-04 | End: 2023-08-04

## 2023-08-04 RX ADMIN — OXYCODONE HYDROCHLORIDE 5 MG: 5 TABLET ORAL at 12:47

## 2023-08-04 RX ADMIN — POTASSIUM CHLORIDE 20 MEQ: 1500 TABLET, EXTENDED RELEASE ORAL at 08:01

## 2023-08-04 RX ADMIN — INSULIN LISPRO 8 UNITS: 100 INJECTION, SOLUTION INTRAVENOUS; SUBCUTANEOUS at 11:10

## 2023-08-04 RX ADMIN — PANTOPRAZOLE SODIUM 40 MG: 40 TABLET, DELAYED RELEASE ORAL at 06:32

## 2023-08-04 RX ADMIN — OXYCODONE HYDROCHLORIDE 10 MG: 10 TABLET ORAL at 17:38

## 2023-08-04 RX ADMIN — FINASTERIDE 5 MG: 5 TABLET, FILM COATED ORAL at 08:01

## 2023-08-04 RX ADMIN — HEPARIN SODIUM 5000 UNITS: 5000 INJECTION INTRAVENOUS; SUBCUTANEOUS at 06:32

## 2023-08-04 RX ADMIN — ALBUMIN (HUMAN) 12.5 G: 12.5 INJECTION, SOLUTION INTRAVENOUS at 00:53

## 2023-08-04 RX ADMIN — HEPARIN SODIUM 5000 UNITS: 5000 INJECTION INTRAVENOUS; SUBCUTANEOUS at 14:56

## 2023-08-04 RX ADMIN — PIPERACILLIN SODIUM AND TAZOBACTAM SODIUM 3.38 G: 36; 4.5 INJECTION, POWDER, LYOPHILIZED, FOR SOLUTION INTRAVENOUS at 03:11

## 2023-08-04 RX ADMIN — MIRTAZAPINE 7.5 MG: 15 TABLET, FILM COATED ORAL at 21:43

## 2023-08-04 RX ADMIN — INSULIN LISPRO 6 UNITS: 100 INJECTION, SOLUTION INTRAVENOUS; SUBCUTANEOUS at 07:33

## 2023-08-04 RX ADMIN — METOPROLOL SUCCINATE 50 MG: 50 TABLET, EXTENDED RELEASE ORAL at 08:01

## 2023-08-04 RX ADMIN — SENNOSIDES AND DOCUSATE SODIUM 1 TABLET: 50; 8.6 TABLET ORAL at 21:43

## 2023-08-04 RX ADMIN — ESCITSLOPRAM 5 MG: 5 TABLET ORAL at 08:01

## 2023-08-04 RX ADMIN — INSULIN LISPRO 6 UNITS: 100 INJECTION, SOLUTION INTRAVENOUS; SUBCUTANEOUS at 11:08

## 2023-08-04 RX ADMIN — ATORVASTATIN CALCIUM 40 MG: 40 TABLET, FILM COATED ORAL at 17:39

## 2023-08-04 RX ADMIN — TAMSULOSIN HYDROCHLORIDE 0.4 MG: 0.4 CAPSULE ORAL at 17:39

## 2023-08-04 RX ADMIN — FUROSEMIDE 20 MG: 10 INJECTION, SOLUTION INTRAMUSCULAR; INTRAVENOUS at 11:27

## 2023-08-04 RX ADMIN — HYDROMORPHONE HYDROCHLORIDE 0.5 MG: 1 INJECTION, SOLUTION INTRAMUSCULAR; INTRAVENOUS; SUBCUTANEOUS at 15:07

## 2023-08-04 RX ADMIN — HEPARIN SODIUM 5000 UNITS: 5000 INJECTION INTRAVENOUS; SUBCUTANEOUS at 21:43

## 2023-08-04 RX ADMIN — ASPIRIN 81 MG CHEWABLE TABLET 81 MG: 81 TABLET CHEWABLE at 08:01

## 2023-08-04 RX ADMIN — CLOPIDOGREL BISULFATE 75 MG: 75 TABLET ORAL at 08:01

## 2023-08-04 RX ADMIN — OXYCODONE HYDROCHLORIDE 5 MG: 5 TABLET ORAL at 04:05

## 2023-08-04 RX ADMIN — INSULIN LISPRO 5 UNITS: 100 INJECTION, SOLUTION INTRAVENOUS; SUBCUTANEOUS at 07:33

## 2023-08-04 NOTE — PLAN OF CARE
Problem: PAIN - ADULT  Goal: Verbalizes/displays adequate comfort level or baseline comfort level  Description: Interventions:  - Encourage patient to monitor pain and request assistance  - Assess pain using appropriate pain scale  - Administer analgesics based on type and severity of pain and evaluate response  - Implement non-pharmacological measures as appropriate and evaluate response  - Consider cultural and social influences on pain and pain management  - Notify physician/advanced practitioner if interventions unsuccessful or patient reports new pain  Outcome: Progressing     Problem: INFECTION - ADULT  Goal: Absence or prevention of progression during hospitalization  Description: INTERVENTIONS:  - Assess and monitor for signs and symptoms of infection  - Monitor lab/diagnostic results  - Monitor all insertion sites, i.e. indwelling lines, tubes, and drains  - Monitor endotracheal if appropriate and nasal secretions for changes in amount and color  - [place] appropriate cooling/warming therapies per order  - Administer medications as ordered  - Instruct and encourage patient and family to use good hand hygiene technique  - Identify and instruct in appropriate isolation precautions for identified infection/condition  Outcome: Progressing     Problem: DISCHARGE PLANNING  Goal: Discharge to home or other facility with appropriate resources  Description: INTERVENTIONS:  - Identify barriers to discharge w/patient and caregiver  - Arrange for needed discharge resources and transportation as appropriate  - Identify discharge learning needs (meds, wound care, etc.)  - Arrange for interpretive services to assist at discharge as needed  - Refer to Case Management Department for coordinating discharge planning if the patient needs post-hospital services based on physician/advanced practitioner order or complex needs related to functional status, cognitive ability, or social support system  Outcome: Progressing Problem: Knowledge Deficit  Goal: Patient/family/caregiver demonstrates understanding of disease process, treatment plan, medications, and discharge instructions  Description: Complete learning assessment and assess knowledge base. Interventions:  - Provide teaching at level of understanding  - Provide teaching via preferred learning methods  Outcome: Progressing     Problem: Nutrition/Hydration-ADULT  Goal: Nutrient/Hydration intake appropriate for improving, restoring or maintaining nutritional needs  Description: Monitor and assess patient's nutrition/hydration status for malnutrition. Collaborate with interdisciplinary team and initiate plan and interventions as ordered. Monitor patient's weight and dietary intake as ordered or per policy. Utilize nutrition screening tool and intervene as necessary. Determine patient's food preferences and provide high-protein, high-caloric foods as appropriate.      INTERVENTIONS:  - Monitor oral intake, urinary output, labs, and treatment plans  - Assess nutrition and hydration status and recommend course of action  - Evaluate amount of meals eaten  - Assist patient with eating if necessary   - Allow adequate time for meals  - Recommend/ encourage appropriate diets, oral nutritional supplements, and vitamin/mineral supplements  - Order, calculate, and assess calorie counts as needed  - Recommend, monitor, and adjust tube feedings based on assessed needs  - Assess need for intravenous fluids  - Provide nutrition/hydration education as appropriate  - Include patient/family/caregiver in decisions related to nutrition  Outcome: Progressing     Problem: METABOLIC, FLUID AND ELECTROLYTES - ADULT  Goal: Electrolytes maintained within normal limits  Description: INTERVENTIONS:  - Monitor labs and assess patient for signs and symptoms of electrolyte imbalances  - Administer electrolyte replacement as ordered  - Monitor response to electrolyte replacements, including repeat lab results as appropriate  - Instruct patient on fluid and nutrition as appropriate  Outcome: Progressing     Problem: SKIN/TISSUE INTEGRITY - ADULT  Goal: Incision(s), wounds(s) or drain site(s) healing without S/S of infection  Description: INTERVENTIONS  - Assess and document dressing, incision, wound bed, drain sites and surrounding tissue  - Provide patient and family education  - Perform skin care/dressing changes every shift    Outcome: Progressing     Problem: HEMATOLOGIC - ADULT  Goal: Maintains hematologic stability  Description: INTERVENTIONS  - Assess for signs and symptoms of bleeding or hemorrhage  - Monitor labs  - Administer supportive blood products/factors as ordered and appropriate  Outcome: Progressing     Problem: MUSCULOSKELETAL - ADULT  Goal: Maintain or return mobility to safest level of function  Description: INTERVENTIONS:  - Assess patient's ability to carry out ADLs; assess patient's baseline for ADL function and identify physical deficits which impact ability to perform ADLs (bathing, care of mouth/teeth, toileting, grooming, dressing, etc.)  - Assess/evaluate cause of self-care deficits   - Assess range of motion  - Assess patient's mobility  - Assess patient's need for assistive devices and provide as appropriate  - Encourage maximum independence but intervene and supervise when necessary  - Involve family in performance of ADLs  - Assess for home care needs following discharge   - Consider OT consult to assist with ADL evaluation and planning for discharge  - Provide patient education as appropriate  Outcome: Progressing  Goal: Maintain proper alignment of affected body part  Description: INTERVENTIONS:  - Support, maintain and protect limb and body alignment  - Provide patient/ family with appropriate education  Outcome: Progressing     Problem: Prexisting or High Potential for Compromised Skin Integrity  Goal: Skin integrity is maintained or improved  Description: INTERVENTIONS:  - Identify patients at risk for skin breakdown  - Assess and monitor skin integrity  - Assess and monitor nutrition and hydration status  - Monitor labs   - Assess for incontinence   - Turn and reposition patient  - Assist with mobility/ambulation  - Relieve pressure over bony prominences  - Avoid friction and shearing  - Provide appropriate hygiene as needed including keeping skin clean and dry  - Evaluate need for skin moisturizer/barrier cream  - Collaborate with interdisciplinary team   - Patient/family teaching  - Consider wound care consult   Outcome: Progressing

## 2023-08-04 NOTE — PROGRESS NOTES
Peripheral Nerve Block Follow-up Note - Acute Pain Service    Jefferson Salcido 69 y.o. male MRN: [id number]  Unit/Bed#: [facility] 514-01 Encounter: [id number]      Assessment:   Principal Problem:    PAD (peripheral artery disease) ([address])  Active Problems:    Type 2 diabetes mellitus with hyperglycemia, with long-term current use of insulin (HCC)    Chronic systolic CHF (congestive heart failure) (HCC)    CAD (coronary artery disease)    Essential hypertension    Cardiomyopathy ([address])    Hx of BKA, right ([address])    Visit for wound check    Anemia    Jefferson Salcido is a 69y.o. year old male with PMHx of DM, CHF, HTN, PAD s/p multiple LLE endovascular interventions, s/p R BKA who presented for LLE vasculopathy now s/p left BKA on (08/03/23) for which patient received preoperative adductor canal and popliteal nerve blocks. Today, patient was resting comfortably in the chair at bedside. He notes that he thinks the blocks have worn off as his leg is less numb compared to last night. He reports that he has chronic numbness of both LE and so he has not had severe difficulty with pain. He states that he had some thigh pain though this was relieved with adjustment of the brace tightness. He denies any LAST symptoms such as headache, visual changes, tinnitus, perioral numbness/tingling, nausea, or vomiting. He has been eating and drinking without issue. Plan:   - Left adductor and popliteal block has resolved appropriately with no residual deficits, other than his chronic LE numbness   - continue oxycodone 5 mg/10 mg PO q4hrs PRN for moderate/severe pain   - continue Dilaudid 0.5 mg IV q3hrs PRN for breakthrough pain     - continue acetaminophen 975mg PO q6h PRN mild pain   - consider making scheduled if pain increases   - consider starting robaxin 500mg PO q8h if pain increases or starts to experience spasms, would start with q8h dosing given creatinine    APS will sign off at this time. Thank you for the consult.  All opioids and other analgesics to be written at discretion of primary team. Please contact Acute Pain Service - [facility] via [employer]-Audio from [phone] with additional questions or concerns. See [facility] or Trena for additional contacts and after hours information.     Pain History  Current pain location(s): no pain  Pain Scale:   0/10  Quality: none  24 hour history: see assessment    Opioid requirement previous 24 hours: oxycodone 5mg PO x1 dose    Meds/Allergies   all current active meds have been reviewed and current meds:   Current Facility-Administered Medications   Medication Dose Route Frequency   • acetaminophen (TYLENOL) tablet 975 mg  975 mg Oral Q6H PRN   • aspirin chewable tablet 81 mg  81 mg Oral Daily   • atorvastatin (LIPITOR) tablet 40 mg  40 mg Oral Daily With Dinner   • clopidogrel (PLAVIX) tablet 75 mg  75 mg Oral Daily   • escitalopram (LEXAPRO) tablet 5 mg  5 mg Oral Daily   • finasteride (PROSCAR) tablet 5 mg  5 mg Oral Daily   • furosemide (LASIX) injection 20 mg  20 mg Intravenous Once   • heparin (porcine) subcutaneous injection 5,000 Units  5,000 Units Subcutaneous Q8H [address]   • HYDROmorphone (DILAUDID) injection 0.5 mg  0.5 mg Intravenous Q3H PRN   • insulin glargine (LANTUS) subcutaneous injection 5 Units 0.05 mL  5 Units Subcutaneous HS   • insulin lispro (HumaLOG) 100 units/mL subcutaneous injection 2-12 Units  2-12 Units Subcutaneous TID AC   • insulin lispro (HumaLOG) 100 units/mL subcutaneous injection 2-12 Units  2-12 Units Subcutaneous HS   • insulin lispro (HumaLOG) 100 units/mL subcutaneous injection 8 Units  8 Units Subcutaneous TID With Meals   • metoprolol succinate (TOPROL-XL) 24 hr tablet 25 mg  25 mg Oral Q12H [place]   • mirtazapine (REMERON) tablet 7.5 mg  7.5 mg Oral HS   • oxyCODONE (ROXICODONE) immediate release tablet 10 mg  10 mg Oral Q4H PRN   • oxyCODONE (ROXICODONE) IR tablet 5 mg  5 mg Oral Q4H PRN   • pantoprazole (PROTONIX) EC tablet 40 mg  40 mg Oral Daily   • senna-docusate sodium (SENOKOT S) 8.6-50 mg per tablet 1 tablet  1 tablet Oral HS   • tamsulosin (FLOMAX) capsule 0.4 mg  0.4 mg Oral Daily With Dinner       Allergies   Allergen Reactions   • Metformin Diarrhea       Objective     Temp:  [97.7 °F (36.5 °C)-98.8 °F (37.1 °C)] 98 °F (36.7 °C)  HR:  [62-97] 84  Resp:  [9-35] 26  BP: ()/(46-74) 111/65    Physical Exam  Vitals and nursing note reviewed. Constitutional:       General: He is not in acute distress. Appearance: Normal appearance. He is not toxic-appearing or diaphoretic. HENT:      Head: Normocephalic and atraumatic. Mouth/Throat:      Mouth: Mucous membranes are moist.      Pharynx: Oropharynx is clear. Eyes:      General: No scleral icterus. Conjunctiva/sclera: Conjunctivae normal.   Cardiovascular:      Rate and Rhythm: Normal rate and regular rhythm. Pulmonary:      Effort: Pulmonary effort is normal. No respiratory distress. Breath sounds: Normal breath sounds. No wheezing. Abdominal:      General: Abdomen is flat. There is no distension. Palpations: Abdomen is soft. Tenderness: There is no abdominal tenderness. Musculoskeletal:         General: Tenderness and deformity (s/p right and left BKA) present. Injury: mild TTP of LLE. Comments: LLE wrapped, braced   Skin:     General: Skin is warm and dry. Coloration: Skin is pale. Skin is not jaundiced. Neurological:      General: No focal deficit present. Mental Status: He is alert and oriented to person, place, and time. Mental status is at baseline. Sensory: Sensory deficit (dense numbness resolved, chronic numbness currently) present. Motor: No weakness. Psychiatric:         Mood and Affect: Mood normal.         Thought Content:  Thought content normal.         Judgment: Judgment normal.           Lab Results:   Results from last 7 days   Lab Units 08/04/23  0215   WBC Thousand/uL 8.19   HEMOGLOBIN g/dL 7.0*   HEMATOCRIT % 22.3* PLATELETS Thousands/uL 232      Results from last 7 days   Lab Units 08/04/23  0215   POTASSIUM mmol/L 4.9   CHLORIDE mmol/L 110*   CO2 mmol/L 25   BUN mg/dL 54*   CREATININE mg/dL 1.45*   CALCIUM mg/dL 8.6   ALK PHOS U/L 256*   ALT U/L 22   AST U/L 24       Imaging Studies: I have personally reviewed pertinent reports. EKG, Pathology, and Other Studies: I have personally reviewed pertinent reports. Counseling / Coordination of Care  Total floor / unit time spent today 15 minutes. Greater than 50% of total time was spent with the patient and / or family counseling and / or coordination of care. Please note that the APS provides consultative services regarding pain management only. With the exception of ketamine, peripheral nerve catheters, and epidural infusions (and except when indicated), final decisions regarding starting or changing doses of analgesic medications are at the discretion of the consulting service. Off hours consultation and/or medication management is generally not available.     Karthikeyan Villegas MD  Acute Pain Service

## 2023-08-04 NOTE — PHYSICAL THERAPY NOTE
Physical Therapy Evaluation     Patient's Name: Mandie Kilgore    Admitting Diagnosis  Visit for wound check [Z51.89]  Peripheral arterial disease ([address]) [I73.9]    Problem List  Patient Active Problem List   Diagnosis    Hypokalemia    Type 2 diabetes mellitus with hyperglycemia, with long-term current use of insulin (HCC)    Abnormal EKG    Chronic systolic CHF (congestive heart failure) ([address])    Urinary retention    Moderate protein malnutrition ([address])    Unintentional weight loss    CAD (coronary artery disease)    Suspected deep tissue injury of unknown depth    Diabetic ulcer of both feet associated with type 2 diabetes mellitus ([address])    Toenail avulsion    PAD (peripheral artery disease) ([address])    Essential hypertension    Former tobacco use    Cardiomyopathy ([address])    Moderate protein-calorie malnutrition ([address])    Ischemic cardiomyopathy    Nausea & vomiting    Depressed mood    Encounter for competency evaluation    Severe protein-calorie malnutrition ([address])    Goals of care, counseling/discussion    Deep tissue injury of sacrum    Hx of BKA, right ([address])    Visit for wound check    Anemia       Past Medical History  Past Medical History:   Diagnosis Date    6th nerve palsy     Blister of right leg     BPH (benign prostatic hyperplasia)     CAD (coronary artery disease)     Cardiomyopathy (HCC)     EF 25%    CHF (congestive heart failure) ([address])     Depression     Diabetes mellitus ([address])     type 2, insulin dependent    Diabetic foot ulcer ([address])     left, local wound care    Diabetic retinopathy ([address])     Exposure to Agent Orange     while serving in [place]    Former tobacco use     H/O urinary retention     w/ [facility]agan placement    Hyperlipidemia     Hypertension     Rupture of biceps tendon, traumatic 2017    right       Past Surgical History  Past Surgical History:   Procedure Laterality Date    CARDIAC CATHETERIZATION      CARDIAC CATHETERIZATION Left 2/14/2023    Procedure: Cardiac Left Heart Cath;  Surgeon: Sofi Hylton MD;  Location: [facility]; Service: Cardiology    CARDIAC CATHETERIZATION N/A 2/14/2023    Procedure: Cardiac Coronary Angiogram;  Surgeon: Sofi Hylton MD;  [facility]: [facility]; Service: Cardiology    CATARACT EXTRACTION, BILATERAL Bilateral 2021    IR LOWER EXTREMITY ANGIOGRAM  3/28/2023    IR LOWER EXTREMITY ANGIOGRAM  4/6/2023    LEG AMPUTATION THROUGH LOWER TIBIA AND FIBULA Right 3/30/2023    Procedure: (BKA); Surgeon: Kassandra Mckeon DO;  Location: [facility];  Service: Vascular    LEG AMPUTATION THROUGH LOWER TIBIA AND FIBULA Left 8/3/2023    Procedure: AMPUTATION BELOW KNEE (BKA); Surgeon: Kassandra Mckeon DO;  Location: [facility];  Service: Vascular    WOUND DEBRIDEMENT Right 3/24/2023    Procedure: debridement of heel ulcer;  Surgeon: Quiana Vick DPM;  Location: [facility];  Service: Podiatry        08/04/23 1009   PT Last Visit   PT Visit Date 08/04/23   Note Type   Note type Evaluation   Pain Assessment   Pain Assessment Tool 0-10   Pain Score 5   Pain Location/Orientation Orientation: Left; Location: Leg;Location: Incision   Pain Onset/Description Onset: Ongoing;Frequency: Intermittent; Descriptor: Aching;Descriptor: Discomfort; Descriptor: Sore   Effect of Pain on Daily Activities limits mobility and rest   Patient's Stated Pain Goal No pain   Hospital Pain Intervention(s) Repositioned; Ambulation/increased activity; Emotional support   Restrictions/Precautions   Weight Bearing Precautions Per Order Yes   RLE Weight Bearing Per Order (S)    (BKA)   LLE Weight Bearing Per Order (S)  NWB  (BKA)   Braces or Orthoses Knee immobilizer  (LLE)   Other Precautions Cognitive; Chair Alarm; Bed Alarm;Multiple lines;Telemetry; Fall Risk;Pain   Home Living   Type of [address] One level;Performs ADLs on one level; Able to live on main level with bedroom/bathroom; Elevator   Bathroom Shower/Tub Tub/shower unit   Bathroom Toilet Raised  (BSC over toilet) Bathroom Equipment Shower chair;Commode;Grab bars in [address]; Wheelchair-manual;Other (Comment)  (slide board)   Additional Comments pt reports using sliding board transfers at home vs. using RW for transfer to w/c; reports having bruises on UEs from bumping into things w/ w/c   Prior Function   Level of [place] Independent with ADLs; Independent with IADLS;Needs assistance with IADLS;Modified independent with wheelchair  (neighbors assist with driving and groceries ; patient cooks owns meals and performs other IADL tasks)   Lives With (S)  Alone   Receives Help From Neighbor   IADLs Family/Friend/Other provides transportation; Independent with meal prep; Independent with medication management   Falls in the last 6 months 1 to 4   Vocational Retired   Comments reports neighbors assist w/ transport or use of bus transport, reports neighbors assist him in home if needed; pt reports needing a hospital bed   General   Family/Caregiver Present No   Cognition   Overall Cognitive Status WFL   Arousal/Participation Alert   Attention Attends with cues to redirect   Orientation Level Oriented X4   Memory Decreased recall of precautions   Following Commands Follows one step commands with increased time or repetition   Comments Patient pleasant and cooperative, but decreased safety awareness   Subjective   Subjective Patient agreeable to PT evaluation   RUE Assessment   RUE Assessment WFL   LUE Assessment   LUE Assessment WFL   RLE Assessment   RLE Assessment X  (decreased hip/knee strength)   LLE Assessment   LLE Assessment X  (decreased hip strength ; L knee immobilized in brace)   Vision-Basic Assessment   Current Vision Wears glasses all the time   Bed Mobility   Supine to Sit 3  Moderate assistance   Additional items Assist x 2; Increased time required;Verbal cues;HOB elevated; Bedrails   Sit to Supine Unable to assess   Additional Comments initially requiring support while seated EOB then improved to close supervision   Transfers   Sit to Stand Unable to assess  (not appropriate d/t B/L BKA)   Sliding Board transfer 2  Maximal assistance   Additional items Assist x 2; Increased time required;Verbal cues  (to drop arm recliner chair ; toward RLE ; impaired safety with slide board despite cues)   Additional Comments assist to position sliding board under patient and cues for safety and sequencing for transfer to drop arm recliner; BP EOB: 111/65   Ambulation/Elevation   Gait pattern Not appropriate   Balance   Static Sitting Fair   Dynamic Sitting Fair -   Endurance Deficit   Endurance Deficit Yes   Activity Tolerance   Activity Tolerance Patient limited by fatigue;Patient limited by pain   Medical Staff Made Aware OT Tamanna   Nurse Made Aware RN cleared   Assessment   Prognosis Good   Problem List Decreased strength;Decreased range of motion; Impaired balance;Decreased mobility; Decreased endurance;Decreased safety awareness;Pain;Orthopedic restrictions   Assessment Pt is a 69 y.o. male seen for a high complexity PT evaluation due to Ongoing medical management for primary dx, Increased reliance on more restrictive AD compared to baseline, Decreased activity tolerance compared to baseline, Fall risk, Increased assistance needed from caregiver at current time, Ongoing telemetry monitoring, Current WBS, s/p surgical intervention, S/P LBKA (previous R BKA). Patient is s/p admit to [address] [place] [address] on 7/27/2023 for Visit for wound check (Z51.89)  Peripheral arterial disease (HCC) (I73.9).  Patient  has a past medical history of 6th nerve palsy, Blister of right leg, BPH (benign prostatic hyperplasia), CAD (coronary artery disease), Cardiomyopathy ([address]), CHF (congestive heart failure) ([address]), Depression, Diabetes mellitus ([address]), Diabetic foot ulcer ([address]), Diabetic retinopathy ([address]), Exposure to [employer], Former tobacco use, H/O urinary retention, Hyperlipidemia, Hypertension, and Rupture of biceps tendon, traumatic. Lilian Nails PT now consulted to assess functional mobility and needs for safe d/c planning. Prior to admission, pt was I with functional mobility in w/c, ADLs, and some IADLs. Some IADL assist from supportive neighbors. Personal factors affecting status include home alone, fall hx, B/L BKA, limited support. Currently pt requires Mod Ax2 for bed mobility, Max Ax2 for functional transfer with sliding board to drop arm bedside chair. Pt presents functioning below baseline and w/ overall mobility deficits 2* to: decreased strength, decreased endurance, decreased mobility, impaired balance. These impairments place pt at risk for falls. Pt will continue to benefit from skilled PT interventions to address stated impairments; to maximize functional potential; for ongoing pt/family education; and DME needs. The patient's AM-PAC Basic Mobility Inpatient Short Form Raw Score Is 6. A Raw score of less than 16 suggests the patient may benefit from discharge to post-acute rehabilitation services. PT is currently recommending rehab on d/c from hospital. Will continue to follow as able. Barriers to Discharge Decreased caregiver support   Goals   Patient Goals "to get stronger"   STG Expiration Date 08/18/23   Short Term Goal #1 In 14 days, patient will    1) increase strength in BUE/BLE by 1/2 to 1 full grade for increased strength and stability needed for functional mobility 2) improve bed mobility to MI for improved mobility and decreased need for assist 3) increase functional sliding board transfers to MI for improved safety and functional mobility 4) propel manual w/c at least 48' using BUE with MI 5) improve balance by 1 grade for improved safety and stability and decreased risk for falls. PT Treatment Day 0   Plan   Treatment/Interventions ADL retraining;Functional transfer training;LE strengthening/ROM; Therapeutic exercise; Endurance training;Patient/family training;Equipment eval/education; Bed mobility;Spoke to nursing;Spoke to case management;OT   PT Frequency 2-3x/wk   Recommendation   PT Discharge Recommendation Post acute rehabilitation services   Equipment Recommended Wheelchair; Other (Comment)  (may need hospital bed ; owns slide board and w/c)   AM-PAC Basic Mobility Inpatient   Turning in Flat Bed Without Bedrails 1   Lying on Back to Sitting on Edge of Flat Bed Without Bedrails 1   Moving Bed to Chair 1   Standing Up From Chair Using Arms 1   Walk in Room 1   Climb 3-5 Stairs With Railing 1   Basic Mobility Inpatient Raw Score 6   Highest Level Of Mobility   JH-HLM Goal 2: Bed activities/Dependent transfer   JH-HLM Achieved 4: Move to chair/commode   Modified [place] Scale   Modified [place] Scale 4   End of Consult   Patient Position at End of Consult Bedside chair;Bed/Chair alarm activated; All needs within reach  (drop arm recliner)     Jud Del Cid, PT, DPT

## 2023-08-04 NOTE — PROGRESS NOTES
General Cardiology   Progress Note -  Team One   Jordan Balderrama 69 y.o. male MRN: [id number]    Unit/Bed#: [facility] 514-01 Encounter: [id number]    Assessment  1. PAD w/ nonhealing LLE wound s/p LBKA - POD  #1  Management per the vascular surgery service. 2. Post operative hypotension  -Management per the CC service. -BP last recorded at 97/52. -Initiated on IV pressors  last evening/overnight, weaned off currently. -Received PRBC transfusions last evening/overnight. 3. Chronic HFrEF  4. Ischemic cardiomyopathy w/improved LVEF 35-40% (per TTE in 4/2023, previously 25% in 2/2023)  -Compensated  -TTE 4/28/2023; LVEF 35 to 40%, grade 3 DD, RV systolic function mildly reduced, PFO visualized, trace AI, moderate MR, moderate TR.  -Outpatient GDMT: Entresto 49-51 mg twice daily (on hold currently), and metoprolol succinate 50 mg BID (continued)  -Outpatient diuretic regimen; metolazone 5 mg daily ?; not previously on a loop diuretic ?  -Inpatient diuretic regimen; torsemide 20 mg daily (on hold currently)  -24 hr I&O balance; +3.4L; overall -+2.3 L  -Recent outpatient weights at rehab; 155 to 160 pounds; BS weight 165 pounds today  5. Multivessel CAD - medically managed  -Previously evaluated by CT surgery and felt to not be a good surgical candidate  -[facility] 2/14/2023: 50% stenosis ostial LM and distal LAD. 60% stenosis proximal LAD. 70% stenosis mid LAD, proximal RCA, and D1. 90% stenosis distal RCA.  100% stenosis mid Cx.  -On aspirin 81 mg daily, clopidogrel 75 mg daily, atorvastatin 40 mg daily, and metoprolol succinate 50 mg BID  6. 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 Severe PAD w/ prior lower extremity interventions and prior right-sided BKA  -On aspirin 81 mg daily, clopidogrel 75 mg daily, atorvastatin 40 mg daily, and metoprolol succinate 50 mg BID  8. DM type II  -Hgb A1c 7.3  9. Dyslipidemia  -Lipid profile 2/11/23; cholesterol 108, triglycerides 57, HDL 36, and LDL calculated at 61  -On atorvastatin 40 mg daily  10. History of nicotine dependence; quit in his 35s.     Plan  -Pt staying comfortably in his bed chair this a.m. States he felt slightly short of breath getting out of bed and working with PT this AM.  No current supplemental O2 requirements. He has exam findings suggestive of volume overload - likely iatrogenic in etiology in the setting of aggressive IV fluid resuscitation/PRBC transfusions yesterday in the  presence of hypotension. He has been weaned off of IV pressors. His BP has improved this AM.  Would recommend giving a one-time dose of IV Lasix 20 mg x 1 and assess his response to this, we can consider redosing this afternoon if his blood pressure remains stable.   -Hold Entresto   -Hold oral diuretics. -Decrease metoprolol succinate to 25 mg BID (w/hold parameters)  -Continue aspirin 81 mg daily and clopidogrel 75 mg daily.  -Continue high intensity statin therapy.  -Strict I's and O's, daily weights, 2 g Na+ diet to LFR.  -Monitor renal function and electrolytes closely.  Replete to maintain K+ level of 4.0 magnesium level at 2.0.   -Monitor on telemetry.     Subjective  Review of Systems   Constitutional: Positive for malaise/fatigue. Negative for chills and fever. Eyes: Negative for visual disturbance. Cardiovascular: Positive for dyspnea on exertion. Negative for chest pain, leg swelling, orthopnea and palpitations. Respiratory: Negative for cough and shortness of breath. Gastrointestinal: Negative for abdominal pain. Neurological: Negative for dizziness, headaches and light-headedness. Objective:   Physical Exam  Vitals and nursing note reviewed. Constitutional:       General: He is not in acute distress. Appearance: He is not diaphoretic. HENT:      Head: Normocephalic and atraumatic. Eyes:      General: No scleral icterus. Cardiovascular:      Rate and Rhythm: Normal rate and regular rhythm.       Pulses: Normal pulses. Heart sounds: Normal heart sounds. Pulmonary:      Effort: Pulmonary effort is normal.      Breath sounds: Rales present. No wheezing. Abdominal:      Palpations: Abdomen is soft. Musculoskeletal:      Right lower leg: Edema present. Left lower leg: Edema present. Skin:     General: Skin is warm and dry. Capillary Refill: Capillary refill takes less than 2 seconds. Coloration: Skin is pale. Neurological:      General: No focal deficit present. Mental Status: He is alert and oriented to person, place, and time. Psychiatric:         Mood and Affect: Mood normal.         Vitals: Blood pressure 111/65, pulse 84, temperature 98 °F (36.7 °C), temperature source Oral, resp. rate (!) 26, height 5' 10" (1.778 m), weight 74.9 kg (165 lb 2 oz), SpO2 95 %. ,     Body mass index is 23.69 kg/m². ,   Systolic (74YTL), RZS:37 , Min:78 , YHX:659     Diastolic (19DGB), EG, Min:46, Max:74      Intake/Output Summary (Last 24 hours) at 2023 1014  Last data filed at 2023 6974  Gross per 24 hour   Intake 5427.51 ml   Output 1625 ml   Net 3802.51 ml     Weight (last 2 days)     Date/Time Weight    23 0600 74.9 (165.12)    23 1645 --    Comment rows:    OBSERV: Surgical team upgrading pt to ICU for further monitoring. at 23 1645    23 161 --    Comment rows:    OBSERV: dr Natacha Villalta. at bedside. IV phenergan given by anesthesia. at 23 1615    23 1614 --    Comment rows:    OBSERV: sent blood paperwork down. CPower called and verified paperwork was obtained. waiting for blood at 23 1614    23 1545 --    Comment rows:    OBSERV: -- (albumin ordered for MAP<65. Lab work ordered and obtained) at 23 1545    23 1544 --    Comment rows:    OBSERV: glucose 62. CRNA aware. ordered dextroseand given.  see MAR at 23 1544    23 0600 72.8 (160.5)    23 0600 73.5 (162.1)          LABORATORY RESULTS      CBC with diff:   Results from last 7 days   Lab Units 08/04/23  0215 08/03/23  2212 08/03/23  1809 08/03/23  1556 08/03/23  0501 08/02/23  0430 07/30/23  0502 07/29/23  0557   WBC Thousand/uL 8.19  --   --  8.73 7.03 10.13  --  7.11   HEMOGLOBIN g/dL 7.0* 7.6* 8.7* 6.8* 7.9* 7.6* 7.7* 7.1*   HEMATOCRIT % 22.3*  --   --  23.6* 26.4* 25.8* 25.8* 23.7*   MCV fL 86  --   --  87 87 88  --  86   PLATELETS Thousands/uL 232  --   --  291 305 325  --  283   RBC Million/uL 2.58*  --   --  2.70* 3.02* 2.94*  --  2.77*   MCH pg 27.1  --   --  25.2* 26.2* 25.9*  --  25.6*   MCHC g/dL 31.4  --   --  28.8* 29.9* 29.5*  --  30.0*   RDW % 17.5*  --   --  18.1* 18.2* 17.9*  --  17.3*   MPV fL 8.6*  --   --  8.4* 9.0 8.7*  --  8.6*   NRBC AUTO /100 WBCs  --   --   --  0  --  0  --   --        CMP:  Results from last 7 days   Lab Units 08/04/23 0215 08/03/23  1556 08/03/23  0501 08/02/23  0430 08/01/23  1001 07/29/23  0557   POTASSIUM mmol/L 4.9 4.6 3.9 3.4* 3.4* 3.8   CHLORIDE mmol/L 110* 110* 111* 109* 108 106   CO2 mmol/L 25 26 26 31 31 32   BUN mg/dL 54* 53* 56* 47* 39* 42*   CREATININE mg/dL 1.45* 1.32* 1.38* 1.55* 1.53* 1.03   CALCIUM mg/dL 8.6 8.1* 8.3 8.3 8.2* 8.5   AST U/L 24  --   --  31  --   --    ALT U/L 22  --   --  33  --   --    ALK PHOS U/L 256*  --   --  387*  --   --    EGFR ml/min/1.73sq m 46 51 48 42 43 69       BMP:  Results from last 7 days   Lab Units 08/04/23  0215 08/03/23  1556 08/03/23  0501 08/02/23  0430 08/01/23  1001 07/29/23  0557   POTASSIUM mmol/L 4.9 4.6 3.9 3.4* 3.4* 3.8   CHLORIDE mmol/L 110* 110* 111* 109* 108 106   CO2 mmol/L 25 26 26 31 31 32   BUN mg/dL 54* 53* 56* 47* 39* 42*   CREATININE mg/dL 1.45* 1.32* 1.38* 1.55* 1.53* 1.03   CALCIUM mg/dL 8.6 8.1* 8.3 8.3 8.2* 8.5       Lab Results   Component Value Date    [facility] 14,210 (H) 02/11/2023        Results from last 7 days   Lab Units 08/04/23  0215 08/01/23  1001   MAGNESIUM mg/dL 2.0 1.7                         Lipid Profile:   No results found for: "CHOL"  Lab Results   Component Value Date    HDL 36 (L) 02/11/2023     Lab Results   Component Value Date    LDLCALC 61 02/11/2023     Lab Results   Component Value Date    TRIG 57 02/11/2023       Cardiac testing:   No results found for this or any previous visit. No results found for this or any previous visit. No results found for this or any previous visit. No valid procedures specified. No results found for this or any previous visit.       Meds/Allergies   all current active meds have been reviewed and current meds:   Current Facility-Administered Medications   Medication Dose Route Frequency   • acetaminophen (TYLENOL) tablet 975 mg  975 mg Oral Q6H PRN   • aspirin chewable tablet 81 mg  81 mg Oral Daily   • atorvastatin (LIPITOR) tablet 40 mg  40 mg Oral Daily With Dinner   • clopidogrel (PLAVIX) tablet 75 mg  75 mg Oral Daily   • escitalopram (LEXAPRO) tablet 5 mg  5 mg Oral Daily   • finasteride (PROSCAR) tablet 5 mg  5 mg Oral Daily   • heparin (porcine) subcutaneous injection 5,000 Units  5,000 Units Subcutaneous Q8H [address]   • HYDROmorphone (DILAUDID) injection 0.5 mg  0.5 mg Intravenous Q3H PRN   • insulin glargine (LANTUS) subcutaneous injection 5 Units 0.05 mL  5 Units Subcutaneous HS   • insulin lispro (HumaLOG) 100 units/mL subcutaneous injection 2-12 Units  2-12 Units Subcutaneous TID AC   • insulin lispro (HumaLOG) 100 units/mL subcutaneous injection 2-12 Units  2-12 Units Subcutaneous HS   • insulin lispro (HumaLOG) 100 units/mL subcutaneous injection 8 Units  8 Units Subcutaneous TID With Meals   • metoprolol succinate (TOPROL-XL) 24 hr tablet 50 mg  50 mg Oral Q12H [place]   • mirtazapine (REMERON) tablet 7.5 mg  7.5 mg Oral HS   • oxyCODONE (ROXICODONE) immediate release tablet 10 mg  10 mg Oral Q4H PRN   • oxyCODONE (ROXICODONE) IR tablet 5 mg  5 mg Oral Q4H PRN   • pantoprazole (PROTONIX) EC tablet 40 mg  40 mg Oral Daily   • senna-docusate sodium (SENOKOT S) 8.6-50 mg per tablet 1 tablet  1 tablet Oral HS   • tamsulosin (FLOMAX) capsule 0.4 mg  0.4 mg Oral Daily With Dinner              EKG personally reviewed by JANIS Bowers. Assessment:  Principal Problem:    PAD (peripheral artery disease) ([facility])  Active Problems:    Type 2 diabetes mellitus with hyperglycemia, with long-term current use of insulin ([facility])    Chronic systolic CHF (congestive heart failure) ([facility])    CAD (coronary artery disease)    Essential hypertension    Cardiomyopathy ([address])    Hx of BKA, right ([address])    Visit for wound check    Anemia    Counseling / Coordination of Care  Total floor / unit time spent today 20 minutes. Greater than 50% of total time was spent with the patient and / or family counseling and / or coordination of care. ** Please Note: Dragon 360 Dictation voice to text software may have been used in the creation of this document.  **

## 2023-08-04 NOTE — CONSULTS
Vancomycin IV Pharmacy-to-Dose Consultation    Cornell Alcantar is a 69 y.o. male who was receiving Vancomycin IV with management by the Pharmacy Consult service for treatment of Soft tissue (goal -600, trough >10)  . The patient’s Vancomycin therapy has been discontinued. Thank you for allowing us to take part in this patient's care. Pharmacy will sign-off now; please call or re-consult if there are any questions.         Philippe Diaz, PharmD, BCCCP  Critical Care Clinical Pharmacist  Available via [facility] Text Continue Regimen: Soriatane ( every other day) and TAC prn Detail Level: Simple Render In Strict Bullet Format?: No

## 2023-08-04 NOTE — OCCUPATIONAL THERAPY NOTE
Occupational Therapy Evaluation     Patient Name: Sheila Singh  Today's Date: 8/4/2023  Problem List  Principal Problem:    PAD (peripheral artery disease) ([address])  Active Problems:    Type 2 diabetes mellitus with hyperglycemia, with long-term current use of insulin ([facility])    Chronic systolic CHF (congestive heart failure) (HCC)    CAD (coronary artery disease)    Essential hypertension    Cardiomyopathy ([address])    Hx of BKA, right ([address])    Visit for wound check    Anemia    Past Medical History  Past Medical History:   Diagnosis Date    6th nerve palsy     Blister of right leg     BPH (benign prostatic hyperplasia)     CAD (coronary artery disease)     Cardiomyopathy (HCC)     EF 25%    CHF (congestive heart failure) ([facility])     Depression     Diabetes mellitus ([address])     type 2, insulin dependent    Diabetic foot ulcer ([address])     left, local wound care    Diabetic retinopathy ([address])     Exposure to Agent Orange     while serving in [place]    Former tobacco use     H/O urinary retention     w/ reagan placement    Hyperlipidemia     Hypertension     Rupture of biceps tendon, traumatic 2017    right     Past Surgical History  Past Surgical History:   Procedure Laterality Date    CARDIAC CATHETERIZATION      CARDIAC CATHETERIZATION Left 2/14/2023    Procedure: Cardiac Left Heart Cath;  Surgeon: Sofi Hylton MD;  Location: [facility]; Service: Cardiology    CARDIAC CATHETERIZATION N/A 2/14/2023    Procedure: Cardiac Coronary Angiogram;  Surgeon: Sofi Hylton MD;  Location: [facility]; Service: Cardiology    CATARACT EXTRACTION, BILATERAL Bilateral 2021    IR LOWER EXTREMITY ANGIOGRAM  3/28/2023    IR LOWER EXTREMITY ANGIOGRAM  4/6/2023    LEG AMPUTATION THROUGH LOWER TIBIA AND FIBULA Right 3/30/2023    Procedure: (BKA);   Surgeon: Kassandra Mckeon DO;  Location: [facility] OR;  Service: Vascular    LEG AMPUTATION THROUGH LOWER TIBIA AND FIBULA Left 8/3/2023    Procedure: AMPUTATION BELOW KNEE (BKA); Surgeon: Monse Ren DO;  Location: [facility] OR;  Service: Vascular    WOUND DEBRIDEMENT Right 3/24/2023    Procedure: debridement of heel ulcer;  Surgeon: Eloy Kapadia DPM;  Location: [facility] OR;  Service: Podiatry         08/04/23 1008   OT Last Visit   OT Visit Date 08/04/23   Note Type   Note type Evaluation   Pain Assessment   Pain Assessment Tool 0-10   Pain Score 5   Pain Location/Orientation Orientation: Left; Location: Leg   Restrictions/Precautions   Weight Bearing Precautions Per Order Yes   RLE Weight Bearing Per Order   (BKA)   LLE Weight Bearing Per Order NWB  (BKA)   Braces or Orthoses Knee immobilizer  (L LE)   Other Precautions Cognitive; Chair Alarm; Bed Alarm;Multiple lines;Telemetry; Fall Risk;Pain   Home Living   Type of [address] One level;Performs ADLs on one level; Able to live on main level with bedroom/bathroom; Elevator   Bathroom Shower/Tub Tub/shower unit   Bathroom Toilet Raised  (BSC over toilet)   [address] chair;Grab bars in shower;Commode  (drop arm commode)   Bathroom Accessibility Accessible;Accessible via wheelchair   [place]; John Stade  (slding board)   Additional Comments pt reports using sliding board transfers at home vs. using RW for transfer to w/c; reports having bruises on UEs from bumping into things w/ w/c   Prior Function   Level of [place] Independent with ADLs; Independent with IADLS;Needs assistance with IADLS;Modified independent with wheelchair   Lives With [address]   IADLs Family/Friend/Other provides transportation; Independent with meal prep; Independent with medication management   Falls in the last 6 months 1 to 4   Vocational Retired   Comments reports neighbors assist w/ transport or use of bus transport, reports neighbors assist him in home if needed; pt reports needing a hospital bed   Lifestyle   Autonomy per pt independent w/ ADLs, MOD I functional transfers w/ sliding board or Rw, MOD I w/c propulsion, MOD I to assist w/ IADLs, assist w/ transport   Reciprocal Relationships neighbors   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   Subjective   Subjective "I am doing okay"   ADL   Where Assessed Chair   Eating Assistance 5  Supervision/Setup   Grooming Assistance 4  Minimal Assistance   UB Bathing Assistance 4  Minimal Assistance    [address] [facility] 3  Moderate Assistance   [address] [facility] 4  Minimal Assistance   LB [address] VISHNU Kelley Final 2  Maximal [address] [facility] [address]  2  Maximal [facility] 3  Moderate Assistance   Bed Mobility   Supine to Sit 3  Moderate assistance   Additional items Assist x 2; Increased time required;LE management;Verbal cues; Bedrails   Additional Comments increased time to complete, initially requiring support while seated EOB then improved to close supervision   Transfers   Sit to Stand Unable to assess  (b/l BKA)   Sliding Board transfer 2  Maximal assistance   Additional items Assist x 2; Increased time required;Verbal cues;Armrests; Bedrails   Additional Comments assist to position sliding board under patient and cues for safety and sequencing for transfer to drop arm recliner; BP EOB: 111/65   Balance   Static Sitting Fair   Dynamic Sitting Fair -   Activity Tolerance   Activity Tolerance Patient limited by fatigue;Patient limited by pain;Treatment limited secondary to medical complications (Comment)   Medical Staff Made Aware MARIA ELENA Winston:   Nurse Made Aware appropriate to see per Robin MCDONALD Assessment   RUE Assessment WFL  (4-/5)   LUE Assessment   LUE Assessment WFL  (4-/5)   Hand Function   Gross Motor Coordination Functional   Fine Motor Coordination Functional   Sensation   Light Touch Partial deficits in the RLE;Partial deficits in the LLE   Additional Comments neuropathy in b/l hands per patient   Proprioception   Proprioception No apparent deficits Vision-Basic Assessment   Current Vision Wears glasses all the time   Vision - Complex Assessment   Ocular Range of Motion Intact   Acuity Able to read clock/calendar on wall without difficulty   Perception   Inattention/Neglect Appears intact   Cognition   Overall Cognitive Status WFL   Arousal/Participation Responsive; Cooperative   Attention Attends with cues to redirect   Orientation Level Oriented to person;Oriented to place;Oriented to time;Oriented to situation   Memory Decreased recall of precautions   Following Commands Follows one step commands with increased time or repetition   Comments decreased safety awareness, pleasant and cooperative   Assessment   Limitation Decreased ADL status; Decreased UE strength;Decreased Safe judgement during ADL;Decreased cognition;Decreased endurance;Decreased sensation;Decreased self-care trans;Decreased high-level ADLs   Prognosis Good;Fair   Assessment Pt is a 69 y.o. male seen for OT evaluation s/p admit to [facility] on 7/27/2023 w/ PAD (peripheral artery disease) ([address]), Nonhealing ulcer of multiple sites of left lower extremity, unspecified ulcer stage of L LE, s/p L BKA w/ knee immobilizer in place. Comorbidities affecting pt's functional performance at time of assessment include: r BKA 3/2023, hypotension, CHF, ischemic cardiomyopathy, hyperlipidemia, respiratory insuffiencey, MATTHEW, anemia, DM II. Personal factors affecting pt at time of IE include:limited home support, difficulty performing ADLS, difficulty performing IADLS  and limited insight into deficits. Prior to admission, pt was living alone w/ supportive neighbors nearby and reports: per pt independent w/ ADLs, MOD I functional transfers w/ sliding board or Rw, MOD I w/c propulsion, MOD I to assist w/ IADLs, assist w/ transport.  Upon evaluation: Pt requires MOD assist x2 supine>sit bed mobility w/ increased time to complete, MAX assist x2 sliding board transfer to drop arm recliner w/ cues for sequencing and assist to position board, MOD-MAX assist LB ADLs, MIN assist UB ADLs, assist toileting 2* the following deficits impacting occupational performance: increased pain, impaired dynamic sitting balance, impaired functional reach, decreased safety awareness, decreased strength and endurance, multiple lines, slight dizziness in chair BP: 111/65,fall risk, decreased sensation. Pt educated on completed b/l UE exercises seen above to increased strength for transfers. Pt to benefit from continued skilled OT tx while in the hospital to address deficits as defined above and maximize level of functional independence w ADL's and functional mobility. Occupational Performance areas to address include: grooming, bathing/shower, toilet hygiene, dressing, health maintenance, functional mobility, clothing management, cleaning and meal prep. From OT standpoint, recommendation at time of d/c would be short term rehab. The patient's raw score on the AM-PAC Daily Activity Inpatient Short Form is 15. A raw score of less than 19 suggests the patient may benefit from discharge to post-acute rehabilitation services. Please refer to the recommendation of the Occupational Therapist for safe discharge planning. Goals   Patient Goals "to get better"   LTG Time Frame 10-14   Long Term Goal please see below goals   Plan   Treatment Interventions ADL retraining;Functional transfer training; Endurance training;UE strengthening/ROM; Cognitive reorientation;Patient/family training;Equipment evaluation/education; Compensatory technique education; Energy conservation; Activityengagement   Goal Expiration Date 08/18/23   OT Frequency 3-5x/wk   Recommendation   OT Discharge Recommendation Post acute rehabilitation services   Equipment Recommended   (pt requesting a hospital bed)   [facility] Daily Activity Inpatient   Lower Body Dressing 1   Bathing 2   Toileting 2   Upper Body Dressing 3   Grooming 3   Eating 4   Daily Activity Raw Score 15   Daily Activity Standardized Score (Calc for Raw Score >=11) 34.69   AM-PAC Applied Cognition Inpatient   Following a Speech/Presentation 4   Understanding Ordinary Conversation 4   Taking Medications 4   Remembering Where Things Are Placed or Put Away 3   Remembering List of 4-5 Errands 3   Taking Care of Complicated Tasks 3   Applied Cognition Raw Score 21   Applied Cognition Standardized Score 44.3   Modified Evin Scale   Modified Evin Scale 4   End of Consult   Education Provided Yes   Patient Position at End of Consult Bed/Chair alarm activated; All needs within reach; Bedside chair   Nurse Communication Nurse aware of consult     Occupational Therapy Goals to be met in 10-14 days:  1) Pt will improve activity tolerance to G for 30 min txment sessions to enhance ADLs  2) Pt will complete ADLs/self care w/ mod I   3) Pt will complete toileting w/ mod I w/ G hygiene/thoroughness using DME PRN  4) Pt will improve functional transfers on/off all surfaces using DME/sliding board PRN w/ G balance/safety including toileting w/ min assist  5) Pt will improve fx'l mobility during I/ADl/leisure tasks using w/c w/ g balance/safety w/ mod I  6) Pt will engage in ongoing cognitive assessment w/ G participation to A w/ safe d/c planning/recommendations  7) Pt will demonstrate G carryover of pt/caregiver education and training as appropriate w/ mod I  w/ G tolerance  8) Pt will engage in depression screen/leisure interest checklist w/ G participation to monitor s/s depression and ID 3 positive coping strategies to A w/ emotional regulation and management  9) Pt will demonstrate 100% carryover of E.C. techniques w/ mod I t/o fx'l I/ADL/leisure tasks w/o cues s/p skilled education  10) Pt will demonstrate improved bed mobility to MOD I to enhance ADLs  11) Pt will engage in activity configuration activity w/ G participation and mod I to increase time management skills and improve participation in a structured routine to improve overall quality of life  12) Pt will demonstrate improved b/l UE strength by 1 MMT grade to enhance ADLS and functional transfers   Documentation completed by: Sonal Mathews, MS, OTR/L

## 2023-08-04 NOTE — WOUND OSTOMY CARE
Progress Note - Wound   Cornell Alcantar 69 y.o. male MRN: [id number]  Unit/Bed#: [facility] 697-91 Encounter: [id number]        Assessment:   Patient is seen for wound care follow-up. Min assist for turning and repositioning on CCU specialty mattress. Patient with Right BKA and now with Left BKA. Per primary RN, patient likely to be transferred out of CCU. P-500 specialty mattress ordered for patient to be used when transferred out of the CCU. Incontinent at times of stool and patient reports continence of urine. Findings:  1. POA Sacro-Buttocks Stage 2 Pressure Injury: RESOLVED. Area is pink intact epithelial tissue- no skin loss or drainage present. Area blanches. Recommend continuing with allevyn foam dressing to area for protection and prevention. No induration, fluctuance, odor, warmth/temperature differences, redness, or purulence noted to the above noted wounds and skin areas assessed. New dressings applied per orders listed below. Patient tolerated well- no s/s of non-verbal pain or discomfort observed during the encounter. Bedside nurse aware of plan of care. See flow sheets for more detailed assessment findings. Orders listed below and wound care will sign off at this time, call or tiger text with questions. Skin Care Plan:  1-Cleanse sacro-buttocks with soap and water. Apply allevyn foam dressing to sacro-buttocks. Rasheed with P for Prevention and change every 3 days or PRN soilage/displacement. Peel back and inspect skin Q-shift. 2-Turn/reposition q2h or when medically stable for pressure re-distribution on skin . 3-P-500 Specialty Mattress Ordered for Patient  4-Moisturize skin daily with skin nourishing cream  5-Ehob cushion in chair when out of bed.           WOUNDS:  Wound 07/27/23 Pressure Injury Sacrum (Active)   Wound Image   08/04/23 0909   Wound Description Intact 08/04/23 1200   Pressure Injury Stage O 08/04/23 1200   Hanh-wound Assessment Intact 08/04/23 1200   Wound Length (cm) 0 cm 08/04/23 1200   Wound Width (cm) 0 cm 08/04/23 1200   Wound Depth (cm) 0 cm 08/04/23 1200   Wound Surface Area (cm^2) 0 cm^2 08/04/23 1200   Wound Volume (cm^3) 0 cm^3 08/04/23 1200   Calculated Wound Volume (cm^3) 0 cm^3 08/04/23 1200   Change in Wound Size % 100 08/04/23 1200   Drainage Amount None 08/04/23 1200   Drainage Description Other (Comment) 08/04/23 1200   Non-staged Wound Description Not applicable 33/57/63 9602   Treatments Cleansed;Site care 08/04/23 1200   Dressing Foam, Silicon (eg. Allevyn, etc) 08/04/23 1200   Dressing Changed Reinforced 08/04/23 1200   Patient Tolerance Tolerated well 08/04/23 1200   Dressing Status Dry;Clean; Intact 08/04/23 [address], [place], [employer]

## 2023-08-04 NOTE — PROGRESS NOTES
Progress Note - Vascular Surgery  : SHANNAN [facility] 69 [facility] MRN: [id number]  Unit/Bed#: [facility] 514-01 Encounter: [id number]    Assessment:  69 y.o. male with nonsalvagable LLE for amputation       Plan:  * PAD (peripheral artery disease) ([address])  Assessment & Plan  69 y.o. male with history of DM (A1C 7.3), CHF (EF 35-40%), HTN, PAD s/p multiple LLE endovascular interventions (as below), s/p R BKA in setting of non-salvageable R foot with non-healing wound, who now presented for evaluation for LCLTI with poor healing open LLE wounds, XR concerning for OM    Not MRI candidate due to history of stents, patient amenable to L BKA now, does not want to delay further    S/p L BKA 8/4/2023 with Dr. Jh Francis hypotension in PACU requiring RBC transfusion x1, brief norepi requirement, second unit overnight  Remains in ICU off pressors but with SBP 90s, mentating well  Trend hgb, transfuse PRN  Appreciate ICU level of care  L knee immobilizer to remain in place  PT/OT, PM&R ocnsults  L BKA stump dressing down by vascular team Saturday 8/5        Subjective/Objective     Subjective: No complaints      Objective:     Pulse exam:  R BKA  L BKA stump with dressing CDI (no strike through), knee immobilizer in place    Physical Exam:  GEN: NAD  HEENT: MMM  CV: RRR  Lung: Normal effort  Ab: Soft, ND/NT   Neuro: A+Ox3         Vitals:  /59   Pulse 86   Temp 98.1 °F (36.7 °C)   Resp 15   Ht 5' 10" (1.778 m)   Wt 74.9 kg (165 lb 2 oz)   SpO2 96%   BMI 23.69 kg/m²     I/Os:  I/O last 3 completed shifts: In: 5100 [P.O.:1320; I.V.:1945; Blood:785; IV Piggyback:1050]  Out: 7306 [Urine:2375; Blood:150]  I/O this shift:   In: 87.5 [I.V.:87.5]  Out: -     Lab Results and Cultures:   Lab Results   Component Value Date    WBC 8.19 08/04/2023    HGB 7.0 (L) 08/04/2023    HCT 22.3 (L) 08/04/2023    MCV 86 08/04/2023     08/04/2023     Lab Results   Component Value Date    CALCIUM 8.6 08/04/2023    K 4.9 08/04/2023    CO2 25 08/04/2023     (H) 08/04/2023    BUN 54 (H) 08/04/2023    CREATININE 1.45 (H) 08/04/2023     Lab Results   Component Value Date    INR 1.33 (H) 07/28/2023    INR 1.25 (H) 07/26/2023    INR 1.21 (H) 02/08/2023    PROTIME 16.7 (H) 07/28/2023    PROTIME 15.9 (H) 07/26/2023    PROTIME 15.6 (H) 02/08/2023        Blood Culture:   Lab Results   Component Value Date    BLOODCX No Growth After 5 Days. 07/27/2023    BLOODCX No Growth After 5 Days.  07/27/2023   ,   Urinalysis:   Lab Results   Component Value Date    COLORU Charlene 04/02/2023    CLARITYU Cloudy 04/02/2023    SPECGRAV >=1.030 04/02/2023    PHUR 5.5 04/02/2023    LEUKOCYTESUR Negative 04/02/2023    NITRITE Negative 04/02/2023    GLUCOSEU 100 (1/10%) (A) 04/02/2023    KETONESU Trace (A) 04/02/2023    BILIRUBINUR Negative 04/02/2023    BLOODU Large (A) 04/02/2023   ,   Urine Culture:   Lab Results   Component Value Date    URINECX No Growth <1000 cfu/mL 02/08/2023   ,   Wound Culure:   Lab Results   Component Value Date    WOUNDCULT (A) 03/21/2023     3+ Growth of Methicillin Resistant Staphylococcus aureus    WOUNDCULT 4+ Growth of Enterococcus faecalis (A) 03/21/2023    WOUNDCULT 1+ Growth of 03/21/2023       Medications:  Current Facility-Administered Medications   Medication Dose Route Frequency   • acetaminophen (TYLENOL) tablet 975 mg  975 mg Oral Q6H PRN   • aspirin chewable tablet 81 mg  81 mg Oral Daily   • atorvastatin (LIPITOR) tablet 40 mg  40 mg Oral Daily With Dinner   • clopidogrel (PLAVIX) tablet 75 mg  75 mg Oral Daily   • escitalopram (LEXAPRO) tablet 5 mg  5 mg Oral Daily   • finasteride (PROSCAR) tablet 5 mg  5 mg Oral Daily   • heparin (porcine) subcutaneous injection 5,000 Units  5,000 Units Subcutaneous Q8H [facility]   • HYDROmorphone (DILAUDID) injection 0.5 mg  0.5 mg Intravenous Q3H PRN   • insulin glargine (LANTUS) subcutaneous injection 5 Units 0.05 mL  5 Units Subcutaneous HS   • insulin lispro (HumaLOG) 100 units/mL subcutaneous injection 1-6 Units  1-6 Units Subcutaneous TID AC   • insulin lispro (HumaLOG) 100 units/mL subcutaneous injection 5 Units  5 Units Subcutaneous TID With Meals   • metoprolol succinate (TOPROL-XL) 24 hr tablet 50 mg  50 mg Oral Q12H [place]   • mirtazapine (REMERON) tablet 7.5 mg  7.5 mg Oral HS   • oxyCODONE (ROXICODONE) immediate release tablet 10 mg  10 mg Oral Q4H PRN   • oxyCODONE (ROXICODONE) IR tablet 5 mg  5 mg Oral Q4H PRN   • pantoprazole (PROTONIX) EC tablet 40 mg  40 mg Oral Daily   • piperacillin-tazobactam (ZOSYN) 3.375 g in sodium chloride 0.9 % 100 mL IVPB  3.375 g Intravenous Q6H   • potassium chloride (K-DUR,KLOR-CON) CR tablet 20 mEq  20 mEq Oral BID   • senna-docusate sodium (SENOKOT S) 8.6-50 mg per tablet 1 tablet  1 tablet Oral HS   • tamsulosin (FLOMAX) capsule 0.4 mg  0.4 mg Oral Daily With Dinner   • vancomycin (VANCOCIN) IVPB (premix in dextrose) 1,000 mg 200 mL  15 mg/kg Intravenous Daily PRN           Manuel Sánchez MD  8/4/2023

## 2023-08-04 NOTE — PROGRESS NOTES
[address] [facility], [place]  Progress Note: Critical Care  Name: Mandie Kilgore 69 y.o. male I MRN: [id number]  Unit/Bed#: [facility] 218-84 I Date of Admission: 7/27/2023   Date of Service: 8/4/2023 I Hospital Day: 8    Assessment/Plan   ASSESSMENT:  Acute:  1. S/P left BKA  2. Acute postoperative pain  3. MATTHEW  4. ABLA  5. Hypotension due to #4, resolved  6. Concern for osteomyelitis    Chronic:  1. HFrEF due to ischemic cardiomyopathy  2. S/P right BKA  3. CAD  4. HTN   5. PAD  6. DM2  7. HLD  8. Heart block s/p ppm   9. Depression     PLAN:  Neuro:  Plan: Frequent neuro checks. CAM-ICU. Delirium precautions. Frequent re-orientation. Regulate sleep/wake cycle. Analgesia: Multimodal. Tylenol prn, oxy 5/10 prn, dilaudid prn  • Home lexapro  • Home remeron    CV:  Plan: Continuous cardiopulmonary monitoring. Maintain MAP >65. Monitor resuscitative endpoints. • DAPT with ASA and plavix  • Atorvastatin     Pulm:  Plan: Continuous pulse oximetry. Incentive spirometry when appropriate. Pulmonary toilet. Elevate HOB. Maintain O2 sat >90%. GI:  Plan: Bowel regimen: Senokot S. Monitor stool output and quality. GI prophylaxis: home protonix    :  Plan: Strict I/O. Monitor urine output and renal indices. Avoid nephrotoxins. • Home flomax and finasteride    F/E/N:  Plan:   • F: d/c continuous fluids. Fluid resuscitation prn. • E: Monitor and replete electrolytes for Mg >2, Phos >3, K >4.  • N: regular diet    Endo: Insulin: SSI before meals, standing 5u with meals, lantus nightly  Steroids: not indicated  Plan: Monitor blood glucose for goal 140-180. Heme:  Plan: Monitor Hgb and transfuse for Hgb <7 or based on hemodynamics if actively bleeding. Monitor platelets. VTE prophylaxis: SQH. Sequential compression devices and/or foot pumps.   · Received total 2u PRBC post op, one of which was this AM  · F/u 1200 hemoglobin    ID:  Antimicrobials: Zosyn and vancomycin to end today  Plan: Monitor fever curve and WBC. Maintain normothermia. Daily CHG bath, Peridex oral rinse, and nasal antiseptic while in ICU.    MSK/Skin:  Plan: Frequent turning and repositioning. Pressure injury prevention. Monitor for skin breakdown. PT/OT when appropriate. Encourage OOBTC and ambulation when appropriate. Local wound care prn. Invasive medical instruments:  Invasive Devices     Peripheral Intravenous Line  Duration           Peripheral IV 08/02/23 Left;Ventral (anterior) Forearm 1 day    Peripheral IV 08/03/23 Right Forearm <1 day                 ICU LOS: 13h    CODE STATUS: Level 1    FAMILY UPDATE: I have updated and/or plan to update family today by telephone or in person if applicable. HOME MEDICATIONS: I have reviewed home medications and reordered as clinically appropriate. Medications Prior to Admission   Medication   • ascorbic acid (VITAMIN C) 500 MG tablet   • atorvastatin (LIPITOR) 40 mg tablet   • acetaminophen (TYLENOL) 325 mg tablet   • aspirin (ECOTRIN LOW STRENGTH) 81 mg EC tablet   • bisacodyl (FLEET) 10 MG/30ML ENEM   • clopidogrel (PLAVIX) 75 mg tablet   • escitalopram (LEXAPRO) 5 mg tablet   • finasteride (PROSCAR) 5 mg tablet   • furosemide (LASIX) 40 mg tablet   • Insulin Pen Needle (Pen Needles) 31G X 5 MM MISC   • melatonin 3 mg   • metolazone (ZAROXOLYN) 5 mg tablet   • metoprolol succinate (TOPROL-XL) 50 mg 24 hr tablet   • mirtazapine (REMERON) 7.5 MG tablet   • multivitamin (THERAGRAN) TABS   • pantoprazole (PROTONIX) 40 mg tablet   • sacubitril-valsartan (Entresto) 49-51 MG TABS   • senna-docusate sodium (SENOKOT S) 8.6-50 mg per tablet   • tamsulosin (FLOMAX) 0.4 mg       IMAGING: I have reviewed pertinent films and reports. LABS: I have reviewed pertinent lab results. MICRO: I have reviewed microbiology data and adjusted antibiotic regimen as clinically appropriate.     MULTIDISCIPLINARY CARE: I have performed bedside rounds with nursing colleagues and will discuss the plan above with attending and full CC team.    Gwendolyn Schafer PA-C  08/04/23    Disposition: Stepdown Level 2    ICU Core Measures     A: Assess, Prevent, and Manage Pain · Has pain been assessed? Yes  · Need for changes to pain regimen? No   B: Both SAT/SAT  · N/A   C: Choice of Sedation · RASS Goal: N/A patient not on sedation  · Need for changes to sedation or analgesia regimen? No   D: Delirium · CAM-ICU: Negative   E: Early Mobility  · Plan for early mobility? Yes   F: Family Engagement · Plan for family engagement today? Yes       Antibiotic Review: Antibiotics to be discontinued today    Review of Invasive Devices:            Prophylaxis:  VTE VTE covered by:  heparin (porcine), Subcutaneous, 5,000 Units at 08/04/23 8501       Stress Ulcer  covered bypantoprazole (PROTONIX) EC tablet 40 mg [[id number]]          Subjective   HPI/24hr events: Admitted last evening. Received total of 2u PRBC overnight. Review of Systems   All other systems reviewed and are negative. Objective                            Vitals I/O      Most Recent Min/Max in 24hrs   Temp 98.1 °F (36.7 °C) Temp  Min: 97.7 °F (36.5 °C)  Max: 98.8 °F (37.1 °C)   Pulse 86 Pulse  Min: 62  Max: 97   Resp 15 Resp  Min: 9  Max: 27   /59 BP  Min: 78/49  Max: 129/74   O2 Sat 96 % SpO2  Min: 93 %  Max: 100 %      Intake/Output Summary (Last 24 hours) at 8/4/2023 0726  Last data filed at 8/4/2023 0600  Gross per 24 hour   Intake 5100.01 ml   Output 1625 ml   Net 3475.01 ml         Diet Regular; Regular House     Invasive Monitoring Physical exam   Arterial Line  Kalyn BP    No data recorded   MAP    No data recorded    Physical Exam  Vitals reviewed. Constitutional:       General: He is not in acute distress. Appearance: He is not ill-appearing, toxic-appearing or diaphoretic. Cardiovascular:      Rate and Rhythm: Normal rate and regular rhythm. Pulses: Normal pulses. Heart sounds: Normal heart sounds.    Pulmonary:      Effort: Pulmonary effort is normal. No tachypnea, accessory muscle usage or respiratory distress. Breath sounds: Normal breath sounds and air entry. No wheezing, rhonchi or rales. Abdominal:      General: There is no distension. Palpations: Abdomen is soft. Tenderness: There is no abdominal tenderness. Musculoskeletal:      Right lower leg: No edema. Left lower leg: No edema. Comments: Bilateral BKA. New left BKA with dressings c/d/i. Skin:     General: Skin is warm and dry. Capillary Refill: Capillary refill takes less than 2 seconds. Neurological:      General: No focal deficit present. Mental Status: He is alert and oriented to person, place, and time. Diagnostic Studies      EKG: reviewed  Imaging:  I have personally reviewed pertinent reports. and I have personally reviewed pertinent films in PACS     Medications:  Scheduled PRN   aspirin, 81 mg, Daily  atorvastatin, 40 mg, Daily With Dinner  clopidogrel, 75 mg, Daily  escitalopram, 5 mg, Daily  finasteride, 5 mg, Daily  heparin (porcine), 5,000 Units, Q8H [address]  insulin glargine, 5 Units, HS  insulin lispro, 1-6 Units, TID AC  insulin lispro, 5 Units, TID With Meals  metoprolol succinate, 50 mg, Q12H [place]  mirtazapine, 7.5 mg, HS  pantoprazole, 40 mg, Daily  piperacillin-tazobactam, 3.375 g, Q6H  potassium chloride, 20 mEq, BID  senna-docusate sodium, 1 tablet, HS  tamsulosin, 0.4 mg, Daily With Dinner  vancomycin, 1,000 mg, Q24H      acetaminophen, 975 mg, Q6H PRN  HYDROmorphone, 0.5 mg, Q3H PRN  oxyCODONE, 10 mg, Q4H PRN  oxyCODONE, 5 mg, Q4H PRN       Continuous          Labs:    CBC    Recent Labs     08/03/23  1556 08/03/23  1809 08/03/23  2212 08/04/23  0215   WBC 8.73  --   --  8.19   HGB 6.8*   < > 7.6* 7.0*   HCT 23.6*  --   --  22.3*     --   --  232    < > = values in this interval not displayed.      BMP    Recent Labs     08/03/23  1556 08/04/23  0215   SODIUM 143 141   K 4.6 4.9   * 110* CO2 26 25   AGAP 7 6   BUN 53* 54*   CREATININE 1.32* 1.45*   CALCIUM 8.1* 8.6       Coags    No recent results     Additional Electrolytes  Recent Labs     08/04/23  0215   MG 2.0   PHOS 4.1          Blood Gas    No recent results  No recent results LFTs  Recent Labs     08/04/23 0215   ALT 22   AST 24   ALKPHOS 256*   ALB 2.4*   TBILI 0.90       Infectious  No recent results  Glucose  Recent Labs     08/03/23  0501 08/03/23  1556 08/04/23  0215   GLUC 152* 173* 282*               Carlos Manuel Godinez PA-C

## 2023-08-04 NOTE — PLAN OF CARE
Problem: PHYSICAL THERAPY ADULT  Goal: Performs mobility at highest level of function for planned discharge setting. See evaluation for individualized goals. Description: Treatment/Interventions: ADL retraining, Functional transfer training, LE strengthening/ROM, Therapeutic exercise, Endurance training, Patient/family training, Equipment eval/education, Bed mobility, Spoke to nursing, Spoke to case management, OT  Equipment Recommended: Wheelchair, Other (Comment) (may need hospital bed ; owns slide board and w/c)       See flowsheet documentation for full assessment, interventions and recommendations. Outcome: Progressing  Note: Prognosis: Good  Problem List: Decreased strength, Decreased range of motion, Impaired balance, Decreased mobility, Decreased endurance, Decreased safety awareness, Pain, Orthopedic restrictions  Assessment: Pt is a 69 y.o. male seen for a high complexity PT evaluation due to Ongoing medical management for primary dx, Increased reliance on more restrictive AD compared to baseline, Decreased activity tolerance compared to baseline, Fall risk, Increased assistance needed from caregiver at current time, Ongoing telemetry monitoring, Current WBS, s/p surgical intervention, S/P LBKA (previous R BKA). Patient is s/p admit to [facility] on 7/27/2023 for Visit for wound check (Z51.89)  Peripheral arterial disease (HCC) (I73.9). Patient  has a past medical history of 6th nerve palsy, Blister of right leg, BPH (benign prostatic hyperplasia), CAD (coronary artery disease), Cardiomyopathy ([address]), CHF (congestive heart failure) ([address]), Depression, Diabetes mellitus ([address]), Diabetic foot ulcer ([address]), Diabetic retinopathy ([address]), Exposure to [employer], Former tobacco use, H/O urinary retention, Hyperlipidemia, Hypertension, and Rupture of biceps tendon, traumatic. Feliberto Koch PT now consulted to assess functional mobility and needs for safe d/c planning.  Prior to admission, pt was I with functional mobility in w/c, ADLs, and some IADLs. Some IADL assist from supportive neighbors. Personal factors affecting status include home alone, fall hx, B/L BKA, limited support. Currently pt requires Mod Ax2 for bed mobility, Max Ax2 for functional transfer with sliding board to drop arm bedside chair. Pt presents functioning below baseline and w/ overall mobility deficits 2* to: decreased strength, decreased endurance, decreased mobility, impaired balance. These impairments place pt at risk for falls. Pt will continue to benefit from skilled PT interventions to address stated impairments; to maximize functional potential; for ongoing pt/family education; and DME needs. The patient's AM-PAC Basic Mobility Inpatient Short Form Raw Score Is 6. A Raw score of less than 16 suggests the patient may benefit from discharge to post-acute rehabilitation services. PT is currently recommending rehab on d/c from hospital. Will continue to follow as able. Barriers to Discharge: Decreased caregiver support     PT Discharge Recommendation: Post acute rehabilitation services    See flowsheet documentation for full assessment.

## 2023-08-04 NOTE — RESTORATIVE TECHNICIAN NOTE
Restorative Technician Note      Patient Name: Yun Vegas     Note Type: Mobility  Patient Position Upon Consult: Bedside chair  Activity Performed: Transferred  Assistive Device: Other (Comment) (Smoothmover)  Patient Position at End of Consult: Supine;  All needs within reach

## 2023-08-04 NOTE — PLAN OF CARE
Problem: OCCUPATIONAL THERAPY ADULT  Goal: Performs self-care activities at highest level of function for planned discharge setting. See evaluation for individualized goals. Description: Treatment Interventions: ADL retraining, Functional transfer training, Endurance training, UE strengthening/ROM, Cognitive reorientation, Patient/family training, Equipment evaluation/education, Compensatory technique education, Energy conservation, Activityengagement  Equipment Recommended:  (pt requesting a hospital bed)       See flowsheet documentation for full assessment, interventions and recommendations. Note: Limitation: Decreased ADL status, Decreased UE strength, Decreased Safe judgement during ADL, Decreased cognition, Decreased endurance, Decreased sensation, Decreased self-care trans, Decreased high-level ADLs  Prognosis: Good, Fair  Assessment: Pt is a 69 y.o. male seen for OT evaluation s/p admit to [facility] on 7/27/2023 w/ PAD (peripheral artery disease) (HCC), Nonhealing ulcer of multiple sites of left lower extremity, unspecified ulcer stage of L LE, s/p L BKA w/ knee immobilizer in place. Comorbidities affecting pt's functional performance at time of assessment include: r BKA 3/2023, hypotension, CHF, ischemic cardiomyopathy, hyperlipidemia, respiratory insuffiencey, MATTHEW, anemia, DM II. Personal factors affecting pt at time of IE include:limited home support, difficulty performing ADLS, difficulty performing IADLS  and limited insight into deficits. Prior to admission, pt was living alone w/ supportive neighbors nearby and reports: per pt independent w/ ADLs, MOD I functional transfers w/ sliding board or Rw, MOD I w/c propulsion, MOD I to assist w/ IADLs, assist w/ transport.  Upon evaluation: Pt requires MOD assist x2 supine>sit bed mobility w/ increased time to complete, MAX assist x2 sliding board transfer to drop arm recliner w/ cues for sequencing and assist to position board, MOD-MAX assist LB ADLs, MIN assist UB ADLs, assist toileting 2* the following deficits impacting occupational performance: increased pain, impaired dynamic sitting balance, impaired functional reach, decreased safety awareness, decreased strength and endurance, multiple lines, slight dizziness in chair BP: 111/65,fall risk, decreased sensation. Pt educated on completed b/l UE exercises seen above to increased strength for transfers. Pt to benefit from continued skilled OT tx while in the hospital to address deficits as defined above and maximize level of functional independence w ADL's and functional mobility. Occupational Performance areas to address include: grooming, bathing/shower, toilet hygiene, dressing, health maintenance, functional mobility, clothing management, cleaning and meal prep. From OT standpoint, recommendation at time of d/c would be short term rehab. The patient's raw score on the -PAC Daily Activity Inpatient Short Form is 15. A raw score of less than 19 suggests the patient may benefit from discharge to post-acute rehabilitation services. Please refer to the recommendation of the Occupational Therapist for safe discharge planning.      OT Discharge Recommendation: Post acute rehabilitation services

## 2023-08-04 NOTE — PROGRESS NOTES
Critical Care Interval Transfer Note:    Please refer to progress note from earlier today for full details. Barriers to discharge:   · Finalization of wound care/amputation site care  · PT/OT postoperative evaluation and treatment recommendations  · CM for dispo     Consults: IP CONSULT TO PODIATRY  IP CONSULT TO VASCULAR SURGERY  IP CONSULT TO PHARMACY  IP CONSULT TO CASE MANAGEMENT  IP CONSULT TO CARDIOLOGY  IP CONSULT TO ACUTE PAIN SERVICE  IP CONSULT TO PHYSICAL MEDICINE REHAB  IP CONSULT TO CASE MANAGEMENT    Discharge Plan: Anticipate discharge in 48-72 hrs to rehab facility. PT Recommendations: Post acute rehabilitation services  OT Recommendations: Post acute rehabilitation services    Patient seen and evaluated by Critical Care today and deemed to be appropriate for transfer to Stepdown Level 2. Spoke to Dr Marie Steel from [facility] to accept transfer. Critical care can be contacted via [employer] with any questions or concerns.

## 2023-08-04 NOTE — CASE MANAGEMENT
Case Management Progress Note    Patient name Nataliya Bai  [facility] [address] [id number]  : 1945 Date 2023       LOS (days): 8  Geometric Mean LOS (GMLOS) (days): 6.80  Days to GMLOS:-0.9        OBJECTIVE:        Current admission status: Inpatient  Preferred Pharmacy:   [address], [address] [facility] [address]  Phone: [phone] Fax: [phone]    [address] [facility], [address] - [address] [place] [address] [facility] [place] [address] [facility] [address]  Phone: [phone] Fax: [phone]    Primary Care Provider: Rei Spence DO    Primary Insurance: Tejinder  Secondary Insurance: Consuela Najjar [address] [facility]    PROGRESS NOTE: Per interdisciplinary team meeting, patient is s/p left BKA and will need rehab on DC. Met with patient at bedside to discuss DC plan. Patient states he has been to [facility] and does not mind going back but he would like to see if there is a place closer to his home. Patient is willing to use his [facility] if needed. Patient provided CM with permission to perform a provider search.

## 2023-08-05 PROBLEM — N17.9 AKI (ACUTE KIDNEY INJURY) (HCC): Status: ACTIVE | Noted: 2023-08-05

## 2023-08-05 PROBLEM — Z89.512 S/P BKA (BELOW KNEE AMPUTATION), LEFT (HCC): Status: ACTIVE | Noted: 2023-08-05

## 2023-08-05 LAB
ABO GROUP BLD BPU: NORMAL
ABO GROUP BLD BPU: NORMAL
ANION GAP SERPL CALCULATED.3IONS-SCNC: 3 MMOL/L
BASOPHILS # BLD AUTO: 0.04 THOUSANDS/ÂΜL (ref 0–0.1)
BASOPHILS NFR BLD AUTO: 0 % (ref 0–1)
BPU ID: NORMAL
BPU ID: NORMAL
BUN SERPL-MCNC: 64 MG/DL (ref 5–25)
CA-I BLD-SCNC: 1.11 MMOL/L (ref 1.12–1.32)
CALCIUM SERPL-MCNC: 8.5 MG/DL (ref 8.3–10.1)
CHLORIDE SERPL-SCNC: 109 MMOL/L (ref 96–108)
CO2 SERPL-SCNC: 27 MMOL/L (ref 21–32)
CREAT SERPL-MCNC: 1.61 MG/DL (ref 0.6–1.3)
CROSSMATCH: NORMAL
CROSSMATCH: NORMAL
EOSINOPHIL # BLD AUTO: 0.49 THOUSAND/ÂΜL (ref 0–0.61)
EOSINOPHIL NFR BLD AUTO: 5 % (ref 0–6)
ERYTHROCYTE [DISTWIDTH] IN BLOOD BY AUTOMATED COUNT: 17.8 % (ref 11.6–15.1)
GFR SERPL CREATININE-BSD FRML MDRD: 40 ML/MIN/1.73SQ M
GLUCOSE SERPL-MCNC: 116 MG/DL (ref 65–140)
GLUCOSE SERPL-MCNC: 135 MG/DL (ref 65–140)
GLUCOSE SERPL-MCNC: 146 MG/DL (ref 65–140)
GLUCOSE SERPL-MCNC: 146 MG/DL (ref 65–140)
GLUCOSE SERPL-MCNC: 166 MG/DL (ref 65–140)
GLUCOSE SERPL-MCNC: 38 MG/DL (ref 65–140)
GLUCOSE SERPL-MCNC: 41 MG/DL (ref 65–140)
GLUCOSE SERPL-MCNC: 85 MG/DL (ref 65–140)
HCT VFR BLD AUTO: 26.3 % (ref 36.5–49.3)
HGB BLD-MCNC: 8.1 G/DL (ref 12–17)
IMM GRANULOCYTES # BLD AUTO: 0.06 THOUSAND/UL (ref 0–0.2)
IMM GRANULOCYTES NFR BLD AUTO: 1 % (ref 0–2)
LYMPHOCYTES # BLD AUTO: 1.28 THOUSANDS/ÂΜL (ref 0.6–4.47)
LYMPHOCYTES NFR BLD AUTO: 13 % (ref 14–44)
MAGNESIUM SERPL-MCNC: 2.2 MG/DL (ref 1.6–2.6)
MCH RBC QN AUTO: 27 PG (ref 26.8–34.3)
MCHC RBC AUTO-ENTMCNC: 30.8 G/DL (ref 31.4–37.4)
MCV RBC AUTO: 88 FL (ref 82–98)
MONOCYTES # BLD AUTO: 0.78 THOUSAND/ÂΜL (ref 0.17–1.22)
MONOCYTES NFR BLD AUTO: 8 % (ref 4–12)
NEUTROPHILS # BLD AUTO: 7.24 THOUSANDS/ÂΜL (ref 1.85–7.62)
NEUTS SEG NFR BLD AUTO: 73 % (ref 43–75)
NRBC BLD AUTO-RTO: 0 /100 WBCS
PHOSPHATE SERPL-MCNC: 4 MG/DL (ref 2.3–4.1)
PLATELET # BLD AUTO: 259 THOUSANDS/UL (ref 149–390)
PMV BLD AUTO: 8.6 FL (ref 8.9–12.7)
POTASSIUM SERPL-SCNC: 5 MMOL/L (ref 3.5–5.3)
RBC # BLD AUTO: 3 MILLION/UL (ref 3.88–5.62)
SODIUM SERPL-SCNC: 139 MMOL/L (ref 135–147)
UNIT DISPENSE STATUS: NORMAL
UNIT DISPENSE STATUS: NORMAL
UNIT PRODUCT CODE: NORMAL
UNIT PRODUCT CODE: NORMAL
UNIT PRODUCT VOLUME: 350 ML
UNIT PRODUCT VOLUME: 350 ML
UNIT RH: NORMAL
UNIT RH: NORMAL
WBC # BLD AUTO: 9.89 THOUSAND/UL (ref 4.31–10.16)

## 2023-08-05 PROCEDURE — 85025 COMPLETE CBC W/AUTO DIFF WBC: CPT

## 2023-08-05 PROCEDURE — 82948 REAGENT STRIP/BLOOD GLUCOSE: CPT

## 2023-08-05 PROCEDURE — 82330 ASSAY OF CALCIUM: CPT

## 2023-08-05 PROCEDURE — 99232 SBSQ HOSP IP/OBS MODERATE 35: CPT | Performed by: FAMILY MEDICINE

## 2023-08-05 PROCEDURE — 80048 BASIC METABOLIC PNL TOTAL CA: CPT

## 2023-08-05 PROCEDURE — 99233 SBSQ HOSP IP/OBS HIGH 50: CPT | Performed by: INTERNAL MEDICINE

## 2023-08-05 PROCEDURE — 83735 ASSAY OF MAGNESIUM: CPT

## 2023-08-05 PROCEDURE — 84100 ASSAY OF PHOSPHORUS: CPT

## 2023-08-05 RX ORDER — DEXTROSE MONOHYDRATE 25 G/50ML
INJECTION, SOLUTION INTRAVENOUS
Status: COMPLETED
Start: 2023-08-05 | End: 2023-08-05

## 2023-08-05 RX ORDER — DEXTROSE MONOHYDRATE 25 G/50ML
50 INJECTION, SOLUTION INTRAVENOUS ONCE
Status: COMPLETED | OUTPATIENT
Start: 2023-08-05 | End: 2023-08-06

## 2023-08-05 RX ORDER — INSULIN LISPRO 100 [IU]/ML
4 INJECTION, SOLUTION INTRAVENOUS; SUBCUTANEOUS
Status: DISCONTINUED | OUTPATIENT
Start: 2023-08-06 | End: 2023-08-06 | Stop reason: DRUGHIGH

## 2023-08-05 RX ADMIN — ESCITSLOPRAM 5 MG: 5 TABLET ORAL at 08:21

## 2023-08-05 RX ADMIN — HEPARIN SODIUM 5000 UNITS: 5000 INJECTION INTRAVENOUS; SUBCUTANEOUS at 13:44

## 2023-08-05 RX ADMIN — FINASTERIDE 5 MG: 5 TABLET, FILM COATED ORAL at 08:21

## 2023-08-05 RX ADMIN — PANTOPRAZOLE SODIUM 40 MG: 40 TABLET, DELAYED RELEASE ORAL at 05:31

## 2023-08-05 RX ADMIN — HEPARIN SODIUM 5000 UNITS: 5000 INJECTION INTRAVENOUS; SUBCUTANEOUS at 22:54

## 2023-08-05 RX ADMIN — INSULIN GLARGINE 5 UNITS: 100 INJECTION, SOLUTION SUBCUTANEOUS at 22:54

## 2023-08-05 RX ADMIN — METOPROLOL SUCCINATE 25 MG: 25 TABLET, FILM COATED, EXTENDED RELEASE ORAL at 08:21

## 2023-08-05 RX ADMIN — MIRTAZAPINE 7.5 MG: 15 TABLET, FILM COATED ORAL at 22:54

## 2023-08-05 RX ADMIN — ATORVASTATIN CALCIUM 40 MG: 40 TABLET, FILM COATED ORAL at 16:27

## 2023-08-05 RX ADMIN — INSULIN LISPRO 8 UNITS: 100 INJECTION, SOLUTION INTRAVENOUS; SUBCUTANEOUS at 08:23

## 2023-08-05 RX ADMIN — HEPARIN SODIUM 5000 UNITS: 5000 INJECTION INTRAVENOUS; SUBCUTANEOUS at 05:31

## 2023-08-05 RX ADMIN — INSULIN LISPRO 4 UNITS: 100 INJECTION, SOLUTION INTRAVENOUS; SUBCUTANEOUS at 12:11

## 2023-08-05 RX ADMIN — TAMSULOSIN HYDROCHLORIDE 0.4 MG: 0.4 CAPSULE ORAL at 16:27

## 2023-08-05 RX ADMIN — ASPIRIN 81 MG CHEWABLE TABLET 81 MG: 81 TABLET CHEWABLE at 08:21

## 2023-08-05 RX ADMIN — DEXTROSE MONOHYDRATE 50 ML: 25 INJECTION, SOLUTION INTRAVENOUS at 16:27

## 2023-08-05 RX ADMIN — CLOPIDOGREL BISULFATE 75 MG: 75 TABLET ORAL at 08:21

## 2023-08-05 NOTE — QUICK NOTE
Vascular Surgery  Bedside Procedure Note    DRESSING CHANGE     A timeout was performed with the patient's nurse, 10 minutes, prior to beginning the dressing change. The nurse was debriefed at the completion of the dressing change. PLAN:   - Patient s/p left BKA POD 2, doing well. - Surgical incision site appears well healing, No further acute vascular interventions      Location of wound: Left BKA    Dressing Procedure Note:   Using sterile technique, Ace wrap, Kerlix, 4 x 4 gauze, and Xeroform were taken down from the incision site. The periwound skin was cleaned and dried using a chlorhexidine prep. After assessing the wound, med and pain was used along the incision site. Three 4 x 4 gauze were placed along the incision site with 2 ABD dressings over top. Using Kerlix/Ace wrap the incision site was redressed in sterile fashion. Patient tolerated the procedure well. Description of wound: On inspection of the wound today, incision appears to be clean dry and intact without signs of hematoma or infection.       Wound Images:          Pro Emanuel PA-C  8/4/2023 9:23 PM

## 2023-08-05 NOTE — PROGRESS NOTES
Cardiology Progress Note - Jose Lam 69 y.o. male MRN: [id number]    Unit/Bed#: [facility] 523-01 Encounter: [id number]      Assessment:  Principal Problem:    PAD (peripheral artery disease) ([address])  Active Problems:    Type 2 diabetes mellitus with hyperglycemia, with long-term current use of insulin (HCC)    Chronic systolic CHF (congestive heart failure) (HCC)    CAD (coronary artery disease)    Essential hypertension    Cardiomyopathy ([address])    Hx of BKA, right ([address])    Visit for wound check    Anemia      Plan:  Patient with no issues overnight. He has no chest pain or significant dyspnea. He is in sinus rhythm on telemetry. He is postop day 2 after left BKA. Surgical note appreciated. Blood pressure is improved. BMP today with potassium of 5.0 and creatinine of 1.61 up from 1.45 yesterday. Did receive IV dose of diuretic yesterday. We will hold restarting oral diuretic and Entresto. Hopefully renal function will equilibrate. Continues on dual antiplatelet therapy in reference to multivessel CAD which is being managed medically. Subjective:   Patient seen and examined. No significant events overnight.  negative. Objective:     Vitals: Blood pressure 113/67, pulse 75, temperature (!) 97.4 °F (36.3 °C), temperature source Oral, resp. rate 17, height 5' 10" (1.778 m), weight 74.2 kg (163 lb 8 oz), SpO2 94 %. , Body mass index is 23.46 kg/m².,   Orthostatic Blood Pressures    Flowsheet Row Most Recent Value   Blood Pressure 113/67 filed at 08/05/2023 0820   Patient Position - Orthostatic VS Lying filed at 08/05/2023 0718      ,      Intake/Output Summary (Last 24 hours) at 8/5/2023 0909  Last data filed at 8/5/2023 1483  Gross per 24 hour   Intake 300 ml   Output 850 ml   Net -550 ml       No significant arrhythmias seen on telemetry review.        Physical Exam:    GEN: Jose Lam   NECK: supple, no carotid bruits, no JVD or HJR  HEART: normal rate, regular rhythm, normal S1 and S2, no murmurs, clicks, gallops or rubs   LUNGS: Decreased breath sounds at the bases  ABDOMEN: normal bowel sounds, soft, no tenderness, no distention  EXTREMITIES: Left and right BKA  SKIN: warm and well perfused, no suspicious lesions on exposed skin    Labs & Results:    No results displayed because visit has over 200 results. US bedside procedure    Result Date: 8/3/2023  Narrative: [id number]    XR tibia fibula 2 views LEFT    Result Date: 7/27/2023  Narrative: LEFT TIBIA AND FIBULA INDICATION:  Left lower extremity wounds. COMPARISON: MRI left heel 4/11/2023 VIEWS:  XR TIBIA FIBULA 2 VW LEFT Images: 4 FINDINGS: There is no acute fracture or dislocation. Degenerative changes of the knee noted. There is cortical irregularity and suspected bone loss along the posterior distal fibula which may be indicative of osteomyelitis. Soft tissue irregularity seen along the distal lateral lower extremity. Vascular calcifications and vascular stent noted proximally. Impression: Soft tissue ulceration distal lateral lower extremity with loss of cortical conspicuity posterior distal fibula shaft suspicious for osteomyelitis. This could be confirmed with MRI if deemed clinically necessary. The study was marked in [facility] for immediate notification. Workstation performed: [id number]     XR ankle 3+ views LEFT    Result Date: 7/27/2023  Narrative: LEFT ANKLE INDICATION:  Left lower extremity wounds. COMPARISON: Left foot 3/21/2023, MRI of the left heel 4/11/2023 VIEWS:  XR ANKLE 3+ VW LEFT Images: 3 FINDINGS: There is no acute fracture or dislocation. Tiny plantar calcaneal spur. Periosteal reaction suggested along the distal posterior fibular shaft, suspicious for osteomyelitis. Soft tissue ulcerations seen along the distal lateral lower extremity about the distal fibula as well as the lateral malleolus without radiopaque foreign bodies. Vascular calcifications.      Impression: Soft tissue ulcerations seen along the distal lateral lower extremity. Periosteal reaction suggested along the distal posterior fibular shaft, suspicious for osteomyelitis. Workstation performed: [id number]     XR foot 3+ views LEFT    Result Date: 7/27/2023  Narrative: LEFT FOOT INDICATION:  Left lower extremity wounds. COMPARISON: Left foot 3/21/2023 VIEWS:  XR FOOT 3+ VW LEFT FINDINGS: There is no acute fracture or dislocation. Mild hallux valgus. Minuscule plantar calcaneal spur. There is loss of bony conspicuity along the posterior distal fibular shaft, new from previous study. Cortical margins of the calcaneus appear grossly stable. Soft tissue ulceration is seen along the distal lateral lower extremity along the fibula. Question slight soft tissue irregularity along the posterior heel. Vascular calcifications. Impression: Soft tissue ulceration distal lateral lower extremity and potentially along the posterior heel. Loss of bony conspicuity posterior distal fibular shaft suspicious for osteomyelitis. No discernible interval bony destruction involving the calcaneus. Workstation performed: [id number]     VAS lower limb arterial duplex, limited/unilateral    Result Date: 7/26/2023  Narrative:  [facility] REPORT CLINICAL: Indications: Patient presents with an ulcer on his left lateral calf which started about 1 month ago and an ulcer on his left heel which started over 4 months ago. Known PVD. Patient denies any pain, but has not been walking. Operative History: 2023-04-06 Left PTA of posterior tibial artery, popliteal artery stent 2023-02-14 Cardiac catheterization Risk Factors The patient has history of Diabetes (IDDM), Hyperlipidemia and CAD. Clinical Right Pressure:  117/ mm Hg, Left Pressure:  110/ mm Hg.   FINDINGS:  Left                   Impression  PSV (cm/s)  EDV  Common Femoral Artery                      84    8  Prox Profunda                              96    0  Prox SFA 68   11  Mid SFA                50-75%             184   11  Dist SFA                                   44   10  Proximal Pop                               56   14  Distal Pop - Stent     Occluded                     Tibioperoneal          Occluded                     Dist Post Tibial                           43   18  Prox. Ant. Tibial      Occluded             0    0  Dist. Ant. Tibial      Occluded             0    0  Dist Peroneal                              25   10     CONCLUSION: Impression: RIGHT LOWER LIMB: BKA  LEFT LOWER LIMB: Duplex evaluation reveals a 50-75% stenosis in the mid superficial femoral artery and an occlusion of the distal popliteal artery stent, tibio-peroneal trunk and anterior tibial artery. Diffuse atherosclerotic disease throughout the remaining femoro-popliteal and tibio-peroneal segments. (BRADY performed 4/7/2023) Ankle/Brachial Index: 0.75, moderate claudication range. Prior 0.58 PPG/PVR Tracings are dampened. Metatarsal Pressure 42 mm Hg Great Toe Pressure: 19 mm Hg, below the healing range. Prior 62 mm Hg. In comparison to the study of 4/10/2023, there is a new stenosis in the mid left SFA and new occlusion of the TPT and MARIA GUADALUPE. The left toe pressure is now below healing potential.  SIGNATURE: Electronically Signed by: Christina Beck MD, RPVI on 2023-07-26 02:42:53 PM      EKG personally reviewed by Bharath Washington MD.     Counseling / Coordination of Care  Total floor / unit time spent today 30 minutes. Greater than 50% of total time was spent with the patient and / or family counseling and / or coordination of care.

## 2023-08-05 NOTE — ASSESSMENT & PLAN NOTE
Patient with hx of PAD s/p right sided BKA  Now presenting with worsening multiple diabetic ulcerations of left leg and foot  Transferred for vascular eval  He was evaluated by vascular and podiatry  Left lower extremity x-ray was suspicious for osteomyelitis  Briefly required ICU level of care after developing postop hypotension requiring pressors  Also is s/p 2u PRBC for ABLA during surgery    Plan:  10 day broad-spectrum antibiotics finished 2 days ago  Patient is s/p L BKA POD#2  Vascular surgery following; appreciate recs

## 2023-08-05 NOTE — ASSESSMENT & PLAN NOTE
Recent Labs     08/03/23  1556 08/04/23  0215 08/05/23  0509   CREATININE 1.32* 1.45* 1.61*   EGFR 51 46 40     Estimated Creatinine Clearance: 39.7 mL/min (A) (by C-G formula based on SCr of 1.61 mg/dL (H)).     • Baseline Cr 0.8-1.0  • Likely secondary to intraoperative hypotension and acute blood loss anemia    Plan:  • Daily weights, I/O  • Monitor BMP daily and observe for downward trend of creatinine  • Avoid hypoperfusion of the kidneys, minimize nephrotoxins  • RAAS Blockers/Diuretics held: home entresto, demadex  • Cardiology consulted

## 2023-08-05 NOTE — PLAN OF CARE
Problem: PAIN - ADULT  Goal: Verbalizes/displays adequate comfort level or baseline comfort level  Description: Interventions:  - Encourage patient to monitor pain and request assistance  - Assess pain using appropriate pain scale  - Administer analgesics based on type and severity of pain and evaluate response  - Implement non-pharmacological measures as appropriate and evaluate response  - Consider cultural and social influences on pain and pain management  - Notify physician/advanced practitioner if interventions unsuccessful or patient reports new pain  Outcome: Progressing     Problem: INFECTION - ADULT  Goal: Absence or prevention of progression during hospitalization  Description: INTERVENTIONS:  - Assess and monitor for signs and symptoms of infection  - Monitor lab/diagnostic results  - Monitor all insertion sites, i.e. indwelling lines, tubes, and drains  - Monitor endotracheal if appropriate and nasal secretions for changes in amount and color  - Mesick appropriate cooling/warming therapies per order  - Administer medications as ordered  - Instruct and encourage patient and family to use good hand hygiene technique  - Identify and instruct in appropriate isolation precautions for identified infection/condition  Outcome: Progressing     Problem: DISCHARGE PLANNING  Goal: Discharge to home or other facility with appropriate resources  Description: INTERVENTIONS:  - Identify barriers to discharge w/patient and caregiver  - Arrange for needed discharge resources and transportation as appropriate  - Identify discharge learning needs (meds, wound care, etc.)  - Arrange for interpretive services to assist at discharge as needed  - Refer to Case Management Department for coordinating discharge planning if the patient needs post-hospital services based on physician/advanced practitioner order or complex needs related to functional status, cognitive ability, or social support system  Outcome: Progressing Problem: Knowledge Deficit  Goal: Patient/family/caregiver demonstrates understanding of disease process, treatment plan, medications, and discharge instructions  Description: Complete learning assessment and assess knowledge base. Interventions:  - Provide teaching at level of understanding  - Provide teaching via preferred learning methods  Outcome: Progressing     Problem: Nutrition/Hydration-ADULT  Goal: Nutrient/Hydration intake appropriate for improving, restoring or maintaining nutritional needs  Description: Monitor and assess patient's nutrition/hydration status for malnutrition. Collaborate with interdisciplinary team and initiate plan and interventions as ordered. Monitor patient's weight and dietary intake as ordered or per policy. Utilize nutrition screening tool and intervene as necessary. Determine patient's food preferences and provide high-protein, high-caloric foods as appropriate.      INTERVENTIONS:  - Monitor oral intake, urinary output, labs, and treatment plans  - Assess nutrition and hydration status and recommend course of action  - Evaluate amount of meals eaten  - Assist patient with eating if necessary   - Allow adequate time for meals  - Recommend/ encourage appropriate diets, oral nutritional supplements, and vitamin/mineral supplements  - Order, calculate, and assess calorie counts as needed  - Recommend, monitor, and adjust tube feedings based on assessed needs  - Assess need for intravenous fluids  - Provide nutrition/hydration education as appropriate  - Include patient/family/caregiver in decisions related to nutrition  Outcome: Progressing     Problem: METABOLIC, FLUID AND ELECTROLYTES - ADULT  Goal: Electrolytes maintained within normal limits  Description: INTERVENTIONS:  - Monitor labs and assess patient for signs and symptoms of electrolyte imbalances  - Administer electrolyte replacement as ordered  - Monitor response to electrolyte replacements, including repeat lab results as appropriate  - Instruct patient on fluid and nutrition as appropriate  Outcome: Progressing     Problem: SKIN/TISSUE INTEGRITY - ADULT  Goal: Incision(s), wounds(s) or drain site(s) healing without S/S of infection  Description: INTERVENTIONS  - Assess and document dressing, incision, wound bed, drain sites and surrounding tissue  - Provide patient and family education  - Perform skin care/dressing changes every shift    Outcome: Progressing     Problem: HEMATOLOGIC - ADULT  Goal: Maintains hematologic stability  Description: INTERVENTIONS  - Assess for signs and symptoms of bleeding or hemorrhage  - Monitor labs  - Administer supportive blood products/factors as ordered and appropriate  Outcome: Progressing     Problem: MUSCULOSKELETAL - ADULT  Goal: Maintain or return mobility to safest level of function  Description: INTERVENTIONS:  - Assess patient's ability to carry out ADLs; assess patient's baseline for ADL function and identify physical deficits which impact ability to perform ADLs (bathing, care of mouth/teeth, toileting, grooming, dressing, etc.)  - Assess/evaluate cause of self-care deficits   - Assess range of motion  - Assess patient's mobility  - Assess patient's need for assistive devices and provide as appropriate  - Encourage maximum independence but intervene and supervise when necessary  - Involve family in performance of ADLs  - Assess for home care needs following discharge   - Consider OT consult to assist with ADL evaluation and planning for discharge  - Provide patient education as appropriate  Outcome: Progressing  Goal: Maintain proper alignment of affected body part  Description: INTERVENTIONS:  - Support, maintain and protect limb and body alignment  - Provide patient/ family with appropriate education  Outcome: Progressing     Problem: Prexisting or High Potential for Compromised Skin Integrity  Goal: Skin integrity is maintained or improved  Description: INTERVENTIONS:  - Identify patients at risk for skin breakdown  - Assess and monitor skin integrity  - Assess and monitor nutrition and hydration status  - Monitor labs   - Assess for incontinence   - Turn and reposition patient  - Assist with mobility/ambulation  - Relieve pressure over bony prominences  - Avoid friction and shearing  - Provide appropriate hygiene as needed including keeping skin clean and dry  - Evaluate need for skin moisturizer/barrier cream  - Collaborate with interdisciplinary team   - Patient/family teaching  - Consider wound care consult   Outcome: Progressing

## 2023-08-05 NOTE — ASSESSMENT & PLAN NOTE
Wt Readings from Last 3 Encounters:   08/05/23 74.2 kg (163 lb 8 oz)   07/26/23 67.7 kg (149 lb 4 oz)   05/18/23 59 kg (130 lb)     History of CHF with EF 25%  Euvolemic on exam  Home medications: Entresto, Toprol-XL and Demadex    Plan:  Continue to hold Entresto and Demadex  Continue Toprol-XL  Cardiology following  Monitor Cr, daily BMP

## 2023-08-05 NOTE — PROGRESS NOTES
[address] [facility]  Progress Note  Name: Darren Menjivar  MRN: [id number]  Unit/Bed#: [facility] 523-01 I Date of Admission: 7/27/2023   Date of Service: 8/5/2023 I Hospital Day: 9    Assessment/Plan   * PAD (peripheral artery disease) ([address])  Assessment & Plan  Patient with hx of PAD s/p right sided BKA  Now presenting with worsening multiple diabetic ulcerations of left leg and foot  Transferred for vascular eval  He was evaluated by vascular and podiatry  Left lower extremity x-ray was suspicious for osteomyelitis  Briefly required ICU level of care after developing postop hypotension requiring pressors  Also is s/p 2u PRBC for ABLA during surgery    Plan:  10 day broad-spectrum antibiotics finished 2 days ago  Patient is s/p L BKA POD#2  Vascular surgery following; peggy sykes    MATTHEW (acute kidney injury) [facility])  Assessment & Plan  Recent Labs     08/03/23  1556 08/04/23  0215 08/05/23  0509   CREATININE 1.32* 1.45* 1.61*   EGFR 51 46 40     Estimated Creatinine Clearance: 39.7 mL/min (A) (by C-G formula based on SCr of 1.61 mg/dL (H)).     • Baseline Cr 0.8-1.0  • Likely secondary to intraoperative hypotension and acute blood loss anemia    Plan:  • Daily weights, I/O  • Monitor BMP daily and observe for downward trend of creatinine  • Avoid hypoperfusion of the kidneys, minimize nephrotoxins  • RAAS Blockers/Diuretics held: home entresto, demadex  • Cardiology consulted      Chronic systolic CHF (congestive heart failure) (HCC)  Assessment & Plan  Wt Readings from Last 3 Encounters:   08/05/23 74.2 kg (163 lb 8 oz)   07/26/23 67.7 kg (149 lb 4 oz)   05/18/23 59 kg (130 lb)     History of CHF with EF 25%  Euvolemic on exam  Home medications: Entresto, Toprol-XL and Demadex    Plan:  Continue to hold Entresto and Demadex  Continue Toprol-XL  Cardiology following  Monitor Cr, daily BMP      S/P BKA (below knee amputation), left ([address])  Assessment & Plan  POD#2  See a/p above for "PAD"    Anemia  Assessment & Plan  Anemia panel consistent with anemia of chronic disease  Blood transfusion for hemoglobin below 7  Status post 2 units PRBCs for acute blood loss anemia intraoperatively  Monitor    Hx of BKA, right (HCC)  Assessment & Plan  Hx of right sided bka    Essential hypertension  Assessment & Plan  Stable BP  Continue to monitor    CAD (coronary artery disease)  Assessment & Plan  • Patient has a history of coronary artery disease, previous cardiac cath with diffuse CAD recommending GDMT  • Continue aspirin, Plavix statin, beta-blocker    Type 2 diabetes mellitus with hyperglycemia, with long-term current use of insulin [facility])  Assessment & Plan  Lab Results   Component Value Date    HGBA1C 7.3 (H) 2023       Recent Labs     23  2044 23  0537 23  0541 23  1123   POCGLU 83 146* 146* 85       Blood Sugar Average: Last 72 hrs:  (P) 131.7994444067487020   Patient refusing cardiac/diabetic diet  He wants regular diet   continue with Lantus and Humalog with meals  Continue insulin sliding scale  Monitor         VTE Pharmacologic Prophylaxis: VTE Score: 4 Moderate Risk (Score 3-4) - Pharmacological DVT Prophylaxis Ordered: heparin. Patient Centered Rounds: I performed bedside rounds with nursing staff today. Discussions with Specialists or Other Care Team Provider: Cardiology, Vascular surgery    Education and Discussions with Family / Patient: Patient declined call to . Current Length of Stay: 9 day(s)  Current Patient Status: Inpatient   Discharge Plan: Anticipate discharge in 48-72 hrs to rehab facility. Code Status: Level 1 - Full Code    Subjective:   Patient sitting upright in bed resting comfortably. Describes more comfortable and yesterday. Denies any current pain.      Objective:     Vitals:   Temp (24hrs), Av.7 °F (36.5 °C), Min:97.4 °F (36.3 °C), Max:98 °F (36.7 °C)    Temp:  [97.4 °F (36.3 °C)-98 °F (36.7 °C)] 97.6 °F (36.4 °C)  HR:  [68-76] 75  Resp:  [16-20] 19  BP: ()/(52-67) 94/55  SpO2:  [94 %-98 %] 97 %  Body mass index is 23.46 kg/m². Input and Output Summary (last 24 hours): Intake/Output Summary (Last 24 hours) at 8/5/2023 1304  Last data filed at 8/5/2023 1245  Gross per 24 hour   Intake 360 ml   Output 1200 ml   Net -840 ml       Physical Exam:   Physical Exam  Vitals and nursing note reviewed. Constitutional:       Appearance: Normal appearance. Cardiovascular:      Rate and Rhythm: Normal rate and regular rhythm. Pulses: Normal pulses. Heart sounds: Normal heart sounds. Pulmonary:      Effort: Pulmonary effort is normal.      Breath sounds: Normal breath sounds. Abdominal:      General: Abdomen is flat. Palpations: Abdomen is soft. Musculoskeletal:         General: Deformity (s/p L BKA; hx of R BKA) present. Skin:     Findings: No rash. Neurological:      Mental Status: He is alert and oriented to person, place, and time.    Psychiatric:         Mood and Affect: Mood normal.         Behavior: Behavior normal.          Additional Data:     Labs:  Results from last 7 days   Lab Units 08/05/23  0509   WBC Thousand/uL 9.89   HEMOGLOBIN g/dL 8.1*   HEMATOCRIT % 26.3*   PLATELETS Thousands/uL 259   NEUTROS PCT % 73   LYMPHS PCT % 13*   MONOS PCT % 8   EOS PCT % 5     Results from last 7 days   Lab Units 08/05/23  0509 08/04/23  0215   SODIUM mmol/L 139 141   POTASSIUM mmol/L 5.0 4.9   CHLORIDE mmol/L 109* 110*   CO2 mmol/L 27 25   BUN mg/dL 64* 54*   CREATININE mg/dL 1.61* 1.45*   ANION GAP mmol/L 3 6   CALCIUM mg/dL 8.5 8.6   ALBUMIN g/dL  --  2.4*   TOTAL BILIRUBIN mg/dL  --  0.90   ALK PHOS U/L  --  256*   ALT U/L  --  22   AST U/L  --  24   GLUCOSE RANDOM mg/dL 135 282*         Results from last 7 days   Lab Units 08/05/23  1123 08/05/23  0541 08/05/23  0537 08/04/23  2044 08/04/23  1619 08/04/23  1106 08/04/23  0731 08/03/23  2212 08/03/23  1838 08/03/23  1611 08/03/23  1545 08/03/23  0703   POC GLUCOSE mg/dl 85 146* 146* 83 109 271* 406* 217* 117 116 62* 150*               Lines/Drains:  Invasive Devices     Peripheral Intravenous Line  Duration           Peripheral IV 08/02/23 Left;Ventral (anterior) Forearm 2 days    Peripheral IV 08/03/23 Right Forearm 2 days                Imaging: No pertinent imaging reviewed.     Recent Cultures (last 7 days):         Last 24 Hours Medication List:   Current Facility-Administered Medications   Medication Dose Route Frequency Provider Last Rate   • acetaminophen  975 mg Oral Q6H PRN Hattie Pardo PA-C     • aspirin  81 mg Oral Daily Jannette Godinez PA-C     • atorvastatin  40 mg Oral Daily With [employer] [facility]	Clayton YEN Godinez     • clopidogrel  75 mg Oral Daily Jannette Godinez PA-C     • escitalopram  5 mg Oral Daily Hattie Pardo PA-C     • finasteride  5 mg Oral Daily Jannette Godinez PA-C     • heparin (porcine)  5,000 Units Subcutaneous [facility] Jannette Godinez PA-C     • HYDROmorphone  0.5 mg Intravenous Q3H PRN Hattie Pardo PA-C     • insulin glargine  5 Units Subcutaneous HS Carlos Manuel Godinez PA-C     • insulin lispro  2-12 Units Subcutaneous TID AC Carlos Manuel Godinez PA-C     • insulin lispro  2-12 Units Subcutaneous HS Jannette Godinez PA-C     • insulin lispro  8 Units Subcutaneous TID With Meals Jannette Godinez PA-C     • metoprolol succinate  25 mg Oral Q12H [address] Carlos Manuel Godinez PA-C     • mirtazapine  7.5 mg Oral HS Hattie Pardo PA-C     • oxyCODONE  10 mg Oral Q4H PRN Hattie Pardo PA-C     • oxyCODONE  5 mg Oral Q4H PRN Hattie Pardo PA-C     • pantoprazole  40 mg Oral Daily Jannette Godinez PA-C     • senna-docusate sodium  1 tablet Oral HS Hattie Pardo PA-C     • tamsulosin  0.4 mg Oral Daily With Dinner Hattie Pardo PA-C          Today, Patient Was Seen By: Lakshmi Amaya MD    **Please Note: This note may have been constructed using a voice recognition system. **

## 2023-08-05 NOTE — ASSESSMENT & PLAN NOTE
Lab Results   Component Value Date    HGBA1C 7.3 (H) 05/04/2023       Recent Labs     08/04/23  2044 08/05/23  0537 08/05/23  0541 08/05/23  1123   POCGLU 83 146* 146* 85       Blood Sugar Average: Last 72 hrs:  (P) 325.9366644300123907   Patient refusing cardiac/diabetic diet  He wants regular diet   continue with Lantus and Humalog with meals  Continue insulin sliding scale  Monitor

## 2023-08-05 NOTE — ASSESSMENT & PLAN NOTE
Anemia panel consistent with anemia of chronic disease  Blood transfusion for hemoglobin below 7  Status post 2 units PRBCs for acute blood loss anemia intraoperatively  Monitor

## 2023-08-06 LAB
ANION GAP SERPL CALCULATED.3IONS-SCNC: 3 MMOL/L
BASOPHILS # BLD AUTO: 0.02 THOUSANDS/ÂΜL (ref 0–0.1)
BASOPHILS NFR BLD AUTO: 0 % (ref 0–1)
BUN SERPL-MCNC: 69 MG/DL (ref 5–25)
CALCIUM SERPL-MCNC: 8.6 MG/DL (ref 8.3–10.1)
CHLORIDE SERPL-SCNC: 110 MMOL/L (ref 96–108)
CO2 SERPL-SCNC: 27 MMOL/L (ref 21–32)
CREAT SERPL-MCNC: 1.66 MG/DL (ref 0.6–1.3)
EOSINOPHIL # BLD AUTO: 0.35 THOUSAND/ÂΜL (ref 0–0.61)
EOSINOPHIL NFR BLD AUTO: 4 % (ref 0–6)
ERYTHROCYTE [DISTWIDTH] IN BLOOD BY AUTOMATED COUNT: 18.4 % (ref 11.6–15.1)
GFR SERPL CREATININE-BSD FRML MDRD: 39 ML/MIN/1.73SQ M
GLUCOSE SERPL-MCNC: 106 MG/DL (ref 65–140)
GLUCOSE SERPL-MCNC: 115 MG/DL (ref 65–140)
GLUCOSE SERPL-MCNC: 205 MG/DL (ref 65–140)
GLUCOSE SERPL-MCNC: 210 MG/DL (ref 65–140)
GLUCOSE SERPL-MCNC: 224 MG/DL (ref 65–140)
GLUCOSE SERPL-MCNC: 51 MG/DL (ref 65–140)
GLUCOSE SERPL-MCNC: 57 MG/DL (ref 65–140)
HCT VFR BLD AUTO: 24.6 % (ref 36.5–49.3)
HGB BLD-MCNC: 7.5 G/DL (ref 12–17)
IMM GRANULOCYTES # BLD AUTO: 0.05 THOUSAND/UL (ref 0–0.2)
IMM GRANULOCYTES NFR BLD AUTO: 1 % (ref 0–2)
LYMPHOCYTES # BLD AUTO: 0.94 THOUSANDS/ÂΜL (ref 0.6–4.47)
LYMPHOCYTES NFR BLD AUTO: 11 % (ref 14–44)
MCH RBC QN AUTO: 27 PG (ref 26.8–34.3)
MCHC RBC AUTO-ENTMCNC: 30.5 G/DL (ref 31.4–37.4)
MCV RBC AUTO: 89 FL (ref 82–98)
MONOCYTES # BLD AUTO: 0.71 THOUSAND/ÂΜL (ref 0.17–1.22)
MONOCYTES NFR BLD AUTO: 9 % (ref 4–12)
NEUTROPHILS # BLD AUTO: 6.31 THOUSANDS/ÂΜL (ref 1.85–7.62)
NEUTS SEG NFR BLD AUTO: 75 % (ref 43–75)
NRBC BLD AUTO-RTO: 0 /100 WBCS
PLATELET # BLD AUTO: 269 THOUSANDS/UL (ref 149–390)
PMV BLD AUTO: 9 FL (ref 8.9–12.7)
POTASSIUM SERPL-SCNC: 4.5 MMOL/L (ref 3.5–5.3)
RBC # BLD AUTO: 2.78 MILLION/UL (ref 3.88–5.62)
SODIUM SERPL-SCNC: 140 MMOL/L (ref 135–147)
WBC # BLD AUTO: 8.38 THOUSAND/UL (ref 4.31–10.16)

## 2023-08-06 PROCEDURE — 80048 BASIC METABOLIC PNL TOTAL CA: CPT

## 2023-08-06 PROCEDURE — 99233 SBSQ HOSP IP/OBS HIGH 50: CPT | Performed by: INTERNAL MEDICINE

## 2023-08-06 PROCEDURE — 85025 COMPLETE CBC W/AUTO DIFF WBC: CPT

## 2023-08-06 PROCEDURE — 82948 REAGENT STRIP/BLOOD GLUCOSE: CPT

## 2023-08-06 PROCEDURE — 99232 SBSQ HOSP IP/OBS MODERATE 35: CPT | Performed by: FAMILY MEDICINE

## 2023-08-06 RX ADMIN — INSULIN LISPRO 4 UNITS: 100 INJECTION, SOLUTION INTRAVENOUS; SUBCUTANEOUS at 12:01

## 2023-08-06 RX ADMIN — INSULIN LISPRO 4 UNITS: 100 INJECTION, SOLUTION INTRAVENOUS; SUBCUTANEOUS at 12:00

## 2023-08-06 RX ADMIN — TAMSULOSIN HYDROCHLORIDE 0.4 MG: 0.4 CAPSULE ORAL at 15:24

## 2023-08-06 RX ADMIN — HEPARIN SODIUM 5000 UNITS: 5000 INJECTION INTRAVENOUS; SUBCUTANEOUS at 21:43

## 2023-08-06 RX ADMIN — ESCITSLOPRAM 5 MG: 5 TABLET ORAL at 08:24

## 2023-08-06 RX ADMIN — HEPARIN SODIUM 5000 UNITS: 5000 INJECTION INTRAVENOUS; SUBCUTANEOUS at 15:24

## 2023-08-06 RX ADMIN — CLOPIDOGREL BISULFATE 75 MG: 75 TABLET ORAL at 08:24

## 2023-08-06 RX ADMIN — ASPIRIN 81 MG CHEWABLE TABLET 81 MG: 81 TABLET CHEWABLE at 08:24

## 2023-08-06 RX ADMIN — HEPARIN SODIUM 5000 UNITS: 5000 INJECTION INTRAVENOUS; SUBCUTANEOUS at 06:06

## 2023-08-06 RX ADMIN — DEXTROSE MONOHYDRATE 25 ML: 25 INJECTION, SOLUTION INTRAVENOUS at 16:54

## 2023-08-06 RX ADMIN — FINASTERIDE 5 MG: 5 TABLET, FILM COATED ORAL at 08:24

## 2023-08-06 RX ADMIN — INSULIN LISPRO 4 UNITS: 100 INJECTION, SOLUTION INTRAVENOUS; SUBCUTANEOUS at 08:22

## 2023-08-06 RX ADMIN — MIRTAZAPINE 7.5 MG: 15 TABLET, FILM COATED ORAL at 21:43

## 2023-08-06 RX ADMIN — ATORVASTATIN CALCIUM 40 MG: 40 TABLET, FILM COATED ORAL at 15:24

## 2023-08-06 RX ADMIN — PANTOPRAZOLE SODIUM 40 MG: 40 TABLET, DELAYED RELEASE ORAL at 06:06

## 2023-08-06 NOTE — PROGRESS NOTES
[address] [facility]  Progress Note  Name: Fernando Nurse  MRN: [id number]  Unit/Bed#: PPHP 523-01 I Date of Admission: 7/27/2023   Date of Service: 8/6/2023 I Hospital Day: 10    Assessment/Plan   MATTHEW (acute kidney injury) [facility])  Assessment & Plan  Recent Labs     08/04/23  0215 08/05/23  0509 08/06/23  0503   CREATININE 1.45* 1.61* 1.66*   EGFR 46 40 39     Estimated Creatinine Clearance: 38.5 mL/min (A) (by C-G formula based on SCr of 1.66 mg/dL (H)).     • Baseline Cr 0.8-1.0  • Likely secondary to intraoperative hypotension and acute blood loss anemia    Plan:  • Daily weights, I/O  • Monitor BMP daily and observe for downward trend of creatinine  • Avoid hypoperfusion of the kidneys, minimize nephrotoxins  • RAAS Blockers/Diuretics held: home entresto, demadex  • Cardiology consulted      S/P BKA (below knee amputation), left (HCC)  Assessment & Plan  POD#3  See a/p above for "PAD"    Hx of BKA, right (HCC)  Assessment & Plan  Hx of right sided bka    Essential hypertension  Assessment & Plan  Stable BP  Continue to monitor    CAD (coronary artery disease)  Assessment & Plan  • Patient has a history of coronary artery disease, previous cardiac cath with diffuse CAD recommending GDMT  • Continue aspirin, Plavix statin, beta-blocker    Chronic systolic CHF (congestive heart failure) (HCC)  Assessment & Plan  Wt Readings from Last 3 Encounters:   08/06/23 74.8 kg (165 lb)   07/26/23 67.7 kg (149 lb 4 oz)   05/18/23 59 kg (130 lb)     History of CHF with EF 25%  Euvolemic on exam  Home medications: Entresto, Toprol-XL and Demadex    Plan:  Continue to hold Entresto and Demadex  Continue Toprol-XL  Cardiology following  Monitor Cr, daily BMP      Type 2 diabetes mellitus with hyperglycemia, with long-term current use of insulin [facility])  Assessment & Plan  Lab Results   Component Value Date    HGBA1C 7.3 (H) 05/04/2023       Recent Labs     08/05/23  1658 08/05/23  2031 08/06/23  0630 08/06/23  1037   POCGLU 116 166* 224* 210*       Blood Sugar Average: Last 72 hrs:  (P) 599.0171615585031489   Patient refusing cardiac/diabetic diet  He wants regular diet   continue with Lantus and Humalog with meals  Continue insulin sliding scale  Monitor  On 8/5 he had episode of hypoglycemia and was given a dose of dextrose, he is likely more sensitive to insulin in the setting of MATTHEW from his hypotension. Continue to monitor. Mealtime insulin was decreased to 4 units. * PAD (peripheral artery disease) ([address])  Assessment & Plan  Patient with hx of PAD s/p right sided BKA  Now presenting with worsening multiple diabetic ulcerations of left leg and foot  Transferred for vascular eval  He was evaluated by vascular and podiatry  Left lower extremity x-ray was suspicious for osteomyelitis  Briefly required ICU level of care after developing postop hypotension requiring pressors  Also is s/p 2u PRBC for ABLA during surgery    Plan:  10 day broad-spectrum antibiotics finished 2 days ago  Patient is s/p L BKA POD#2  Vascular surgery following; appreciate recs               VTE Pharmacologic Prophylaxis: VTE Score: 4 Moderate Risk (Score 3-4) - Pharmacological DVT Prophylaxis Ordered: heparin. Patient Centered Rounds: I performed bedside rounds with nursing staff today. Discussions with Specialists or Other Care Team Provider: Cardiology    Education and Discussions with Family / Patient: Patient declined call to . Total Time Spent on Date of Encounter in care of patient: 35 minutes This time was spent on one or more of the following: performing physical exam; counseling and coordination of care; obtaining or reviewing history; documenting in the medical record; reviewing/ordering tests, medications or procedures; communicating with other healthcare professionals and discussing with patient's family/caregivers.     Current Length of Stay: 10 day(s)  Current Patient Status: Inpatient Certification Statement: The patient will continue to require additional inpatient hospital stay due to Rehab placement, monitoring for MATTHEW  Discharge Plan: Anticipate discharge in 24-48 hrs to discharge location to be determined pending rehab evaluations. Code Status: Level 1 - Full Code    Subjective:   Patient seen and examined. He has no complaints. He would like to return to the rehab he was in [place]. Objective:     Vitals:   Temp (24hrs), Av.5 °F (36.9 °C), Min:98.2 °F (36.8 °C), Max:99.1 °F (37.3 °C)    Temp:  [98.2 °F (36.8 °C)-99.1 °F (37.3 °C)] 98.3 °F (36.8 °C)  HR:  [71-81] 80  Resp:  [18-19] 18  BP: (92-95)/(54-55) 95/55  SpO2:  [88 %-97 %] 93 %  Body mass index is 23.68 kg/m². Input and Output Summary (last 24 hours): Intake/Output Summary (Last 24 hours) at 2023 1437  Last data filed at 2023 1223  Gross per 24 hour   Intake 1640 ml   Output 380 ml   Net 1260 ml       Physical Exam:   Physical Exam  Vitals and nursing note reviewed. Constitutional:       General: He is not in acute distress. Appearance: He is well-developed. HENT:      Head: Normocephalic and atraumatic. Eyes:      Conjunctiva/sclera: Conjunctivae normal.   Cardiovascular:      Rate and Rhythm: Normal rate and regular rhythm. Heart sounds: No murmur heard. Pulmonary:      Effort: Pulmonary effort is normal. No respiratory distress. Breath sounds: Normal breath sounds. Abdominal:      Palpations: Abdomen is soft. Tenderness: There is no abdominal tenderness. Musculoskeletal:         General: No swelling. Cervical back: Neck supple. Comments: B/L BKA, left site is clean dry and intact    Skin:     General: Skin is warm and dry. Capillary Refill: Capillary refill takes less than 2 seconds. Neurological:      Mental Status: He is alert.    Psychiatric:         Mood and Affect: Mood normal.          Additional Data:     Labs:  Results from last 7 days   Lab Units 08/06/23  0503   WBC Thousand/uL 8.38   HEMOGLOBIN g/dL 7.5*   HEMATOCRIT % 24.6*   PLATELETS Thousands/uL 269   NEUTROS PCT % 75   LYMPHS PCT % 11*   MONOS PCT % 9   EOS PCT % 4     Results from last 7 days   Lab Units 08/06/23  0503 08/05/23  0509 08/04/23  0215   SODIUM mmol/L 140   < > 141   POTASSIUM mmol/L 4.5   < > 4.9   CHLORIDE mmol/L 110*   < > 110*   CO2 mmol/L 27   < > 25   BUN mg/dL 69*   < > 54*   CREATININE mg/dL 1.66*   < > 1.45*   ANION GAP mmol/L 3   < > 6   CALCIUM mg/dL 8.6   < > 8.6   ALBUMIN g/dL  --   --  2.4*   TOTAL BILIRUBIN mg/dL  --   --  0.90   ALK PHOS U/L  --   --  256*   ALT U/L  --   --  22   AST U/L  --   --  24   GLUCOSE RANDOM mg/dL 205*   < > 282*    < > = values in this interval not displayed. Results from last 7 days   Lab Units 08/06/23  1037 08/06/23  0630 08/05/23  2031 08/05/23  1658 08/05/23  1621 08/05/23  1600 08/05/23  1123 08/05/23  0541 08/05/23  0537 08/04/23  2044 08/04/23  1619 08/04/23  1106   POC GLUCOSE mg/dl 210* 224* 166* 116 41* 38* 85 146* 146* 83 109 271*               Lines/Drains:  Invasive Devices     Peripheral Intravenous Line  Duration           Peripheral IV 08/02/23 Left;Ventral (anterior) Forearm 3 days    Peripheral IV 08/03/23 Right Forearm 3 days                      Imaging: No pertinent imaging reviewed.     Recent Cultures (last 7 days):         Last 24 Hours Medication List:   Current Facility-Administered Medications   Medication Dose Route Frequency Provider Last Rate   • acetaminophen  975 mg Oral Q6H PRN Melina Lam PA-C     • aspirin  81 mg Oral Daily Cherylene Krabbe McGonigal, PA-C     • atorvastatin  40 mg Oral Daily With [employer]	[place] YEN Godinez     • clopidogrel  75 mg Oral Daily Carlos Manuel Godinez PA-C     • dextrose  50 mL Intravenous Once Christina Sins, DO     • escitalopram  5 mg Oral Daily Carlos Manuel Godinez PA-C     • finasteride  5 mg Oral Daily Carlos Manuel Godinez PA-C     • heparin (porcine)  5,000 Units Subcutaneous [facility] En[place] Godinez PA-C     • HYDROmorphone  0.5 mg Intravenous Q3H PRN José Luis Alvares PA-C     • insulin glargine  5 Units Subcutaneous HS En[place] Godinez PA-C     • insulin lispro  2-12 Units Subcutaneous TID  Carlos Manuel Godinez PA-C     • insulin lispro  2-12 Units Subcutaneous HS En[place] Godinez PA-C     • insulin lispro  4 Units Subcutaneous TID With Meals Christina Dye DO     • metoprolol succinate  25 mg Oral Q12H [address] Carlos Manuel Godinez PA-C     • mirtazapine  7.5 mg Oral HS José Luis Alvares PA-C     • oxyCODONE  10 mg Oral Q4H PRN José Luis Alvares PA-C     • oxyCODONE  5 mg Oral Q4H PRN José Luis Alvares PA-C     • pantoprazole  40 mg Oral Daily Enid Godinez PA-C     • senna-docusate sodium  1 tablet Oral HS José Luis Alvares PA-C     • tamsulosin  0.4 mg Oral Daily With Dinner José Luis Alvares PA-C          Today, Patient Was Seen By: Jesus Cruz DO    **Please Note: This note may have been constructed using a voice recognition system. **

## 2023-08-06 NOTE — NURSING NOTE
Pt O2 saturation was in the high 80s. It was recommended that the patient wear O2 while resting overnight. Pt refused. Education was provided and [facility] was notified.

## 2023-08-06 NOTE — CASE MANAGEMENT
Case Management Discharge Planning Note    Patient name Leonard Cuevas  Location [address] [place] [address] Road [facility] 386-86 MRN [id number]  : 1945 Date 2023       Current Admission Date: 2023  Current Admission Diagnosis:PAD (peripheral artery disease) [facility])   Patient Active Problem List    Diagnosis Date Noted   • S/P BKA (below knee amputation), left ([address]) 2023   • MATTHEW (acute kidney injury) ([address]) 2023   • Anemia 2023   • Visit for wound check 2023   • Deep tissue injury of sacrum 2023   • Hx of BKA, right ([address]) 2023   • Goals of care, counseling/discussion 2023   • Severe protein-calorie malnutrition ([address]) 2023   • Encounter for competency evaluation 2023   • Depressed mood 2023   • Nausea & vomiting 2023   • Moderate protein-calorie malnutrition ([address]) 2023   • Ischemic cardiomyopathy 2023   • Former tobacco use    • Cardiomyopathy ([address])    • PAD (peripheral artery disease) ([address]) 2023   • Essential hypertension 2023   • Toenail avulsion 2023   • Suspected deep tissue injury of unknown depth 2023   • Diabetic ulcer of both feet associated with type 2 diabetes mellitus ([address]) 2023   • CAD (coronary artery disease) 2023   • Unintentional weight loss 2023   • Moderate protein malnutrition ([address]) 02/10/2023   • Chronic systolic CHF (congestive heart failure) ([address]) 2023   • Urinary retention 2023   • Abnormal EKG 2021   • Hypokalemia 2021   • Type 2 diabetes mellitus with hyperglycemia, with long-term current use of insulin ([address]) 2021      LOS (days): 10  Geometric Mean LOS (GMLOS) (days): 6.80  Days to GMLOS:-3     OBJECTIVE:  Risk of Unplanned Readmission Score: 31.4         Current admission status: Inpatient   Preferred Pharmacy:   [address] [place] [address] - [address]  [address] [facility] [address]  Phone: [phone] Fax: [address] [facility], [address] [place] [address] Drive [facility] [address] [place] [address] [address] [facility] [address]  Phone: [phone] Fax: [phone]    Primary Care Provider: Will Gaona DO    Primary Insurance: Tejinder  Secondary Insurance: Amirah Bazan [facility] HOSPITAL REP    DISCHARGE DETAILS:    Discharge planning discussed with[de-identified] Pt  Freedom of Choice: Yes  Comments - Freedom of Choice: STR - referrals placed in [address] pending. First Choice - [facility]                               Other Referral/Resources/Interventions Provided:  Referral Comments: STR - pt's first  choice is the [facility] at [place], [place] sent directly in [address] letting them know that they are pts first choice and to please notify CM dept if willing to accept upon medical stability and d/c. Will need auth through insurance.

## 2023-08-06 NOTE — ASSESSMENT & PLAN NOTE
Lab Results   Component Value Date    HGBA1C 7.3 (H) 05/04/2023       Recent Labs     08/05/23  1658 08/05/23  2031 08/06/23  0630 08/06/23  1037   POCGLU 116 166* 224* 210*       Blood Sugar Average: Last 72 hrs:  (P) 907.6619603397017305   Patient refusing cardiac/diabetic diet  He wants regular diet   continue with Lantus and Humalog with meals  Continue insulin sliding scale  Monitor  On 8/5 he had episode of hypoglycemia and was given a dose of dextrose, he is likely more sensitive to insulin in the setting of MATTHEW from his hypotension. Continue to monitor. Mealtime insulin was decreased to 4 units.

## 2023-08-06 NOTE — ASSESSMENT & PLAN NOTE
Wt Readings from Last 3 Encounters:   08/06/23 74.8 kg (165 lb)   07/26/23 67.7 kg (149 lb 4 oz)   05/18/23 59 kg (130 lb)     History of CHF with EF 25%  Euvolemic on exam  Home medications: Entresto, Toprol-XL and Demadex    Plan:  Continue to hold Entresto and Demadex  Continue Toprol-XL  Cardiology following  Monitor Cr, daily BMP

## 2023-08-06 NOTE — PROGRESS NOTES
Cardiology Progress Note - Kurt Rosales 69 y.o. male MRN: [id number]    Unit/Bed#: [facility] 523-01 Encounter: [id number]      Assessment:  Principal Problem:    PAD (peripheral artery disease) ([address])  Active Problems:    Type 2 diabetes mellitus with hyperglycemia, with long-term current use of insulin ([facility])    Chronic systolic CHF (congestive heart failure) ([facility])    CAD (coronary artery disease)    Essential hypertension    Cardiomyopathy ([address])    Hx of BKA, right ([address])    Visit for wound check    Anemia    S/P BKA (below knee amputation), left ([facility])    MATTHEW (acute kidney injury) ([address])      Plan:  Patient with no issues overnight. He has no chest pain or significant dyspnea. He is postop day 3 after left BKA. BMP today with potassium of 4.5 and creatinine of 1.66. We will hold restarting oral diuretic and Entresto. Hopefully renal function will equilibrate. Continues on dual antiplatelet therapy in reference to multivessel CAD which is being managed medically. Patient with ischemic cardiomyopathy with LVEF of 35 to 40% as per echocardiogram done April 2023. Patient with pacemaker in place. Subjective:   Patient seen and examined. No significant events overnight.  negative. Objective:     Vitals: Blood pressure 95/55, pulse 80, temperature 98.3 °F (36.8 °C), resp. rate 18, height 5' 10" (1.778 m), weight 74.8 kg (165 lb), SpO2 93 %. , Body mass index is 23.68 kg/m².,   Orthostatic Blood Pressures    Flowsheet Row Most Recent Value   Blood Pressure 95/55 filed at 08/06/2023 0727   Patient Position - Orthostatic VS Lying filed at 08/05/2023 1559      ,      Intake/Output Summary (Last 24 hours) at 8/6/2023 0826  Last data filed at 8/6/2023 0601  Gross per 24 hour   Intake 1400 ml   Output 730 ml   Net 670 ml             Physical Exam:    GEN: Kurt Rosales appears well, alert and oriented x 3, pleasant and cooperative   NECK: supple, no carotid bruits, no JVD or HJR  HEART: normal rate, regular rhythm, normal S1 and S2, no murmurs, clicks, gallops or rubs   LUNGS: clear to auscultation bilaterally; decreased at the bases  ABDOMEN: normal bowel sounds, soft, no tenderness, no distention  EXTREMITIES: Bilat BKA  SKIN: warm and well perfused, no suspicious lesions on exposed skin    Labs & Results:    No results displayed because visit has over 200 results. US bedside procedure    Result Date: 8/3/2023  Narrative: [id number]. [id number]    XR tibia fibula 2 views LEFT    Result Date: 7/27/2023  Narrative: LEFT TIBIA AND FIBULA INDICATION:  Left lower extremity wounds. COMPARISON: MRI left heel 4/11/2023 VIEWS:  XR TIBIA FIBULA 2 VW LEFT Images: 4 FINDINGS: There is no acute fracture or dislocation. Degenerative changes of the knee noted. There is cortical irregularity and suspected bone loss along the posterior distal fibula which may be indicative of osteomyelitis. Soft tissue irregularity seen along the distal lateral lower extremity. Vascular calcifications and vascular stent noted proximally. Impression: Soft tissue ulceration distal lateral lower extremity with loss of cortical conspicuity posterior distal fibula shaft suspicious for osteomyelitis. This could be confirmed with MRI if deemed clinically necessary. The study was marked in [facility] for immediate notification. Workstation performed: [id number]     XR ankle 3+ views LEFT    Result Date: 7/27/2023  Narrative: LEFT ANKLE INDICATION:  Left lower extremity wounds. COMPARISON: Left foot 3/21/2023, MRI of the left heel 4/11/2023 VIEWS:  XR ANKLE 3+ VW LEFT Images: 3 FINDINGS: There is no acute fracture or dislocation. Tiny plantar calcaneal spur. Periosteal reaction suggested along the distal posterior fibular shaft, suspicious for osteomyelitis. Soft tissue ulcerations seen along the distal lateral lower extremity about the distal fibula as well as the lateral malleolus without radiopaque foreign bodies.  Vascular calcifications. Impression: Soft tissue ulcerations seen along the distal lateral lower extremity. Periosteal reaction suggested along the distal posterior fibular shaft, suspicious for osteomyelitis. Workstation performed: [id number]     XR foot 3+ views LEFT    Result Date: 7/27/2023  Narrative: LEFT FOOT INDICATION:  Left lower extremity wounds. COMPARISON: Left foot 3/21/2023 VIEWS:  XR FOOT 3+ VW LEFT FINDINGS: There is no acute fracture or dislocation. Mild hallux valgus. Minuscule plantar calcaneal spur. There is loss of bony conspicuity along the posterior distal fibular shaft, new from previous study. Cortical margins of the calcaneus appear grossly stable. Soft tissue ulceration is seen along the distal lateral lower extremity along the fibula. Question slight soft tissue irregularity along the posterior heel. Vascular calcifications. Impression: Soft tissue ulceration distal lateral lower extremity and potentially along the posterior heel. Loss of bony conspicuity posterior distal fibular shaft suspicious for osteomyelitis. No discernible interval bony destruction involving the calcaneus. Workstation performed: [id number]     VAS lower limb arterial duplex, limited/unilateral    Result Date: 7/26/2023  Narrative:  [facility] REPORT CLINICAL: Indications: Patient presents with an ulcer on his left lateral calf which started about 1 month ago and an ulcer on his left heel which started over 4 months ago. Known PVD. Patient denies any pain, but has not been walking. Operative History: 2023-04-06 Left PTA of posterior tibial artery, popliteal artery stent 2023-02-14 Cardiac catheterization Risk Factors The patient has history of Diabetes (IDDM), Hyperlipidemia and CAD. Clinical Right Pressure:  117/ mm Hg, Left Pressure:  110/ mm Hg.   FINDINGS:  Left                   Impression  PSV (cm/s)  EDV  Common Femoral Artery                      84    8  Prox Profunda 96    0  Prox SFA                                   68   11  Mid SFA                50-75%             184   11  Dist SFA                                   44   10  Proximal Pop                               56   14  Distal Pop - Stent     Occluded                     Tibioperoneal          Occluded                     Dist Post Tibial                           43   18  Prox. Ant. Tibial      Occluded             0    0  Dist. Ant. Tibial      Occluded             0    0  Dist Peroneal                              25   10     CONCLUSION: Impression: RIGHT LOWER LIMB: BKA  LEFT LOWER LIMB: Duplex evaluation reveals a 50-75% stenosis in the mid superficial femoral artery and an occlusion of the distal popliteal artery stent, tibio-peroneal trunk and anterior tibial artery. Diffuse atherosclerotic disease throughout the remaining femoro-popliteal and tibio-peroneal segments. (BRADY performed 4/7/2023) Ankle/Brachial Index: 0.75, moderate claudication range. Prior 0.58 PPG/PVR Tracings are dampened. Metatarsal Pressure 42 mm Hg Great Toe Pressure: 19 mm Hg, below the healing range. Prior 62 mm Hg. In comparison to the study of 4/10/2023, there is a new stenosis in the mid left SFA and new occlusion of the TPT and MARIA GUADALUPE. The left toe pressure is now below healing potential.  SIGNATURE: Electronically Signed by: Marco Collazo MD, RPVI on 2023-07-26 02:42:53 PM      EKG personally reviewed by Kulwinder Luna MD.     Counseling / Coordination of Care  Total floor / unit time spent today 30 minutes. Greater than 50% of total time was spent with the patient and / or family counseling and / or coordination of care.

## 2023-08-06 NOTE — ASSESSMENT & PLAN NOTE
Recent Labs     08/04/23  0215 08/05/23  0509 08/06/23  0503   CREATININE 1.45* 1.61* 1.66*   EGFR 46 40 39     Estimated Creatinine Clearance: 38.5 mL/min (A) (by C-G formula based on SCr of 1.66 mg/dL (H)).     • Baseline Cr 0.8-1.0  • Likely secondary to intraoperative hypotension and acute blood loss anemia    Plan:  • Daily weights, I/O  • Monitor BMP daily and observe for downward trend of creatinine  • Avoid hypoperfusion of the kidneys, minimize nephrotoxins  • RAAS Blockers/Diuretics held: home entresto, demadex  • Cardiology consulted

## 2023-08-06 NOTE — PLAN OF CARE
Problem: PAIN - ADULT  Goal: Verbalizes/displays adequate comfort level or baseline comfort level  Description: Interventions:  - Encourage patient to monitor pain and request assistance  - Assess pain using appropriate pain scale  - Administer analgesics based on type and severity of pain and evaluate response  - Implement non-pharmacological measures as appropriate and evaluate response  - Consider cultural and social influences on pain and pain management  - Notify physician/advanced practitioner if interventions unsuccessful or patient reports new pain  Outcome: Progressing     Problem: Knowledge Deficit  Goal: Patient/family/caregiver demonstrates understanding of disease process, treatment plan, medications, and discharge instructions  Description: Complete learning assessment and assess knowledge base.   Interventions:  - Provide teaching at level of understanding  - Provide teaching via preferred learning methods  Outcome: Not Progressing

## 2023-08-07 PROBLEM — I50.23 ACUTE ON CHRONIC SYSTOLIC HEART FAILURE (HCC): Status: ACTIVE | Noted: 2023-02-08

## 2023-08-07 LAB
ANION GAP SERPL CALCULATED.3IONS-SCNC: 6 MMOL/L
BASOPHILS # BLD AUTO: 0.01 THOUSANDS/ÂΜL (ref 0–0.1)
BASOPHILS NFR BLD AUTO: 0 % (ref 0–1)
BUN SERPL-MCNC: 62 MG/DL (ref 5–25)
CALCIUM SERPL-MCNC: 8.4 MG/DL (ref 8.3–10.1)
CHLORIDE SERPL-SCNC: 112 MMOL/L (ref 96–108)
CO2 SERPL-SCNC: 25 MMOL/L (ref 21–32)
CREAT SERPL-MCNC: 1.34 MG/DL (ref 0.6–1.3)
EOSINOPHIL # BLD AUTO: 0.4 THOUSAND/ÂΜL (ref 0–0.61)
EOSINOPHIL NFR BLD AUTO: 5 % (ref 0–6)
ERYTHROCYTE [DISTWIDTH] IN BLOOD BY AUTOMATED COUNT: 19.1 % (ref 11.6–15.1)
GFR SERPL CREATININE-BSD FRML MDRD: 50 ML/MIN/1.73SQ M
GLUCOSE SERPL-MCNC: 123 MG/DL (ref 65–140)
GLUCOSE SERPL-MCNC: 131 MG/DL (ref 65–140)
GLUCOSE SERPL-MCNC: 236 MG/DL (ref 65–140)
GLUCOSE SERPL-MCNC: 360 MG/DL (ref 65–140)
GLUCOSE SERPL-MCNC: 366 MG/DL (ref 65–140)
HCT VFR BLD AUTO: 24.4 % (ref 36.5–49.3)
HGB BLD-MCNC: 7.6 G/DL (ref 12–17)
IMM GRANULOCYTES # BLD AUTO: 0.03 THOUSAND/UL (ref 0–0.2)
IMM GRANULOCYTES NFR BLD AUTO: 0 % (ref 0–2)
LYMPHOCYTES # BLD AUTO: 0.98 THOUSANDS/ÂΜL (ref 0.6–4.47)
LYMPHOCYTES NFR BLD AUTO: 11 % (ref 14–44)
MCH RBC QN AUTO: 27.7 PG (ref 26.8–34.3)
MCHC RBC AUTO-ENTMCNC: 31.1 G/DL (ref 31.4–37.4)
MCV RBC AUTO: 89 FL (ref 82–98)
MONOCYTES # BLD AUTO: 0.61 THOUSAND/ÂΜL (ref 0.17–1.22)
MONOCYTES NFR BLD AUTO: 7 % (ref 4–12)
NEUTROPHILS # BLD AUTO: 6.56 THOUSANDS/ÂΜL (ref 1.85–7.62)
NEUTS SEG NFR BLD AUTO: 77 % (ref 43–75)
NRBC BLD AUTO-RTO: 0 /100 WBCS
PLATELET # BLD AUTO: 278 THOUSANDS/UL (ref 149–390)
PMV BLD AUTO: 9 FL (ref 8.9–12.7)
POTASSIUM SERPL-SCNC: 4.2 MMOL/L (ref 3.5–5.3)
RBC # BLD AUTO: 2.74 MILLION/UL (ref 3.88–5.62)
SODIUM SERPL-SCNC: 143 MMOL/L (ref 135–147)
WBC # BLD AUTO: 8.59 THOUSAND/UL (ref 4.31–10.16)

## 2023-08-07 PROCEDURE — 99232 SBSQ HOSP IP/OBS MODERATE 35: CPT | Performed by: INTERNAL MEDICINE

## 2023-08-07 PROCEDURE — 85025 COMPLETE CBC W/AUTO DIFF WBC: CPT | Performed by: FAMILY MEDICINE

## 2023-08-07 PROCEDURE — 82948 REAGENT STRIP/BLOOD GLUCOSE: CPT

## 2023-08-07 PROCEDURE — NC001 PR NO CHARGE: Performed by: SURGERY

## 2023-08-07 PROCEDURE — 80048 BASIC METABOLIC PNL TOTAL CA: CPT | Performed by: FAMILY MEDICINE

## 2023-08-07 PROCEDURE — 99232 SBSQ HOSP IP/OBS MODERATE 35: CPT | Performed by: STUDENT IN AN ORGANIZED HEALTH CARE EDUCATION/TRAINING PROGRAM

## 2023-08-07 RX ORDER — INSULIN LISPRO 100 [IU]/ML
1-6 INJECTION, SOLUTION INTRAVENOUS; SUBCUTANEOUS
Status: DISCONTINUED | OUTPATIENT
Start: 2023-08-07 | End: 2023-08-10 | Stop reason: HOSPADM

## 2023-08-07 RX ORDER — INSULIN GLARGINE 100 [IU]/ML
10 INJECTION, SOLUTION SUBCUTANEOUS
Status: DISCONTINUED | OUTPATIENT
Start: 2023-08-07 | End: 2023-08-10 | Stop reason: HOSPADM

## 2023-08-07 RX ORDER — INSULIN LISPRO 100 [IU]/ML
3 INJECTION, SOLUTION INTRAVENOUS; SUBCUTANEOUS
Status: DISCONTINUED | OUTPATIENT
Start: 2023-08-07 | End: 2023-08-10 | Stop reason: HOSPADM

## 2023-08-07 RX ADMIN — ATORVASTATIN CALCIUM 40 MG: 40 TABLET, FILM COATED ORAL at 17:24

## 2023-08-07 RX ADMIN — ASPIRIN 81 MG CHEWABLE TABLET 81 MG: 81 TABLET CHEWABLE at 08:46

## 2023-08-07 RX ADMIN — INSULIN LISPRO 6 UNITS: 100 INJECTION, SOLUTION INTRAVENOUS; SUBCUTANEOUS at 17:26

## 2023-08-07 RX ADMIN — TAMSULOSIN HYDROCHLORIDE 0.4 MG: 0.4 CAPSULE ORAL at 17:24

## 2023-08-07 RX ADMIN — HEPARIN SODIUM 5000 UNITS: 5000 INJECTION INTRAVENOUS; SUBCUTANEOUS at 05:46

## 2023-08-07 RX ADMIN — MIRTAZAPINE 7.5 MG: 15 TABLET, FILM COATED ORAL at 21:31

## 2023-08-07 RX ADMIN — INSULIN LISPRO 10 UNITS: 100 INJECTION, SOLUTION INTRAVENOUS; SUBCUTANEOUS at 21:27

## 2023-08-07 RX ADMIN — INSULIN LISPRO 3 UNITS: 100 INJECTION, SOLUTION INTRAVENOUS; SUBCUTANEOUS at 17:27

## 2023-08-07 RX ADMIN — SENNOSIDES AND DOCUSATE SODIUM 1 TABLET: 50; 8.6 TABLET ORAL at 21:31

## 2023-08-07 RX ADMIN — CLOPIDOGREL BISULFATE 75 MG: 75 TABLET ORAL at 08:46

## 2023-08-07 RX ADMIN — FINASTERIDE 5 MG: 5 TABLET, FILM COATED ORAL at 08:46

## 2023-08-07 RX ADMIN — HEPARIN SODIUM 5000 UNITS: 5000 INJECTION INTRAVENOUS; SUBCUTANEOUS at 21:31

## 2023-08-07 RX ADMIN — INSULIN GLARGINE 10 UNITS: 100 INJECTION, SOLUTION SUBCUTANEOUS at 21:27

## 2023-08-07 RX ADMIN — ESCITSLOPRAM 5 MG: 5 TABLET ORAL at 08:46

## 2023-08-07 RX ADMIN — PANTOPRAZOLE SODIUM 40 MG: 40 TABLET, DELAYED RELEASE ORAL at 06:07

## 2023-08-07 NOTE — PROGRESS NOTES
General Cardiology   Progress Note -  Team One   Emigdio Mak 69 y.o. male MRN: [id number]    Unit/Bed#: [facility] 523-01 Encounter: [id number]    Assessment:    1.  PAD: w/ nonhealing LLE wound s/p Lt BKA. POD  #4 with management per the vascular surgery service. 2. Post operative hypotension: BB and Entresto have been on hold. 3. ICM/Chronic HFrEF: Volume status appears stable on exam  · Echocardiogram 4/2023: EF 35 to 40%, grade 3 DD, RV systolic function mildly reduced, PFO visualized, trace AI, moderate MR, moderate TR. (previously 25% in 2/2023)  · Outpatient GDMT: Entresto 49-51 mg twice daily (on hold), and metoprolol succinate 50 mg BID (on hold)  · Outpatient diuretic regimen; metolazone 5 mg daily ?; not previously on a loop diuretic ?  · Inpatient diuretic regimen; torsemide 20 mg daily (on hold)    5. Multivessel CAD: medically managed  · Previously evaluated by CT surgery and felt to not be a good surgical candidate  · [facility] 2/14/2023: 50% stenosis ostial LM and distal LAD. 60% stenosis proximal LAD. 70% stenosis mid LAD, proximal RCA, and D1. 90% stenosis distal RCA. 100% stenosis mid Cx. · On aspirin 81 mg daily, clopidogrel 75 mg daily, atorvastatin 40 mg daily, and metoprolol succinate 50 mg BID (on hold)    6.  CHB: s/p DC PPM placed at [facility] and in 5/2023    7. Severe PAD: w/ prior lower extremity interventions and prior right-sided BKA  · On aspirin 81 mg daily, clopidogrel 75 mg daily, atorvastatin 40 mg daily, and metoprolol succinate 50 mg BID (on hold)  ·   8. Type II DM: Hgb A1c 7.3. Management per primary team.    9. Dyslipidemia: Lipid panel 2/11/23 , TG 57, HDL 36, and LDL 61on atorvastatin 40 mg daily    10.  MATTHEW: Baseline creatinine 0.8-1.0. Creatinine improved to 1.34 today from 1.66 yesterday. Entresto and torsemide on hold.     Plan/Recommendations:  · At this time Entresto, Toprol and torsemide remain on hold due to soft BP  · Can try to reinitiate in a staged approach as blood pressures allow which will likely occur in the outpatient setting  · Fortunately volume status appears stable  · Follow-up with Landon Christianson PA-C on 8/21/2023    ___________________________________________________________________    Subjective    Patient seen and examined. No acute events overnight. Review of Systems   Constitutional: Negative. Negative for chills. Cardiovascular: Negative for chest pain, dyspnea on exertion, leg swelling, near-syncope, orthopnea, palpitations, paroxysmal nocturnal dyspnea and syncope. Respiratory: Negative. Negative for cough, shortness of breath and wheezing. Endocrine: Negative. Hematologic/Lymphatic: Negative. Skin: Negative. Musculoskeletal: Negative. Gastrointestinal: Negative. Negative for diarrhea, nausea and vomiting. Neurological: Negative for dizziness, light-headedness and weakness. Psychiatric/Behavioral: Negative. Negative for altered mental status. All other systems reviewed and are negative. Objective:   Vitals: Blood pressure 108/59, pulse 94, temperature 98.5 °F (36.9 °C), temperature source Oral, resp. rate 15, height 5' 10" (1.778 m), weight 75 kg (165 lb 5.5 oz), SpO2 92 %. ,     Wt Readings from Last 3 Encounters:   08/07/23 75 kg (165 lb 5.5 oz)   07/26/23 67.7 kg (149 lb 4 oz)   05/18/23 59 kg (130 lb)        Lab Results   Component Value Date    CREATININE 1.34 (H) 08/07/2023    CREATININE 1.66 (H) 08/06/2023    CREATININE 1.61 (H) 08/05/2023         Body mass index is 23.72 kg/m². ,     Systolic (08SNJ), FPZ:853 , Min:97 , KBT:020     Diastolic (44HQH), YTK:82, Min:52, Max:59          Intake/Output Summary (Last 24 hours) at 8/7/2023 1038  Last data filed at 8/7/2023 0900  Gross per 24 hour   Intake 720 ml   Output 1050 ml   Net -330 ml     Weight (last 2 days)     Date/Time Weight    08/07/23 0600 75 (165.35)    08/06/23 0600 74.8 (165)    08/05/23 0532 74.2 (163.5)            Physical Exam  Vitals and nursing note reviewed. Constitutional:       General: He is not in acute distress. Appearance: He is well-developed. Comments: On RA in NAD   HENT:      Head: Normocephalic and atraumatic. Neck:      Vascular: No JVD. Cardiovascular:      Rate and Rhythm: Normal rate and regular rhythm. Heart sounds: Normal heart sounds. No murmur heard. No friction rub. No gallop. Pulmonary:      Effort: Pulmonary effort is normal. No respiratory distress. Breath sounds: Normal breath sounds. No wheezing or rales. Chest:      Chest wall: No tenderness. Abdominal:      General: Bowel sounds are normal. There is no distension. Palpations: Abdomen is soft. Tenderness: There is no abdominal tenderness. Musculoskeletal:         General: No tenderness. Normal range of motion. Cervical back: Normal range of motion and neck supple. Comments: Bilateral BKA   Skin:     General: Skin is warm and dry. Coloration: Skin is pale. Findings: No erythema. Neurological:      Mental Status: He is alert and oriented to person, place, and time. Psychiatric:         Mood and Affect: Mood normal.         Behavior: Behavior normal.         Thought Content:  Thought content normal.         Judgment: Judgment normal.         LABORATORY RESULTS      CBC with diff:   Results from last 7 days   Lab Units 08/07/23  0607 08/06/23  0503 08/05/23  0509 08/04/23  1157 08/04/23  0215 08/03/23  2212 08/03/23  1809 08/03/23  1556 08/03/23  0501 08/02/23  0430   WBC Thousand/uL 8.59 8.38 9.89  --  8.19  --   --  8.73 7.03 10.13   HEMOGLOBIN g/dL 7.6* 7.5* 8.1* 8.2* 7.0* 7.6* 8.7* 6.8* 7.9* 7.6*   HEMATOCRIT % 24.4* 24.6* 26.3*  --  22.3*  --   --  23.6* 26.4* 25.8*   MCV fL 89 89 88  --  86  --   --  87 87 88   PLATELETS Thousands/uL 278 269 259  --  232  --   --  291 305 325   RBC Million/uL 2.74* 2.78* 3.00*  --  2.58*  --   --  2.70* 3.02* 2.94*   MCH pg 27.7 27.0 27.0  --  27.1  --   --  25.2* 26.2* 25.9* MCHC g/dL 31.1* 30.5* 30.8*  --  31.4  --   --  28.8* 29.9* 29.5*   RDW % 19.1* 18.4* 17.8*  --  17.5*  --   --  18.1* 18.2* 17.9*   MPV fL 9.0 9.0 8.6*  --  8.6*  --   --  8.4* 9.0 8.7*   NRBC AUTO /100 WBCs 0 0 0  --   --   --   --  0  --  0       CMP:  Results from last 7 days   Lab Units 08/07/23  0607 08/06/23  0503 08/05/23  0509 08/04/23 0215 08/03/23  1556 08/03/23  0501 08/02/23  0430   POTASSIUM mmol/L 4.2 4.5 5.0 4.9 4.6 3.9 3.4*   CHLORIDE mmol/L 112* 110* 109* 110* 110* 111* 109*   CO2 mmol/L 25 27 27 25 26 26 31   BUN mg/dL 62* 69* 64* 54* 53* 56* 47*   CREATININE mg/dL 1.34* 1.66* 1.61* 1.45* 1.32* 1.38* 1.55*   CALCIUM mg/dL 8.4 8.6 8.5 8.6 8.1* 8.3 8.3   AST U/L  --   --   --  24  --   --  31   ALT U/L  --   --   --  22  --   --  33   ALK PHOS U/L  --   --   --  256*  --   --  387*   EGFR ml/min/1.73sq m 50 39 40 46 51 48 42       BMP:  Results from last 7 days   Lab Units 08/07/23  0607 08/06/23  0503 08/05/23  0509 08/04/23  0215 08/03/23  1556 08/03/23  0501 08/02/23  0430   POTASSIUM mmol/L 4.2 4.5 5.0 4.9 4.6 3.9 3.4*   CHLORIDE mmol/L 112* 110* 109* 110* 110* 111* 109*   CO2 mmol/L 25 27 27 25 26 26 31   BUN mg/dL 62* 69* 64* 54* 53* 56* 47*   CREATININE mg/dL 1.34* 1.66* 1.61* 1.45* 1.32* 1.38* 1.55*   CALCIUM mg/dL 8.4 8.6 8.5 8.6 8.1* 8.3 8.3       Lab Results   Component Value Date    NTBNP 14,829 (H) 02/11/2023             Results from last 7 days   Lab Units 08/05/23  0509 08/04/23  0215 08/01/23  1001   MAGNESIUM mg/dL 2.2 2.0 1.7                           Lipid Profile:   No results found for: "CHOL"  Lab Results   Component Value Date    HDL 36 (L) 02/11/2023     Lab Results   Component Value Date    LDLCALC 61 02/11/2023     Lab Results   Component Value Date    TRIG 57 02/11/2023       Cardiac testing:   No results found for this or any previous visit. No results found for this or any previous visit. No results found for this or any previous visit.     No valid procedures specified. No results found for this or any previous visit. Meds/Allergies   all current active meds have been reviewed, current meds:   Current Facility-Administered Medications   Medication Dose Route Frequency   • acetaminophen (TYLENOL) tablet 975 mg  975 mg Oral Q6H PRN   • aspirin chewable tablet 81 mg  81 mg Oral Daily   • atorvastatin (LIPITOR) tablet 40 mg  40 mg Oral Daily With Dinner   • clopidogrel (PLAVIX) tablet 75 mg  75 mg Oral Daily   • escitalopram (LEXAPRO) tablet 5 mg  5 mg Oral Daily   • finasteride (PROSCAR) tablet 5 mg  5 mg Oral Daily   • heparin (porcine) subcutaneous injection 5,000 Units  5,000 Units Subcutaneous Q8H [address]   • HYDROmorphone (DILAUDID) injection 0.5 mg  0.5 mg Intravenous Q3H PRN   • insulin lispro (HumaLOG) 100 units/mL subcutaneous injection 2-12 Units  2-12 Units Subcutaneous HS   • metoprolol succinate (TOPROL-XL) 24 hr tablet 25 mg  25 mg Oral Q12H [place]   • mirtazapine (REMERON) tablet 7.5 mg  7.5 mg Oral HS   • oxyCODONE (ROXICODONE) immediate release tablet 10 mg  10 mg Oral Q4H PRN   • oxyCODONE (ROXICODONE) IR tablet 5 mg  5 mg Oral Q4H PRN   • pantoprazole (PROTONIX) EC tablet 40 mg  40 mg Oral Daily   • senna-docusate sodium (SENOKOT S) 8.6-50 mg per tablet 1 tablet  1 tablet Oral HS   • tamsulosin (FLOMAX) capsule 0.4 mg  0.4 mg Oral Daily With Dinner    and PTA meds:   Prior to Admission Medications   Prescriptions Last Dose Informant Patient Reported? Taking?    Insulin Pen Needle (Pen Needles) 31G X 5 MM MISC  Outside Facility (Specify) No No   Sig: Use 2 (two) times a day   acetaminophen (TYLENOL) 325 mg tablet Unknown  Yes No   Sig: Take 650 mg by mouth every 6 (six) hours as needed for mild pain or fever   Patient not taking: Reported on 7/27/2023   ascorbic acid (VITAMIN C) 500 MG tablet 7/26/2023  Yes Yes   Sig: Take 500 mg by mouth daily   aspirin (ECOTRIN LOW STRENGTH) 81 mg EC tablet Unknown Outside Facility (Specify) No No   Sig: Take 1 tablet (81 mg total) by mouth daily   atorvastatin (LIPITOR) 40 mg tablet 7/26/2023 Outside Facility (Specify) No Yes   Sig: Take 1 tablet (40 mg total) by mouth daily with dinner   bisacodyl (FLEET) 10 MG/30ML ENEM Not Taking  Yes No   Sig: Insert 10 mg into the rectum once   Patient not taking: Reported on 7/27/2023   clopidogrel (PLAVIX) 75 mg tablet  Outside Facility (Specify) No No   Sig: Take 1 tablet (75 mg total) by mouth daily Do not start before April 22, 2023. escitalopram (LEXAPRO) 5 mg tablet  Outside Facility (Specify) No No   Sig: Take 1 tablet (5 mg total) by mouth daily Do not start before April 22, 2023.    Patient not taking: Reported on 7/27/2023   finasteride (PROSCAR) 5 mg tablet   No No   Sig: Take 1 tablet (5 mg total) by mouth daily   furosemide (LASIX) 40 mg tablet   Yes No   Sig: Take 40 mg by mouth in the morning   Patient not taking: Reported on 7/27/2023   melatonin 3 mg  Outside Facility (Specify) No No   Sig: Take 1 tablet (3 mg total) by mouth daily at bedtime   metolazone (ZAROXOLYN) 5 mg tablet   Yes No   Sig: Take 5 mg by mouth in the morning   metoprolol succinate (TOPROL-XL) 50 mg 24 hr tablet  Outside Facility (Specify) No No   Sig: Take 1 tablet (50 mg total) by mouth every 12 (twelve) hours   mirtazapine (REMERON) 7.5 MG tablet  Outside Facility (Specify) No No   Sig: Take 1 tablet (7.5 mg total) by mouth daily at bedtime   Patient not taking: Reported on 7/27/2023   multivitamin (THERAGRAN) TABS  Outside Facility (Specify) Yes No   Sig: Take 1 tablet by mouth daily   pantoprazole (PROTONIX) 40 mg tablet  Outside Facility (Specify) No No   Sig: Take 1 tablet (40 mg total) by mouth daily   sacubitril-valsartan (Entresto) 49-51 MG TABS  Outside Facility (Specify) No No   Sig: Take 1 tablet by mouth 2 (two) times a day   senna-docusate sodium (SENOKOT S) 8.6-50 mg per tablet  Outside Facility (Specify) Yes No   Sig: Take 1 tablet by mouth daily as needed   Patient not taking: Reported on 7/27/2023   tamsulosin (FLOMAX) 0.4 mg   No No   Sig: Take 1 capsule (0.4 mg total) by mouth daily with dinner      Facility-Administered Medications: None     Medications Prior to Admission   Medication   • ascorbic acid (VITAMIN C) 500 MG tablet   • atorvastatin (LIPITOR) 40 mg tablet   • acetaminophen (TYLENOL) 325 mg tablet   • aspirin (ECOTRIN LOW STRENGTH) 81 mg EC tablet   • bisacodyl (FLEET) 10 MG/30ML ENEM   • clopidogrel (PLAVIX) 75 mg tablet   • escitalopram (LEXAPRO) 5 mg tablet   • finasteride (PROSCAR) 5 mg tablet   • furosemide (LASIX) 40 mg tablet   • Insulin Pen Needle (Pen Needles) 31G X 5 MM MISC   • melatonin 3 mg   • metolazone (ZAROXOLYN) 5 mg tablet   • metoprolol succinate (TOPROL-XL) 50 mg 24 hr tablet   • mirtazapine (REMERON) 7.5 MG tablet   • multivitamin (THERAGRAN) TABS   • pantoprazole (PROTONIX) 40 mg tablet   • sacubitril-valsartan (Entresto) 49-51 MG TABS   • senna-docusate sodium (SENOKOT S) 8.6-50 mg per tablet   • tamsulosin (FLOMAX) 0.4 mg            Counseling / Coordination of Care  Total floor / unit time spent today 20 minutes. Greater than 50% of total time was spent with the patient and / or family counseling and / or coordination of care. ** Please Note: Dragon 360 Dictation voice to text software may have been used in the creation of this document.  **

## 2023-08-07 NOTE — ASSESSMENT & PLAN NOTE
Recent Labs     08/05/23  0509 08/06/23  0503 08/07/23  0607   CREATININE 1.61* 1.66* 1.34*   EGFR 40 39 50     Estimated Creatinine Clearance: 47.7 mL/min (A) (by C-G formula based on SCr of 1.34 mg/dL (H)).     • Baseline Cr 0.8-1.0  • Likely secondary to intraoperative hypotension and acute blood loss anemia    Plan:  • Daily weights, I/O  • Monitor BMP daily and observe for downward trend of creatinine  • Avoid hypoperfusion of the kidneys, minimize nephrotoxins  • RAAS Blockers/Diuretics held: home entresto, demadex  • Cardiology consulted  • Plan to resume entresto and demadex as outpatient

## 2023-08-07 NOTE — ASSESSMENT & PLAN NOTE
Patient with hx of PAD s/p right sided BKA  Now presenting with worsening multiple diabetic ulcerations of left leg and foot  Transferred for vascular eval  He was evaluated by vascular and podiatry  Left lower extremity x-ray was suspicious for osteomyelitis  Briefly required ICU level of care after developing postop hypotension requiring pressors  Also is s/p 2u PRBC for ABLA during surgery    Plan:  10 day broad-spectrum antibiotics finished 2 days ago  Patient is s/p L BKA POD#4  Vascular surgery following  Cleared for discharge

## 2023-08-07 NOTE — ASSESSMENT & PLAN NOTE
Lab Results   Component Value Date    HGBA1C 7.3 (H) 05/04/2023       Recent Labs     08/06/23  2045 08/07/23  0627 08/07/23  1033 08/07/23  1557   POCGLU 106 123 236* 366*       Blood Sugar Average: Last 72 hrs:  (P) 154.75   Patient refusing cardiac/diabetic diet  He wants regular diet   continue with Lantus and Humalog with meals  Continue insulin sliding scale  Monitor  On 8/5 he had episode of hypoglycemia and was given a dose of dextrose, he is likely more sensitive to insulin in the setting of MATTHEW from his hypotension. Continue to monitor. Mealtime insulin was decreased to 4 units. 8/7 Having hyperglycemia. Switch diet to carb consistent. Lantus 10 units, lispro 3 units with meals.  ISS

## 2023-08-07 NOTE — ASSESSMENT & PLAN NOTE
• Echocardiogram 4/2023: EF 35 to 40%, grade 3 DD, RV systolic function mildly reduced, PFO visualized, trace AI, moderate MR, moderate TR. (previously 25% in 2/2023)  • Outpatient GDMT: Entresto 49-51 mg twice daily (on hold), and metoprolol succinate 50 mg BID (on hold)  • Outpatient diuretic regimen; metolazone 5 mg daily ?; not previously on a loop diuretic ?  • Inpatient diuretic regimen; torsemide 20 mg daily (on hold)

## 2023-08-07 NOTE — PROGRESS NOTES
Stump changed at bedside  Incision c/d/i  No further vascular surgery interventions planned  Cont dressing changes per wound care instructions

## 2023-08-07 NOTE — ASSESSMENT & PLAN NOTE
Lab Results   Component Value Date    HGBA1C 7.3 (H) 05/04/2023       Recent Labs     08/06/23  1555 08/06/23  1645 08/06/23  1713 08/06/23 2045   POCGLU 51* 57* 115 106       Blood Sugar Average: Last 72 hrs:  (P) 137.8080963825787625   Patient refusing cardiac/diabetic diet  He wants regular diet   continue with Lantus and Humalog with meals  Continue insulin sliding scale  Monitor  On 8/5 he had episode of hypoglycemia and was given a dose of dextrose, he is likely more sensitive to insulin in the setting of MATTHEW from his hypotension. Continue to monitor. Mealtime insulin was decreased to 4 units.   Lantus on hold, restart as able

## 2023-08-07 NOTE — PROGRESS NOTES
[address] [facility]  Progress Note  Name: Yan Luis  MRN: [id number]  Unit/Bed#: PPHP 523-01 I Date of Admission: 7/27/2023   Date of Service: 8/7/2023 I Hospital Day: 11    Assessment/Plan   * PAD (peripheral artery disease) ([address] [facility])  Assessment & Plan  Patient with hx of PAD s/p right sided BKA  Now presenting with worsening multiple diabetic ulcerations of left leg and foot  Transferred for vascular eval  He was evaluated by vascular and podiatry  Left lower extremity x-ray was suspicious for osteomyelitis  Briefly required ICU level of care after developing postop hypotension requiring pressors  Also is s/p 2u PRBC for ABLA during surgery    Plan:  10 day broad-spectrum antibiotics finished 2 days ago  Patient is s/p L BKA POD#4  Vascular surgery following  Cleared for discharge    Acute on chronic systolic heart failure (HCC)  Assessment & Plan  Wt Readings from Last 3 Encounters:   08/07/23 75 kg (165 lb 5.5 oz)   07/26/23 67.7 kg (149 lb 4 oz)   05/18/23 59 kg (130 lb)     History of CHF with EF 25%  Euvolemic on exam  Home medications: Entresto, Toprol-XL and Demadex    Plan:  Continue to hold Entresto and Demadex  Continue Toprol-XL  Cardiology following  Monitor Cr, daily BMP  Plan to resume entresto and demadex as outpatient  Cleared for discharge by cardio      MATTHEW (acute kidney injury) [facility])  Assessment & Plan  Recent Labs     08/05/23  0509 08/06/23  0503 08/07/23  0607   CREATININE 1.61* 1.66* 1.34*   EGFR 40 39 50     Estimated Creatinine Clearance: 47.7 mL/min (A) (by C-G formula based on SCr of 1.34 mg/dL (H)).     • Baseline Cr 0.8-1.0  • Likely secondary to intraoperative hypotension and acute blood loss anemia    Plan:  • Daily weights, I/O  • Monitor BMP daily and observe for downward trend of creatinine  • Avoid hypoperfusion of the kidneys, minimize nephrotoxins  • RAAS Blockers/Diuretics held: home entresto, demadex  • Cardiology consulted  • Plan to resume entresto and demadex as outpatient      S/P BKA (below knee amputation), left [facility])  Assessment & Plan  POD#4  See a/p above for "PAD"    Anemia  Assessment & Plan  Anemia panel consistent with anemia of chronic disease  Blood transfusion for hemoglobin below 7  Status post 2 units PRBCs for acute blood loss anemia intraoperatively  Monitor    Hx of BKA, right (HCC)  Assessment & Plan  Hx of right sided bka    Cardiomyopathy ([address])  Assessment & Plan  • Echocardiogram 4/2023: EF 35 to 40%, grade 3 DD, RV systolic function mildly reduced, PFO visualized, trace AI, moderate MR, moderate TR. (previously 25% in 2/2023)  • Outpatient GDMT: Entresto 49-51 mg twice daily (on hold), and metoprolol succinate 50 mg BID (on hold)  • Outpatient diuretic regimen; metolazone 5 mg daily ?; not previously on a loop diuretic ?  • Inpatient diuretic regimen; torsemide 20 mg daily (on hold). Essential hypertension  Assessment & Plan  Stable BP  Continue to monitor    CAD (coronary artery disease)  Assessment & Plan  • Patient has a history of coronary artery disease, previous cardiac cath with diffuse CAD recommending GDMT  • Continue aspirin, Plavix statin, beta-blocker    Type 2 diabetes mellitus with hyperglycemia, with long-term current use of insulin [facility])  Assessment & Plan  Lab Results   Component Value Date    HGBA1C 7.3 (H) 05/04/2023       Recent Labs     08/06/23  1555 08/06/23  1645 08/06/23  1713 08/06/23  2045   POCGLU 51* 57* 115 106       Blood Sugar Average: Last 72 hrs:  (P) 137.1577614226169651   Patient refusing cardiac/diabetic diet  He wants regular diet   continue with Lantus and Humalog with meals  Continue insulin sliding scale  Monitor  On 8/5 he had episode of hypoglycemia and was given a dose of dextrose, he is likely more sensitive to insulin in the setting of MATTHEW from his hypotension. Continue to monitor. Mealtime insulin was decreased to 4 units. 8/7 Having hyperglycemia.  Switch diet to carb consistent. Lantus 10 units, lispro 3 units with meals. ISS             VTE Pharmacologic Prophylaxis: VTE Score: 4 Moderate Risk (Score 3-4) - Pharmacological DVT Prophylaxis Ordered: heparin. Patient Centered Rounds: I performed bedside rounds with nursing staff today. Discussions with Specialists or Other Care Team Provider: case management    Education and Discussions with Family / Patient: Patient declined call to . Total Time Spent on Date of Encounter in care of patient: 45 minutes This time was spent on one or more of the following: performing physical exam; counseling and coordination of care; obtaining or reviewing history; documenting in the medical record; reviewing/ordering tests, medications or procedures; communicating with other healthcare professionals and discussing with patient's family/caregivers. Current Length of Stay: 11 day(s)  Current Patient Status: Inpatient   Certification Statement: The patient will continue to require additional inpatient hospital stay due to pending safe dispo  Discharge Plan: Anticipate discharge tomorrow to rehab facility. Code Status: Level 1 - Full Code    Subjective:   Patient seen examined at bedside. Currently offers no acute complaints. Objective:     Vitals:   Temp (24hrs), Av °F (37.2 °C), Min:98.5 °F (36.9 °C), Max:99.4 °F (37.4 °C)    Temp:  [98.5 °F (36.9 °C)-99.4 °F (37.4 °C)] 98.5 °F (36.9 °C)  HR:  [82-94] 94  Resp:  [15-19] 15  BP: (100-108)/(52-59) 108/59  SpO2:  [90 %-92 %] 92 %  Body mass index is 23.72 kg/m². Input and Output Summary (last 24 hours): Intake/Output Summary (Last 24 hours) at 2023 1629  Last data filed at 2023 1300  Gross per 24 hour   Intake 740 ml   Output 550 ml   Net 190 ml       Physical Exam:   Physical Exam  Vitals and nursing note reviewed. Constitutional:       General: He is not in acute distress. Appearance: He is well-developed.    HENT:      Head: Normocephalic and atraumatic. Eyes:      Conjunctiva/sclera: Conjunctivae normal.   Cardiovascular:      Rate and Rhythm: Normal rate and regular rhythm. Heart sounds: No murmur heard. Pulmonary:      Effort: Pulmonary effort is normal. No respiratory distress. Breath sounds: Normal breath sounds. Abdominal:      Palpations: Abdomen is soft. Tenderness: There is no abdominal tenderness. Musculoskeletal:      Cervical back: Neck supple. Comments: S/P Bilateral BKA   Skin:     General: Skin is warm and dry. Capillary Refill: Capillary refill takes less than 2 seconds. Neurological:      Mental Status: He is alert. Psychiatric:         Mood and Affect: Mood normal.          Additional Data:     Labs:  Results from last 7 days   Lab Units 08/07/23  0607   WBC Thousand/uL 8.59   HEMOGLOBIN g/dL 7.6*   HEMATOCRIT % 24.4*   PLATELETS Thousands/uL 278   NEUTROS PCT % 77*   LYMPHS PCT % 11*   MONOS PCT % 7   EOS PCT % 5     Results from last 7 days   Lab Units 08/07/23  0607 08/05/23  0509 08/04/23  0215   SODIUM mmol/L 143   < > 141   POTASSIUM mmol/L 4.2   < > 4.9   CHLORIDE mmol/L 112*   < > 110*   CO2 mmol/L 25   < > 25   BUN mg/dL 62*   < > 54*   CREATININE mg/dL 1.34*   < > 1.45*   ANION GAP mmol/L 6   < > 6   CALCIUM mg/dL 8.4   < > 8.6   ALBUMIN g/dL  --   --  2.4*   TOTAL BILIRUBIN mg/dL  --   --  0.90   ALK PHOS U/L  --   --  256*   ALT U/L  --   --  22   AST U/L  --   --  24   GLUCOSE RANDOM mg/dL 131   < > 282*    < > = values in this interval not displayed.          Results from last 7 days   Lab Units 08/07/23  1557 08/07/23  1033 08/07/23  0627 08/06/23  2045 08/06/23  1713 08/06/23  1645 08/06/23  1555 08/06/23  1037 08/06/23  0630 08/05/23  2031 08/05/23  1658 08/05/23  1621   POC GLUCOSE mg/dl 366* 236* 123 106 115 57* 51* 210* 224* 166* 116 41*               Lines/Drains:  Invasive Devices     Peripheral Intravenous Line  Duration           Peripheral IV 08/02/23 Left;Ventral (anterior) Forearm 5 days    Peripheral IV 08/03/23 Right Forearm 4 days                      Imaging: Reviewed radiology reports from this admission including: procedure reports    Recent Cultures (last 7 days):         Last 24 Hours Medication List:   Current Facility-Administered Medications   Medication Dose Route Frequency Provider Last Rate   • acetaminophen  975 mg Oral Q6H PRN Kriss Rodas PA-C     • aspirin  81 mg Oral Daily Rah Godinez PA-C     • atorvastatin  40 mg Oral Daily With Steven Godinez PA-C     • clopidogrel  75 mg Oral Daily Rah Godinez PA-C     • escitalopram  5 mg Oral Daily Rah Godinez PA-C     • finasteride  5 mg Oral Daily Rah Godinez PA-C     • heparin (porcine)  5,000 Units Subcutaneous [facility] Rah Godinez PA-C     • HYDROmorphone  0.5 mg Intravenous Q3H PRN Kriss Rodas PA-C     • insulin lispro  2-12 Units Subcutaneous HS Rah Godinez PA-C     • metoprolol succinate  25 mg Oral Q12H [address] Carlos Manuel Godinez PA-C     • mirtazapine  7.5 mg Oral HS Kriss Rodas PA-C     • oxyCODONE  10 mg Oral Q4H PRN Kriss Rodas PA-C     • oxyCODONE  5 mg Oral Q4H PRN Rah Godinez PA-C     • pantoprazole  40 mg Oral Daily Carlos Manuel Godinez PA-C     • senna-docusate sodium  1 tablet Oral HS Carlos Manuel Godinez PA-C     • tamsulosin  0.4 mg Oral Daily With D[employer] Kriss Rodas PA-C          Today, Patient Was Seen By: Maikel Signs, DO    **Please Note: This note may have been constructed using a voice recognition system. **

## 2023-08-07 NOTE — ASSESSMENT & PLAN NOTE
Wt Readings from Last 3 Encounters:   08/07/23 75 kg (165 lb 5.5 oz)   07/26/23 67.7 kg (149 lb 4 oz)   05/18/23 59 kg (130 lb)     History of CHF with EF 25%  Euvolemic on exam  Home medications: Entresto, Toprol-XL and Demadex    Plan:  Continue to hold Entresto and Demadex  Continue Toprol-XL  Cardiology following  Monitor Cr, daily BMP  Plan to resume entresto and demadex as outpatient  Cleared for discharge by cardio

## 2023-08-08 LAB
ANION GAP SERPL CALCULATED.3IONS-SCNC: 5 MMOL/L
BUN SERPL-MCNC: 60 MG/DL (ref 5–25)
CALCIUM SERPL-MCNC: 8.4 MG/DL (ref 8.3–10.1)
CHLORIDE SERPL-SCNC: 111 MMOL/L (ref 96–108)
CO2 SERPL-SCNC: 26 MMOL/L (ref 21–32)
CREAT SERPL-MCNC: 1.43 MG/DL (ref 0.6–1.3)
ERYTHROCYTE [DISTWIDTH] IN BLOOD BY AUTOMATED COUNT: 19.4 % (ref 11.6–15.1)
GFR SERPL CREATININE-BSD FRML MDRD: 46 ML/MIN/1.73SQ M
GLUCOSE SERPL-MCNC: 144 MG/DL (ref 65–140)
GLUCOSE SERPL-MCNC: 71 MG/DL (ref 65–140)
GLUCOSE SERPL-MCNC: 80 MG/DL (ref 65–140)
GLUCOSE SERPL-MCNC: 91 MG/DL (ref 65–140)
GLUCOSE SERPL-MCNC: 94 MG/DL (ref 65–140)
HCT VFR BLD AUTO: 23 % (ref 36.5–49.3)
HGB BLD-MCNC: 7.1 G/DL (ref 12–17)
MCH RBC QN AUTO: 27.8 PG (ref 26.8–34.3)
MCHC RBC AUTO-ENTMCNC: 30.9 G/DL (ref 31.4–37.4)
MCV RBC AUTO: 90 FL (ref 82–98)
PLATELET # BLD AUTO: 294 THOUSANDS/UL (ref 149–390)
PMV BLD AUTO: 8.8 FL (ref 8.9–12.7)
POTASSIUM SERPL-SCNC: 3.8 MMOL/L (ref 3.5–5.3)
RBC # BLD AUTO: 2.55 MILLION/UL (ref 3.88–5.62)
SODIUM SERPL-SCNC: 142 MMOL/L (ref 135–147)
WBC # BLD AUTO: 8.55 THOUSAND/UL (ref 4.31–10.16)

## 2023-08-08 PROCEDURE — 80048 BASIC METABOLIC PNL TOTAL CA: CPT | Performed by: STUDENT IN AN ORGANIZED HEALTH CARE EDUCATION/TRAINING PROGRAM

## 2023-08-08 PROCEDURE — 85027 COMPLETE CBC AUTOMATED: CPT | Performed by: STUDENT IN AN ORGANIZED HEALTH CARE EDUCATION/TRAINING PROGRAM

## 2023-08-08 PROCEDURE — 82948 REAGENT STRIP/BLOOD GLUCOSE: CPT

## 2023-08-08 PROCEDURE — 99233 SBSQ HOSP IP/OBS HIGH 50: CPT | Performed by: INTERNAL MEDICINE

## 2023-08-08 RX ORDER — INSULIN GLARGINE 100 [IU]/ML
5 INJECTION, SOLUTION SUBCUTANEOUS ONCE
Status: COMPLETED | OUTPATIENT
Start: 2023-08-08 | End: 2023-08-08

## 2023-08-08 RX ADMIN — ATORVASTATIN CALCIUM 40 MG: 40 TABLET, FILM COATED ORAL at 15:31

## 2023-08-08 RX ADMIN — ASPIRIN 81 MG CHEWABLE TABLET 81 MG: 81 TABLET CHEWABLE at 09:19

## 2023-08-08 RX ADMIN — MIRTAZAPINE 7.5 MG: 15 TABLET, FILM COATED ORAL at 21:58

## 2023-08-08 RX ADMIN — INSULIN LISPRO 3 UNITS: 100 INJECTION, SOLUTION INTRAVENOUS; SUBCUTANEOUS at 11:52

## 2023-08-08 RX ADMIN — FINASTERIDE 5 MG: 5 TABLET, FILM COATED ORAL at 09:19

## 2023-08-08 RX ADMIN — INSULIN GLARGINE 5 UNITS: 100 INJECTION, SOLUTION SUBCUTANEOUS at 23:06

## 2023-08-08 RX ADMIN — HEPARIN SODIUM 5000 UNITS: 5000 INJECTION INTRAVENOUS; SUBCUTANEOUS at 21:58

## 2023-08-08 RX ADMIN — CLOPIDOGREL BISULFATE 75 MG: 75 TABLET ORAL at 09:19

## 2023-08-08 RX ADMIN — ESCITSLOPRAM 5 MG: 5 TABLET ORAL at 09:19

## 2023-08-08 RX ADMIN — HEPARIN SODIUM 5000 UNITS: 5000 INJECTION INTRAVENOUS; SUBCUTANEOUS at 05:15

## 2023-08-08 RX ADMIN — TAMSULOSIN HYDROCHLORIDE 0.4 MG: 0.4 CAPSULE ORAL at 15:31

## 2023-08-08 RX ADMIN — INSULIN LISPRO 3 UNITS: 100 INJECTION, SOLUTION INTRAVENOUS; SUBCUTANEOUS at 17:46

## 2023-08-08 RX ADMIN — PANTOPRAZOLE SODIUM 40 MG: 40 TABLET, DELAYED RELEASE ORAL at 06:13

## 2023-08-08 RX ADMIN — HEPARIN SODIUM 5000 UNITS: 5000 INJECTION INTRAVENOUS; SUBCUTANEOUS at 14:17

## 2023-08-08 NOTE — PHYSICAL THERAPY NOTE
Physical Therapy Cancellation Note    Patient's Name: Marck Duncan       08/08/23 1306   PT Last Visit   PT Visit Date 08/08/23   Note Type   Note Type Cancelled Session   Cancel Reasons Refusal       Leticia Jha, PT, DPT

## 2023-08-08 NOTE — ASSESSMENT & PLAN NOTE
Wt Readings from Last 3 Encounters:   08/08/23 74.7 kg (164 lb 10.9 oz)   07/26/23 67.7 kg (149 lb 4 oz)   05/18/23 59 kg (130 lb)     History of CHF with EF 25%  Euvolemic on exam  Home medications: Entresto, Toprol-XL and Demadex    Plan:  Continue to hold Entresto and Demadex  Continue Toprol-XL  Cardiology following  Monitor Cr, daily BMP  Plan to resume entresto and demadex as outpatient  Cleared for discharge by cardio

## 2023-08-08 NOTE — PROGRESS NOTES
-- Patient:  -- MRN: [id number]  -- Aidin Request ID: [id number]  -- Level of care reserved: [address]  -- Partner [facility] [id number]  [facility] [address] [place] [address] [phone]  -- Clinical needs requested:  -- Geography searched: [place] miles around [facility]  -- Start of Service:  -- Request sent: 12:29pm EDT on 8/4/2023 by Rita Huff  -- Partner reserved: 4:04pm EDT on 8/8/2023 by Yg Sam  -- Choice list shared: 1:33pm EDT on 8/8/2023 by Yg Sam

## 2023-08-08 NOTE — ASSESSMENT & PLAN NOTE
Recent Labs     08/06/23  0503 08/07/23  0607 08/08/23  0451   CREATININE 1.66* 1.34* 1.43*   EGFR 39 50 46     Estimated Creatinine Clearance: 44.7 mL/min (A) (by C-G formula based on SCr of 1.43 mg/dL (H)).     • Baseline Cr 0.8-1.0  • Likely secondary to intraoperative hypotension and acute blood loss anemia    Plan:  • Daily weights, I/O  • Monitor BMP daily and observe for downward trend of creatinine  • Avoid hypoperfusion of the kidneys, minimize nephrotoxins  • RAAS Blockers/Diuretics held: home entresto, demadex  • Cardiology consulted  • Plan to resume entresto and demadex as outpatient

## 2023-08-08 NOTE — PROGRESS NOTES
Made Dr. Cat Buenrostro aware that patient is refusing reagan despite large bladder scan volumes. No new orders at this time.

## 2023-08-08 NOTE — PROGRESS NOTES
[address] [facility]  Progress Note  Name: Addison Henriquez  MRN: [id number]  Unit/Bed#: PPHP 523-01 I Date of Admission: 7/27/2023   Date of Service: 8/8/2023 I Hospital Day: 12    Assessment/Plan   * PAD (peripheral artery disease) ([address])  Assessment & Plan  Patient with hx of PAD s/p right sided BKA  Now presenting with worsening multiple diabetic ulcerations of left leg and foot  Transferred for vascular eval  He was evaluated by vascular and podiatry  Left lower extremity x-ray was suspicious for osteomyelitis  Briefly required ICU level of care after developing postop hypotension requiring pressors  Also is s/p 2u PRBC for ABLA during surgery    Plan:  10 day broad-spectrum antibiotics-status post completion of antibiotic therapy on the fourth  Patient is s/p L BKA POD 5  Vascular surgery following  Cleared for discharge    MATTHEW (acute kidney injury) [facility])  Assessment & Plan  Recent Labs     08/06/23  0503 08/07/23  0607 08/08/23  0451   CREATININE 1.66* 1.34* 1.43*   EGFR 39 50 46     Estimated Creatinine Clearance: 44.7 mL/min (A) (by C-G formula based on SCr of 1.43 mg/dL (H)).     • Baseline Cr 0.8-1.0  • Likely secondary to intraoperative hypotension and acute blood loss anemia    Plan:  • Daily weights, I/O  • Monitor BMP daily and observe for downward trend of creatinine  • Avoid hypoperfusion of the kidneys, minimize nephrotoxins  • RAAS Blockers/Diuretics held: home entresto, demadex  • Cardiology consulted  • Plan to resume entresto and demadex as outpatient      Anemia  Assessment & Plan  Anemia panel consistent with anemia of chronic disease  Blood transfusion for hemoglobin below 7  Status post 2 units PRBCs for acute blood loss anemia intraoperatively  Monitor    Hx of BKA, right (HCC)  Assessment & Plan  Hx of right sided bka    Cardiomyopathy ([address])  Assessment & Plan  • Echocardiogram 4/2023: EF 35 to 40%, grade 3 DD, RV systolic function mildly reduced, PFO visualized, trace AI, moderate MR, moderate TR. (previously 25% in 2/2023)  • Outpatient GDMT: Entresto 49-51 mg twice daily (on hold), and metoprolol succinate 50 mg BID (on hold)  • Outpatient diuretic regimen; metolazone 5 mg daily ?; not previously on a loop diuretic ?  • Inpatient diuretic regimen; torsemide 20 mg daily (on hold)    Essential hypertension  Assessment & Plan  Stable BP  Continue to monitor    CAD (coronary artery disease)  Assessment & Plan  • Patient has a history of coronary artery disease, previous cardiac cath with diffuse CAD recommending GDMT  • Continue aspirin, Plavix statin, beta-blocker    Acute on chronic systolic heart failure (HCC)  Assessment & Plan  Wt Readings from Last 3 Encounters:   08/08/23 74.7 kg (164 lb 10.9 oz)   07/26/23 67.7 kg (149 lb 4 oz)   05/18/23 59 kg (130 lb)     History of CHF with EF 25%  Euvolemic on exam  Home medications: Entresto, Toprol-XL and Demadex    Plan:  Continue to hold Entresto and Demadex  Continue Toprol-XL  Cardiology following  Monitor Cr, daily BMP  Plan to resume entresto and demadex as outpatient  Cleared for discharge by cardio      Type 2 diabetes mellitus with hyperglycemia, with long-term current use of insulin [facility])  Assessment & Plan  Lab Results   Component Value Date    HGBA1C 7.3 (H) 05/04/2023       Recent Labs     08/06/23  2045 08/07/23  0627 08/07/23  1033 08/07/23  1557   POCGLU 106 123 236* 366*       Blood Sugar Average: Last 72 hrs:  (P) 154.75   Patient refusing cardiac/diabetic diet  He wants regular diet   continue with Lantus and Humalog with meals  Continue insulin sliding scale  Monitor  On 8/5 he had episode of hypoglycemia and was given a dose of dextrose, he is likely more sensitive to insulin in the setting of MATTHEW from his hypotension. Continue to monitor. Mealtime insulin was decreased to 4 units. 8/7 Having hyperglycemia. Switch diet to carb consistent. Lantus 10 units, lispro 3 units with meals.  ISS VTE Pharmacologic Prophylaxis:   Pharmacologic: Heparin  Mechanical VTE Prophylaxis in Place: No    Patient Centered Rounds: I have performed bedside rounds with nursing staff today. Await placement. Patient instructed that we will need physical therapy assessment for rehab referrals. Time Spent for Care: 54. More than 50% of total time spent on counseling and coordination of care as described above. Current Length of Stay: 12 day(s)    Current Patient Status: Inpatient       Code Status: Level 1 - Full Code      Subjective:   nad    Objective:     Vitals:   Temp (24hrs), Av.4 °F (36.9 °C), Min:98.3 °F (36.8 °C), Max:98.5 °F (36.9 °C)    Temp:  [98.3 °F (36.8 °C)-98.5 °F (36.9 °C)] 98.3 °F (36.8 °C)  HR:  [76-88] 78  Resp:  [16-18] 16  BP: (91-98)/(45-54) 91/47  SpO2:  [92 %-95 %] 95 %  Body mass index is 23.63 kg/m². Input and Output Summary (last 24 hours): Intake/Output Summary (Last 24 hours) at 2023 1048  Last data filed at 2023 0601  Gross per 24 hour   Intake 1100 ml   Output 260 ml   Net 840 ml       Physical Exam:     Physical Exam  Constitutional:       Appearance: Normal appearance. Eyes:      Pupils: Pupils are equal, round, and reactive to light. Cardiovascular:      Rate and Rhythm: Regular rhythm. Pulmonary:      Effort: Pulmonary effort is normal.   Abdominal:      General: Abdomen is flat. Palpations: Abdomen is soft. Musculoskeletal:         General: No swelling. Skin:     General: Skin is warm and dry. Neurological:      General: No focal deficit present. Mental Status: He is alert and oriented to person, place, and time.    Psychiatric:         Mood and Affect: Mood normal.         Additional Data:     Labs:    Results from last 7 days   Lab Units 23  0451 23  0607   WBC Thousand/uL 8.55 8.59   HEMOGLOBIN g/dL 7.1* 7.6*   HEMATOCRIT % 23.0* 24.4*   PLATELETS Thousands/uL 294 278   NEUTROS PCT %  --  77*   LYMPHS PCT %  -- 11*   MONOS PCT %  --  7   EOS PCT %  --  5     Results from last 7 days   Lab Units 08/08/23  0451 08/05/23  0509 08/04/23  0215   POTASSIUM mmol/L 3.8   < > 4.9   CHLORIDE mmol/L 111*   < > 110*   CO2 mmol/L 26   < > 25   BUN mg/dL 60*   < > 54*   CREATININE mg/dL 1.43*   < > 1.45*   CALCIUM mg/dL 8.4   < > 8.6   ALK PHOS U/L  --   --  256*   ALT U/L  --   --  22   AST U/L  --   --  24    < > = values in this interval not displayed. * I Have Reviewed All Lab Data Listed Above. * Additional Pertinent Lab Tests Reviewed:  All Labs Within Last 24 Hours Reviewed        Recent Cultures (last 7 days):           Last 24 Hours Medication List:   Current Facility-Administered Medications   Medication Dose Route Frequency Provider Last Rate   • acetaminophen  975 mg Oral Q6H PRN Randi Fofana PA-C     • aspirin  81 mg Oral Daily Christina Godinez PA-C     • atorvastatin  40 mg Oral Daily With [employer]	[place] YEN Godinez     • clopidogrel  75 mg Oral Daily Christina Godinez PA-C     • escitalopram  5 mg Oral Daily Randi Fofana PA-C     • finasteride  5 mg Oral Daily Christina Godinez PA-C     • heparin (porcine)  5,000 Units Subcutaneous Q8H [address] Carlos Manuel Godinez PA-C     • HYDROmorphone  0.5 mg Intravenous Q3H PRN Christina Godinez PA-C     • insulin glargine  10 Units Subcutaneous HS Shahab Tofoxe, DO     • insulin lispro  1-6 Units Subcutaneous TID AC Shahab Tofoxe, DO     • insulin lispro  2-12 Units Subcutaneous HS Carlos Manuel Godinez PA-C     • insulin lispro  3 Units Subcutaneous TID With Meals Shahab Tofoxe, DO     • metoprolol succinate  25 mg Oral Q12H [address] Carlos Manuel Godinez PA-C     • mirtazapine  7.5 mg Oral HS Randi Fofana PA-C     • oxyCODONE  10 mg Oral Q4H PRN Ladean Sabins, PA-C     • oxyCODONE  5 mg Oral Q4H PRN Randi Fofana PA-C     • pantoprazole  40 mg Oral Daily Christina Godinez PA-C     • senna-docusate sodium  1 tablet Oral HS Randi Fofana, YEN     • tamsulosin  0.4 mg Oral Daily With [id number] [facility], YEN          Today, Patient Was Seen By: Taylor Lilly DO    ** Please Note: Dictation voice to text software may have been used in the creation of this document.  **

## 2023-08-08 NOTE — ASSESSMENT & PLAN NOTE
Patient with hx of PAD s/p right sided BKA  Now presenting with worsening multiple diabetic ulcerations of left leg and foot  Transferred for vascular eval  He was evaluated by vascular and podiatry  Left lower extremity x-ray was suspicious for osteomyelitis  Briefly required ICU level of care after developing postop hypotension requiring pressors  Also is s/p 2u PRBC for ABLA during surgery    Plan:  10 day broad-spectrum antibiotics-status post completion of antibiotic therapy on the fourth  Patient is s/p L BKA POD 5  Vascular surgery following  Cleared for discharge

## 2023-08-08 NOTE — OCCUPATIONAL THERAPY NOTE
Occupational Therapy cx        Patient Name: Yan Hughes  Today's Date: 8/8/2023 08/08/23 1304   OT Last Visit   OT Visit Date 08/08/23   Note Type   Note Type Cancelled Session   Cancel Reasons Refusal  (pt reports he refuses to get OOB at this time, is not interested in engaging in EOB activities and is very busy.  will hold)         Luis F Calvo, LACHO, OTR/L

## 2023-08-09 LAB
GLUCOSE SERPL-MCNC: 102 MG/DL (ref 65–140)
GLUCOSE SERPL-MCNC: 126 MG/DL (ref 65–140)
GLUCOSE SERPL-MCNC: 132 MG/DL (ref 65–140)
GLUCOSE SERPL-MCNC: 146 MG/DL (ref 65–140)

## 2023-08-09 PROCEDURE — 97110 THERAPEUTIC EXERCISES: CPT

## 2023-08-09 PROCEDURE — 97535 SELF CARE MNGMENT TRAINING: CPT

## 2023-08-09 PROCEDURE — 97530 THERAPEUTIC ACTIVITIES: CPT

## 2023-08-09 PROCEDURE — 88311 DECALCIFY TISSUE: CPT | Performed by: PATHOLOGY

## 2023-08-09 PROCEDURE — 88307 TISSUE EXAM BY PATHOLOGIST: CPT | Performed by: PATHOLOGY

## 2023-08-09 PROCEDURE — 82948 REAGENT STRIP/BLOOD GLUCOSE: CPT

## 2023-08-09 PROCEDURE — 99233 SBSQ HOSP IP/OBS HIGH 50: CPT | Performed by: INTERNAL MEDICINE

## 2023-08-09 RX ADMIN — PANTOPRAZOLE SODIUM 40 MG: 40 TABLET, DELAYED RELEASE ORAL at 05:57

## 2023-08-09 RX ADMIN — ATORVASTATIN CALCIUM 40 MG: 40 TABLET, FILM COATED ORAL at 16:25

## 2023-08-09 RX ADMIN — ASPIRIN 81 MG CHEWABLE TABLET 81 MG: 81 TABLET CHEWABLE at 08:04

## 2023-08-09 RX ADMIN — METOPROLOL SUCCINATE 25 MG: 25 TABLET, FILM COATED, EXTENDED RELEASE ORAL at 08:04

## 2023-08-09 RX ADMIN — HEPARIN SODIUM 5000 UNITS: 5000 INJECTION INTRAVENOUS; SUBCUTANEOUS at 21:12

## 2023-08-09 RX ADMIN — CLOPIDOGREL BISULFATE 75 MG: 75 TABLET ORAL at 08:04

## 2023-08-09 RX ADMIN — FINASTERIDE 5 MG: 5 TABLET, FILM COATED ORAL at 08:04

## 2023-08-09 RX ADMIN — MIRTAZAPINE 7.5 MG: 15 TABLET, FILM COATED ORAL at 21:13

## 2023-08-09 RX ADMIN — ESCITSLOPRAM 5 MG: 5 TABLET ORAL at 08:04

## 2023-08-09 RX ADMIN — INSULIN LISPRO 3 UNITS: 100 INJECTION, SOLUTION INTRAVENOUS; SUBCUTANEOUS at 11:12

## 2023-08-09 RX ADMIN — HEPARIN SODIUM 5000 UNITS: 5000 INJECTION INTRAVENOUS; SUBCUTANEOUS at 05:57

## 2023-08-09 RX ADMIN — TAMSULOSIN HYDROCHLORIDE 0.4 MG: 0.4 CAPSULE ORAL at 16:25

## 2023-08-09 RX ADMIN — HEPARIN SODIUM 5000 UNITS: 5000 INJECTION INTRAVENOUS; SUBCUTANEOUS at 13:24

## 2023-08-09 RX ADMIN — METOPROLOL SUCCINATE 25 MG: 25 TABLET, FILM COATED, EXTENDED RELEASE ORAL at 21:11

## 2023-08-09 RX ADMIN — INSULIN LISPRO 3 UNITS: 100 INJECTION, SOLUTION INTRAVENOUS; SUBCUTANEOUS at 07:55

## 2023-08-09 RX ADMIN — INSULIN LISPRO 3 UNITS: 100 INJECTION, SOLUTION INTRAVENOUS; SUBCUTANEOUS at 16:25

## 2023-08-09 RX ADMIN — INSULIN GLARGINE 10 UNITS: 100 INJECTION, SOLUTION SUBCUTANEOUS at 21:11

## 2023-08-09 NOTE — ASSESSMENT & PLAN NOTE
Lab Results   Component Value Date    HGBA1C 7.3 (H) 05/04/2023       Recent Labs     08/08/23  1543 08/08/23  2051 08/09/23  0617 08/09/23  1032   POCGLU 94 91 102 132       Blood Sugar Average: Last 72 hrs:  (P) 155.125   Patient refusing cardiac/diabetic diet  He wants regular diet   continue with Lantus and Humalog with meals  Continue insulin sliding scale  Monitor  On 8/5 he had episode of hypoglycemia and was given a dose of dextrose, he is likely more sensitive to insulin in the setting of MATTHEW from his hypotension. Continue to monitor. Mealtime insulin was decreased to 4 units. 8/7 Having hyperglycemia. Switch diet to carb consistent. Lantus 10 units, lispro 3 units with meals.  ISS

## 2023-08-09 NOTE — ASSESSMENT & PLAN NOTE
Wt Readings from Last 3 Encounters:   08/09/23 77.1 kg (170 lb)   07/26/23 67.7 kg (149 lb 4 oz)   05/18/23 59 kg (130 lb)     History of CHF with EF 25%  Euvolemic on exam  Home medications: Entresto, Toprol-XL and Demadex    Plan:  Continue to hold Entresto and Demadex  Continue Toprol-XL  Cardiology following  Monitor Cr, daily BMP  Plan to resume entresto and demadex as outpatient  Cleared for discharge by cardio

## 2023-08-09 NOTE — PROGRESS NOTES
23 1300   Clinical Encounter Type   Visited With Patient   Rastafarian Encounters   Rastafarian Needs Prayer   Sacramental Encounters   Communion Given Indicator Yes      Pastoral Care Progress Note    2023  Patient: Kurt Rosales : 1945  Admission Date & Time: 2023 1849  MRN: [id number] CSN: [id number]          Fr. [address] visit.

## 2023-08-09 NOTE — PLAN OF CARE
Problem: PHYSICAL THERAPY ADULT  Goal: Performs mobility at highest level of function for planned discharge setting. See evaluation for individualized goals. Description: Treatment/Interventions: ADL retraining, Functional transfer training, LE strengthening/ROM, Therapeutic exercise, Endurance training, Patient/family training, Equipment eval/education, Bed mobility, Spoke to nursing, Spoke to case management, OT  Equipment Recommended: Wheelchair, Other (Comment) (may need hospital bed ; owns slide board and w/c)       See flowsheet documentation for full assessment, interventions and recommendations. Outcome: Progressing  Note: Prognosis: Good  Problem List: Decreased strength, Decreased range of motion, Impaired balance, Decreased mobility, Decreased endurance, Decreased safety awareness  Assessment: Yadira Bergeron is a 69 y.o. male admitted to [address] [place] [address] on 7/27/2023 for PAD (peripheral artery disease) ([address] [facility]), now POD # 6 L BKA. Pt seen today for PT treatment session to improve functional mobility and progress toward stated goals. Pt demonstrates improvements this session, with improved AM-PAC and activity tolerance. Pt preforming bed mobility with mod A x2 and sliding board transfer bed>chair with max Ax2. Frequent cuing provided for proper slide board technique, including hand placement and weight shift. Pt able to balance EOB with supervision with bilateral UE support but requires max Ax1 to sit without UE support. Pt still demonstrates impaired balance, decreased strength, decreased mobility, need for physical assist and decreased endurance. Pt will continue to benefit from skilled IP PT to address stated impairments and  in order to maximize recovery and increase functional independence when completing mobility and ADLs. PT recommendations for d/c remain post acute rehab services.   Barriers to Discharge: Decreased caregiver support     PT Discharge Recommendation: Post acute rehabilitation services    See flowsheet documentation for full assessment.

## 2023-08-09 NOTE — PLAN OF CARE
Problem: OCCUPATIONAL THERAPY ADULT  Goal: Performs self-care activities at highest level of function for planned discharge setting. See evaluation for individualized goals. Description: Treatment Interventions: ADL retraining, Functional transfer training, Endurance training, UE strengthening/ROM, Cognitive reorientation, Patient/family training, Equipment evaluation/education, Compensatory technique education, Energy conservation, Activityengagement  Equipment Recommended:  (pt requesting a hospital bed)       See flowsheet documentation for full assessment, interventions and recommendations. Outcome: Progressing  Note: Limitation: Decreased ADL status, Decreased UE strength, Decreased Safe judgement during ADL, Decreased cognition, Decreased endurance, Decreased sensation, Decreased self-care trans, Decreased high-level ADLs  Prognosis: Good, Fair  Assessment: Pt seen on this date for OT session focusing on ADL retraining,  body mechanics, transfer retraining, increasing activity tolerance/endurance and EOB sitting to increase ability to participate in ADL/functional tasks. Pt was found in bed and was left in chair w/ all needs within reach. Pt completed bed mob w mod Ax2, sat EOB with S/cGA if he had b/l UE support. When sitting unsupported at EOB, he required max Ax1 to maintain upright posture. Completed ADL's while seated EOB: UB: Min A, LB: Mod A. See flowsheet for further detail. Completed slide board transfers with max Ax2 at this time from EOB>chair. Pt w/ improvements in activity tolerance, transfer ability, ADL task completion, however is still limited 2* decreased ADL/High-level ADL status, decreased activity tolerance/endurance, decreased cognition, decreased self-care trans, decreased safety awareness and insight to condition. The patient's raw score on the -PAC Daily Activity Inpatient Short Form is 16.  A raw score of less than 19 suggests the patient may benefit from discharge to post-acute rehabilitation services. Please refer to the recommendation of the Occupational Therapist for safe discharge planning. Recommending pt D/C to STR when medically stable. Pt will continue to benefit from acute OT services to meet goals.      OT Discharge Recommendation: Post acute rehabilitation services

## 2023-08-09 NOTE — PHYSICAL THERAPY NOTE
PHYSICAL THERAPY NOTE          Patient Name: Ofelia Snyder  WTVQX'L Date: 8/9/2023 08/09/23 0901   Pain Assessment   Pain Assessment Tool 0-10   Pain Score No Pain   Restrictions/Precautions   Weight Bearing Precautions Per Order Yes   RLE Weight Bearing Per Order NWB   LLE Weight Bearing Per Order NWB   Other Precautions Fall Risk;Pain;Multiple lines;WBS   General   Family/Caregiver Present No   Cognition   Overall Cognitive Status WFL   Orientation Level Oriented to person;Oriented to situation   Memory Within functional limits   Following Commands Follows one step commands without difficulty   Bed Mobility   Supine to Sit 3  Moderate assistance   Additional items Assist x 2; Increased time required;LE management;Verbal cues;HOB elevated; Bedrails   Additional Comments VCs for technique, LE mangement and grab bar use   Transfers   Sliding Board transfer 2  Maximal assistance   Additional items Assist x 2; Increased time required;Verbal cues;Armrests   Additional Comments Cues for best technique, weight shift, hand placement   Balance   Static Sitting Fair -   Dynamic Sitting Poor -  (unable to complete functional activities without UE support)   Endurance Deficit   Endurance Deficit Yes   Endurance Deficit Description deconditioning   Activity Tolerance   Activity Tolerance Patient limited by fatigue   Medical Staff Made Aware OT Candance Dom, Pt Courtney   Nurse Made Aware yes   Assessment   Prognosis Good   Problem List Decreased strength;Decreased range of motion; Impaired balance;Decreased mobility; Decreased endurance;Decreased safety awareness   Assessment Ofelia Snyder is a 69 y.o. male admitted to [address] [place] [address] on 7/27/2023 for PAD (peripheral artery disease) ([facility]), now POD # 6 L BKA. Pt seen today for PT treatment session to improve functional mobility and progress toward stated goals.  Pt demonstrates improvements this session, with improved AM-PAC and activity tolerance. Pt preforming bed mobility with mod A x2 and sliding board transfer bed>chair with max Ax2. Frequent cuing provided for proper slide board technique, including hand placement and weight shift. Pt able to balance EOB with supervision with bilateral UE support but requires max Ax1 to sit without UE support. Pt still demonstrates impaired balance, decreased strength, decreased mobility, need for physical assist and decreased endurance. Pt will continue to benefit from skilled IP PT to address stated impairments and  in order to maximize recovery and increase functional independence when completing mobility and ADLs. PT recommendations for d/c remain post acute rehab services. Barriers to Discharge Decreased caregiver support   Goals   Patient Goals to get stronger   STG Expiration Date 08/18/23   PT Treatment Day 1   Plan   Treatment/Interventions ADL retraining;Functional transfer training;LE strengthening/ROM; Therapeutic exercise; Endurance training;Patient/family training;Equipment eval/education; Bed mobility;Spoke to nursing;OT   PT Frequency 2-3x/wk   Recommendation   PT Discharge Recommendation Post acute rehabilitation services   AM-PAC Basic Mobility Inpatient   Turning in Flat Bed Without Bedrails 2   Lying on Back to Sitting on Edge of Flat Bed Without Bedrails 1   Moving Bed to Chair 1   Standing Up From Chair Using Arms 1   Walk in Room 1   Climb 3-5 Stairs With Railing 1   Basic Mobility Inpatient Raw Score 7   Turning Head Towards Sound 4   Follow Simple Instructions 4   Low Function Basic Mobility Raw Score  15   Low Function Basic Mobility Standardized Score  23.9   Highest Level Of Mobility   JH-HLM Goal 2: Bed activities/Dependent transfer   JH-HLM Achieved 4: Move to chair/commode   Exercises   Hip Flexion Supine;10 reps;AROM; Bilateral  (SLR)   Hip Abduction Sitting;10 reps;AROM; Bilateral  (manual resistance)   Knee AROM Long Arc Quad Sitting;10 reps;AROM; Bilateral   End of Consult Patient Position at End of Consult Bedside chair; All needs within reach       [facility], [facility]

## 2023-08-09 NOTE — CASE MANAGEMENT
[address] received request for authorization from Care Manager.   Authorization request for: SNF  Facility Name: [id number] NPI: [id number]  Facility MD: Kena Lemus NPI: [id number]  Authorization initiated by contacting insurance: Deya Evans Via: [employer]   Pending Reference #: [id number]   Clinicals submitted via: [place]

## 2023-08-09 NOTE — PROGRESS NOTES
[facility]  Progress Note  Name: Pravin Merida  MRN: [id number]  Unit/Bed#: PPHP 523-01 I Date of Admission: 7/27/2023   Date of Service: 8/9/2023 I Hospital Day: 15    Assessment/Plan   * PAD (peripheral artery disease) ([address])  Assessment & Plan  Patient with hx of PAD s/p right sided BKA  Now presenting with worsening multiple diabetic ulcerations of left leg and foot  Transferred for vascular eval  He was evaluated by vascular and podiatry  Left lower extremity x-ray was suspicious for osteomyelitis  Briefly required ICU level of care after developing postop hypotension requiring pressors  Also is s/p 2u PRBC for ABLA during surgery    Plan:  10 day broad-spectrum antibiotics-status post completion of antibiotic therapy on the fourth  Patient is s/p L BKA POD 6  Vascular surgery following  Cleared for discharge    S/P BKA (below knee amputation), left ([address])  Assessment & Plan  POD#4  See a/p above for "PAD"    Hx of BKA, right ([address])  Assessment & Plan  Hx of right sided bka    Cardiomyopathy ([address])  Assessment & Plan  • Echocardiogram 4/2023: EF 35 to 40%, grade 3 DD, RV systolic function mildly reduced, PFO visualized, trace AI, moderate MR, moderate TR. (previously 25% in 2/2023)  • Outpatient GDMT: Entresto 49-51 mg twice daily (on hold), and metoprolol succinate 50 mg BID (on hold)  • Outpatient diuretic regimen; metolazone 5 mg daily ?; not previously on a loop diuretic ?  • Inpatient diuretic regimen; torsemide 20 mg daily (on hold)    Essential hypertension  Assessment & Plan  Stable BP  Continue to monitor    CAD (coronary artery disease)  Assessment & Plan  • Patient has a history of coronary artery disease, previous cardiac cath with diffuse CAD recommending GDMT  • Continue aspirin, Plavix statin, beta-blocker    Acute on chronic systolic heart failure ([address])  Assessment & Plan  Wt Readings from Last 3 Encounters:   08/09/23 77.1 kg (170 lb)   07/26/23 67.7 kg (149 lb 4 oz)   23 59 kg (130 lb)     History of CHF with EF 25%  Euvolemic on exam  Home medications: Entresto, Toprol-XL and Demadex    Plan:  Continue to hold Entresto and Demadex  Continue Toprol-XL  Cardiology following  Monitor Cr, daily BMP  Plan to resume entresto and demadex as outpatient  Cleared for discharge by cardio      Type 2 diabetes mellitus with hyperglycemia, with long-term current use of insulin [facility])  Assessment & Plan  Lab Results   Component Value Date    HGBA1C 7.3 (H) 2023       Recent Labs     23  1543 23  20523  0617 23  1032   POCGLU 94 91 102 132       Blood Sugar Average: Last 72 hrs:  (P) 155.125   Patient refusing cardiac/diabetic diet  He wants regular diet   continue with Lantus and Humalog with meals  Continue insulin sliding scale  Monitor  On  he had episode of hypoglycemia and was given a dose of dextrose, he is likely more sensitive to insulin in the setting of MATTHEW from his hypotension. Continue to monitor. Mealtime insulin was decreased to 4 units.  Having hyperglycemia. Switch diet to carb consistent. Lantus 10 units, lispro 3 units with meals. ISS           VTE Pharmacologic Prophylaxis:   Pharmacologic: Heparin  Mechanical VTE Prophylaxis in Place: No    Patient Centered Rounds: I have performed bedside rounds with nursing staff today. Time Spent for Care: 54. More than 50% of total time spent on counseling and coordination of care as described above. Current Length of Stay: 13 day(s)    Current Patient Status: Inpatient       Code Status: Level 1 - Full Code      Subjective:   nad    Objective:     Vitals:   Temp (24hrs), Av.1 °F (36.7 °C), Min:97.9 °F (36.6 °C), Max:98.4 °F (36.9 °C)    Temp:  [97.9 °F (36.6 °C)-98.4 °F (36.9 °C)] 97.9 °F (36.6 °C)  HR:  [75-82] 82  Resp:  [14-18] 14  BP: ()/(51-68) 124/68  SpO2:  [92 %-97 %] 92 %  Body mass index is 24.39 kg/m².      Input and Output Summary (last 24 hours): Intake/Output Summary (Last 24 hours) at 8/9/2023 1155  Last data filed at 8/9/2023 0930  Gross per 24 hour   Intake 1330 ml   Output 450 ml   Net 880 ml       Physical Exam:     Physical Exam  Constitutional:       Appearance: Normal appearance. HENT:      Head: Normocephalic and atraumatic. Cardiovascular:      Rate and Rhythm: Normal rate and regular rhythm. Heart sounds: No murmur heard. Pulmonary:      Effort: Pulmonary effort is normal.      Breath sounds: Normal breath sounds. Abdominal:      General: Abdomen is flat. Palpations: Abdomen is soft. Musculoskeletal:         General: No swelling. Normal range of motion. Cervical back: Normal range of motion and neck supple. Skin:     General: Skin is warm and dry. Neurological:      General: No focal deficit present. Mental Status: He is alert and oriented to person, place, and time. Psychiatric:         Mood and Affect: Mood normal.         Additional Data:     Labs:    Results from last 7 days   Lab Units 08/08/23  0451 08/07/23  0607   WBC Thousand/uL 8.55 8.59   HEMOGLOBIN g/dL 7.1* 7.6*   HEMATOCRIT % 23.0* 24.4*   PLATELETS Thousands/uL 294 278   NEUTROS PCT %  --  77*   LYMPHS PCT %  --  11*   MONOS PCT %  --  7   EOS PCT %  --  5     Results from last 7 days   Lab Units 08/08/23  0451 08/05/23  0509 08/04/23  0215   POTASSIUM mmol/L 3.8   < > 4.9   CHLORIDE mmol/L 111*   < > 110*   CO2 mmol/L 26   < > 25   BUN mg/dL 60*   < > 54*   CREATININE mg/dL 1.43*   < > 1.45*   CALCIUM mg/dL 8.4   < > 8.6   ALK PHOS U/L  --   --  256*   ALT U/L  --   --  22   AST U/L  --   --  24    < > = values in this interval not displayed. * I Have Reviewed All Lab Data Listed Above. * Additional Pertinent Lab Tests Reviewed:  All Labs Within Last 24 Hours Reviewed        Recent Cultures (last 7 days):           Last 24 Hours Medication List:   Current Facility-Administered Medications   Medication Dose Route Frequency Provider Last Rate   • acetaminophen  975 mg Oral Q6H PRN Gordy Govea PA-C     • aspirin  81 mg Oral Daily Gordy Govea PA-C     • atorvastatin  40 mg Oral Daily With KB Home	Feliciano Ramirez YEN Godinez     • clopidogrel  75 mg Oral Daily Gordy Govea PA-C     • escitalopram  5 mg Oral Daily Gordy Govea PA-C     • finasteride  5 mg Oral Daily Miguel Godinez PA-C     • heparin (porcine)  5,000 Units Subcutaneous [facility] Miguel Godinez PA-C     • HYDROmorphone  0.5 mg Intravenous Q3H PRN Gordy Govea PA-C     • insulin glargine  10 Units Subcutaneous HS Ronaldo Haddad PA-C     • insulin lispro  1-6 Units Subcutaneous TID AC Shahab Gutierrez DO     • insulin lispro  2-12 Units Subcutaneous HS Carlos Manuel Godinez PA-C     • insulin lispro  3 Units Subcutaneous TID With Meals Shahab Gutierrez DO     • metoprolol succinate  25 mg Oral Q12H [address] Carlos Manuel Godinez PA-C     • mirtazapine  7.5 mg Oral HS Gordy Govea PA-C     • oxyCODONE  10 mg Oral Q4H PRN Gordy Govea PA-C     • oxyCODONE  5 mg Oral Q4H PRN Gordy Govea PA-C     • pantoprazole  40 mg Oral Daily Miguel Godinez PA-C     • senna-docusate sodium  1 tablet Oral HS Miguel Godinez PA-C     • tamsulosin  0.4 mg Oral Daily With Dinner Gordy Govea PA-C          Today, Patient Was Seen By: Shahida Wong DO    ** Please Note: Dictation voice to text software may have been used in the creation of this document.  **

## 2023-08-09 NOTE — PLAN OF CARE
Problem: Nutrition/Hydration-ADULT  Goal: Nutrient/Hydration intake appropriate for improving, restoring or maintaining nutritional needs  Description: Monitor and assess patient's nutrition/hydration status for malnutrition. Collaborate with interdisciplinary team and initiate plan and interventions as ordered. Monitor patient's weight and dietary intake as ordered or per policy. Utilize nutrition screening tool and intervene as necessary. Determine patient's food preferences and provide high-protein, high-caloric foods as appropriate.      INTERVENTIONS:  - Monitor oral intake, urinary output, labs, and treatment plans  - Assess nutrition and hydration status and recommend course of action  - Evaluate amount of meals eaten  - Assist patient with eating if necessary   - Allow adequate time for meals  - Recommend/ encourage appropriate diets, oral nutritional supplements, and vitamin/mineral supplements  - Order, calculate, and assess calorie counts as needed  - Recommend, monitor, and adjust tube feedings based on assessed needs  - Assess need for intravenous fluids  - Provide nutrition/hydration education as appropriate  - Include patient/family/caregiver in decisions related to nutrition  Outcome: Progressing

## 2023-08-10 VITALS
BODY MASS INDEX: 24.34 KG/M2 | DIASTOLIC BLOOD PRESSURE: 64 MMHG | OXYGEN SATURATION: 99 % | HEART RATE: 75 BPM | TEMPERATURE: 98 F | WEIGHT: 170 LBS | SYSTOLIC BLOOD PRESSURE: 118 MMHG | RESPIRATION RATE: 18 BRPM | HEIGHT: 70 IN

## 2023-08-10 LAB
GLUCOSE SERPL-MCNC: 156 MG/DL (ref 65–140)
GLUCOSE SERPL-MCNC: 201 MG/DL (ref 65–140)

## 2023-08-10 PROCEDURE — 82948 REAGENT STRIP/BLOOD GLUCOSE: CPT

## 2023-08-10 PROCEDURE — 99239 HOSP IP/OBS DSCHRG MGMT >30: CPT | Performed by: INTERNAL MEDICINE

## 2023-08-10 RX ORDER — INSULIN GLARGINE 100 [IU]/ML
10 INJECTION, SOLUTION SUBCUTANEOUS
Qty: 10 ML | Refills: 0 | Status: SHIPPED | OUTPATIENT
Start: 2023-08-10

## 2023-08-10 RX ORDER — OXYCODONE HYDROCHLORIDE 5 MG/1
5 TABLET ORAL EVERY 4 HOURS PRN
Qty: 10 TABLET | Refills: 0 | Status: SHIPPED | OUTPATIENT
Start: 2023-08-10 | End: 2023-08-20

## 2023-08-10 RX ADMIN — CLOPIDOGREL BISULFATE 75 MG: 75 TABLET ORAL at 08:07

## 2023-08-10 RX ADMIN — METOPROLOL SUCCINATE 25 MG: 25 TABLET, FILM COATED, EXTENDED RELEASE ORAL at 08:07

## 2023-08-10 RX ADMIN — PANTOPRAZOLE SODIUM 40 MG: 40 TABLET, DELAYED RELEASE ORAL at 06:05

## 2023-08-10 RX ADMIN — INSULIN LISPRO 3 UNITS: 100 INJECTION, SOLUTION INTRAVENOUS; SUBCUTANEOUS at 11:06

## 2023-08-10 RX ADMIN — INSULIN LISPRO 1 UNITS: 100 INJECTION, SOLUTION INTRAVENOUS; SUBCUTANEOUS at 11:06

## 2023-08-10 RX ADMIN — HEPARIN SODIUM 5000 UNITS: 5000 INJECTION INTRAVENOUS; SUBCUTANEOUS at 06:04

## 2023-08-10 RX ADMIN — INSULIN LISPRO 3 UNITS: 100 INJECTION, SOLUTION INTRAVENOUS; SUBCUTANEOUS at 08:07

## 2023-08-10 RX ADMIN — ASPIRIN 81 MG CHEWABLE TABLET 81 MG: 81 TABLET CHEWABLE at 08:07

## 2023-08-10 RX ADMIN — ESCITSLOPRAM 5 MG: 5 TABLET ORAL at 08:07

## 2023-08-10 RX ADMIN — INSULIN LISPRO 2 UNITS: 100 INJECTION, SOLUTION INTRAVENOUS; SUBCUTANEOUS at 06:06

## 2023-08-10 RX ADMIN — FINASTERIDE 5 MG: 5 TABLET, FILM COATED ORAL at 08:07

## 2023-08-10 NOTE — CASE MANAGEMENT
[facility] has received approved authorization from insurance: Rissa Steward in by Rep: Luigi Beasley P# [phone]  Authorization received for: [facility]  Facility: [address] [place] [address] #: [id number]   Start of Care: 08/10/2023  Next Review Date: 08/15/2023  Continued Stay Care Coordinator: Adela Carpio.  P#: [phone]  Submit next review to: [phone]   Care Manager notified: Jarrod Peña

## 2023-08-10 NOTE — ASSESSMENT & PLAN NOTE
• Echocardiogram 4/2023: EF 35 to 40%, grade 3 DD, RV systolic function mildly reduced, PFO visualized, trace AI, moderate MR, moderate TR. (previously 25% in 2/2023)  • Outpatient GDMT: Entresto 49-51 mg twice daily (on hold), and metoprolol succinate 50 mg BID (on hold)  • Outpatient diuretic regimen; metolazone 5 mg elijah  • Inpatient diuretic regimen; torsemide 20 mg daily (on hold)

## 2023-08-10 NOTE — ASSESSMENT & PLAN NOTE
Wt Readings from Last 3 Encounters:   08/10/23 77.1 kg (170 lb)   07/26/23 67.7 kg (149 lb 4 oz)   05/18/23 59 kg (130 lb)     History of CHF with EF 25%  Euvolemic on exam  Home medications: Entresto, Toprol-XL and Demadex    Plan:  Continue to hold Entresto and Demadex  Continue Toprol-XL  Cardiology following  Monitor Cr, daily BMP  Plan to resume entresto and demadex as outpatient  Cleared for discharge by cardio

## 2023-08-10 NOTE — DISCHARGE SUMMARY
[facility]  Discharge- Erlinda Otto 1945, 69 y.o. male MRN: [id number]  Unit/Bed#: [facility] 523-01 Encounter: [id number]  Primary Care Provider: Surinder Lind,    Date and time admitted to hospital: 7/27/2023  6:49 PM    * PAD (peripheral artery disease) [facility])  Assessment & Plan  Patient with hx of PAD s/p right sided BKA  Required a BKA for worsening wounds on left side. He was evaluated by vascular and podiatry  Left lower extremity x-ray was suspicious for osteomyelitis  y    Plan:  10 day broad-spectrum antibiotics-status post completion of antibiotic therapy on the fourth  Patient is s/p L BKA POD 7 Vascular surgery following  Cleared for discharge by vascular and cardiology    MATTHEW (acute kidney injury) [facility])  Assessment & Plan  Recent Labs     08/08/23  0451   CREATININE 1.43*   EGFR 46     Estimated Creatinine Clearance: 44.7 mL/min (A) (by C-G formula based on SCr of 1.43 mg/dL (H)). • Baseline Cr 0.8-1.0  • Likely secondary to intraoperative hypotension and acute blood loss anemia  • Likely component of urinary retenion as well.   Will discharge with plans for outpatient voiding trial    Plan:  • Daily weights, I/O  • Monitor BMP daily and observe for downward trend of creatinine  • Avoid hypoperfusion of the kidneys, minimize nephrotoxins  • RAAS Blockers/Diuretics held: home entresto, demadex  • Cardiology consulted  • Plan to resume entresto and demadex as outpatient      S/P BKA (below knee amputation), left [facility])  Assessment & Plan  POD#7  See a/p above for "PAD"    Anemia  Assessment & Plan  Anemia panel consistent with anemia of chronic disease  Blood transfusion for hemoglobin below 7  Status post 2 units PRBCs for acute blood loss anemia intraoperatively  Monitor    Hx of BKA, right (HCC)  Assessment & Plan  Hx of right sided bka    Cardiomyopathy ([address])  Assessment & Plan  • Echocardiogram 4/2023: EF 35 to 40%, grade 3 DD, RV systolic function mildly reduced, PFO visualized, trace AI, moderate MR, moderate TR. (previously 25% in 2/2023)  • Outpatient GDMT: Entresto 49-51 mg twice daily (on hold), and metoprolol succinate 50 mg BID Demadex on hold  • Outpatient diuretic regimen; metolazone 5 mg daily on hold    Essential hypertension  Assessment & Plan  Stable BP  Continue to monitor    CAD (coronary artery disease)  Assessment & Plan  • Patient has a history of coronary artery disease, previous cardiac cath with diffuse CAD recommending GDMT  • Continue aspirin, Plavix statin, beta-blocker    Acute on chronic systolic heart failure (HCC)  Assessment & Plan  Wt Readings from Last 3 Encounters:   08/10/23 77.1 kg (170 lb)   07/26/23 67.7 kg (149 lb 4 oz)   05/18/23 59 kg (130 lb)     History of CHF with EF 25%  Patient presented with acute decompensated heart failure status post blood transfusion/IV fluids for treatment of hypotension in the setting of ejection fraction of 25%. Now euvolemic. Given patient's borderline renal function we will hold on Demadex/Entresto as per cardiology's recommendations. Will need close outpatient follow-up for restarting diuretic regimen for management of heart failure.     Plan:  Continue to hold Entresto and Demadex  Continue Toprol-XL  Cardiology following  Follow-up on repeat blood work as outpatient  Plan to resume entresto and demadex as outpatient  Cleared for discharge by cardio      Type 2 diabetes mellitus with hyperglycemia, with long-term current use of insulin [facility])  Assessment & Plan  Lab Results   Component Value Date    HGBA1C 7.3 (H) 05/04/2023       Recent Labs     08/09/23  1032 08/09/23  1524 08/09/23  2052 08/10/23  0558   POCGLU 132 126 146* 201*       Blood Sugar Average: Last 72 hrs:  (P) 927.9853041682145814   Patient refusing cardiac/diabetic diet  He wants regular diet   continue with Lantus and Humalog with meals  Continue insulin sliding scale  Monitor  On 8/5 he had episode of hypoglycemia and was given a dose of dextrose, he is likely more sensitive to insulin in the setting of MATTHEW from his hypotension. Continue to monitor. Mealtime insulin was decreased to 4 units. 8/7 Having hyperglycemia. Switch diet to carb consistent. Lantus 10 units, lispro 3 units with meals. ISS              Medical Problems     Resolved Problems  Date Reviewed: 8/7/2023   None         Admission Date:   Admission Orders (From admission, onward)     Ordered        07/27/23 1910  Inpatient Admission  Once                        Admitting Diagnosis: Visit for wound check [Z51.89]  Peripheral arterial disease ([address]) [I73.9]    HPI: This is a very pleasant 81-year-old male with past medical history significant for peripheral arterial disease  requiring multiple vascular interventions and right-sided BKA who presents to the hospital with left lower extremity wounds. Patient was recommended transfer to [facility] for further assessment by vascular surgery. Performed: No orders of the defined types were placed in this encounter. Summary of Hospital Course: Patient was admitted with peripheral arterial disease with lower extremity wound. He is status post left-sided BKA. He was followed by cardiology who recommends reinitiating Entresto and Demadex as an outpatient. Will need outpatient follow-up with cardiology. Patient was noted to have significant urinary retention during this hospitalization. Will also require voiding trial at the nursing facility rehab. Suspect that this is likely contributing to renal function. Creatinine appears to have plateaued. Will discharge with outpatient blood work and follow-up with cardiology regarding reinitiation of diuretic therapy including Entresto and Demadex. Condition at Discharge: fair         Discharge instructions/Information to patient and family:   See after visit summary for information provided to patient and family.       Provisions for Follow-Up Care:  See after visit summary for information related to follow-up care and any pertinent home health orders. PCP: Adelaida Beavers DO    Disposition: Home    Planned Readmission: No    Discharge Statement   I spent 85 minutes discharging the patient. This time was spent on the day of discharge. I had direct contact with the patient on the day of discharge. Additional documentation is required if more than 30 minutes were spent on discharge. Discharge Medications:  See after visit summary for reconciled discharge medications provided to patient and family.

## 2023-08-10 NOTE — ASSESSMENT & PLAN NOTE
Recent Labs     08/08/23  0451   CREATININE 1.43*   EGFR 46     Estimated Creatinine Clearance: 44.7 mL/min (A) (by C-G formula based on SCr of 1.43 mg/dL (H)).     • Baseline Cr 0.8-1.0  • Likely secondary to intraoperative hypotension and acute blood loss anemia    Plan:  • Daily weights, I/O  • Monitor BMP daily and observe for downward trend of creatinine  • Avoid hypoperfusion of the kidneys, minimize nephrotoxins  • RAAS Blockers/Diuretics held: home entresto, demadex  • Cardiology consulted  • Plan to resume entresto and demadex as outpatient

## 2023-08-10 NOTE — ASSESSMENT & PLAN NOTE
Patient with hx of PAD s/p right sided BKA  Now presenting with worsening multiple diabetic ulcerations of left leg and foot  Transferred for vascular eval  He was evaluated by vascular and podiatry  Left lower extremity x-ray was suspicious for osteomyelitis  Briefly required ICU level of care after developing postop hypotension requiring pressors  Also is s/p 2u PRBC for ABLA during surgery    Plan:  10 day broad-spectrum antibiotics-status post completion of antibiotic therapy on the fourth  Patient is s/p L BKA POD 7 Vascular surgery following  Cleared for discharge

## 2023-08-10 NOTE — ASSESSMENT & PLAN NOTE
Lab Results   Component Value Date    HGBA1C 7.3 (H) 05/04/2023       Recent Labs     08/09/23  1032 08/09/23  1524 08/09/23  2052 08/10/23  0558   POCGLU 132 126 146* 201*       Blood Sugar Average: Last 72 hrs:  (P) 503.4407619690299053   Patient refusing cardiac/diabetic diet  He wants regular diet   continue with Lantus and Humalog with meals  Continue insulin sliding scale  Monitor  On 8/5 he had episode of hypoglycemia and was given a dose of dextrose, he is likely more sensitive to insulin in the setting of MATTHEW from his hypotension. Continue to monitor. Mealtime insulin was decreased to 4 units. 8/7 Having hyperglycemia. Switch diet to carb consistent. Lantus 10 units, lispro 3 units with meals.  ISS

## 2023-08-10 NOTE — INCIDENTAL FINDINGS
The following findings require follow up:  Non-Radiographic finding   Finding: Creatinine at 1.43 on Entresto and Demadex on hold currently. Will need follow-up with cardiology follow up required: Repeat labs after reassessment with voiding trial for [place]. Patient with scrotal edema and urinary retention postoperatively.   Will discharge with Mensah in place with plans for outpatient follow-up for voiding trial.   Follow up should be done within BMP week(s)    Please notify the following clinician to assist with the follow up:   Dr. Maximiliano Pedersen

## 2023-08-10 NOTE — CASE MANAGEMENT
Case Management Discharge Note    Patient name Jefferson Salcido  [facility] [address] [id number]  : 1945 Date 8/10/2023       Current Admission Date: 2023  Current Admission Diagnosis:PAD (peripheral artery disease) ([address])      LOS (days): 14  Geometric Mean LOS (GMLOS) (days): 10.30  Days to GMLOS:-3.3     OBJECTIVE:  Risk of Unplanned Readmission Score: 29.99   Current admission status: Inpatient   Primary Insurance: [facility]  Secondary Insurance: [employer] [facility] REP    DISCHARGE DETAILS:    Discharge planning discussed with[de-identified] Patient  Freedom of Choice: Yes  CM contacted family/caregiver?: Yes  Were Treatment Team discharge recommendations reviewed with patient/caregiver?: Yes  Did patient/caregiver verbalize understanding of patient care needs?: Yes  Were patient/caregiver advised of the risks associated with not following Treatment Team discharge recommendations?: Yes    Contacts  Patient Contacts: Claudeen Nicks (friend)  Relationship to Patient[de-identified] Friend  Contact Method: Phone  Phone Number: [phone]  Reason/Outcome: Emergency Contact    Treatment Team Recommendation: Short Term Rehab  Discharge Destination Plan[de-identified] Short Term Rehab  Transport at Discharge : S Ambulance  Dispatcher Contacted: Yes  Number/Name of Dispatcher: [phone]  [facility]  ETA of Transport (Date): 08/10/23  ETA of Transport (Time): 1130 (11:30AM)  Transfer Mode: Stretcher  Accompanied by: EMS personnel  Transfer Equipment: BLS devices    Additional Comments: Pt is cleared for d/c by ORALIA More. HARRY England was notified of pt's d/c order. Pt is accepted for short-term skilled rehab placement at [facility] located in [address] [place] [address],[address]. CM obtained auth# [id number] for SNF placement from pt's [facility].  CM arranged BLS ambulance via [facility] for 11:30AM pickup this day to transport pt to SNF. The pt and his friend Jenni Carrion were both informed of d/c. IMM signed by pt. PCS for transport completed by CM. Chart copy for SNF requested from RNDelores MOLINA to follow. Accepting Facility Name, [address] [place] [address] :  [facility]  Receiving Facility/Agency Phone Number: [phone]  Facility/Agency Fax Number: [facility] Number: [phone]

## 2023-08-11 ENCOUNTER — PATIENT OUTREACH (OUTPATIENT)
Dept: CASE MANAGEMENT | Facility: OTHER | Age: 78
End: 2023-08-11

## 2023-08-11 NOTE — PROGRESS NOTES
Outpatient care management referral via HRR report 8/11/23. Discharged 8/10/23 to [facility]. Email sent to facility to inform them I will be following the patient during their skilled stay. This Admin Coordinator will continue to monitor via chart review.

## 2023-08-11 NOTE — UTILIZATION REVIEW
NOTIFICATION OF ADMISSION DISCHARGE   This is a Notification of Discharge from [address] [place] [address]. Please be advised that this patient has been discharge from our facility. Below you will find the admission and discharge date and time including the patient’s disposition. UTILIZATION REVIEW CONTACT:  Sandy Morley  Utilization   Network Utilization Review Department  Phone: [phone] x carefully listen to the prompts. All voicemails are confidential.  Email: [email or web address]     ADMISSION INFORMATION  PRESENTATION DATE: 7/27/2023  6:49 PM  OBERVATION ADMISSION DATE:  INPATIENT ADMISSION DATE: 7/27/23  6:49 PM   DISCHARGE DATE: 8/10/2023  1:13 PM   DISPOSITION:Non [facility] SNF/TCU/SNU    IMPORTANT INFORMATION:  Send all requests for admission clinical reviews, approved or denied determinations and any other requests to dedicated fax number below belonging to the campus where the patient is receiving treatment.  List of dedicated fax numbers:  [place] DENIALS (Administrative/Medical Necessity) [phone] [address] [facility] (Maternity/NICU/Pediatrics) [phone]   [facility] [phone]   [facility] [phone] [facility] [phone]   [address] [place] [address] [phone]   [facility] [phone]   [address] [place], [place] [address]e [address] [place] [address] [phone]   [address] [place] [address] [phone]   [address] [facility] [phone] [address] [facility] [address] 32[facility] [phone]

## 2023-08-14 ENCOUNTER — TELEPHONE (OUTPATIENT)
Dept: VASCULAR SURGERY | Facility: CLINIC | Age: 78
End: 2023-08-14

## 2023-08-14 NOTE — TELEPHONE ENCOUNTER
Vascular Nurse Navigator Post Op Call    Procedure:  Left - AMPUTATION BELOW KNEE (BKA)    Date of Procedure:  8/3/23    Surgeon:     Cris Downey, DO - Primary     * Corrine Hanley MD - Assisting     * Jose Roberto Samuel DO - Fellow    Discharge Date:  8/10/23    Discharge Disposition: [address] [place] [address] at [facility] SNF      Anticoagulation pt was discharged on post op?: Aspirin and Clopidogrel (Plavix)    Statin pt was discharged on post op?: Lipitor (atorvastatin)    NEXT OFFICE VISIT SCHEDULED: 8/29/23 at 2:30 pm with Ginny Donovan PA-C at The [address] [place] [address]    Transportation: Yes, facility to arrange transportation      Any further questions/concerns? Spoke with Daysi Howard, nurse at [facility], in regards to above. She stated that patient was sent out to [facility] this morning to an unresponsive episode. She stated that prior to transfer, patient's incision looked good and without any signs of infection. All questions answered.

## 2023-08-16 ENCOUNTER — PATIENT OUTREACH (OUTPATIENT)
Dept: CASE MANAGEMENT | Facility: OTHER | Age: 78
End: 2023-08-16

## 2023-08-16 NOTE — PROGRESS NOTES
Chart review completed. The patient admitted 8/14/23 to [facility]. This Admin Coordinator will continue to monitor via chart review.

## 2023-08-23 ENCOUNTER — PATIENT OUTREACH (OUTPATIENT)
Dept: CASE MANAGEMENT | Facility: OTHER | Age: 78
End: 2023-08-23

## 2023-08-23 NOTE — PROGRESS NOTES
Chart review complete the patient is currently admitted to [address] [place] [address]. The patient has a TDD of 8/25/23 to SNF. This Admin Coordinator will continue to monitor via chart review.

## 2023-08-28 NOTE — PROGRESS NOTES
Assessment/Plan:    Status post left BKA 8/3/2023 [facility])  Post-operative state    -no complaints; no extremity pain or incisional complaints  -no fevers, chills  -Hospitalized [facility] (8/14/-8/25) for bilateral PE's and now on anticoagulation with apixaban 5 twice daily        L BKA  -Staples in place and incision is overall well approximated; mild separation at the middle of the incision with mild serosanguineous drainage  -Mild stump edema and moderate knee and thigh swelling  -Incision is still healing; staples left in place    Plan:  POD #26 after L BKA for non-healing wounds. The incision is still healing so staples were left in place. There is edema in the extremity. No overt infection.   Patient is currently in short-term rehab, but since discharge from [facility], he ended up in the hospital with bilateral PEs and now on anticoagulation.    -Continue with local care and monitoring of incision  -Wash daily with saline  -Apply Betadine along the incision  -Needs better edema control  -Continue with gentle Ace wrap compression and elevation  -Maintain good diabetes control  -Optimize protein and nutrition status for healing  -Continue with medical therapy on aspirin, apixaban 5 twice a day and atorvastatin 40  -Follow-up office visit in about 3 weeks for recheck  -Call or go to ED if incisional breakdown, heat or redness      Bilateral pulmonary emboli  -Recently hospitalized at [facility]  -Now on apixaban 5 twice daily    Left upper extremity swelling  -Appears severe  -He is already on apixaban 5 twice daily  -Since I do not have a history of this, will defer indication for venous duplex to facility      CC: Dr. Laith Morris      Additional diagnoses:     Hx of BKA, right ([address])  -healed and closed    Severe protein-calorie malnutrition ([address])    Ischemic cardiomyopathy    Type 2 diabetes mellitus with hyperglycemia, with long-term current use of insulin ([address])    Other orders  -     apixaban (ELIQUIS) 5 mg; Take 5 mg by mouth 2 (two) times a day      Subjective:      Patient ID: Sis Eli is a 69 y.o. male. Patient is s/p L BKA on 8/3/23 [facility]) and presents today for post-op visit. HPI    Mr. Terrance Lozano 79y/o BPH, chronic indwelling Mensah catheter, hypertension, isch CMP, chronic sys/dCHF, [employer] pacemaker, diabetes requiring insulin, peripheral arterial disease, s/p R BKA in setting of non-salvageable R foot with non-healing wound who developed LCLTI with poor healing open LLE wounds, XR concerning for OM for which L BKA was recommended. Patient underwent left below the knee amputation by Dr. Vlad Levine on 8/3/2023. Surgery was without any apparent complication. 8/29/23: Patient is now about 3 and half weeks postop after left below the knee amputation. Since he was discharged to rehab, he ended up at [facility] after unresponsive episode. He was found to have pulmonary emboli and placed on full anticoagulation. He also required thoracentesis for bilateral large pleural effusions. He developed acute kidney injury (creat pk 3.55) which is was attributed to contrast-induced nephropathy for which he received IV fluids. Elevated troponin. He also required a unit of packed red blood cells and Venofer for iron deficiency anemia. Hemoglobin A1c was noted to be 7.3. Patient tells me that he is back at [address] [facility] rehab. He is otherwise unable to provide any details about rehab or his recent hospitalization. The information regarding intercurrent hospitalization was noted in Epic. He offers no complaints. He denies any stump pain. He has no incisional complaints. No drainage or bleeding. He tells me that the stump is being covered with a pad and Ace wrap but denies any ongoing cleansing or wound care. He has no fevers or chills. Noted, severe left arm pitting edema on examination. He is unable to tell me how long he has had arm swelling.   At this point, he is already on anticoagulation so will defer any further work-up for this to the facility. The following portions of the patient's history were reviewed and updated as appropriate: allergies, current medications, past family history, past medical history, past social history, past surgical history and problem list.    Review of Systems   Constitutional: Negative. HENT: Negative. Eyes: Negative. Respiratory: Negative. Cardiovascular: Negative. Gastrointestinal: Negative. Endocrine: Negative. Genitourinary: Negative. Musculoskeletal: Positive for gait problem. Skin: Negative. Allergic/Immunologic: Negative. Hematological: Negative. Psychiatric/Behavioral: Negative. Objective:    /64 (BP Location: Right arm, Patient Position: Sitting)   Pulse 74      Physical Exam      Seated in WC  Pale. Appears chronically ill  Non-labored  RRR S1S2    B upper extremity ecchymoses  Right upper extremity marked swelling (chronic); motor sensation intact    Bilateral below the knee amputation with moderate to severe thigh edema    R BKA is healed and closed    Left BKA with staples in place; mild stump edema and moderate knee and thigh swelling    The L BKA incison is overall well approximated, there is very minimal separation at the middle of the incision with mild serosanguineous drainage      I have reviewed and made appropriate changes to the review of systems input by the medical assistant.     Vitals:    08/29/23 1433   BP: 106/64   BP Location: Right arm   Patient Position: Sitting   Pulse: 74       Patient Active Problem List   Diagnosis   • Hypokalemia   • Type 2 diabetes mellitus with hyperglycemia, with long-term current use of insulin (HCC)   • Abnormal EKG   • Acute on chronic systolic heart failure (HCC)   • Urinary retention   • Moderate protein malnutrition (HCC)   • Unintentional weight loss   • CAD (coronary artery disease)   • Suspected deep tissue injury of unknown depth   • Diabetic ulcer of both feet associated with type 2 diabetes mellitus ([address])   • Toenail avulsion   • PAD (peripheral artery disease) ([address])   • Essential hypertension   • Former tobacco use   • Cardiomyopathy ([address])   • Moderate protein-calorie malnutrition ([address])   • Ischemic cardiomyopathy   • Nausea & vomiting   • Depressed mood   • Encounter for competency evaluation   • Severe protein-calorie malnutrition ([address])   • Goals of care, counseling/discussion   • Deep tissue injury of sacrum   • Hx of BKA, right ([address])   • Visit for wound check   • Anemia   • S/P BKA (below knee amputation), left (HCC)   • MATTHEW (acute kidney injury) [facility])       Past Surgical History:   Procedure Laterality Date   • CARDIAC CATHETERIZATION     • CARDIAC CATHETERIZATION Left 2/14/2023    Procedure: Cardiac Left Heart Cath;  Surgeon: Shawnie Mcardle, MD;  Location: [facility] CARD[facility]; Service: Cardiology   • CARDIAC CATHETERIZATION N/A 2/14/2023    Procedure: Cardiac Coronary Angiogram;  Surgeon: Shawnie Mcardle, MD;  Location: [facility]; Service: Cardiology   • CATARACT EXTRACTION, BILATERAL Bilateral 2021   • IR LOWER EXTREMITY ANGIOGRAM  3/28/2023   • IR LOWER EXTREMITY ANGIOGRAM  4/6/2023   • LEG AMPUTATION THROUGH LOWER TIBIA AND FIBULA Right 3/30/2023    Procedure: (BKA); Surgeon: Jatin Sharma DO;  Location: [facility];  Service: Vascular   • LEG AMPUTATION THROUGH LOWER TIBIA AND FIBULA Left 8/3/2023    Procedure: AMPUTATION BELOW KNEE (BKA);   Surgeon: Jatin Sharma DO;  Location: [facility];  Service: Vascular   • WOUND DEBRIDEMENT Right 3/24/2023    Procedure: debridement of heel ulcer;  Surgeon: Kirk Umaña DPM;  Location: [facility];  Service: Podiatry       Family History   Problem Relation Age of Onset   • No Known Problems Sister    • Other Daughter         chronic neck pain   • Other Son         chronic back pain   • Sudden death Neg Hx    • Cancer Neg Hx        Social History     Socioeconomic History   • Marital status:      Spouse name: Not on file   • Number of children: 2   • Years of education: Not on file   • Highest education level: Not on file   Occupational History   • Not on file   Tobacco Use   • Smoking status: Former     Packs/day: 1.00     Years: 6.00     Total pack years: 6.00     Types: Cigarettes     Start date: 5     Quit date: 12     Years since quittin.6   • Smokeless tobacco: Never   Vaping Use   • Vaping Use: Never used   Substance and Sexual Activity   • Alcohol use: Not Currently     Comment: Denies history of heavy alcohol use. At most will drink 1 or 2 drinks in a year (Updated 2023). • Drug use: Never   • Sexual activity: Not on file   Other Topics Concern   • Not on file   Social History Narrative    Served in the [employer]; completed 18-month tour in [employer]    Previously worked for [employer]. Lives alone with his dog. Has a son and a daughter who both live into [place], [place] -does not maintain a close relationship with them. Denies having strong support system in place at home. Social Determinants of Health     Financial Resource Strain: Not on file   Food Insecurity: No Food Insecurity (3/25/2023)    Hunger Vital Sign    • Worried About Running Out of Food in the Last Year: Never true    • Ran Out of Food in the Last Year: Never true   Transportation Needs: No Transportation Needs (3/25/2023)    PRAPARE - Transportation    • Lack of Transportation (Medical): No    • Lack of Transportation (Non-Medical):  No   Physical Activity: Not on file   Stress: Not on file   Social Connections: Not on file   Intimate Partner Violence: Not on file   Housing Stability: Low Risk  (3/25/2023)    Housing Stability Vital Sign    • Unable to Pay for Housing in the Last Year: No    • Number of Places Lived in the Last Year: 1    • Unstable Housing in the Last Year: No       Allergies   Allergen Reactions   • Metformin Diarrhea         Current Outpatient Medications:   •  apixaban (ELIQUIS) 5 mg, Take 5 mg by mouth 2 (two) times a day, Disp: , Rfl:   •  aspirin (ECOTRIN LOW STRENGTH) 81 mg EC tablet, Take 1 tablet (81 mg total) by mouth daily, Disp: 30 tablet, Rfl: 0  •  atorvastatin (LIPITOR) 40 mg tablet, Take 1 tablet (40 mg total) by mouth daily with dinner, Disp: 30 tablet, Rfl: 0  •  clopidogrel (PLAVIX) 75 mg tablet, Take 1 tablet (75 mg total) by mouth daily Do not start before April 22, 2023., Disp: , Rfl: 0  •  escitalopram (LEXAPRO) 5 mg tablet, Take 1 tablet (5 mg total) by mouth daily Do not start before April 22, 2023., Disp: , Rfl: 0  •  finasteride (PROSCAR) 5 mg tablet, Take 1 tablet (5 mg total) by mouth daily, Disp: 30 tablet, Rfl: 11  •  insulin glargine (LANTUS) 100 units/mL subcutaneous injection, Inject 10 Units under the skin daily at bedtime, Disp: 10 mL, Rfl: 0  •  Insulin Pen Needle (Pen Needles) 31G X 5 MM MISC, Use 2 (two) times a day, Disp: 100 each, Rfl: 1  •  metoprolol succinate (TOPROL-XL) 50 mg 24 hr tablet, Take 1 tablet (50 mg total) by mouth every 12 (twelve) hours, Disp: 30 tablet, Rfl: 0  •  mirtazapine (REMERON) 7.5 MG tablet, Take 1 tablet (7.5 mg total) by mouth daily at bedtime, Disp: , Rfl: 0  •  pantoprazole (PROTONIX) 40 mg tablet, Take 1 tablet (40 mg total) by mouth daily, Disp: , Rfl: 0  •  sacubitril-valsartan (Entresto) 49-51 MG TABS, Take 1 tablet by mouth 2 (two) times a day, Disp: 60 tablet, Rfl: 1  •  senna-docusate sodium (SENOKOT S) 8.6-50 mg per tablet, Take 1 tablet by mouth daily as needed, Disp: , Rfl:   •  tamsulosin (FLOMAX) 0.4 mg, Take 1 capsule (0.4 mg total) by mouth daily with dinner, Disp: 30 capsule, Rfl: 11

## 2023-08-29 ENCOUNTER — OFFICE VISIT (OUTPATIENT)
Dept: VASCULAR SURGERY | Facility: CLINIC | Age: 78
End: 2023-08-29

## 2023-08-29 VITALS — HEART RATE: 74 BPM | SYSTOLIC BLOOD PRESSURE: 106 MMHG | DIASTOLIC BLOOD PRESSURE: 64 MMHG

## 2023-08-29 DIAGNOSIS — I25.5 ISCHEMIC CARDIOMYOPATHY: ICD-10-CM

## 2023-08-29 DIAGNOSIS — Z79.4 TYPE 2 DIABETES MELLITUS WITH HYPERGLYCEMIA, WITH LONG-TERM CURRENT USE OF INSULIN (HCC): ICD-10-CM

## 2023-08-29 DIAGNOSIS — E11.65 TYPE 2 DIABETES MELLITUS WITH HYPERGLYCEMIA, WITH LONG-TERM CURRENT USE OF INSULIN (HCC): ICD-10-CM

## 2023-08-29 DIAGNOSIS — Z89.512 S/P BKA (BELOW KNEE AMPUTATION), LEFT (HCC): Primary | ICD-10-CM

## 2023-08-29 DIAGNOSIS — Z89.511 HX OF BKA, RIGHT (HCC): ICD-10-CM

## 2023-08-29 DIAGNOSIS — E43 SEVERE PROTEIN-CALORIE MALNUTRITION (HCC): ICD-10-CM

## 2023-08-29 PROCEDURE — 99024 POSTOP FOLLOW-UP VISIT: CPT | Performed by: PHYSICIAN ASSISTANT

## 2023-08-29 NOTE — PATIENT INSTRUCTIONS
Status post left BKA 8/3/2023 [facility])    Plan:  -3 weeks post-op after L BKA  -Continue with local care and monitoring of incision  -Wash daily with saline  -Apply Betadine along the incision  -Needs better edema control  -Continue with gentle Ace wrap compression and elevation  -Maintain good diabetes control  -Optimize protein and nutrition status for healing  -Continue with medical therapy on aspirin, apixaban 5 twice a day and atorvastatin 40  -Follow-up office visit in about 3 weeks for recheck  -Call or go to ED if incisional breakdown, heat or redness        Bilateral pulmonary emboli  -Recently hospitalized at [facility]  -Now on apixaban 5 twice daily      Left upper extremity swelling  -Appears severe  -He is already on apixaban 5 twice daily  -Since I do not have a history of this, will defer indication for venous duplex to facility

## 2023-08-30 ENCOUNTER — PATIENT OUTREACH (OUTPATIENT)
Dept: CASE MANAGEMENT | Facility: OTHER | Age: 78
End: 2023-08-30

## 2023-08-30 NOTE — PROGRESS NOTES
Chart review complete the patient discharged 8/25/23 from [address] [place] [address] to [facility]. Email sent to facility requesting update on patient. This Admin Coordinator will continue to monitor via chart review throughout episode.

## 2023-09-06 ENCOUNTER — PATIENT OUTREACH (OUTPATIENT)
Dept: CASE MANAGEMENT | Facility: OTHER | Age: 78
End: 2023-09-06

## 2023-09-06 NOTE — PROGRESS NOTES
Chart review completed. Email sent to facility requesting update on patient. This Admin Coordinator will continue to monitor via chart review throughout episode. Discharged 8/25/23 to [facility].  Admitted 8/31/23 to [address] [place] [address].

## 2023-09-07 ENCOUNTER — TELEPHONE (OUTPATIENT)
Dept: VASCULAR SURGERY | Facility: CLINIC | Age: 78
End: 2023-09-07

## 2023-09-13 ENCOUNTER — PATIENT OUTREACH (OUTPATIENT)
Dept: CASE MANAGEMENT | Facility: OTHER | Age: 78
End: 2023-09-13

## 2023-09-13 NOTE — PROGRESS NOTES
Chart review complete the patient discharged 9/8/23 to [facility]. Chart review completed. Email sent to facility requesting update on patient. This Admin Coordinator will continue to monitor via chart review throughout episode.

## 2023-09-14 ENCOUNTER — PATIENT OUTREACH (OUTPATIENT)
Dept: CASE MANAGEMENT | Facility: OTHER | Age: 78
End: 2023-09-14

## 2023-09-14 NOTE — PROGRESS NOTES
Update received from facility the patient return from the hospital to LTC at [facility]. I have removed myself from the care team, updated the Care Coordination note, and closed the episode. Inbasket sent to the [facility]PS[facility] to inform the patient is now LTC.

## 2023-09-18 NOTE — PROGRESS NOTES
Assessment/Plan:    Pressure wounds to lateral aspect of L BKA  -Open sore to lateral aspect of the BKA (new since last OV)  -Evidence of pressure, breakdown      -LEFT BKA lateral aspect  -Evidence of redness and wounds along the lateral aspect of the stump and incision     Plan:  -Apply Betadine at least twice a day to lateral ulceration  -Recommend bandage to this area to avoid friction  -Avoid pressure to the left leg/BKA and rotate at least every 2 hours  -Consider pillow or wedge at night to avoid pressure  -Check that his wheelchair does not have a friction point which can cause wounds  -Continue close monitoring  -Follow-up with vascular surgery in about 2-3 weeks for re-check        S/P LEFT below the knee amputation Agathareginaldo Yadav, 8/3/2023)  -Progressing after L BKA      L BKA  -Stump appears healthy without incisional necrosis or ischemia  -Incision is overall healed with tiny scab in the middle for which Betadine was applied  -Incision was examined and cleansed with saline; staples were removed and Steri-Strips placed  -Stump is soft; no significant edema    Plan:  -POD #47 L BKA; overall progressing; incision is closing, there is lateral wound/breakdown as noted above  -Staples were removed in the office and replaced with Steri-Strips  -Continue with incisional care; Betadine along incision  -Maintain good diabetes control  -Healthy diet and protein supplementation  -Continue with apixaban 5 twice a day and atorvastatin 40  -Aspirin was discontinued at the facility due to epistaxis  -Continue with close monitoring and call if any concerns  -Follow-up in 2 to 3 weeks with vascular surgery      S/P RIGHT below the knee amputation Agathareginaldo Yadav, 3/30/23)  -R BKA is healed and closed  -No concerns at this time      Bilateral pulmonary emboli  -Hospitalized at [address] [place] [address]- for B PE's August '23  -Continue with apixaban 5 twice daily       Additional diagnoses:     Moderate protein-calorie malnutrition ([address])  Ischemic cardiomyopathy  PAD (peripheral artery disease) (HCC)  Type 2 diabetes mellitus with hyperglycemia, with long-term current use of insulin (HCC)      Subjective:    Patient ID: Diandra Santiago is a 69 y.o. male. Patient presents today for 3 week f/u visit- incision recheck s/p L BKA on 8/3/23 [facility]). HPI    Mr. Deborah Vick 79y/o BPH, chronic indwelling Mensah catheter, hypertension, isch CMP, chronic sys/dCHF, [employer] pacemaker, diabetes requiring insulin, peripheral arterial disease, s/p R BKA 3/2023 in setting of non-salvageable R foot with non-healing wound who developed LCLTI with poor healing open LLE wounds, XR concerning for OM for which L BKA was recommended. Patient underwent left below the knee amputation by Dr. Ze Alcaraz on 8/3/2023. Surgery was without any apparent complication. In August, shortly after discharge to rehab, he ended up at [facility] after unresponsive episode. He was found to have pulmonary emboli and placed on full anticoagulation. He also required thoracentesis for bilateral large pleural effusions. He developed acute kidney injury (creat pk 3.55) which is was attributed to contrast-induced nephropathy for which he received IV fluids. Elevated troponin. He also required a unit of packed red blood cells and Venofer for iron deficiency anemia. Hemoglobin A1c was noted to be 7.3.       9/19/23:   Patient was seen 8/29/2023 after left BKA. He returns today for reevaluation of left BKA. [facility]. No stump pain. He tells me that he is progressing. He is using a wheelchair at the facility. He is getting physical therapy "every other day."  Otherwise, he is either lying in bed or sitting in the wheelchair. It appears he is no longer on clopidogrel. Patient states that it was discontinued because he was having episodes of epistaxis. He is still on apixaban 5 twice daily.     Noted on examination there is a wound on the lateral aspect of the the left leg/stump area. Appears to be pressure. Patient is not certain how long or how he got a wound. We had a long discussion regarding ways to avoid worsening of pressure breakdown. He generally has decreased appetite but eating better. No fevers or chills. No recent illnesses. The following portions of the patient's history were reviewed and updated as appropriate: allergies, current medications, past family history, past medical history, past social history, past surgical history and problem list.    Review of Systems   Constitutional: Negative. HENT: Negative. Eyes: Negative. Respiratory: Negative. Cardiovascular: Negative. Gastrointestinal: Negative. Endocrine: Negative. Genitourinary: Negative. Musculoskeletal: Positive for gait problem. Skin: Negative. Allergic/Immunologic: Negative. Neurological: Positive for weakness. Hematological: Negative. Psychiatric/Behavioral: Negative. Objective:    BP 90/56 (BP Location: Right arm)   Pulse 70      Physical Exam      Patient presents in stretcher [facility]. He is much more awake and alert and interactive than he was on prior visit. Lying flat in no apparent respiratory distress  Lungs overall clear to auscultation bilaterally  Heart regular rate rhythm, S1-S2  Abdomen soft nontender  Left arm swelling has resolved. Bilateral below the knee amputations          I have reviewed and made appropriate changes to the review of systems input by the medical assistant.     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 mellitus ([facility])   • Toenail avulsion   • PAD (peripheral artery disease) ([facility])   • Essential hypertension   • Former tobacco use   • Moderate protein-calorie malnutrition (HCC)   • Ischemic cardiomyopathy   • Nausea & vomiting   • Depressed mood   • Encounter for competency evaluation   • Severe protein-calorie malnutrition ([address])   • Goals of care, counseling/discussion   • Deep tissue injury of sacrum   • Hx of BKA, right ([address])   • Visit for wound check   • Anemia   • S/P BKA (below knee amputation), left ([facility])   • MATTHEW (acute kidney injury) [facility])       Past Surgical History:   Procedure Laterality Date   • CARDIAC CATHETERIZATION     • CARDIAC CATHETERIZATION Left 2/14/2023    Procedure: Cardiac Left Heart Cath;  Surgeon: Yas Whitmore MD;  Location: [facility]; Service: Cardiology   • CARDIAC CATHETERIZATION N/A 2/14/2023    Procedure: Cardiac Coronary Angiogram;  Surgeon: Yas Whitmore MD;  Location: [facility]; Service: Cardiology   • CATARACT EXTRACTION, BILATERAL Bilateral 2021   • IR LOWER EXTREMITY ANGIOGRAM  3/28/2023   • IR LOWER EXTREMITY ANGIOGRAM  4/6/2023   • LEG AMPUTATION THROUGH LOWER TIBIA AND FIBULA Right 3/30/2023    Procedure: (BKA); Surgeon: Rachel Sánchez DO;  Location: [facility];  Service: Vascular   • LEG AMPUTATION THROUGH LOWER TIBIA AND FIBULA Left 8/3/2023    Procedure: AMPUTATION BELOW KNEE (BKA);   Surgeon: Rachel Sánchez DO;  Location: [facility];  Service: Vascular   • WOUND DEBRIDEMENT Right 3/24/2023    Procedure: debridement of heel ulcer;  Surgeon: Donna Martinez DPM;  Location: [facility];  Service: Podiatry       Family History   Problem Relation Age of Onset   • No Known Problems Sister    • Other Daughter         chronic neck pain   • Other Son         chronic back pain   • Sudden death Neg Hx    • Cancer Neg Hx        Social History     Socioeconomic History   • Marital status:      Spouse name: Not on file   • Number of children: 2   • Years of education: Not on file   • Highest education level: Not on file   Occupational History   • Not on file   Tobacco Use   • Smoking status: Former     Packs/day: 1.00     Years: 6.00     Total pack years: 6.00     Types: Cigarettes     Start date: 5     Quit date: 12     Years since quittin.7   • Smokeless tobacco: Never   Vaping Use   • Vaping Use: Never used   Substance and Sexual Activity   • Alcohol use: Not Currently     Comment: Denies history of heavy alcohol use. At most will drink 1 or 2 drinks in a year (Updated 2023). • Drug use: Never   • Sexual activity: Not on file   Other Topics Concern   • Not on file   Social History Narrative    Served in the [employer]; completed 18-month tour in [employer].    Previously worked for [employer]. Lives alone with his dog. Has a son and a daughter who both live into [place], [place] -does not maintain a close relationship with them. Denies having strong support system in place at home. Social Determinants of Health     Financial Resource Strain: Not on file   Food Insecurity: No Food Insecurity (3/25/2023)    Hunger Vital Sign    • Worried About Running Out of Food in the Last Year: Never true    • Ran Out of Food in the Last Year: Never true   Transportation Needs: No Transportation Needs (3/25/2023)    PRAPARE - Transportation    • Lack of Transportation (Medical): No    • Lack of Transportation (Non-Medical):  No   Physical Activity: Not on file   Stress: Not on file   Social Connections: Not on file   Intimate Partner Violence: Not on file   Housing Stability: Low Risk  (3/25/2023)    Housing Stability Vital Sign    • Unable to Pay for Housing in the Last Year: No    • Number of Places Lived in the Last Year: 1    • Unstable Housing in the Last Year: No       Allergies   Allergen Reactions • Metformin Diarrhea         Current Outpatient Medications:   •  acetaminophen (TYLENOL) 325 mg tablet, Take 650 mg by mouth every 6 (six) hours as needed for mild pain, Disp: , Rfl:   •  apixaban (ELIQUIS) 5 mg, Take 5 mg by mouth 2 (two) times a day, Disp: , Rfl:   •  aspirin (ECOTRIN LOW STRENGTH) 81 mg EC tablet, Take 1 tablet (81 mg total) by mouth daily, Disp: 30 tablet, Rfl: 0  •  atorvastatin (LIPITOR) 40 mg tablet, Take 1 tablet (40 mg total) by mouth daily with dinner, Disp: 30 tablet, Rfl: 0  •  Empagliflozin (JARDIANCE) 10 MG TABS tablet, Take 10 mg by mouth every morning, Disp: , Rfl:   •  escitalopram (LEXAPRO) 5 mg tablet, Take 1 tablet (5 mg total) by mouth daily Do not start before April 22, 2023., Disp: , Rfl: 0  •  Evolocumab with Infusor (Repatha Pushtronex System) 420 MG/3.5ML SOCT, Inject under the skin, Disp: , Rfl:   •  famotidine (PEPCID) 20 mg tablet, Take 20 mg by mouth 2 (two) times a day, Disp: , Rfl:   •  finasteride (PROSCAR) 5 mg tablet, Take 1 tablet (5 mg total) by mouth daily, Disp: 30 tablet, Rfl: 11  •  insulin aspart protamine-insulin aspart (NovoLOG 70/30) 100 units/mL injection, Inject under the skin 2 (two) times a day before meals, Disp: , Rfl:   •  insulin glargine (LANTUS) 100 units/mL subcutaneous injection, Inject 10 Units under the skin daily at bedtime, Disp: 10 mL, Rfl: 0  •  Insulin Pen Needle (Pen Needles) 31G X 5 MM MISC, Use 2 (two) times a day, Disp: 100 each, Rfl: 1  •  linaGLIPtin 5 MG TABS, Take 5 mg by mouth daily, Disp: , Rfl:   •  loperamide (IMODIUM) 1 mg/5 mL oral liquid, Take by mouth 4 (four) times a day as needed for diarrhea, Disp: , Rfl:   •  metoprolol succinate (TOPROL-XL) 50 mg 24 hr tablet, Take 1 tablet (50 mg total) by mouth every 12 (twelve) hours, Disp: 30 tablet, Rfl: 0  •  mirtazapine (REMERON) 7.5 MG tablet, Take 1 tablet (7.5 mg total) by mouth daily at bedtime, Disp: , Rfl: 0  •  pantoprazole (PROTONIX) 40 mg tablet, Take 1 tablet (40 mg total) by mouth daily, Disp: , Rfl: 0  •  potassium chloride (KLOR-CON) 20 mEq packet, Take 20 mEq by mouth 2 (two) times a day, Disp: , Rfl:   •  sacubitril-valsartan (Entresto) 49-51 MG TABS, Take 1 tablet by mouth 2 (two) times a day, Disp: 60 tablet, Rfl: 1  •  senna-docusate sodium (SENOKOT S) 8.6-50 mg per tablet, Take 1 tablet by mouth daily as needed, Disp: , Rfl:   •  tamsulosin (FLOMAX) 0.4 mg, Take 1 capsule (0.4 mg total) by mouth daily with dinner, Disp: 30 capsule, Rfl: 11  •  torsemide (DEMADEX) 10 mg tablet, Take 40 mg by mouth daily, Disp: , Rfl:   •  clopidogrel (PLAVIX) 75 mg tablet, Take 1 tablet (75 mg total) by mouth daily Do not start before April 22, 2023.  (Patient not taking: Reported on 9/19/2023), Disp: , Rfl: 0

## 2023-09-19 ENCOUNTER — OFFICE VISIT (OUTPATIENT)
Dept: VASCULAR SURGERY | Facility: CLINIC | Age: 78
End: 2023-09-19

## 2023-09-19 VITALS — SYSTOLIC BLOOD PRESSURE: 90 MMHG | HEART RATE: 70 BPM | DIASTOLIC BLOOD PRESSURE: 56 MMHG

## 2023-09-19 DIAGNOSIS — I73.9 PAD (PERIPHERAL ARTERY DISEASE) (HCC): ICD-10-CM

## 2023-09-19 DIAGNOSIS — E11.65 TYPE 2 DIABETES MELLITUS WITH HYPERGLYCEMIA, WITH LONG-TERM CURRENT USE OF INSULIN (HCC): ICD-10-CM

## 2023-09-19 DIAGNOSIS — I25.5 ISCHEMIC CARDIOMYOPATHY: ICD-10-CM

## 2023-09-19 DIAGNOSIS — Z89.512 S/P BKA (BELOW KNEE AMPUTATION), LEFT (HCC): Primary | ICD-10-CM

## 2023-09-19 DIAGNOSIS — Z79.4 TYPE 2 DIABETES MELLITUS WITH HYPERGLYCEMIA, WITH LONG-TERM CURRENT USE OF INSULIN (HCC): ICD-10-CM

## 2023-09-19 DIAGNOSIS — E44.0 MODERATE PROTEIN-CALORIE MALNUTRITION (HCC): ICD-10-CM

## 2023-09-19 PROCEDURE — 99024 POSTOP FOLLOW-UP VISIT: CPT | Performed by: PHYSICIAN ASSISTANT

## 2023-09-19 RX ORDER — POTASSIUM CHLORIDE 1.5 G/1.58G
20 POWDER, FOR SOLUTION ORAL 2 TIMES DAILY
COMMUNITY

## 2023-09-19 RX ORDER — TORSEMIDE 10 MG/1
40 TABLET ORAL DAILY
COMMUNITY

## 2023-09-19 RX ORDER — ACETAMINOPHEN 325 MG/1
650 TABLET ORAL EVERY 6 HOURS PRN
COMMUNITY

## 2023-09-19 RX ORDER — FAMOTIDINE 20 MG/1
20 TABLET, FILM COATED ORAL 2 TIMES DAILY
COMMUNITY

## 2023-09-19 RX ORDER — EVOLOCUMAB 420 MG/3.5
KIT SUBCUTANEOUS
COMMUNITY

## 2023-09-19 RX ORDER — INSULIN ASPART 100 [IU]/ML
INJECTION, SUSPENSION SUBCUTANEOUS
COMMUNITY

## 2023-09-19 NOTE — PATIENT INSTRUCTIONS
LEFT BKA Pressure wounds  -Open sore to lateral aspect of the BKA (new since last OV)  -Evidence of pressure, breakdown    Plan:  -Apply Betadine at least twice a day to lateral ulceration and bandage for protection  -Avoid pressure to the left leg/BKA and rotate at least every 2 hours  -Consider pillow or wedge at night to avoid pressure  -Recommend bandage to this area to avoid friction  -Check that his wheelchair does not have a friction point which can cause wounds  -Continue close monitoring  -Follow-up with vascular surgery in about 2 weeks for re-check          S/P LEFT below the knee amputation by Dr. Vlad Levine on 8/3/2023    -Continue with local incisional care  -Cleanse with saline and Betadine along incision  -Maintain good diabetes control  -Healthy diet and protein supplementation  -Follow up with [facility] or Dr. Vlad Levine in about 2 weeks

## 2023-10-05 ENCOUNTER — TELEPHONE (OUTPATIENT)
Dept: VASCULAR SURGERY | Facility: CLINIC | Age: 78
End: 2023-10-05

## 2023-10-05 NOTE — TELEPHONE ENCOUNTER
10/5/23 Appt with Dr Mike Amaya cancelled due to an emergency surgery. Message was left at the facility phone number.

## 2023-10-09 ENCOUNTER — OFFICE VISIT (OUTPATIENT)
Dept: CARDIOLOGY CLINIC | Facility: HOSPITAL | Age: 78
End: 2023-10-09
Payer: COMMERCIAL

## 2023-10-09 VITALS
HEART RATE: 80 BPM | WEIGHT: 118.7 LBS | DIASTOLIC BLOOD PRESSURE: 40 MMHG | SYSTOLIC BLOOD PRESSURE: 80 MMHG | BODY MASS INDEX: 17.03 KG/M2

## 2023-10-09 DIAGNOSIS — I73.9 PAD (PERIPHERAL ARTERY DISEASE) (HCC): ICD-10-CM

## 2023-10-09 DIAGNOSIS — I10 ESSENTIAL HYPERTENSION: ICD-10-CM

## 2023-10-09 DIAGNOSIS — E78.5 DYSLIPIDEMIA: ICD-10-CM

## 2023-10-09 DIAGNOSIS — I25.10 TRIPLE VESSEL CORONARY ARTERY DISEASE: Primary | ICD-10-CM

## 2023-10-09 DIAGNOSIS — I50.42 CHRONIC COMBINED SYSTOLIC AND DIASTOLIC CONGESTIVE HEART FAILURE (HCC): ICD-10-CM

## 2023-10-09 DIAGNOSIS — I25.5 ISCHEMIC CARDIOMYOPATHY: ICD-10-CM

## 2023-10-09 PROCEDURE — 99214 OFFICE O/P EST MOD 30 MIN: CPT | Performed by: PHYSICIAN ASSISTANT

## 2023-10-09 NOTE — PROGRESS NOTES
Cardiology Follow Up    Channing Essex  1945  [id number]  [address] [place] [id number]  [phone]  [phone]    1. Triple vessel coronary artery disease        2. Chronic combined systolic and diastolic congestive heart failure ([address])        3. Ischemic cardiomyopathy  Echo follow up/limited w/ contrast if indicated      4. Essential hypertension        5. Dyslipidemia  Lipid Panel with Direct LDL reflex    Comprehensive metabolic panel      6. PAD (peripheral artery disease) ([facility])            Discussion/Summary:  Multivessel coronary artery disease:  [facility] 2/2023: 50% stenosis ostial LM and distal LAD. 60% stenosis proximal LAD. 70% stenosis mid LAD, proximal RCA, and D1. 90% stenosis distal RCA. 100% stenosis mid Cx. He denies any anginal symptoms. Continue statin, beta-blocker. Aspirin is no longer on medication list. Most recent hemoglobin was 7.7, patient also states he was having frequent nosebleeds. Ultimately if tolerated, would recommend he resume the medication. Ischemic cardiomyopathy:  EF initially 25% - February 2023, 35-40% - April 2023, ~30-35% - September 2023  GDMT: metoprolol succinate 50 mg BID, Entresto 24/26 mg - 1/2 tab BID, Jardiance 10 mg daily. BP was 80/40 in office today - although no BP lower than 102 was recorded at the [facility]. Recommend continued monitoring for now. Chronic combined systolic and diastolic congestive heart failure:  Discharged on torsemide 40 mg daily during hospitalization last month. He appears euvolemic on exam today. Had multiple prior thoracentesis - x 2 on right, 1 on left. Most recent creatinine 1.75. Status post dual chamber pacemaker implantation:  Implanted for CHB in May 2023 at [facility]. Will arrange pacemaker interrogation with our office. Dyslipidemia:   Lipids are well controlled. Was recently started on Repatha in addition to atorvastatin.  Repeat lipid panel prior to his next office visit. On anticoagulation with Eliquis for recent pulmonary embolism (August 2023). He will RTO in 2 months with Dr. Genet Montes or sooner if necessary. He will call the office with any concerns in the interim. Interval History:   Felicity Rogers is a 69 y.o. male with multivessel coronary artery disease turned down for CABG, ischemic cardiomyopathy with most recent EF 31-33%, chronic combined systolic and diastolic congestive heart failure, complete heart block s/p dual chamber pacemaker implantation in May 2023 at [facility], hypertension, dyslipidemia, severe peripheral arterial disease s/p right BKA in March 2023 and left BKA in August 2023 who presents to the office today for follow up. He has been hospitalized numerous times in 2023. In February 2023 he presented to this facility with acute congestive heart failure. He was found to have an EF of 25%. Ultimately he was transferred to [facility] where he underwent cardiac catheterization revealing multivessel coronary artery disease. He was turned down for CABG given multiple comorbidities. March 2023 - admission, right BKA for nonhealing right heel ulcer    May 2023 - pacemaker implantation for complete heart block    July/August 2023 - admission for left BKA. Diuretics and GDMT held given worsening renal function    Mid August 2023 - admission for syncope, found to have acute pulmonary embolism and was started on anticoagulation with Eliquis. During this time he also underwent bilateral thoracentesis, both sides yielding approximately 1.5 L. End of August-September 2023 - admission for acute congestive heart failure. Required bumex infusion. Reported diuresis >15 L on admission. Required repeat right sided thoracentesis yielding 1.5 L. Echocardiogram showed EF around 30% with grade II diastolic dysfunction. Entresto restarted at 1/2 tablet BID. Jardiance added. Diuretic regimen at discharge was torsemide 40 mg daily.     Despite the above multiple hospital stays, he presents today feeling "fair."  He is residing at [address] [place] [address]. He denies any chest pain/pressure/discomfort. He spends the majority of his day in a wheelchair. He does not ambulate. He denies any shortness of breath, orthopnea, or PND. He has not noticed any lower extremity edema or abdominal bloating. He denies significant lightheadedness or dizziness. He denies any falls or syncope. He denies palpitations    Medical Problems     Problem List     Hypokalemia    Type 2 diabetes mellitus with hyperglycemia, with long-term current use of insulin ([facility])      Lab Results   Component Value Date    HGBA1C 7.1 (H) 08/18/2023         Abnormal EKG    Acute on chronic systolic heart failure ([facility])    Wt Readings from Last 3 Encounters:   10/09/23 53.8 kg (118 lb 11.2 oz)   08/10/23 77.1 kg (170 lb)   07/26/23 67.7 kg (149 lb 4 oz)                 Urinary retention    Moderate protein malnutrition (HCC)    Malnutrition Findings:                                 BMI Findings: Body mass index is 17.03 kg/m². Unintentional weight loss    CAD (coronary artery disease)    Suspected deep tissue injury of unknown depth    Diabetic ulcer of both feet associated with type 2 diabetes mellitus ([address])      Lab Results   Component Value Date    HGBA1C 7.1 (H) 08/18/2023         Toenail avulsion    PAD (peripheral artery disease) ([address])    Essential hypertension    Former tobacco use    Moderate protein-calorie malnutrition ([address])    Malnutrition Findings:                                 BMI Findings: Body mass index is 17.03 kg/m². Ischemic cardiomyopathy    Nausea & vomiting    Depressed mood    Encounter for competency evaluation    Severe protein-calorie malnutrition ([address])    Malnutrition Findings:                                 BMI Findings: Body mass index is 17.03 kg/m².          Goals of care, counseling/discussion    Deep tissue injury of sacrum    Hx of BKA, right ([address])    Visit for wound check    Anemia    S/P BKA (below knee amputation), left ([facility])    MATTHEW (acute kidney injury) ([address])        Past Medical History:   Diagnosis Date   • 6th nerve palsy    • Blister of right leg    • BPH (benign prostatic hyperplasia)    • CAD (coronary artery disease)    • Cardiomyopathy ([facility])     EF 25%   • CHF (congestive heart failure) ([facility])    • Depression    • Diabetes mellitus ([address])     type 2, insulin dependent   • Diabetic foot ulcer ([address])     left, local wound care   • Diabetic retinopathy ([address])    • Exposure to Agent Orange     while serving in [place] [employer] [facility]   • Former tobacco use    • H/O urinary retention     w/ reagan placement   • Hyperlipidemia    • Hypertension    • Rupture of biceps tendon, traumatic 2017    right     Social History     Socioeconomic History   • Marital status:      Spouse name: Not on file   • Number of children: 2   • Years of education: Not on file   • Highest education level: Not on file   Occupational History   • Not on file   Tobacco Use   • Smoking status: Former     Packs/day: 1.00     Years: 6.00     Total pack years: 6.00     Types: Cigarettes     Start date: 5     Quit date: 12     Years since quittin.8   • Smokeless tobacco: Never   Vaping Use   • Vaping Use: Never used   Substance and Sexual Activity   • Alcohol use: Not Currently     Comment: Denies history of heavy alcohol use. At most will drink 1 or 2 drinks in a year (Updated 2023). • Drug use: Never   • Sexual activity: Not on file   Other Topics Concern   • Not on file   Social History Narrative    Served in the [employer]; completed 18-month tour in [facility].    Previously worked for [employer]. Lives alone with his dog. Has a son and a daughter who both live into [place], [place] -does not maintain a close relationship with them. Denies having strong support system in place at home.      Social Determinants of Health Financial Resource Strain: Not on file   Food Insecurity: No Food Insecurity (3/25/2023)    Hunger Vital Sign    • Worried About Running Out of Food in the Last Year: Never true    • Ran Out of Food in the Last Year: Never true   Transportation Needs: No Transportation Needs (3/25/2023)    PRAPARE - Transportation    • Lack of Transportation (Medical): No    • Lack of Transportation (Non-Medical): No   Physical Activity: Not on file   Stress: Not on file   Social Connections: Not on file   Intimate Partner Violence: Not on file   Housing Stability: Low Risk  (3/25/2023)    Housing Stability Vital Sign    • Unable to Pay for Housing in the Last Year: No    • Number of Places Lived in the Last Year: 1    • Unstable Housing in the Last Year: No      Family History   Problem Relation Age of Onset   • No Known Problems Sister    • Other Daughter         chronic neck pain   • Other Son         chronic back pain   • Sudden death Neg Hx    • Cancer Neg Hx      Past Surgical History:   Procedure Laterality Date   • CARDIAC CATHETERIZATION     • CARDIAC CATHETERIZATION Left 2/14/2023    Procedure: Cardiac Left Heart Cath;  Surgeon: Rosanna Ojeda MD;  Location: [facility]; Service: Cardiology   • CARDIAC CATHETERIZATION N/A 2/14/2023    Procedure: Cardiac Coronary Angiogram;  Surgeon: Rosanna Ojeda MD;  Location: [facility]; Service: Cardiology   • CATARACT EXTRACTION, BILATERAL Bilateral 2021   • IR LOWER EXTREMITY ANGIOGRAM  3/28/2023   • IR LOWER EXTREMITY ANGIOGRAM  4/6/2023   • LEG AMPUTATION THROUGH LOWER TIBIA AND FIBULA Right 3/30/2023    Procedure: (BKA); Surgeon: Kylah Leos DO;  Location: [facility];  Service: Vascular   • LEG AMPUTATION THROUGH LOWER TIBIA AND FIBULA Left 8/3/2023    Procedure: AMPUTATION BELOW KNEE (BKA);   Surgeon: Kylah Leos DO;  Location: [facility];  Service: Vascular   • WOUND DEBRIDEMENT Right 3/24/2023    Procedure: debridement of heel ulcer;  Surgeon: Wojciech Wren Donal Lagunas DPM;  Location: [facility];  Service: Podiatry       Current Outpatient Medications:   •  acetaminophen (TYLENOL) 325 mg tablet, Take 650 mg by mouth every 6 (six) hours as needed for mild pain, Disp: , Rfl:   •  apixaban (ELIQUIS) 5 mg, Take 5 mg by mouth 2 (two) times a day, Disp: , Rfl:   •  atorvastatin (LIPITOR) 40 mg tablet, Take 1 tablet (40 mg total) by mouth daily with dinner, Disp: 30 tablet, Rfl: 0  •  Empagliflozin (JARDIANCE) 10 MG TABS tablet, Take 10 mg by mouth every morning, Disp: , Rfl:   •  Evolocumab with Infusor (Repatha Pushtronex System) 420 MG/3.5ML SOCT, Inject under the skin, Disp: , Rfl:   •  insulin aspart protamine-insulin aspart (NovoLOG 70/30) 100 units/mL injection, Inject under the skin 2 (two) times a day before meals, Disp: , Rfl:   •  insulin glargine (LANTUS) 100 units/mL subcutaneous injection, Inject 10 Units under the skin daily at bedtime, Disp: 10 mL, Rfl: 0  •  linaGLIPtin 5 MG TABS, Take 5 mg by mouth daily, Disp: , Rfl:   •  loperamide (IMODIUM) 1 mg/5 mL oral liquid, Take by mouth 4 (four) times a day as needed for diarrhea, Disp: , Rfl:   •  metoprolol succinate (TOPROL-XL) 50 mg 24 hr tablet, Take 1 tablet (50 mg total) by mouth every 12 (twelve) hours (Patient taking differently: Take 25 mg by mouth every 12 (twelve) hours Half tab BID), Disp: 30 tablet, Rfl: 0  •  potassium chloride (KLOR-CON) 20 mEq packet, Take 10 mEq by mouth daily, Disp: , Rfl:   •  sacubitril-valsartan (Entresto) 49-51 MG TABS, Take 1 tablet by mouth 2 (two) times a day, Disp: 60 tablet, Rfl: 1  •  torsemide (DEMADEX) 10 mg tablet, Take 40 mg by mouth daily, Disp: , Rfl:   •  aspirin (ECOTRIN LOW STRENGTH) 81 mg EC tablet, Take 1 tablet (81 mg total) by mouth daily (Patient not taking: Reported on 10/9/2023), Disp: 30 tablet, Rfl: 0  •  clopidogrel (PLAVIX) 75 mg tablet, Take 1 tablet (75 mg total) by mouth daily Do not start before April 22, 2023.  (Patient not taking: Reported on 9/19/2023), Disp: , Rfl: 0  •  escitalopram (LEXAPRO) 5 mg tablet, Take 1 tablet (5 mg total) by mouth daily Do not start before April 22, 2023.  (Patient not taking: Reported on 10/9/2023), Disp: , Rfl: 0  •  famotidine (PEPCID) 20 mg tablet, Take 20 mg by mouth 2 (two) times a day (Patient not taking: Reported on 10/9/2023), Disp: , Rfl:   •  finasteride (PROSCAR) 5 mg tablet, Take 1 tablet (5 mg total) by mouth daily (Patient not taking: Reported on 10/9/2023), Disp: 30 tablet, Rfl: 11  •  Insulin Pen Needle (Pen Needles) 31G X 5 MM MISC, Use 2 (two) times a day (Patient not taking: Reported on 10/9/2023), Disp: 100 each, Rfl: 1  •  mirtazapine (REMERON) 7.5 MG tablet, Take 1 tablet (7.5 mg total) by mouth daily at bedtime (Patient not taking: Reported on 10/9/2023), Disp: , Rfl: 0  •  pantoprazole (PROTONIX) 40 mg tablet, Take 1 tablet (40 mg total) by mouth daily (Patient not taking: Reported on 10/9/2023), Disp: , Rfl: 0  •  senna-docusate sodium (SENOKOT S) 8.6-50 mg per tablet, Take 1 tablet by mouth daily as needed (Patient not taking: Reported on 10/9/2023), Disp: , Rfl:   •  tamsulosin (FLOMAX) 0.4 mg, Take 1 capsule (0.4 mg total) by mouth daily with dinner (Patient not taking: Reported on 10/9/2023), Disp: 30 capsule, Rfl: 11  Allergies   Allergen Reactions   • Metformin Diarrhea       Labs:     Chemistry        Component Value Date/Time    K 3.8 08/08/2023 0451     (H) 08/08/2023 0451    CO2 26 08/08/2023 0451    BUN 60 (H) 08/08/2023 0451    CREATININE 1.43 (H) 08/08/2023 0451        Component Value Date/Time    CALCIUM 8.4 08/08/2023 0451    ALKPHOS 256 (H) 08/04/2023 0215    AST 24 08/04/2023 0215    ALT 22 08/04/2023 0215            No results found for: "CHOL"  Lab Results   Component Value Date    HDL 36 (L) 02/11/2023     Lab Results   Component Value Date    LDLCALC 61 02/11/2023     Lab Results   Component Value Date    TRIG 57 02/11/2023     No results found for: "CHOLHDL"    Imaging: No results found. Review of Systems   All other systems reviewed and are negative. Vitals:    10/09/23 1423   BP: (!) 80/40   Pulse:      Vitals:    10/09/23 1349   Weight: 53.8 kg (118 lb 11.2 oz)         Body mass index is 17.03 kg/m². Physical Exam:  Physical Exam  Vitals reviewed. Constitutional:       General: He is not in acute distress. Appearance: He is well-developed. He is not diaphoretic. HENT:      Head: Normocephalic and atraumatic. Eyes:      Pupils: Pupils are equal, round, and reactive to light. Neck:      Vascular: No carotid bruit. Cardiovascular:      Rate and Rhythm: Normal rate and regular rhythm. Pulses:           Radial pulses are 2+ on the right side and 2+ on the left side. Heart sounds: S1 normal and S2 normal. No murmur heard. Pulmonary:      Effort: Pulmonary effort is normal. No respiratory distress. Breath sounds: Normal breath sounds. No wheezing or rales. Abdominal:      General: There is no distension. Palpations: Abdomen is soft. Tenderness: There is no abdominal tenderness. Musculoskeletal:         General: Deformity (bilateral BKA's noted.) present. Normal range of motion. Cervical back: Normal range of motion. Skin:     General: Skin is warm and dry. Findings: No erythema. Neurological:      General: No focal deficit present. Mental Status: He is alert and oriented to person, place, and time.    Psychiatric:         Mood and Affect: Mood normal.         Behavior: Behavior normal.

## 2023-10-17 ENCOUNTER — IN-CLINIC DEVICE VISIT (OUTPATIENT)
Dept: CARDIOLOGY CLINIC | Facility: HOSPITAL | Age: 78
End: 2023-10-17
Payer: COMMERCIAL

## 2023-10-17 DIAGNOSIS — Z95.0 PRESENCE OF CARDIAC PACEMAKER: Primary | ICD-10-CM

## 2023-10-17 PROCEDURE — 93280 PM DEVICE PROGR EVAL DUAL: CPT | Performed by: INTERNAL MEDICINE

## 2023-10-17 NOTE — PROGRESS NOTES
Results for orders placed or performed in visit on 10/17/23   Cardiac EP device report    Narrative    DEVICE INTERROGATED IN THE [facility]: BATTERY VOLTAGE ADEQUATE (14.7 YRS). AP: 8.1%. : <0.1% (MVP-ON). ALL LEAD PARAMETERS WITHIN NORMAL LIMITS. NO SIGNIFICANT HIGH RATE EPISODES. NO PROGRAMMING CHANGES MADE TO DEVICE PARAMETERS. NORMAL DEVICE FUNCTION.   [address] [place] [address]

## 2023-10-18 NOTE — PROGRESS NOTES
Assessment/Plan:    S/P BKA (below knee amputation), left [facility])  67-year-old gentleman with PAD status post right and left below-knee amputation the right was done in June and the left in August the amputation sites themselves have healed well he is connected with prosthetics through [facility]. On his last visit with vascular he did have a left lateral wound on the knee which is a pressure wound compared to the previous pictures this is now much improved there is still a small dry wound on the lateral aspect of the left knee this will need continued wound care and will need to hold off on prosthetic fitting on the left side until this is completely healed to avoid further skin breakdown. Once this is healed he will be cleared for full ambulation and prosthetics fitting. The right side is well-healed and at this time he is cleared for full ambulation and prosthetics fitting      He can follow-up as needed with vascular at this time         Diagnoses and all orders for this visit:    PAD (peripheral artery disease) ([facility])    Hx of BKA, right ([address])    S/P BKA (below knee amputation), left ([address])          Subjective:      Patient ID: Mayra Jerome is a 69 y.o. male. Patient is s/p L BKA on 8/3/23 and presents today for f/u visit and re-check of surgical site healing. HPI    The following portions of the patient's history were reviewed and updated as appropriate: allergies, current medications, past family history, past medical history, past social history, past surgical history, and problem list.    Review of Systems   Constitutional: Negative. HENT: Negative. Eyes: Negative. Respiratory: Negative. Cardiovascular: Negative. Gastrointestinal: Negative. Endocrine: Negative. Genitourinary: Negative. Musculoskeletal:  Positive for gait problem. Skin: Negative. Allergic/Immunologic: Negative. Neurological:  Positive for weakness. Hematological:  Bruises/bleeds easily. Psychiatric/Behavioral: Negative. I have reviewed the ROS above and made changes as needed.         Objective:      BP (!) 80/44 (BP Location: Right arm, Patient Position: Sitting)   Pulse 62          Physical Exam      General  Exam: alert, awake, oriented, no distress, consistent with stated age    Integumentary  Exam: warm, dry, no gross lesions, no bruises and normal color    Head and Neck  Exam: supple, no bruits, trachea midline, no JVD, no mass or palpable nodes    Eye  Exam: extraoccular movements intact, no scleral icterus, sclera clear, pupils equal round and reactive to light    ENMT  Exam: oral mucosa pink and moist    Chest and Lung  Exam: chest normal without deformity, bilaterally expansive, clear to auscultation    Cardiovascular  Exam: regular rate, regular rhythm, no murmurs, no rubs or gallops    Adbomen  Exam: soft, non-tender, non-distended, no pulsatile abdominal masses, no abdominal bruit    Peripheral Vascular  Exam: healed bilateral BKA    Partial thickness lateral left knee wound, wound is dry      Upper Extremity:  Palpation: Radial pulse- Bilateral 2+    Neurologic  Exam:alert, non-focal, oriented x 3, cranial nerves II-XII grossly intact

## 2023-10-19 ENCOUNTER — OFFICE VISIT (OUTPATIENT)
Dept: VASCULAR SURGERY | Facility: CLINIC | Age: 78
End: 2023-10-19

## 2023-10-19 VITALS — HEART RATE: 62 BPM | SYSTOLIC BLOOD PRESSURE: 80 MMHG | DIASTOLIC BLOOD PRESSURE: 44 MMHG

## 2023-10-19 DIAGNOSIS — I73.9 PAD (PERIPHERAL ARTERY DISEASE) (HCC): Primary | ICD-10-CM

## 2023-10-19 DIAGNOSIS — Z89.511 HX OF BKA, RIGHT (HCC): ICD-10-CM

## 2023-10-19 DIAGNOSIS — Z89.512 S/P BKA (BELOW KNEE AMPUTATION), LEFT (HCC): ICD-10-CM

## 2023-10-19 PROCEDURE — 99024 POSTOP FOLLOW-UP VISIT: CPT | Performed by: SURGERY

## 2023-10-19 NOTE — ASSESSMENT & PLAN NOTE
81-year-old gentleman with PAD status post right and left below-knee amputation the right was done in June and the left in August the amputation sites themselves have healed well he is connected with prosthetics through [facility]. On his last visit with vascular he did have a left lateral wound on the knee which is a pressure wound compared to the previous pictures this is now much improved there is still a small dry wound on the lateral aspect of the left knee this will need continued wound care and will need to hold off on prosthetic fitting on the left side until this is completely healed to avoid further skin breakdown. Once this is healed he will be cleared for full ambulation and prosthetics fitting.         The right side is well-healed and at this time he is cleared for full ambulation and prosthetics fitting      He can follow-up as needed with vascular at this time

## 2023-12-05 ENCOUNTER — HOSPITAL ENCOUNTER (OUTPATIENT)
Dept: NON INVASIVE DIAGNOSTICS | Facility: HOSPITAL | Age: 78
Discharge: HOME | End: 2023-12-05
Payer: COMMERCIAL

## 2023-12-05 DIAGNOSIS — I25.5 ISCHEMIC CARDIOMYOPATHY: ICD-10-CM

## 2023-12-05 LAB
AORTIC ROOT: 3.7 CM
E WAVE DECELERATION TIME: 129 MS
FRACTIONAL SHORTENING: 16 (ref 28–44)
INTERVENTRICULAR SEPTUM IN DIASTOLE (PARASTERNAL SHORT AXIS VIEW): 0.8 CM
INTERVENTRICULAR SEPTUM: 0.8 CM (ref 0.6–1.1)
LEFT ATRIUM SIZE: 3.7 CM
LEFT INTERNAL DIMENSION IN SYSTOLE: 4.6 CM (ref 2.1–4)
LEFT VENTRICULAR INTERNAL DIMENSION IN DIASTOLE: 5.5 CM (ref 3.5–6)
LEFT VENTRICULAR POSTERIOR WALL IN END DIASTOLE: 0.8 CM
LEFT VENTRICULAR STROKE VOLUME: 49 ML
LVSV (TEICH): 49 ML
MV PEAK E VEL: 109 CM/S
MV STENOSIS PRESSURE HALF TIME: 37 MS
MV VALVE AREA P 1/2 METHOD: 5.95
PA SYSTOLIC PRESSURE: 35 MMHG
SL CV LV EF: 25
SL CV PED ECHO LEFT VENTRICLE DIASTOLIC VOLUME (MOD BIPLANE) 2D: 147 ML
SL CV PED ECHO LEFT VENTRICLE SYSTOLIC VOLUME (MOD BIPLANE) 2D: 98 ML
TR MAX PG: 35 MMHG
TR PEAK VELOCITY: 3 M/S
TRICUSPID VALVE PEAK REGURGITATION VELOCITY: 2.96 M/S

## 2023-12-05 PROCEDURE — 93325 DOPPLER ECHO COLOR FLOW MAPG: CPT

## 2023-12-05 PROCEDURE — 93308 TTE F-UP OR LMTD: CPT

## 2023-12-05 PROCEDURE — 93325 DOPPLER ECHO COLOR FLOW MAPG: CPT | Performed by: INTERNAL MEDICINE

## 2023-12-05 PROCEDURE — 93308 TTE F-UP OR LMTD: CPT | Performed by: INTERNAL MEDICINE

## 2023-12-05 PROCEDURE — 93321 DOPPLER ECHO F-UP/LMTD STD: CPT

## 2023-12-05 PROCEDURE — 93321 DOPPLER ECHO F-UP/LMTD STD: CPT | Performed by: INTERNAL MEDICINE

## 2024-05-29 NOTE — PROGRESS NOTES
Cardiology Follow Up    Marco Callejas  1945  [id number]  [facility]  [address]  [place] [address] [phone]    1. Coronary artery disease involving native coronary artery of native heart without angina pectoris        2. Ischemic cardiomyopathy        3. Acute on chronic systolic heart failure (HCC)              Discussion/Summary:This is the 1st time I have seen him. He appears stable. I have reviewed his medications and made no changes. No cardiac testing is ordered.  RTO 2 months.      Interval History: he has severe CAD with cardiac cath 2/14/2023. He had surgical disease but was deemed not a good candidate for CABG.  His weight is up from 118 to 128 lbs.  EF 30%. He has a pacemaker for CHB.  He has severe PVD.    This is the first time I have seen him in the office.    He denies CP / SOB    Patient Active Problem List   Diagnosis    Hypokalemia    Type 2 diabetes mellitus with hyperglycemia, with long-term current use of insulin ([facility])    Abnormal EKG    Acute on chronic systolic heart failure (HCC)    Urinary retention    Moderate protein malnutrition (HCC)    Unintentional weight loss    CAD (coronary artery disease)    Suspected deep tissue injury of unknown depth    Diabetic ulcer of both feet associated with type 2 diabetes mellitus ([facility])    Toenail avulsion    PAD (peripheral artery disease) ([facility])    Essential hypertension    Former tobacco use    Moderate protein-calorie malnutrition (HCC)    Ischemic cardiomyopathy    Nausea & vomiting    Depressed mood    Encounter for competency evaluation    Severe protein-calorie malnutrition ([facility])    Goals of care, counseling/discussion    Deep tissue injury of sacrum    Hx of BKA, right ([facility])    Visit for wound check    Anemia    S/P BKA (below knee amputation), left ([facility])    MATTHEW (acute kidney injury) ([facility])     Past Medical History:   Diagnosis Date    6th  nerve palsy     Blister of right leg     BPH (benign prostatic hyperplasia)     CAD (coronary artery disease)     Cardiomyopathy (HCC)     EF 25%    CHF (congestive heart failure) (HCC)     Depression     Diabetes mellitus (HCC)     type 2, insulin dependent    Diabetic foot ulcer (HCC)     left, local wound care    Diabetic retinopathy (HCC)     Exposure to Agent Orange     while serving in [employer]    Former tobacco use     H/O urinary retention     w/ [facility] placement    Hyperlipidemia     Hypertension     Rupture of biceps tendon, traumatic 2017    right     Social History     Socioeconomic History    Marital status:      Spouse name: Not on file    Number of children: 2    Years of education: Not on file    Highest education level: Not on file   Occupational History    Not on file   Tobacco Use    Smoking status: Former     Current packs/day: 0.00     Average packs/day: 1 pack/day for 6.0 years (6.0 ttl pk-yrs)     Types: Cigarettes     Start date:      Quit date:      Years since quittin.4    Smokeless tobacco: Never   Vaping Use    Vaping status: Never Used   Substance and Sexual Activity    Alcohol use: Not Currently     Comment: Denies history of heavy alcohol use. At most will drink 1 or 2 drinks in a year (Updated 2023).    Drug use: Never    Sexual activity: Not on file   Other Topics Concern    Not on file   Social History Narrative    Served in the [employer]; completed 18-month tour in [employer].    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    Lives alone with his dog.    Has a son and a daughter who both live into [place], [place] -does not maintain a close relationship with them.    Denies having strong support system in place at home.     Social Determinants of Health     Financial Resource Strain: High Risk (10/26/2023)    Received from [facility]    Overall Financial Resource Strain (CARDIA)     Difficulty of Paying Living Expenses: Hard   Food Insecurity: No Food  Insecurity (10/26/2023)    Received from [facility]    Hunger Vital Sign     Worried About Running Out of Food in the Last Year: Never true     Ran Out of Food in the Last Year: Never true   Transportation Needs: Unmet Transportation Needs (10/26/2023)    Received from [facility]    PRAPARE - Transportation     Lack of Transportation (Medical): Yes     Lack of Transportation (Non-Medical): Yes   Physical Activity: Not on file   Stress: Not on file   Social Connections: Not on file   Intimate Partner Violence: Not At Risk (10/26/2023)    Received from [facility]    Humiliation, Afraid, Rape, and Kick questionnaire     Fear of Current or Ex-Partner: No     Emotionally Abused: No     Physically Abused: No     Sexually Abused: No   Housing Stability: Low Risk  (10/26/2023)    Received from [facility]    Housing Stability Vital Sign     Unable to Pay for Housing in the Last Year: No     Number of Places Lived in the Last Year: 1     In the last 12 months, was there a time when you did not have a steady place to sleep or slept in a shelter (including now)?: No      Family History   Problem Relation Age of Onset    No Known Problems Sister     Other Daughter         chronic neck pain    Other Son         chronic back pain    Sudden death Neg Hx     Cancer Neg Hx      Past Surgical History:   Procedure Laterality Date    CARDIAC CATHETERIZATION      CARDIAC CATHETERIZATION Left 2/14/2023    Procedure: Cardiac Left Heart Cath;  Surgeon: Gilmar Lucas MD;  Location: [facility];  Service: Cardiology    CARDIAC CATHETERIZATION N/A 2/14/2023    Procedure: Cardiac Coronary Angiogram;  Surgeon: Gilmar Lucas MD;  Location: [facility];  Service: Cardiology    CATARACT EXTRACTION, BILATERAL Bilateral 2021    IR LOWER EXTREMITY ANGIOGRAM  3/28/2023    IR LOWER EXTREMITY ANGIOGRAM  4/6/2023    LEG AMPUTATION THROUGH LOWER TIBIA AND FIBULA Right  3/30/2023    Procedure: (BKA);  Surgeon: Adam Angelo DO;  Location: [facility];  Service: Vascular    LEG AMPUTATION THROUGH LOWER TIBIA AND FIBULA Left 8/3/2023    Procedure: AMPUTATION BELOW KNEE (BKA);  Surgeon: Adam Angelo DO;  Location: [facility];  Service: Vascular    WOUND DEBRIDEMENT Right 3/24/2023    Procedure: debridement of heel ulcer;  Surgeon: Richy Carrasquillo DPM;  Location: [facility];  Service: Podiatry       Current Outpatient Medications:     acetaminophen (TYLENOL) 325 mg tablet, Take 650 mg by mouth every 6 (six) hours as needed for mild pain, Disp: , Rfl:     apixaban (ELIQUIS) 5 mg, Take 5 mg by mouth 2 (two) times a day, Disp: , Rfl:     Empagliflozin (JARDIANCE) 10 MG TABS tablet, Take 10 mg by mouth every morning, Disp: , Rfl:     Evolocumab with Infusor (Repatha Pushtronex System) 420 MG/3.5ML SOCT, Inject under the skin, Disp: , Rfl:     insulin aspart protamine-insulin aspart (NovoLOG 70/30) 100 units/mL injection, Inject under the skin 2 (two) times a day before meals, Disp: , Rfl:     linaGLIPtin 5 MG TABS, Take 5 mg by mouth daily, Disp: , Rfl:     loperamide (IMODIUM) 1 mg/5 mL oral liquid, Take by mouth 4 (four) times a day as needed for diarrhea, Disp: , Rfl:     famotidine (PEPCID) 20 mg tablet, Take 20 mg by mouth 2 (two) times a day (Patient not taking: Reported on 10/9/2023), Disp: , Rfl:     potassium chloride (KLOR-CON) 20 mEq packet, Take 10 mEq by mouth daily (Patient not taking: Reported on 12/5/2023), Disp: , Rfl:     torsemide (DEMADEX) 10 mg tablet, Take 40 mg by mouth daily (Patient not taking: Reported on 12/5/2023), Disp: , Rfl:   Allergies   Allergen Reactions    Metformin Diarrhea     Vitals:    12/05/23 1421   BP: 108/70   Pulse: 83   SpO2: 97%   Weight: 58.1 kg (128 lb)     Weight (last 2 days)       None           Blood pressure 108/70, pulse 83, weight 58.1 kg (128 lb), SpO2 97%., Body mass index is 18.37 kg/m².    Labs:  Hospital Outpatient Visit on  12/05/2023   Component Date Value    Triscuspid Valve Regurgi* 12/05/2023 35.0     MV stenosis pressure 1/2* 12/05/2023 37     MV Peak E [place] 12/05/2023 109     E wave deceleration time 12/05/2023 129     MV valve area p 1/2 meth* 12/05/2023 5.95     TR Peak [place] 12/05/2023 3.0     Tricuspid valve peak reg* 12/05/2023 2.96     Left ventricular stroke * 12/05/2023 49.00     IVSd 12/05/2023 0.80     Ao root 12/05/2023 3.70     LVPWd 12/05/2023 0.80     LA size 12/05/2023 3.7     FS 12/05/2023 16     LVIDS 12/05/2023 4.60     IVS 12/05/2023 0.8     LVIDd 12/05/2023 5.50     LEFT VENTRICLE SYSTOLIC * 12/05/2023 98     LV DIASTOLIC VOLUME (MOD* 12/05/2023 147     LVSV, 2D 12/05/2023 49     LV EF 12/05/2023 25     PASP 12/05/2023 35.0    No results displayed because visit has over 200 results.      Admission on 07/26/2023, Discharged on 07/27/2023   Component Date Value    WBC 07/26/2023 7.92     RBC 07/26/2023 3.11 (L)     Hemoglobin 07/26/2023 7.9 (L)     Hematocrit 07/26/2023 26.6 (L)     MCV 07/26/2023 86     MCH 07/26/2023 25.4 (L)     MCHC 07/26/2023 29.7 (L)     RDW 07/26/2023 17.1 (H)     MPV 07/26/2023 8.2 (L)     Platelets 07/26/2023 314     nRBC 07/26/2023 0     Segmented % 07/26/2023 75     Immature Grans % 07/26/2023 0     Lymphocytes % 07/26/2023 14     Monocytes % 07/26/2023 7     Eosinophils Relative 07/26/2023 4     Basophils Relative 07/26/2023 0     Absolute Neutrophils 07/26/2023 5.89     Absolute Immature Grans 07/26/2023 0.03     Absolute Lymphocytes 07/26/2023 1.14     Absolute Monocytes 07/26/2023 0.53     Eosinophils Absolute 07/26/2023 0.32     Basophils Absolute 07/26/2023 0.01     Sodium 07/26/2023 137     Potassium 07/26/2023 3.7     Chloride 07/26/2023 95 (L)     CO2 07/26/2023 36 (H)     ANION GAP 07/26/2023 6     BUN 07/26/2023 36 (H)     Creatinine 07/26/2023 0.95     Glucose 07/26/2023 283 (H)     Calcium 07/26/2023 8.5     Corrected Calcium 07/26/2023 9.4     AST 07/26/2023 41 (H)     ALT  07/26/2023 44     Alkaline Phosphatase 07/26/2023 472 (H)     Total Protein 07/26/2023 6.4     Albumin 07/26/2023 2.9 (L)     Total Bilirubin 07/26/2023 0.60     eGFR 07/26/2023 76     LACTIC ACID 07/26/2023 1.3     Procalcitonin 07/26/2023 0.05     Protime 07/26/2023 15.9 (H)     INR 07/26/2023 1.25 (H)     PTT 07/26/2023 37     Blood Culture 07/26/2023 No Growth After 5 Days.     Blood Culture 07/26/2023 No Growth After 5 Days.     Sed Rate 07/26/2023 60 (H)     CRP 07/26/2023 35.8 (H)     Ventricular Rate 07/26/2023 62     Atrial Rate 07/26/2023 62     ID Interval 07/26/2023 194     QRSD Interval 07/26/2023 86     QT Interval 07/26/2023 408     QTC Interval 07/26/2023 414     P Axis 07/26/2023 70     QRS Axis 07/26/2023 65     T Wave [place] 07/26/2023 -47     POC Glucose 07/27/2023 262 (H)     POC Glucose 07/27/2023 274 (H)      Imaging: No results found.    Review of Systems:  Review of Systems   Constitutional:  Negative for diaphoresis, fatigue, fever and unexpected weight change.   HENT: Negative.     Respiratory:  Negative for cough, shortness of breath and wheezing.    Cardiovascular:  Negative for chest pain, palpitations and leg swelling.   Gastrointestinal:  Negative for abdominal pain, diarrhea and nausea.   Musculoskeletal:  Negative for gait problem and myalgias.   Skin:  Negative for rash.   Neurological:  Negative for dizziness and numbness.   Psychiatric/Behavioral: Negative.         Physical Exam:  Physical Exam  Constitutional:       Appearance: He is well-developed.   HENT:      Head: Normocephalic and atraumatic.   Eyes:      Pupils: Pupils are equal, round, and reactive to light.   Neck:      Vascular: No JVD.   Cardiovascular:      Rate and Rhythm: Regular rhythm.      Pulses: Normal pulses.           Carotid pulses are 2+ on the right side and 2+ on the left side.     Heart sounds: S1 normal and S2 normal.   Pulmonary:      Effort: Pulmonary effort is normal.      Breath sounds: Normal breath  sounds. No wheezing or rales.   Abdominal:      General: Bowel sounds are normal.      Palpations: Abdomen is soft.   Musculoskeletal:         General: No tenderness. Normal range of motion.      Cervical back: Normal range of motion and neck supple.   Skin:     General: Skin is warm.   Neurological:      Mental Status: He is alert and oriented to person, place, and time.      Cranial Nerves: No cranial nerve deficit.      Deep Tendon Reflexes: Reflexes are normal and symmetric.
